# Patient Record
Sex: MALE | HISPANIC OR LATINO | Employment: FULL TIME | URBAN - METROPOLITAN AREA
[De-identification: names, ages, dates, MRNs, and addresses within clinical notes are randomized per-mention and may not be internally consistent; named-entity substitution may affect disease eponyms.]

---

## 2023-03-04 ENCOUNTER — APPOINTMENT (OUTPATIENT)
Dept: RADIOLOGY | Facility: MEDICAL CENTER | Age: 58
End: 2023-03-04
Attending: EMERGENCY MEDICINE

## 2023-03-04 ENCOUNTER — HOSPITAL ENCOUNTER (EMERGENCY)
Facility: MEDICAL CENTER | Age: 58
End: 2023-03-04
Attending: EMERGENCY MEDICINE
Payer: OTHER MISCELLANEOUS

## 2023-03-04 VITALS
DIASTOLIC BLOOD PRESSURE: 89 MMHG | RESPIRATION RATE: 18 BRPM | WEIGHT: 124.12 LBS | OXYGEN SATURATION: 96 % | HEIGHT: 65 IN | TEMPERATURE: 97.5 F | BODY MASS INDEX: 20.68 KG/M2 | SYSTOLIC BLOOD PRESSURE: 121 MMHG | HEART RATE: 79 BPM

## 2023-03-04 DIAGNOSIS — I50.9 ACUTE ON CHRONIC CONGESTIVE HEART FAILURE, UNSPECIFIED HEART FAILURE TYPE (HCC): ICD-10-CM

## 2023-03-04 DIAGNOSIS — N17.9 AKI (ACUTE KIDNEY INJURY) (HCC): ICD-10-CM

## 2023-03-04 LAB
ALBUMIN SERPL BCP-MCNC: 4.1 G/DL (ref 3.2–4.9)
ALBUMIN/GLOB SERPL: 1.2 G/DL
ALP SERPL-CCNC: 115 U/L (ref 30–99)
ALT SERPL-CCNC: 96 U/L (ref 2–50)
ANION GAP SERPL CALC-SCNC: 14 MMOL/L (ref 7–16)
AST SERPL-CCNC: 74 U/L (ref 12–45)
BASOPHILS # BLD AUTO: 0.9 % (ref 0–1.8)
BASOPHILS # BLD: 0.06 K/UL (ref 0–0.12)
BILIRUB SERPL-MCNC: 1.3 MG/DL (ref 0.1–1.5)
BUN SERPL-MCNC: 23 MG/DL (ref 8–22)
CALCIUM ALBUM COR SERPL-MCNC: 9 MG/DL (ref 8.5–10.5)
CALCIUM SERPL-MCNC: 9.1 MG/DL (ref 8.5–10.5)
CHLORIDE SERPL-SCNC: 105 MMOL/L (ref 96–112)
CO2 SERPL-SCNC: 19 MMOL/L (ref 20–33)
CREAT SERPL-MCNC: 1.42 MG/DL (ref 0.5–1.4)
EKG IMPRESSION: NORMAL
EOSINOPHIL # BLD AUTO: 0.16 K/UL (ref 0–0.51)
EOSINOPHIL NFR BLD: 2.4 % (ref 0–6.9)
ERYTHROCYTE [DISTWIDTH] IN BLOOD BY AUTOMATED COUNT: 58.8 FL (ref 35.9–50)
FLUAV RNA SPEC QL NAA+PROBE: NEGATIVE
FLUBV RNA SPEC QL NAA+PROBE: NEGATIVE
GFR SERPLBLD CREATININE-BSD FMLA CKD-EPI: 57 ML/MIN/1.73 M 2
GLOBULIN SER CALC-MCNC: 3.3 G/DL (ref 1.9–3.5)
GLUCOSE SERPL-MCNC: 130 MG/DL (ref 65–99)
HCT VFR BLD AUTO: 49.1 % (ref 42–52)
HGB BLD-MCNC: 15.8 G/DL (ref 14–18)
IMM GRANULOCYTES # BLD AUTO: 0.02 K/UL (ref 0–0.11)
IMM GRANULOCYTES NFR BLD AUTO: 0.3 % (ref 0–0.9)
LYMPHOCYTES # BLD AUTO: 1.31 K/UL (ref 1–4.8)
LYMPHOCYTES NFR BLD: 19.6 % (ref 22–41)
MCH RBC QN AUTO: 29.8 PG (ref 27–33)
MCHC RBC AUTO-ENTMCNC: 32.2 G/DL (ref 33.7–35.3)
MCV RBC AUTO: 92.6 FL (ref 81.4–97.8)
MONOCYTES # BLD AUTO: 0.5 K/UL (ref 0–0.85)
MONOCYTES NFR BLD AUTO: 7.5 % (ref 0–13.4)
NEUTROPHILS # BLD AUTO: 4.62 K/UL (ref 1.82–7.42)
NEUTROPHILS NFR BLD: 69.3 % (ref 44–72)
NRBC # BLD AUTO: 0 K/UL
NRBC BLD-RTO: 0 /100 WBC
NT-PROBNP SERPL IA-MCNC: 6542 PG/ML (ref 0–125)
PLATELET # BLD AUTO: 337 K/UL (ref 164–446)
PMV BLD AUTO: 10 FL (ref 9–12.9)
POTASSIUM SERPL-SCNC: 3.9 MMOL/L (ref 3.6–5.5)
PROT SERPL-MCNC: 7.4 G/DL (ref 6–8.2)
RBC # BLD AUTO: 5.3 M/UL (ref 4.7–6.1)
RSV RNA SPEC QL NAA+PROBE: NEGATIVE
SARS-COV-2 RNA RESP QL NAA+PROBE: NOTDETECTED
SODIUM SERPL-SCNC: 138 MMOL/L (ref 135–145)
SPECIMEN SOURCE: NORMAL
TROPONIN T SERPL-MCNC: 34 NG/L (ref 6–19)
TROPONIN T SERPL-MCNC: 40 NG/L (ref 6–19)
WBC # BLD AUTO: 6.7 K/UL (ref 4.8–10.8)

## 2023-03-04 PROCEDURE — 85025 COMPLETE CBC W/AUTO DIFF WBC: CPT

## 2023-03-04 PROCEDURE — 83880 ASSAY OF NATRIURETIC PEPTIDE: CPT

## 2023-03-04 PROCEDURE — 71045 X-RAY EXAM CHEST 1 VIEW: CPT

## 2023-03-04 PROCEDURE — C9803 HOPD COVID-19 SPEC COLLECT: HCPCS | Performed by: EMERGENCY MEDICINE

## 2023-03-04 PROCEDURE — 80053 COMPREHEN METABOLIC PANEL: CPT

## 2023-03-04 PROCEDURE — 84484 ASSAY OF TROPONIN QUANT: CPT

## 2023-03-04 PROCEDURE — 36415 COLL VENOUS BLD VENIPUNCTURE: CPT

## 2023-03-04 PROCEDURE — 93005 ELECTROCARDIOGRAM TRACING: CPT | Performed by: EMERGENCY MEDICINE

## 2023-03-04 PROCEDURE — 99284 EMERGENCY DEPT VISIT MOD MDM: CPT

## 2023-03-04 PROCEDURE — 0241U HCHG SARS-COV-2 COVID-19 NFCT DS RESP RNA 4 TRGT MIC: CPT

## 2023-03-04 PROCEDURE — 93005 ELECTROCARDIOGRAM TRACING: CPT

## 2023-03-04 RX ORDER — POTASSIUM CHLORIDE 20 MEQ/1
40 TABLET, EXTENDED RELEASE ORAL DAILY
Qty: 60 TABLET | Refills: 0 | Status: ON HOLD | OUTPATIENT
Start: 2023-03-04 | End: 2024-02-11

## 2023-03-04 RX ORDER — FUROSEMIDE 40 MG/1
40 TABLET ORAL DAILY
Qty: 30 TABLET | Refills: 0 | Status: SHIPPED | OUTPATIENT
Start: 2023-03-04 | End: 2024-02-06

## 2023-03-04 ASSESSMENT — LIFESTYLE VARIABLES
HAVE PEOPLE ANNOYED YOU BY CRITICIZING YOUR DRINKING: NO
TOTAL SCORE: 0
EVER HAD A DRINK FIRST THING IN THE MORNING TO STEADY YOUR NERVES TO GET RID OF A HANGOVER: NO
HAVE YOU EVER FELT YOU SHOULD CUT DOWN ON YOUR DRINKING: NO
TOTAL SCORE: 0
EVER FELT BAD OR GUILTY ABOUT YOUR DRINKING: NO
DO YOU DRINK ALCOHOL: NO
TOTAL SCORE: 0
CONSUMPTION TOTAL: NEGATIVE
AVERAGE NUMBER OF DAYS PER WEEK YOU HAVE A DRINK CONTAINING ALCOHOL: 0
ON A TYPICAL DAY WHEN YOU DRINK ALCOHOL HOW MANY DRINKS DO YOU HAVE: 0
HOW MANY TIMES IN THE PAST YEAR HAVE YOU HAD 5 OR MORE DRINKS IN A DAY: 0

## 2023-03-04 NOTE — ED TRIAGE NOTES
"Chief Complaint   Patient presents with    Shortness of Breath     Worsening for 1 week, patient was diagnosed with CHF about 1 month ago and was started on lasix. He has been c/o BLE swelling too.        Patient to triage ambulatory with a steady gait, AAOx4, Appropriate precautions in place.     Explained wait time and triage process. Placed back in lobby. Told to notify ED tech or RN of any changes, verbalized understanding.    BP (!) 133/97   Pulse 100   Temp 36.2 °C (97.1 °F) (Temporal)   Resp 16   Ht 1.651 m (5' 5\")   Wt 56.3 kg (124 lb 1.9 oz)   SpO2 96%   BMI 20.65 kg/m²     "

## 2023-03-04 NOTE — ED NOTES
Pt states he has been a daily drinker x '40 years, I would drink 6-12 beers/day but I quit drinking 6 weeks ago', pt denies any current ETOH, denies having gone through withdrawal 6 weeks ago

## 2023-03-04 NOTE — DISCHARGE INSTRUCTIONS
You were seen in the Emergency Department for shortness of breath after not taking your heart failure medications.    EKG, labs were completed without significant acute abnormalities other than signs of ongoing heart failure.  Kidney function was mildly elevated however not worrisome.    Please use 1,000mg of tylenol every 6 hours as needed for pain.    Please start taking your diuretic medication furosemide again along with potassium daily.    Please call for immediate follow-up with cardiology within the next month for recheck of heart and kidney function.    Return to the Emergency Department with chest pain, trouble breathing, lightheadedness or fainting, or other concerns.

## 2023-03-04 NOTE — ED PROVIDER NOTES
ED Provider Note    CHIEF COMPLAINT  Chief Complaint   Patient presents with    Shortness of Breath     Worsening for 1 week, patient was diagnosed with CHF about 1 month ago and was started on lasix. He has been c/o BLE swelling too.        EXTERNAL RECORDS REVIEWED  Patient appears to have admission at [facility] in December 22 for pneumonia and pleural effusion.  No other recent records or available echo    HPI/ROS  LIMITATION TO HISTORY   Select: : None  OUTSIDE HISTORIAN(S):  None    Santino Zabala is a 57 y.o. male with history of congestive heart failure who presents due to shortness of breath.  Patient states that shortness of breath has been worsening over the last week.  He has a history of heart failure that was diagnosed in December at [facility].  He was discharged home on diuretic medications however states he has been taking these infrequently, trying to save them so he will run out.  He has been out of the medication for a few days.  He reports concern for swelling in his lower extremity and abdomen as well.  He denies any real significant chest pain however has had an ongoing cough.  No other fever or infectious symptoms.  Patient states he did see a cardiologist after his admission and had an ultrasound of his heart.  He does have a history of alcohol abuse however has not drank in the last 6 weeks.    PAST MEDICAL HISTORY   has a past medical history of Congestive heart failure (HCC).    SURGICAL HISTORY  patient denies any surgical history    FAMILY HISTORY  History reviewed. No pertinent family history.    SOCIAL HISTORY  Social History     Tobacco Use    Smoking status: Never    Smokeless tobacco: Never   Vaping Use    Vaping Use: Never used   Substance and Sexual Activity    Alcohol use: Not Currently    Drug use: Never    Sexual activity: Not on file       CURRENT MEDICATIONS  Home Medications       Reviewed by Rehana Gupta R.N. (Registered  "Nurse) on 23 at 0742  Med List Status: Partial     Medication Last Dose Status        Patient Raoul Taking any Medications                           ALLERGIES  Not on File    PHYSICAL EXAM  VITAL SIGNS: /89   Pulse 79   Temp 36.4 °C (97.5 °F) (Temporal)   Resp 18   Ht 1.651 m (5' 5\")   Wt 56.3 kg (124 lb 1.9 oz)   SpO2 96%   BMI 20.65 kg/m²    Constitutional: Nontoxic appearing. Alert in no apparent distress.  HENT: Normocephalic, Atraumatic. Bilateral external ears normal. Nose normal.  Moist mucous membranes.  Oropharynx clear.  Eyes: Pupils are equal and reactive. Conjunctiva normal.   Neck: Supple, full range of motion  Heart: Regular rate and rhythm.  No murmurs.    Lungs: No respiratory distress, normal work of breathing. Lungs clear to auscultation bilaterally.  Abdomen Soft, no distention.  No tenderness to palpation.  Musculoskeletal: Atraumatic. No obvious deformities noted.  No lower extremity edema.  Skin: Warm, Dry.  No erythema, No rash.   Neurologic: Alert and oriented x3. Moving all extremities spontaneously without focal deficits.  Psychiatric: Affect normal, Mood normal, Appears appropriate and not intoxicated.      DIAGNOSTIC STUDIES / PROCEDURES  EKG  I have independently interpreted this EKG  Results for orders placed or performed during the hospital encounter of 23   EKG (NOW)   Result Value Ref Range    Report       [facility]    Test Date:  2023  Pt Name:    TREVON HAUSER                Department: ER  MRN:        [id number]                      Room:  Gender:     Male                         Technician: [id number]  :        1965                   Requested By:ER TRIAGE PROTOCOL  Order #:    [id number]                    Reading MD: Yu Cao MD    Measurements  Intervals                                Axis  Rate:       87                           P:          62  FL:         210                          QRS:        " 90  QRSD:       110                          T:          -17  QT:         399  QTc:        480    Interpretive Statements  Sinus rhythm  Prolonged OH interval  Left ventricular hypertrophy  Borderline prolonged QT interval  ST depressions in lateral leads  No STEMI  No previous ECG available for comparison  Electronically Signed On 3-4-2023 8:28:01 PST by Yu Cao MD           LABS  Labs Reviewed   CBC WITH DIFFERENTIAL - Abnormal; Notable for the following components:       Result Value    MCHC 32.2 (*)     RDW 58.8 (*)     Lymphocytes 19.60 (*)     All other components within normal limits    Narrative:     Biotin intake of greater than 5 mg per day may interfere with  troponin levels, causing false low values.   COMP METABOLIC PANEL - Abnormal; Notable for the following components:    Co2 19 (*)     Glucose 130 (*)     Bun 23 (*)     Creatinine 1.42 (*)     AST(SGOT) 74 (*)     ALT(SGPT) 96 (*)     Alkaline Phosphatase 115 (*)     All other components within normal limits    Narrative:     Biotin intake of greater than 5 mg per day may interfere with  troponin levels, causing false low values.   TROPONIN - Abnormal; Notable for the following components:    Troponin T 40 (*)     All other components within normal limits    Narrative:     Biotin intake of greater than 5 mg per day may interfere with  troponin levels, causing false low values.   TROPONIN - Abnormal; Notable for the following components:    Troponin T 34 (*)     All other components within normal limits    Narrative:     Biotin intake of greater than 5 mg per day may interfere with  troponin levels, causing false low values.   ESTIMATED GFR - Abnormal; Notable for the following components:    GFR (CKD-EPI) 57 (*)     All other components within normal limits    Narrative:     Biotin intake of greater than 5 mg per day may interfere with  troponin levels, causing false low values.   PROBRAIN NATRIURETIC PEPTIDE, NT - Abnormal; Notable for the  following components:    NT-proBNP 6542 (*)     All other components within normal limits    Narrative:     Biotin intake of greater than 5 mg per day may interfere with  troponin levels, causing false low values.   COV-2, FLU A/B, AND RSV BY PCR ([employer])   CORRECTED CALCIUM    Narrative:     Biotin intake of greater than 5 mg per day may interfere with  troponin levels, causing false low values.         RADIOLOGY  I have independently interpreted the diagnostic imaging associated with this visit and am waiting the final reading from the radiologist.   My preliminary interpretation is as follows: no infiltrate  Radiologist interpretation:   DX-CHEST-PORTABLE (1 VIEW)   Final Result         1. No pulmonary infiltrates or consolidations are noted.      2. Cardiomegaly.            COURSE & MEDICAL DECISION MAKING    ED Observation Status? Yes; I am placing the patient in to an observation status due to a diagnostic uncertainty as well as therapeutic intensity. Patient placed in observation status at 8:27 AM, 3/4/2023.     Observation plan is as follows: ekg, labs, chest xray    Upon Reevaluation, the patient's condition has: Improved; and will be discharged.    Patient discharged from ED Observation status at 12:00PM (Time) 3/4/23 (Date).     INITIAL ASSESSMENT, COURSE AND PLAN  Care Narrative: Patient with what appears to be congestive heart failure following prior admission a few months ago for pneumonia who is now presenting with increasing shortness of breath and leg swelling in the setting of running out of his furosemide.  He is afebrile with normal vital signs on arrival.  He has no hypoxia or respiratory distress.  His EKG has some nonspecific changes, unclear if they are chronic or not.  He is not having any active symptoms concerning for ACS.  Initial troponin was mildly elevated however repeat improving.  Clinically doubt pulmonary embolism or aortic dissection.  BNP is elevated consistent with congestive  heart failure.  He has cardiomegaly on chest x-ray however no evidence of pulmonary edema or pneumonia.  COVID testing negative.  Labs are otherwise reassuring other than mildly elevated creatinine, unclear of baseline.  No associated electrolyte abnormality, anemia.  Patient can likely be discharged home with refills of his medication and outpatient follow-up with his cardiologist.    12:00 PM - Upon reassessment, patient is resting comfortably with normal vital signs.  No new complaints at this time.  Discussed results with patient and/or family as well as importance of primary care/cardiology follow up.  Patient understands plan of care and strict return precautions for new or changing symptoms.           ADDITIONAL PROBLEM LIST  Problem #1: Acute on chronic congestive heart failure -due to medication noncompliance, no evidence of respiratory distress or hypoxia, will discharge home with refills of furosemide and potassium with instructions on close outpatient follow-up with cardiology.    Problem #2: Acute kidney injury -mild, unclear of baseline, follow-up with cardiology for repeat labs in 1 to 2 weeks      DISPOSITION AND DISCUSSIONS  Escalation of care considered, and ultimately not performed:acute inpatient care management, however at this time, the patient is most appropriate for outpatient management    Barriers to care at this time, including but not limited to: Patient does not have established PCP and Patient lacks financial resources.       DISPOSITION:  Patient will be discharged home in stable condition.    FOLLOW UP:  [facility]-CARDIOLOGY  [address] [id number] [phone]  Schedule an appointment as soon as possible for a visit   call your cardiologist    [facility], Emergency Dept  [address] [facility] [id number] [phone]    If symptoms worsen      OUTPATIENT MEDICATIONS:  Discharge Medication List as of  3/4/2023 11:58 AM        START taking these medications    Details   furosemide (LASIX) 40 MG Tab Take 1 Tablet by mouth every day., Disp-30 Tablet, R-0, Normal      potassium chloride SA (KDUR) 20 MEQ Tab CR Take 2 Tablets by mouth every day., Disp-60 Tablet, R-0, Normal               FINAL DIAGNOSIS  1. Acute on chronic congestive heart failure, unspecified heart failure type (HCC)    2. SUZY (acute kidney injury) ([facility])           Electronically signed by: Yu Cao M.D., 3/4/2023 8:25 AM

## 2023-04-09 ENCOUNTER — APPOINTMENT (OUTPATIENT)
Dept: RADIOLOGY | Facility: MEDICAL CENTER | Age: 58
End: 2023-04-09
Attending: EMERGENCY MEDICINE

## 2023-04-09 ENCOUNTER — HOSPITAL ENCOUNTER (EMERGENCY)
Facility: MEDICAL CENTER | Age: 58
End: 2023-04-09
Attending: EMERGENCY MEDICINE
Payer: OTHER MISCELLANEOUS

## 2023-04-09 VITALS
HEART RATE: 80 BPM | SYSTOLIC BLOOD PRESSURE: 125 MMHG | DIASTOLIC BLOOD PRESSURE: 92 MMHG | RESPIRATION RATE: 16 BRPM | TEMPERATURE: 98 F | BODY MASS INDEX: 20.02 KG/M2 | WEIGHT: 120.15 LBS | OXYGEN SATURATION: 98 % | HEIGHT: 65 IN

## 2023-04-09 DIAGNOSIS — M25.532 LEFT WRIST PAIN: ICD-10-CM

## 2023-04-09 DIAGNOSIS — M10.9 ACUTE GOUT OF LEFT WRIST, UNSPECIFIED CAUSE: ICD-10-CM

## 2023-04-09 DIAGNOSIS — E79.0 HYPERURICEMIA: ICD-10-CM

## 2023-04-09 LAB
ALBUMIN SERPL BCP-MCNC: 4 G/DL (ref 3.2–4.9)
ALBUMIN/GLOB SERPL: 1.1 G/DL
ALP SERPL-CCNC: 87 U/L (ref 30–99)
ALT SERPL-CCNC: 19 U/L (ref 2–50)
ANION GAP SERPL CALC-SCNC: 14 MMOL/L (ref 7–16)
AST SERPL-CCNC: 17 U/L (ref 12–45)
BASOPHILS # BLD AUTO: 0.2 % (ref 0–1.8)
BASOPHILS # BLD: 0.02 K/UL (ref 0–0.12)
BILIRUB SERPL-MCNC: 2.5 MG/DL (ref 0.1–1.5)
BUN SERPL-MCNC: 14 MG/DL (ref 8–22)
CALCIUM ALBUM COR SERPL-MCNC: 9.1 MG/DL (ref 8.5–10.5)
CALCIUM SERPL-MCNC: 9.1 MG/DL (ref 8.5–10.5)
CHLORIDE SERPL-SCNC: 101 MMOL/L (ref 96–112)
CO2 SERPL-SCNC: 23 MMOL/L (ref 20–33)
CREAT SERPL-MCNC: 0.9 MG/DL (ref 0.5–1.4)
EOSINOPHIL # BLD AUTO: 0.01 K/UL (ref 0–0.51)
EOSINOPHIL NFR BLD: 0.1 % (ref 0–6.9)
ERYTHROCYTE [DISTWIDTH] IN BLOOD BY AUTOMATED COUNT: 57.3 FL (ref 35.9–50)
GFR SERPLBLD CREATININE-BSD FMLA CKD-EPI: 99 ML/MIN/1.73 M 2
GLOBULIN SER CALC-MCNC: 3.7 G/DL (ref 1.9–3.5)
GLUCOSE SERPL-MCNC: 135 MG/DL (ref 65–99)
HCT VFR BLD AUTO: 46 % (ref 42–52)
HGB BLD-MCNC: 14.8 G/DL (ref 14–18)
IMM GRANULOCYTES # BLD AUTO: 0.04 K/UL (ref 0–0.11)
IMM GRANULOCYTES NFR BLD AUTO: 0.4 % (ref 0–0.9)
LYMPHOCYTES # BLD AUTO: 0.8 K/UL (ref 1–4.8)
LYMPHOCYTES NFR BLD: 8.1 % (ref 22–41)
MCH RBC QN AUTO: 30.1 PG (ref 27–33)
MCHC RBC AUTO-ENTMCNC: 32.2 G/DL (ref 33.7–35.3)
MCV RBC AUTO: 93.7 FL (ref 81.4–97.8)
MONOCYTES # BLD AUTO: 0.95 K/UL (ref 0–0.85)
MONOCYTES NFR BLD AUTO: 9.6 % (ref 0–13.4)
NEUTROPHILS # BLD AUTO: 8.08 K/UL (ref 1.82–7.42)
NEUTROPHILS NFR BLD: 81.6 % (ref 44–72)
NRBC # BLD AUTO: 0 K/UL
NRBC BLD-RTO: 0 /100 WBC
PLATELET # BLD AUTO: 266 K/UL (ref 164–446)
PMV BLD AUTO: 10.2 FL (ref 9–12.9)
POTASSIUM SERPL-SCNC: 3.7 MMOL/L (ref 3.6–5.5)
PROT SERPL-MCNC: 7.7 G/DL (ref 6–8.2)
RBC # BLD AUTO: 4.91 M/UL (ref 4.7–6.1)
SODIUM SERPL-SCNC: 138 MMOL/L (ref 135–145)
URATE SERPL-MCNC: 10.7 MG/DL (ref 2.5–8.3)
WBC # BLD AUTO: 9.9 K/UL (ref 4.8–10.8)

## 2023-04-09 PROCEDURE — 96375 TX/PRO/DX INJ NEW DRUG ADDON: CPT

## 2023-04-09 PROCEDURE — 85025 COMPLETE CBC W/AUTO DIFF WBC: CPT

## 2023-04-09 PROCEDURE — 84550 ASSAY OF BLOOD/URIC ACID: CPT

## 2023-04-09 PROCEDURE — 96374 THER/PROPH/DIAG INJ IV PUSH: CPT

## 2023-04-09 PROCEDURE — 700111 HCHG RX REV CODE 636 W/ 250 OVERRIDE (IP): Performed by: EMERGENCY MEDICINE

## 2023-04-09 PROCEDURE — 36415 COLL VENOUS BLD VENIPUNCTURE: CPT

## 2023-04-09 PROCEDURE — 80053 COMPREHEN METABOLIC PANEL: CPT

## 2023-04-09 PROCEDURE — 99284 EMERGENCY DEPT VISIT MOD MDM: CPT

## 2023-04-09 PROCEDURE — 73110 X-RAY EXAM OF WRIST: CPT | Mod: LT

## 2023-04-09 RX ORDER — KETOROLAC TROMETHAMINE 30 MG/ML
30 INJECTION, SOLUTION INTRAMUSCULAR; INTRAVENOUS ONCE
Status: COMPLETED | OUTPATIENT
Start: 2023-04-09 | End: 2023-04-09

## 2023-04-09 RX ORDER — MORPHINE SULFATE 4 MG/ML
4 INJECTION INTRAVENOUS ONCE
Status: COMPLETED | OUTPATIENT
Start: 2023-04-09 | End: 2023-04-09

## 2023-04-09 RX ORDER — ONDANSETRON 2 MG/ML
4 INJECTION INTRAMUSCULAR; INTRAVENOUS ONCE
Status: COMPLETED | OUTPATIENT
Start: 2023-04-09 | End: 2023-04-09

## 2023-04-09 RX ORDER — PREDNISONE 20 MG/1
50 TABLET ORAL DAILY
Qty: 13 TABLET | Refills: 0 | Status: SHIPPED | OUTPATIENT
Start: 2023-04-09 | End: 2023-04-14

## 2023-04-09 RX ORDER — HYDROCODONE BITARTRATE AND ACETAMINOPHEN 5; 325 MG/1; MG/1
1-2 TABLET ORAL EVERY 6 HOURS PRN
Qty: 16 TABLET | Refills: 0 | Status: SHIPPED | OUTPATIENT
Start: 2023-04-09 | End: 2023-04-14

## 2023-04-09 RX ADMIN — KETOROLAC TROMETHAMINE 30 MG: 30 INJECTION, SOLUTION INTRAMUSCULAR at 12:25

## 2023-04-09 RX ADMIN — ONDANSETRON 4 MG: 2 INJECTION INTRAMUSCULAR; INTRAVENOUS at 12:25

## 2023-04-09 RX ADMIN — MORPHINE SULFATE 4 MG: 4 INJECTION, SOLUTION INTRAMUSCULAR; INTRAVENOUS at 12:25

## 2023-04-09 ASSESSMENT — PAIN DESCRIPTION - PAIN TYPE: TYPE: ACUTE PAIN

## 2023-04-09 ASSESSMENT — FIBROSIS 4 INDEX: FIB4 SCORE: 1.3

## 2023-04-09 NOTE — ED TRIAGE NOTES
Chief Complaint   Patient presents with    Arm Pain     C/o pain in left arm since Friday describes as sharp then worse today and also became numb. Rates pain as 8/10     Also noticed redness in left arm since Friday and warm to touch. Educated on triage process. Instructed to notify staff for any worsening symptoms. Denies any recent travel. Denies exposure to known covid positive patients. Denies any respiratory symptoms.

## 2023-04-09 NOTE — Clinical Note
Santino Zabala was seen and treated in our emergency department on 4/9/2023.  He may return to work on 04/16/2023.       If you have any questions or concerns, please don't hesitate to call.      Abdias Serrano M.D.

## 2023-04-09 NOTE — DISCHARGE INSTRUCTIONS
Follow-up with orthopedics regarding left wrist pain.  Please return for fever, uncontrollable pain or any concerns.

## 2023-04-09 NOTE — ED PROVIDER NOTES
ED Provider Note    CHIEF COMPLAINT  Chief Complaint   Patient presents with    Arm Pain     C/o pain in left arm since Friday describes as sharp then worse today and also became numb. Rates pain as 8/10       EXTERNAL RECORDS REVIEWED  External ED Note from December 2022, emergency department note for evaluation of pleural effusions and shortness of breath    HPI/ROS  LIMITATION TO HISTORY   Select: : None  OUTSIDE HISTORIAN(S):  Parent patient's mother at the bedside for discussion of symptoms    Santino Zabala is a 57 y.o. male who presents with complaint of left wrist and shoulder pain onset at work.  He states it was not related to an accident.  He has had the same job over the past 7 years, states it is very physical and he uses his arms for work but there was no direct injury below her fall.  Onset of pain was while at work, he has developed some swelling.  Pain is described as severe to the dorsum of the left wrist.  Gout runs in his family although states no history personally.  Patient has history of CHF, takes Lasix and potassium.  He states he is compliant with his medications.  No shortness of breath or cough today.  No chest pain.  Pain is described as severe with any movement of the left wrist or touch to the left wrist.    PAST MEDICAL HISTORY   has a past medical history of Congestive heart failure (HCC).    SURGICAL HISTORY  patient denies any surgical history    FAMILY HISTORY  No family history on file.    SOCIAL HISTORY  Social History     Tobacco Use    Smoking status: Never    Smokeless tobacco: Never   Vaping Use    Vaping Use: Never used   Substance and Sexual Activity    Alcohol use: Not Currently    Drug use: Never    Sexual activity: Not on file       CURRENT MEDICATIONS  Home Medications       Reviewed by Bridget Patel R.N. (Registered Nurse) on 04/09/23 at 1057  Med List Status: Partial     Medication Last Dose Status   furosemide (LASIX) 40 MG Tab  Active   potassium chloride SA  "(KDUR) 20 MEQ Tab CR  Active                    ALLERGIES  No Known Allergies    PHYSICAL EXAM  VITAL SIGNS: /80   Pulse (!) 105   Temp 36.8 °C (98.3 °F) (Temporal)   Resp 12   Ht 1.651 m (5' 5\")   Wt 54.5 kg (120 lb 2.4 oz)   SpO2 98%   BMI 19.99 kg/m²    Skin: Slight swelling with faint erythematous change to the dorsum of the left wrist.  No bruising, no laceration or abrasion.  No palpable cords.  No proximal edema of the arm or forearm  Vascular: Normal pulse left wrist, normal capillary refill left hand  Neurologic: Sensation intact left hand.  Strength is limited by pain with movement of the fingers  Musculoskeletal: Tenderness left wrist, no deformity.  Left elbow and left shoulder are nontender.  Chest wall nontender, spine nontender    DIAGNOSTIC STUDIES / PROCEDURES      LABS  Results for orders placed or performed during the hospital encounter of 04/09/23   CBC WITH DIFFERENTIAL   Result Value Ref Range    WBC 9.9 4.8 - 10.8 K/uL    RBC 4.91 4.70 - 6.10 M/uL    Hemoglobin 14.8 14.0 - 18.0 g/dL    Hematocrit 46.0 42.0 - 52.0 %    MCV 93.7 81.4 - 97.8 fL    MCH 30.1 27.0 - 33.0 pg    MCHC 32.2 (L) 33.7 - 35.3 g/dL    RDW 57.3 (H) 35.9 - 50.0 fL    Platelet Count 266 164 - 446 K/uL    MPV 10.2 9.0 - 12.9 fL    Neutrophils-Polys 81.60 (H) 44.00 - 72.00 %    Lymphocytes 8.10 (L) 22.00 - 41.00 %    Monocytes 9.60 0.00 - 13.40 %    Eosinophils 0.10 0.00 - 6.90 %    Basophils 0.20 0.00 - 1.80 %    Immature Granulocytes 0.40 0.00 - 0.90 %    Nucleated RBC 0.00 /100 WBC    Neutrophils (Absolute) 8.08 (H) 1.82 - 7.42 K/uL    Lymphs (Absolute) 0.80 (L) 1.00 - 4.80 K/uL    Monos (Absolute) 0.95 (H) 0.00 - 0.85 K/uL    Eos (Absolute) 0.01 0.00 - 0.51 K/uL    Baso (Absolute) 0.02 0.00 - 0.12 K/uL    Immature Granulocytes (abs) 0.04 0.00 - 0.11 K/uL    NRBC (Absolute) 0.00 K/uL   COMP METABOLIC PANEL   Result Value Ref Range    Sodium 138 135 - 145 mmol/L    Potassium 3.7 3.6 - 5.5 mmol/L    Chloride 101 96 " - 112 mmol/L    Co2 23 20 - 33 mmol/L    Anion Gap 14.0 7.0 - 16.0    Glucose 135 (H) 65 - 99 mg/dL    Bun 14 8 - 22 mg/dL    Creatinine 0.90 0.50 - 1.40 mg/dL    Calcium 9.1 8.5 - 10.5 mg/dL    AST(SGOT) 17 12 - 45 U/L    ALT(SGPT) 19 2 - 50 U/L    Alkaline Phosphatase 87 30 - 99 U/L    Total Bilirubin 2.5 (H) 0.1 - 1.5 mg/dL    Albumin 4.0 3.2 - 4.9 g/dL    Total Protein 7.7 6.0 - 8.2 g/dL    Globulin 3.7 (H) 1.9 - 3.5 g/dL    A-G Ratio 1.1 g/dL   URIC ACID   Result Value Ref Range    Uric Acid 10.7 (H) 2.5 - 8.3 mg/dL   CORRECTED CALCIUM   Result Value Ref Range    Correct Calcium 9.1 8.5 - 10.5 mg/dL   ESTIMATED GFR   Result Value Ref Range    GFR (CKD-EPI) 99 >60 mL/min/1.73 m 2         RADIOLOGY  I have independently interpreted the diagnostic imaging associated with this visit and am waiting the final reading from the radiologist.   My preliminary interpretation is as follows: No acute fracture or dislocation on x-ray of the left wrist  Radiologist interpretation:   DX-WRIST-COMPLETE 3+ LEFT   Final Result      Unremarkable wrist/hand series.            COURSE & MEDICAL DECISION MAKING    ED Observation Status? Yes; I am placing the patient in to an observation status due to a diagnostic uncertainty as well as therapeutic intensity. Patient placed in observation status at 11:53 AM, 4/9/2023.     Observation plan is as follows: Serial physical examination, laboratory eval meant and radiographic imaging for left wrist pain which is atraumatic    Upon Reevaluation, the patient's condition has: Improved; and will be discharged.    Patient discharged from ED Observation status at 2 PM (Time) April 9, 2023 (Date).     INITIAL ASSESSMENT, COURSE AND PLAN  Care Narrative: Patient presents with atraumatic pain of his left wrist.  Is possible this is pain related to overuse although without change in his work or any other new activities, this seems less likely.  Patient was found to have elevated uric acid level,  states family history of gout.  Given the nature of his pain, the severity, and location, suspect t gout as the etiology to his discomfort.  Patient will be treated with prednisone for anti-inflammatory purposes, he requested pain medication, is prescribed Norco.  I have informed the patient he may require alternative treatments to gout in the future.  He has been referred to orthopedics regarding his left wrist pain.  A splint to the left wrist was considered however patient would like to avoid stiffening of the wrist and requested no splint be placed.  Patient is afebrile, normal white blood cell count, this does not appear to be a septic arthritis.  However, the possibility this was discussed with the patient and need for him to return for reevaluation should his symptoms not improved.        ADDITIONAL PROBLEM LIST  History of congestive heart failure: Clear lung sounds today with normal pulse oximetry and no respiratory distress, no further work-up was ordered on this.    DISPOSITION AND DISCUSSIONS    Escalation of care considered, and ultimately not performed:acute inpatient care management, however at this time, the patient is most appropriate for outpatient management    Barriers to care at this time, including but not limited to: Patient does not have established PCP.     Decision tools and prescription drugs considered including, but not limited to: Antibiotics not indicated, suspect crystal induced arthritis .    FINAL DIAGNOSIS  1. Left wrist pain    2. Acute gout of left wrist, unspecified cause    3. Hyperuricemia           Electronically signed by: Abdias Serrano M.D., 4/9/2023 11:53 AM

## 2024-01-24 ENCOUNTER — HOSPITAL ENCOUNTER (EMERGENCY)
Facility: MEDICAL CENTER | Age: 59
End: 2024-01-24
Attending: EMERGENCY MEDICINE
Payer: OTHER MISCELLANEOUS

## 2024-01-24 ENCOUNTER — APPOINTMENT (OUTPATIENT)
Dept: RADIOLOGY | Facility: MEDICAL CENTER | Age: 59
End: 2024-01-24
Attending: EMERGENCY MEDICINE

## 2024-01-24 VITALS
HEART RATE: 82 BPM | DIASTOLIC BLOOD PRESSURE: 91 MMHG | RESPIRATION RATE: 16 BRPM | HEIGHT: 65 IN | TEMPERATURE: 97 F | WEIGHT: 130 LBS | OXYGEN SATURATION: 100 % | BODY MASS INDEX: 21.66 KG/M2 | SYSTOLIC BLOOD PRESSURE: 117 MMHG

## 2024-01-24 DIAGNOSIS — R79.89 ELEVATED LFTS: ICD-10-CM

## 2024-01-24 DIAGNOSIS — M79.89 LEG SWELLING: ICD-10-CM

## 2024-01-24 DIAGNOSIS — R10.13 EPIGASTRIC PAIN: ICD-10-CM

## 2024-01-24 LAB
ALBUMIN SERPL BCP-MCNC: 4 G/DL (ref 3.2–4.9)
ALBUMIN/GLOB SERPL: 1.1 G/DL
ALP SERPL-CCNC: 129 U/L (ref 30–99)
ALT SERPL-CCNC: 170 U/L (ref 2–50)
ANION GAP SERPL CALC-SCNC: 16 MMOL/L (ref 7–16)
ANION GAP SERPL CALC-SCNC: 20 MMOL/L (ref 7–16)
AST SERPL-CCNC: 156 U/L (ref 12–45)
BASOPHILS # BLD AUTO: 0.7 % (ref 0–1.8)
BASOPHILS # BLD: 0.04 K/UL (ref 0–0.12)
BILIRUB SERPL-MCNC: 1.8 MG/DL (ref 0.1–1.5)
BUN SERPL-MCNC: 28 MG/DL (ref 8–22)
BUN SERPL-MCNC: 30 MG/DL (ref 8–22)
CALCIUM ALBUM COR SERPL-MCNC: 8.9 MG/DL (ref 8.5–10.5)
CALCIUM SERPL-MCNC: 8.6 MG/DL (ref 8.5–10.5)
CALCIUM SERPL-MCNC: 8.9 MG/DL (ref 8.5–10.5)
CHLORIDE SERPL-SCNC: 100 MMOL/L (ref 96–112)
CHLORIDE SERPL-SCNC: 102 MMOL/L (ref 96–112)
CO2 SERPL-SCNC: 16 MMOL/L (ref 20–33)
CO2 SERPL-SCNC: 18 MMOL/L (ref 20–33)
CREAT SERPL-MCNC: 1.13 MG/DL (ref 0.5–1.4)
CREAT SERPL-MCNC: 1.38 MG/DL (ref 0.5–1.4)
EKG IMPRESSION: NORMAL
EOSINOPHIL # BLD AUTO: 0.02 K/UL (ref 0–0.51)
EOSINOPHIL NFR BLD: 0.4 % (ref 0–6.9)
ERYTHROCYTE [DISTWIDTH] IN BLOOD BY AUTOMATED COUNT: 56.3 FL (ref 35.9–50)
ETHANOL BLD-MCNC: <10.1 MG/DL
GFR SERPLBLD CREATININE-BSD FMLA CKD-EPI: 59 ML/MIN/1.73 M 2
GFR SERPLBLD CREATININE-BSD FMLA CKD-EPI: 75 ML/MIN/1.73 M 2
GLOBULIN SER CALC-MCNC: 3.5 G/DL (ref 1.9–3.5)
GLUCOSE SERPL-MCNC: 107 MG/DL (ref 65–99)
GLUCOSE SERPL-MCNC: 120 MG/DL (ref 65–99)
HCT VFR BLD AUTO: 50.5 % (ref 42–52)
HGB BLD-MCNC: 16.9 G/DL (ref 14–18)
IMM GRANULOCYTES # BLD AUTO: 0.01 K/UL (ref 0–0.11)
IMM GRANULOCYTES NFR BLD AUTO: 0.2 % (ref 0–0.9)
LACTATE SERPL-SCNC: 3.1 MMOL/L (ref 0.5–2)
LIPASE SERPL-CCNC: 23 U/L (ref 11–82)
LYMPHOCYTES # BLD AUTO: 1.03 K/UL (ref 1–4.8)
LYMPHOCYTES NFR BLD: 18.4 % (ref 22–41)
MCH RBC QN AUTO: 32.2 PG (ref 27–33)
MCHC RBC AUTO-ENTMCNC: 33.5 G/DL (ref 32.3–36.5)
MCV RBC AUTO: 96.2 FL (ref 81.4–97.8)
MONOCYTES # BLD AUTO: 0.34 K/UL (ref 0–0.85)
MONOCYTES NFR BLD AUTO: 6.1 % (ref 0–13.4)
NEUTROPHILS # BLD AUTO: 4.17 K/UL (ref 1.82–7.42)
NEUTROPHILS NFR BLD: 74.2 % (ref 44–72)
NRBC # BLD AUTO: 0 K/UL
NRBC BLD-RTO: 0 /100 WBC (ref 0–0.2)
NT-PROBNP SERPL IA-MCNC: ABNORMAL PG/ML (ref 0–125)
PLATELET # BLD AUTO: 232 K/UL (ref 164–446)
PMV BLD AUTO: 11.1 FL (ref 9–12.9)
POTASSIUM SERPL-SCNC: 4.5 MMOL/L (ref 3.6–5.5)
POTASSIUM SERPL-SCNC: 5 MMOL/L (ref 3.6–5.5)
PROT SERPL-MCNC: 7.5 G/DL (ref 6–8.2)
RBC # BLD AUTO: 5.25 M/UL (ref 4.7–6.1)
SODIUM SERPL-SCNC: 136 MMOL/L (ref 135–145)
SODIUM SERPL-SCNC: 136 MMOL/L (ref 135–145)
TROPONIN T SERPL-MCNC: 42 NG/L (ref 6–19)
TROPONIN T SERPL-MCNC: 42 NG/L (ref 6–19)
WBC # BLD AUTO: 5.6 K/UL (ref 4.8–10.8)

## 2024-01-24 PROCEDURE — 85025 COMPLETE CBC W/AUTO DIFF WBC: CPT

## 2024-01-24 PROCEDURE — 84484 ASSAY OF TROPONIN QUANT: CPT

## 2024-01-24 PROCEDURE — 700102 HCHG RX REV CODE 250 W/ 637 OVERRIDE(OP): Performed by: EMERGENCY MEDICINE

## 2024-01-24 PROCEDURE — 80048 BASIC METABOLIC PNL TOTAL CA: CPT

## 2024-01-24 PROCEDURE — 80053 COMPREHEN METABOLIC PANEL: CPT

## 2024-01-24 PROCEDURE — A9270 NON-COVERED ITEM OR SERVICE: HCPCS | Performed by: EMERGENCY MEDICINE

## 2024-01-24 PROCEDURE — 93005 ELECTROCARDIOGRAM TRACING: CPT

## 2024-01-24 PROCEDURE — 700105 HCHG RX REV CODE 258: Performed by: EMERGENCY MEDICINE

## 2024-01-24 PROCEDURE — 36415 COLL VENOUS BLD VENIPUNCTURE: CPT

## 2024-01-24 PROCEDURE — 83605 ASSAY OF LACTIC ACID: CPT

## 2024-01-24 PROCEDURE — 96374 THER/PROPH/DIAG INJ IV PUSH: CPT

## 2024-01-24 PROCEDURE — 71045 X-RAY EXAM CHEST 1 VIEW: CPT

## 2024-01-24 PROCEDURE — 83690 ASSAY OF LIPASE: CPT

## 2024-01-24 PROCEDURE — 93005 ELECTROCARDIOGRAM TRACING: CPT | Performed by: EMERGENCY MEDICINE

## 2024-01-24 PROCEDURE — 99285 EMERGENCY DEPT VISIT HI MDM: CPT

## 2024-01-24 PROCEDURE — 700111 HCHG RX REV CODE 636 W/ 250 OVERRIDE (IP): Performed by: EMERGENCY MEDICINE

## 2024-01-24 PROCEDURE — 82077 ASSAY SPEC XCP UR&BREATH IA: CPT

## 2024-01-24 PROCEDURE — 83880 ASSAY OF NATRIURETIC PEPTIDE: CPT

## 2024-01-24 RX ORDER — SODIUM CHLORIDE, SODIUM LACTATE, POTASSIUM CHLORIDE, CALCIUM CHLORIDE 600; 310; 30; 20 MG/100ML; MG/100ML; MG/100ML; MG/100ML
250 INJECTION, SOLUTION INTRAVENOUS ONCE
Status: COMPLETED | OUTPATIENT
Start: 2024-01-24 | End: 2024-01-24

## 2024-01-24 RX ORDER — SUCRALFATE ORAL 1 G/10ML
1 SUSPENSION ORAL 4 TIMES DAILY
Qty: 414 ML | Refills: 0 | Status: ON HOLD | OUTPATIENT
Start: 2024-01-24 | End: 2024-02-11

## 2024-01-24 RX ORDER — FUROSEMIDE 10 MG/ML
40 INJECTION INTRAMUSCULAR; INTRAVENOUS ONCE
Status: COMPLETED | OUTPATIENT
Start: 2024-01-24 | End: 2024-01-24

## 2024-01-24 RX ORDER — SODIUM CHLORIDE, SODIUM LACTATE, POTASSIUM CHLORIDE, CALCIUM CHLORIDE 600; 310; 30; 20 MG/100ML; MG/100ML; MG/100ML; MG/100ML
1000 INJECTION, SOLUTION INTRAVENOUS ONCE
Status: DISCONTINUED | OUTPATIENT
Start: 2024-01-24 | End: 2024-01-24

## 2024-01-24 RX ORDER — FAMOTIDINE 20 MG/1
20 TABLET, FILM COATED ORAL 2 TIMES DAILY
Qty: 60 TABLET | Refills: 0 | Status: SHIPPED | OUTPATIENT
Start: 2024-01-24 | End: 2024-02-06

## 2024-01-24 RX ORDER — ALLOPURINOL 100 MG/1
100 TABLET ORAL 2 TIMES DAILY
COMMUNITY

## 2024-01-24 RX ADMIN — SODIUM CHLORIDE, POTASSIUM CHLORIDE, SODIUM LACTATE AND CALCIUM CHLORIDE 250 ML: 600; 310; 30; 20 INJECTION, SOLUTION INTRAVENOUS at 17:41

## 2024-01-24 RX ADMIN — LIDOCAINE HYDROCHLORIDE 30 ML: 20 SOLUTION ORAL; TOPICAL at 17:48

## 2024-01-24 RX ADMIN — FUROSEMIDE 40 MG: 10 INJECTION, SOLUTION INTRAMUSCULAR; INTRAVENOUS at 19:37

## 2024-01-24 ASSESSMENT — FIBROSIS 4 INDEX: FIB4 SCORE: 0.87

## 2024-01-24 NOTE — ED TRIAGE NOTES
".  Chief Complaint   Patient presents with    Chest Pain     Hx chf. Reports seeing Dr Sequeira cardiology at [facility].    Leg Swelling     Pt to triage with brother. Reports chronic cp not improving. Pt is awaiting echo to be done through [facility]. Episodes of pain worse yesterday. Pt anxious to sleep reports \"afraid of dying in my sleep\"  "

## 2024-01-25 NOTE — ED NOTES
"Pt discharged home, pt's brother is picking him up. IV discontinued and gauze placed, pt in possession of belongings. Pt provided discharge education and information pertaining to medications and follow up appointments. Pt received copy of discharge instructions and verbalized understanding. Discussed signs and symptoms to return to the ER, patient verbalized understanding. Pt is A/O x 4, ambulatory with a steady gait.      BP (!) 117/91   Pulse 82   Temp 36.1 °C (97 °F) (Temporal)   Resp 16   Ht 1.651 m (5' 5\")   Wt 59 kg (130 lb)   SpO2 100%   BMI 21.63 kg/m²    "

## 2024-01-25 NOTE — ED PROVIDER NOTES
ED Provider Note    CHIEF COMPLAINT  Chief Complaint   Patient presents with    Chest Pain     Hx chf. Reports seeing Dr Sequeira cardiology at [facility].    Leg Swelling       EXTERNAL RECORDS REVIEWED  Outpatient Notes patient has a history of congestive heart failure as well as alcohol dependence.    HPI/ROS  LIMITATION TO HISTORY   Select: : None  OUTSIDE HISTORIAN(S):  eric    Santino Zabala is a 58 y.o. male who presents to the emergency department with chief complaint of epigastric pain and worsening leg swelling.  He is not feeling any real more short of breath he just having this burning epigastric discomfort the counter radiates to his chest wants to make sure that it is okay.  He is been compliant with his potassium Lasix as well as medication for gout.  He states that he does drink but has been sober for a while now at least has been trying to be.  A friend at the bedside stated is not quite sure but he has not been drinking this week.  Denies any bloody stool or emesis denies any actual chest pain or shortness of breath with exertion.    PAST MEDICAL HISTORY   has a past medical history of Congestive heart failure (HCC).    SURGICAL HISTORY  patient denies any surgical history    FAMILY HISTORY  No family history on file.    SOCIAL HISTORY  Social History     Tobacco Use    Smoking status: Never    Smokeless tobacco: Never   Vaping Use    Vaping Use: Never used   Substance and Sexual Activity    Alcohol use: Not Currently    Drug use: Never    Sexual activity: Not on file       CURRENT MEDICATIONS  Home Medications       Reviewed by Radha Mcmanus R.N. (Registered Nurse) on 01/24/24 at 1447  Med List Status: Partial     Medication Last Dose Status   allopurinol (ZYLOPRIM) 100 MG Tab 1/24/2024 Active   furosemide (LASIX) 40 MG Tab 1/24/2024 Active   potassium chloride SA (KDUR) 20 MEQ Tab CR  Active                    ALLERGIES  No Known Allergies    PHYSICAL EXAM  VITAL SIGNS: /57   Pulse 89    "Temp 35.8 °C (96.5 °F) (Temporal)   Resp 15   Ht 1.651 m (5' 5\")   Wt 59 kg (130 lb)   SpO2 97%   BMI 21.63 kg/m²    Pulse OX: Pulse Oxygen level is within normal limits  Constitutional: Alert in no apparent distress.  HENT: Normocephalic, Atraumatic, MMM  Eyes: PERound. Conjunctiva normal, non-icteric.   Heart: Regular rate and rhythm, intact distal pulses   Lungs: Symmetrical movement, no resp distress no rales or rhonchi  Abdomen: Mild epigastric discomfort no right upper quadrant tenderness, non-distended, normal bowel sounds  EXT/Back 2+ pitting edema bilateral lower extremity ghost about the knees to mid thighs DP pulses 2+  Skin: Warm, Dry, No erythema, No rash.   Neurologic: Alert and oriented, Grossly non-focal.       DIAGNOSTIC STUDIES / PROCEDURES  EKG  I have independently interpreted this EKG  Results for orders placed or performed during the hospital encounter of 24   EKG (NOW)   Result Value Ref Range    Report       [facility] Emergency Dept.    Test Date:  2024  Pt Name:    TREVON HAUSER                Department: ER  MRN:        [id number]                      Room:  Gender:     Male                         Technician: [id number]  :        1965                   Requested By:ER TRIAGE PROTOCOL  Order #:    [id number]                    Reading MD:    Measurements  Intervals                                Axis  Rate:       89                           P:          56  FL:         227                          QRS:        106  QRSD:       117                          T:          -30  QT:         386  QTc:        470    Interpretive Statements  Sinus rhythm  Prolonged FL interval  Left atrial enlargement  Nonspecific intraventricular conduction delay  Anterior infarct, old  Minimal ST elevation, lateral leads  Compared to ECG 2023 07:38:03  Atrial abnormality now present  Intraventricular conduction delay now present  Myocardial infarct finding now " present  Left ventri cular hypertrophy no longer present  ST (T wave) deviation still present           LABS  Labs Reviewed   CBC WITH DIFFERENTIAL - Abnormal; Notable for the following components:       Result Value    RDW 56.3 (*)     Neutrophils-Polys 74.20 (*)     Lymphocytes 18.40 (*)     All other components within normal limits    Narrative:     Biotin intake of greater than 5 mg per day may interfere with  troponin levels, causing false low values.   COMP METABOLIC PANEL - Abnormal; Notable for the following components:    Co2 16 (*)     Anion Gap 20.0 (*)     Glucose 120 (*)     Bun 30 (*)     AST(SGOT) 156 (*)     ALT(SGPT) 170 (*)     Alkaline Phosphatase 129 (*)     Total Bilirubin 1.8 (*)     All other components within normal limits    Narrative:     Biotin intake of greater than 5 mg per day may interfere with  troponin levels, causing false low values.   TROPONIN - Abnormal; Notable for the following components:    Troponin T 42 (*)     All other components within normal limits    Narrative:     Biotin intake of greater than 5 mg per day may interfere with  troponin levels, causing false low values.   ESTIMATED GFR - Abnormal; Notable for the following components:    GFR (CKD-EPI) 59 (*)     All other components within normal limits    Narrative:     Biotin intake of greater than 5 mg per day may interfere with  troponin levels, causing false low values.   LACTIC ACID - Abnormal; Notable for the following components:    Lactic Acid 3.1 (*)     All other components within normal limits   TROPONIN - Abnormal; Notable for the following components:    Troponin T 42 (*)     All other components within normal limits   PROBRAIN NATRIURETIC PEPTIDE, NT - Abnormal; Notable for the following components:    NT-proBNP 82101 (*)     All other components within normal limits   BASIC METABOLIC PANEL - Abnormal; Notable for the following components:    Co2 18 (*)     Glucose 107 (*)     Bun 28 (*)     All other  components within normal limits   DIAGNOSTIC ALCOHOL   LIPASE   ESTIMATED GFR         RADIOLOGY  I have independently interpreted the diagnostic imaging associated with this visit and am waiting the final reading from the radiologist.   My preliminary interpretation is as follows:     Chest x-ray without lobar pneumonia nor effusion nor interstitial edema    Radiologist interpretation:     DX-CHEST-PORTABLE (1 VIEW)   Final Result      1.  Stable multichamber cardiac enlargement.   2.  No lobar pneumonia or overt pulmonary edema.            COURSE & MEDICAL DECISION MAKING    ED Observation Status? No; Patient does not meet criteria for ED Observation.     INITIAL ASSESSMENT, COURSE AND PLAN  Care Narrative:       Patient is a 58-year-old male with a history of CHF as well as alcohol abuse and dependence is presenting with epigastric discomfort that kind of radiates to the chest was burning in nature some leg swelling but has been compliant with his medications.  Will evaluate for atypical angina and fluid overload although is not in short of breath at all renal dysfunction electrolyte abnormalities he will be treated with a GI cocktail for concern for alcoholic gastritis pancreatitis will be evaluated as well.      Preliminary laboratory and Alysis was done prior to my evaluation he does have an anion gap acidosis with little bit of elevated BUN consistent with some mild dehydration LFTs are also elevated which have been so in the past with a more so than normal.  There is no evidence of pancreatitis will evaluate with a GI cocktail small IV fluid bolus and then repeat assessment.    DISPOSITION AND DISCUSSIONS  7:35 PM  I reassessed patient the bedside repeat analysis there is no longer an anion gap he does still have a mild acidosis he is feeling improved.  BUN is also improved.  His proBNP was elevated as expected his troponin is at baseline x 2 he is feeling a lot better after the GI cocktail states that  significantly improved his pain.  He did have a mild lactic acidosis but there is no signs of infectious process.  No pneumonia no fluid overload he ambulatory trial without hypoxia.  He states he is feeling better.  He was given a dose of IV Lasix here in the ED.  As well as instructions to take double the dose for the next 3 mornings with his continued potassium.  He will also be prescribed Pepcid and Carafate and referred to gastroenterology.  With return precautions for any new worsening issues.    HYDRATION: Based on the patient's presentation of Dehydration the patient was given IV fluids. IV Hydration was used because oral hydration was not adequate alone. Upon recheck following hydration, the patient was improved.    I am concerned that administering 30 ml/kg of crystalloid may be harmful to this patient despite the Tachycardia, with heart rate greater than 90 BPM. This patient has concern for causing harm given CHF. We will administer a 250 ml bolus and reassess.             I have discussed management of the patient with the following physicians and FRITZ's:  none    Discussion of management with other QHP or appropriate source(s): None     Escalation of care considered, and ultimately not performed:acute inpatient care management, however at this time, the patient is most appropriate for outpatient management    Barriers to care at this time, including but not limited to:  ba .     Decision tools and prescription drugs considered including, but not limited to: Medication modification increase lasix for the next 3 days , pepcid, carafate .    The patient will return for new or worsening symptoms and is stable at the time of discharge.    The patient is referred to a primary physician for blood pressure management, diabetic screening, and for all other preventative health concerns.    DISPOSITION:  Patient will be discharged home in stable condition.    FOLLOW UP:  [facility], Emergency  Dept  [address] [place] [id number]  [phone]    If symptoms worsen      OUTPATIENT MEDICATIONS:  Discharge Medication List as of 1/24/2024  8:06 PM        START taking these medications    Details   famotidine (PEPCID) 20 MG Tab Take 1 Tablet by mouth 2 times a day., Disp-60 Tablet, R-0, Normal      sucralfate (CARAFATE) 1 GM/10ML Suspension Take 10 mL by mouth 4 times a day., Disp-414 mL, R-0, Normal               FINAL DIAGNOSIS  1. Leg swelling    2. Epigastric pain    3. Elevated LFTs           Electronically signed by: Savannah Lee M.D., 1/24/2024 5:26 PM

## 2024-01-25 NOTE — DISCHARGE INSTRUCTIONS
Take 80mg of the lasix on the morning and 40mg at night for the next three days     Take the medication for your stomach twice a day for two weeks and then once daily  Take the liquid medicine for your stomach with every meal

## 2024-01-25 NOTE — ED NOTES
Bedside report received from Madeleine RN, assumed care of patient.  POC discussed with patient. Call light within reach, all needs addressed at this time.   Fall risk interventions in place: Not Applicable (all applicable per [place] Fall risk assessment)   Continuous monitoring: Cardiac Leads, Pulse Ox, or Blood Pressure  IVF/IV medications: Not Applicable   Oxygen: Room Air  Bedside sitter: Not Applicable   Isolation: Not Applicable    ED tech completing ambulation test with pulse ox.

## 2024-02-06 ENCOUNTER — APPOINTMENT (OUTPATIENT)
Dept: RADIOLOGY | Facility: MEDICAL CENTER | Age: 59
DRG: 286 | End: 2024-02-06
Attending: STUDENT IN AN ORGANIZED HEALTH CARE EDUCATION/TRAINING PROGRAM
Payer: OTHER MISCELLANEOUS

## 2024-02-06 ENCOUNTER — HOSPITAL ENCOUNTER (INPATIENT)
Facility: MEDICAL CENTER | Age: 59
LOS: 5 days | DRG: 286 | End: 2024-02-11
Attending: STUDENT IN AN ORGANIZED HEALTH CARE EDUCATION/TRAINING PROGRAM | Admitting: STUDENT IN AN ORGANIZED HEALTH CARE EDUCATION/TRAINING PROGRAM
Payer: OTHER MISCELLANEOUS

## 2024-02-06 DIAGNOSIS — R79.89 ELEVATED TROPONIN: ICD-10-CM

## 2024-02-06 DIAGNOSIS — I50.41 ACUTE COMBINED SYSTOLIC AND DIASTOLIC HEART FAILURE (HCC): ICD-10-CM

## 2024-02-06 DIAGNOSIS — I50.9 ACUTE ON CHRONIC CONGESTIVE HEART FAILURE, UNSPECIFIED HEART FAILURE TYPE (HCC): ICD-10-CM

## 2024-02-06 DIAGNOSIS — I42.8 NONISCHEMIC CARDIOMYOPATHY (HCC): ICD-10-CM

## 2024-02-06 DIAGNOSIS — R60.1 ANASARCA: ICD-10-CM

## 2024-02-06 PROBLEM — R74.01 TRANSAMINASEMIA: Status: ACTIVE | Noted: 2024-02-06

## 2024-02-06 PROBLEM — R74.01 TRANSAMINASEMIA: Status: RESOLVED | Noted: 2024-02-06 | Resolved: 2024-02-06

## 2024-02-06 PROBLEM — I50.43 CHF (CONGESTIVE HEART FAILURE), NYHA CLASS I, ACUTE ON CHRONIC, COMBINED (HCC): Status: ACTIVE | Noted: 2024-02-06

## 2024-02-06 PROBLEM — E80.6 HYPERBILIRUBINEMIA: Status: ACTIVE | Noted: 2024-02-06

## 2024-02-06 LAB
ALBUMIN SERPL BCP-MCNC: 3.8 G/DL (ref 3.2–4.9)
ALBUMIN/GLOB SERPL: 1 G/DL
ALP SERPL-CCNC: 153 U/L (ref 30–99)
ALT SERPL-CCNC: 55 U/L (ref 2–50)
ANION GAP SERPL CALC-SCNC: 14 MMOL/L (ref 7–16)
APPEARANCE UR: CLEAR
AST SERPL-CCNC: 44 U/L (ref 12–45)
BACTERIA #/AREA URNS HPF: NEGATIVE /HPF
BASOPHILS # BLD AUTO: 0.7 % (ref 0–1.8)
BASOPHILS # BLD: 0.05 K/UL (ref 0–0.12)
BILIRUB SERPL-MCNC: 1.8 MG/DL (ref 0.1–1.5)
BILIRUB UR QL STRIP.AUTO: NEGATIVE
BUN SERPL-MCNC: 24 MG/DL (ref 8–22)
CALCIUM ALBUM COR SERPL-MCNC: 9.6 MG/DL (ref 8.5–10.5)
CALCIUM SERPL-MCNC: 9.4 MG/DL (ref 8.5–10.5)
CHLORIDE SERPL-SCNC: 102 MMOL/L (ref 96–112)
CO2 SERPL-SCNC: 21 MMOL/L (ref 20–33)
COLOR UR: YELLOW
CREAT SERPL-MCNC: 0.95 MG/DL (ref 0.5–1.4)
EKG IMPRESSION: NORMAL
EOSINOPHIL # BLD AUTO: 0.04 K/UL (ref 0–0.51)
EOSINOPHIL NFR BLD: 0.6 % (ref 0–6.9)
EPI CELLS #/AREA URNS HPF: NEGATIVE /HPF
ERYTHROCYTE [DISTWIDTH] IN BLOOD BY AUTOMATED COUNT: 59.2 FL (ref 35.9–50)
EST. AVERAGE GLUCOSE BLD GHB EST-MCNC: 157 MG/DL
GFR SERPLBLD CREATININE-BSD FMLA CKD-EPI: 92 ML/MIN/1.73 M 2
GLOBULIN SER CALC-MCNC: 3.7 G/DL (ref 1.9–3.5)
GLUCOSE SERPL-MCNC: 107 MG/DL (ref 65–99)
GLUCOSE UR STRIP.AUTO-MCNC: NEGATIVE MG/DL
HBA1C MFR BLD: 7.1 % (ref 4–5.6)
HCT VFR BLD AUTO: 46.4 % (ref 42–52)
HGB BLD-MCNC: 15.5 G/DL (ref 14–18)
HYALINE CASTS #/AREA URNS LPF: ABNORMAL /LPF
IMM GRANULOCYTES # BLD AUTO: 0.01 K/UL (ref 0–0.11)
IMM GRANULOCYTES NFR BLD AUTO: 0.1 % (ref 0–0.9)
KETONES UR STRIP.AUTO-MCNC: NEGATIVE MG/DL
LEUKOCYTE ESTERASE UR QL STRIP.AUTO: NEGATIVE
LYMPHOCYTES # BLD AUTO: 1.14 K/UL (ref 1–4.8)
LYMPHOCYTES NFR BLD: 16.1 % (ref 22–41)
MAGNESIUM SERPL-MCNC: 2.3 MG/DL (ref 1.5–2.5)
MCH RBC QN AUTO: 32.2 PG (ref 27–33)
MCHC RBC AUTO-ENTMCNC: 33.4 G/DL (ref 32.3–36.5)
MCV RBC AUTO: 96.5 FL (ref 81.4–97.8)
MICRO URNS: ABNORMAL
MONOCYTES # BLD AUTO: 0.6 K/UL (ref 0–0.85)
MONOCYTES NFR BLD AUTO: 8.5 % (ref 0–13.4)
NEUTROPHILS # BLD AUTO: 5.25 K/UL (ref 1.82–7.42)
NEUTROPHILS NFR BLD: 74 % (ref 44–72)
NITRITE UR QL STRIP.AUTO: NEGATIVE
NRBC # BLD AUTO: 0 K/UL
NRBC BLD-RTO: 0 /100 WBC (ref 0–0.2)
NT-PROBNP SERPL IA-MCNC: ABNORMAL PG/ML (ref 0–125)
PH UR STRIP.AUTO: 5 [PH] (ref 5–8)
PLATELET # BLD AUTO: 231 K/UL (ref 164–446)
PMV BLD AUTO: 10.7 FL (ref 9–12.9)
POTASSIUM SERPL-SCNC: 3.9 MMOL/L (ref 3.6–5.5)
PROT SERPL-MCNC: 7.5 G/DL (ref 6–8.2)
PROT UR QL STRIP: 30 MG/DL
RBC # BLD AUTO: 4.81 M/UL (ref 4.7–6.1)
RBC # URNS HPF: ABNORMAL /HPF
RBC UR QL AUTO: NEGATIVE
SODIUM SERPL-SCNC: 137 MMOL/L (ref 135–145)
SP GR UR STRIP.AUTO: 1.01
TROPONIN T SERPL-MCNC: 38 NG/L (ref 6–19)
UROBILINOGEN UR STRIP.AUTO-MCNC: 0.2 MG/DL
WBC # BLD AUTO: 7.1 K/UL (ref 4.8–10.8)
WBC #/AREA URNS HPF: ABNORMAL /HPF

## 2024-02-06 PROCEDURE — 96375 TX/PRO/DX INJ NEW DRUG ADDON: CPT

## 2024-02-06 PROCEDURE — 36415 COLL VENOUS BLD VENIPUNCTURE: CPT

## 2024-02-06 PROCEDURE — 83036 HEMOGLOBIN GLYCOSYLATED A1C: CPT

## 2024-02-06 PROCEDURE — 71045 X-RAY EXAM CHEST 1 VIEW: CPT

## 2024-02-06 PROCEDURE — 99223 1ST HOSP IP/OBS HIGH 75: CPT | Performed by: STUDENT IN AN ORGANIZED HEALTH CARE EDUCATION/TRAINING PROGRAM

## 2024-02-06 PROCEDURE — 96374 THER/PROPH/DIAG INJ IV PUSH: CPT

## 2024-02-06 PROCEDURE — 99285 EMERGENCY DEPT VISIT HI MDM: CPT

## 2024-02-06 PROCEDURE — 700102 HCHG RX REV CODE 250 W/ 637 OVERRIDE(OP): Performed by: STUDENT IN AN ORGANIZED HEALTH CARE EDUCATION/TRAINING PROGRAM

## 2024-02-06 PROCEDURE — 93005 ELECTROCARDIOGRAM TRACING: CPT | Performed by: STUDENT IN AN ORGANIZED HEALTH CARE EDUCATION/TRAINING PROGRAM

## 2024-02-06 PROCEDURE — 700111 HCHG RX REV CODE 636 W/ 250 OVERRIDE (IP): Performed by: STUDENT IN AN ORGANIZED HEALTH CARE EDUCATION/TRAINING PROGRAM

## 2024-02-06 PROCEDURE — 85025 COMPLETE CBC W/AUTO DIFF WBC: CPT

## 2024-02-06 PROCEDURE — 84484 ASSAY OF TROPONIN QUANT: CPT

## 2024-02-06 PROCEDURE — 80053 COMPREHEN METABOLIC PANEL: CPT

## 2024-02-06 PROCEDURE — 83735 ASSAY OF MAGNESIUM: CPT

## 2024-02-06 PROCEDURE — 96376 TX/PRO/DX INJ SAME DRUG ADON: CPT

## 2024-02-06 PROCEDURE — 83880 ASSAY OF NATRIURETIC PEPTIDE: CPT

## 2024-02-06 PROCEDURE — 770020 HCHG ROOM/CARE - TELE (206)

## 2024-02-06 PROCEDURE — 80074 ACUTE HEPATITIS PANEL: CPT

## 2024-02-06 PROCEDURE — 81001 URINALYSIS AUTO W/SCOPE: CPT

## 2024-02-06 PROCEDURE — A9270 NON-COVERED ITEM OR SERVICE: HCPCS | Performed by: STUDENT IN AN ORGANIZED HEALTH CARE EDUCATION/TRAINING PROGRAM

## 2024-02-06 RX ORDER — FAMOTIDINE 20 MG/1
20 TABLET, FILM COATED ORAL 2 TIMES DAILY
Status: DISCONTINUED | OUTPATIENT
Start: 2024-02-06 | End: 2024-02-11 | Stop reason: HOSPADM

## 2024-02-06 RX ORDER — FUROSEMIDE 40 MG/1
40 TABLET ORAL 2 TIMES DAILY
Status: ON HOLD | COMMUNITY
End: 2024-02-11

## 2024-02-06 RX ORDER — FUROSEMIDE 10 MG/ML
40 INJECTION INTRAMUSCULAR; INTRAVENOUS ONCE
Status: COMPLETED | OUTPATIENT
Start: 2024-02-06 | End: 2024-02-06

## 2024-02-06 RX ORDER — MORPHINE SULFATE 4 MG/ML
4 INJECTION INTRAVENOUS ONCE
Status: COMPLETED | OUTPATIENT
Start: 2024-02-06 | End: 2024-02-06

## 2024-02-06 RX ORDER — ALLOPURINOL 100 MG/1
100 TABLET ORAL 2 TIMES DAILY
Status: DISCONTINUED | OUTPATIENT
Start: 2024-02-06 | End: 2024-02-11 | Stop reason: HOSPADM

## 2024-02-06 RX ORDER — FUROSEMIDE 10 MG/ML
40 INJECTION INTRAMUSCULAR; INTRAVENOUS
Status: DISCONTINUED | OUTPATIENT
Start: 2024-02-06 | End: 2024-02-10

## 2024-02-06 RX ORDER — ENOXAPARIN SODIUM 100 MG/ML
40 INJECTION SUBCUTANEOUS DAILY
Status: DISCONTINUED | OUTPATIENT
Start: 2024-02-07 | End: 2024-02-11 | Stop reason: HOSPADM

## 2024-02-06 RX ADMIN — ALLOPURINOL 100 MG: 100 TABLET ORAL at 21:42

## 2024-02-06 RX ADMIN — FUROSEMIDE 40 MG: 10 INJECTION, SOLUTION INTRAMUSCULAR; INTRAVENOUS at 19:52

## 2024-02-06 RX ADMIN — MORPHINE SULFATE 4 MG: 4 INJECTION, SOLUTION INTRAMUSCULAR; INTRAVENOUS at 18:16

## 2024-02-06 RX ADMIN — FAMOTIDINE 20 MG: 20 TABLET ORAL at 21:42

## 2024-02-06 ASSESSMENT — LIFESTYLE VARIABLES: SUBSTANCE_ABUSE: 0

## 2024-02-06 ASSESSMENT — ENCOUNTER SYMPTOMS
DIARRHEA: 0
CHILLS: 0
ABDOMINAL PAIN: 0
COUGH: 0
BACK PAIN: 0
SHORTNESS OF BREATH: 1
DIZZINESS: 0
NAUSEA: 0
VOMITING: 0
DEPRESSION: 0
FEVER: 0
SPEECH CHANGE: 0
NECK PAIN: 0
STRIDOR: 0
SPUTUM PRODUCTION: 0
SINUS PAIN: 0
ORTHOPNEA: 1
HEMOPTYSIS: 0
PALPITATIONS: 0
CLAUDICATION: 0
HALLUCINATIONS: 0
TREMORS: 0
NERVOUS/ANXIOUS: 0
SENSORY CHANGE: 0
PND: 1
HEADACHES: 0
TINGLING: 0

## 2024-02-06 ASSESSMENT — HEART SCORE
HISTORY: SLIGHTLY SUSPICIOUS
HEART SCORE: 5
TROPONIN: 1-3 TIMES NORMAL LIMIT
AGE: 45-64
RISK FACTORS: >2 RISK FACTORS OR HX OF ATHEROSCLEROTIC DISEASE
ECG: NON-SPECIFIC REPOLARIZATION DISTURBANCE

## 2024-02-06 ASSESSMENT — FIBROSIS 4 INDEX: FIB4 SCORE: 3.04

## 2024-02-06 NOTE — ED TRIAGE NOTES
"   Chief Complaint   Patient presents with    Groin Pain     Groin pain x2 days. States his scrotum is very swollen, \"my balls are as big as softballs\". Reports 7/10 burning pain.     Painful Urination     X1 week. +urgency, +incontinence, +odor        Reports urinary and stool incontinence x1 week.     Patient ambulatory to triage for above complaint. Patient A&Ox4, GCS 15, patient speaking in full sentences. Equal and unlabored respirations. Patient educated on triage process and encouraged to notify staff if condition worsens. Appropriate protocols ordered. Patient returned to the lobby in stable condition.     "

## 2024-02-07 ENCOUNTER — APPOINTMENT (OUTPATIENT)
Dept: CARDIOLOGY | Facility: MEDICAL CENTER | Age: 59
DRG: 286 | End: 2024-02-07
Attending: STUDENT IN AN ORGANIZED HEALTH CARE EDUCATION/TRAINING PROGRAM
Payer: OTHER MISCELLANEOUS

## 2024-02-07 ENCOUNTER — APPOINTMENT (OUTPATIENT)
Dept: RADIOLOGY | Facility: MEDICAL CENTER | Age: 59
DRG: 286 | End: 2024-02-07
Attending: STUDENT IN AN ORGANIZED HEALTH CARE EDUCATION/TRAINING PROGRAM
Payer: OTHER MISCELLANEOUS

## 2024-02-07 PROBLEM — I50.41 ACUTE COMBINED SYSTOLIC AND DIASTOLIC HEART FAILURE (HCC): Status: ACTIVE | Noted: 2024-02-06

## 2024-02-07 LAB
ALBUMIN SERPL BCP-MCNC: 3.6 G/DL (ref 3.2–4.9)
ALBUMIN/GLOB SERPL: 1.1 G/DL
ALP SERPL-CCNC: 133 U/L (ref 30–99)
ALT SERPL-CCNC: 47 U/L (ref 2–50)
ANION GAP SERPL CALC-SCNC: 13 MMOL/L (ref 7–16)
AST SERPL-CCNC: 37 U/L (ref 12–45)
BILIRUB SERPL-MCNC: 1.8 MG/DL (ref 0.1–1.5)
BUN SERPL-MCNC: 25 MG/DL (ref 8–22)
CALCIUM ALBUM COR SERPL-MCNC: 9.7 MG/DL (ref 8.5–10.5)
CALCIUM SERPL-MCNC: 9.4 MG/DL (ref 8.5–10.5)
CHLORIDE SERPL-SCNC: 102 MMOL/L (ref 96–112)
CHOLEST SERPL-MCNC: 134 MG/DL (ref 100–199)
CO2 SERPL-SCNC: 21 MMOL/L (ref 20–33)
CREAT SERPL-MCNC: 0.91 MG/DL (ref 0.5–1.4)
ERYTHROCYTE [DISTWIDTH] IN BLOOD BY AUTOMATED COUNT: 56.1 FL (ref 35.9–50)
GFR SERPLBLD CREATININE-BSD FMLA CKD-EPI: 97 ML/MIN/1.73 M 2
GLOBULIN SER CALC-MCNC: 3.3 G/DL (ref 1.9–3.5)
GLUCOSE SERPL-MCNC: 119 MG/DL (ref 65–99)
HCT VFR BLD AUTO: 42.1 % (ref 42–52)
HDLC SERPL-MCNC: 39 MG/DL
HGB BLD-MCNC: 14.6 G/DL (ref 14–18)
LDLC SERPL CALC-MCNC: 73 MG/DL
LV EJECT FRACT MOD 2C 99903: 50.92
LV EJECT FRACT MOD 4C 99902: 52.19
LV EJECT FRACT MOD BP 99901: 48.15
MCH RBC QN AUTO: 32.6 PG (ref 27–33)
MCHC RBC AUTO-ENTMCNC: 34.7 G/DL (ref 32.3–36.5)
MCV RBC AUTO: 94 FL (ref 81.4–97.8)
PLATELET # BLD AUTO: 230 K/UL (ref 164–446)
PMV BLD AUTO: 10.6 FL (ref 9–12.9)
POTASSIUM SERPL-SCNC: 3.8 MMOL/L (ref 3.6–5.5)
PROT SERPL-MCNC: 6.9 G/DL (ref 6–8.2)
RBC # BLD AUTO: 4.48 M/UL (ref 4.7–6.1)
SODIUM SERPL-SCNC: 136 MMOL/L (ref 135–145)
TRIGL SERPL-MCNC: 108 MG/DL (ref 0–149)
WBC # BLD AUTO: 6.8 K/UL (ref 4.8–10.8)

## 2024-02-07 PROCEDURE — 99233 SBSQ HOSP IP/OBS HIGH 50: CPT | Performed by: STUDENT IN AN ORGANIZED HEALTH CARE EDUCATION/TRAINING PROGRAM

## 2024-02-07 PROCEDURE — 80053 COMPREHEN METABOLIC PANEL: CPT

## 2024-02-07 PROCEDURE — 96376 TX/PRO/DX INJ SAME DRUG ADON: CPT

## 2024-02-07 PROCEDURE — A9270 NON-COVERED ITEM OR SERVICE: HCPCS | Performed by: STUDENT IN AN ORGANIZED HEALTH CARE EDUCATION/TRAINING PROGRAM

## 2024-02-07 PROCEDURE — 700117 HCHG RX CONTRAST REV CODE 255: Performed by: STUDENT IN AN ORGANIZED HEALTH CARE EDUCATION/TRAINING PROGRAM

## 2024-02-07 PROCEDURE — 74170 CT ABD WO CNTRST FLWD CNTRST: CPT

## 2024-02-07 PROCEDURE — 700111 HCHG RX REV CODE 636 W/ 250 OVERRIDE (IP): Performed by: STUDENT IN AN ORGANIZED HEALTH CARE EDUCATION/TRAINING PROGRAM

## 2024-02-07 PROCEDURE — 80061 LIPID PANEL: CPT

## 2024-02-07 PROCEDURE — 85027 COMPLETE CBC AUTOMATED: CPT

## 2024-02-07 PROCEDURE — 36415 COLL VENOUS BLD VENIPUNCTURE: CPT

## 2024-02-07 PROCEDURE — 93306 TTE W/DOPPLER COMPLETE: CPT

## 2024-02-07 PROCEDURE — 93306 TTE W/DOPPLER COMPLETE: CPT | Mod: 26 | Performed by: INTERNAL MEDICINE

## 2024-02-07 PROCEDURE — 700102 HCHG RX REV CODE 250 W/ 637 OVERRIDE(OP): Performed by: STUDENT IN AN ORGANIZED HEALTH CARE EDUCATION/TRAINING PROGRAM

## 2024-02-07 PROCEDURE — 770020 HCHG ROOM/CARE - TELE (206)

## 2024-02-07 PROCEDURE — 76705 ECHO EXAM OF ABDOMEN: CPT

## 2024-02-07 RX ADMIN — FUROSEMIDE 40 MG: 10 INJECTION, SOLUTION INTRAMUSCULAR; INTRAVENOUS at 05:10

## 2024-02-07 RX ADMIN — ALLOPURINOL 100 MG: 100 TABLET ORAL at 20:28

## 2024-02-07 RX ADMIN — FAMOTIDINE 20 MG: 20 TABLET ORAL at 20:28

## 2024-02-07 RX ADMIN — IOHEXOL 100 ML: 350 INJECTION, SOLUTION INTRAVENOUS at 16:00

## 2024-02-07 RX ADMIN — FUROSEMIDE 40 MG: 10 INJECTION, SOLUTION INTRAMUSCULAR; INTRAVENOUS at 15:36

## 2024-02-07 RX ADMIN — ENOXAPARIN SODIUM 40 MG: 100 INJECTION SUBCUTANEOUS at 17:01

## 2024-02-07 RX ADMIN — ALLOPURINOL 100 MG: 100 TABLET ORAL at 08:24

## 2024-02-07 RX ADMIN — FAMOTIDINE 20 MG: 20 TABLET ORAL at 08:24

## 2024-02-07 ASSESSMENT — LIFESTYLE VARIABLES
TOTAL SCORE: 0
ALCOHOL_USE: NO
HOW MANY TIMES IN THE PAST YEAR HAVE YOU HAD 5 OR MORE DRINKS IN A DAY: 0
HAVE PEOPLE ANNOYED YOU BY CRITICIZING YOUR DRINKING: NO
ON A TYPICAL DAY WHEN YOU DRINK ALCOHOL HOW MANY DRINKS DO YOU HAVE: 0
HAVE YOU EVER FELT YOU SHOULD CUT DOWN ON YOUR DRINKING: NO
TOTAL SCORE: 0
HAVE PEOPLE ANNOYED YOU BY CRITICIZING YOUR DRINKING: NO
TOTAL SCORE: 0
AVERAGE NUMBER OF DAYS PER WEEK YOU HAVE A DRINK CONTAINING ALCOHOL: 0
CONSUMPTION TOTAL: INCOMPLETE
TOTAL SCORE: 0
EVER FELT BAD OR GUILTY ABOUT YOUR DRINKING: NO
EVER HAD A DRINK FIRST THING IN THE MORNING TO STEADY YOUR NERVES TO GET RID OF A HANGOVER: NO
CONSUMPTION TOTAL: NEGATIVE
HAVE YOU EVER FELT YOU SHOULD CUT DOWN ON YOUR DRINKING: NO
EVER HAD A DRINK FIRST THING IN THE MORNING TO STEADY YOUR NERVES TO GET RID OF A HANGOVER: NO
ALCOHOL_USE: NO
EVER FELT BAD OR GUILTY ABOUT YOUR DRINKING: NO

## 2024-02-07 ASSESSMENT — PATIENT HEALTH QUESTIONNAIRE - PHQ9
SUM OF ALL RESPONSES TO PHQ9 QUESTIONS 1 AND 2: 0
1. LITTLE INTEREST OR PLEASURE IN DOING THINGS: NOT AT ALL
2. FEELING DOWN, DEPRESSED, IRRITABLE, OR HOPELESS: NOT AT ALL

## 2024-02-07 ASSESSMENT — COGNITIVE AND FUNCTIONAL STATUS - GENERAL
SUGGESTED CMS G CODE MODIFIER DAILY ACTIVITY: CJ
MOBILITY SCORE: 21
SUGGESTED CMS G CODE MODIFIER MOBILITY: CJ
CLIMB 3 TO 5 STEPS WITH RAILING: A LITTLE
STANDING UP FROM CHAIR USING ARMS: A LITTLE
DAILY ACTIVITIY SCORE: 21
TOILETING: A LITTLE
DRESSING REGULAR LOWER BODY CLOTHING: A LITTLE
HELP NEEDED FOR BATHING: A LITTLE
WALKING IN HOSPITAL ROOM: A LITTLE

## 2024-02-07 ASSESSMENT — FIBROSIS 4 INDEX: FIB4 SCORE: 1.38

## 2024-02-07 ASSESSMENT — PAIN DESCRIPTION - PAIN TYPE: TYPE: ACUTE PAIN

## 2024-02-07 ASSESSMENT — ENCOUNTER SYMPTOMS
ABDOMINAL PAIN: 0
SHORTNESS OF BREATH: 1

## 2024-02-07 NOTE — ASSESSMENT & PLAN NOTE
Gallbladder wall thickening without visualized shadowing stones, nonspecific in the setting of hepatocellular disease. Consider acalculous cholecystitis as clinically appropriate. Could be further evaluated with HIDA scan as clinically appropriate     CT abdomen result reviewed noted with heterogeneous liver likely from heart failure, likely cirrhosis, moderate ascites with no pancreatic mass, a small bilateral pleural effusion,

## 2024-02-07 NOTE — ED NOTES
Med Rec complete per patient and RX bottles at bedside   Allergies reviewed  Antibiotics in the past 30 days:no  Anticoagulant in past 14 days:no  Pharmacy patient utilizes:[facility]

## 2024-02-07 NOTE — ED NOTES
Pt asleep in gurney, call light in reach, gurney in lowest position. Attached to monitor. NAD at this time.

## 2024-02-07 NOTE — ED NOTES
BEDSIDE REPORT FROM JULIANA HUANG    PT IS SLEEPING. BREATHING IS EVEN AND UNLABORED, SKIN IS WARM AND DRY, VSS, NAD, MONITORING ONGOING. PT ON O2 2L NC LINE TRACED TO THE WALL.

## 2024-02-07 NOTE — PROGRESS NOTES
Hospital Medicine Daily Progress Note    Date of Service  2/7/2024    Chief Complaint  Santino Zabala is a 58 y.o. male admitted 2/6/2024 with acute CHF    Hospital Course    58 male with past medical history of congestive heart failure following up with Dr. Jimenes at [facility] presented with worsening lower extremity swelling.  Labs noted to have elevated troponin and BNP.  Chest ray noted with vascular congestion.  Echocardiogram noted with ejection fraction 20 to 25%, mildly dilated right ventricle reduced right ventricular systolic function.  Cardiology has been consulted for evaluation      Interval Problem Update    2/7/2024  Patient seen and examined in ED  Currently seems asymptomatic  On exam noted to have severe lower extremity edema, minimal rales appreciated on auscultation.  Labs reviewed CBC, BMP unremarkable, A1c 7.1, bilirubin 1.8 elevated troponin, BNP, EKG with sinus tachycardia, nonspecific ST changes.  Right upper quadrant ultrasound result reviewed noted with possible nodule around pancreatic head  Echocardiogram with EF 20 to 25%, dilated right ventricle, reduced right ventricular function     Cardiology to evaluate for further recommendation      Telemetry monitoring for acute CHF  Lasix 40mg BID, monitor potassium and magnesium level   Supplement O2 as needed  Fluid restriction   Daily strict input and output  Daily weight  Monitor Intake/Output, Daily Weights  Get CT pancreas for evaluation of pancreatic nodule  Repeat BMP in a.m. to monitor electrolytes and toxicity while on high-dose of diuretics  Repeat CBC in a.m. to monitor white count and hemoglobin  Need close monitoring of blood counts, electrolytes and renal function due to potential of organ dysfunction due to acute CHF.      Patient has a high medical complexity, complex decision making and is at high risk of complication, morbidity and mortality       I have discussed this patient's plan of care and discharge plan at  IDT rounds today with Case Management, Nursing, Nursing leadership, and other members of the IDT team.    Consultants/Specialty  cardiology    Code Status  Full Code    Disposition  The patient is not medically cleared for discharge to home or a post-acute facility.  Anticipate discharge to: home with close outpatient follow-up    I have placed the appropriate orders for post-discharge needs.    Review of Systems  Review of Systems   Respiratory:  Positive for shortness of breath.    Cardiovascular:  Positive for leg swelling.   Gastrointestinal:  Negative for abdominal pain.        Physical Exam  Temp:  [36.4 °C (97.6 °F)-37 °C (98.6 °F)] 37 °C (98.6 °F)  Pulse:  [] 101  Resp:  [12-24] 16  BP: ()/(77-95) 132/87  SpO2:  [90 %-100 %] 92 %    Physical Exam  Cardiovascular:      Rate and Rhythm: Normal rate and regular rhythm.      Pulses: Normal pulses.      Heart sounds: Normal heart sounds.   Pulmonary:      Effort: Pulmonary effort is normal. No respiratory distress.      Breath sounds: Rales (Minimal Rales noted) present.   Musculoskeletal:      Right lower leg: Edema present.      Left lower leg: Edema present.   Neurological:      General: No focal deficit present.      Mental Status: He is alert.   Psychiatric:         Mood and Affect: Mood normal.         Fluids    Intake/Output Summary (Last 24 hours) at 2/7/2024 1338  Last data filed at 2/7/2024 1119  Gross per 24 hour   Intake --   Output 2200 ml   Net -2200 ml       Laboratory  Recent Labs     02/06/24 1817 02/07/24  0356   WBC 7.1 6.8   RBC 4.81 4.48*   HEMOGLOBIN 15.5 14.6   HEMATOCRIT 46.4 42.1   MCV 96.5 94.0   MCH 32.2 32.6   MCHC 33.4 34.7   RDW 59.2* 56.1*   PLATELETCT 231 230   MPV 10.7 10.6     Recent Labs     02/06/24 1817 02/07/24  0356   SODIUM 137 136   POTASSIUM 3.9 3.8   CHLORIDE 102 102   CO2 21 21   GLUCOSE 107* 119*   BUN 24* 25*   CREATININE 0.95 0.91   CALCIUM 9.4 9.4             Recent Labs     02/07/24  0356    TRIGLYCERIDE 108   HDL 39*   LDL 73       Imaging  EC-ECHOCARDIOGRAM COMPLETE W/O CONT   Final Result      US-RUQ   Preliminary Result         1.  Gallbladder wall thickening without visualized shadowing stones, nonspecific in the setting of hepatocellular disease. Consider acalculous cholecystitis as clinically appropriate. Could be further evaluated with HIDA scan as clinically appropriate.   2.  Echogenic nodule adjacent to the pancreatic head, could represent prominent lymph node, otherwise indeterminate. Follow-up with CT of the abdomen with contrast for further characterization.   3.  Borderline hepatomegaly   4.  Irregular echogenic liver, appearance favoring changes of cirrhosis.   5.  Scattered mild ascites.      DX-CHEST-PORTABLE (1 VIEW)   Final Result      Congestive heart failure.      CT-PANCREAS AND ABDOMEN WITH & W/O    (Results Pending)        Assessment/Plan  * Acute combined systolic and diastolic heart failure (HCC)- (present on admission)  Assessment & Plan  Echo noted with left ventricular ejection fraction 20 to 25%, right ventricular function, global hypokinesis, grade 2 diastolic 40 function        Telemetry monitoring for acute CHF  Lasix 40mg BID, monitor potassium and magnesium level   Supplement O2 as needed  Fluid restriction   Daily strict input and output  Daily weight  Monitor Intake/Output, Daily Weights  Get CT pancreas for evaluation of pancreatic nodule  Repeat BMP in a.m. to monitor electrolytes and toxicity while on high-dose of diuretics  Repeat CBC in a.m. to monitor white count and hemoglobin  Need close monitoring of blood counts, electrolytes and renal function due to potential of organ dysfunction due to acute CHF.      Transaminitis  Assessment & Plan  Gallbladder wall thickening without visualized shadowing stones, nonspecific in the setting of hepatocellular disease. Consider acalculous cholecystitis as clinically appropriate. Could be further evaluated with HIDA  scan as clinically appropriate     He denies abdominal pain.   No tenderness of right upper quadrant  Getting CT abdomen for further evaluation of pancreatic head nodule    Hyperbilirubinemia  Assessment & Plan  Follow-up right upper quadrant ultrasound    Anasarca  Assessment & Plan  Secondary to congestive heart failure.  Follow-up echocardiogram  Lasix 40 mg IV twice daily  Patient will need daily monitoring with BMPs  Daily weights  Strict ins and outs         VTE prophylaxis: lovenox    I have performed a physical exam and reviewed and updated ROS and Plan today (2/7/2024). In review of yesterday's note (2/6/2024), there are no changes except as documented above.

## 2024-02-07 NOTE — ED PROVIDER NOTES
"CHIEF COMPLAINT  Chief Complaint   Patient presents with    Groin Pain     Groin pain x2 days. States his scrotum is very swollen, \"my balls are as big as softballs\". Reports 7/10 burning pain.     Painful Urination     X1 week. +urgency, +incontinence, +odor       LIMITATION TO HISTORY   Select: none    HPI    Santino Zabala is a 58 y.o. male who presents to the Emergency Department presents for evaluation of lower extremity swelling, as well as scrotal swelling.  Patient does have a history of CHF states he has had been compliant with his Lasix.  Over the past few weeks has had increasing lower extremity swelling and noted that his scrotum and penis began to swell.  States his penis has become so swollen it is painful to urinate.  Denies any concerns for sexually transmitted infections.  No chest pain does admit to exertional dyspnea and mild orthopnea    OUTSIDE HISTORIAN(S):  Select: None available    EXTERNAL RECORDS REVIEWED  Select: Other reviewed as several BMPs, BMP ranges from about 6000-11 100, does have a history of CHF, baseline troponin appears to be in the 40s      PAST MEDICAL HISTORY  Past Medical History:   Diagnosis Date    Congestive heart failure (HCC)      .    SURGICAL HISTORY  History reviewed. No pertinent surgical history.      FAMILY HISTORY  History reviewed. No pertinent family history.       SOCIAL HISTORY  Social History     Socioeconomic History    Marital status: Single     Spouse name: Not on file    Number of children: Not on file    Years of education: Not on file    Highest education level: Not on file   Occupational History    Not on file   Tobacco Use    Smoking status: Never    Smokeless tobacco: Never   Vaping Use    Vaping Use: Never used   Substance and Sexual Activity    Alcohol use: Not Currently    Drug use: Never    Sexual activity: Not on file   Other Topics Concern    Not on file   Social History Narrative    Not on file     Social Determinants of Health " "    Financial Resource Strain: Not on file   Food Insecurity: Not on file   Transportation Needs: Not on file   Physical Activity: Not on file   Stress: Not on file   Social Connections: Not on file   Intimate Partner Violence: Not on file   Housing Stability: Not on file         CURRENT MEDICATIONS  No current facility-administered medications on file prior to encounter.     Current Outpatient Medications on File Prior to Encounter   Medication Sig Dispense Refill    allopurinol (ZYLOPRIM) 100 MG Tab Take 100 mg by mouth 2 times a day.      famotidine (PEPCID) 20 MG Tab Take 1 Tablet by mouth 2 times a day. 60 Tablet 0    sucralfate (CARAFATE) 1 GM/10ML Suspension Take 10 mL by mouth 4 times a day. 414 mL 0    furosemide (LASIX) 40 MG Tab Take 1 Tablet by mouth every day. 30 Tablet 0    potassium chloride SA (KDUR) 20 MEQ Tab CR Take 2 Tablets by mouth every day. 60 Tablet 0           ALLERGIES  No Known Allergies    PHYSICAL EXAM  VITAL SIGNS:/79   Pulse 98   Temp 36.6 °C (97.9 °F) (Temporal)   Resp 13   Ht 1.651 m (5' 5\")   Wt 68.4 kg (150 lb 12.7 oz)   SpO2 97%   BMI 25.09 kg/m²       VITALS - vital signs documented prior to this note have been reviewed and noted,  GENERAL - awake, alert, oriented, GCS 15, no apparent distress, non-toxic  appearing  HEENT - normocephalic, atraumatic, pupils equal, sclera anicteric, mucus  membranes moist  NECK - supple, no meningismus, full active range of motion, trachea midline  CARDIOVASCULAR - regular rate/rhythm, no murmurs/gallops/rubs  PULMONARY - no respiratory distress, speaking in full sentences, clear to  auscultation bilaterally, no wheezing/ronchi/rales, no accessory muscle use  GASTROINTESTINAL - soft, non-tender, non-distended, no rebound, guarding,  or peritonitis anasarca  GENITOURINARY -edema of the scrotum and penis  NEUROLOGIC - Awake alert, normal mental status, speech fluid, cognition  normal, moves all extremities  MUSCULOSKELETAL - no obvious " asymmetry or deformities present  EXTREMITIES -weeping edema in bilateral lower extremities  DERMATOLOGIC - warm, dry, no rashes, no jaundice  PSYCHIATRIC - normal affect, normal insight, normal concentration    DIAGNOSTIC STUDIES / PROCEDURES  EKG  I have independently interpreted this EKG  Interpreted below by myself as EKG shows no acute ischemic changes there is LVH with strain pattern and a first-degree AV block, there is a left axis deviation interpretation is for scree AV block, with LVH, no acute ischemic changes     Report   Date Value Ref Range Status   2024       [facility] Emergency Dept.    Test Date:  2024  Pt Name:    TREVON HAUSER                Department: ER  MRN:        [id number]                      Room:       [facility]  Gender:     Male                         Technician: [id number]  :        1965                   Requested By:ELODIA RAI  Order #:    [id number]                    Reading MD: Elodia Rai    Measurements  Intervals                                Axis  Rate:       103                          P:          122  CT:         204                          QRS:        117  QRSD:       117                          T:          -65  QT:         337  QTc:        441    Interpretive Statements  Sinus tachycardia  Borderline prolonged CT interval  Left posterior fascicular block  Consider left ventricular hypertrophy  Anterior Q waves, possibly due to LVH  Nonspecific T abnormalities, lateral leads  Compared to ECG 2024 14:18:13  Left posterior fascicular block now present  Left ventricular hypertrophy now present  T-wave abnormality now present  Sinus rhythm no longer present  ST (T wave) deviation no longer present  LEFT VENTRICULAR HYPERTROPHY with strain pattern  Electronically Signed On 2024 19:30:59 PST by Elodia aRi                LABS  Labs Reviewed   CBC WITH DIFFERENTIAL - Abnormal; Notable for the following components:        Result Value    RDW 59.2 (*)     Neutrophils-Polys 74.00 (*)     Lymphocytes 16.10 (*)     All other components within normal limits    Narrative:     Biotin intake of greater than 5 mg per day may interfere with                  troponin levels, causing false low values.   COMP METABOLIC PANEL - Abnormal; Notable for the following components:    Glucose 107 (*)     Bun 24 (*)     ALT(SGPT) 55 (*)     Alkaline Phosphatase 153 (*)     Total Bilirubin 1.8 (*)     Globulin 3.7 (*)     All other components within normal limits    Narrative:     Biotin intake of greater than 5 mg per day may interfere with                  troponin levels, causing false low values.   TROPONIN - Abnormal; Notable for the following components:    Troponin T 38 (*)     All other components within normal limits    Narrative:     Biotin intake of greater than 5 mg per day may interfere with                  troponin levels, causing false low values.   PROBRAIN NATRIURETIC PEPTIDE, NT - Abnormal; Notable for the following components:    NT-proBNP 15301 (*)     All other components within normal limits    Narrative:     Biotin intake of greater than 5 mg per day may interfere with                  troponin levels, causing false low values.   ESTIMATED GFR    Narrative:     Biotin intake of greater than 5 mg per day may interfere with                  troponin levels, causing false low values.   URINALYSIS       BNP is increasing in conjunction with the patient's pulmonary edema and weeping lower extremity edema concerning for a CHF exacerbation as tried outpatient therapy and failed  RADIOLOGY  I have independently interpreted the diagnostic imaging associated with this visit and am waiting the final reading from the radiologist.   My preliminary interpretation is as follows: Mild pulmonary edema noted cardiomegaly      Radiologist interpretation:   DX-CHEST-PORTABLE (1 VIEW)   Final Result      Congestive heart failure.           COURSE  & MEDICAL DECISION MAKING    ED COURSE:    ED Observation Status?no    INTERVENTIONS BY ME:  Medications   furosemide (Lasix) injection 40 mg (has no administration in time range)   morphine 4 MG/ML injection 4 mg (4 mg Intravenous Given 2/6/24 1816)         Critical care    Critical Care Procedure Note    Total critical care time: Approximately 36 minutes    Upon my assess due to a high probability of clinically significant, life threatening deterioration, secondary to CHF exacerbation requiring IV diuresis the patient required my direct attention and intervention. This critical care time included obtaining a history; examining the patient; pulse oximetry; ordering and review of studies; arranging urgent treatment with development of a management plan; evaluation of patient's response to treatment; frequent reassessment; and, discussions with other providers.    was exclusive of separately billable procedures and treating other patients and teaching time.    INITIAL ASSESSMENT, COURSE AND PLAN  Care Narrative: Patient presented for evaluation of increasing lower extremity edema, penis and scrotal edema, does have a history of CHF has been compliant with his medications, denies any abnormal diet changes.  However over the past several weeks has had increasing lower extremity swelling that is gone up to his penis scrotum and abdomen.  He was seen in the emergency department a few weeks ago, and was discharged with instructions to double his Lasix dose which she did do without significant improvement.  Labs were repeated today did show an uptrending BNP and chest x-ray now does demonstrate pulmonary edema.  In addition he has diffuse anasarca.  Given that he has already tried outpatient therapy has worsening edema uptrending BNP with now noted a pulmonary edema I do believe he warrants admission for IV diuresis.  He was given a dose of Lasix and will be admitted to the medicine service for further care and  evaluation             ADDITIONAL PROBLEM LIST  History of CHF  DISPOSITION AND DISCUSSIONS  I have discussed management of the patient with the following physicians and FRITZ's:  hospitalist    Barriers to care at this time, including but not limited to: Patient does not have established PCP.     Decision tools and prescription drugs considered including, but not limited to: HEART Score 5 .    FINAL DIAGNOSIS  1. Acute on chronic congestive heart failure, unspecified heart failure type (HCC)    2. Anasarca    3. Elevated troponin             Electronically signed by: Figueroa Rai DO ,7:41 PM 02/06/24

## 2024-02-07 NOTE — ED NOTES
Rounded on pt. Pt remains asleep in [facility].  VSS on RA, call light in reach.

## 2024-02-07 NOTE — ED NOTES
Pt sleeping in gurney, awakens easily. Pt medicated per MAR, education provided.  Pt verbalized understanding. VSS on RA.

## 2024-02-07 NOTE — ED NOTES
Pt awake and alert in [facility], asked to have urinal emptied. 500ml of urine out.  Pt denies other needs at this time.

## 2024-02-07 NOTE — ED NOTES
Bedside report received from off going RN: [place], assumed care of patient.  POC discussed with patient. Call light within reach, all needs addressed at this time.       Fall risk interventions in place: Move the patient closer to the nurse's station, Patient's personal possessions are with in their safe reach, Place fall risk sign on patient's door, and Keep floor surfaces clean and dry (all applicable per [place] Fall risk assessment)   Continuous monitoring: Cardiac Leads, Pulse Ox, or Blood Pressure  IVF/IV medications: Not Applicable   Oxygen: Room Air  Bedside sitter: Not Applicable   Isolation: Not Applicable

## 2024-02-07 NOTE — ASSESSMENT & PLAN NOTE
Secondary to congestive heart failure/possible cirrhosis  No enough fluid to tap  on ultrasound  On IV Lasix

## 2024-02-07 NOTE — ED NOTES
PT IS RESTING IN BED. BREATHING IS EVEN AND UNLABORED, SKIN IS WARM AND DRY, VSS, NAD, MONITORING ONGOING.

## 2024-02-07 NOTE — ED NOTES
Rounded on pt. Pt sleeping in [facility], attached to monitor, VSS on RA. NAD at this time.

## 2024-02-07 NOTE — ED NOTES
PT was [id number]. Apologized for wait times and thanked them for their patience. PT states that all symptoms are the same since he arrived here. Advised him to continue to inform us of any updates.

## 2024-02-07 NOTE — H&P
"Hospital Medicine History & Physical Note    Date of Service  2/6/2024    Primary Care Physician  Pcp Pt States None        Code Status  Full Code    Chief Complaint  Chief Complaint   Patient presents with    Groin Pain     Groin pain x2 days. States his scrotum is very swollen, \"my balls are as big as softballs\". Reports 7/10 burning pain.     Painful Urination     X1 week. +urgency, +incontinence, +odor       History of Presenting Illness  Santino Zabala is a 58 y.o. male with past medical history of congestive heart failure followed by outside cardiologist who presented 2/6/2024 with lower extremity swelling.  Patient states that he was diagnosed with congestive heart failure approximately a year ago.  He follows an outside cardiologist, he does state that he has a history of MIs in the past.  He states that over the last few weeks, he has noticed worsening lower extremity swelling that is gone all the way up to his thighs and abdominal regions.  He endorses orthopnea and PND.  He is also having some shortness of breath.  He did contact his cardiologist, who recommended that the patient double his dose of diuretics.  Patient states that there is no improvement.  In the emergency department, vital signs stable.  Did have an elevated BNP.  Chest x-ray did reveal some pulmonary congestion as well.  Patient will be admitted for management of CHF exacerbation.    I discussed the plan of care with patient.    Review of Systems  Review of Systems   Constitutional:  Negative for chills and fever.   HENT:  Negative for congestion, ear discharge, ear pain, nosebleeds and sinus pain.    Respiratory:  Positive for shortness of breath. Negative for cough, hemoptysis, sputum production and stridor.    Cardiovascular:  Positive for orthopnea, leg swelling and PND. Negative for chest pain, palpitations and claudication.   Gastrointestinal:  Negative for abdominal pain, diarrhea, nausea and vomiting.   Genitourinary:  " Negative for frequency, hematuria and urgency.   Musculoskeletal:  Negative for back pain and neck pain.   Neurological:  Negative for dizziness, tingling, tremors, sensory change, speech change and headaches.   Psychiatric/Behavioral:  Negative for depression, hallucinations, substance abuse and suicidal ideas. The patient is not nervous/anxious.        Past Medical History   has a past medical history of Congestive heart failure (HCC).    Surgical History   has no past surgical history on file.     Family History  family history is not on file.   Family history reviewed with patient. There is no family history that is pertinent to the chief complaint.     Social History   reports that he has never smoked. He has never used smokeless tobacco. He reports that he does not currently use alcohol. He reports that he does not use drugs.    Allergies  No Known Allergies    Medications  Prior to Admission Medications   Prescriptions Last Dose Informant Patient Reported? Taking?   allopurinol (ZYLOPRIM) 100 MG Tab   Yes No   Sig: Take 100 mg by mouth 2 times a day.   famotidine (PEPCID) 20 MG Tab   No No   Sig: Take 1 Tablet by mouth 2 times a day.   furosemide (LASIX) 40 MG Tab   No No   Sig: Take 1 Tablet by mouth every day.   potassium chloride SA (KDUR) 20 MEQ Tab CR   No No   Sig: Take 2 Tablets by mouth every day.   sucralfate (CARAFATE) 1 GM/10ML Suspension   No No   Sig: Take 10 mL by mouth 4 times a day.      Facility-Administered Medications: None       Physical Exam  Temp:  [36.4 °C (97.6 °F)-36.6 °C (97.9 °F)] 36.6 °C (97.9 °F)  Pulse:  [] 98  Resp:  [13-18] 13  BP: (118-130)/(79-86) 130/79  SpO2:  [97 %-100 %] 97 %  Blood Pressure: 130/79   Temperature: 36.6 °C (97.9 °F)   Pulse: 98   Respiration: 13   Pulse Oximetry: 97 %       Physical Exam  Constitutional:       General: He is not in acute distress.     Appearance: Normal appearance. He is normal weight. He is not ill-appearing, toxic-appearing or  diaphoretic.   HENT:      Head: Normocephalic and atraumatic.      Mouth/Throat:      Mouth: Mucous membranes are moist.   Eyes:      Pupils: Pupils are equal, round, and reactive to light.   Cardiovascular:      Rate and Rhythm: Normal rate and regular rhythm.      Pulses: Normal pulses.      Heart sounds: Normal heart sounds. No murmur heard.     No friction rub. No gallop.   Pulmonary:      Effort: Pulmonary effort is normal. No respiratory distress.      Breath sounds: Normal breath sounds. No stridor. No wheezing, rhonchi or rales.   Chest:      Chest wall: No tenderness.   Abdominal:      General: There is distension.      Palpations: There is no mass.      Tenderness: There is no abdominal tenderness. There is no right CVA tenderness, left CVA tenderness, guarding or rebound.      Hernia: No hernia is present.   Genitourinary:     Comments: Scrotal and penil swelling  Musculoskeletal:         General: No swelling, tenderness, deformity or signs of injury.      Right lower leg: Edema present.      Left lower leg: Edema present.   Skin:     General: Skin is warm and dry.      Capillary Refill: Capillary refill takes less than 2 seconds.      Coloration: Skin is not jaundiced or pale.      Findings: No bruising, erythema, lesion or rash.   Neurological:      General: No focal deficit present.      Mental Status: He is alert and oriented to person, place, and time. Mental status is at baseline.      Cranial Nerves: No cranial nerve deficit.      Sensory: No sensory deficit.      Motor: No weakness.      Coordination: Coordination normal.      Gait: Gait normal.      Deep Tendon Reflexes: Reflexes normal.   Psychiatric:         Mood and Affect: Mood normal.         Laboratory:  Recent Labs     02/06/24  1817   WBC 7.1   RBC 4.81   HEMOGLOBIN 15.5   HEMATOCRIT 46.4   MCV 96.5   MCH 32.2   MCHC 33.4   RDW 59.2*   PLATELETCT 231   MPV 10.7     Recent Labs     02/06/24  1817   SODIUM 137   POTASSIUM 3.9   CHLORIDE 102    CO2 21   GLUCOSE 107*   BUN 24*   CREATININE 0.95   CALCIUM 9.4     Recent Labs     02/06/24  1817   ALTSGPT 55*   ASTSGOT 44   ALKPHOSPHAT 153*   TBILIRUBIN 1.8*   GLUCOSE 107*         Recent Labs     02/06/24  1817   NTPROBNP 42341*         Recent Labs     02/06/24 1817   TROPONINT 38*       Imaging:  DX-CHEST-PORTABLE (1 VIEW)   Final Result      Congestive heart failure.      EC-ECHOCARDIOGRAM COMPLETE W/O CONT    (Results Pending)   US-RUQ    (Results Pending)       X-Ray:  I have personally reviewed the images and compared with prior images.  EKG:  I have personally reviewed the images and compared with prior images.    Assessment/Plan:  Justification for Admission Status  I anticipate this patient will require at least two midnights for appropriate medical management, necessitating inpatient admission because CHF, anasarca    Patient will need a Telemetry bed on MEDICAL service .  The need is secondary to CHF and anasarca.    * CHF (congestive heart failure), NYHA class I, acute on chronic, combined (HCC)- (present on admission)  Assessment & Plan  Patient states that he was diagnosed with heart failure approximately a year ago.  States that he follows an outside cardiologist.  He has noticed worsening swelling over the last several weeks, and contacted outside cardiologist recommended increasing his diuretic dose  Unable to obtain records and see what his ejection fraction has.  Patient does not appear to be on guideline based therapy for congestive heart failure  Follow up echocardiogram  Day team to initiate appropriate medications based on echocardiogram results    Transaminitis  Assessment & Plan  Follow-up right upper quadrant ultrasound    Hyperbilirubinemia  Assessment & Plan  Follow-up right upper quadrant ultrasound    Anasarca  Assessment & Plan  Secondary to congestive heart failure.  Follow-up echocardiogram  Lasix 40 mg IV twice daily  Patient will need daily monitoring with BMPs  Daily  weights  Strict ins and outs        VTE prophylaxis: enoxaparin ppx

## 2024-02-07 NOTE — PROGRESS NOTES
4 Eyes Skin Assessment Completed by JULIANA Piña and JULIANA Muhammad.    Head WDL  Ears WDL  Nose WDL  Mouth WDL  Neck WDL  Breast/Chest WDL  Shoulder Blades WDL  Spine WDL  (R) Arm/Elbow/Hand WDL  (L) Arm/Elbow/Hand WDL  Abdomen WDL  Groin Swelling r/t CHF exacerbation  Scrotum/Coccyx/Buttocks WDL  (R) Leg Swelling  (L) Leg Swelling  (R) Heel/Foot/Toe Boggy  (L) Heel/Foot/Toe Boggy          Devices In Places Tele Box and Pulse Ox      Interventions In Place N/A    Possible Skin Injury No    Pictures Uploaded Into Epic N/A  Wound Consult Placed N/A  RN Wound Prevention Protocol Ordered No

## 2024-02-07 NOTE — ASSESSMENT & PLAN NOTE
Echo noted with left ventricular ejection fraction 20 to 25%, right ventricular function, global hypokinesis, grade 2 diastolic 40 function        Telemetry monitoring for acute CHF  Lasix 40mg BID, monitor potassium and magnesium level   Supplement O2 as needed  Fluid restriction   Daily strict input and output  Daily weight  Monitor Intake/Output, Daily Weights  Get CT pancreas for evaluation of pancreatic nodule  Repeat BMP in a.m. to monitor electrolytes and toxicity while on high-dose of diuretics  Repeat CBC in a.m. to monitor white count and hemoglobin  Need close monitoring of blood counts, electrolytes and renal function due to potential of organ dysfunction due to acute CHF.    2/8/2024  Continue diuresis  Cardiology planning for cardiac cath eventually    2/9/2024  Scheduled for left heart cath    2/10/2024  He underwent cardiac cath which revealed nonischemic cardiomyopathy.

## 2024-02-07 NOTE — ED NOTES
Labs drawn and sent. Pt reports pain returning to genital region, requesting pain medication.  MD notified.

## 2024-02-08 ENCOUNTER — APPOINTMENT (OUTPATIENT)
Dept: RADIOLOGY | Facility: MEDICAL CENTER | Age: 59
DRG: 286 | End: 2024-02-08
Attending: STUDENT IN AN ORGANIZED HEALTH CARE EDUCATION/TRAINING PROGRAM
Payer: OTHER MISCELLANEOUS

## 2024-02-08 PROBLEM — K70.31 ALCOHOLIC CIRRHOSIS OF LIVER WITH ASCITES (HCC): Status: ACTIVE | Noted: 2024-02-08

## 2024-02-08 LAB
ALBUMIN SERPL BCP-MCNC: 3.4 G/DL (ref 3.2–4.9)
ALBUMIN/GLOB SERPL: 1.1 G/DL
ALP SERPL-CCNC: 141 U/L (ref 30–99)
ALT SERPL-CCNC: 39 U/L (ref 2–50)
ANION GAP SERPL CALC-SCNC: 13 MMOL/L (ref 7–16)
AST SERPL-CCNC: 31 U/L (ref 12–45)
BILIRUB SERPL-MCNC: 1.2 MG/DL (ref 0.1–1.5)
BUN SERPL-MCNC: 30 MG/DL (ref 8–22)
CALCIUM ALBUM COR SERPL-MCNC: 9.4 MG/DL (ref 8.5–10.5)
CALCIUM SERPL-MCNC: 8.9 MG/DL (ref 8.5–10.5)
CHLORIDE SERPL-SCNC: 101 MMOL/L (ref 96–112)
CO2 SERPL-SCNC: 23 MMOL/L (ref 20–33)
CREAT SERPL-MCNC: 0.91 MG/DL (ref 0.5–1.4)
GFR SERPLBLD CREATININE-BSD FMLA CKD-EPI: 97 ML/MIN/1.73 M 2
GLOBULIN SER CALC-MCNC: 3.1 G/DL (ref 1.9–3.5)
GLUCOSE SERPL-MCNC: 122 MG/DL (ref 65–99)
INR PPP: 1.19 (ref 0.87–1.13)
POTASSIUM SERPL-SCNC: 3.5 MMOL/L (ref 3.6–5.5)
PROT SERPL-MCNC: 6.5 G/DL (ref 6–8.2)
PROTHROMBIN TIME: 15.2 SEC (ref 12–14.6)
SODIUM SERPL-SCNC: 137 MMOL/L (ref 135–145)

## 2024-02-08 PROCEDURE — 76705 ECHO EXAM OF ABDOMEN: CPT

## 2024-02-08 PROCEDURE — 770020 HCHG ROOM/CARE - TELE (206)

## 2024-02-08 PROCEDURE — 700102 HCHG RX REV CODE 250 W/ 637 OVERRIDE(OP): Performed by: NURSE PRACTITIONER

## 2024-02-08 PROCEDURE — 99223 1ST HOSP IP/OBS HIGH 75: CPT | Performed by: INTERNAL MEDICINE

## 2024-02-08 PROCEDURE — 36415 COLL VENOUS BLD VENIPUNCTURE: CPT

## 2024-02-08 PROCEDURE — 85610 PROTHROMBIN TIME: CPT

## 2024-02-08 PROCEDURE — 700102 HCHG RX REV CODE 250 W/ 637 OVERRIDE(OP): Performed by: STUDENT IN AN ORGANIZED HEALTH CARE EDUCATION/TRAINING PROGRAM

## 2024-02-08 PROCEDURE — A9270 NON-COVERED ITEM OR SERVICE: HCPCS | Mod: JZ | Performed by: STUDENT IN AN ORGANIZED HEALTH CARE EDUCATION/TRAINING PROGRAM

## 2024-02-08 PROCEDURE — A9270 NON-COVERED ITEM OR SERVICE: HCPCS | Performed by: STUDENT IN AN ORGANIZED HEALTH CARE EDUCATION/TRAINING PROGRAM

## 2024-02-08 PROCEDURE — 700102 HCHG RX REV CODE 250 W/ 637 OVERRIDE(OP): Mod: JZ | Performed by: STUDENT IN AN ORGANIZED HEALTH CARE EDUCATION/TRAINING PROGRAM

## 2024-02-08 PROCEDURE — A9270 NON-COVERED ITEM OR SERVICE: HCPCS | Performed by: NURSE PRACTITIONER

## 2024-02-08 PROCEDURE — 80053 COMPREHEN METABOLIC PANEL: CPT

## 2024-02-08 PROCEDURE — 99233 SBSQ HOSP IP/OBS HIGH 50: CPT | Performed by: STUDENT IN AN ORGANIZED HEALTH CARE EDUCATION/TRAINING PROGRAM

## 2024-02-08 PROCEDURE — 700111 HCHG RX REV CODE 636 W/ 250 OVERRIDE (IP): Mod: JZ | Performed by: STUDENT IN AN ORGANIZED HEALTH CARE EDUCATION/TRAINING PROGRAM

## 2024-02-08 RX ORDER — SPIRONOLACTONE 25 MG/1
25 TABLET ORAL
Status: DISCONTINUED | OUTPATIENT
Start: 2024-02-08 | End: 2024-02-11 | Stop reason: HOSPADM

## 2024-02-08 RX ORDER — DAPAGLIFLOZIN 10 MG/1
10 TABLET, FILM COATED ORAL DAILY
Status: DISCONTINUED | OUTPATIENT
Start: 2024-02-08 | End: 2024-02-11 | Stop reason: HOSPADM

## 2024-02-08 RX ORDER — POTASSIUM CHLORIDE 20 MEQ/1
40 TABLET, EXTENDED RELEASE ORAL EVERY 4 HOURS
Status: COMPLETED | OUTPATIENT
Start: 2024-02-08 | End: 2024-02-08

## 2024-02-08 RX ADMIN — ENOXAPARIN SODIUM 40 MG: 100 INJECTION SUBCUTANEOUS at 17:15

## 2024-02-08 RX ADMIN — POTASSIUM CHLORIDE 40 MEQ: 1500 TABLET, EXTENDED RELEASE ORAL at 09:12

## 2024-02-08 RX ADMIN — FAMOTIDINE 20 MG: 20 TABLET ORAL at 21:11

## 2024-02-08 RX ADMIN — SACUBITRIL AND VALSARTAN 1 TABLET: 24; 26 TABLET, FILM COATED ORAL at 09:12

## 2024-02-08 RX ADMIN — SACUBITRIL AND VALSARTAN 1 TABLET: 24; 26 TABLET, FILM COATED ORAL at 17:15

## 2024-02-08 RX ADMIN — DAPAGLIFLOZIN 10 MG: 10 TABLET, FILM COATED ORAL at 09:12

## 2024-02-08 RX ADMIN — ALLOPURINOL 100 MG: 100 TABLET ORAL at 09:12

## 2024-02-08 RX ADMIN — SPIRONOLACTONE 25 MG: 25 TABLET ORAL at 09:12

## 2024-02-08 RX ADMIN — FUROSEMIDE 40 MG: 10 INJECTION, SOLUTION INTRAMUSCULAR; INTRAVENOUS at 04:58

## 2024-02-08 RX ADMIN — POTASSIUM CHLORIDE 40 MEQ: 1500 TABLET, EXTENDED RELEASE ORAL at 15:39

## 2024-02-08 RX ADMIN — FUROSEMIDE 40 MG: 10 INJECTION, SOLUTION INTRAMUSCULAR; INTRAVENOUS at 17:15

## 2024-02-08 RX ADMIN — FAMOTIDINE 20 MG: 20 TABLET ORAL at 09:12

## 2024-02-08 RX ADMIN — ALLOPURINOL 100 MG: 100 TABLET ORAL at 21:11

## 2024-02-08 ASSESSMENT — PATIENT HEALTH QUESTIONNAIRE - PHQ9
2. FEELING DOWN, DEPRESSED, IRRITABLE, OR HOPELESS: NOT AT ALL
1. LITTLE INTEREST OR PLEASURE IN DOING THINGS: NOT AT ALL
SUM OF ALL RESPONSES TO PHQ9 QUESTIONS 1 AND 2: 0

## 2024-02-08 ASSESSMENT — ENCOUNTER SYMPTOMS
SHORTNESS OF BREATH: 1
ABDOMINAL PAIN: 0

## 2024-02-08 ASSESSMENT — FIBROSIS 4 INDEX
FIB4 SCORE: 1.27
FIB4 SCORE: 1.27

## 2024-02-08 ASSESSMENT — PAIN DESCRIPTION - PAIN TYPE: TYPE: ACUTE PAIN

## 2024-02-08 NOTE — CARE PLAN
The patient is Watcher - Medium risk of patient condition declining or worsening    Shift Goals  Clinical Goals: monitor I/O, diuresis, daily weight  Patient Goals: rest, diuresis    Progress made toward(s) clinical / shift goals:      Problem: Care Map:  Day 1 Optimal Outcome for the Heart Failure Patient  Goal: Day 1:  Optimal Care of the heart failure patient  Outcome: Progressing   Discussed fluid restriction, I/O, daily weight and diet management with pt. Pt agreeable. States he is ready to make the changes at home to follow a heart healthy lifestyle.    Problem: Knowledge Deficit - Standard  Goal: Patient and family/care givers will demonstrate understanding of plan of care, disease process/condition, diagnostic tests and medications  Outcome: Progressing       Patient is not progressing towards the following goals:

## 2024-02-08 NOTE — PROGRESS NOTES
Bedside report received from off going RN/tech: Eleanor, assumed care of patient.     Fall Risk Score: LOW RISK  Fall risk interventions in place: Place yellow fall risk ID band on patient, Provide patient/family education based on risk assessment, Educate patient/family to call staff for assistance when getting out of bed, Place fall precaution signage outside patient door, Place patient in room close to nursing station, Utilize bed/chair fall alarm, and Notify charge of high risk for huddle  Bed type: Regular ([place] Score less than 17 interventions in place)  Patient on cardiac monitor: Yes  IVF/IV medications: Not Applicable   Oxygen: Room Air  Bedside sitter: Not Applicable   Isolation: Not applicable

## 2024-02-08 NOTE — CARE PLAN
Problem: Knowledge Deficit - Standard  Goal: Patient and family/care givers will demonstrate understanding of plan of care, disease process/condition, diagnostic tests and medications  Description: Target End Date:  1-3 days or as soon as patient condition allows    Document in Patient Education    1.  Patient and family/caregiver oriented to unit, equipment, visitation policy and means for communicating concern  2.  Complete/review Learning Assessment  3.  Assess knowledge level of disease process/condition, treatment plan, diagnostic tests and medications  4.  Explain disease process/condition, treatment plan, diagnostic tests and medications  Outcome: Progressing   The patient is Watcher - Medium risk of patient condition declining or worsening    Shift Goals  Clinical Goals: diuresis  Patient Goals: diuresis    Progress made toward(s) clinical / shift goals:  PT updated on POC    Patient is not progressing towards the following goals:

## 2024-02-08 NOTE — PROGRESS NOTES
Hospital Medicine Daily Progress Note    Date of Service  2/8/2024    Chief Complaint  Santino Zabala is a 58 y.o. male admitted 2/6/2024 with acute CHF    Hospital Course    58 male with past medical history of congestive heart failure following up with Dr. Jimenes at [facility] presented with worsening lower extremity swelling.  Labs noted to have elevated troponin and BNP.  Chest ray noted with vascular congestion.  Echocardiogram noted with ejection fraction 20 to 25%, mildly dilated right ventricle reduced right ventricular systolic function.  Cardiology Dr. Martinez evaluated and plan is for  cardiac cath.      Interval Problem Update    2/8/2024  Vitals remained stable  On room air saturating 90%  Patient report he had a history of heavy drinking in the past.  Last drink was a month ago    Labs reviewed noted with sodium 137, potassium 3.5, elevated alkaline phosphatase  CT abdomen result reviewed noted with heterogeneous liver likely from heart failure, likely cirrhosis, moderate ascites with no pancreatic mass, a small bilateral pleural effusion,    Case discussed with cardiology Dr. Martinez .  Plan is to continue with diuresis and cardiac cath      Telemetry monitoring for acute CHF  Lasix 40mg BID, monitor potassium and magnesium level   Supplement O2 as needed  Fluid restriction   Daily strict input and output  Daily weight  Monitor Intake/Output, Daily Weights  Order ultrasound-guided paracentesis    Repeat BMP in a.m. to monitor electrolytes and toxicity while on high-dose of diuretics  Repeat CBC in a.m. to monitor white count and hemoglobin  Need close monitoring of blood counts, electrolytes and renal function due to potential of organ dysfunction due to acute CHF.      Patient has a high medical complexity, complex decision making and is at high risk of complication, morbidity and mortality       I have discussed this patient's plan of care and discharge plan at IDT rounds today with Case  Management, Nursing, Nursing leadership, and other members of the IDT team.    Consultants/Specialty  cardiology    Code Status  Full Code    Disposition  The patient is not medically cleared for discharge to home or a post-acute facility.  Anticipate discharge to: home with close outpatient follow-up    I have placed the appropriate orders for post-discharge needs.    Review of Systems  Review of Systems   Respiratory:  Positive for shortness of breath.    Cardiovascular:  Positive for leg swelling.   Gastrointestinal:  Negative for abdominal pain.        Physical Exam  Temp:  [36.6 °C (97.9 °F)-37.2 °C (99 °F)] 37.2 °C (99 °F)  Pulse:  [] 94  Resp:  [17-18] 17  BP: ()/(72-85) 100/72  SpO2:  [95 %-100 %] 99 %    Physical Exam  Cardiovascular:      Rate and Rhythm: Normal rate and regular rhythm.      Pulses: Normal pulses.      Heart sounds: Normal heart sounds.   Pulmonary:      Effort: Pulmonary effort is normal. No respiratory distress.      Breath sounds: Rales (Minimal Rales noted) present.   Musculoskeletal:      Right lower leg: Edema present.      Left lower leg: Edema present.   Neurological:      General: No focal deficit present.      Mental Status: He is alert.   Psychiatric:         Mood and Affect: Mood normal.         Fluids    Intake/Output Summary (Last 24 hours) at 2/8/2024 1509  Last data filed at 2/8/2024 1040  Gross per 24 hour   Intake 1720 ml   Output 2865 ml   Net -1145 ml         Laboratory  Recent Labs     02/06/24 1817 02/07/24  0356   WBC 7.1 6.8   RBC 4.81 4.48*   HEMOGLOBIN 15.5 14.6   HEMATOCRIT 46.4 42.1   MCV 96.5 94.0   MCH 32.2 32.6   MCHC 33.4 34.7   RDW 59.2* 56.1*   PLATELETCT 231 230   MPV 10.7 10.6       Recent Labs     02/06/24 1817 02/07/24  0356 02/08/24  0032   SODIUM 137 136 137   POTASSIUM 3.9 3.8 3.5*   CHLORIDE 102 102 101   CO2 21 21 23   GLUCOSE 107* 119* 122*   BUN 24* 25* 30*   CREATININE 0.95 0.91 0.91   CALCIUM 9.4 9.4 8.9               Recent Labs      02/07/24  0356   TRIGLYCERIDE 108   HDL 39*   LDL 73         Imaging  US-ABDOMEN LTD (SOFT TISSUE)   Final Result      Small volume ascites is seen in all 4 quadrants. Not enough fluid for therapeutic thoracentesis.      CT-PANCREAS AND ABDOMEN WITH & W/O   Final Result      1.  Heterogeneous liver, possibly due to heart failure.  Cirrhosis is also a consideration.   2.  Moderate ascites.   3.  No pancreatic mass demonstrated.   4.  Enlarged freedom hepatis lymph node, likely accounting for ultrasound findings, measuring 16 mm short axis, of uncertain etiology.   5.  Small bilateral pleural effusions with associated atelectasis.            EC-ECHOCARDIOGRAM COMPLETE W/O CONT   Final Result      US-RUQ   Final Result         1.  Gallbladder wall thickening without visualized shadowing stones, nonspecific in the setting of hepatocellular disease. Consider acalculous cholecystitis as clinically appropriate. Could be further evaluated with HIDA scan as clinically appropriate.   2.  Echogenic nodule adjacent to the pancreatic head, could represent prominent lymph node, otherwise indeterminate. Follow-up with CT of the abdomen with contrast for further characterization.   3.  Borderline hepatomegaly   4.  Irregular echogenic liver, appearance favoring changes of cirrhosis.   5.  Scattered mild ascites.      Findings and/or recommendations of the examination where conveyed digitally using the Voalte system to, Omari Cuadra, and message was electronically verified as Read on 2/7/2024 11:17 AM.      DX-CHEST-PORTABLE (1 VIEW)   Final Result      Congestive heart failure.           Assessment/Plan  * Acute combined systolic and diastolic heart failure (HCC)- (present on admission)  Assessment & Plan  Echo noted with left ventricular ejection fraction 20 to 25%, right ventricular function, global hypokinesis, grade 2 diastolic 40 function        Telemetry monitoring for acute CHF  Lasix 40mg BID, monitor potassium and  magnesium level   Supplement O2 as needed  Fluid restriction   Daily strict input and output  Daily weight  Monitor Intake/Output, Daily Weights  Get CT pancreas for evaluation of pancreatic nodule  Repeat BMP in a.m. to monitor electrolytes and toxicity while on high-dose of diuretics  Repeat CBC in a.m. to monitor white count and hemoglobin  Need close monitoring of blood counts, electrolytes and renal function due to potential of organ dysfunction due to acute CHF.    2/8/2024  Continue diuresis  Cardiology planning for cardiac cath eventually      Alcoholic cirrhosis of liver with ascites (HCC)  Assessment & Plan  Patient had extensive history of alcohol use   reports he had quit alcohol 8 months ago  Ultrasound-guided paracentesis ordered  Monitor CMP daily, INR    Transaminitis  Assessment & Plan  Gallbladder wall thickening without visualized shadowing stones, nonspecific in the setting of hepatocellular disease. Consider acalculous cholecystitis as clinically appropriate. Could be further evaluated with HIDA scan as clinically appropriate     CT abdomen result reviewed noted with heterogeneous liver likely from heart failure, likely cirrhosis, moderate ascites with no pancreatic mass, a small bilateral pleural effusion,    Hyperbilirubinemia  Assessment & Plan  Follow-up right upper quadrant ultrasound    Anasarca  Assessment & Plan  Secondary to congestive heart failure/possible cirrhosis  Follow-up echocardiogram  Lasix 40 mg IV twice daily  Patient will need daily monitoring with BMPs  Daily weights  Strict ins and outs  2/8/2024  Paracentesis ordered         VTE prophylaxis: lovenox    I have performed a physical exam and reviewed and updated ROS and Plan today (2/8/2024). In review of yesterday's note (2/7/2024), there are no changes except as documented above.

## 2024-02-08 NOTE — DIETARY
"Nutrition services: Day 2 of admit.  Santino Zabala is a 58 y.o. male with admitting DX of acute combined systolic and diastolic heart failure.   Consult received for unplanned weight loss of 14-23 lbs in 6 months with poor po (MST 3).     RD able to visit pt at bedside. Pt with a lot of questions regarding heart healthy/kidney healthy diets. RD able to provide heart healthy nutrition therapy handout. Encouraged fresh fruit and vegetables. Pt and RD discussed processed foods and how they can be harmful for the heart when eaten in excess. RD reviewed pt chart, no kidney injury identified. Kidney education not indicated at this time.     Pt reports weight had dropped in the past few months but when he was weighed at the hospital his weight had returned to normal. Per chart review weight is trending up. Pt stated UBW of 135 lbs. Of note pt with a lot of fluid retention at this time. Wt loss potentially being masked by fluid gain. Pt eating % of meals so far. Per NFPE pt is well nourished.     Assessment:  Height: 165.1 cm (5' 5\")  Weight: 63.8 kg (140 lb 10.5 oz)  Body mass index is 23.41 kg/m²., BMI classification: Normal.   Diet/Intake: cardiac with 1500 ml fluid restriction    Wt Readings from Last 5 Encounters:   02/08/24 63.8 kg (140 lb 10.5 oz)   01/24/24 59 kg (130 lb)   04/09/23 54.5 kg (120 lb 2.4 oz)   03/04/23 56.3 kg (124 lb 1.9 oz)     Evaluation:   Hx of congestive heart failure presented with worsening lower extremity swelling   Labs: K+ 3.5, Glu 122, Bun 30  Meds reviewed.   Skin: 3+ pitting edema in the RLE and LLE, 4+ scrotal edema, 3+ perineal and abdominal edema.   +BM    Malnutrition Risk: No criteria identified at this time.     Recommendations/Plan:  Encourage intake of >50%.   Document intake of all PO as % taken in ADL's to provide interdisciplinary communication across all shifts.   Monitor weight.  Nutrition rep will continue to see patient for ongoing meal and snack preferences. "     RD to monitor per department policy.

## 2024-02-08 NOTE — CONSULTS
"CARDIAC CONSULTATION    Requesting Provider: Jacob Wilson M.D.  Reason for consultation: HF    Impressions:  #. Acute HFrEF  #. Severe MR- tethered post leaflet, Inf WMA?  #. Hx of heavy alcohol, now sober x1 yr  #. Ascites      Recommendations:  Diuresis  Cath when able   Dapa, entresto, aldactone    Will follow    History: Santino Zabala is a 58 y.o. male who I have been consulted for heart failure. Has a history for the past year perhaps- never seen at our center. Comes in with edema, ascites. Responding to diuretics. Echo with EF 25, minor WMA, and MR.     ROS:   10 point review systems is otherwise negative except as per the HPI    PE:  /82   Pulse (!) 102   Temp 36.7 °C (98.1 °F) (Temporal)   Resp 17   Ht 1.651 m (5' 5\")   Wt 63.8 kg (140 lb 10.5 oz)   SpO2 99%   BMI 23.41 kg/m²   Gen: NAD  Resp: bibasilar rales  CVS: Regular, systolic murmur + JVD  Abd: Soft, NT/ND  EXT: 2+ Edema    For this encounter I directly reviewed and interpreted ECG tracings and Echo images. Sinus, sev MR, sev LV dysfunction    Past Medical History:   Diagnosis Date    Congestive heart failure (HCC)      History reviewed. No pertinent surgical history.  No Known Allergies    Current Facility-Administered Medications:     potassium chloride SA (Kdur) tablet 40 mEq, 40 mEq, Oral, Q4HRS, Jacob Wilson M.D., 40 mEq at 02/08/24 0912    spironolactone (Aldactone) tablet 25 mg, 25 mg, Oral, Q DAY, Eugenia Fesharamber, A.P.N., 25 mg at 02/08/24 0912    sacubitril-valsartan (Entresto) 24-26 MG 1 Tablet, 1 Tablet, Oral, BID, Eugenia Fesagata, A.P.N., 1 Tablet at 02/08/24 0912    dapagliflozin propanediol (Farxiga) tablet 10 mg, 10 mg, Oral, DAILY, Eugenia Fesagata, A.P.N., 10 mg at 02/08/24 0912    [START ON 2/9/2024] lidocaine (Xylocaine) 1 % injection 0.5 mL, 0.5 mL, Intradermal, Once PRN, SERAFIN Stewart.    enoxaparin (Lovenox) inj 40 mg, 40 mg, Subcutaneous, DAILY AT 1800, Omari Cuadra M.D., 40 mg at 02/07/24 1701    " furosemide (Lasix) injection 40 mg, 40 mg, Intravenous, BID DIURETIC, Omari Cuadra M.D., 40 mg at 02/08/24 0458    allopurinol (Zyloprim) tablet 100 mg, 100 mg, Oral, BID, Omari Cuadra M.D., 100 mg at 02/08/24 0912    famotidine (Pepcid) tablet 20 mg, 20 mg, Oral, BID, Omari Cuadra M.D., 20 mg at 02/08/24 0912  Medications Prior to Admission   Medication Sig Dispense Refill Last Dose    furosemide (LASIX) 40 MG Tab Take 40 mg by mouth 2 times a day.   2/6/2024 at AM    allopurinol (ZYLOPRIM) 100 MG Tab Take 100 mg by mouth 2 times a day.   2/6/2024 at AM    sucralfate (CARAFATE) 1 GM/10ML Suspension Take 10 mL by mouth 4 times a day. 414 mL 0 2/6/2024 at AM    potassium chloride SA (KDUR) 20 MEQ Tab CR Take 2 Tablets by mouth every day. 60 Tablet 0 2/6/2024 at AM      Social History     Socioeconomic History    Marital status: Single     Spouse name: Not on file    Number of children: Not on file    Years of education: Not on file    Highest education level: Not on file   Occupational History    Not on file   Tobacco Use    Smoking status: Never    Smokeless tobacco: Never   Vaping Use    Vaping Use: Never used   Substance and Sexual Activity    Alcohol use: Not Currently    Drug use: Never    Sexual activity: Not on file   Other Topics Concern    Not on file   Social History Narrative    Not on file     Social Determinants of Health     Financial Resource Strain: Not on file   Food Insecurity: Not on file   Transportation Needs: Not on file   Physical Activity: Not on file   Stress: Not on file   Social Connections: Not on file   Intimate Partner Violence: Not on file   Housing Stability: Not on file     History reviewed. No pertinent family history.       Studies  Lab Results   Component Value Date/Time    CHOLSTRLTOT 134 02/07/2024 03:56 AM    LDL 73 02/07/2024 03:56 AM    HDL 39 (A) 02/07/2024 03:56 AM    TRIGLYCERIDE 108 02/07/2024 03:56 AM       Lab Results   Component Value Date/Time    SODIUM 137  "02/08/2024 12:32 AM    POTASSIUM 3.5 (L) 02/08/2024 12:32 AM    CHLORIDE 101 02/08/2024 12:32 AM    CO2 23 02/08/2024 12:32 AM    GLUCOSE 122 (H) 02/08/2024 12:32 AM    BUN 30 (H) 02/08/2024 12:32 AM    CREATININE 0.91 02/08/2024 12:32 AM     Lab Results   Component Value Date/Time    ALKPHOSPHAT 141 (H) 02/08/2024 12:32 AM    ASTSGOT 31 02/08/2024 12:32 AM    ALTSGPT 39 02/08/2024 12:32 AM    TBILIRUBIN 1.2 02/08/2024 12:32 AM      No results found for: \"BNPBTYPENAT\"     Consultation for life threatening condition, excacerbation      "

## 2024-02-08 NOTE — PROGRESS NOTES
Monitor Summary  Rhythm: SR  Rate: 91-97  Ectopy: R coup, R Trigem, O PVC  0.19 / 0.08 / 0.35

## 2024-02-08 NOTE — CARE PLAN
Problem: Care Map:  Day 2 Optimal Outcome for the Heart Failure Patient  Goal: Day 2:  Optimal Care of the heart failure patient  Outcome: Progressing  Intervention: Start Heart Failure education booklet, provide writing utensils and notepad for questions  Note: Pt provided with booklet and has been provided with extensive education regarding heart failure. Pt asked questions appropriately.   Intervention: For patient's with heart failure exacerbation, identify precipitant (diet, med compliance, etc.) and direct education towards lifestyle changes to prevent exacerbations.  Note: Pt able to identity precipitants and modifications to current lifestyle to avoid future hospitalizations.  Intervention: Ensure daily BMP is ordered by provider  Note: Daily BMP ordered by MD  Intervention: Daily weight documented.  Use stand up scale if patient able. Compare to previous weight  Note: Daily weight documented in EMR  Intervention: Assess and document 2 hour post diuretic output  Note: Strict I/O implemented for patient.   Intervention: Document intake and output per shift.  Communicate with care team if volume status is improving, stalled or worseing.  Note: Urinal at bedside for strict I/O  Intervention: Document ambulation tolerance (functional assessment) in ADL flowsheet daily  Note: Documented in EMR  Intervention: Assess edema every shift (peripheral, sacral, periorbital, perineal/scrotal, and abdominal)  Note: Documented in EMR  Intervention: If patient is HFrEF, review for guideline medications (evidence based beta blocker (Toprol XL, carvedilol, bisprolol), ACEI/ARB/ARNI, Aldosterone receptor antagonist).  If not ordered request provider contraindication documentation.  Note: Appropriate meds prescribed by MD     Problem: Knowledge Deficit - Standard  Goal: Patient and family/care givers will demonstrate understanding of plan of care, disease process/condition, diagnostic tests and medications  Outcome:  Progressing     Problem: Pain - Standard  Goal: Alleviation of pain or a reduction in pain to the patient’s comfort goal  Outcome: Progressing   The patient is Watcher - Medium risk of patient condition declining or worsening    Shift Goals  Clinical Goals: diuresis, I/O, increase mobilization  Patient Goals: rest, education  Family Goals: not paresent    Progress made toward(s) clinical / shift goals:  Increase in diuresis    Patient is not progressing towards the following goals:

## 2024-02-08 NOTE — ASSESSMENT & PLAN NOTE
Patient had extensive history of alcohol use   reports he had quit alcohol 8 months ago  No enough fluid to tap on ultrasound

## 2024-02-09 ENCOUNTER — APPOINTMENT (OUTPATIENT)
Dept: CARDIOLOGY | Facility: MEDICAL CENTER | Age: 59
DRG: 286 | End: 2024-02-09
Attending: NURSE PRACTITIONER
Payer: OTHER MISCELLANEOUS

## 2024-02-09 LAB
ALBUMIN SERPL BCP-MCNC: 3.4 G/DL (ref 3.2–4.9)
ALBUMIN/GLOB SERPL: 1.2 G/DL
ALP SERPL-CCNC: 143 U/L (ref 30–99)
ALT SERPL-CCNC: 43 U/L (ref 2–50)
AMPHET UR QL SCN: NEGATIVE
ANION GAP SERPL CALC-SCNC: 11 MMOL/L (ref 7–16)
AST SERPL-CCNC: 34 U/L (ref 12–45)
BARBITURATES UR QL SCN: NEGATIVE
BENZODIAZ UR QL SCN: NEGATIVE
BILIRUB SERPL-MCNC: 1.1 MG/DL (ref 0.1–1.5)
BUN SERPL-MCNC: 27 MG/DL (ref 8–22)
BZE UR QL SCN: NEGATIVE
CALCIUM ALBUM COR SERPL-MCNC: 8.8 MG/DL (ref 8.5–10.5)
CALCIUM SERPL-MCNC: 8.3 MG/DL (ref 8.5–10.5)
CANNABINOIDS UR QL SCN: NEGATIVE
CHLORIDE SERPL-SCNC: 102 MMOL/L (ref 96–112)
CO2 SERPL-SCNC: 24 MMOL/L (ref 20–33)
CREAT SERPL-MCNC: 1.13 MG/DL (ref 0.5–1.4)
FENTANYL UR QL: NEGATIVE
GFR SERPLBLD CREATININE-BSD FMLA CKD-EPI: 75 ML/MIN/1.73 M 2
GLOBULIN SER CALC-MCNC: 2.8 G/DL (ref 1.9–3.5)
GLUCOSE SERPL-MCNC: 117 MG/DL (ref 65–99)
HAV IGM SERPL QL IA: NORMAL
HBV CORE IGM SERPL QL IA: NEGATIVE
HBV SURFACE AG SER QL: NORMAL
HCV AB SER QL: NORMAL
METHADONE UR QL SCN: NEGATIVE
OPIATES UR QL SCN: NEGATIVE
OXYCODONE UR QL SCN: NEGATIVE
PCP UR QL SCN: NEGATIVE
POTASSIUM SERPL-SCNC: 4 MMOL/L (ref 3.6–5.5)
PROPOXYPH UR QL SCN: NEGATIVE
PROT SERPL-MCNC: 6.2 G/DL (ref 6–8.2)
SODIUM SERPL-SCNC: 137 MMOL/L (ref 135–145)

## 2024-02-09 PROCEDURE — 700111 HCHG RX REV CODE 636 W/ 250 OVERRIDE (IP): Performed by: STUDENT IN AN ORGANIZED HEALTH CARE EDUCATION/TRAINING PROGRAM

## 2024-02-09 PROCEDURE — 90471 IMMUNIZATION ADMIN: CPT

## 2024-02-09 PROCEDURE — 3E02340 INTRODUCTION OF INFLUENZA VACCINE INTO MUSCLE, PERCUTANEOUS APPROACH: ICD-10-PCS | Performed by: STUDENT IN AN ORGANIZED HEALTH CARE EDUCATION/TRAINING PROGRAM

## 2024-02-09 PROCEDURE — 36415 COLL VENOUS BLD VENIPUNCTURE: CPT

## 2024-02-09 PROCEDURE — 80307 DRUG TEST PRSMV CHEM ANLYZR: CPT

## 2024-02-09 PROCEDURE — 99233 SBSQ HOSP IP/OBS HIGH 50: CPT | Performed by: STUDENT IN AN ORGANIZED HEALTH CARE EDUCATION/TRAINING PROGRAM

## 2024-02-09 PROCEDURE — 99232 SBSQ HOSP IP/OBS MODERATE 35: CPT | Mod: FS | Performed by: INTERNAL MEDICINE

## 2024-02-09 PROCEDURE — 3E0234Z INTRODUCTION OF SERUM, TOXOID AND VACCINE INTO MUSCLE, PERCUTANEOUS APPROACH: ICD-10-PCS | Performed by: STUDENT IN AN ORGANIZED HEALTH CARE EDUCATION/TRAINING PROGRAM

## 2024-02-09 PROCEDURE — 770020 HCHG ROOM/CARE - TELE (206)

## 2024-02-09 PROCEDURE — 700102 HCHG RX REV CODE 250 W/ 637 OVERRIDE(OP): Performed by: NURSE PRACTITIONER

## 2024-02-09 PROCEDURE — 80053 COMPREHEN METABOLIC PANEL: CPT

## 2024-02-09 PROCEDURE — A9270 NON-COVERED ITEM OR SERVICE: HCPCS | Performed by: NURSE PRACTITIONER

## 2024-02-09 PROCEDURE — A9270 NON-COVERED ITEM OR SERVICE: HCPCS | Performed by: STUDENT IN AN ORGANIZED HEALTH CARE EDUCATION/TRAINING PROGRAM

## 2024-02-09 PROCEDURE — 90677 PCV20 VACCINE IM: CPT | Performed by: STUDENT IN AN ORGANIZED HEALTH CARE EDUCATION/TRAINING PROGRAM

## 2024-02-09 PROCEDURE — 90686 IIV4 VACC NO PRSV 0.5 ML IM: CPT | Performed by: STUDENT IN AN ORGANIZED HEALTH CARE EDUCATION/TRAINING PROGRAM

## 2024-02-09 PROCEDURE — 700102 HCHG RX REV CODE 250 W/ 637 OVERRIDE(OP): Performed by: STUDENT IN AN ORGANIZED HEALTH CARE EDUCATION/TRAINING PROGRAM

## 2024-02-09 RX ORDER — ASPIRIN 81 MG/1
81 TABLET, CHEWABLE ORAL DAILY
Status: DISCONTINUED | OUTPATIENT
Start: 2024-02-09 | End: 2024-02-11 | Stop reason: HOSPADM

## 2024-02-09 RX ADMIN — FAMOTIDINE 20 MG: 20 TABLET ORAL at 06:58

## 2024-02-09 RX ADMIN — ENOXAPARIN SODIUM 40 MG: 100 INJECTION SUBCUTANEOUS at 16:37

## 2024-02-09 RX ADMIN — ASPIRIN 81 MG 81 MG: 81 TABLET ORAL at 11:49

## 2024-02-09 RX ADMIN — SPIRONOLACTONE 25 MG: 25 TABLET ORAL at 06:57

## 2024-02-09 RX ADMIN — FUROSEMIDE 40 MG: 10 INJECTION, SOLUTION INTRAMUSCULAR; INTRAVENOUS at 16:37

## 2024-02-09 RX ADMIN — ALLOPURINOL 100 MG: 100 TABLET ORAL at 06:58

## 2024-02-09 RX ADMIN — ALLOPURINOL 100 MG: 100 TABLET ORAL at 20:00

## 2024-02-09 RX ADMIN — PNEUMOCOCCAL 20-VALENT CONJUGATE VACCINE 0.5 ML
2.2; 2.2; 2.2; 2.2; 2.2; 2.2; 2.2; 2.2; 2.2; 2.2; 2.2; 2.2; 2.2; 2.2; 2.2; 2.2; 4.4; 2.2; 2.2; 2.2 INJECTION, SUSPENSION INTRAMUSCULAR at 16:39

## 2024-02-09 RX ADMIN — SACUBITRIL AND VALSARTAN 1 TABLET: 24; 26 TABLET, FILM COATED ORAL at 06:57

## 2024-02-09 RX ADMIN — FUROSEMIDE 40 MG: 10 INJECTION, SOLUTION INTRAMUSCULAR; INTRAVENOUS at 06:58

## 2024-02-09 RX ADMIN — FAMOTIDINE 20 MG: 20 TABLET ORAL at 20:00

## 2024-02-09 RX ADMIN — INFLUENZA A VIRUS A/VICTORIA/4897/2022 IVR-238 (H1N1) ANTIGEN (FORMALDEHYDE INACTIVATED), INFLUENZA A VIRUS A/DARWIN/9/2021 SAN-010 (H3N2) ANTIGEN (FORMALDEHYDE INACTIVATED), INFLUENZA B VIRUS B/PHUKET/3073/2013 ANTIGEN (FORMALDEHYDE INACTIVATED), AND INFLUENZA B VIRUS B/MICHIGAN/01/2021 ANTIGEN (FORMALDEHYDE INACTIVATED) 0.5 ML: 15; 15; 15; 15 INJECTION, SUSPENSION INTRAMUSCULAR at 16:40

## 2024-02-09 RX ADMIN — DAPAGLIFLOZIN 10 MG: 10 TABLET, FILM COATED ORAL at 06:58

## 2024-02-09 RX ADMIN — SACUBITRIL AND VALSARTAN 1 TABLET: 24; 26 TABLET, FILM COATED ORAL at 16:42

## 2024-02-09 ASSESSMENT — ENCOUNTER SYMPTOMS
SHORTNESS OF BREATH: 1
STRIDOR: 0
SHORTNESS OF BREATH: 0
CHOKING: 0
ABDOMINAL DISTENTION: 1
CHEST TIGHTNESS: 0
ABDOMINAL PAIN: 0
APNEA: 0
WHEEZING: 0
COUGH: 0

## 2024-02-09 ASSESSMENT — PAIN DESCRIPTION - PAIN TYPE
TYPE: ACUTE PAIN
TYPE: ACUTE PAIN

## 2024-02-09 ASSESSMENT — FIBROSIS 4 INDEX: FIB4 SCORE: 1.33

## 2024-02-09 NOTE — PROGRESS NOTES
Cardiology Follow Up Progress Note    Date of Service  2/9/2024    Attending Physician  Jacob Wilson M.D.    Chief Complaint   Acute decompensated systolic heart failure LVEF 25%    HPI  Santino Zabala is a 58 y.o. male admitted 2/6/2024 with severe cardiomyopathy and acute decompensated systolic heart failure LVEF 25%.      PMH: Prior history of heavy alcohol use, reports prior history of cardiac disease and heart failure    Interim Events    No overnight cardiac events  Telemetry-SR  SBP ~100  Effective diuresis  I/O =- 3400 x 24  Plan for [facility] this afternoon.  Review of Systems  Review of Systems   Respiratory:  Negative for apnea, cough, choking, chest tightness, shortness of breath, wheezing and stridor.    Cardiovascular:  Positive for leg swelling. Negative for chest pain.   Gastrointestinal:  Positive for abdominal distention.       Vital signs in last 24 hours  Temp:  [36.8 °C (98.2 °F)-37.2 °C (99 °F)] 36.9 °C (98.5 °F)  Pulse:  [85-99] 93  Resp:  [17] 17  BP: ()/(62-73) 100/67  SpO2:  [88 %-99 %] 92 %    Physical Exam  Physical Exam  Constitutional:       Appearance: He is normal weight.   Cardiovascular:      Rate and Rhythm: Normal rate and regular rhythm.      Pulses: Normal pulses.   Pulmonary:      Effort: Pulmonary effort is normal.   Skin:     General: Skin is warm.   Neurological:      Mental Status: Mental status is at baseline.   Psychiatric:         Mood and Affect: Mood normal.         Lab Review  Lab Results   Component Value Date/Time    WBC 6.8 02/07/2024 03:56 AM    RBC 4.48 (L) 02/07/2024 03:56 AM    HEMOGLOBIN 14.6 02/07/2024 03:56 AM    HEMATOCRIT 42.1 02/07/2024 03:56 AM    MCV 94.0 02/07/2024 03:56 AM    MCH 32.6 02/07/2024 03:56 AM    MCHC 34.7 02/07/2024 03:56 AM    MPV 10.6 02/07/2024 03:56 AM      Lab Results   Component Value Date/Time    SODIUM 137 02/09/2024 01:03 AM    POTASSIUM 4.0 02/09/2024 01:03 AM    CHLORIDE 102 02/09/2024 01:03 AM    CO2 24 02/09/2024 01:03 AM     GLUCOSE 117 (H) 02/09/2024 01:03 AM    BUN 27 (H) 02/09/2024 01:03 AM    CREATININE 1.13 02/09/2024 01:03 AM      Lab Results   Component Value Date/Time    ASTSGOT 34 02/09/2024 01:03 AM    ALTSGPT 43 02/09/2024 01:03 AM     Lab Results   Component Value Date/Time    CHOLSTRLTOT 134 02/07/2024 03:56 AM    LDL 73 02/07/2024 03:56 AM    HDL 39 (A) 02/07/2024 03:56 AM    TRIGLYCERIDE 108 02/07/2024 03:56 AM    TROPONINT 38 (H) 02/06/2024 06:17 PM       Recent Labs     02/06/24  1817   NTPROBNP [id number]*         Assessment/Plan    # Severe cardiomyopathy LVEF 25%  # Acute decompensated systolic heart failure.  # Severe MR  # Ascites  # History of heavy alcohol use  # A1c 7.1    -Continue diuresis furosemide 40 IV twice daily.  -Effective diuresis overnight, -3400.  -GDMT for severe cardiomyopathy  -Currently on Entresto 24/26 twice daily, Farxiga 10 mg daily, spironolactone 25 mg.  -Initiate BB when near euvolemic  -Plan for [facility] this afternoon.    Cardiology will follow along    Thank you for allowing me to participate in the care of this patient.  I will continue to follow this patient    Please contact me with any questions.    SERAFIN Stewart.   Cardiologist, [facility]  [phone]

## 2024-02-09 NOTE — PROGRESS NOTES
Hospital Medicine Daily Progress Note    Date of Service  2/9/2024    Chief Complaint  Santino Zabala is a 58 y.o. male admitted 2/6/2024 with acute CHF    Hospital Course    58 male with past medical history of congestive heart failure following up with Dr. Jimenes at [facility] presented with worsening lower extremity swelling.  Labs noted to have elevated troponin and BNP.  Chest ray noted with vascular congestion.  Echocardiogram noted with ejection fraction 20 to 25%, mildly dilated right ventricle reduced right ventricular systolic function.  Cardiology Dr. Martinez evaluated and plan is for  cardiac cath.      Interval Problem Update      2/9/2024  Blood pressure borderline low    Labs reviewed from this a.m., normal sodium 137, potassium 4, renal function stable slightly worsened since yesterday BUN/creatinine 27/1.13.    Total intake output - 8 L since admission    Ultrasound abdomen result reviewed noted with small ascites in all 4 quadrant, no enough fluid to tap.      Case discussed with cardiology Dr. Martinez        Continue telemetry monitoring for acute CHF  Lasix 40mg BID, monitor potassium and magnesium level   Supplement O2 as needed  Fluid restriction   Daily strict input and output  Daily weight  Monitor Intake/Output, Daily Weights    Repeat BMP in a.m. to monitor electrolytes and toxicity while on high-dose of diuretics  Repeat CBC in a.m. to monitor white count and hemoglobin  Need close monitoring of blood counts, electrolytes and renal function due to potential of organ dysfunction due to acute CHF.      Patient is scheduled for left heart cath today    Patient has a high medical complexity, complex decision making and is at high risk of complication, morbidity and mortality       I have discussed this patient's plan of care and discharge plan at IDT rounds today with Case Management, Nursing, Nursing leadership, and other members of the IDT  team.    Consultants/Specialty  cardiology    Code Status  Full Code    Disposition  The patient is not medically cleared for discharge to home or a post-acute facility.  Anticipate discharge to: home with close outpatient follow-up    I have placed the appropriate orders for post-discharge needs.    Review of Systems  Review of Systems   Respiratory:  Positive for shortness of breath.    Cardiovascular:  Positive for leg swelling.   Gastrointestinal:  Negative for abdominal pain.        Physical Exam  Temp:  [36.8 °C (98.2 °F)-37 °C (98.6 °F)] 36.8 °C (98.3 °F)  Pulse:  [85-99] 94  Resp:  [17] 17  BP: ()/(62-73) 100/64  SpO2:  [88 %-99 %] 92 %    Physical Exam  Cardiovascular:      Rate and Rhythm: Normal rate and regular rhythm.      Pulses: Normal pulses.      Heart sounds: Normal heart sounds.   Pulmonary:      Effort: Pulmonary effort is normal. No respiratory distress.      Breath sounds: No rales (Minimal Rales noted).   Musculoskeletal:      Right lower leg: Edema present.      Left lower leg: Edema present.      Comments: Edema improving   Neurological:      General: No focal deficit present.      Mental Status: He is alert.   Psychiatric:         Mood and Affect: Mood normal.         Fluids    Intake/Output Summary (Last 24 hours) at 2/9/2024 1515  Last data filed at 2/9/2024 1300  Gross per 24 hour   Intake 250 ml   Output 4975 ml   Net -4725 ml         Laboratory  Recent Labs     02/06/24  1817 02/07/24  0356   WBC 7.1 6.8   RBC 4.81 4.48*   HEMOGLOBIN 15.5 14.6   HEMATOCRIT 46.4 42.1   MCV 96.5 94.0   MCH 32.2 32.6   MCHC 33.4 34.7   RDW 59.2* 56.1*   PLATELETCT 231 230   MPV 10.7 10.6       Recent Labs     02/07/24  0356 02/08/24  0032 02/09/24  0103   SODIUM 136 137 137   POTASSIUM 3.8 3.5* 4.0   CHLORIDE 102 101 102   CO2 21 23 24   GLUCOSE 119* 122* 117*   BUN 25* 30* 27*   CREATININE 0.91 0.91 1.13   CALCIUM 9.4 8.9 8.3*       Recent Labs     02/08/24  1542   INR 1.19*         Recent Labs      02/07/24  0356   TRIGLYCERIDE 108   HDL 39*   LDL 73         Imaging  US-ABDOMEN LTD (SOFT TISSUE)   Final Result      Small volume ascites is seen in all 4 quadrants. Not enough fluid for therapeutic thoracentesis.      CT-PANCREAS AND ABDOMEN WITH & W/O   Final Result      1.  Heterogeneous liver, possibly due to heart failure.  Cirrhosis is also a consideration.   2.  Moderate ascites.   3.  No pancreatic mass demonstrated.   4.  Enlarged freedom hepatis lymph node, likely accounting for ultrasound findings, measuring 16 mm short axis, of uncertain etiology.   5.  Small bilateral pleural effusions with associated atelectasis.            EC-ECHOCARDIOGRAM COMPLETE W/O CONT   Final Result      US-RUQ   Final Result         1.  Gallbladder wall thickening without visualized shadowing stones, nonspecific in the setting of hepatocellular disease. Consider acalculous cholecystitis as clinically appropriate. Could be further evaluated with HIDA scan as clinically appropriate.   2.  Echogenic nodule adjacent to the pancreatic head, could represent prominent lymph node, otherwise indeterminate. Follow-up with CT of the abdomen with contrast for further characterization.   3.  Borderline hepatomegaly   4.  Irregular echogenic liver, appearance favoring changes of cirrhosis.   5.  Scattered mild ascites.      Findings and/or recommendations of the examination where conveyed digitally using the [employer] system to, Omari Cuadra, and message was electronically verified as Read on 2/7/2024 11:17 AM.      DX-CHEST-PORTABLE (1 VIEW)   Final Result      Congestive heart failure.      CL-LEFT HEART CATHETERIZATION WITH POSSIBLE INTERVENTION    (Results Pending)        Assessment/Plan  * Acute combined systolic and diastolic heart failure (HCC)- (present on admission)  Assessment & Plan  Echo noted with left ventricular ejection fraction 20 to 25%, right ventricular function, global hypokinesis, grade 2 diastolic 40  function        Telemetry monitoring for acute CHF  Lasix 40mg BID, monitor potassium and magnesium level   Supplement O2 as needed  Fluid restriction   Daily strict input and output  Daily weight  Monitor Intake/Output, Daily Weights  Get CT pancreas for evaluation of pancreatic nodule  Repeat BMP in a.m. to monitor electrolytes and toxicity while on high-dose of diuretics  Repeat CBC in a.m. to monitor white count and hemoglobin  Need close monitoring of blood counts, electrolytes and renal function due to potential of organ dysfunction due to acute CHF.    2/8/2024  Continue diuresis  Cardiology planning for cardiac cath eventually    2/9/2024  Scheduled for left heart cath      Alcoholic cirrhosis of liver with ascites (HCC)  Assessment & Plan  Patient had extensive history of alcohol use   reports he had quit alcohol 8 months ago  No enough fluid to tap on ultrasound    Transaminitis  Assessment & Plan  Gallbladder wall thickening without visualized shadowing stones, nonspecific in the setting of hepatocellular disease. Consider acalculous cholecystitis as clinically appropriate. Could be further evaluated with HIDA scan as clinically appropriate     CT abdomen result reviewed noted with heterogeneous liver likely from heart failure, likely cirrhosis, moderate ascites with no pancreatic mass, a small bilateral pleural effusion,    Hyperbilirubinemia  Assessment & Plan  Follow-up right upper quadrant ultrasound    Anasarca  Assessment & Plan  Secondary to congestive heart failure/possible cirrhosis  Follow-up echocardiogram  Lasix 40 mg IV twice daily  Patient will need daily monitoring with BMPs  Daily weights  Strict ins and outs  No enough fluid to tap ultrasound         VTE prophylaxis: lovenox    I have performed a physical exam and reviewed and updated ROS and Plan today (2/9/2024). In review of yesterday's note (2/8/2024), there are no changes except as documented above.

## 2024-02-09 NOTE — CARE PLAN
Problem: Care Map:  Day 2 Optimal Outcome for the Heart Failure Patient  Goal: Day 2:  Optimal Care of the heart failure patient  Outcome: Progressing  Note: Daily weight charted in EMR. Pt using urinal for strict I/O. Pt NPO for cath procedure. Education provided.      Problem: Knowledge Deficit - Standard  Goal: Patient and family/care givers will demonstrate understanding of plan of care, disease process/condition, diagnostic tests and medications  Outcome: Progressing   The patient is Watcher - Medium risk of patient condition declining or worsening    Shift Goals  Clinical Goals: npo for cath procedure  Patient Goals: eat and drink asap  Family Goals: not present    Progress made toward(s) clinical / shift goals:      Patient is not progressing towards the following goals:

## 2024-02-10 ENCOUNTER — APPOINTMENT (OUTPATIENT)
Dept: CARDIOLOGY | Facility: MEDICAL CENTER | Age: 59
DRG: 286 | End: 2024-02-10
Attending: NURSE PRACTITIONER
Payer: OTHER MISCELLANEOUS

## 2024-02-10 PROBLEM — I42.8 NONISCHEMIC CARDIOMYOPATHY (HCC): Status: ACTIVE | Noted: 2024-02-10

## 2024-02-10 LAB
ANION GAP SERPL CALC-SCNC: 12 MMOL/L (ref 7–16)
BASOPHILS # BLD AUTO: 0.9 % (ref 0–1.8)
BASOPHILS # BLD: 0.05 K/UL (ref 0–0.12)
BUN SERPL-MCNC: 22 MG/DL (ref 8–22)
CALCIUM SERPL-MCNC: 8.4 MG/DL (ref 8.5–10.5)
CHLORIDE SERPL-SCNC: 101 MMOL/L (ref 96–112)
CO2 SERPL-SCNC: 25 MMOL/L (ref 20–33)
CREAT SERPL-MCNC: 1.13 MG/DL (ref 0.5–1.4)
EOSINOPHIL # BLD AUTO: 0.12 K/UL (ref 0–0.51)
EOSINOPHIL NFR BLD: 2.2 % (ref 0–6.9)
ERYTHROCYTE [DISTWIDTH] IN BLOOD BY AUTOMATED COUNT: 56.5 FL (ref 35.9–50)
GFR SERPLBLD CREATININE-BSD FMLA CKD-EPI: 75 ML/MIN/1.73 M 2
GLUCOSE SERPL-MCNC: 104 MG/DL (ref 65–99)
HCT VFR BLD AUTO: 44.6 % (ref 42–52)
HGB BLD-MCNC: 15.3 G/DL (ref 14–18)
IMM GRANULOCYTES # BLD AUTO: 0.02 K/UL (ref 0–0.11)
IMM GRANULOCYTES NFR BLD AUTO: 0.4 % (ref 0–0.9)
LYMPHOCYTES # BLD AUTO: 1.09 K/UL (ref 1–4.8)
LYMPHOCYTES NFR BLD: 20 % (ref 22–41)
MAGNESIUM SERPL-MCNC: 2.4 MG/DL (ref 1.5–2.5)
MCH RBC QN AUTO: 32 PG (ref 27–33)
MCHC RBC AUTO-ENTMCNC: 34.3 G/DL (ref 32.3–36.5)
MCV RBC AUTO: 93.3 FL (ref 81.4–97.8)
MONOCYTES # BLD AUTO: 0.52 K/UL (ref 0–0.85)
MONOCYTES NFR BLD AUTO: 9.5 % (ref 0–13.4)
NEUTROPHILS # BLD AUTO: 3.66 K/UL (ref 1.82–7.42)
NEUTROPHILS NFR BLD: 67 % (ref 44–72)
NRBC # BLD AUTO: 0 K/UL
NRBC BLD-RTO: 0 /100 WBC (ref 0–0.2)
PHOSPHATE SERPL-MCNC: 3.7 MG/DL (ref 2.5–4.5)
PLATELET # BLD AUTO: 281 K/UL (ref 164–446)
PMV BLD AUTO: 10.3 FL (ref 9–12.9)
POTASSIUM SERPL-SCNC: 4 MMOL/L (ref 3.6–5.5)
RBC # BLD AUTO: 4.78 M/UL (ref 4.7–6.1)
SODIUM SERPL-SCNC: 138 MMOL/L (ref 135–145)
WBC # BLD AUTO: 5.5 K/UL (ref 4.8–10.8)

## 2024-02-10 PROCEDURE — 80048 BASIC METABOLIC PNL TOTAL CA: CPT

## 2024-02-10 PROCEDURE — 99232 SBSQ HOSP IP/OBS MODERATE 35: CPT | Mod: 25 | Performed by: NURSE PRACTITIONER

## 2024-02-10 PROCEDURE — B2111ZZ FLUOROSCOPY OF MULTIPLE CORONARY ARTERIES USING LOW OSMOLAR CONTRAST: ICD-10-PCS | Performed by: INTERNAL MEDICINE

## 2024-02-10 PROCEDURE — 770020 HCHG ROOM/CARE - TELE (206)

## 2024-02-10 PROCEDURE — 99233 SBSQ HOSP IP/OBS HIGH 50: CPT | Performed by: STUDENT IN AN ORGANIZED HEALTH CARE EDUCATION/TRAINING PROGRAM

## 2024-02-10 PROCEDURE — 93458 L HRT ARTERY/VENTRICLE ANGIO: CPT | Mod: 26 | Performed by: INTERNAL MEDICINE

## 2024-02-10 PROCEDURE — 700117 HCHG RX CONTRAST REV CODE 255: Performed by: INTERNAL MEDICINE

## 2024-02-10 PROCEDURE — 36415 COLL VENOUS BLD VENIPUNCTURE: CPT

## 2024-02-10 PROCEDURE — A9270 NON-COVERED ITEM OR SERVICE: HCPCS | Performed by: NURSE PRACTITIONER

## 2024-02-10 PROCEDURE — 84100 ASSAY OF PHOSPHORUS: CPT

## 2024-02-10 PROCEDURE — 700111 HCHG RX REV CODE 636 W/ 250 OVERRIDE (IP)

## 2024-02-10 PROCEDURE — 700101 HCHG RX REV CODE 250

## 2024-02-10 PROCEDURE — 85025 COMPLETE CBC W/AUTO DIFF WBC: CPT

## 2024-02-10 PROCEDURE — 99152 MOD SED SAME PHYS/QHP 5/>YRS: CPT | Performed by: INTERNAL MEDICINE

## 2024-02-10 PROCEDURE — 700111 HCHG RX REV CODE 636 W/ 250 OVERRIDE (IP): Mod: JZ | Performed by: STUDENT IN AN ORGANIZED HEALTH CARE EDUCATION/TRAINING PROGRAM

## 2024-02-10 PROCEDURE — 83735 ASSAY OF MAGNESIUM: CPT

## 2024-02-10 PROCEDURE — 99152 MOD SED SAME PHYS/QHP 5/>YRS: CPT

## 2024-02-10 PROCEDURE — 700102 HCHG RX REV CODE 250 W/ 637 OVERRIDE(OP): Performed by: NURSE PRACTITIONER

## 2024-02-10 PROCEDURE — 700102 HCHG RX REV CODE 250 W/ 637 OVERRIDE(OP): Performed by: STUDENT IN AN ORGANIZED HEALTH CARE EDUCATION/TRAINING PROGRAM

## 2024-02-10 PROCEDURE — 4A023N7 MEASUREMENT OF CARDIAC SAMPLING AND PRESSURE, LEFT HEART, PERCUTANEOUS APPROACH: ICD-10-PCS | Performed by: INTERNAL MEDICINE

## 2024-02-10 PROCEDURE — A9270 NON-COVERED ITEM OR SERVICE: HCPCS | Performed by: STUDENT IN AN ORGANIZED HEALTH CARE EDUCATION/TRAINING PROGRAM

## 2024-02-10 RX ORDER — HEPARIN SODIUM 1000 [USP'U]/ML
INJECTION, SOLUTION INTRAVENOUS; SUBCUTANEOUS
Status: COMPLETED
Start: 2024-02-10 | End: 2024-02-10

## 2024-02-10 RX ORDER — HEPARIN SODIUM 200 [USP'U]/100ML
INJECTION, SOLUTION INTRAVENOUS
Status: COMPLETED
Start: 2024-02-10 | End: 2024-02-10

## 2024-02-10 RX ORDER — VERAPAMIL HYDROCHLORIDE 2.5 MG/ML
INJECTION, SOLUTION INTRAVENOUS
Status: COMPLETED
Start: 2024-02-10 | End: 2024-02-10

## 2024-02-10 RX ORDER — LIDOCAINE HYDROCHLORIDE 20 MG/ML
INJECTION, SOLUTION INFILTRATION; PERINEURAL
Status: COMPLETED
Start: 2024-02-10 | End: 2024-02-10

## 2024-02-10 RX ORDER — PHENYLEPHRINE HCL IN 0.9% NACL 1 MG/10 ML
SYRINGE (ML) INTRAVENOUS
Status: COMPLETED
Start: 2024-02-10 | End: 2024-02-10

## 2024-02-10 RX ORDER — FUROSEMIDE 10 MG/ML
40 INJECTION INTRAMUSCULAR; INTRAVENOUS
Status: DISCONTINUED | OUTPATIENT
Start: 2024-02-11 | End: 2024-02-11 | Stop reason: HOSPADM

## 2024-02-10 RX ORDER — MIDAZOLAM HYDROCHLORIDE 1 MG/ML
INJECTION INTRAMUSCULAR; INTRAVENOUS
Status: COMPLETED
Start: 2024-02-10 | End: 2024-02-10

## 2024-02-10 RX ADMIN — DAPAGLIFLOZIN 10 MG: 10 TABLET, FILM COATED ORAL at 05:34

## 2024-02-10 RX ADMIN — NITROGLYCERIN 10 ML: 20 INJECTION INTRAVENOUS at 11:10

## 2024-02-10 RX ADMIN — Medication 200 MCG: at 11:26

## 2024-02-10 RX ADMIN — HEPARIN SODIUM: 1000 INJECTION, SOLUTION INTRAVENOUS; SUBCUTANEOUS at 11:10

## 2024-02-10 RX ADMIN — SPIRONOLACTONE 25 MG: 25 TABLET ORAL at 05:33

## 2024-02-10 RX ADMIN — FENTANYL CITRATE 50 MCG: 50 INJECTION, SOLUTION INTRAMUSCULAR; INTRAVENOUS at 11:26

## 2024-02-10 RX ADMIN — HEPARIN SODIUM 2000 UNITS: 200 INJECTION, SOLUTION INTRAVENOUS at 11:09

## 2024-02-10 RX ADMIN — VERAPAMIL HYDROCHLORIDE 5 MG: 2.5 INJECTION, SOLUTION INTRAVENOUS at 11:10

## 2024-02-10 RX ADMIN — ENOXAPARIN SODIUM 40 MG: 100 INJECTION SUBCUTANEOUS at 17:43

## 2024-02-10 RX ADMIN — MIDAZOLAM HYDROCHLORIDE 1.5 MG: 1 INJECTION, SOLUTION INTRAMUSCULAR; INTRAVENOUS at 11:25

## 2024-02-10 RX ADMIN — FUROSEMIDE 40 MG: 10 INJECTION, SOLUTION INTRAMUSCULAR; INTRAVENOUS at 05:33

## 2024-02-10 RX ADMIN — ASPIRIN 81 MG 81 MG: 81 TABLET ORAL at 05:33

## 2024-02-10 RX ADMIN — FAMOTIDINE 20 MG: 20 TABLET ORAL at 20:26

## 2024-02-10 RX ADMIN — FAMOTIDINE 20 MG: 20 TABLET ORAL at 09:23

## 2024-02-10 RX ADMIN — ALLOPURINOL 100 MG: 100 TABLET ORAL at 09:23

## 2024-02-10 RX ADMIN — IOHEXOL 29 ML: 350 INJECTION, SOLUTION INTRAVENOUS at 11:25

## 2024-02-10 RX ADMIN — LIDOCAINE HYDROCHLORIDE: 20 INJECTION, SOLUTION INFILTRATION; PERINEURAL at 11:10

## 2024-02-10 RX ADMIN — ALLOPURINOL 100 MG: 100 TABLET ORAL at 20:26

## 2024-02-10 ASSESSMENT — ENCOUNTER SYMPTOMS
CHOKING: 0
CHEST TIGHTNESS: 0
WHEEZING: 0
ABDOMINAL DISTENTION: 1
COUGH: 0
STRIDOR: 0
APNEA: 0
SHORTNESS OF BREATH: 0
SHORTNESS OF BREATH: 1
ABDOMINAL PAIN: 0

## 2024-02-10 ASSESSMENT — PAIN DESCRIPTION - PAIN TYPE: TYPE: ACUTE PAIN

## 2024-02-10 ASSESSMENT — FIBROSIS 4 INDEX: FIB4 SCORE: 1.09

## 2024-02-10 NOTE — PROGRESS NOTES
Hospital Medicine Daily Progress Note    Date of Service  2/10/2024    Chief Complaint  Santino Zabala is a 58 y.o. male admitted 2/6/2024 with acute CHF    Hospital Course    58 male with past medical history of congestive heart failure following up with Dr. Jimenes at [facility] presented with worsening lower extremity swelling.  Labs noted to have elevated troponin and BNP.  Chest ray noted with vascular congestion.  Echocardiogram noted with ejection fraction 20 to 25%, mildly dilated right ventricle reduced right ventricular systolic function.  He underwent cardiac cath on 2/10 which revealed nonischemic cardiomyopathy.    Interval Problem Update      2/10/2024  Blood pressure borderline low  Labs reviewed noted with normal white count, sodium 138, normal phosphorus, magnesium level    Negative fluid balance around -12 L since admission    Cardiac cath result reviewed revealed nonischemic cardiomyopathy    Case discussed with interventional cardiology Dr Ortiz     Continue telemetry monitoring for acute CHF  Decrease Lasix to 40 mg IV daily(blood pressure borderline low)   Supplement O2 as needed  Fluid restriction   Daily strict input and output  Daily weight  Monitor Intake/Output, Daily Weights    Repeat BMP in a.m. to monitor electrolytes and toxicity while on high-dose of diuretics  Repeat CBC in a.m. to monitor white count and hemoglobin  Need close monitoring of blood counts, electrolytes and renal function due to potential of organ dysfunction due to acute CHF.    Patient has a high medical complexity, complex decision making and is at high risk of complication, morbidity and mortality       I have discussed this patient's plan of care and discharge plan at IDT rounds today with Case Management, Nursing, Nursing leadership, and other members of the IDT team.    Consultants/Specialty  cardiology    Code Status  Full Code    Disposition  The patient is not medically cleared for discharge to  home or a post-acute facility.  Anticipate discharge to: home with close outpatient follow-up    I have placed the appropriate orders for post-discharge needs.    Review of Systems  Review of Systems   Respiratory:  Positive for shortness of breath.    Cardiovascular:  Positive for leg swelling.   Gastrointestinal:  Negative for abdominal pain.        Physical Exam  Temp:  [36.5 °C (97.7 °F)-37 °C (98.6 °F)] 36.9 °C (98.4 °F)  Pulse:  [9-94] 77  Resp:  [16-18] 17  BP: ()/(61-79) 95/63  SpO2:  [91 %-99 %] 95 %    Physical Exam  Cardiovascular:      Rate and Rhythm: Normal rate and regular rhythm.      Pulses: Normal pulses.      Heart sounds: Normal heart sounds.   Pulmonary:      Effort: Pulmonary effort is normal. No respiratory distress.      Breath sounds: No rales (Minimal Rales noted).   Musculoskeletal:      Right lower leg: Edema present.      Left lower leg: Edema present.      Comments: Edema improving   Neurological:      General: No focal deficit present.      Mental Status: He is alert.   Psychiatric:         Mood and Affect: Mood normal.         Fluids    Intake/Output Summary (Last 24 hours) at 2/10/2024 1526  Last data filed at 2/10/2024 1000  Gross per 24 hour   Intake 800 ml   Output 4655 ml   Net -3855 ml         Laboratory  Recent Labs     02/10/24  0055   WBC 5.5   RBC 4.78   HEMOGLOBIN 15.3   HEMATOCRIT 44.6   MCV 93.3   MCH 32.0   MCHC 34.3   RDW 56.5*   PLATELETCT 281   MPV 10.3       Recent Labs     02/08/24  0032 02/09/24  0103 02/10/24  0055   SODIUM 137 137 138   POTASSIUM 3.5* 4.0 4.0   CHLORIDE 101 102 101   CO2 23 24 25   GLUCOSE 122* 117* 104*   BUN 30* 27* 22   CREATININE 0.91 1.13 1.13   CALCIUM 8.9 8.3* 8.4*       Recent Labs     02/08/24  1542   INR 1.19*                   Imaging  US-ABDOMEN LTD (SOFT TISSUE)   Final Result      Small volume ascites is seen in all 4 quadrants. Not enough fluid for therapeutic thoracentesis.      CT-PANCREAS AND ABDOMEN WITH & W/O   Final  Result      1.  Heterogeneous liver, possibly due to heart failure.  Cirrhosis is also a consideration.   2.  Moderate ascites.   3.  No pancreatic mass demonstrated.   4.  Enlarged freedom hepatis lymph node, likely accounting for ultrasound findings, measuring 16 mm short axis, of uncertain etiology.   5.  Small bilateral pleural effusions with associated atelectasis.            EC-ECHOCARDIOGRAM COMPLETE W/O CONT   Final Result      US-RUQ   Final Result         1.  Gallbladder wall thickening without visualized shadowing stones, nonspecific in the setting of hepatocellular disease. Consider acalculous cholecystitis as clinically appropriate. Could be further evaluated with HIDA scan as clinically appropriate.   2.  Echogenic nodule adjacent to the pancreatic head, could represent prominent lymph node, otherwise indeterminate. Follow-up with CT of the abdomen with contrast for further characterization.   3.  Borderline hepatomegaly   4.  Irregular echogenic liver, appearance favoring changes of cirrhosis.   5.  Scattered mild ascites.      Findings and/or recommendations of the examination where conveyed digitally using the [employer] system toOmari, and message was electronically verified as Read on 2/7/2024 11:17 AM.      DX-CHEST-PORTABLE (1 VIEW)   Final Result      Congestive heart failure.      CL-LEFT HEART CATHETERIZATION WITH POSSIBLE INTERVENTION    (Results Pending)   CL-LEFT HEART CATHETERIZATION WITH POSSIBLE INTERVENTION    (Results Pending)        Assessment/Plan  * Acute combined systolic and diastolic heart failure (HCC)- (present on admission)  Assessment & Plan  Echo noted with left ventricular ejection fraction 20 to 25%, right ventricular function, global hypokinesis, grade 2 diastolic 40 function        Telemetry monitoring for acute CHF  Lasix 40mg BID, monitor potassium and magnesium level   Supplement O2 as needed  Fluid restriction   Daily strict input and output  Daily  weight  Monitor Intake/Output, Daily Weights  Get CT pancreas for evaluation of pancreatic nodule  Repeat BMP in a.m. to monitor electrolytes and toxicity while on high-dose of diuretics  Repeat CBC in a.m. to monitor white count and hemoglobin  Need close monitoring of blood counts, electrolytes and renal function due to potential of organ dysfunction due to acute CHF.    2/8/2024  Continue diuresis  Cardiology planning for cardiac cath eventually    2/9/2024  Scheduled for left heart cath    2/10/2024  He underwent cardiac cath which revealed nonischemic cardiomyopathy.        Alcoholic cirrhosis of liver with ascites (HCC)  Assessment & Plan  Patient had extensive history of alcohol use   reports he had quit alcohol 8 months ago  No enough fluid to tap on ultrasound    Transaminitis  Assessment & Plan  Gallbladder wall thickening without visualized shadowing stones, nonspecific in the setting of hepatocellular disease. Consider acalculous cholecystitis as clinically appropriate. Could be further evaluated with HIDA scan as clinically appropriate     CT abdomen result reviewed noted with heterogeneous liver likely from heart failure, likely cirrhosis, moderate ascites with no pancreatic mass, a small bilateral pleural effusion,    Hyperbilirubinemia  Assessment & Plan  Follow-up right upper quadrant ultrasound    Anasarca  Assessment & Plan  Secondary to congestive heart failure/possible cirrhosis  No enough fluid to tap  on ultrasound  On IV Lasix         VTE prophylaxis: lovenox    I have performed a physical exam and reviewed and updated ROS and Plan today (2/10/2024). In review of yesterday's note (2/9/2024), there are no changes except as documented above.

## 2024-02-10 NOTE — PROGRESS NOTES
Assumed care of patient, bedside report received from JULIANA Cuevas. Updated on POC, call light within reach and fall precautions in place. Bed locked and in lowest position. Patient instructed to call for assistance before getting out of bed. All questions answered, no other needs at this time.

## 2024-02-10 NOTE — PROCEDURES
Cardiac Catheterization Laboratory Procedure Note    DATE: 2/10/2024    : Carl Ortiz MD, MPH    PROCEDURES PERFORMED:  Left heart catheterization  Coronary angiography  Ultrasound-guided right radial arterial access  Moderate conscious sedation    INDICATIONS:  Cardiomyopathy, acute heart failure    CONSENT:  The complete alternatives, risks, and benefits of the procedure were explained to the patient. Informed consent was obtained prior to the procedure.    MEDICATIONS:  Lidocaine  Fentanyl  Midazolam  Nitroglycerin  Verapamil  Heparin    MODERATE CONSCIOUS SEDATION:  I personally supervised the administration of moderate conscious sedation by the nursing staff for 15 minutes.  Start time: 11:11 AM  End time: 11:26 AM    CONTRAST: Omnipaque 29 cc    RADIATION DOSE (Air Kerma): 116 mGy    FLUOROSCOPY TIME: 3.9 minutes    ACCESS:  6-Maltese Glidesheath in the right radial artery    ESTIMATED BLOOD LOSS: <10 cc    COMPLICATIONS: None    PROCEDURE IN DETAIL:  The patient was brought to the cardiac catheterization laboratory in the fasting state. The skin over the right wrist was prepped and draped in the usual sterile fashion. Lidocaine infiltration was used to anesthetize the tissue over the right radial artery. Using the micropuncture technique, and ultrasound guidance, a 6-Maltese Glidesheath was inserted in the right radial artery. A 4Fr JL3.5 diagnostic catheter was then advanced over a standard J-wire into the aortic root and then used to engage the left coronary artery and cineangiograms were obtained in multiple projections for complete evaluation of the left coronary system. This catheter was then exchanged over J-wire to a 4Fr JR4 diagnostic catheter which was advanced into the LV and LVEDP recorded.  Then used to engage the ostium of the right coronary artery and cineangiograms were obtained in multiple projections for complete evaluation of the right coronary system. Following completion of  coronary angiography, all wires, catheters, and sheaths were removed.  A TR band was placed over the right wrist using the patent hemostasis technique.     HEMODYNAMICS:  Aortic pressure: 96 /78 mmHg  LVEDP: 30 mmHg  No significant aortic gradient on pullback    CORONARY ANGIOGRAPHY:  The left main coronary artery : Normal-appearing, trifurcates to LAD, ramus intermedius and left circumflex  Ramus intermedius coronary artery: Large-caliber normal-appearing  The left anterior descending coronary artery : Minimal CAD, large in caliber transapical vessel.  Gives rise to 3 large caliber diagonal branches  The left circumflex coronary artery : Large-caliber vessel that gives rise to diminutive OM1, small OM 2 and a large bifurcating OM 3  The right coronary artery  : Minimal CAD, large caliber dominant vessel.    IMPRESSION:  1.  Minimal epicardial CAD  2.  Nonischemic cardiomyopathy, most likely alcohol related  3.  Significantly elevated resting LVEDP 30mmHg  4.  Narrow pulse pressure    RECOMMENDATIONS:  Ongoing optimal medical therapy for HFrEF and nonischemic cardiomyopathy, as tolerated by BP and HR parameters. Goal SBP > 90 mmHg  Consider another 24-48 hrs of IV diuresis before transitioning to p.o. diuresis as long as tolerated form hemodynamic and renal standpoint  TR band protocol    Cardiology will sign off at this time.  Close outpatient cardiology follow-up - HF clinic - is warranted , will facilitate.    NOTIFICATION:  The patient opted no need to update any family/friends at this time.    Internal medicine hospitalist- Dr. Wilson - was updated.    Carl Ortiz MD, MPH [facility]  Interventional Cardiologist  [facility]   of Clinical Internal Medicine - [facility] Greg GARCIA

## 2024-02-10 NOTE — PROGRESS NOTES
Bedside report received from Dianelys ANDREWS. Fall prec in place. Patient awake and alert, npo for cath. No further needs at this time.

## 2024-02-10 NOTE — PROGRESS NOTES
Monitor Summary:   Rhythm: SR  Rate: 75-89  Measurement: .20/.07/.37  Ectopy: 6b VT, r pvc

## 2024-02-10 NOTE — PROGRESS NOTES
Cardiology Follow Up Progress Note    Date of Service  2/10/2024    Attending Physician  Jacob Wilson M.D.    Chief Complaint   Acute decompensated systolic heart failure LVEF 25%    HPI  Santino Zabala is a 58 y.o. male admitted 2/6/2024 with severe cardiomyopathy & acute decompensated systolic heart failure LVEF 25%.      PMH: Prior history of heavy alcohol use, reports prior history of  heart failure    Interim Events    No overnight cardiac events  Telemetry-SR  SBP ~100  Effective diuresis  I/O =- 3700 x 24  Plan for LHC this morning.  SCr stable  Review of Systems  Review of Systems   Respiratory:  Negative for apnea, cough, choking, chest tightness, shortness of breath, wheezing and stridor.    Cardiovascular:  Positive for leg swelling. Negative for chest pain.   Gastrointestinal:  Positive for abdominal distention.       Vital signs in last 24 hours  Temp:  [36.8 °C (98.2 °F)-37 °C (98.6 °F)] 37 °C (98.6 °F)  Pulse:  [9-94] 74  Resp:  [17-18] 18  BP: ()/(64-79) 100/71  SpO2:  [92 %-99 %] 99 %    Physical Exam  Physical Exam  Constitutional:       Appearance: He is normal weight.   Cardiovascular:      Rate and Rhythm: Normal rate and regular rhythm.      Pulses: Normal pulses.   Pulmonary:      Effort: Pulmonary effort is normal.   Skin:     General: Skin is warm.   Neurological:      Mental Status: Mental status is at baseline.   Psychiatric:         Mood and Affect: Mood normal.         Lab Review  Lab Results   Component Value Date/Time    WBC 5.5 02/10/2024 12:55 AM    RBC 4.78 02/10/2024 12:55 AM    HEMOGLOBIN 15.3 02/10/2024 12:55 AM    HEMATOCRIT 44.6 02/10/2024 12:55 AM    MCV 93.3 02/10/2024 12:55 AM    MCH 32.0 02/10/2024 12:55 AM    MCHC 34.3 02/10/2024 12:55 AM    MPV 10.3 02/10/2024 12:55 AM      Lab Results   Component Value Date/Time    SODIUM 138 02/10/2024 12:55 AM    POTASSIUM 4.0 02/10/2024 12:55 AM    CHLORIDE 101 02/10/2024 12:55 AM    CO2 25 02/10/2024 12:55 AM    GLUCOSE 104  "(H) 02/10/2024 12:55 AM    BUN 22 02/10/2024 12:55 AM    CREATININE 1.13 02/10/2024 12:55 AM      Lab Results   Component Value Date/Time    ASTSGOT 34 02/09/2024 01:03 AM    ALTSGPT 43 02/09/2024 01:03 AM     Lab Results   Component Value Date/Time    CHOLSTRLTOT 134 02/07/2024 03:56 AM    LDL 73 02/07/2024 03:56 AM    HDL 39 (A) 02/07/2024 03:56 AM    TRIGLYCERIDE 108 02/07/2024 03:56 AM    TROPONINT 38 (H) 02/06/2024 06:17 PM       No results for input(s): \"NTPROBNP\" in the last 72 hours.        Assessment/Plan    # new findings of severe cardiomyopathy LVEF 25%.  # Acute decompensated systolic heart failure.  # Severe MR  # Ascites  # History of heavy alcohol use  # A1c 7.1    -Effective diuresis overnight, -3700.  -Continue furosemide 40 IV twice daily.  -GDMT for severe cardiomyopathy  -Currently on Entresto 24/26 BID, Farxiga 10 mg daily, spironolactone 25 mg.  -Initiate BB when near euvolemic  -Plan for [facility] this morning.    Cardiology will follow along    Thank you for allowing me to participate in the care of this patient.  I will continue to follow this patient    Please contact me with any questions.    SERAFIN Stewart.   Cardiologist, [facility]  [phone]        "

## 2024-02-10 NOTE — PROGRESS NOTES
Patient settled back from cath lab. TR band in place with 12ml. Site c/d/I. Palpable R radial pulse. Patient VSS - placed onto room air. Bed alarm in place.

## 2024-02-10 NOTE — PROGRESS NOTES
Patient refused to allow RN to assess scrotal swelling at start of shift. Patient allowing RN to re wrap legs and assess swelling. Patient reports wound on penis related to swelling. photo obtained for chart, wound consult ordered. Wound cleaned. Patient provided underwear to support penis.

## 2024-02-11 ENCOUNTER — PHARMACY VISIT (OUTPATIENT)
Dept: PHARMACY | Facility: MEDICAL CENTER | Age: 59
End: 2024-02-11
Payer: COMMERCIAL

## 2024-02-11 ENCOUNTER — APPOINTMENT (OUTPATIENT)
Dept: CARDIOLOGY | Facility: MEDICAL CENTER | Age: 59
DRG: 286 | End: 2024-02-11
Attending: NURSE PRACTITIONER
Payer: OTHER MISCELLANEOUS

## 2024-02-11 VITALS
BODY MASS INDEX: 21.52 KG/M2 | DIASTOLIC BLOOD PRESSURE: 74 MMHG | OXYGEN SATURATION: 95 % | WEIGHT: 129.19 LBS | HEIGHT: 65 IN | SYSTOLIC BLOOD PRESSURE: 103 MMHG | TEMPERATURE: 97.2 F | HEART RATE: 89 BPM | RESPIRATION RATE: 18 BRPM

## 2024-02-11 LAB
ANION GAP SERPL CALC-SCNC: 11 MMOL/L (ref 7–16)
BUN SERPL-MCNC: 22 MG/DL (ref 8–22)
CALCIUM SERPL-MCNC: 8.4 MG/DL (ref 8.5–10.5)
CHLORIDE SERPL-SCNC: 102 MMOL/L (ref 96–112)
CO2 SERPL-SCNC: 25 MMOL/L (ref 20–33)
CREAT SERPL-MCNC: 1.16 MG/DL (ref 0.5–1.4)
GFR SERPLBLD CREATININE-BSD FMLA CKD-EPI: 72 ML/MIN/1.73 M 2
GLUCOSE SERPL-MCNC: 112 MG/DL (ref 65–99)
MAGNESIUM SERPL-MCNC: 2.6 MG/DL (ref 1.5–2.5)
PHOSPHATE SERPL-MCNC: 3.7 MG/DL (ref 2.5–4.5)
POTASSIUM SERPL-SCNC: 4.1 MMOL/L (ref 3.6–5.5)
SODIUM SERPL-SCNC: 138 MMOL/L (ref 135–145)

## 2024-02-11 PROCEDURE — 80048 BASIC METABOLIC PNL TOTAL CA: CPT

## 2024-02-11 PROCEDURE — A9270 NON-COVERED ITEM OR SERVICE: HCPCS | Performed by: NURSE PRACTITIONER

## 2024-02-11 PROCEDURE — RXMED WILLOW AMBULATORY MEDICATION CHARGE: Performed by: STUDENT IN AN ORGANIZED HEALTH CARE EDUCATION/TRAINING PROGRAM

## 2024-02-11 PROCEDURE — 99239 HOSP IP/OBS DSCHRG MGMT >30: CPT | Performed by: STUDENT IN AN ORGANIZED HEALTH CARE EDUCATION/TRAINING PROGRAM

## 2024-02-11 PROCEDURE — 700102 HCHG RX REV CODE 250 W/ 637 OVERRIDE(OP): Performed by: STUDENT IN AN ORGANIZED HEALTH CARE EDUCATION/TRAINING PROGRAM

## 2024-02-11 PROCEDURE — RXMED WILLOW AMBULATORY MEDICATION CHARGE: Performed by: NURSE PRACTITIONER

## 2024-02-11 PROCEDURE — 84100 ASSAY OF PHOSPHORUS: CPT

## 2024-02-11 PROCEDURE — 83735 ASSAY OF MAGNESIUM: CPT

## 2024-02-11 PROCEDURE — 36415 COLL VENOUS BLD VENIPUNCTURE: CPT

## 2024-02-11 PROCEDURE — A9270 NON-COVERED ITEM OR SERVICE: HCPCS | Performed by: STUDENT IN AN ORGANIZED HEALTH CARE EDUCATION/TRAINING PROGRAM

## 2024-02-11 PROCEDURE — 700102 HCHG RX REV CODE 250 W/ 637 OVERRIDE(OP): Performed by: NURSE PRACTITIONER

## 2024-02-11 PROCEDURE — 700111 HCHG RX REV CODE 636 W/ 250 OVERRIDE (IP): Mod: JZ | Performed by: STUDENT IN AN ORGANIZED HEALTH CARE EDUCATION/TRAINING PROGRAM

## 2024-02-11 RX ORDER — SPIRONOLACTONE 25 MG/1
25 TABLET ORAL DAILY
Qty: 30 TABLET | Refills: 3 | Status: SHIPPED | OUTPATIENT
Start: 2024-02-12

## 2024-02-11 RX ORDER — ASPIRIN 81 MG/1
81 TABLET, CHEWABLE ORAL DAILY
Qty: 30 TABLET | Refills: 0 | Status: SHIPPED | OUTPATIENT
Start: 2024-02-12 | End: 2024-03-13

## 2024-02-11 RX ORDER — ATORVASTATIN CALCIUM 40 MG/1
40 TABLET, FILM COATED ORAL DAILY
Qty: 30 TABLET | Refills: 0 | Status: SHIPPED | OUTPATIENT
Start: 2024-02-11 | End: 2024-03-12

## 2024-02-11 RX ORDER — DAPAGLIFLOZIN 10 MG/1
10 TABLET, FILM COATED ORAL DAILY
Qty: 30 TABLET | Refills: 0 | Status: SHIPPED | OUTPATIENT
Start: 2024-02-12 | End: 2024-03-13

## 2024-02-11 RX ORDER — POTASSIUM CHLORIDE 750 MG/1
10 TABLET, EXTENDED RELEASE ORAL DAILY
Qty: 15 EACH | Refills: 0 | Status: SHIPPED | OUTPATIENT
Start: 2024-02-11 | End: 2024-02-26

## 2024-02-11 RX ORDER — FUROSEMIDE 40 MG/1
40 TABLET ORAL DAILY
Qty: 15 TABLET | Refills: 0 | Status: SHIPPED | OUTPATIENT
Start: 2024-02-11 | End: 2024-02-26

## 2024-02-11 RX ORDER — METOPROLOL SUCCINATE 25 MG/1
25 TABLET, EXTENDED RELEASE ORAL DAILY
Qty: 30 TABLET | Refills: 11 | Status: SHIPPED | OUTPATIENT
Start: 2024-02-11

## 2024-02-11 RX ORDER — POTASSIUM CHLORIDE 20 MEQ/1
20 TABLET, EXTENDED RELEASE ORAL DAILY
Qty: 30 TABLET | Refills: 0 | Status: CANCELLED | OUTPATIENT
Start: 2024-02-11 | End: 2024-03-12

## 2024-02-11 RX ADMIN — ASPIRIN 81 MG 81 MG: 81 TABLET ORAL at 06:35

## 2024-02-11 RX ADMIN — SACUBITRIL AND VALSARTAN 1 TABLET: 24; 26 TABLET, FILM COATED ORAL at 06:35

## 2024-02-11 RX ADMIN — DAPAGLIFLOZIN 10 MG: 10 TABLET, FILM COATED ORAL at 06:35

## 2024-02-11 RX ADMIN — SPIRONOLACTONE 25 MG: 25 TABLET ORAL at 06:35

## 2024-02-11 RX ADMIN — ALLOPURINOL 100 MG: 100 TABLET ORAL at 08:08

## 2024-02-11 RX ADMIN — FUROSEMIDE 40 MG: 10 INJECTION, SOLUTION INTRAVENOUS at 06:35

## 2024-02-11 RX ADMIN — FAMOTIDINE 20 MG: 20 TABLET ORAL at 08:08

## 2024-02-11 ASSESSMENT — FIBROSIS 4 INDEX: FIB4 SCORE: 1.09

## 2024-02-11 ASSESSMENT — PAIN DESCRIPTION - PAIN TYPE: TYPE: ACUTE PAIN

## 2024-02-11 NOTE — CARE PLAN
The patient is Stable - Low risk of patient condition declining or worsening    Shift Goals  Clinical Goals: patient will tolerate a stable bp throug hand post cath  Patient Goals: eat and drink asap  Family Goals: not present    Progress made toward(s) clinical / shift goals:  Tolerated cath well - no issues with bleeding. BP soft but stable. No pain. Great diuresis.    Patient is not progressing towards the following goals:

## 2024-02-11 NOTE — PROGRESS NOTES
Patient being discharged. Pt educated on discharge instructions and new prescriptions, verbalized understanding. Follow up appointment made with cardiology. PIV removed, monitor checked in. Patient going home via family

## 2024-02-11 NOTE — DISCHARGE SUMMARY
"Discharge Summary    CHIEF COMPLAINT ON ADMISSION  Chief Complaint   Patient presents with    Groin Pain     Groin pain x2 days. States his scrotum is very swollen, \"my balls are as big as softballs\". Reports 7/10 burning pain.     Painful Urination     X1 week. +urgency, +incontinence, +odor       Reason for Admission  Groin Pain     Admission Date  2/6/2024    CODE STATUS  Full Code    HPI & HOSPITAL COURSE    58 male with past medical history of congestive heart failure following up with Dr. Jimenes at [facility] presented with worsening lower extremity swelling.  Labs noted to have elevated troponin and BNP.  Chest ray noted with vascular congestion.  Echocardiogram noted with ejection fraction 20 to 25%, mildly dilated right ventricle reduced right ventricular systolic function.  He underwent cardiac cath on 2/10 which revealed nonischemic cardiomyopathy.     Patient seen and examined at bedside on the day of discharge.  Remained asymptomatic.  Euvolemic on exam.  Labs unremarkable  Patient to be discharged with outpatient follow-up with cardiology.  Cardiology cleared patient for discharge.      At the time of discharge patient remain hemodynamically stable ,asymptomatic ,labs remain unremarkable .  Patient will be discharged with close follow up with PCP, cardiology.Discharge plan was discussed with patient in details .  Patient agreed with discharge plan  and  all questions answered.    Therefore, he is discharged in good and stable condition to home with close outpatient follow-up.    The patient met 2-midnight criteria for an inpatient stay at the time of discharge.    Discharge Date  2/11/2024          DISCHARGE DIAGNOSES  Principal Problem:    Acute combined systolic and diastolic heart failure (HCC) (POA: Yes)  Active Problems:    Anasarca (POA: Unknown)    Hyperbilirubinemia (POA: Unknown)    Transaminitis (POA: Unknown)    Alcoholic cirrhosis of liver with ascites (HCC) (POA: Unknown)    " Nonischemic cardiomyopathy (HCC) (POA: Unknown)  Resolved Problems:    * No resolved hospital problems. *      FOLLOW UP    Carl Ortiz M.D.  [address] [facility] [address]  [facility] [address]  [phone]    Follow up in 1 week(s)        MEDICATIONS ON DISCHARGE     Medication List        START taking these medications        Instructions   aspirin 81 MG Chew chewable tablet  Start taking on: February 12, 2024  Commonly known as: Asa   Chew 1 Tablet every day for 30 days.  Dose: 81 mg     atorvastatin 20 MG Tabs  Commonly known as: Lipitor   Take 2 Tablets by mouth every day for 30 days.  Dose: 40 mg     dapagliflozin propanediol 10 MG Tabs  Start taking on: February 12, 2024  Commonly known as: Farxiga   Take 1 Tablet by mouth every day for 30 days.  Dose: 10 mg     sacubitril-valsartan 24-26 MG Tabs  Commonly known as: Entresto   Take 1 Tablet by mouth 2 times a day for 30 days.  Dose: 1 Tablet     spironolactone 25 MG Tabs  Start taking on: February 12, 2024  Commonly known as: Aldactone   Take 1 Tablet by mouth every day.  Dose: 25 mg            CHANGE how you take these medications        Instructions   furosemide 40 MG Tabs  What changed: when to take this  Commonly known as: Lasix   Take 1 Tablet by mouth every day for 15 days.  Dose: 40 mg     potassium chloride SA 10 MEQ Tbcr  What changed:   medication strength  how much to take  Commonly known as: K-Dur   Take 1 Tablet by mouth every day for 15 days.  Dose: 10 mEq            CONTINUE taking these medications        Instructions   allopurinol 100 MG Tabs  Commonly known as: Zyloprim   Take 100 mg by mouth 2 times a day.  Dose: 100 mg            STOP taking these medications      sucralfate 1 GM/10ML Susp  Commonly known as: Carafate              Allergies  No Known Allergies    DIET  Orders Placed This Encounter   Procedures    Diet Order Diet: Cardiac     Standing Status:   Standing     Number of Occurrences:   1     Order Specific Question:   Diet:      Answer:   Cardiac [6]       ACTIVITY  As tolerated.  Weight bearing as tolerated    CONSULTATIONS  Cardiology     PROCEDURES  US-ABDOMEN LTD (SOFT TISSUE)   Final Result      Small volume ascites is seen in all 4 quadrants. Not enough fluid for therapeutic thoracentesis.      CT-PANCREAS AND ABDOMEN WITH & W/O   Final Result      1.  Heterogeneous liver, possibly due to heart failure.  Cirrhosis is also a consideration.   2.  Moderate ascites.   3.  No pancreatic mass demonstrated.   4.  Enlarged freedom hepatis lymph node, likely accounting for ultrasound findings, measuring 16 mm short axis, of uncertain etiology.   5.  Small bilateral pleural effusions with associated atelectasis.            EC-ECHOCARDIOGRAM COMPLETE W/O CONT   Final Result      US-RUQ   Final Result         1.  Gallbladder wall thickening without visualized shadowing stones, nonspecific in the setting of hepatocellular disease. Consider acalculous cholecystitis as clinically appropriate. Could be further evaluated with HIDA scan as clinically appropriate.   2.  Echogenic nodule adjacent to the pancreatic head, could represent prominent lymph node, otherwise indeterminate. Follow-up with CT of the abdomen with contrast for further characterization.   3.  Borderline hepatomegaly   4.  Irregular echogenic liver, appearance favoring changes of cirrhosis.   5.  Scattered mild ascites.      Findings and/or recommendations of the examination where conveyed digitally using the Voalte system to, Omari Cuadra, and message was electronically verified as Read on 2/7/2024 11:17 AM.      DX-CHEST-PORTABLE (1 VIEW)   Final Result      Congestive heart failure.      CL-LEFT HEART CATHETERIZATION WITH POSSIBLE INTERVENTION    (Results Pending)          LABORATORY  Lab Results   Component Value Date    SODIUM 138 02/11/2024    POTASSIUM 4.1 02/11/2024    CHLORIDE 102 02/11/2024    CO2 25 02/11/2024    GLUCOSE 112 (H) 02/11/2024    BUN 22 02/11/2024    CREATININE  1.16 02/11/2024        Lab Results   Component Value Date    WBC 5.5 02/10/2024    HEMOGLOBIN 15.3 02/10/2024    HEMATOCRIT 44.6 02/10/2024    PLATELETCT 281 02/10/2024        Total time of the discharge process exceeds 37  minutes.

## 2024-02-11 NOTE — PROGRESS NOTES
Bedside report received from off going RN/tech: Dillon, assumed care of patient.     Fall Risk Score: NO RISK  Fall risk interventions in place: Place yellow fall risk ID band on patient and Provide patient/family education based on risk assessment  Bed type: Regular (Mich Score less than 17 interventions in place)  Patient on cardiac monitor: Yes  IVF/IV medications: Not Applicable   Oxygen: Room Air  Bedside sitter: Not Applicable   Isolation: Not applicable

## 2024-02-11 NOTE — PROGRESS NOTES
Report received by day shift RN. PT awake alert and oriented x 4 with no c/o pain. Discussed POC. All needs met at this time.

## 2024-02-11 NOTE — DISCHARGE INSTRUCTIONS
HF Patient Discharge Instructions  Monitor your weight daily, and maintain a weight chart, to track your weight changes.   Activity as tolerated, unless your Doctor has ordered otherwise. Other activity order: as tolerated.  Follow a low fat, low cholesterol, low salt diet unless instructed otherwise by your Doctor. Read the labels on the back of food products and track your intake of fat, cholesterol and salt.   Fluid Restriction No. If a Fluid Restriction has been ordered by your Doctor, measure fluids with a measuring cup to ensure that you are not exceeding the restriction.   No smoking.  Oxygen No. If your Doctor has ordered that you wear Oxygen at home, it is important to wear it as ordered.  Did you receive an explanation from staff on the importance of taking each of your medications and why it is necessary to keep taking them unless your doctor says to stop? Yes  Were all of your questions answered about how to manage your heart failure and what to do if you have increased signs and symptoms after you go home? Yes  Do you feel like your heart failure care team involved you in the care treatment plan and allowed you to make decisions regarding your care while in the hospital and addressed any discharge needs you might have? Yes    See the educational handout provided at discharge for more information on monitoring your daily weight, activity and diet. This also explains more about Heart Failure, symptoms of a flare-up and some of the tests that you have undergone.     Warning Signs of a Flare-Up include:  Swelling in the ankles or lower legs.  Shortness of breath, while at rest, or while doing normal activities.   Shortness of breath at night when in bed, or coughing in bed.   Requiring more pillows to sleep at night, or needing to sit up at night to sleep.  Feeling weak, dizzy or fatigued.     When to call your Doctor:  Call your Primary Care Physician or Cardiologist if:   You experience any pain  radiating to your jaw or neck.  You have any difficulty breathing.  You experience weight gain of 3 lbs in a day or 5 lbs in a week.   You feel any palpitations or irregular heartbeats.  You become dizzy or lose consciousness.   If you have had an angiogram or had a pacemaker or AICD placed, and experience:  Bleeding, drainage or swelling at the surgical / puncture site.  Fever greater than 100.0 F  Shock from internal defibrillator.  Cool and / or numb extremities.     Please access the [facility] My HF Guide/Heart Failure Interactive Workbook:   [email or web address]

## 2024-02-12 ENCOUNTER — TELEPHONE (OUTPATIENT)
Dept: CARDIOLOGY | Facility: MEDICAL CENTER | Age: 59
End: 2024-02-12
Payer: OTHER MISCELLANEOUS

## 2024-02-12 NOTE — TELEPHONE ENCOUNTER
LVM for pt to call back and schedule Hospital Follow Up w/ HF team in about 10 days per AL (around 02/20/24). /cg 02/12/24

## 2024-07-31 ENCOUNTER — HOSPITAL ENCOUNTER (EMERGENCY)
Facility: MEDICAL CENTER | Age: 59
End: 2024-07-31
Attending: EMERGENCY MEDICINE

## 2024-07-31 ENCOUNTER — APPOINTMENT (OUTPATIENT)
Dept: RADIOLOGY | Facility: MEDICAL CENTER | Age: 59
End: 2024-07-31
Attending: EMERGENCY MEDICINE

## 2024-07-31 VITALS
DIASTOLIC BLOOD PRESSURE: 73 MMHG | BODY MASS INDEX: 20.68 KG/M2 | WEIGHT: 124.12 LBS | TEMPERATURE: 97.7 F | SYSTOLIC BLOOD PRESSURE: 104 MMHG | RESPIRATION RATE: 15 BRPM | HEART RATE: 72 BPM | OXYGEN SATURATION: 94 % | HEIGHT: 65 IN

## 2024-07-31 DIAGNOSIS — R06.02 SHORTNESS OF BREATH: ICD-10-CM

## 2024-07-31 DIAGNOSIS — R74.01 TRANSAMINITIS: ICD-10-CM

## 2024-07-31 DIAGNOSIS — I50.9 ACUTE CONGESTIVE HEART FAILURE, UNSPECIFIED HEART FAILURE TYPE (HCC): Primary | ICD-10-CM

## 2024-07-31 DIAGNOSIS — R60.0 BILATERAL LOWER EXTREMITY EDEMA: ICD-10-CM

## 2024-07-31 DIAGNOSIS — K27.9 PEPTIC ULCER: ICD-10-CM

## 2024-07-31 DIAGNOSIS — F10.11 HISTORY OF ALCOHOL ABUSE: ICD-10-CM

## 2024-07-31 DIAGNOSIS — R79.89 ELEVATED TROPONIN: ICD-10-CM

## 2024-07-31 DIAGNOSIS — R10.13 EPIGASTRIC ABDOMINAL PAIN: ICD-10-CM

## 2024-07-31 LAB
ALBUMIN SERPL BCP-MCNC: 3.6 G/DL (ref 3.2–4.9)
ALBUMIN/GLOB SERPL: 1.1 G/DL
ALP SERPL-CCNC: 96 U/L (ref 30–99)
ALT SERPL-CCNC: 61 U/L (ref 2–50)
ANION GAP SERPL CALC-SCNC: 13 MMOL/L (ref 7–16)
AST SERPL-CCNC: 72 U/L (ref 12–45)
BASOPHILS # BLD AUTO: 0.8 % (ref 0–1.8)
BASOPHILS # BLD: 0.06 K/UL (ref 0–0.12)
BILIRUB SERPL-MCNC: 0.7 MG/DL (ref 0.1–1.5)
BUN SERPL-MCNC: 33 MG/DL (ref 8–22)
CALCIUM ALBUM COR SERPL-MCNC: 9.2 MG/DL (ref 8.5–10.5)
CALCIUM SERPL-MCNC: 8.9 MG/DL (ref 8.5–10.5)
CHLORIDE SERPL-SCNC: 104 MMOL/L (ref 96–112)
CO2 SERPL-SCNC: 21 MMOL/L (ref 20–33)
CREAT SERPL-MCNC: 1.34 MG/DL (ref 0.5–1.4)
EKG IMPRESSION: NORMAL
EOSINOPHIL # BLD AUTO: 0.06 K/UL (ref 0–0.51)
EOSINOPHIL NFR BLD: 0.8 % (ref 0–6.9)
ERYTHROCYTE [DISTWIDTH] IN BLOOD BY AUTOMATED COUNT: 52.6 FL (ref 35.9–50)
GFR SERPLBLD CREATININE-BSD FMLA CKD-EPI: 61 ML/MIN/1.73 M 2
GLOBULIN SER CALC-MCNC: 3.3 G/DL (ref 1.9–3.5)
GLUCOSE SERPL-MCNC: 148 MG/DL (ref 65–99)
HCT VFR BLD AUTO: 51.6 % (ref 42–52)
HGB BLD-MCNC: 16.7 G/DL (ref 14–18)
IMM GRANULOCYTES # BLD AUTO: 0.02 K/UL (ref 0–0.11)
IMM GRANULOCYTES NFR BLD AUTO: 0.3 % (ref 0–0.9)
LIPASE SERPL-CCNC: 16 U/L (ref 11–82)
LYMPHOCYTES # BLD AUTO: 1.19 K/UL (ref 1–4.8)
LYMPHOCYTES NFR BLD: 15.4 % (ref 22–41)
MCH RBC QN AUTO: 33 PG (ref 27–33)
MCHC RBC AUTO-ENTMCNC: 32.4 G/DL (ref 32.3–36.5)
MCV RBC AUTO: 102 FL (ref 81.4–97.8)
MONOCYTES # BLD AUTO: 0.65 K/UL (ref 0–0.85)
MONOCYTES NFR BLD AUTO: 8.4 % (ref 0–13.4)
NEUTROPHILS # BLD AUTO: 5.74 K/UL (ref 1.82–7.42)
NEUTROPHILS NFR BLD: 74.3 % (ref 44–72)
NRBC # BLD AUTO: 0 K/UL
NRBC BLD-RTO: 0 /100 WBC (ref 0–0.2)
NT-PROBNP SERPL IA-MCNC: ABNORMAL PG/ML (ref 0–125)
PLATELET # BLD AUTO: 291 K/UL (ref 164–446)
PMV BLD AUTO: 10.2 FL (ref 9–12.9)
POTASSIUM SERPL-SCNC: 4.8 MMOL/L (ref 3.6–5.5)
PROT SERPL-MCNC: 6.9 G/DL (ref 6–8.2)
RBC # BLD AUTO: 5.06 M/UL (ref 4.7–6.1)
SODIUM SERPL-SCNC: 138 MMOL/L (ref 135–145)
TROPONIN T SERPL-MCNC: 38 NG/L (ref 6–19)
WBC # BLD AUTO: 7.7 K/UL (ref 4.8–10.8)

## 2024-07-31 PROCEDURE — 36415 COLL VENOUS BLD VENIPUNCTURE: CPT

## 2024-07-31 PROCEDURE — 71045 X-RAY EXAM CHEST 1 VIEW: CPT

## 2024-07-31 PROCEDURE — 96374 THER/PROPH/DIAG INJ IV PUSH: CPT

## 2024-07-31 PROCEDURE — 76705 ECHO EXAM OF ABDOMEN: CPT

## 2024-07-31 PROCEDURE — 83880 ASSAY OF NATRIURETIC PEPTIDE: CPT

## 2024-07-31 PROCEDURE — 93005 ELECTROCARDIOGRAM TRACING: CPT | Performed by: EMERGENCY MEDICINE

## 2024-07-31 PROCEDURE — A9270 NON-COVERED ITEM OR SERVICE: HCPCS | Performed by: EMERGENCY MEDICINE

## 2024-07-31 PROCEDURE — 96375 TX/PRO/DX INJ NEW DRUG ADDON: CPT

## 2024-07-31 PROCEDURE — 84484 ASSAY OF TROPONIN QUANT: CPT

## 2024-07-31 PROCEDURE — 99285 EMERGENCY DEPT VISIT HI MDM: CPT

## 2024-07-31 PROCEDURE — 93005 ELECTROCARDIOGRAM TRACING: CPT

## 2024-07-31 PROCEDURE — 700111 HCHG RX REV CODE 636 W/ 250 OVERRIDE (IP): Mod: JZ,UD | Performed by: EMERGENCY MEDICINE

## 2024-07-31 PROCEDURE — 83690 ASSAY OF LIPASE: CPT

## 2024-07-31 PROCEDURE — 80053 COMPREHEN METABOLIC PANEL: CPT

## 2024-07-31 PROCEDURE — 700102 HCHG RX REV CODE 250 W/ 637 OVERRIDE(OP): Performed by: EMERGENCY MEDICINE

## 2024-07-31 PROCEDURE — 85025 COMPLETE CBC W/AUTO DIFF WBC: CPT

## 2024-07-31 RX ORDER — ONDANSETRON 2 MG/ML
4 INJECTION INTRAMUSCULAR; INTRAVENOUS ONCE
Status: COMPLETED | OUTPATIENT
Start: 2024-07-31 | End: 2024-07-31

## 2024-07-31 RX ORDER — MORPHINE SULFATE 4 MG/ML
4 INJECTION INTRAVENOUS ONCE
Status: COMPLETED | OUTPATIENT
Start: 2024-07-31 | End: 2024-07-31

## 2024-07-31 RX ORDER — FUROSEMIDE 10 MG/ML
80 INJECTION INTRAMUSCULAR; INTRAVENOUS ONCE
Status: COMPLETED | OUTPATIENT
Start: 2024-07-31 | End: 2024-07-31

## 2024-07-31 RX ORDER — FAMOTIDINE 20 MG/1
20 TABLET, FILM COATED ORAL 2 TIMES DAILY
Qty: 60 TABLET | Refills: 0 | Status: SHIPPED | OUTPATIENT
Start: 2024-07-31 | End: 2024-12-08

## 2024-07-31 RX ADMIN — LIDOCAINE HYDROCHLORIDE 30 ML: 20 SOLUTION ORAL; TOPICAL at 17:54

## 2024-07-31 RX ADMIN — ONDANSETRON 4 MG: 2 INJECTION INTRAMUSCULAR; INTRAVENOUS at 17:55

## 2024-07-31 RX ADMIN — FUROSEMIDE 80 MG: 10 INJECTION INTRAMUSCULAR; INTRAVENOUS at 17:51

## 2024-07-31 RX ADMIN — MORPHINE SULFATE 4 MG: 4 INJECTION, SOLUTION INTRAMUSCULAR; INTRAVENOUS at 17:55

## 2024-07-31 ASSESSMENT — FIBROSIS 4 INDEX: FIB4 SCORE: 1.09

## 2024-07-31 NOTE — ED TRIAGE NOTES
Chief Complaint   Patient presents with    Shortness of Breath     SOB x1 day. Reports chest tightness when he takes a deep breath. Hx of CHF, reports he is compliant with his medications.       Patient ambulatory to triage for above complaint. Patient A&Ox4, GCS 15, patient speaking in full sentences. Equal and unlabored respirations. Patient educated on triage process and encouraged to notify staff if condition worsens. Appropriate protocols ordered. Patient returned to the lobby in stable condition.

## 2024-08-01 NOTE — ED NOTES
Pt resting comfortably with even chest rise and fall, aware of POC, urinals at bedside, call light available and in reach.

## 2024-08-01 NOTE — ED NOTES
Pt discharged to home. Pt was given follow up instructions and prescriptions for pepcid. All lines removed prior to discharge. Pt verbalized understanding of all instructions for discharge and is ambulatory out of ED with steady gait. AOx4

## 2024-08-01 NOTE — ED PROVIDER NOTES
"ED Provider Note    Scribed for Magnus Pfeiffer by Brad Tillman. 7/31/2024  5:21 PM    Primary care provider: Pcp Pt States None  Means of arrival: Walk-in  History obtained from: Patient  History limited by: None    CHIEF COMPLAINT  Chief Complaint   Patient presents with    Shortness of Breath     SOB x1 day. Reports chest tightness when he takes a deep breath. Hx of CHF, reports he is compliant with his medications.      EXTERNAL RECORDS REVIEWED  Inpatient Notes patient was admitted for 5 days for heart failure in February of this year    HPI/ROS  LIMITATION TO HISTORY   Select: : None    HPI  Santino Zabala is a 58 y.o. male with a history of congestive heart failure who presents to the Emergency Department for evaluation of intermittent upper abdominal pain and shortness of breath onset one day ago. Patient reports that he had an echogram performed yesterday, and he was having shortness of breath while having this performed. He was advised to present to the ED at that encounter, and he reports that he has been experiencing abdominal tightness when taking a deep breath. He describes having a tightness sensation in his upper abdomen, which makes him feel as though he may need to use the restroom during episodes of shortness of breath. He has had three episodes of tightness and shortness of breath today. He describes his pain as \"sharp and squeezing\", and adds that this pain often makes him feel as though he may lose consciousness. His episodes of tightness last for about two minutes at a time. He reports having associated diarrhea, cold sweats, difficulty with urination, decreased PO intake, and occasional vomiting. He denies any dark or bloody stools, hematuria, or any hematemesis. His last bowel movement was this morning, and he states that he has not been passing gas today. Patient has a history of hospitalizations due to leg swelling and cardiac problems, and has been held in the hospital for a " week until an appropriate medication was found. He was also examined in [place] for his cardiac history. Patient denies any history of asthma, kidney stones, or diabetes. He denies any history of abdominal surgeries. Patient has a history of alcohol consumption, stating that he drank an 18 pack per day for many years, but was sober for the past year. He now drinks about 1-2 beers per week. He denies any tobacco or recreational drug use. Patient stopped taking his medications recently, but began to take them again two days ago.     REVIEW OF SYSTEMS  As above, all other systems reviewed and are negative.   See HPI for further details.     PAST MEDICAL HISTORY   has a past medical history of Congestive heart failure (HCC).  SURGICAL HISTORY  patient denies any surgical history  SOCIAL HISTORY  Social History     Tobacco Use    Smoking status: Never    Smokeless tobacco: Never   Vaping Use    Vaping status: Never Used   Substance Use Topics    Alcohol use: Not Currently    Drug use: Never      Social History     Substance and Sexual Activity   Drug Use Never     FAMILY HISTORY  History reviewed. No pertinent family history.  CURRENT MEDICATIONS  Home Medications       Reviewed by Joanne Dixon R.N. (Registered Nurse) on 07/31/24 at 1613  Med List Status: Partial     Medication Last Dose Status   allopurinol (ZYLOPRIM) 100 MG Tab  Active   metoprolol SR (TOPROL XL) 25 MG TABLET SR 24 HR  Active   spironolactone (ALDACTONE) 25 MG Tab  Active                  Audit from Redirected Encounters    **Home medications have not yet been reviewed for this encounter**       ALLERGIES  No Known Allergies    PHYSICAL EXAM    VITAL SIGNS:   Vitals:    07/31/24 1901 07/31/24 2102 07/31/24 2129 07/31/24 2132   BP: 103/75 104/73     Pulse: 69 72 69 72   Resp:  13 15    Temp:       TempSrc:       SpO2: 100% 91% 92% 94%   Weight:       Height:         Vitals: My interpretation: normotensive, not tachycardic, afebrile, not  hypoxic    Reinterpretation of vitals: Unchanged unremarkable    Cardiac Monitor Interpretation: The cardiac monitor revealed normal Sinus Rhythm as interpreted by me. The cardiac monitor was ordered secondary to the patient's history of abdominal/chest pain and to monitor for dysrhythmia and/or tachycardia.    PE:   Gen: sitting comfortably, speaking clearly, appears in no acute distress   ENT: Mucous membranes moist, posterior pharynx clear, uvula midline, nares patent bilaterally   Neck: Supple, FROM  Pulmonary: Lungs are clear to auscultation bilaterally. No tachypnea  CV:  RRR, no murmur appreciated, pulses 2+ in both upper and lower extremities  Abdomen: soft, mild tenderness palpation in the epigastrium/ND; no rebound/guarding  : no CVA or suprapubic tenderness   Neuro: A&Ox4 (person, place, time, situation), speech fluent, gait steady, no focal deficits appreciated  Skin: No rash or lesions.  No pallor or jaundice.  No cyanosis.  Warm and dry.     DIAGNOSTIC STUDIES / PROCEDURES    LABS  Results for orders placed or performed during the hospital encounter of 07/31/24   CBC with Differential   Result Value Ref Range    WBC 7.7 4.8 - 10.8 K/uL    RBC 5.06 4.70 - 6.10 M/uL    Hemoglobin 16.7 14.0 - 18.0 g/dL    Hematocrit 51.6 42.0 - 52.0 %    .0 (H) 81.4 - 97.8 fL    MCH 33.0 27.0 - 33.0 pg    MCHC 32.4 32.3 - 36.5 g/dL    RDW 52.6 (H) 35.9 - 50.0 fL    Platelet Count 291 164 - 446 K/uL    MPV 10.2 9.0 - 12.9 fL    Neutrophils-Polys 74.30 (H) 44.00 - 72.00 %    Lymphocytes 15.40 (L) 22.00 - 41.00 %    Monocytes 8.40 0.00 - 13.40 %    Eosinophils 0.80 0.00 - 6.90 %    Basophils 0.80 0.00 - 1.80 %    Immature Granulocytes 0.30 0.00 - 0.90 %    Nucleated RBC 0.00 0.00 - 0.20 /100 WBC    Neutrophils (Absolute) 5.74 1.82 - 7.42 K/uL    Lymphs (Absolute) 1.19 1.00 - 4.80 K/uL    Monos (Absolute) 0.65 0.00 - 0.85 K/uL    Eos (Absolute) 0.06 0.00 - 0.51 K/uL    Baso (Absolute) 0.06 0.00 - 0.12 K/uL     Immature Granulocytes (abs) 0.02 0.00 - 0.11 K/uL    NRBC (Absolute) 0.00 K/uL   Comp Metabolic Panel   Result Value Ref Range    Sodium 138 135 - 145 mmol/L    Potassium 4.8 3.6 - 5.5 mmol/L    Chloride 104 96 - 112 mmol/L    Co2 21 20 - 33 mmol/L    Anion Gap 13.0 7.0 - 16.0    Glucose 148 (H) 65 - 99 mg/dL    Bun 33 (H) 8 - 22 mg/dL    Creatinine 1.34 0.50 - 1.40 mg/dL    Calcium 8.9 8.5 - 10.5 mg/dL    Correct Calcium 9.2 8.5 - 10.5 mg/dL    AST(SGOT) 72 (H) 12 - 45 U/L    ALT(SGPT) 61 (H) 2 - 50 U/L    Alkaline Phosphatase 96 30 - 99 U/L    Total Bilirubin 0.7 0.1 - 1.5 mg/dL    Albumin 3.6 3.2 - 4.9 g/dL    Total Protein 6.9 6.0 - 8.2 g/dL    Globulin 3.3 1.9 - 3.5 g/dL    A-G Ratio 1.1 g/dL   proBrain Natriuretic Peptide, NT   Result Value Ref Range    NT-proBNP 28077 (H) 0 - 125 pg/mL   TROPONIN   Result Value Ref Range    Troponin T 38 (H) 6 - 19 ng/L   ESTIMATED GFR   Result Value Ref Range    GFR (CKD-EPI) 61 >60 mL/min/1.73 m 2   LIPASE   Result Value Ref Range    Lipase 16 11 - 82 U/L   EKG   Result Value Ref Range    Report       [facility]    Test Date:  2024  Pt Name:    TREVON HAUSER                Department: ER  MRN:        [id number]                      Room:  Gender:     Male                         Technician: [id number]  :        1965                   Requested By:ER TRIAGE PROTOCOL  Order #:    [id number]                    Reading MD: Magnus Pfeiffer    Measurements  Intervals                                Axis  Rate:       83                           P:          56  WV:         228                          QRS:        135  QRSD:       110                          T:          -34  QT:         387  QTc:        455    Interpretive Statements  Sinus rhythm  Prolonged WV interval  Probable left atrial enlargement  Right axis deviation  Consider left ventricular hypertrophy  Abnormal T, consider ischemia, lateral leads  Compared to ECG 2024  18:25:05  Right-axis deviation now present  Possible ischemia now present    Electronically Signed On 07-  17:29:52 PDT by Magnus Pfeiffer        All labs reviewed by me. Labs were compared to prior labs if they were available. Significant for no leukocytosis, no anemia, normal electrolytes, normal glucose, normal renal function, trace transaminitis, normal bilirubin, heart failure at baseline with a BNP of 12,000, troponin of 38, lipase normal.    RADIOLOGY  I have independently interpreted the diagnostic imaging associated with this visit and am waiting the final reading from the radiologist.   My preliminary interpretation is a follows: Chronic cardiomegaly, no focal consolidation.   Radiologist interpretation is as follows:  US-RUQ   Final Result      1.  Thick-walled edematous gallbladder wall without gallstones. No pericholecystic fluid. Nonspecific in the absence of gallstones. If there is concern for acalculous cholecystitis, HIDA scan may be obtained for further evaluation.   2.  Nodular focus anterior to the caudate lobe measuring 2.1 cm, also seen on previous CT study 2/7/2024, question enlarged lymph node.      DX-CHEST-PORTABLE (1 VIEW)   Final Result         1.  Cardiomegaly is unchanged.      2.  Minimal perihilar opacifications which could be due to congestion appear unchanged.      3.  No new infiltrates or consolidations are identified.        COURSE & MEDICAL DECISION MAKING  Nursing notes, VS, PMSFHx, labs, imaging, EKG reviewed in chart.    Heart Score: Moderate      ED Observation Status? No; Patient does not meet criteria for ED Observation.     Ddx: Gastritis, peptic ulcer disease, cholecystitis, pancreatitis, PE, MI, pneumonia    MDM: 5:21 PM Santino Zabala is a 58 y.o. male who presented with evaluation of moderate epigastric pain, although patient initially reported that he was feeling short of breath.  He denies shortness of breath or chest pain to me on my evaluation and  states that he is having epigastric pain.  He states that usually improves with GI cocktail he had this previously.  He has a history of heavy alcohol abuse but has been sober for quite some time he states.  History of alcohol induced gastritis.  Upon arrival here his vital signs are normal.  His exam shows minimal tenderness in the epigastrium without rebound or guarding, the rest of his abdomen is benign.  Because of his chief complaint initially of shortness of breath and some chest discomfort, patient underwent chest pain workup with chest x-ray which on my depend interpretation showed cardiomegaly but no other focal infiltrates, radiologist agrees, and labs. All labs reviewed by me. Labs were compared to prior labs if they were available. Significant for no leukocytosis, no anemia, normal electrolytes, normal glucose, normal renal function, trace transaminitis, normal bilirubin, heart failure at baseline with a BNP of 12,000, troponin of 38, lipase normal.  Labs appear to be all at baseline on chart review.  Patient did undergo a right upper quadrant ultrasound as he had some mild tenderness in the right upper quadrant epigastrium as well as a trace elevation in transaminitis.  However he does have a history of cirrhosis and elevation in liver enzymes could just be secondary to poor liver function general.  Ultrasound does show that patient has a thick-walled edematous gallbladder but no gallstones.  If there is concern for a calculus cholecystitis recommend HIDA scan.  However after treatment with a GI cocktail patient's pain is completely resolved.  I discussed the case with Dr. Ahuja of general surgery who reviewed labs, imaging at this time feels that the patient is feeling improved, and able to tolerate p.o. intake without exacerbation of symptoms, then he can follow-up with her in clinic as an outpatient.  Patient is tolerating p.o. intake, asking for discharge in no acute distress.  Vital signs remained  normal.  Repeat physical exam is benign.  Ultimately patient discharged with strict return precautions after dose of Lasix given here in the ED and he is on Lasix at home.  No signs of acute fluid overload on exam today.  Return precautions were discussed and patient verbalized understanding.    ADDITIONAL PROBLEM LIST AND DISPOSITION    I have discussed management of the patient with the following physicians and FRITZ's: Dr. Ahuja of general surgery    Discussion of management with other QHP or appropriate source(s): None     Escalation of care considered, and ultimately not performed:acute inpatient care management, however at this time, the patient is most appropriate for outpatient management    Barriers to care at this time, including but not limited to: Patient does not have established PCP.     FINAL IMPRESSION  1. Acute congestive heart failure, unspecified heart failure type (HCC) Acute   2. Shortness of breath Acute   3. Transaminitis Acute   4. Elevated troponin Acute   5. History of alcohol abuse Acute   6. Bilateral lower extremity edema Acute   7. Epigastric abdominal pain Acute   8. Peptic ulcer Acute      Brad LOUIS (Scribmartha), am scribing for, and in the presence of, Magnus Pfeiffer.    Electronically signed by: Brad Tillman ([place]), 7/31/2024    IMagnus personally performed the services described in this documentation, as scribed by Brad Tillman in my presence, and it is both accurate and complete.    The note accurately reflects work and decisions made by me.  Magnus Pfeiffer  7/31/2024  10:09 PM

## 2024-08-01 NOTE — ED NOTES
ERP notified pt has not urinated post lasix. Pt reports no urge, no pain to abdomen w/ palpation.

## 2024-08-01 NOTE — ED NOTES
Pt tolerated PO challenge, urinated 230. Pt bladder scanned post void and 68ml in bladder. ERP notified.

## 2024-08-01 NOTE — DISCHARGE INSTRUCTIONS
I think possibly her pain can be caused by an ulcer in her stomach.  I want you to take the Pepcid medication prescribed as directed.  Want to follow-up with her primary care team for further evaluation and treatment.  Give any worsening symptoms or concerns or when to return to ED.  Thank for coming in today.

## 2024-11-16 ENCOUNTER — APPOINTMENT (OUTPATIENT)
Dept: CARDIOLOGY | Facility: MEDICAL CENTER | Age: 59
DRG: 315 | End: 2024-11-16
Attending: SURGERY
Payer: OTHER MISCELLANEOUS

## 2024-11-16 ENCOUNTER — APPOINTMENT (OUTPATIENT)
Dept: RADIOLOGY | Facility: MEDICAL CENTER | Age: 59
DRG: 315 | End: 2024-11-16
Attending: STUDENT IN AN ORGANIZED HEALTH CARE EDUCATION/TRAINING PROGRAM
Payer: OTHER MISCELLANEOUS

## 2024-11-16 ENCOUNTER — APPOINTMENT (OUTPATIENT)
Dept: RADIOLOGY | Facility: MEDICAL CENTER | Age: 59
DRG: 315 | End: 2024-11-16
Payer: OTHER MISCELLANEOUS

## 2024-11-16 ENCOUNTER — HOSPITAL ENCOUNTER (INPATIENT)
Facility: MEDICAL CENTER | Age: 59
LOS: 2 days | DRG: 315 | End: 2024-11-18
Attending: STUDENT IN AN ORGANIZED HEALTH CARE EDUCATION/TRAINING PROGRAM | Admitting: SURGERY
Payer: OTHER MISCELLANEOUS

## 2024-11-16 DIAGNOSIS — I95.9 HYPOTENSION, UNSPECIFIED HYPOTENSION TYPE: ICD-10-CM
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
## 2024-11-16 DIAGNOSIS — I50.20 HFREF (HEART FAILURE WITH REDUCED EJECTION FRACTION) (HCC): ICD-10-CM

## 2024-11-16 PROBLEM — T14.90XA TRAUMA: Status: ACTIVE | Noted: 2024-11-16

## 2024-11-16 PROBLEM — I50.9 CHF (CONGESTIVE HEART FAILURE) (HCC): Status: ACTIVE | Noted: 2024-11-16

## 2024-11-16 LAB
ABO + RH BLD: NORMAL
ABO GROUP BLD: NORMAL
ALBUMIN SERPL BCP-MCNC: 3.3 G/DL (ref 3.2–4.9)
ALBUMIN/GLOB SERPL: 1 G/DL
ALP SERPL-CCNC: 107 U/L (ref 30–99)
ALT SERPL-CCNC: 431 U/L (ref 2–50)
AMPHET UR QL SCN: POSITIVE
ANION GAP SERPL CALC-SCNC: 12 MMOL/L (ref 7–16)
APTT PPP: 28.4 SEC (ref 24.7–36)
AST SERPL-CCNC: 79 U/L (ref 12–45)
BARBITURATES UR QL SCN: NEGATIVE
BARCODED ABORH UBTYP: 5100
BARCODED PRD CODE UBPRD: NORMAL
BARCODED UNIT NUM UBUNT: NORMAL
BENZODIAZ UR QL SCN: NEGATIVE
BILIRUB SERPL-MCNC: 0.5 MG/DL (ref 0.1–1.5)
BLD GP AB SCN SERPL QL: NORMAL
BUN SERPL-MCNC: 52 MG/DL (ref 8–22)
BZE UR QL SCN: NEGATIVE
CALCIUM ALBUM COR SERPL-MCNC: 8.7 MG/DL (ref 8.5–10.5)
CALCIUM SERPL-MCNC: 8.1 MG/DL (ref 8.5–10.5)
CANNABINOIDS UR QL SCN: NEGATIVE
CHLORIDE SERPL-SCNC: 105 MMOL/L (ref 96–112)
CO2 SERPL-SCNC: 21 MMOL/L (ref 20–33)
COMPONENT R 8504R: NORMAL
CREAT SERPL-MCNC: 1.53 MG/DL (ref 0.5–1.4)
ERYTHROCYTE [DISTWIDTH] IN BLOOD BY AUTOMATED COUNT: 60.3 FL (ref 35.9–50)
ETHANOL BLD-MCNC: <10.1 MG/DL
FENTANYL UR QL: NEGATIVE
GFR SERPLBLD CREATININE-BSD FMLA CKD-EPI: 52 ML/MIN/1.73 M 2
GLOBULIN SER CALC-MCNC: 3.2 G/DL (ref 1.9–3.5)
GLUCOSE SERPL-MCNC: 112 MG/DL (ref 65–99)
HCT VFR BLD AUTO: 39 % (ref 42–52)
HGB BLD-MCNC: 12.7 G/DL (ref 14–18)
INR PPP: 1.14 (ref 0.87–1.13)
LV EJECT FRACT  99904: 20
LV EJECT FRACT MOD 2C 99903: 11.43
LV EJECT FRACT MOD 4C 99902: 22.22
LV EJECT FRACT MOD BP 99901: 17.2
MCH RBC QN AUTO: 32.6 PG (ref 27–33)
MCHC RBC AUTO-ENTMCNC: 32.6 G/DL (ref 32.3–36.5)
MCV RBC AUTO: 100 FL (ref 81.4–97.8)
METHADONE UR QL SCN: NEGATIVE
OPIATES UR QL SCN: NEGATIVE
OXYCODONE UR QL SCN: NEGATIVE
PCP UR QL SCN: NEGATIVE
PLATELET # BLD AUTO: 225 K/UL (ref 164–446)
PMV BLD AUTO: 10.3 FL (ref 9–12.9)
POTASSIUM SERPL-SCNC: 4.1 MMOL/L (ref 3.6–5.5)
PRODUCT TYPE UPROD: NORMAL
PROPOXYPH UR QL SCN: NEGATIVE
PROT SERPL-MCNC: 6.5 G/DL (ref 6–8.2)
PROTHROMBIN TIME: 14.7 SEC (ref 12–14.6)
RBC # BLD AUTO: 3.9 M/UL (ref 4.7–6.1)
RH BLD: NORMAL
SODIUM SERPL-SCNC: 138 MMOL/L (ref 135–145)
TROPONIN T SERPL-MCNC: 89 NG/L (ref 6–19)
UNIT STATUS USTAT: NORMAL
WBC # BLD AUTO: 6.1 K/UL (ref 4.8–10.8)

## 2024-11-16 PROCEDURE — 03HY32Z INSERTION OF MONITORING DEVICE INTO UPPER ARTERY, PERCUTANEOUS APPROACH: ICD-10-PCS | Performed by: STUDENT IN AN ORGANIZED HEALTH CARE EDUCATION/TRAINING PROGRAM

## 2024-11-16 PROCEDURE — 93306 TTE W/DOPPLER COMPLETE: CPT

## 2024-11-16 PROCEDURE — 99254 IP/OBS CNSLTJ NEW/EST MOD 60: CPT | Performed by: INTERNAL MEDICINE

## 2024-11-16 PROCEDURE — 4A133J1 MONITORING OF ARTERIAL PULSE, PERIPHERAL, PERCUTANEOUS APPROACH: ICD-10-PCS | Performed by: STUDENT IN AN ORGANIZED HEALTH CARE EDUCATION/TRAINING PROGRAM

## 2024-11-16 PROCEDURE — 86900 BLOOD TYPING SEROLOGIC ABO: CPT

## 2024-11-16 PROCEDURE — 86901 BLOOD TYPING SEROLOGIC RH(D): CPT

## 2024-11-16 PROCEDURE — 36415 COLL VENOUS BLD VENIPUNCTURE: CPT

## 2024-11-16 PROCEDURE — 71260 CT THORAX DX C+: CPT

## 2024-11-16 PROCEDURE — 80053 COMPREHEN METABOLIC PANEL: CPT

## 2024-11-16 PROCEDURE — 93005 ELECTROCARDIOGRAM TRACING: CPT | Performed by: NURSE PRACTITIONER

## 2024-11-16 PROCEDURE — 80307 DRUG TEST PRSMV CHEM ANLYZR: CPT

## 2024-11-16 PROCEDURE — 36430 TRANSFUSION BLD/BLD COMPNT: CPT

## 2024-11-16 PROCEDURE — 85027 COMPLETE CBC AUTOMATED: CPT

## 2024-11-16 PROCEDURE — 700117 HCHG RX CONTRAST REV CODE 255: Performed by: STUDENT IN AN ORGANIZED HEALTH CARE EDUCATION/TRAINING PROGRAM

## 2024-11-16 PROCEDURE — G0390 TRAUMA RESPONS W/HOSP CRITI: HCPCS

## 2024-11-16 PROCEDURE — 700105 HCHG RX REV CODE 258: Mod: UD

## 2024-11-16 PROCEDURE — 4A133B1 MONITORING OF ARTERIAL PRESSURE, PERIPHERAL, PERCUTANEOUS APPROACH: ICD-10-PCS | Performed by: STUDENT IN AN ORGANIZED HEALTH CARE EDUCATION/TRAINING PROGRAM

## 2024-11-16 PROCEDURE — 85730 THROMBOPLASTIN TIME PARTIAL: CPT

## 2024-11-16 PROCEDURE — 84484 ASSAY OF TROPONIN QUANT: CPT

## 2024-11-16 PROCEDURE — 72128 CT CHEST SPINE W/O DYE: CPT

## 2024-11-16 PROCEDURE — 99291 CRITICAL CARE FIRST HOUR: CPT | Mod: AI,25 | Performed by: SURGERY

## 2024-11-16 PROCEDURE — 85610 PROTHROMBIN TIME: CPT

## 2024-11-16 PROCEDURE — 99291 CRITICAL CARE FIRST HOUR: CPT

## 2024-11-16 PROCEDURE — 72170 X-RAY EXAM OF PELVIS: CPT

## 2024-11-16 PROCEDURE — 93306 TTE W/DOPPLER COMPLETE: CPT | Mod: 26 | Performed by: INTERNAL MEDICINE

## 2024-11-16 PROCEDURE — 72125 CT NECK SPINE W/O DYE: CPT

## 2024-11-16 PROCEDURE — 82077 ASSAY SPEC XCP UR&BREATH IA: CPT

## 2024-11-16 PROCEDURE — 72131 CT LUMBAR SPINE W/O DYE: CPT

## 2024-11-16 PROCEDURE — 700117 HCHG RX CONTRAST REV CODE 255: Performed by: SURGERY

## 2024-11-16 PROCEDURE — 70450 CT HEAD/BRAIN W/O DYE: CPT

## 2024-11-16 PROCEDURE — A9270 NON-COVERED ITEM OR SERVICE: HCPCS | Performed by: NURSE PRACTITIONER

## 2024-11-16 PROCEDURE — P9016 RBC LEUKOCYTES REDUCED: HCPCS

## 2024-11-16 PROCEDURE — 86850 RBC ANTIBODY SCREEN: CPT

## 2024-11-16 PROCEDURE — 770022 HCHG ROOM/CARE - ICU (200)

## 2024-11-16 PROCEDURE — 700105 HCHG RX REV CODE 258: Performed by: SURGERY

## 2024-11-16 PROCEDURE — 700102 HCHG RX REV CODE 250 W/ 637 OVERRIDE(OP): Performed by: NURSE PRACTITIONER

## 2024-11-16 PROCEDURE — 71045 X-RAY EXAM CHEST 1 VIEW: CPT

## 2024-11-16 PROCEDURE — 36620 INSERTION CATHETER ARTERY: CPT | Mod: RT | Performed by: STUDENT IN AN ORGANIZED HEALTH CARE EDUCATION/TRAINING PROGRAM

## 2024-11-16 RX ORDER — SODIUM CHLORIDE 9 MG/ML
INJECTION, SOLUTION INTRAVENOUS
Status: COMPLETED | OUTPATIENT
Start: 2024-11-16 | End: 2024-11-16

## 2024-11-16 RX ORDER — AMOXICILLIN 250 MG
1 CAPSULE ORAL
Status: DISCONTINUED | OUTPATIENT
Start: 2024-11-16 | End: 2024-11-18 | Stop reason: HOSPADM

## 2024-11-16 RX ORDER — OXYCODONE HYDROCHLORIDE 5 MG/1
5 TABLET ORAL
Status: DISCONTINUED | OUTPATIENT
Start: 2024-11-16 | End: 2024-11-18 | Stop reason: HOSPADM

## 2024-11-16 RX ORDER — ONDANSETRON 2 MG/ML
4 INJECTION INTRAMUSCULAR; INTRAVENOUS EVERY 4 HOURS PRN
Status: DISCONTINUED | OUTPATIENT
Start: 2024-11-16 | End: 2024-11-18 | Stop reason: HOSPADM

## 2024-11-16 RX ORDER — OXYCODONE HYDROCHLORIDE 5 MG/1
2.5 TABLET ORAL
Status: DISCONTINUED | OUTPATIENT
Start: 2024-11-16 | End: 2024-11-18 | Stop reason: HOSPADM

## 2024-11-16 RX ORDER — SODIUM PHOSPHATE,MONO-DIBASIC 19G-7G/118
1 ENEMA (ML) RECTAL
Status: DISCONTINUED | OUTPATIENT
Start: 2024-11-16 | End: 2024-11-18 | Stop reason: HOSPADM

## 2024-11-16 RX ORDER — ONDANSETRON 4 MG/1
4 TABLET, ORALLY DISINTEGRATING ORAL EVERY 4 HOURS PRN
Status: DISCONTINUED | OUTPATIENT
Start: 2024-11-16 | End: 2024-11-18 | Stop reason: HOSPADM

## 2024-11-16 RX ORDER — SODIUM CHLORIDE, SODIUM LACTATE, POTASSIUM CHLORIDE, AND CALCIUM CHLORIDE .6; .31; .03; .02 G/100ML; G/100ML; G/100ML; G/100ML
1000 INJECTION, SOLUTION INTRAVENOUS ONCE
Status: DISCONTINUED | OUTPATIENT
Start: 2024-11-16 | End: 2024-11-16

## 2024-11-16 RX ORDER — HYDROMORPHONE HYDROCHLORIDE 1 MG/ML
0.25 INJECTION, SOLUTION INTRAMUSCULAR; INTRAVENOUS; SUBCUTANEOUS
Status: DISCONTINUED | OUTPATIENT
Start: 2024-11-16 | End: 2024-11-17

## 2024-11-16 RX ORDER — BISACODYL 10 MG
10 SUPPOSITORY, RECTAL RECTAL
Status: DISCONTINUED | OUTPATIENT
Start: 2024-11-16 | End: 2024-11-18 | Stop reason: HOSPADM

## 2024-11-16 RX ORDER — POLYETHYLENE GLYCOL 3350 17 G/17G
1 POWDER, FOR SOLUTION ORAL 2 TIMES DAILY
Status: DISCONTINUED | OUTPATIENT
Start: 2024-11-16 | End: 2024-11-18 | Stop reason: HOSPADM

## 2024-11-16 RX ORDER — DOCUSATE SODIUM 100 MG/1
100 CAPSULE, LIQUID FILLED ORAL 2 TIMES DAILY
Status: DISCONTINUED | OUTPATIENT
Start: 2024-11-17 | End: 2024-11-18 | Stop reason: HOSPADM

## 2024-11-16 RX ORDER — AMOXICILLIN 250 MG
1 CAPSULE ORAL NIGHTLY
Status: DISCONTINUED | OUTPATIENT
Start: 2024-11-16 | End: 2024-11-18 | Stop reason: HOSPADM

## 2024-11-16 RX ADMIN — IOHEXOL 100 ML: 350 INJECTION, SOLUTION INTRAVENOUS at 19:30

## 2024-11-16 RX ADMIN — SENNOSIDES AND DOCUSATE SODIUM 1 TABLET: 50; 8.6 TABLET ORAL at 21:35

## 2024-11-16 RX ADMIN — HUMAN ALBUMIN MICROSPHERES AND PERFLUTREN 3 ML: 10; .22 INJECTION, SOLUTION INTRAVENOUS at 20:45

## 2024-11-16 RX ADMIN — SODIUM CHLORIDE 500 ML: 9 INJECTION, SOLUTION INTRAVENOUS at 18:21

## 2024-11-16 RX ADMIN — SODIUM CHLORIDE 500 ML: 9 INJECTION, SOLUTION INTRAVENOUS at 18:36

## 2024-11-16 ASSESSMENT — PAIN DESCRIPTION - PAIN TYPE
TYPE: ACUTE PAIN
TYPE: ACUTE PAIN

## 2024-11-16 ASSESSMENT — FIBROSIS 4 INDEX: FIB4 SCORE: 1.01

## 2024-11-17 ENCOUNTER — APPOINTMENT (OUTPATIENT)
Dept: RADIOLOGY | Facility: MEDICAL CENTER | Age: 59
DRG: 315 | End: 2024-11-17
Attending: NURSE PRACTITIONER
Payer: OTHER MISCELLANEOUS

## 2024-11-17 PROBLEM — F15.10 METHAMPHETAMINE ABUSE (HCC): Status: ACTIVE | Noted: 2024-11-17

## 2024-11-17 PROBLEM — Z78.9 ALCOHOL USE: Status: ACTIVE | Noted: 2024-11-17

## 2024-11-17 PROBLEM — Z53.09 CONTRAINDICATION TO DEEP VEIN THROMBOSIS (DVT) PROPHYLAXIS: Status: ACTIVE | Noted: 2024-11-17

## 2024-11-17 PROBLEM — F10.90 ALCOHOL USE: Status: ACTIVE | Noted: 2024-11-17

## 2024-11-17 LAB
ALBUMIN SERPL BCP-MCNC: 3.3 G/DL (ref 3.2–4.9)
ALBUMIN/GLOB SERPL: 1.1 G/DL
ALP SERPL-CCNC: 95 U/L (ref 30–99)
ALT SERPL-CCNC: 362 U/L (ref 2–50)
ANION GAP SERPL CALC-SCNC: 11 MMOL/L (ref 7–16)
AST SERPL-CCNC: 65 U/L (ref 12–45)
BASOPHILS # BLD AUTO: 0.6 % (ref 0–1.8)
BASOPHILS # BLD: 0.04 K/UL (ref 0–0.12)
BILIRUB SERPL-MCNC: 1.1 MG/DL (ref 0.1–1.5)
BUN SERPL-MCNC: 39 MG/DL (ref 8–22)
CALCIUM ALBUM COR SERPL-MCNC: 8.9 MG/DL (ref 8.5–10.5)
CALCIUM SERPL-MCNC: 8.3 MG/DL (ref 8.5–10.5)
CHLORIDE SERPL-SCNC: 109 MMOL/L (ref 96–112)
CO2 SERPL-SCNC: 18 MMOL/L (ref 20–33)
CREAT SERPL-MCNC: 1.19 MG/DL (ref 0.5–1.4)
EKG IMPRESSION: NORMAL
EOSINOPHIL # BLD AUTO: 0.12 K/UL (ref 0–0.51)
EOSINOPHIL NFR BLD: 1.9 % (ref 0–6.9)
ERYTHROCYTE [DISTWIDTH] IN BLOOD BY AUTOMATED COUNT: 62.4 FL (ref 35.9–50)
GFR SERPLBLD CREATININE-BSD FMLA CKD-EPI: 70 ML/MIN/1.73 M 2
GLOBULIN SER CALC-MCNC: 3.1 G/DL (ref 1.9–3.5)
GLUCOSE BLD STRIP.AUTO-MCNC: 153 MG/DL (ref 65–99)
GLUCOSE SERPL-MCNC: 145 MG/DL (ref 65–99)
HCT VFR BLD AUTO: 40.2 % (ref 42–52)
HGB BLD-MCNC: 13.9 G/DL (ref 14–18)
IMM GRANULOCYTES # BLD AUTO: 0.03 K/UL (ref 0–0.11)
IMM GRANULOCYTES NFR BLD AUTO: 0.5 % (ref 0–0.9)
LYMPHOCYTES # BLD AUTO: 1.24 K/UL (ref 1–4.8)
LYMPHOCYTES NFR BLD: 19.7 % (ref 22–41)
MCH RBC QN AUTO: 33.7 PG (ref 27–33)
MCHC RBC AUTO-ENTMCNC: 34.6 G/DL (ref 32.3–36.5)
MCV RBC AUTO: 97.3 FL (ref 81.4–97.8)
MONOCYTES # BLD AUTO: 0.5 K/UL (ref 0–0.85)
MONOCYTES NFR BLD AUTO: 7.9 % (ref 0–13.4)
NEUTROPHILS # BLD AUTO: 4.37 K/UL (ref 1.82–7.42)
NEUTROPHILS NFR BLD: 69.4 % (ref 44–72)
NRBC # BLD AUTO: 0 K/UL
NRBC BLD-RTO: 0 /100 WBC (ref 0–0.2)
PLATELET # BLD AUTO: 221 K/UL (ref 164–446)
PMV BLD AUTO: 10.3 FL (ref 9–12.9)
POTASSIUM SERPL-SCNC: 4.1 MMOL/L (ref 3.6–5.5)
PROT SERPL-MCNC: 6.4 G/DL (ref 6–8.2)
RBC # BLD AUTO: 4.13 M/UL (ref 4.7–6.1)
SODIUM SERPL-SCNC: 138 MMOL/L (ref 135–145)
WBC # BLD AUTO: 6.3 K/UL (ref 4.8–10.8)

## 2024-11-17 PROCEDURE — 80053 COMPREHEN METABOLIC PANEL: CPT

## 2024-11-17 PROCEDURE — 85025 COMPLETE CBC W/AUTO DIFF WBC: CPT

## 2024-11-17 PROCEDURE — A9270 NON-COVERED ITEM OR SERVICE: HCPCS | Performed by: INTERNAL MEDICINE

## 2024-11-17 PROCEDURE — A9270 NON-COVERED ITEM OR SERVICE: HCPCS | Performed by: HOSPITALIST

## 2024-11-17 PROCEDURE — 700102 HCHG RX REV CODE 250 W/ 637 OVERRIDE(OP): Performed by: HOSPITALIST

## 2024-11-17 PROCEDURE — 700102 HCHG RX REV CODE 250 W/ 637 OVERRIDE(OP): Performed by: NURSE PRACTITIONER

## 2024-11-17 PROCEDURE — 770020 HCHG ROOM/CARE - TELE (206)

## 2024-11-17 PROCEDURE — 82962 GLUCOSE BLOOD TEST: CPT

## 2024-11-17 PROCEDURE — 99254 IP/OBS CNSLTJ NEW/EST MOD 60: CPT | Performed by: HOSPITALIST

## 2024-11-17 PROCEDURE — 99233 SBSQ HOSP IP/OBS HIGH 50: CPT | Mod: 25 | Performed by: INTERNAL MEDICINE

## 2024-11-17 PROCEDURE — 93010 ELECTROCARDIOGRAM REPORT: CPT | Performed by: INTERNAL MEDICINE

## 2024-11-17 PROCEDURE — A9270 NON-COVERED ITEM OR SERVICE: HCPCS | Performed by: NURSE PRACTITIONER

## 2024-11-17 PROCEDURE — 93970 EXTREMITY STUDY: CPT

## 2024-11-17 PROCEDURE — 700102 HCHG RX REV CODE 250 W/ 637 OVERRIDE(OP): Performed by: INTERNAL MEDICINE

## 2024-11-17 PROCEDURE — 99233 SBSQ HOSP IP/OBS HIGH 50: CPT | Mod: DUPCHRG | Performed by: STUDENT IN AN ORGANIZED HEALTH CARE EDUCATION/TRAINING PROGRAM

## 2024-11-17 PROCEDURE — 99232 SBSQ HOSP IP/OBS MODERATE 35: CPT

## 2024-11-17 PROCEDURE — 71045 X-RAY EXAM CHEST 1 VIEW: CPT

## 2024-11-17 RX ORDER — METOPROLOL SUCCINATE 25 MG/1
25 TABLET, EXTENDED RELEASE ORAL
Status: DISCONTINUED | OUTPATIENT
Start: 2024-11-17 | End: 2024-11-18 | Stop reason: HOSPADM

## 2024-11-17 RX ORDER — GAUZE BANDAGE 2" X 2"
100 BANDAGE TOPICAL DAILY
Status: DISCONTINUED | OUTPATIENT
Start: 2024-11-17 | End: 2024-11-18 | Stop reason: HOSPADM

## 2024-11-17 RX ORDER — LOSARTAN POTASSIUM 25 MG/1
25 TABLET ORAL EVERY EVENING
Status: DISCONTINUED | OUTPATIENT
Start: 2024-11-17 | End: 2024-11-18 | Stop reason: HOSPADM

## 2024-11-17 RX ORDER — LOSARTAN POTASSIUM 25 MG/1
25 TABLET ORAL DAILY
Qty: 30 TABLET | Refills: 1 | Status: SHIPPED | OUTPATIENT
Start: 2024-11-17 | End: 2024-11-29

## 2024-11-17 RX ORDER — FOLIC ACID 1 MG/1
1 TABLET ORAL DAILY
Status: DISCONTINUED | OUTPATIENT
Start: 2024-11-17 | End: 2024-11-18 | Stop reason: HOSPADM

## 2024-11-17 RX ADMIN — DOCUSATE SODIUM 100 MG: 100 CAPSULE, LIQUID FILLED ORAL at 05:28

## 2024-11-17 RX ADMIN — FOLIC ACID 1 MG: 1 TABLET ORAL at 17:08

## 2024-11-17 RX ADMIN — POLYETHYLENE GLYCOL 3350 1 PACKET: 17 POWDER, FOR SOLUTION ORAL at 05:28

## 2024-11-17 RX ADMIN — LOSARTAN POTASSIUM 25 MG: 50 TABLET, FILM COATED ORAL at 17:08

## 2024-11-17 RX ADMIN — Medication 100 MG: at 17:08

## 2024-11-17 RX ADMIN — METOPROLOL SUCCINATE 25 MG: 50 TABLET, EXTENDED RELEASE ORAL at 13:10

## 2024-11-17 SDOH — ECONOMIC STABILITY: TRANSPORTATION INSECURITY
IN THE PAST 12 MONTHS, HAS THE LACK OF TRANSPORTATION KEPT YOU FROM MEDICAL APPOINTMENTS OR FROM GETTING MEDICATIONS?: NO

## 2024-11-17 SDOH — ECONOMIC STABILITY: TRANSPORTATION INSECURITY
IN THE PAST 12 MONTHS, HAS LACK OF RELIABLE TRANSPORTATION KEPT YOU FROM MEDICAL APPOINTMENTS, MEETINGS, WORK OR FROM GETTING THINGS NEEDED FOR DAILY LIVING?: NO

## 2024-11-17 ASSESSMENT — COPD QUESTIONNAIRES
HAVE YOU SMOKED AT LEAST 100 CIGARETTES IN YOUR ENTIRE LIFE: NO/DON'T KNOW
DO YOU EVER COUGH UP ANY MUCUS OR PHLEGM?: NO/ONLY WITH OCCASIONAL COLDS OR INFECTIONS
DURING THE PAST 4 WEEKS HOW MUCH DID YOU FEEL SHORT OF BREATH: NONE/LITTLE OF THE TIME
COPD SCREENING SCORE: 2

## 2024-11-17 ASSESSMENT — ENCOUNTER SYMPTOMS
MYALGIAS: 0
SENSORY CHANGE: 0
DOUBLE VISION: 0
COUGH: 0
CHILLS: 0
SHORTNESS OF BREATH: 0
ABDOMINAL PAIN: 0
VOMITING: 0
HEADACHES: 0
DIZZINESS: 0
SPEECH CHANGE: 0
NECK PAIN: 0
TINGLING: 0
BACK PAIN: 0
BLURRED VISION: 0
FEVER: 0
WEAKNESS: 0
NERVOUS/ANXIOUS: 0
NAUSEA: 0

## 2024-11-17 ASSESSMENT — SOCIAL DETERMINANTS OF HEALTH (SDOH)
WITHIN THE PAST 12 MONTHS, THE FOOD YOU BOUGHT JUST DIDN'T LAST AND YOU DIDN'T HAVE MONEY TO GET MORE: NEVER TRUE
WITHIN THE LAST YEAR, HAVE TO BEEN RAPED OR FORCED TO HAVE ANY KIND OF SEXUAL ACTIVITY BY YOUR PARTNER OR EX-PARTNER?: NO
WITHIN THE PAST 12 MONTHS, YOU WORRIED THAT YOUR FOOD WOULD RUN OUT BEFORE YOU GOT THE MONEY TO BUY MORE: NEVER TRUE
WITHIN THE LAST YEAR, HAVE YOU BEEN HUMILIATED OR EMOTIONALLY ABUSED IN OTHER WAYS BY YOUR PARTNER OR EX-PARTNER?: NO
WITHIN THE PAST 12 MONTHS, THE FOOD YOU BOUGHT JUST DIDN'T LAST AND YOU DIDN'T HAVE MONEY TO GET MORE: NEVER TRUE
WITHIN THE LAST YEAR, HAVE TO BEEN RAPED OR FORCED TO HAVE ANY KIND OF SEXUAL ACTIVITY BY YOUR PARTNER OR EX-PARTNER?: NO
WITHIN THE LAST YEAR, HAVE YOU BEEN HUMILIATED OR EMOTIONALLY ABUSED IN OTHER WAYS BY YOUR PARTNER OR EX-PARTNER?: NO
IN THE PAST 12 MONTHS, HAS THE ELECTRIC, GAS, OIL, OR WATER COMPANY THREATENED TO SHUT OFF SERVICE IN YOUR HOME?: NO
WITHIN THE LAST YEAR, HAVE YOU BEEN KICKED, HIT, SLAPPED, OR OTHERWISE PHYSICALLY HURT BY YOUR PARTNER OR EX-PARTNER?: NO
WITHIN THE LAST YEAR, HAVE YOU BEEN KICKED, HIT, SLAPPED, OR OTHERWISE PHYSICALLY HURT BY YOUR PARTNER OR EX-PARTNER?: NO
WITHIN THE LAST YEAR, HAVE YOU BEEN AFRAID OF YOUR PARTNER OR EX-PARTNER?: NO
WITHIN THE LAST YEAR, HAVE YOU BEEN AFRAID OF YOUR PARTNER OR EX-PARTNER?: NO
IN THE PAST 12 MONTHS, HAS THE ELECTRIC, GAS, OIL, OR WATER COMPANY THREATENED TO SHUT OFF SERVICE IN YOUR HOME?: PATIENT DECLINED
WITHIN THE PAST 12 MONTHS, YOU WORRIED THAT YOUR FOOD WOULD RUN OUT BEFORE YOU GOT THE MONEY TO BUY MORE: NEVER TRUE

## 2024-11-17 ASSESSMENT — COGNITIVE AND FUNCTIONAL STATUS - GENERAL
DAILY ACTIVITIY SCORE: 24
SUGGESTED CMS G CODE MODIFIER DAILY ACTIVITY: CH
MOBILITY SCORE: 24
SUGGESTED CMS G CODE MODIFIER MOBILITY: CH

## 2024-11-17 ASSESSMENT — LIFESTYLE VARIABLES
TOTAL SCORE: 0
HAVE PEOPLE ANNOYED YOU BY CRITICIZING YOUR DRINKING: NO
EVER HAD A DRINK FIRST THING IN THE MORNING TO STEADY YOUR NERVES TO GET RID OF A HANGOVER: NO
TOTAL SCORE: 0
HOW MANY TIMES IN THE PAST YEAR HAVE YOU HAD 5 OR MORE DRINKS IN A DAY: 0
HAVE YOU EVER FELT YOU SHOULD CUT DOWN ON YOUR DRINKING: NO
CONSUMPTION TOTAL: NEGATIVE
ON A TYPICAL DAY WHEN YOU DRINK ALCOHOL HOW MANY DRINKS DO YOU HAVE: 0
SUBSTANCE_ABUSE: 1
TOTAL SCORE: 0
DOES PATIENT WANT TO STOP DRINKING: NO
AVERAGE NUMBER OF DAYS PER WEEK YOU HAVE A DRINK CONTAINING ALCOHOL: 0
ALCOHOL_USE: NO
EVER FELT BAD OR GUILTY ABOUT YOUR DRINKING: NO

## 2024-11-17 ASSESSMENT — PAIN DESCRIPTION - PAIN TYPE
TYPE: ACUTE PAIN

## 2024-11-17 ASSESSMENT — FIBROSIS 4 INDEX: FIB4 SCORE: 1.01
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
## 2024-11-17 NOTE — ASSESSMENT & PLAN NOTE
Hypotensive on arrival.  CT without acute traumatic injuries.  2L in crystalloids. Transfused one unit of PRBCs in ED.  Cardiac ECHO with EF of 20 %.  EKG with SR, prolonged KY with L hypertrophy.  Troponin 89.
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
## 2024-11-17 NOTE — ASSESSMENT & PLAN NOTE
Education on the harms of illicit drugs discussed with the patient at length.  Given his heart disease I alerted him that further use could potentially kill him or have further disability.  I have recommended to him that he seek help groups for cessation.

## 2024-11-17 NOTE — PROGRESS NOTES
[facility] does not open until 1000. Unable to complete Med-Rec at this time. RN will follow up when they open later today.

## 2024-11-17 NOTE — ASSESSMENT & PLAN NOTE
VTE prophylaxis initially contraindicated secondary to elevated bleeding risk.  11/19 Trauma surveillance venous duplex ultrasonography ordered.

## 2024-11-17 NOTE — DISCHARGE INSTRUCTIONS
- Call or seek medical attention for questions or concerns  - Follow up with the [facility] RETURN: as needed  - Follow up with your Cardiologist to inform them of your hospital admission  - Follow up with primary care provider within one weeks time to inform them of your hospital admission  - Resume regular diet  - May take over the counter acetaminophen or ibuprofen as needed for pain  - Continue daily over the counter stool softener while on narcotics  - No operation of machinery or motorized vehicles while under the influence of narcotics  - No alcohol, marijuana or illicit drug use while under the influence of narcotics  - In the event of a narcotic overdose naloxone (Narcan) is available without a prescription from any [facility] or [facility]  - No swimming, hot tubs, baths or wound submersion until cleared by outpatient provider. May shower  - No contact sports, strenuous activities, or heavy lifting until cleared by outpatient provider  - If respiratory decompensation, persistent or worsening pain, or signs or symptoms of infection occur seek medical attention      Discharge Instructions    Discharged to home by car with relative. Discharged via walking, hospital escort: Refused.  Special equipment needed: Not Applicable    Be sure to schedule a follow-up appointment with your primary care doctor or any specialists as instructed.     Discharge Plan:   Influenza Vaccine Indication: Not indicated: Previously immunized this influenza season and > 8 years of age    I understand that a diet low in cholesterol, fat, and sodium is recommended for good health. Unless I have been given specific instructions below for another diet, I accept this instruction as my diet prescription.     Special Instructions:   HF Patient Discharge Instructions  Monitor your weight daily, and maintain a weight chart, to track your weight changes.   Activity as tolerated, unless your Doctor has ordered  otherwise.   Follow a low fat, low cholesterol, low salt diet unless instructed otherwise by your Doctor. Read the labels on the back of food products and track your intake of fat, cholesterol and salt.   If a Fluid Restriction has been ordered by your Doctor, measure fluids with a measuring cup to ensure that you are not exceeding the restriction.   No smoking.    See the educational handout provided at discharge for more information on monitoring your daily weight, activity and diet. This also explains more about Heart Failure, symptoms of a flare-up and some of the tests that you have undergone.     Warning Signs of a Flare-Up include:  Swelling in the ankles or lower legs.  Shortness of breath, while at rest, or while doing normal activities.   Shortness of breath at night when in bed, or coughing in bed.   Requiring more pillows to sleep at night, or needing to sit up at night to sleep.  Feeling weak, dizzy or fatigued.     When to call your Doctor:  Call your Primary Care Physician or Cardiologist if:   You experience any pain radiating to your jaw or neck.  You have any difficulty breathing.  You experience weight gain of 3 lbs in a day or 5 lbs in a week.   You feel any palpitations or irregular heartbeats.  You become dizzy or lose consciousness.   If you have had an angiogram or had a pacemaker or AICD placed, and experience:  Bleeding, drainage or swelling at the surgical / puncture site.  Fever greater than 100.0 F  Shock from internal defibrillator.  Cool and / or numb extremities.     Please access the AHA My HF Guide/Heart Failure Interactive Workbook:   [email or web address]  HF Patient Discharge Instructions  Monitor your weight daily, and maintain a weight chart, to track your weight changes.   Activity as tolerated, unless your Doctor has ordered otherwise. Other activity order.  Follow a low fat, low cholesterol, low salt diet unless instructed otherwise by your Doctor. Read the  labels on the back of food products and track your intake of fat, cholesterol and salt.   Fluid Restriction No. If a Fluid Restriction has been ordered by your Doctor, measure fluids with a measuring cup to ensure that you are not exceeding the restriction.   No smoking.  Oxygen No. If your Doctor has ordered that you wear Oxygen at home, it is important to wear it as ordered.  Did you receive an explanation from staff on the importance of taking each of your medications and why it is necessary to keep taking them unless your doctor says to stop? Yes  Were all of your questions answered about how to manage your heart failure and what to do if you have increased signs and symptoms after you go home? Yes  Do you feel like your heart failure care team involved you in the care treatment plan and allowed you to make decisions regarding your care while in the hospital and addressed any discharge needs you might have? Yes    See the educational handout provided at discharge for more information on monitoring your daily weight, activity and diet. This also explains more about Heart Failure, symptoms of a flare-up and some of the tests that you have undergone.     Warning Signs of a Flare-Up include:  Swelling in the ankles or lower legs.  Shortness of breath, while at rest, or while doing normal activities.   Shortness of breath at night when in bed, or coughing in bed.   Requiring more pillows to sleep at night, or needing to sit up at night to sleep.  Feeling weak, dizzy or fatigued.     When to call your Doctor:  Call your Primary Care Physician or Cardiologist if:   You experience any pain radiating to your jaw or neck.  You have any difficulty breathing.  You experience weight gain of 3 lbs in a day or 5 lbs in a week.   You feel any palpitations or irregular heartbeats.  You become dizzy or lose consciousness.   If you have had an angiogram or had a pacemaker or AICD placed, and experience:  Bleeding, drainage or  swelling at the surgical / puncture site.  Fever greater than 100.0 F  Shock from internal defibrillator.  Cool and / or numb extremities.     Please access the AHA My HF Guide/Heart Failure Interactive Workbook:   [email or web address]  Shortness of breath, while at rest, or while doing normal activities.   Shortness of breath at night when in bed, or coughing in bed.   Requiring more pillows to sleep at night, or needing to sit up at night to sleep.  Feeling weak, dizzy or fatigued.     When to call your Doctor:  Call your Primary Care Physician or Cardiologist if:   You experience any pain radiating to your jaw or neck.  You have any difficulty breathing.  You experience weight gain of 3 lbs in a day or 5 lbs in a week.   You feel any palpitations or irregular heartbeats.  You become dizzy or lose consciousness.   If you have had an angiogram or had a pacemaker or AICD placed, and experience:  Bleeding, drainage or swelling at the surgical / puncture site.  Fever greater than 100.0 F  Shock from internal defibrillator.  Cool and / or numb extremities.     Please access the AHA My HF Guide/Heart Failure Interactive Workbook:   [email or web address]
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
## 2024-11-17 NOTE — ASSESSMENT & PLAN NOTE
Ejection fraction of 20% with systolic dysfunction likely secondary to methamphetamine and alcohol use  Cardiology consulting  Monitor I's and O's, vitals, and labs  Initiating core measures with losartan 25 mg nightly and metoprolol XL 25 mg daily
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
## 2024-11-17 NOTE — ASSESSMENT & PLAN NOTE
Thiamine and folate supplementation  Monitor for withdrawals  Seizure precautions  Cessation encouraged with tips discussed
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
## 2024-11-17 NOTE — PROGRESS NOTES
MD Myrick is bedside to place an art line.   No Sedation required.   Time out 2108. Everyone agrees.  Procedure start time: 2110  Procedure stop time: 2115  Cuff pressure correlates.
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
## 2024-11-17 NOTE — CARE PLAN
The patient is Stable - Low risk of patient condition declining or worsening    Shift Goals  Clinical Goals: MAP >60  Patient Goals: Go home and find car  Family Goals: Family not present    Progress made toward(s) clinical / shift goals:    Problem: Safety  Goal: Will remain free from injury  Outcome: Progressing     Problem: Pain Management  Goal: Pain level will decrease to patient's comfort goal  Outcome: Progressing     Problem: Psychosocial Needs:  Goal: Level of anxiety will decrease  Outcome: Progressing     Problem: Medication  Goal: Compliance with prescribed medication will improve  Outcome: Progressing     Problem: Knowledge Deficit  Goal: Knowledge of disease process/condition, treatment plan, diagnostic tests, and medications will improve  Outcome: Progressing

## 2024-11-17 NOTE — ASSESSMENT & PLAN NOTE
Per chart review.   ECHO from 2/2024 with EF 20-25%.  Repeat ECHO with EF 20 %.  Chronic condition treated with metoprolol and spironolactone.  Medication reconciliation pending. Attempted to call Pharmacy on [facility]. It does not open until 1000 this morning.  Eddi Kimball M.D, Cardiology  Hold maintenance medications per Cardiology consult.
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
## 2024-11-18 ENCOUNTER — PHARMACY VISIT (OUTPATIENT)
Dept: PHARMACY | Facility: MEDICAL CENTER | Age: 59
End: 2024-11-18
Payer: COMMERCIAL

## 2024-11-18 ENCOUNTER — APPOINTMENT (OUTPATIENT)
Dept: RADIOLOGY | Facility: MEDICAL CENTER | Age: 59
DRG: 315 | End: 2024-11-18
Attending: NURSE PRACTITIONER
Payer: OTHER MISCELLANEOUS

## 2024-11-18 VITALS
WEIGHT: 127.21 LBS | RESPIRATION RATE: 18 BRPM | HEART RATE: 92 BPM | TEMPERATURE: 98.1 F | OXYGEN SATURATION: 94 % | DIASTOLIC BLOOD PRESSURE: 90 MMHG | SYSTOLIC BLOOD PRESSURE: 134 MMHG

## 2024-11-18 PROBLEM — I42.0 DILATED CARDIOMYOPATHY (HCC): Status: ACTIVE | Noted: 2024-11-18

## 2024-11-18 PROBLEM — I34.0 NON-RHEUMATIC MITRAL REGURGITATION: Status: ACTIVE | Noted: 2024-11-18

## 2024-11-18 PROBLEM — I51.7 LEFT VENTRICULAR DILATATION: Status: ACTIVE | Noted: 2024-11-18

## 2024-11-18 LAB
ALBUMIN SERPL BCP-MCNC: 3.3 G/DL (ref 3.2–4.9)
ALBUMIN/GLOB SERPL: 0.9 G/DL
ALP SERPL-CCNC: 116 U/L (ref 30–99)
ALT SERPL-CCNC: 352 U/L (ref 2–50)
ANION GAP SERPL CALC-SCNC: 11 MMOL/L (ref 7–16)
AST SERPL-CCNC: 65 U/L (ref 12–45)
BASOPHILS # BLD AUTO: 0.5 % (ref 0–1.8)
BASOPHILS # BLD: 0.03 K/UL (ref 0–0.12)
BILIRUB SERPL-MCNC: 0.8 MG/DL (ref 0.1–1.5)
BUN SERPL-MCNC: 28 MG/DL (ref 8–22)
CALCIUM ALBUM COR SERPL-MCNC: 9.4 MG/DL (ref 8.5–10.5)
CALCIUM SERPL-MCNC: 8.8 MG/DL (ref 8.5–10.5)
CHLORIDE SERPL-SCNC: 111 MMOL/L (ref 96–112)
CO2 SERPL-SCNC: 18 MMOL/L (ref 20–33)
CREAT SERPL-MCNC: 1.07 MG/DL (ref 0.5–1.4)
EOSINOPHIL # BLD AUTO: 0.2 K/UL (ref 0–0.51)
EOSINOPHIL NFR BLD: 3.3 % (ref 0–6.9)
ERYTHROCYTE [DISTWIDTH] IN BLOOD BY AUTOMATED COUNT: 64.4 FL (ref 35.9–50)
GFR SERPLBLD CREATININE-BSD FMLA CKD-EPI: 79 ML/MIN/1.73 M 2
GLOBULIN SER CALC-MCNC: 3.5 G/DL (ref 1.9–3.5)
GLUCOSE SERPL-MCNC: 127 MG/DL (ref 65–99)
HCT VFR BLD AUTO: 44.2 % (ref 42–52)
HGB BLD-MCNC: 14.8 G/DL (ref 14–18)
IMM GRANULOCYTES # BLD AUTO: 0.01 K/UL (ref 0–0.11)
IMM GRANULOCYTES NFR BLD AUTO: 0.2 % (ref 0–0.9)
LYMPHOCYTES # BLD AUTO: 1.15 K/UL (ref 1–4.8)
LYMPHOCYTES NFR BLD: 18.8 % (ref 22–41)
MCH RBC QN AUTO: 33.3 PG (ref 27–33)
MCHC RBC AUTO-ENTMCNC: 33.5 G/DL (ref 32.3–36.5)
MCV RBC AUTO: 99.3 FL (ref 81.4–97.8)
MONOCYTES # BLD AUTO: 0.53 K/UL (ref 0–0.85)
MONOCYTES NFR BLD AUTO: 8.7 % (ref 0–13.4)
NEUTROPHILS # BLD AUTO: 4.2 K/UL (ref 1.82–7.42)
NEUTROPHILS NFR BLD: 68.5 % (ref 44–72)
NRBC # BLD AUTO: 0 K/UL
NRBC BLD-RTO: 0 /100 WBC (ref 0–0.2)
PLATELET # BLD AUTO: 237 K/UL (ref 164–446)
PMV BLD AUTO: 10.3 FL (ref 9–12.9)
POTASSIUM SERPL-SCNC: 4.4 MMOL/L (ref 3.6–5.5)
PROT SERPL-MCNC: 6.8 G/DL (ref 6–8.2)
RBC # BLD AUTO: 4.45 M/UL (ref 4.7–6.1)
SODIUM SERPL-SCNC: 140 MMOL/L (ref 135–145)
WBC # BLD AUTO: 6.1 K/UL (ref 4.8–10.8)

## 2024-11-18 PROCEDURE — A9270 NON-COVERED ITEM OR SERVICE: HCPCS | Performed by: NURSE PRACTITIONER

## 2024-11-18 PROCEDURE — 700102 HCHG RX REV CODE 250 W/ 637 OVERRIDE(OP): Performed by: HOSPITALIST

## 2024-11-18 PROCEDURE — 80053 COMPREHEN METABOLIC PANEL: CPT

## 2024-11-18 PROCEDURE — A9270 NON-COVERED ITEM OR SERVICE: HCPCS | Performed by: HOSPITALIST

## 2024-11-18 PROCEDURE — 71045 X-RAY EXAM CHEST 1 VIEW: CPT

## 2024-11-18 PROCEDURE — 700111 HCHG RX REV CODE 636 W/ 250 OVERRIDE (IP): Performed by: INTERNAL MEDICINE

## 2024-11-18 PROCEDURE — 700102 HCHG RX REV CODE 250 W/ 637 OVERRIDE(OP): Performed by: NURSE PRACTITIONER

## 2024-11-18 PROCEDURE — 97165 OT EVAL LOW COMPLEX 30 MIN: CPT

## 2024-11-18 PROCEDURE — RXMED WILLOW AMBULATORY MEDICATION CHARGE: Performed by: INTERNAL MEDICINE

## 2024-11-18 PROCEDURE — 85025 COMPLETE CBC W/AUTO DIFF WBC: CPT

## 2024-11-18 PROCEDURE — 700102 HCHG RX REV CODE 250 W/ 637 OVERRIDE(OP): Performed by: INTERNAL MEDICINE

## 2024-11-18 PROCEDURE — A9270 NON-COVERED ITEM OR SERVICE: HCPCS | Performed by: INTERNAL MEDICINE

## 2024-11-18 PROCEDURE — 99239 HOSP IP/OBS DSCHRG MGMT >30: CPT | Performed by: INTERNAL MEDICINE

## 2024-11-18 PROCEDURE — 90677 PCV20 VACCINE IM: CPT | Performed by: INTERNAL MEDICINE

## 2024-11-18 PROCEDURE — 90471 IMMUNIZATION ADMIN: CPT

## 2024-11-18 RX ORDER — DAPAGLIFLOZIN 10 MG/1
10 TABLET, FILM COATED ORAL DAILY
Qty: 30 TABLET | Refills: 0 | Status: ON HOLD | OUTPATIENT
Start: 2024-11-18 | End: 2024-12-11

## 2024-11-18 RX ORDER — METOPROLOL SUCCINATE 25 MG/1
25 TABLET, EXTENDED RELEASE ORAL DAILY
Qty: 30 TABLET | Refills: 0 | Status: ON HOLD | OUTPATIENT
Start: 2024-11-19 | End: 2024-12-01

## 2024-11-18 RX ORDER — DAPAGLIFLOZIN 10 MG/1
10 TABLET, FILM COATED ORAL DAILY
Status: DISCONTINUED | OUTPATIENT
Start: 2024-11-18 | End: 2024-11-18 | Stop reason: HOSPADM

## 2024-11-18 RX ADMIN — PNEUMOCOCCAL 20-VALENT CONJUGATE VACCINE 0.5 ML
2.2; 2.2; 2.2; 2.2; 2.2; 2.2; 2.2; 2.2; 2.2; 2.2; 2.2; 2.2; 2.2; 2.2; 2.2; 2.2; 4.4; 2.2; 2.2; 2.2 INJECTION, SUSPENSION INTRAMUSCULAR at 09:59

## 2024-11-18 RX ADMIN — DAPAGLIFLOZIN 10 MG: 10 TABLET, FILM COATED ORAL at 08:57

## 2024-11-18 RX ADMIN — FOLIC ACID 1 MG: 1 TABLET ORAL at 05:56

## 2024-11-18 RX ADMIN — METOPROLOL SUCCINATE 25 MG: 50 TABLET, EXTENDED RELEASE ORAL at 05:56

## 2024-11-18 RX ADMIN — DOCUSATE SODIUM 100 MG: 100 CAPSULE, LIQUID FILLED ORAL at 05:56

## 2024-11-18 RX ADMIN — Medication 100 MG: at 05:56

## 2024-11-18 ASSESSMENT — COGNITIVE AND FUNCTIONAL STATUS - GENERAL
HELP NEEDED FOR BATHING: A LITTLE
TOILETING: A LITTLE
DRESSING REGULAR UPPER BODY CLOTHING: A LITTLE
PERSONAL GROOMING: A LITTLE
DAILY ACTIVITIY SCORE: 19
SUGGESTED CMS G CODE MODIFIER DAILY ACTIVITY: CK
DRESSING REGULAR LOWER BODY CLOTHING: A LITTLE

## 2024-11-18 ASSESSMENT — ENCOUNTER SYMPTOMS
SHORTNESS OF BREATH: 0
PALPITATIONS: 0
CHEST TIGHTNESS: 0

## 2024-11-18 ASSESSMENT — ACTIVITIES OF DAILY LIVING (ADL): TOILETING: INDEPENDENT

## 2024-11-18 ASSESSMENT — PAIN DESCRIPTION - PAIN TYPE
TYPE: ACUTE PAIN
TYPE: ACUTE PAIN
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
## 2024-11-18 NOTE — PROGRESS NOTES
Bedside report received from off going RN/tech: Unique , assumed care of patient.     Fall Risk Score: LOW RISK  Fall risk interventions in place: Place yellow fall risk ID band on patient, Provide patient/family education based on risk assessment, Educate patient/family to call staff for assistance when getting out of bed, and Place fall precaution signage outside patient door  Bed type: Regular (Mich Score less than 17 interventions in place)  Patient on cardiac monitor: Yes  IVF/IV medications: Not Applicable   Oxygen: Room Air  Bedside sitter: Not Applicable   Isolation: Not applicable

## 2024-11-18 NOTE — PROGRESS NOTES
Monitor Summary  Rhythm: SR w/ 1st degree  Rate: 80-87  Ectopy: (O) PVC, (R) Coup, (R) Bigem, (R) Trig ST depression 0.9  .22 / .09 / .32
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
## 2024-11-18 NOTE — DISCHARGE PLANNING
Case Management Discharge Planning    Admission Date: 11/16/2024  GMLOS: 2.7  ALOS: 2    6-Clicks ADL Score: 24  6-Clicks Mobility Score: 24    Anticipated Discharge Dispo: Discharge Disposition: Discharged to home/self care (01)    DME Needed: No    Action(s) Taken: RNCM provided resources for drug and alcohol abuse to patient at bedside. Patient has transportation home. No other CM needs identified.    Escalations Completed: None    Medically Clear: Yes    Next Steps: F/U with medical team regarding D/C needs/ barriers.     Barriers to Discharge: None    Is the patient up for discharge tomorrow: No

## 2024-11-18 NOTE — CARE PLAN
The patient is Stable - Low risk of patient condition declining or worsening    Shift Goals  Clinical Goals: Monitor VS  Patient Goals: Go home  Family Goals: YOEL    Progress made toward(s) clinical / shift goals:    Problem: Safety  Goal: Will remain free from injury  Outcome: Progressing  Goal: Will remain free from falls  Outcome: Progressing     Problem: Mobility  Goal: Risk for activity intolerance will decrease  Outcome: Progressing       Patient is not progressing towards the following goals:

## 2024-11-18 NOTE — PROGRESS NOTES
Assumed care of patient upon arrival to unit. Patient currently A & O x 4; on RA, without complaints of acute pain. Pt placed on monitor, monitor room notified, SR 86. Patient oriented to unit and to call light system. Call light within reach. Pt educated to fall risk. Fall precautions in place. Pt provided with personal grooming items. Bed locked and in lowest position. All questions answered. No other needs indicated at this time.

## 2024-11-18 NOTE — PROGRESS NOTES
4 Eyes Skin Assessment Completed by Unique, JULIANA and JULIANA Panda.    Head WDL  Ears WDL  Nose WDL  Mouth WDL  Neck WDL  Breast/Chest WDL  Shoulder Blades WDL  Spine WDL  (R) Arm/Elbow/Hand WDL  (L) Arm/Elbow/Hand WDL  Abdomen WDL  Groin WDL  Scrotum/Coccyx/Buttocks WDL  (R) Leg WDL  (L) Leg Bruising  (R) Heel/Foot/Toe Redness and Blanching  (L) Heel/Foot/Toe Redness and Blanching          Devices In Places Tele Box and Pulse Ox      Interventions In Place Pillows, Q2 Turns, Barrier Cream, and Pressure Redistribution Mattress    Possible Skin Injury No    Pictures Uploaded Into Epic N/A  Wound Consult Placed N/A  RN Wound Prevention Protocol Ordered No
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
## 2024-11-20 LAB — COMPONENT CELLULAR 8504CLL: NORMAL

## 2024-11-29 ENCOUNTER — HOSPITAL ENCOUNTER (INPATIENT)
Facility: MEDICAL CENTER | Age: 59
End: 2024-11-29
Attending: STUDENT IN AN ORGANIZED HEALTH CARE EDUCATION/TRAINING PROGRAM
Payer: MEDICAID

## 2024-11-29 ENCOUNTER — APPOINTMENT (OUTPATIENT)
Dept: RADIOLOGY | Facility: MEDICAL CENTER | Age: 59
DRG: 291 | End: 2024-11-29
Attending: STUDENT IN AN ORGANIZED HEALTH CARE EDUCATION/TRAINING PROGRAM
Payer: MEDICAID

## 2024-11-29 DIAGNOSIS — R09.02 HYPOXIA: ICD-10-CM

## 2024-11-29 DIAGNOSIS — R07.9 ACUTE CHEST PAIN: ICD-10-CM

## 2024-11-29 DIAGNOSIS — M1A.9XX0 CHRONIC GOUT WITHOUT TOPHUS, UNSPECIFIED CAUSE, UNSPECIFIED SITE: ICD-10-CM

## 2024-11-29 DIAGNOSIS — I50.41 ACUTE COMBINED SYSTOLIC AND DIASTOLIC HEART FAILURE (HCC): ICD-10-CM

## 2024-11-29 DIAGNOSIS — R60.0 PERIPHERAL EDEMA: ICD-10-CM

## 2024-11-29 DIAGNOSIS — I50.20 HFREF (HEART FAILURE WITH REDUCED EJECTION FRACTION) (HCC): ICD-10-CM

## 2024-11-29 DIAGNOSIS — I42.8 NONISCHEMIC CARDIOMYOPATHY (HCC): ICD-10-CM

## 2024-11-29 LAB
ALBUMIN SERPL BCP-MCNC: 4.2 G/DL (ref 3.2–4.9)
ALBUMIN/GLOB SERPL: 1.1 G/DL
ALP SERPL-CCNC: 109 U/L (ref 30–99)
ALT SERPL-CCNC: 44 U/L (ref 2–50)
ANION GAP SERPL CALC-SCNC: 16 MMOL/L (ref 7–16)
AST SERPL-CCNC: 29 U/L (ref 12–45)
BASOPHILS # BLD AUTO: 0.5 % (ref 0–1.8)
BASOPHILS # BLD: 0.05 K/UL (ref 0–0.12)
BILIRUB SERPL-MCNC: 1.9 MG/DL (ref 0.1–1.5)
BUN SERPL-MCNC: 33 MG/DL (ref 8–22)
CALCIUM ALBUM COR SERPL-MCNC: 8.9 MG/DL (ref 8.5–10.5)
CALCIUM SERPL-MCNC: 9.1 MG/DL (ref 8.5–10.5)
CHLORIDE SERPL-SCNC: 100 MMOL/L (ref 96–112)
CO2 SERPL-SCNC: 21 MMOL/L (ref 20–33)
CREAT SERPL-MCNC: 1.38 MG/DL (ref 0.5–1.4)
EKG IMPRESSION: NORMAL
EKG IMPRESSION: NORMAL
EOSINOPHIL # BLD AUTO: 0 K/UL (ref 0–0.51)
EOSINOPHIL NFR BLD: 0 % (ref 0–6.9)
ERYTHROCYTE [DISTWIDTH] IN BLOOD BY AUTOMATED COUNT: 60 FL (ref 35.9–50)
GFR SERPLBLD CREATININE-BSD FMLA CKD-EPI: 59 ML/MIN/1.73 M 2
GLOBULIN SER CALC-MCNC: 3.8 G/DL (ref 1.9–3.5)
GLUCOSE SERPL-MCNC: 112 MG/DL (ref 65–99)
HCT VFR BLD AUTO: 45.8 % (ref 42–52)
HGB BLD-MCNC: 15.1 G/DL (ref 14–18)
HOLDING TUBE BB 8507: NORMAL
IMM GRANULOCYTES # BLD AUTO: 0.04 K/UL (ref 0–0.11)
IMM GRANULOCYTES NFR BLD AUTO: 0.4 % (ref 0–0.9)
LIPASE SERPL-CCNC: 24 U/L (ref 11–82)
LYMPHOCYTES # BLD AUTO: 1.13 K/UL (ref 1–4.8)
LYMPHOCYTES NFR BLD: 12.3 % (ref 22–41)
MCH RBC QN AUTO: 33.6 PG (ref 27–33)
MCHC RBC AUTO-ENTMCNC: 33 G/DL (ref 32.3–36.5)
MCV RBC AUTO: 101.8 FL (ref 81.4–97.8)
MONOCYTES # BLD AUTO: 0.56 K/UL (ref 0–0.85)
MONOCYTES NFR BLD AUTO: 6.1 % (ref 0–13.4)
NEUTROPHILS # BLD AUTO: 7.41 K/UL (ref 1.82–7.42)
NEUTROPHILS NFR BLD: 80.7 % (ref 44–72)
NRBC # BLD AUTO: 0 K/UL
NRBC BLD-RTO: 0 /100 WBC (ref 0–0.2)
NT-PROBNP SERPL IA-MCNC: ABNORMAL PG/ML (ref 0–125)
PLATELET # BLD AUTO: 300 K/UL (ref 164–446)
PMV BLD AUTO: 10.1 FL (ref 9–12.9)
POTASSIUM SERPL-SCNC: 4.9 MMOL/L (ref 3.6–5.5)
PROT SERPL-MCNC: 8 G/DL (ref 6–8.2)
RBC # BLD AUTO: 4.5 M/UL (ref 4.7–6.1)
SODIUM SERPL-SCNC: 137 MMOL/L (ref 135–145)
TROPONIN T SERPL-MCNC: 26 NG/L (ref 6–19)
TROPONIN T SERPL-MCNC: 31 NG/L (ref 6–19)
WBC # BLD AUTO: 9.2 K/UL (ref 4.8–10.8)

## 2024-11-29 PROCEDURE — 36415 COLL VENOUS BLD VENIPUNCTURE: CPT

## 2024-11-29 PROCEDURE — 93005 ELECTROCARDIOGRAM TRACING: CPT | Performed by: STUDENT IN AN ORGANIZED HEALTH CARE EDUCATION/TRAINING PROGRAM

## 2024-11-29 PROCEDURE — 84484 ASSAY OF TROPONIN QUANT: CPT

## 2024-11-29 PROCEDURE — 71045 X-RAY EXAM CHEST 1 VIEW: CPT

## 2024-11-29 PROCEDURE — 96375 TX/PRO/DX INJ NEW DRUG ADDON: CPT

## 2024-11-29 PROCEDURE — 700111 HCHG RX REV CODE 636 W/ 250 OVERRIDE (IP): Performed by: STUDENT IN AN ORGANIZED HEALTH CARE EDUCATION/TRAINING PROGRAM

## 2024-11-29 PROCEDURE — 96374 THER/PROPH/DIAG INJ IV PUSH: CPT

## 2024-11-29 PROCEDURE — 85025 COMPLETE CBC W/AUTO DIFF WBC: CPT

## 2024-11-29 PROCEDURE — 700102 HCHG RX REV CODE 250 W/ 637 OVERRIDE(OP): Performed by: STUDENT IN AN ORGANIZED HEALTH CARE EDUCATION/TRAINING PROGRAM

## 2024-11-29 PROCEDURE — 83690 ASSAY OF LIPASE: CPT

## 2024-11-29 PROCEDURE — 81001 URINALYSIS AUTO W/SCOPE: CPT

## 2024-11-29 PROCEDURE — 80053 COMPREHEN METABOLIC PANEL: CPT

## 2024-11-29 PROCEDURE — A9270 NON-COVERED ITEM OR SERVICE: HCPCS | Performed by: STUDENT IN AN ORGANIZED HEALTH CARE EDUCATION/TRAINING PROGRAM

## 2024-11-29 PROCEDURE — 93005 ELECTROCARDIOGRAM TRACING: CPT

## 2024-11-29 PROCEDURE — 83880 ASSAY OF NATRIURETIC PEPTIDE: CPT

## 2024-11-29 PROCEDURE — 99285 EMERGENCY DEPT VISIT HI MDM: CPT

## 2024-11-29 PROCEDURE — 76705 ECHO EXAM OF ABDOMEN: CPT

## 2024-11-29 RX ORDER — MORPHINE SULFATE 4 MG/ML
4 INJECTION INTRAVENOUS
Status: COMPLETED | OUTPATIENT
Start: 2024-11-29 | End: 2024-11-29

## 2024-11-29 RX ORDER — FAMOTIDINE 20 MG/1
20 TABLET, FILM COATED ORAL ONCE
Status: COMPLETED | OUTPATIENT
Start: 2024-11-29 | End: 2024-11-29

## 2024-11-29 RX ORDER — FUROSEMIDE 40 MG/1
40 TABLET ORAL DAILY
Status: ON HOLD | COMMUNITY
End: 2024-12-01

## 2024-11-29 RX ORDER — SACUBITRIL AND VALSARTAN 24; 26 MG/1; MG/1
1 TABLET, FILM COATED ORAL 2 TIMES DAILY
Status: ON HOLD | COMMUNITY
Start: 2024-10-10 | End: 2024-12-01

## 2024-11-29 RX ORDER — ASPIRIN 81 MG/1
81 TABLET ORAL DAILY
Status: ON HOLD | COMMUNITY
End: 2024-12-11

## 2024-11-29 RX ORDER — FUROSEMIDE 10 MG/ML
80 INJECTION INTRAMUSCULAR; INTRAVENOUS ONCE
Status: COMPLETED | OUTPATIENT
Start: 2024-11-29 | End: 2024-11-29

## 2024-11-29 RX ADMIN — FUROSEMIDE 80 MG: 10 INJECTION INTRAMUSCULAR; INTRAVENOUS at 21:08

## 2024-11-29 RX ADMIN — SACUBITRIL AND VALSARTAN 1 TABLET: 24; 26 TABLET, FILM COATED ORAL at 21:13

## 2024-11-29 RX ADMIN — FAMOTIDINE 20 MG: 20 TABLET, FILM COATED ORAL at 21:31

## 2024-11-29 RX ADMIN — MORPHINE SULFATE 4 MG: 4 INJECTION, SOLUTION INTRAMUSCULAR; INTRAVENOUS at 21:45

## 2024-11-29 RX ADMIN — LIDOCAINE HYDROCHLORIDE 15 ML: 20 SOLUTION ORAL; TOPICAL at 21:31

## 2024-11-29 ASSESSMENT — PAIN DESCRIPTION - PAIN TYPE
TYPE: ACUTE PAIN

## 2024-11-29 ASSESSMENT — FIBROSIS 4 INDEX: FIB4 SCORE: 0.88

## 2024-11-30 ENCOUNTER — APPOINTMENT (OUTPATIENT)
Dept: RADIOLOGY | Facility: MEDICAL CENTER | Age: 59
DRG: 291 | End: 2024-11-30
Payer: MEDICAID

## 2024-11-30 PROBLEM — N17.9 AKI (ACUTE KIDNEY INJURY) (HCC): Status: ACTIVE | Noted: 2024-11-30

## 2024-11-30 PROBLEM — J96.01 ACUTE HYPOXIC RESPIRATORY FAILURE (HCC): Status: ACTIVE | Noted: 2024-11-30

## 2024-11-30 PROBLEM — I50.31 ACUTE CONGESTIVE HEART FAILURE WITH LEFT VENTRICULAR DIASTOLIC DYSFUNCTION (HCC): Status: ACTIVE | Noted: 2024-11-30

## 2024-11-30 PROBLEM — I50.43 ACUTE ON CHRONIC COMBINED SYSTOLIC (CONGESTIVE) AND DIASTOLIC (CONGESTIVE) HEART FAILURE (HCC): Status: ACTIVE | Noted: 2024-11-30

## 2024-11-30 LAB
AMPHET UR QL SCN: NEGATIVE
APPEARANCE UR: CLEAR
BACTERIA #/AREA URNS HPF: ABNORMAL /HPF
BARBITURATES UR QL SCN: NEGATIVE
BENZODIAZ UR QL SCN: NEGATIVE
BILIRUB UR QL STRIP.AUTO: NEGATIVE
BZE UR QL SCN: NEGATIVE
CANNABINOIDS UR QL SCN: NEGATIVE
CASTS URNS QL MICRO: >20 /LPF (ref 0–2)
COLOR UR: YELLOW
EPITHELIAL CELLS 1715: ABNORMAL /HPF (ref 0–5)
FENTANYL UR QL: NEGATIVE
GLUCOSE UR STRIP.AUTO-MCNC: NEGATIVE MG/DL
HYALINE CAST   1831: PRESENT /LPF
KETONES UR STRIP.AUTO-MCNC: NEGATIVE MG/DL
LEUKOCYTE ESTERASE UR QL STRIP.AUTO: NEGATIVE
METHADONE UR QL SCN: NEGATIVE
MICRO URNS: ABNORMAL
NITRITE UR QL STRIP.AUTO: NEGATIVE
OPIATES UR QL SCN: POSITIVE
OXYCODONE UR QL SCN: NEGATIVE
PCP UR QL SCN: NEGATIVE
PH UR STRIP.AUTO: 5 [PH] (ref 5–8)
PROPOXYPH UR QL SCN: NEGATIVE
PROT UR QL STRIP: 30 MG/DL
RBC # URNS HPF: ABNORMAL /HPF (ref 0–2)
RBC UR QL AUTO: NEGATIVE
SP GR UR STRIP.AUTO: 1.01
TROPONIN T SERPL-MCNC: 27 NG/L (ref 6–19)
UROBILINOGEN UR STRIP.AUTO-MCNC: 1 EU/DL
WBC #/AREA URNS HPF: ABNORMAL /HPF

## 2024-11-30 PROCEDURE — 770020 HCHG ROOM/CARE - TELE (206)

## 2024-11-30 PROCEDURE — 96372 THER/PROPH/DIAG INJ SC/IM: CPT

## 2024-11-30 PROCEDURE — 71275 CT ANGIOGRAPHY CHEST: CPT

## 2024-11-30 PROCEDURE — 96376 TX/PRO/DX INJ SAME DRUG ADON: CPT

## 2024-11-30 PROCEDURE — 700117 HCHG RX CONTRAST REV CODE 255

## 2024-11-30 PROCEDURE — 80307 DRUG TEST PRSMV CHEM ANLYZR: CPT

## 2024-11-30 PROCEDURE — 700102 HCHG RX REV CODE 250 W/ 637 OVERRIDE(OP)

## 2024-11-30 PROCEDURE — 99223 1ST HOSP IP/OBS HIGH 75: CPT

## 2024-11-30 PROCEDURE — 700111 HCHG RX REV CODE 636 W/ 250 OVERRIDE (IP)

## 2024-11-30 PROCEDURE — 36415 COLL VENOUS BLD VENIPUNCTURE: CPT

## 2024-11-30 PROCEDURE — A9270 NON-COVERED ITEM OR SERVICE: HCPCS

## 2024-11-30 PROCEDURE — 84484 ASSAY OF TROPONIN QUANT: CPT

## 2024-11-30 RX ORDER — ACETAMINOPHEN 325 MG/1
650 TABLET ORAL EVERY 6 HOURS PRN
Status: DISCONTINUED | OUTPATIENT
Start: 2024-11-30 | End: 2024-12-01 | Stop reason: HOSPADM

## 2024-11-30 RX ORDER — AMOXICILLIN 250 MG
2 CAPSULE ORAL EVERY EVENING
Status: DISCONTINUED | OUTPATIENT
Start: 2024-11-30 | End: 2024-12-01 | Stop reason: HOSPADM

## 2024-11-30 RX ORDER — FUROSEMIDE 10 MG/ML
40 INJECTION INTRAMUSCULAR; INTRAVENOUS
Status: DISCONTINUED | OUTPATIENT
Start: 2024-11-30 | End: 2024-12-01 | Stop reason: HOSPADM

## 2024-11-30 RX ORDER — DAPAGLIFLOZIN 10 MG/1
10 TABLET, FILM COATED ORAL EVERY EVENING
Status: DISCONTINUED | OUTPATIENT
Start: 2024-11-30 | End: 2024-12-01 | Stop reason: HOSPADM

## 2024-11-30 RX ORDER — METOPROLOL SUCCINATE 25 MG/1
25 TABLET, EXTENDED RELEASE ORAL EVERY EVENING
Status: DISCONTINUED | OUTPATIENT
Start: 2024-11-30 | End: 2024-12-01 | Stop reason: HOSPADM

## 2024-11-30 RX ORDER — ALLOPURINOL 100 MG/1
100 TABLET ORAL 2 TIMES DAILY
Status: DISCONTINUED | OUTPATIENT
Start: 2024-11-30 | End: 2024-12-01 | Stop reason: HOSPADM

## 2024-11-30 RX ORDER — ASPIRIN 81 MG/1
81 TABLET ORAL EVERY EVENING
Status: DISCONTINUED | OUTPATIENT
Start: 2024-11-30 | End: 2024-12-01 | Stop reason: HOSPADM

## 2024-11-30 RX ORDER — ENOXAPARIN SODIUM 100 MG/ML
30 INJECTION SUBCUTANEOUS DAILY
Status: DISCONTINUED | OUTPATIENT
Start: 2024-11-30 | End: 2024-12-01 | Stop reason: HOSPADM

## 2024-11-30 RX ORDER — POLYETHYLENE GLYCOL 3350 17 G/17G
1 POWDER, FOR SOLUTION ORAL
Status: DISCONTINUED | OUTPATIENT
Start: 2024-11-30 | End: 2024-12-01 | Stop reason: HOSPADM

## 2024-11-30 RX ORDER — LANOLIN ALCOHOL/MO/W.PET/CERES
400 CREAM (GRAM) TOPICAL 2 TIMES DAILY
Status: DISCONTINUED | OUTPATIENT
Start: 2024-11-30 | End: 2024-12-01 | Stop reason: HOSPADM

## 2024-11-30 RX ORDER — POTASSIUM CHLORIDE 1500 MG/1
20 TABLET, EXTENDED RELEASE ORAL 2 TIMES DAILY
Status: DISCONTINUED | OUTPATIENT
Start: 2024-11-30 | End: 2024-12-01 | Stop reason: HOSPADM

## 2024-11-30 RX ADMIN — Medication 400 MG: at 16:25

## 2024-11-30 RX ADMIN — FUROSEMIDE 40 MG: 10 INJECTION INTRAMUSCULAR; INTRAVENOUS at 16:25

## 2024-11-30 RX ADMIN — SENNOSIDES AND DOCUSATE SODIUM 2 TABLET: 50; 8.6 TABLET ORAL at 17:08

## 2024-11-30 RX ADMIN — ALLOPURINOL 100 MG: 100 TABLET ORAL at 16:25

## 2024-11-30 RX ADMIN — ENOXAPARIN SODIUM 30 MG: 100 INJECTION SUBCUTANEOUS at 01:00

## 2024-11-30 RX ADMIN — POTASSIUM CHLORIDE 20 MEQ: 1500 TABLET, EXTENDED RELEASE ORAL at 16:25

## 2024-11-30 RX ADMIN — FUROSEMIDE 40 MG: 10 INJECTION INTRAMUSCULAR; INTRAVENOUS at 06:37

## 2024-11-30 RX ADMIN — ENOXAPARIN SODIUM 30 MG: 100 INJECTION SUBCUTANEOUS at 16:25

## 2024-11-30 RX ADMIN — Medication 400 MG: at 06:37

## 2024-11-30 RX ADMIN — FUROSEMIDE 40 MG: 10 INJECTION INTRAMUSCULAR; INTRAVENOUS at 01:00

## 2024-11-30 RX ADMIN — IOHEXOL 70 ML: 350 INJECTION, SOLUTION INTRAVENOUS at 03:45

## 2024-11-30 RX ADMIN — POTASSIUM CHLORIDE 20 MEQ: 1500 TABLET, EXTENDED RELEASE ORAL at 06:37

## 2024-11-30 RX ADMIN — ASPIRIN 81 MG: 81 TABLET, COATED ORAL at 17:08

## 2024-11-30 RX ADMIN — DAPAGLIFLOZIN 10 MG: 10 TABLET, FILM COATED ORAL at 17:08

## 2024-11-30 RX ADMIN — ALLOPURINOL 100 MG: 100 TABLET ORAL at 06:37

## 2024-11-30 ASSESSMENT — ENCOUNTER SYMPTOMS
VOMITING: 0
NAUSEA: 0
DOUBLE VISION: 0
WHEEZING: 0
CHILLS: 0
SORE THROAT: 0
SHORTNESS OF BREATH: 1
ORTHOPNEA: 1
HEADACHES: 0
HEARTBURN: 0
DIZZINESS: 0
ABDOMINAL PAIN: 0
FEVER: 0
DIARRHEA: 0
MYALGIAS: 0
CONSTIPATION: 0
PALPITATIONS: 0
COUGH: 0

## 2024-11-30 ASSESSMENT — LIFESTYLE VARIABLES
ON A TYPICAL DAY WHEN YOU DRINK ALCOHOL HOW MANY DRINKS DO YOU HAVE: 0
CONSUMPTION TOTAL: NEGATIVE
HAVE PEOPLE ANNOYED YOU BY CRITICIZING YOUR DRINKING: NO
DOES PATIENT WANT TO STOP DRINKING: NO
ALCOHOL_USE: NO
TOTAL SCORE: 0
AVERAGE NUMBER OF DAYS PER WEEK YOU HAVE A DRINK CONTAINING ALCOHOL: 0
HAVE YOU EVER FELT YOU SHOULD CUT DOWN ON YOUR DRINKING: NO
EVER HAD A DRINK FIRST THING IN THE MORNING TO STEADY YOUR NERVES TO GET RID OF A HANGOVER: NO
TOTAL SCORE: 0
TOTAL SCORE: 0
EVER FELT BAD OR GUILTY ABOUT YOUR DRINKING: NO
HOW MANY TIMES IN THE PAST YEAR HAVE YOU HAD 5 OR MORE DRINKS IN A DAY: 0

## 2024-11-30 ASSESSMENT — COGNITIVE AND FUNCTIONAL STATUS - GENERAL
MOVING FROM LYING ON BACK TO SITTING ON SIDE OF FLAT BED: A LITTLE
DRESSING REGULAR UPPER BODY CLOTHING: A LITTLE
TOILETING: A LITTLE
MOBILITY SCORE: 17
WALKING IN HOSPITAL ROOM: A LITTLE
DAILY ACTIVITIY SCORE: 22
MOVING TO AND FROM BED TO CHAIR: A LITTLE
STANDING UP FROM CHAIR USING ARMS: A LITTLE
TURNING FROM BACK TO SIDE WHILE IN FLAT BAD: A LITTLE
SUGGESTED CMS G CODE MODIFIER DAILY ACTIVITY: CJ
SUGGESTED CMS G CODE MODIFIER MOBILITY: CK
CLIMB 3 TO 5 STEPS WITH RAILING: A LOT

## 2024-11-30 ASSESSMENT — PATIENT HEALTH QUESTIONNAIRE - PHQ9
SUM OF ALL RESPONSES TO PHQ9 QUESTIONS 1 AND 2: 0
2. FEELING DOWN, DEPRESSED, IRRITABLE, OR HOPELESS: NOT AT ALL
1. LITTLE INTEREST OR PLEASURE IN DOING THINGS: NOT AT ALL

## 2024-11-30 ASSESSMENT — SOCIAL DETERMINANTS OF HEALTH (SDOH)
WITHIN THE LAST YEAR, HAVE YOU BEEN AFRAID OF YOUR PARTNER OR EX-PARTNER?: NO
WITHIN THE LAST YEAR, HAVE YOU BEEN HUMILIATED OR EMOTIONALLY ABUSED IN OTHER WAYS BY YOUR PARTNER OR EX-PARTNER?: NO
WITHIN THE PAST 12 MONTHS, THE FOOD YOU BOUGHT JUST DIDN'T LAST AND YOU DIDN'T HAVE MONEY TO GET MORE: SOMETIMES TRUE
WITHIN THE LAST YEAR, HAVE TO BEEN RAPED OR FORCED TO HAVE ANY KIND OF SEXUAL ACTIVITY BY YOUR PARTNER OR EX-PARTNER?: NO
IN THE PAST 12 MONTHS, HAS THE ELECTRIC, GAS, OIL, OR WATER COMPANY THREATENED TO SHUT OFF SERVICE IN YOUR HOME?: NO
WITHIN THE PAST 12 MONTHS, YOU WORRIED THAT YOUR FOOD WOULD RUN OUT BEFORE YOU GOT THE MONEY TO BUY MORE: SOMETIMES TRUE
WITHIN THE LAST YEAR, HAVE YOU BEEN KICKED, HIT, SLAPPED, OR OTHERWISE PHYSICALLY HURT BY YOUR PARTNER OR EX-PARTNER?: NO

## 2024-11-30 ASSESSMENT — PAIN DESCRIPTION - PAIN TYPE
TYPE: ACUTE PAIN

## 2024-11-30 ASSESSMENT — FIBROSIS 4 INDEX
FIB4 SCORE: 0.87
FIB4 SCORE: 0.87
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
## 2024-11-30 NOTE — ED NOTES
Pt complaint of burning sensation in the epigastric area going up to the chest associated with shortness of breath.  Notified to the provider  Applied oxygen inhalation at 2 LPM via nasal cannula  ERP will place order

## 2024-11-30 NOTE — ED NOTES
Bedside report given to the next shift JULIANA Carroll for continuity of care and management.  Provided opportunity to asks questions.  Pt connected to monitor  All pt belongings at bedside    Contraptions: IV gauge 20 Lt FA  Alert and Oriented: x 4  Ambulatory: wheelchair  Oxygen: 4 LPM  Pending: UA/ Results

## 2024-11-30 NOTE — ASSESSMENT & PLAN NOTE
History of, ordered urine drug screen this patient has not slept for 24 hours  -11/30 UDS positive for opiates, but not meth

## 2024-11-30 NOTE — PROGRESS NOTES
4 Eyes Skin Assessment Completed by JULIANA Montana and KARYNA Carter.    Head WDL  Ears Redness  Nose WDL  Mouth WDL  Neck WDL  Breast/Chest WDL  Shoulder Blades WDL  Spine WDL  (R) Arm/Elbow/Hand WDL  (L) Arm/Elbow/Hand WDL  Abdomen WDL  Groin WDL  Scrotum/Coccyx/Buttocks Redness and Blanching  (R) Leg Swelling and Edema  (L) Leg Swelling and Edema  (R) Heel/Foot/Toe Swelling and Edema  (L) Heel/Foot/Toe Swelling and Edema          Devices In Places ECG, Tele Box, Blood Pressure Cuff, and Pulse Ox      Interventions In Place NC W/Ear Foams and Pillows    Possible Skin Injury No    Pictures Uploaded Into Epic Yes  Wound Consult Placed N/A  RN Wound Prevention Protocol Ordered No

## 2024-11-30 NOTE — CARE PLAN
The patient is Stable - Low risk of patient condition declining or worsening    Shift Goals  Clinical Goals: monitor O2, monitor VS  Patient Goals: rest    Progress made toward(s) clinical / shift goals:    Problem: Fall Risk  Goal: Patient will remain free from falls  Outcome: Progressing     Problem: Hemodynamics  Goal: Patient's hemodynamics, fluid balance and neurologic status will be stable or improve  Outcome: Progressing     Problem: Respiratory  Goal: Patient will achieve/maintain optimum respiratory ventilation and gas exchange  Outcome: Progressing       Patient is not progressing towards the following goals:

## 2024-11-30 NOTE — ED NOTES
Bedside report received from off going RN/tech: Chance, assumed care of patient.  POC discussed with patient. Call light within reach, all needs addressed at this time.       Fall risk interventions in place: Patient's personal possessions are with in their safe reach, Place socks on patient, Place fall risk sign on patient's door, Give patient urinal if applicable, and Keep floor surfaces clean and dry (all applicable per [place] Fall risk assessment)   Continuous monitoring: Cardiac Leads, Pulse Ox, or Blood Pressure  IVF/IV medications: Not Applicable   Oxygen: How many liters 6L  Bedside sitter: Not Applicable   Isolation: Not Applicable

## 2024-11-30 NOTE — ED NOTES
Ambulation trial while monitoring o2 saturations performed. Pt maintained steady gait without assistance.  Pt was maintaining saturations above 91% for beginning of trial when Pt suddenly felt tightness in abdomen that radiated to chest. Pt complained of dizziness and SOB, o2 saturations declined to mid 70s. Pt placed back on 6L of oxygen via NC with saturations slowly improving.   Break RN informed.

## 2024-11-30 NOTE — ED NOTES
Medication history reviewed with patient at bedside.   Med rec is complete  Allergies reviewed.     Patient has not had any outpatient antibiotics in the last 30 days.   Anticoagulants: No    Tino Harry
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
## 2024-11-30 NOTE — CARE PLAN
The patient is Stable - Low risk of patient condition declining or worsening    Shift Goals  Clinical Goals: (P) Diurese, Monitor O2  Patient Goals: (P) Comfort and rest  Family Goals: (P) YOEL    Progress made toward(s) clinical / shift goals:    Problem: Care Map:  Day 1 Optimal Outcome for the Heart Failure Patient  Goal: Day 1:  Optimal Care of the heart failure patient  Outcome: Progressing     Problem: Hemodynamics  Goal: Patient's hemodynamics, fluid balance and neurologic status will be stable or improve  Outcome: Progressing     Problem: Respiratory  Goal: Patient will achieve/maintain optimum respiratory ventilation and gas exchange  Outcome: Progressing       Patient is not progressing towards the following goals:
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
## 2024-11-30 NOTE — ED TRIAGE NOTES
Chief Complaint   Patient presents with    Shortness of Breath    Abdominal Pain     Pt reports SOB, abdominal pain, and BLE swelling x1 week.     Leg Swelling    Wheelchair to triage for above complaint. PT reports SOB, abdominal pain, and BLE swelling for 1 week. State he has a history of heart failure and has been increasingly feeling sick over the last year. Reports intermittent dizziness and left sided abdominal pain. Denies history of abdominal pain, all organs intact.  Noncompliant with medications. Denies etoh/drug use. Denies chest pain. -n/v    Pt placed in lobby and educated on triage process. Pt encouraged to alert staff for any changes.    Protocol ordered.     Patient and staff wearing appropriate PPE.

## 2024-11-30 NOTE — ED NOTES
Taken patient from triage waiting room, via wheelchair, alert/ oriented x 4.Verified patient identification.  Assumed patient care.   Placed on patient room. Changed clothes to hospital gown. Connected to cardiac monitor.   Given the call light and instructed to call for any assistance needed/ or concerns.   Bed on lowest position, side rails up, breaks locked. Awaiting for ERP.

## 2024-11-30 NOTE — ASSESSMENT & PLAN NOTE
Likely secondary to cardiorenal syndrome, creatinine 1.38, baseline is 1  -Diuresis, trend  - Should restart ACE/ARB for heart failure once creatinine back to baseline

## 2024-11-30 NOTE — ASSESSMENT & PLAN NOTE
Etiology: Likely medication noncompliance   Last echo:11/14/2024, EF of 20%  CXR: Submitted social markings with coughing on my read, however fairly unremarkable given degree of oxygen requirement  BNP: 22,805  - Lasix IV 40mg BID Diuretic  - Goal directed medical therapy: Per last cardiology note, beta-blocker, angiotensin receptor blocker, and Farxiga, hold on Entresto and spironolactone due to blood pressure tolerance  - Strict I/Os  - Daily weights  - 1.5L Fluid restriction, 2 g sodium restriction

## 2024-11-30 NOTE — PROGRESS NOTES
Bedside report received from NOC RN. Assumed care of pt. Pt awake, laying in bed. A/Ox4, VSS. No concerns, complaints or distress. Pt educated to call before getting out of bed. POC reviewed and white board updated. Tele box on. S 76 on the monitor. Call light in reach. Bed locked in lowest position with 2 upper bed rails up. Bed alarm on.

## 2024-11-30 NOTE — ASSESSMENT & PLAN NOTE
Patient requiring up to 6 L nasal cannula in emergency department, baseline room air.  Recent hospitalization for acute congestive heart failure about a couple of weeks ago, patient reports not being on his spironolactone and Lasix as he did not have access to these medications since discharge.  No change in diet, however has been going through more stress with resultant sharp chest pain exacerbated with ambulation  - Likely acute congestive heart failure, however ordered CTA for PE evaluation as patient does have dilated IVC and appears more right-sided  - Lasix 40 mg IV twice daily  - Last echocardiogram 11/14/2024 with EF of 20%

## 2024-11-30 NOTE — PROGRESS NOTES
Report received from ED RN. Pt care assumed. Telemetry applied. Vitas taken. A&O x 4. No complaints of pain. Pt now on 3 L NC. Bed in low and locked position. Call light and belongings within reach. All pt concerns addressed. No further assistance needed at this time.
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
## 2024-12-01 ENCOUNTER — PHARMACY VISIT (OUTPATIENT)
Dept: PHARMACY | Facility: MEDICAL CENTER | Age: 59
End: 2024-12-01
Payer: COMMERCIAL

## 2024-12-01 VITALS
WEIGHT: 121.47 LBS | SYSTOLIC BLOOD PRESSURE: 104 MMHG | RESPIRATION RATE: 16 BRPM | HEART RATE: 83 BPM | DIASTOLIC BLOOD PRESSURE: 61 MMHG | HEIGHT: 65 IN | BODY MASS INDEX: 20.24 KG/M2 | TEMPERATURE: 98.2 F | OXYGEN SATURATION: 97 %

## 2024-12-01 VITALS
OXYGEN SATURATION: 98 % | HEART RATE: 82 BPM | BODY MASS INDEX: 20.09 KG/M2 | SYSTOLIC BLOOD PRESSURE: 101 MMHG | HEIGHT: 65 IN | WEIGHT: 120.59 LBS | DIASTOLIC BLOOD PRESSURE: 62 MMHG | TEMPERATURE: 97.9 F | RESPIRATION RATE: 16 BRPM

## 2024-12-01 LAB
ALBUMIN SERPL BCP-MCNC: 3.7 G/DL (ref 3.2–4.9)
ALBUMIN/GLOB SERPL: 1.3 G/DL
ALP SERPL-CCNC: 104 U/L (ref 30–99)
ALT SERPL-CCNC: 41 U/L (ref 2–50)
ANION GAP SERPL CALC-SCNC: 14 MMOL/L (ref 7–16)
AST SERPL-CCNC: 40 U/L (ref 12–45)
BASOPHILS # BLD AUTO: 0.9 % (ref 0–1.8)
BASOPHILS # BLD: 0.07 K/UL (ref 0–0.12)
BILIRUB SERPL-MCNC: 0.6 MG/DL (ref 0.1–1.5)
BUN SERPL-MCNC: 34 MG/DL (ref 8–22)
CALCIUM ALBUM COR SERPL-MCNC: 8.7 MG/DL (ref 8.5–10.5)
CALCIUM SERPL-MCNC: 8.5 MG/DL (ref 8.5–10.5)
CHLORIDE SERPL-SCNC: 100 MMOL/L (ref 96–112)
CO2 SERPL-SCNC: 24 MMOL/L (ref 20–33)
CREAT SERPL-MCNC: 1.28 MG/DL (ref 0.5–1.4)
EOSINOPHIL # BLD AUTO: 0.08 K/UL (ref 0–0.51)
EOSINOPHIL NFR BLD: 1 % (ref 0–6.9)
ERYTHROCYTE [DISTWIDTH] IN BLOOD BY AUTOMATED COUNT: 56.9 FL (ref 35.9–50)
GFR SERPLBLD CREATININE-BSD FMLA CKD-EPI: 64 ML/MIN/1.73 M 2
GLOBULIN SER CALC-MCNC: 2.9 G/DL (ref 1.9–3.5)
GLUCOSE SERPL-MCNC: 121 MG/DL (ref 65–99)
HCT VFR BLD AUTO: 41.9 % (ref 42–52)
HGB BLD-MCNC: 14.1 G/DL (ref 14–18)
IMM GRANULOCYTES # BLD AUTO: 0.03 K/UL (ref 0–0.11)
IMM GRANULOCYTES NFR BLD AUTO: 0.4 % (ref 0–0.9)
LYMPHOCYTES # BLD AUTO: 1.29 K/UL (ref 1–4.8)
LYMPHOCYTES NFR BLD: 16.9 % (ref 22–41)
MAGNESIUM SERPL-MCNC: 2.3 MG/DL (ref 1.5–2.5)
MCH RBC QN AUTO: 33.8 PG (ref 27–33)
MCHC RBC AUTO-ENTMCNC: 33.7 G/DL (ref 32.3–36.5)
MCV RBC AUTO: 100.5 FL (ref 81.4–97.8)
MONOCYTES # BLD AUTO: 0.79 K/UL (ref 0–0.85)
MONOCYTES NFR BLD AUTO: 10.4 % (ref 0–13.4)
NEUTROPHILS # BLD AUTO: 5.36 K/UL (ref 1.82–7.42)
NEUTROPHILS NFR BLD: 70.4 % (ref 44–72)
NRBC # BLD AUTO: 0 K/UL
NRBC BLD-RTO: 0 /100 WBC (ref 0–0.2)
NT-PROBNP SERPL IA-MCNC: 5870 PG/ML (ref 0–125)
PLATELET # BLD AUTO: 266 K/UL (ref 164–446)
PMV BLD AUTO: 10.5 FL (ref 9–12.9)
POTASSIUM SERPL-SCNC: 4.1 MMOL/L (ref 3.6–5.5)
PROT SERPL-MCNC: 6.6 G/DL (ref 6–8.2)
RBC # BLD AUTO: 4.17 M/UL (ref 4.7–6.1)
SODIUM SERPL-SCNC: 138 MMOL/L (ref 135–145)
WBC # BLD AUTO: 7.6 K/UL (ref 4.8–10.8)

## 2024-12-01 PROCEDURE — 83735 ASSAY OF MAGNESIUM: CPT

## 2024-12-01 PROCEDURE — RXMED WILLOW AMBULATORY MEDICATION CHARGE: Performed by: STUDENT IN AN ORGANIZED HEALTH CARE EDUCATION/TRAINING PROGRAM

## 2024-12-01 PROCEDURE — 85025 COMPLETE CBC W/AUTO DIFF WBC: CPT

## 2024-12-01 PROCEDURE — 36415 COLL VENOUS BLD VENIPUNCTURE: CPT

## 2024-12-01 PROCEDURE — 80053 COMPREHEN METABOLIC PANEL: CPT

## 2024-12-01 PROCEDURE — 700111 HCHG RX REV CODE 636 W/ 250 OVERRIDE (IP)

## 2024-12-01 PROCEDURE — A9270 NON-COVERED ITEM OR SERVICE: HCPCS

## 2024-12-01 PROCEDURE — 99239 HOSP IP/OBS DSCHRG MGMT >30: CPT | Performed by: STUDENT IN AN ORGANIZED HEALTH CARE EDUCATION/TRAINING PROGRAM

## 2024-12-01 PROCEDURE — 700102 HCHG RX REV CODE 250 W/ 637 OVERRIDE(OP)

## 2024-12-01 PROCEDURE — 83880 ASSAY OF NATRIURETIC PEPTIDE: CPT

## 2024-12-01 RX ORDER — SPIRONOLACTONE 25 MG/1
12.5 TABLET ORAL DAILY
Qty: 30 TABLET | Refills: 3 | Status: SHIPPED | OUTPATIENT
Start: 2024-12-01

## 2024-12-01 RX ORDER — METOPROLOL SUCCINATE 25 MG/1
12.5 TABLET, EXTENDED RELEASE ORAL DAILY
Qty: 30 TABLET | Refills: 3 | Status: SHIPPED | OUTPATIENT
Start: 2024-12-01

## 2024-12-01 RX ORDER — ALLOPURINOL 100 MG/1
100 TABLET ORAL 2 TIMES DAILY
Qty: 60 TABLET | Refills: 3 | Status: ON HOLD | OUTPATIENT
Start: 2024-12-01 | End: 2024-12-11

## 2024-12-01 RX ORDER — FUROSEMIDE 40 MG/1
20 TABLET ORAL
Qty: 60 TABLET | Refills: 1 | Status: SHIPPED | OUTPATIENT
Start: 2024-12-01

## 2024-12-01 RX ADMIN — ALLOPURINOL 100 MG: 100 TABLET ORAL at 05:36

## 2024-12-01 RX ADMIN — FUROSEMIDE 40 MG: 10 INJECTION INTRAMUSCULAR; INTRAVENOUS at 05:36

## 2024-12-01 RX ADMIN — Medication 400 MG: at 05:36

## 2024-12-01 RX ADMIN — POTASSIUM CHLORIDE 20 MEQ: 1500 TABLET, EXTENDED RELEASE ORAL at 05:36

## 2024-12-01 ASSESSMENT — FIBROSIS 4 INDEX: FIB4 SCORE: 1.41

## 2024-12-01 NOTE — CARE PLAN
The patient is Stable - Low risk of patient condition declining or worsening    Shift Goals  Clinical Goals: IV lasix, monitor VS  Patient Goals: rest, wats to go home  Family Goals: YOEL    Progress made toward(s) clinical / shift goals:    Problem: Fall Risk  Goal: Patient will remain free from falls  Outcome: Progressing     Problem: Hemodynamics  Goal: Patient's hemodynamics, fluid balance and neurologic status will be stable or improve  Outcome: Progressing     Problem: Respiratory  Goal: Patient will achieve/maintain optimum respiratory ventilation and gas exchange  Outcome: Progressing       Patient is not progressing towards the following goals:

## 2024-12-01 NOTE — PROGRESS NOTES
Monitor summary:        Rhythm: SR   Rate: 66-90  Ectopy: (r)PVC, (r)COUP  Measurements: 18./.10/.53        12hr chart check

## 2024-12-01 NOTE — PROGRESS NOTES
Bedside report received from NOC RN. Assumed care of pt. Pt awake, laying in bed. A/Ox4, VSS. No concerns, complaints or distress. Pt educated to call before getting out of bed. POC reviewed and white board updated. Tele box on. SR 90 on the monitor. Call light in reach. Bed locked in lowest position with 2 upper bed rails up. Bed alarm on.

## 2024-12-01 NOTE — PROGRESS NOTES
Discharge instructions given to patient at bedside, verbalizes understanding and states plans for follow-up with PCP. New and home medication review, post-discharge activity level and worsening of symptoms needing follow-up care discussed. Telemetry monitor/IV cathlon removed. All belongings accounted for, all questions answered at this time. Heart failure follow  up order placed. Patient will be escorted to family member's car via wheelchair.
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
## 2024-12-03 NOTE — DOCUMENTATION QUERY
"                                                                         Pending sale to [facility]                                                                       Query Response Note      PATIENT:               TREVON HAUSER  ACCT #:                  [id number]  MRN:                     [id number]  :                      1965  ADMIT DATE:       2024 6:06 PM  DISCH DATE:        2024 11:16 AM  RESPONDING  PROVIDER #:        [id number]           QUERY TEXT:    HFrEF is documented in the medical record without a documented acuity level.  Can the acuity of the \"HFrEF\" be further specified based on the clinical indicators and required treatments??     Note: If you agree with a diagnosis listed above, please remember to include it in your concurrent daily documentation and onto the Discharge Summary.    The patient's Clinical Indicators include:  (H&P) \"The patient has a known history of severe heart failure\"    (Card Consult) \"Nonischemic cardiomyopathy currently mildly hypotensive but not on pressors.\"    ( PN ) \"Patient has history of alcohol induced cardiomyopathy with EF of 25%. The patient reports he ran out of his medications and had stopped it for 1 week.\"    (Hosp Consult) \"Patient currently does not have any lower extremity edema he is breathing quite readily with no shortness of breath - Ejection fraction of 20% with systolic dysfunction likely secondary to methamphetamine and alcohol use\"    Echo : The left ventricle is mildly dilated. Severely reduced left ventricular systolic function. The left ventricular ejection fraction is visually estimated to be 20%. Moderate to severe mitral regurgitation. Trace aortic insufficiency. Estimated right ventricular systolic pressure is 45 mmHg    CXR : Cardiomegaly with mild diffuse interstitial prominence.    Risk Factors: Methamphetamine abuse, Alcohol use, Trauma, Hypotension    Tx: Initiating core measures with losartan 25 mg nightly and " metoprolol XL 25 mg daily, Monitor I's and O's, vitals, and labs, Echo    If you have any questions, please contact:  Arian Rawls RN CDI [facility]  [email or web address]  Arian Rawls [address]  Options provided:   -- Chronic Heart Failure with Reduced Ejection Fraction   -- Exacerbation or decompensation of Heart Failure with Reduced Ejection Fraction   -- Acute on Chronic Heart Failure with Reduced Ejection Fraction   -- Other explanation, (please specify other explanation)      Query created by: Arian Rawls on 11/18/2024 5:50 AM    RESPONSE TEXT:    Chronic Heart Failure with Reduced Ejection Fraction          Electronically signed by:  YONY ANDERSEN MD 12/2/2024 6:33 PM

## 2024-12-07 ENCOUNTER — HOSPITAL ENCOUNTER (EMERGENCY)
Facility: MEDICAL CENTER | Age: 59
End: 2024-12-07
Attending: EMERGENCY MEDICINE
Payer: MEDICAID

## 2024-12-07 ENCOUNTER — APPOINTMENT (OUTPATIENT)
Dept: RADIOLOGY | Facility: MEDICAL CENTER | Age: 59
End: 2024-12-07
Attending: EMERGENCY MEDICINE
Payer: MEDICAID

## 2024-12-07 VITALS
WEIGHT: 121.47 LBS | TEMPERATURE: 97 F | BODY MASS INDEX: 20.21 KG/M2 | OXYGEN SATURATION: 96 % | HEART RATE: 95 BPM | SYSTOLIC BLOOD PRESSURE: 124 MMHG | RESPIRATION RATE: 18 BRPM | DIASTOLIC BLOOD PRESSURE: 89 MMHG

## 2024-12-07 DIAGNOSIS — R10.13 EPIGASTRIC PAIN: ICD-10-CM

## 2024-12-07 LAB
ALBUMIN SERPL BCP-MCNC: 4.1 G/DL (ref 3.2–4.9)
ALBUMIN/GLOB SERPL: 1 G/DL
ALP SERPL-CCNC: 135 U/L (ref 30–99)
ALT SERPL-CCNC: 51 U/L (ref 2–50)
ANION GAP SERPL CALC-SCNC: 14 MMOL/L (ref 7–16)
AST SERPL-CCNC: 50 U/L (ref 12–45)
BASOPHILS # BLD AUTO: 0.5 % (ref 0–1.8)
BASOPHILS # BLD: 0.04 K/UL (ref 0–0.12)
BILIRUB SERPL-MCNC: 0.8 MG/DL (ref 0.1–1.5)
BUN SERPL-MCNC: 34 MG/DL (ref 8–22)
CALCIUM ALBUM COR SERPL-MCNC: 9.5 MG/DL (ref 8.5–10.5)
CALCIUM SERPL-MCNC: 9.6 MG/DL (ref 8.5–10.5)
CHLORIDE SERPL-SCNC: 106 MMOL/L (ref 96–112)
CO2 SERPL-SCNC: 18 MMOL/L (ref 20–33)
CREAT SERPL-MCNC: 1.31 MG/DL (ref 0.5–1.4)
EOSINOPHIL # BLD AUTO: 0.07 K/UL (ref 0–0.51)
EOSINOPHIL NFR BLD: 1 % (ref 0–6.9)
ERYTHROCYTE [DISTWIDTH] IN BLOOD BY AUTOMATED COUNT: 60.5 FL (ref 35.9–50)
GFR SERPLBLD CREATININE-BSD FMLA CKD-EPI: 62 ML/MIN/1.73 M 2
GLOBULIN SER CALC-MCNC: 4 G/DL (ref 1.9–3.5)
GLUCOSE SERPL-MCNC: 131 MG/DL (ref 65–99)
HCT VFR BLD AUTO: 47.6 % (ref 42–52)
HGB BLD-MCNC: 15.3 G/DL (ref 14–18)
IMM GRANULOCYTES # BLD AUTO: 0.04 K/UL (ref 0–0.11)
IMM GRANULOCYTES NFR BLD AUTO: 0.5 % (ref 0–0.9)
LIPASE SERPL-CCNC: 33 U/L (ref 11–82)
LYMPHOCYTES # BLD AUTO: 1.42 K/UL (ref 1–4.8)
LYMPHOCYTES NFR BLD: 19.4 % (ref 22–41)
MCH RBC QN AUTO: 33.1 PG (ref 27–33)
MCHC RBC AUTO-ENTMCNC: 32.1 G/DL (ref 32.3–36.5)
MCV RBC AUTO: 103 FL (ref 81.4–97.8)
MONOCYTES # BLD AUTO: 0.56 K/UL (ref 0–0.85)
MONOCYTES NFR BLD AUTO: 7.6 % (ref 0–13.4)
NEUTROPHILS # BLD AUTO: 5.2 K/UL (ref 1.82–7.42)
NEUTROPHILS NFR BLD: 71 % (ref 44–72)
NRBC # BLD AUTO: 0.02 K/UL
NRBC BLD-RTO: 0.3 /100 WBC (ref 0–0.2)
PLATELET # BLD AUTO: 253 K/UL (ref 164–446)
PMV BLD AUTO: 10.2 FL (ref 9–12.9)
POTASSIUM SERPL-SCNC: 4.7 MMOL/L (ref 3.6–5.5)
PROT SERPL-MCNC: 8.1 G/DL (ref 6–8.2)
RBC # BLD AUTO: 4.62 M/UL (ref 4.7–6.1)
SODIUM SERPL-SCNC: 138 MMOL/L (ref 135–145)
WBC # BLD AUTO: 7.3 K/UL (ref 4.8–10.8)

## 2024-12-07 PROCEDURE — 85025 COMPLETE CBC W/AUTO DIFF WBC: CPT

## 2024-12-07 PROCEDURE — A9270 NON-COVERED ITEM OR SERVICE: HCPCS | Performed by: EMERGENCY MEDICINE

## 2024-12-07 PROCEDURE — 99284 EMERGENCY DEPT VISIT MOD MDM: CPT

## 2024-12-07 PROCEDURE — 36415 COLL VENOUS BLD VENIPUNCTURE: CPT

## 2024-12-07 PROCEDURE — 80053 COMPREHEN METABOLIC PANEL: CPT

## 2024-12-07 PROCEDURE — 76705 ECHO EXAM OF ABDOMEN: CPT

## 2024-12-07 PROCEDURE — 700102 HCHG RX REV CODE 250 W/ 637 OVERRIDE(OP): Performed by: EMERGENCY MEDICINE

## 2024-12-07 PROCEDURE — 83690 ASSAY OF LIPASE: CPT

## 2024-12-07 PROCEDURE — 96372 THER/PROPH/DIAG INJ SC/IM: CPT

## 2024-12-07 PROCEDURE — 700111 HCHG RX REV CODE 636 W/ 250 OVERRIDE (IP): Mod: UD | Performed by: EMERGENCY MEDICINE

## 2024-12-07 RX ORDER — ONDANSETRON 4 MG/1
4 TABLET, ORALLY DISINTEGRATING ORAL ONCE
Status: COMPLETED | OUTPATIENT
Start: 2024-12-07 | End: 2024-12-07

## 2024-12-07 RX ADMIN — MORPHINE SULFATE 6 MG: 10 INJECTION INTRAVENOUS at 18:46

## 2024-12-07 RX ADMIN — ONDANSETRON 4 MG: 4 TABLET, ORALLY DISINTEGRATING ORAL at 18:47

## 2024-12-07 RX ADMIN — LIDOCAINE HYDROCHLORIDE 30 ML: 20 SOLUTION ORAL; TOPICAL at 18:46

## 2024-12-07 ASSESSMENT — FIBROSIS 4 INDEX: FIB4 SCORE: 1.41

## 2024-12-08 ENCOUNTER — APPOINTMENT (OUTPATIENT)
Dept: RADIOLOGY | Facility: MEDICAL CENTER | Age: 59
DRG: 291 | End: 2024-12-08
Attending: EMERGENCY MEDICINE
Payer: MEDICAID

## 2024-12-08 ENCOUNTER — HOSPITAL ENCOUNTER (INPATIENT)
Facility: MEDICAL CENTER | Age: 59
LOS: 4 days | DRG: 291 | End: 2024-12-12
Attending: EMERGENCY MEDICINE | Admitting: INTERNAL MEDICINE
Payer: MEDICAID

## 2024-12-08 DIAGNOSIS — I50.20 HFREF (HEART FAILURE WITH REDUCED EJECTION FRACTION) (HCC): ICD-10-CM

## 2024-12-08 DIAGNOSIS — N17.9 AKI (ACUTE KIDNEY INJURY) (HCC): ICD-10-CM

## 2024-12-08 DIAGNOSIS — E16.2 HYPOGLYCEMIA: ICD-10-CM

## 2024-12-08 DIAGNOSIS — R57.0 CARDIOGENIC SHOCK (HCC): ICD-10-CM

## 2024-12-08 DIAGNOSIS — B17.9 ACUTE HEPATITIS: ICD-10-CM

## 2024-12-08 DIAGNOSIS — E87.20 LACTIC ACIDOSIS: ICD-10-CM

## 2024-12-08 DIAGNOSIS — I50.41 ACUTE COMBINED SYSTOLIC AND DIASTOLIC HEART FAILURE (HCC): ICD-10-CM

## 2024-12-08 DIAGNOSIS — N17.9 ACUTE RENAL FAILURE, UNSPECIFIED ACUTE RENAL FAILURE TYPE (HCC): ICD-10-CM

## 2024-12-08 DIAGNOSIS — I50.23 ACUTE ON CHRONIC SYSTOLIC CONGESTIVE HEART FAILURE (HCC): ICD-10-CM

## 2024-12-08 DIAGNOSIS — E87.5 HYPERKALEMIA: ICD-10-CM

## 2024-12-08 PROBLEM — R10.9 ABDOMINAL PAIN: Status: ACTIVE | Noted: 2024-12-08

## 2024-12-08 PROBLEM — E87.29 METABOLIC ACIDOSIS, INCREASED ANION GAP: Status: ACTIVE | Noted: 2024-12-08

## 2024-12-08 LAB
ALBUMIN SERPL BCP-MCNC: 4.1 G/DL (ref 3.2–4.9)
ALBUMIN/GLOB SERPL: 1.1 G/DL
ALP SERPL-CCNC: 126 U/L (ref 30–99)
ALT SERPL-CCNC: 4720 U/L (ref 2–50)
ANION GAP SERPL CALC-SCNC: 22 MMOL/L (ref 7–16)
APPEARANCE FLD: NORMAL
APPEARANCE UR: ABNORMAL
AST SERPL-CCNC: >7000 U/L (ref 12–45)
BACTERIA #/AREA URNS HPF: ABNORMAL /HPF
BASOPHILS # BLD AUTO: 0.1 % (ref 0–1.8)
BASOPHILS # BLD: 0.02 K/UL (ref 0–0.12)
BILIRUB SERPL-MCNC: 3.1 MG/DL (ref 0.1–1.5)
BILIRUB UR QL STRIP.AUTO: ABNORMAL
BODY FLD TYPE: NORMAL
BUN SERPL-MCNC: 53 MG/DL (ref 8–22)
CALCIUM ALBUM COR SERPL-MCNC: 9.2 MG/DL (ref 8.5–10.5)
CALCIUM SERPL-MCNC: 9.3 MG/DL (ref 8.5–10.5)
CASTS URNS QL MICRO: >20 /LPF (ref 0–2)
CELLS FLD: 1
CHLORIDE SERPL-SCNC: 101 MMOL/L (ref 96–112)
CO2 SERPL-SCNC: 12 MMOL/L (ref 20–33)
COLOR FLD: YELLOW
COLOR UR: ABNORMAL
CREAT SERPL-MCNC: 2.42 MG/DL (ref 0.5–1.4)
EKG IMPRESSION: NORMAL
EOSINOPHIL # BLD AUTO: 0 K/UL (ref 0–0.51)
EOSINOPHIL NFR BLD: 0 % (ref 0–6.9)
EPITHELIAL CELLS 1715: ABNORMAL /HPF (ref 0–5)
ERYTHROCYTE [DISTWIDTH] IN BLOOD BY AUTOMATED COUNT: 63.1 FL (ref 35.9–50)
GFR SERPLBLD CREATININE-BSD FMLA CKD-EPI: 30 ML/MIN/1.73 M 2
GLOBULIN SER CALC-MCNC: 3.9 G/DL (ref 1.9–3.5)
GLUCOSE BLD STRIP.AUTO-MCNC: 152 MG/DL (ref 65–99)
GLUCOSE BLD STRIP.AUTO-MCNC: 53 MG/DL (ref 65–99)
GLUCOSE SERPL-MCNC: 46 MG/DL (ref 65–99)
GLUCOSE UR STRIP.AUTO-MCNC: NEGATIVE MG/DL
HBV CORE AB SERPL QL IA: NONREACTIVE
HCT VFR BLD AUTO: 47.2 % (ref 42–52)
HGB BLD-MCNC: 14.8 G/DL (ref 14–18)
HIV 1+2 AB+HIV1 P24 AG SERPL QL IA: NORMAL
HYALINE CAST   1831: PRESENT /LPF
IMM GRANULOCYTES # BLD AUTO: 0.1 K/UL (ref 0–0.11)
IMM GRANULOCYTES NFR BLD AUTO: 0.7 % (ref 0–0.9)
INR PPP: 2.57 (ref 0.87–1.13)
KETONES UR STRIP.AUTO-MCNC: ABNORMAL MG/DL
LACTATE SERPL-SCNC: 5.9 MMOL/L (ref 0.5–2)
LACTATE SERPL-SCNC: 7.3 MMOL/L (ref 0.5–2)
LACTATE SERPL-SCNC: 7.9 MMOL/L (ref 0.5–2)
LEUKOCYTE ESTERASE UR QL STRIP.AUTO: NEGATIVE
LIPASE SERPL-CCNC: 50 U/L (ref 11–82)
LYMPHOCYTES # BLD AUTO: 1.31 K/UL (ref 1–4.8)
LYMPHOCYTES NFR BLD: 8.9 % (ref 22–41)
LYMPHOCYTES NFR FLD: 24 %
MCH RBC QN AUTO: 33.2 PG (ref 27–33)
MCHC RBC AUTO-ENTMCNC: 31.4 G/DL (ref 32.3–36.5)
MCV RBC AUTO: 105.8 FL (ref 81.4–97.8)
MICRO URNS: ABNORMAL
MONOCYTES # BLD AUTO: 1.36 K/UL (ref 0–0.85)
MONOCYTES NFR BLD AUTO: 9.3 % (ref 0–13.4)
MONOS+MACROS NFR FLD MANUAL: 12 %
NEUTROPHILS # BLD AUTO: 11.91 K/UL (ref 1.82–7.42)
NEUTROPHILS NFR BLD: 81 % (ref 44–72)
NEUTROPHILS NFR FLD: 63 %
NITRITE UR QL STRIP.AUTO: NEGATIVE
NRBC # BLD AUTO: 0.04 K/UL
NRBC BLD-RTO: 0.3 /100 WBC (ref 0–0.2)
NUC CELL # FLD: 209 CELLS/UL
PH UR STRIP.AUTO: 5 [PH] (ref 5–8)
PLATELET # BLD AUTO: 208 K/UL (ref 164–446)
PMV BLD AUTO: 10.7 FL (ref 9–12.9)
POTASSIUM SERPL-SCNC: 7.7 MMOL/L (ref 3.6–5.5)
PROT SERPL-MCNC: 8 G/DL (ref 6–8.2)
PROT UR QL STRIP: 100 MG/DL
PROTHROMBIN TIME: 27.7 SEC (ref 12–14.6)
RBC # BLD AUTO: 4.46 M/UL (ref 4.7–6.1)
RBC # FLD: <2000 CELLS/UL
RBC # URNS HPF: ABNORMAL /HPF (ref 0–2)
RBC UR QL AUTO: ABNORMAL
SODIUM SERPL-SCNC: 135 MMOL/L (ref 135–145)
SP GR UR STRIP.AUTO: 1.03
UROBILINOGEN UR STRIP.AUTO-MCNC: 1 EU/DL
WBC # BLD AUTO: 14.7 K/UL (ref 4.8–10.8)
WBC #/AREA URNS HPF: ABNORMAL /HPF

## 2024-12-08 PROCEDURE — 82150 ASSAY OF AMYLASE: CPT

## 2024-12-08 PROCEDURE — 36415 COLL VENOUS BLD VENIPUNCTURE: CPT

## 2024-12-08 PROCEDURE — 93005 ELECTROCARDIOGRAM TRACING: CPT | Mod: TC | Performed by: EMERGENCY MEDICINE

## 2024-12-08 PROCEDURE — 82140 ASSAY OF AMMONIA: CPT

## 2024-12-08 PROCEDURE — 49082 ABD PARACENTESIS: CPT

## 2024-12-08 PROCEDURE — 87205 SMEAR GRAM STAIN: CPT

## 2024-12-08 PROCEDURE — 84100 ASSAY OF PHOSPHORUS: CPT

## 2024-12-08 PROCEDURE — 80307 DRUG TEST PRSMV CHEM ANLYZR: CPT

## 2024-12-08 PROCEDURE — 87389 HIV-1 AG W/HIV-1&-2 AB AG IA: CPT

## 2024-12-08 PROCEDURE — 82550 ASSAY OF CK (CPK): CPT

## 2024-12-08 PROCEDURE — 99292 CRITICAL CARE ADDL 30 MIN: CPT | Performed by: INTERNAL MEDICINE

## 2024-12-08 PROCEDURE — 700111 HCHG RX REV CODE 636 W/ 250 OVERRIDE (IP): Mod: JZ | Performed by: EMERGENCY MEDICINE

## 2024-12-08 PROCEDURE — 84443 ASSAY THYROID STIM HORMONE: CPT

## 2024-12-08 PROCEDURE — 770022 HCHG ROOM/CARE - ICU (200)

## 2024-12-08 PROCEDURE — 83605 ASSAY OF LACTIC ACID: CPT | Mod: 91

## 2024-12-08 PROCEDURE — 87086 URINE CULTURE/COLONY COUNT: CPT

## 2024-12-08 PROCEDURE — 84484 ASSAY OF TROPONIN QUANT: CPT

## 2024-12-08 PROCEDURE — 84439 ASSAY OF FREE THYROXINE: CPT

## 2024-12-08 PROCEDURE — 80074 ACUTE HEPATITIS PANEL: CPT

## 2024-12-08 PROCEDURE — 83690 ASSAY OF LIPASE: CPT

## 2024-12-08 PROCEDURE — A9270 NON-COVERED ITEM OR SERVICE: HCPCS | Performed by: EMERGENCY MEDICINE

## 2024-12-08 PROCEDURE — 0W9G3ZX DRAINAGE OF PERITONEAL CAVITY, PERCUTANEOUS APPROACH, DIAGNOSTIC: ICD-10-PCS | Performed by: EMERGENCY MEDICINE

## 2024-12-08 PROCEDURE — 85610 PROTHROMBIN TIME: CPT | Mod: 91

## 2024-12-08 PROCEDURE — 85025 COMPLETE CBC W/AUTO DIFF WBC: CPT

## 2024-12-08 PROCEDURE — 99291 CRITICAL CARE FIRST HOUR: CPT

## 2024-12-08 PROCEDURE — 80143 DRUG ASSAY ACETAMINOPHEN: CPT

## 2024-12-08 PROCEDURE — 700101 HCHG RX REV CODE 250: Performed by: EMERGENCY MEDICINE

## 2024-12-08 PROCEDURE — 87040 BLOOD CULTURE FOR BACTERIA: CPT

## 2024-12-08 PROCEDURE — 80048 BASIC METABOLIC PNL TOTAL CA: CPT

## 2024-12-08 PROCEDURE — 700117 HCHG RX CONTRAST REV CODE 255: Mod: UD | Performed by: EMERGENCY MEDICINE

## 2024-12-08 PROCEDURE — 700105 HCHG RX REV CODE 258: Performed by: EMERGENCY MEDICINE

## 2024-12-08 PROCEDURE — 99291 CRITICAL CARE FIRST HOUR: CPT | Performed by: INTERNAL MEDICINE

## 2024-12-08 PROCEDURE — 93005 ELECTROCARDIOGRAM TRACING: CPT | Mod: TC

## 2024-12-08 PROCEDURE — 82042 OTHER SOURCE ALBUMIN QUAN EA: CPT

## 2024-12-08 PROCEDURE — 700102 HCHG RX REV CODE 250 W/ 637 OVERRIDE(OP): Performed by: EMERGENCY MEDICINE

## 2024-12-08 PROCEDURE — 80053 COMPREHEN METABOLIC PANEL: CPT

## 2024-12-08 PROCEDURE — 82945 GLUCOSE OTHER FLUID: CPT

## 2024-12-08 PROCEDURE — 81001 URINALYSIS AUTO W/SCOPE: CPT

## 2024-12-08 PROCEDURE — 96365 THER/PROPH/DIAG IV INF INIT: CPT

## 2024-12-08 PROCEDURE — 83735 ASSAY OF MAGNESIUM: CPT

## 2024-12-08 PROCEDURE — 83880 ASSAY OF NATRIURETIC PEPTIDE: CPT

## 2024-12-08 PROCEDURE — 96375 TX/PRO/DX INJ NEW DRUG ADDON: CPT

## 2024-12-08 PROCEDURE — 74177 CT ABD & PELVIS W/CONTRAST: CPT

## 2024-12-08 PROCEDURE — 89051 BODY FLUID CELL COUNT: CPT

## 2024-12-08 PROCEDURE — 80179 DRUG ASSAY SALICYLATE: CPT

## 2024-12-08 PROCEDURE — 82962 GLUCOSE BLOOD TEST: CPT | Mod: 91

## 2024-12-08 PROCEDURE — 700111 HCHG RX REV CODE 636 W/ 250 OVERRIDE (IP): Mod: JZ | Performed by: INTERNAL MEDICINE

## 2024-12-08 PROCEDURE — 700111 HCHG RX REV CODE 636 W/ 250 OVERRIDE (IP)

## 2024-12-08 PROCEDURE — 82247 BILIRUBIN TOTAL: CPT

## 2024-12-08 PROCEDURE — 86704 HEP B CORE ANTIBODY TOTAL: CPT

## 2024-12-08 PROCEDURE — 87070 CULTURE OTHR SPECIMN AEROBIC: CPT

## 2024-12-08 RX ORDER — POTASSIUM CHLORIDE 1500 MG/1
20 TABLET, EXTENDED RELEASE ORAL DAILY
Status: ON HOLD | COMMUNITY
End: 2024-12-11

## 2024-12-08 RX ORDER — DEXTROSE MONOHYDRATE 25 G/50ML
25 INJECTION, SOLUTION INTRAVENOUS
Status: DISCONTINUED | OUTPATIENT
Start: 2024-12-08 | End: 2024-12-12 | Stop reason: HOSPADM

## 2024-12-08 RX ORDER — DOBUTAMINE HYDROCHLORIDE 100 MG/100ML
0-20 INJECTION INTRAVENOUS CONTINUOUS
Status: DISCONTINUED | OUTPATIENT
Start: 2024-12-08 | End: 2024-12-08

## 2024-12-08 RX ORDER — MORPHINE SULFATE 4 MG/ML
4 INJECTION INTRAVENOUS ONCE
Status: COMPLETED | OUTPATIENT
Start: 2024-12-08 | End: 2024-12-08

## 2024-12-08 RX ORDER — HEPARIN SODIUM 5000 [USP'U]/ML
5000 INJECTION, SOLUTION INTRAVENOUS; SUBCUTANEOUS EVERY 8 HOURS
Status: DISCONTINUED | OUTPATIENT
Start: 2024-12-08 | End: 2024-12-10

## 2024-12-08 RX ORDER — FUROSEMIDE 10 MG/ML
40 INJECTION INTRAMUSCULAR; INTRAVENOUS ONCE
Status: COMPLETED | OUTPATIENT
Start: 2024-12-08 | End: 2024-12-08

## 2024-12-08 RX ORDER — OXYCODONE HYDROCHLORIDE 5 MG/1
5 TABLET ORAL
Status: DISCONTINUED | OUTPATIENT
Start: 2024-12-08 | End: 2024-12-12 | Stop reason: HOSPADM

## 2024-12-08 RX ORDER — SODIUM CHLORIDE 9 MG/ML
1000 INJECTION, SOLUTION INTRAVENOUS ONCE
Status: COMPLETED | OUTPATIENT
Start: 2024-12-08 | End: 2024-12-08

## 2024-12-08 RX ORDER — CEFTRIAXONE 2 G/1
2000 INJECTION, POWDER, FOR SOLUTION INTRAMUSCULAR; INTRAVENOUS ONCE
Status: COMPLETED | OUTPATIENT
Start: 2024-12-08 | End: 2024-12-08

## 2024-12-08 RX ORDER — FUROSEMIDE 10 MG/ML
40 INJECTION INTRAMUSCULAR; INTRAVENOUS
Status: DISCONTINUED | OUTPATIENT
Start: 2024-12-09 | End: 2024-12-08

## 2024-12-08 RX ORDER — METRONIDAZOLE 500 MG/100ML
500 INJECTION, SOLUTION INTRAVENOUS ONCE
Status: COMPLETED | OUTPATIENT
Start: 2024-12-08 | End: 2024-12-08

## 2024-12-08 RX ORDER — DOBUTAMINE HYDROCHLORIDE 100 MG/100ML
2.5 INJECTION INTRAVENOUS CONTINUOUS
Status: DISCONTINUED | OUTPATIENT
Start: 2024-12-08 | End: 2024-12-11

## 2024-12-08 RX ORDER — FUROSEMIDE 10 MG/ML
40 INJECTION INTRAMUSCULAR; INTRAVENOUS
Status: DISCONTINUED | OUTPATIENT
Start: 2024-12-08 | End: 2024-12-08

## 2024-12-08 RX ORDER — DEXTROSE MONOHYDRATE 25 G/50ML
25 INJECTION, SOLUTION INTRAVENOUS ONCE
Status: COMPLETED | OUTPATIENT
Start: 2024-12-08 | End: 2024-12-08

## 2024-12-08 RX ORDER — OXYCODONE HYDROCHLORIDE 5 MG/1
2.5 TABLET ORAL
Status: DISCONTINUED | OUTPATIENT
Start: 2024-12-08 | End: 2024-12-12 | Stop reason: HOSPADM

## 2024-12-08 RX ORDER — HYDROMORPHONE HYDROCHLORIDE 1 MG/ML
0.25 INJECTION, SOLUTION INTRAMUSCULAR; INTRAVENOUS; SUBCUTANEOUS
Status: DISCONTINUED | OUTPATIENT
Start: 2024-12-08 | End: 2024-12-11

## 2024-12-08 RX ORDER — ACETAMINOPHEN 10 MG/ML
1000 INJECTION, SOLUTION INTRAVENOUS ONCE
Status: DISCONTINUED | OUTPATIENT
Start: 2024-12-08 | End: 2024-12-08

## 2024-12-08 RX ADMIN — INSULIN HUMAN 5 UNITS: 1 INJECTION, SOLUTION INTRAVENOUS at 22:02

## 2024-12-08 RX ADMIN — SODIUM CHLORIDE 1000 ML: 9 INJECTION, SOLUTION INTRAVENOUS at 19:54

## 2024-12-08 RX ADMIN — IOHEXOL 100 ML: 350 INJECTION, SOLUTION INTRAVENOUS at 19:15

## 2024-12-08 RX ADMIN — MORPHINE SULFATE 4 MG: 4 INJECTION, SOLUTION INTRAMUSCULAR; INTRAVENOUS at 18:14

## 2024-12-08 RX ADMIN — SODIUM BICARBONATE 50 MEQ: 84 INJECTION INTRAVENOUS at 22:03

## 2024-12-08 RX ADMIN — CEFTRIAXONE SODIUM 2000 MG: 2 INJECTION, POWDER, FOR SOLUTION INTRAMUSCULAR; INTRAVENOUS at 20:25

## 2024-12-08 RX ADMIN — METRONIDAZOLE 500 MG: 500 INJECTION, SOLUTION INTRAVENOUS at 22:10

## 2024-12-08 RX ADMIN — FUROSEMIDE 40 MG: 10 INJECTION, SOLUTION INTRAVENOUS at 23:53

## 2024-12-08 RX ADMIN — FUROSEMIDE 40 MG: 10 INJECTION, SOLUTION INTRAVENOUS at 23:10

## 2024-12-08 RX ADMIN — DOBUTAMINE HYDROCHLORIDE 2.5 MCG/KG/MIN: 100 INJECTION INTRAVENOUS at 23:16

## 2024-12-08 RX ADMIN — SODIUM ZIRCONIUM CYCLOSILICATE 10 G: 10 POWDER, FOR SUSPENSION ORAL at 23:47

## 2024-12-08 RX ADMIN — DEXTROSE MONOHYDRATE 25 G: 25 INJECTION, SOLUTION INTRAVENOUS at 22:04

## 2024-12-08 RX ADMIN — HEPARIN SODIUM 5000 UNITS: 5000 INJECTION, SOLUTION INTRAVENOUS; SUBCUTANEOUS at 23:38

## 2024-12-08 RX ADMIN — SODIUM CHLORIDE 1000 ML: 9 INJECTION, SOLUTION INTRAVENOUS at 22:06

## 2024-12-08 ASSESSMENT — FIBROSIS 4 INDEX
FIB4 SCORE: 32.33
FIB4 SCORE: 1.66

## 2024-12-08 ASSESSMENT — PAIN DESCRIPTION - PAIN TYPE: TYPE: ACUTE PAIN

## 2024-12-08 NOTE — ED PROVIDER NOTES
ED Provider Note    CHIEF COMPLAINT  Chief Complaint   Patient presents with    Abdominal Pain     Squeezing abdominal pain, reports that it starts after taking his allopurinol.         EXTERNAL RECORDS REVIEWED  Inpatient Notes patient was admitted to the hospital 11/29/2024 to 12/1/2024 for acute hypoxic respiratory failure abdominal pain and bilateral lower extremity swelling for a week.  He has a history of heart failure substance abuse with methamphetamines chronic kidney disease he was not compliant with his medications for heart failure and he was restarted on his medications he was restarted on his Lasix prior to discharge and advised to follow-up with the heart failure clinic and cardiology within a week    HPI/ROS  LIMITATION TO HISTORY   Select: : None  OUTSIDE HISTORIAN(S):  none    Santino Zabala is a 59 y.o. male who presents complaining of right upper quadrant abdominal pain that started after taking his allopurinol around 4:00 this afternoon.  He states he taste acid in the back of his throat.  This pain is mostly to his right upper quadrant.  He denies any fevers or chills cough or cold symptoms.  He does have a history of heart failure chronic kidney disease and substance abuse he still has his gallbladder.  He was just recently discharged from the hospital 6 days ago for congestive heart failure and hypoxia.  He states he has been taking his medications as prescribed.  His shortness of breath is improved and he does not require any oxygen.  Nothing seems to make his pain better or worse.    PAST MEDICAL HISTORY   has a past medical history of Congestive heart failure (HCC).    SURGICAL HISTORY  patient denies any surgical history    FAMILY HISTORY  No family history on file.    SOCIAL HISTORY  Social History     Tobacco Use    Smoking status: Never    Smokeless tobacco: Never   Vaping Use    Vaping status: Never Used   Substance and Sexual Activity    Alcohol use: Not Currently    Drug use:  Never    Sexual activity: Not on file       CURRENT MEDICATIONS  Home Medications       Reviewed by Unique Rodriguez R.N. (Registered Nurse) on 12/07/24 at 1705  Med List Status: Partial     Medication Last Dose Status   allopurinol (ZYLOPRIM) 100 MG Tab  Active   aspirin 81 MG EC tablet  Active   dapagliflozin propanediol (FARXIGA) 10 MG Tab  Active   famotidine (PEPCID) 20 MG Tab  Active   furosemide (LASIX) 40 MG Tab  Active   metoprolol SR (TOPROL XL) 25 MG TABLET SR 24 HR  Active   POTASSIUM CHLORIDE KHOA ER PO  Active   spironolactone (ALDACTONE) 25 MG Tab  Active                  Audit from Redirected Encounters    **Home medications have not yet been reviewed for this encounter**         ALLERGIES  No Known Allergies    PHYSICAL EXAM  VITAL SIGNS: BP (!) 149/105   Pulse 94   Temp 35.9 °C (96.7 °F) (Temporal)   Resp 18   Wt 55.1 kg (121 lb 7.6 oz)   SpO2 95%   BMI 20.21 kg/m²      Constitutional: Well developed, Well nourished, No acute distress, Non-toxic appearance.   HEENT: Normocephalic, Atraumatic,  external ears normal, pharynx pink,  Mucous  Membranes moist, No rhinorrhea or mucosal edema  Eyes: PERRL, EOMI, Conjunctiva normal, No discharge.   Neck: Normal range of motion, No tenderness, Supple, No stridor.   Lymphatic: No lymphadenopathy    Cardiovascular: Regular Rate and Rhythm, No murmurs,  rubs, or gallops.   Thorax & Lungs: Lungs clear to auscultation bilaterally, No respiratory distress, No wheezes, rhales or rhonchi, No chest wall tenderness.   Abdomen: Bowel sounds normal, Soft, right upper quadrant tenderness to palpation, non distended,  No pulsatile masses., no rebound guarding or peritoneal signs.   Skin: Warm, Dry, No erythema, No rash,   Back:  No CVA tenderness,  No spinal tenderness, bony crepitance step offs or instability.   Extremities: Equal, intact distal pulses, No cyanosis, clubbing 2+ bilateral lower extremity edema,  No tenderness.   Musculoskeletal: Good range of  motion in all major joints. No tenderness to palpation or major deformities noted.   Neurologic: Alert & oriented No focal deficits noted.  Psychiatric: Affect normal, Judgment normal, Mood normal.      EKG/LABS  Results for orders placed or performed during the hospital encounter of 12/07/24   CBC WITH DIFFERENTIAL    Collection Time: 12/07/24  5:16 PM   Result Value Ref Range    WBC 7.3 4.8 - 10.8 K/uL    RBC 4.62 (L) 4.70 - 6.10 M/uL    Hemoglobin 15.3 14.0 - 18.0 g/dL    Hematocrit 47.6 42.0 - 52.0 %    .0 (H) 81.4 - 97.8 fL    MCH 33.1 (H) 27.0 - 33.0 pg    MCHC 32.1 (L) 32.3 - 36.5 g/dL    RDW 60.5 (H) 35.9 - 50.0 fL    Platelet Count 253 164 - 446 K/uL    MPV 10.2 9.0 - 12.9 fL    Neutrophils-Polys 71.00 44.00 - 72.00 %    Lymphocytes 19.40 (L) 22.00 - 41.00 %    Monocytes 7.60 0.00 - 13.40 %    Eosinophils 1.00 0.00 - 6.90 %    Basophils 0.50 0.00 - 1.80 %    Immature Granulocytes 0.50 0.00 - 0.90 %    Nucleated RBC 0.30 (H) 0.00 - 0.20 /100 WBC    Neutrophils (Absolute) 5.20 1.82 - 7.42 K/uL    Lymphs (Absolute) 1.42 1.00 - 4.80 K/uL    Monos (Absolute) 0.56 0.00 - 0.85 K/uL    Eos (Absolute) 0.07 0.00 - 0.51 K/uL    Baso (Absolute) 0.04 0.00 - 0.12 K/uL    Immature Granulocytes (abs) 0.04 0.00 - 0.11 K/uL    NRBC (Absolute) 0.02 K/uL   COMP METABOLIC PANEL    Collection Time: 12/07/24  5:16 PM   Result Value Ref Range    Sodium 138 135 - 145 mmol/L    Potassium 4.7 3.6 - 5.5 mmol/L    Chloride 106 96 - 112 mmol/L    Co2 18 (L) 20 - 33 mmol/L    Anion Gap 14.0 7.0 - 16.0    Glucose 131 (H) 65 - 99 mg/dL    Bun 34 (H) 8 - 22 mg/dL    Creatinine 1.31 0.50 - 1.40 mg/dL    Calcium 9.6 8.5 - 10.5 mg/dL    Correct Calcium 9.5 8.5 - 10.5 mg/dL    AST(SGOT) 50 (H) 12 - 45 U/L    ALT(SGPT) 51 (H) 2 - 50 U/L    Alkaline Phosphatase 135 (H) 30 - 99 U/L    Total Bilirubin 0.8 0.1 - 1.5 mg/dL    Albumin 4.1 3.2 - 4.9 g/dL    Total Protein 8.1 6.0 - 8.2 g/dL    Globulin 4.0 (H) 1.9 - 3.5 g/dL    A-G Ratio 1.0 g/dL    LIPASE    Collection Time: 12/07/24  5:16 PM   Result Value Ref Range    Lipase 33 11 - 82 U/L   ESTIMATED GFR    Collection Time: 12/07/24  5:16 PM   Result Value Ref Range    GFR (CKD-EPI) 62 >60 mL/min/1.73 m 2       I have independently interpreted this EKG    RADIOLOGY/PROCEDURES   I have independently interpreted the diagnostic imaging associated with this visit and am waiting the final reading from the radiologist.   My preliminary interpretation is as follows: Gallbladder bladder ultrasound ascites and gallbladder wall thickening    Radiologist interpretation:  US-RUQ   Final Result      1.  Dilated IVC and pulsatile portal venous flow, nonspecific but could be seen with right heart dysfunction.   2.  Nonspecific gallbladder wall edema without cholelithiasis. This is likely related to etiology extrinsic to the gallbladder.   3.  Small amount of perihepatic fluid.          COURSE & MEDICAL DECISION MAKING    ASSESSMENT, COURSE AND PLAN  Care Narrative: Santino Zabala is a 59 y.o. male who presents complaining of right upper quadrant abdominal pain that started after taking his allopurinol around 4:00 this afternoon.  He states he taste acid in the back of his throat.  This pain is mostly to his right upper quadrant.  He denies any fevers or chills cough or cold symptoms.  He does have a history of heart failure chronic kidney disease and substance abuse he still has his gallbladder.  He was just recently discharged from the hospital 6 days ago for congestive heart failure and hypoxia.  He states he has been taking his medications as prescribed.  His shortness of breath is improved and he does not require any oxygen.  Nothing seems to make his pain better or worse.  Physical exam he is alert awake in no acute distress he has right upper quadrant chest palpation no rebound masses or peritoneal signs lungs are clear to auscultation bilaterally heart is regular rhythm no murmurs or gallops he has 2+  bilateral lower extremity edema.               ADDITIONAL PROBLEMS MANAGED      DISPOSITION AND DISCUSSIONS    Patient's white count is normal at 7.3 hemoglobin 15.3 MCV is 103 platelet count is 253 his differential is normal comprehensive metabolic panel has a CO2 slightly low at 18 glucose elevated at 131 BUN is 34 creatinine 1.31.  His LFTs are slightly evaded with a AST of 58 ALT of 51 and alk phos of 135.  Lipase is normal at 33.    Patient's gallbladder ultrasound does not show any stones but there is some gallbladder wall thickening and ascitic fluid.    Give the patient a GI cocktail and some morphine for his pain.    Upon recheck he feels improved he has no abdominal tenderness to palpation he is not febrile and states he is hungry.  He has an appointment to establish care with his primary care doctor on Tuesday.  I advised that he keep that follow-up appointment and continue his Lasix as directed.  I will place him on omeprazole for his stomach I advised return for any worsening pain fevers vomiting or worsening symptoms.  Patient we discharged home in stable improved condition.    I have discussed management of the patient with the following physicians and FRITZ's:  none    Discussion of management with other QHP or appropriate source(s): None     Escalation of care considered, and ultimately not performed:acute inpatient care management, however at this time, the patient is most appropriate for outpatient management    Barriers to care at this time, including but not limited to: Patient does not have established PCP.  And he has a history of methamphetamine abuse    Decision tools and prescription drugs considered including, but not limited to: Pain Medications morphine .      The patient will return for new or worsening symptoms and is stable at the time of discharge.    The patient is referred to a primary physician for blood pressure management, diabetic screening, and for all other preventative health  concerns.        DISPOSITION:  Patient will be discharged home in stable condition.    FOLLOW UP:  [facility], Emergency Dept  [address] [facility] [address] [phone]    As needed, If symptoms worsen      OUTPATIENT MEDICATIONS:  New Prescriptions    OMEPRAZOLE (PRILOSEC) 20 MG DELAYED-RELEASE CAPSULE    Take 1 Capsule by mouth every day.       FINAL DIAGNOSIS  1. Epigastric pain         Electronically signed by: Francisca Jackson M.D., 12/7/2024 6:09 PM

## 2024-12-08 NOTE — ED NOTES
Discharged in stable condition, alert and oriented, ambulatory. Prescribed medication and follow up appointment instructed. Health education imparted. Instructed to come back once symptoms worsened. Pt verbalized understanding of the information given.

## 2024-12-08 NOTE — ED TRIAGE NOTES
Chief Complaint   Patient presents with    Abdominal Pain     Squeezing abdominal pain, reports that it starts after taking his allopurinol.       Pt reports Hx same.    BP (!) 149/105   Pulse 94   Temp 35.9 °C (96.7 °F) (Temporal)   Resp 18   Wt 55.1 kg (121 lb 7.6 oz)   SpO2 95%   Pt informed of wait times. Educated on triage process.  Asked to return to triage RN for any new or worsening of symptoms. Thanked for patience.

## 2024-12-09 LAB
ALBUMIN FLD-MCNC: 2.8 G/DL
ALBUMIN SERPL BCP-MCNC: 3.3 G/DL (ref 3.2–4.9)
ALBUMIN SERPL BCP-MCNC: 3.4 G/DL (ref 3.2–4.9)
ALBUMIN SERPL BCP-MCNC: 3.5 G/DL (ref 3.2–4.9)
ALBUMIN/GLOB SERPL: 0.9 G/DL
ALBUMIN/GLOB SERPL: 1 G/DL
ALBUMIN/GLOB SERPL: 1.1 G/DL
ALP SERPL-CCNC: 105 U/L (ref 30–99)
ALP SERPL-CCNC: 109 U/L (ref 30–99)
ALP SERPL-CCNC: 117 U/L (ref 30–99)
ALT SERPL-CCNC: 3884 U/L (ref 2–50)
ALT SERPL-CCNC: 5088 U/L (ref 2–50)
ALT SERPL-CCNC: 5440 U/L (ref 2–50)
AMMONIA PLAS-SCNC: 47 UMOL/L (ref 11–45)
AMPHET UR QL SCN: POSITIVE
AMYLASE FLD-CCNC: 40 U/L
ANION GAP SERPL CALC-SCNC: 10 MMOL/L (ref 7–16)
ANION GAP SERPL CALC-SCNC: 14 MMOL/L (ref 7–16)
ANION GAP SERPL CALC-SCNC: 15 MMOL/L (ref 7–16)
ANION GAP SERPL CALC-SCNC: 19 MMOL/L (ref 7–16)
APAP SERPL-MCNC: <5 UG/ML (ref 10–30)
AST SERPL-CCNC: 3774 U/L (ref 12–45)
AST SERPL-CCNC: >7000 U/L (ref 12–45)
AST SERPL-CCNC: >7000 U/L (ref 12–45)
BARBITURATES UR QL SCN: NEGATIVE
BASOPHILS # BLD AUTO: 0.1 % (ref 0–1.8)
BASOPHILS # BLD: 0.02 K/UL (ref 0–0.12)
BENZODIAZ UR QL SCN: NEGATIVE
BILIRUB CONJ SERPL-MCNC: 0.9 MG/DL (ref 0.1–0.5)
BILIRUB INDIRECT SERPL-MCNC: 0.5 MG/DL (ref 0–1)
BILIRUB SERPL-MCNC: 1.4 MG/DL (ref 0.1–1.5)
BILIRUB SERPL-MCNC: 1.5 MG/DL (ref 0.1–1.5)
BILIRUB SERPL-MCNC: 1.5 MG/DL (ref 0.1–1.5)
BODY FLD TYPE: NORMAL
BUN SERPL-MCNC: 45 MG/DL (ref 8–22)
BUN SERPL-MCNC: 52 MG/DL (ref 8–22)
BUN SERPL-MCNC: 52 MG/DL (ref 8–22)
BUN SERPL-MCNC: 56 MG/DL (ref 8–22)
BZE UR QL SCN: NEGATIVE
CALCIUM ALBUM COR SERPL-MCNC: 8.6 MG/DL (ref 8.5–10.5)
CALCIUM ALBUM COR SERPL-MCNC: 8.9 MG/DL (ref 8.5–10.5)
CALCIUM ALBUM COR SERPL-MCNC: 9 MG/DL (ref 8.5–10.5)
CALCIUM SERPL-MCNC: 7.8 MG/DL (ref 8.5–10.5)
CALCIUM SERPL-MCNC: 8.2 MG/DL (ref 8.5–10.5)
CALCIUM SERPL-MCNC: 8.3 MG/DL (ref 8.5–10.5)
CALCIUM SERPL-MCNC: 8.5 MG/DL (ref 8.5–10.5)
CANNABINOIDS UR QL SCN: NEGATIVE
CHLORIDE SERPL-SCNC: 100 MMOL/L (ref 96–112)
CHLORIDE SERPL-SCNC: 104 MMOL/L (ref 96–112)
CHLORIDE SERPL-SCNC: 105 MMOL/L (ref 96–112)
CHLORIDE SERPL-SCNC: 97 MMOL/L (ref 96–112)
CK SERPL-CCNC: 499 U/L (ref 0–154)
CO2 SERPL-SCNC: 15 MMOL/L (ref 20–33)
CO2 SERPL-SCNC: 16 MMOL/L (ref 20–33)
CO2 SERPL-SCNC: 17 MMOL/L (ref 20–33)
CO2 SERPL-SCNC: 27 MMOL/L (ref 20–33)
CREAT SERPL-MCNC: 1.45 MG/DL (ref 0.5–1.4)
CREAT SERPL-MCNC: 1.92 MG/DL (ref 0.5–1.4)
CREAT SERPL-MCNC: 2.13 MG/DL (ref 0.5–1.4)
CREAT SERPL-MCNC: 2.3 MG/DL (ref 0.5–1.4)
CREAT UR-MCNC: 30.24 MG/DL
EOSINOPHIL # BLD AUTO: 0 K/UL (ref 0–0.51)
EOSINOPHIL NFR BLD: 0 % (ref 0–6.9)
ERYTHROCYTE [DISTWIDTH] IN BLOOD BY AUTOMATED COUNT: 58.4 FL (ref 35.9–50)
FENTANYL UR QL: NEGATIVE
GFR SERPLBLD CREATININE-BSD FMLA CKD-EPI: 32 ML/MIN/1.73 M 2
GFR SERPLBLD CREATININE-BSD FMLA CKD-EPI: 35 ML/MIN/1.73 M 2
GFR SERPLBLD CREATININE-BSD FMLA CKD-EPI: 39 ML/MIN/1.73 M 2
GFR SERPLBLD CREATININE-BSD FMLA CKD-EPI: 55 ML/MIN/1.73 M 2
GLOBULIN SER CALC-MCNC: 3.3 G/DL (ref 1.9–3.5)
GLOBULIN SER CALC-MCNC: 3.4 G/DL (ref 1.9–3.5)
GLOBULIN SER CALC-MCNC: 3.6 G/DL (ref 1.9–3.5)
GLUCOSE BLD STRIP.AUTO-MCNC: 136 MG/DL (ref 65–99)
GLUCOSE BLD STRIP.AUTO-MCNC: 99 MG/DL (ref 65–99)
GLUCOSE FLD-MCNC: 69 MG/DL
GLUCOSE SERPL-MCNC: 102 MG/DL (ref 65–99)
GLUCOSE SERPL-MCNC: 109 MG/DL (ref 65–99)
GLUCOSE SERPL-MCNC: 117 MG/DL (ref 65–99)
GLUCOSE SERPL-MCNC: 125 MG/DL (ref 65–99)
GRAM STN SPEC: NORMAL
HAV IGM SERPL QL IA: NORMAL
HBV CORE IGM SER QL: NONREACTIVE
HBV SURFACE AG SER QL: NORMAL
HCT VFR BLD AUTO: 41.9 % (ref 42–52)
HCV AB SER QL: NORMAL
HGB BLD-MCNC: 13.6 G/DL (ref 14–18)
IMM GRANULOCYTES # BLD AUTO: 0.07 K/UL (ref 0–0.11)
IMM GRANULOCYTES NFR BLD AUTO: 0.5 % (ref 0–0.9)
INR PPP: 2.69 (ref 0.87–1.13)
INR PPP: 2.83 (ref 0.87–1.13)
LACTATE SERPL-SCNC: 2.1 MMOL/L (ref 0.5–2)
LACTATE SERPL-SCNC: 2.2 MMOL/L (ref 0.5–2)
LACTATE SERPL-SCNC: 2.6 MMOL/L (ref 0.5–2)
LACTATE SERPL-SCNC: 2.7 MMOL/L (ref 0.5–2)
LACTATE SERPL-SCNC: 3.4 MMOL/L (ref 0.5–2)
LACTATE SERPL-SCNC: 3.4 MMOL/L (ref 0.5–2)
LACTATE SERPL-SCNC: 3.5 MMOL/L (ref 0.5–2)
LACTATE SERPL-SCNC: 4.4 MMOL/L (ref 0.5–2)
LACTATE SERPL-SCNC: 5.8 MMOL/L (ref 0.5–2)
LYMPHOCYTES # BLD AUTO: 0.9 K/UL (ref 1–4.8)
LYMPHOCYTES NFR BLD: 6.3 % (ref 22–41)
MAGNESIUM SERPL-MCNC: 2.8 MG/DL (ref 1.5–2.5)
MAGNESIUM SERPL-MCNC: 3 MG/DL (ref 1.5–2.5)
MCH RBC QN AUTO: 32.8 PG (ref 27–33)
MCHC RBC AUTO-ENTMCNC: 32.5 G/DL (ref 32.3–36.5)
MCV RBC AUTO: 101 FL (ref 81.4–97.8)
METHADONE UR QL SCN: NEGATIVE
MONOCYTES # BLD AUTO: 1.33 K/UL (ref 0–0.85)
MONOCYTES NFR BLD AUTO: 9.3 % (ref 0–13.4)
NEUTROPHILS # BLD AUTO: 12.01 K/UL (ref 1.82–7.42)
NEUTROPHILS NFR BLD: 83.8 % (ref 44–72)
NRBC # BLD AUTO: 0.07 K/UL
NRBC BLD-RTO: 0.5 /100 WBC (ref 0–0.2)
NT-PROBNP SERPL IA-MCNC: ABNORMAL PG/ML (ref 0–125)
OPIATES UR QL SCN: POSITIVE
OXYCODONE UR QL SCN: NEGATIVE
PCP UR QL SCN: NEGATIVE
PHOSPHATE SERPL-MCNC: 6.4 MG/DL (ref 2.5–4.5)
PLATELET # BLD AUTO: 189 K/UL (ref 164–446)
PMV BLD AUTO: 10.8 FL (ref 9–12.9)
POTASSIUM SERPL-SCNC: 4 MMOL/L (ref 3.6–5.5)
POTASSIUM SERPL-SCNC: 4.7 MMOL/L (ref 3.6–5.5)
POTASSIUM SERPL-SCNC: 5.2 MMOL/L (ref 3.6–5.5)
POTASSIUM SERPL-SCNC: 5.8 MMOL/L (ref 3.6–5.5)
PROPOXYPH UR QL SCN: NEGATIVE
PROT SERPL-MCNC: 6.7 G/DL (ref 6–8.2)
PROT SERPL-MCNC: 6.8 G/DL (ref 6–8.2)
PROT SERPL-MCNC: 7 G/DL (ref 6–8.2)
PROT UR-MCNC: 30 MG/DL (ref 0–15)
PROT/CREAT UR: 992 MG/G (ref 15–68)
PROTHROMBIN TIME: 28.8 SEC (ref 12–14.6)
PROTHROMBIN TIME: 29.9 SEC (ref 12–14.6)
RBC # BLD AUTO: 4.15 M/UL (ref 4.7–6.1)
SALICYLATES SERPL-MCNC: <1 MG/DL (ref 15–25)
SIGNIFICANT IND 70042: NORMAL
SITE SITE: NORMAL
SODIUM SERPL-SCNC: 134 MMOL/L (ref 135–145)
SODIUM SERPL-SCNC: 134 MMOL/L (ref 135–145)
SODIUM SERPL-SCNC: 135 MMOL/L (ref 135–145)
SODIUM SERPL-SCNC: 136 MMOL/L (ref 135–145)
SOURCE SOURCE: NORMAL
T4 FREE SERPL-MCNC: 1.41 NG/DL (ref 0.93–1.7)
TROPONIN T SERPL-MCNC: 48 NG/L (ref 6–19)
TROPONIN T SERPL-MCNC: 54 NG/L (ref 6–19)
TROPONIN T SERPL-MCNC: 62 NG/L (ref 6–19)
TSH SERPL DL<=0.005 MIU/L-ACNC: 27.9 UIU/ML (ref 0.38–5.33)
WBC # BLD AUTO: 14.3 K/UL (ref 4.8–10.8)

## 2024-12-09 PROCEDURE — 82570 ASSAY OF URINE CREATININE: CPT

## 2024-12-09 PROCEDURE — 700111 HCHG RX REV CODE 636 W/ 250 OVERRIDE (IP): Performed by: INTERNAL MEDICINE

## 2024-12-09 PROCEDURE — 700111 HCHG RX REV CODE 636 W/ 250 OVERRIDE (IP): Mod: JZ

## 2024-12-09 PROCEDURE — 82248 BILIRUBIN DIRECT: CPT

## 2024-12-09 PROCEDURE — 83605 ASSAY OF LACTIC ACID: CPT | Mod: 91

## 2024-12-09 PROCEDURE — 83735 ASSAY OF MAGNESIUM: CPT

## 2024-12-09 PROCEDURE — 84484 ASSAY OF TROPONIN QUANT: CPT

## 2024-12-09 PROCEDURE — 700111 HCHG RX REV CODE 636 W/ 250 OVERRIDE (IP)

## 2024-12-09 PROCEDURE — 80053 COMPREHEN METABOLIC PANEL: CPT | Mod: 91

## 2024-12-09 PROCEDURE — 83880 ASSAY OF NATRIURETIC PEPTIDE: CPT

## 2024-12-09 PROCEDURE — 99291 CRITICAL CARE FIRST HOUR: CPT | Performed by: INTERNAL MEDICINE

## 2024-12-09 PROCEDURE — 82962 GLUCOSE BLOOD TEST: CPT

## 2024-12-09 PROCEDURE — 85610 PROTHROMBIN TIME: CPT

## 2024-12-09 PROCEDURE — A9270 NON-COVERED ITEM OR SERVICE: HCPCS | Performed by: EMERGENCY MEDICINE

## 2024-12-09 PROCEDURE — 84156 ASSAY OF PROTEIN URINE: CPT

## 2024-12-09 PROCEDURE — 85025 COMPLETE CBC W/AUTO DIFF WBC: CPT

## 2024-12-09 PROCEDURE — 700102 HCHG RX REV CODE 250 W/ 637 OVERRIDE(OP): Performed by: INTERNAL MEDICINE

## 2024-12-09 PROCEDURE — 700105 HCHG RX REV CODE 258: Performed by: STUDENT IN AN ORGANIZED HEALTH CARE EDUCATION/TRAINING PROGRAM

## 2024-12-09 PROCEDURE — 770022 HCHG ROOM/CARE - ICU (200)

## 2024-12-09 PROCEDURE — 99292 CRITICAL CARE ADDL 30 MIN: CPT | Performed by: INTERNAL MEDICINE

## 2024-12-09 PROCEDURE — 700111 HCHG RX REV CODE 636 W/ 250 OVERRIDE (IP): Mod: JZ | Performed by: STUDENT IN AN ORGANIZED HEALTH CARE EDUCATION/TRAINING PROGRAM

## 2024-12-09 PROCEDURE — 36415 COLL VENOUS BLD VENIPUNCTURE: CPT

## 2024-12-09 PROCEDURE — A9270 NON-COVERED ITEM OR SERVICE: HCPCS | Performed by: INTERNAL MEDICINE

## 2024-12-09 PROCEDURE — 700102 HCHG RX REV CODE 250 W/ 637 OVERRIDE(OP): Performed by: EMERGENCY MEDICINE

## 2024-12-09 PROCEDURE — 93005 ELECTROCARDIOGRAM TRACING: CPT | Mod: TC

## 2024-12-09 RX ORDER — FUROSEMIDE 10 MG/ML
40 INJECTION INTRAMUSCULAR; INTRAVENOUS EVERY 8 HOURS
Status: DISCONTINUED | OUTPATIENT
Start: 2024-12-09 | End: 2024-12-11

## 2024-12-09 RX ORDER — FUROSEMIDE 10 MG/ML
40 INJECTION INTRAMUSCULAR; INTRAVENOUS ONCE
Status: COMPLETED | OUTPATIENT
Start: 2024-12-09 | End: 2024-12-09

## 2024-12-09 RX ORDER — THIAMINE HYDROCHLORIDE 100 MG/ML
200 INJECTION, SOLUTION INTRAMUSCULAR; INTRAVENOUS DAILY
Status: COMPLETED | OUTPATIENT
Start: 2024-12-09 | End: 2024-12-11

## 2024-12-09 RX ORDER — ASPIRIN 81 MG/1
81 TABLET ORAL DAILY
Status: DISCONTINUED | OUTPATIENT
Start: 2024-12-09 | End: 2024-12-12 | Stop reason: HOSPADM

## 2024-12-09 RX ORDER — SINCALIDE 5 UG/5ML
0.02 INJECTION, POWDER, LYOPHILIZED, FOR SOLUTION INTRAVENOUS ONCE
Status: DISCONTINUED | OUTPATIENT
Start: 2024-12-09 | End: 2024-12-09

## 2024-12-09 RX ADMIN — PHYTONADIONE 10 MG: 10 INJECTION, EMULSION INTRAMUSCULAR; INTRAVENOUS; SUBCUTANEOUS at 04:11

## 2024-12-09 RX ADMIN — THIAMINE HYDROCHLORIDE 200 MG: 100 INJECTION, SOLUTION INTRAMUSCULAR; INTRAVENOUS at 07:41

## 2024-12-09 RX ADMIN — HEPARIN SODIUM 5000 UNITS: 5000 INJECTION, SOLUTION INTRAVENOUS; SUBCUTANEOUS at 05:40

## 2024-12-09 RX ADMIN — FUROSEMIDE 40 MG: 10 INJECTION, SOLUTION INTRAVENOUS at 14:21

## 2024-12-09 RX ADMIN — FUROSEMIDE 40 MG: 10 INJECTION, SOLUTION INTRAVENOUS at 07:42

## 2024-12-09 RX ADMIN — FUROSEMIDE 40 MG: 10 INJECTION, SOLUTION INTRAVENOUS at 04:07

## 2024-12-09 RX ADMIN — SODIUM ZIRCONIUM CYCLOSILICATE 10 G: 10 POWDER, FOR SUSPENSION ORAL at 15:27

## 2024-12-09 RX ADMIN — HEPARIN SODIUM 5000 UNITS: 5000 INJECTION, SOLUTION INTRAVENOUS; SUBCUTANEOUS at 23:10

## 2024-12-09 RX ADMIN — ASPIRIN 81 MG: 81 TABLET, COATED ORAL at 07:42

## 2024-12-09 RX ADMIN — FUROSEMIDE 40 MG: 10 INJECTION, SOLUTION INTRAVENOUS at 23:10

## 2024-12-09 RX ADMIN — HEPARIN SODIUM 5000 UNITS: 5000 INJECTION, SOLUTION INTRAVENOUS; SUBCUTANEOUS at 14:21

## 2024-12-09 RX ADMIN — SODIUM ZIRCONIUM CYCLOSILICATE 10 G: 10 POWDER, FOR SUSPENSION ORAL at 09:43

## 2024-12-09 RX ADMIN — DOBUTAMINE HYDROCHLORIDE 5 MCG/KG/MIN: 100 INJECTION INTRAVENOUS at 16:48

## 2024-12-09 ASSESSMENT — PAIN DESCRIPTION - PAIN TYPE
TYPE: ACUTE PAIN

## 2024-12-09 ASSESSMENT — ENCOUNTER SYMPTOMS
SHORTNESS OF BREATH: 0
SPUTUM PRODUCTION: 0
ABDOMINAL PAIN: 0
WHEEZING: 0
POLYDIPSIA: 1
VOMITING: 0
PALPITATIONS: 0
COUGH: 0
COUGH: 1
DEPRESSION: 0
NERVOUS/ANXIOUS: 0
HEADACHES: 0
CONSTIPATION: 0
SPEECH CHANGE: 0
ORTHOPNEA: 1
DIAPHORESIS: 0
NAUSEA: 1
FEVER: 0
DIZZINESS: 0
MYALGIAS: 0
ABDOMINAL PAIN: 1
SENSORY CHANGE: 0
BRUISES/BLEEDS EASILY: 0
MYALGIAS: 1
SORE THROAT: 0
NAUSEA: 0
DIARRHEA: 0
DIZZINESS: 1
BACK PAIN: 0
BLURRED VISION: 0
CHILLS: 0

## 2024-12-09 ASSESSMENT — PATIENT HEALTH QUESTIONNAIRE - PHQ9
1. LITTLE INTEREST OR PLEASURE IN DOING THINGS: NOT AT ALL
SUM OF ALL RESPONSES TO PHQ9 QUESTIONS 1 AND 2: 0
SUM OF ALL RESPONSES TO PHQ9 QUESTIONS 1 AND 2: 0
2. FEELING DOWN, DEPRESSED, IRRITABLE, OR HOPELESS: NOT AT ALL
1. LITTLE INTEREST OR PLEASURE IN DOING THINGS: NOT AT ALL
2. FEELING DOWN, DEPRESSED, IRRITABLE, OR HOPELESS: NOT AT ALL

## 2024-12-09 ASSESSMENT — LIFESTYLE VARIABLES: SUBSTANCE_ABUSE: 1

## 2024-12-09 NOTE — ASSESSMENT & PLAN NOTE
EF 20% (severe MR, RVSP 45mmHg) on echo 11/16/24 - decompensated due to ongoing meth abuse and medication non-compliance  Hold GDMT while in shock, resume when stable  Educated on importance of drug cessation and medication compliance

## 2024-12-09 NOTE — ASSESSMENT & PLAN NOTE
Secondary to hepatic congestion, cardiogenic shock - improving  Avoid hepatotoxins  Monitor synthetic function while maintaining perfusion to the liver

## 2024-12-09 NOTE — ASSESSMENT & PLAN NOTE
Likely secondary to hepatic congestion -improving  Continue to monitor, consider HIDA scan if worsens

## 2024-12-09 NOTE — ASSESSMENT & PLAN NOTE
Non oliguric SUZY. Baseline Scr 1-1.3, but 2.3 at presentation here. Likely secondary to cardiorenal syndrome.   Aggressive diuresis as noted above  Will get FeUrea if doesn't improve   Has zachery for strict I/O

## 2024-12-09 NOTE — ASSESSMENT & PLAN NOTE
Secondary to poor PO intake since discharge, associated to abdominal pain - improving  Hypoglycemia protocol

## 2024-12-09 NOTE — CARE PLAN
The patient is Stable - Low risk of patient condition declining or worsening    Shift Goals  Clinical Goals: Hemodynamic stability, comfort, admit  Patient Goals: Eat, drink  Family Goals: YOEL    Progress made toward(s) clinical / shift goals:    Problem: Pain - Standard  Goal: Alleviation of pain or a reduction in pain to the patient’s comfort goal  Outcome: Progressing     Problem: Knowledge Deficit - Standard  Goal: Patient and family/care givers will demonstrate understanding of plan of care, disease process/condition, diagnostic tests and medications  Outcome: Progressing     Problem: Fall Risk  Goal: Patient will remain free from falls  Outcome: Progressing       Patient is not progressing towards the following goals:

## 2024-12-09 NOTE — ASSESSMENT & PLAN NOTE
Likely due to decompensated heart failure with hepatic and intestinal congestion or possibly bowel ischemia as he did present with cardiogenic shock. Tbili was initially elevated, has normalized with tx of cardiogenic shock.   Surgery consulted in the ED, will consider HIDA in near future if worsens  Paracentesis done in the ED: SAAG is 1.8. Ascitic protein is 2.8 which is consistent with cardiac ascites. Ascitic fluid neutrophil count is <250.   Prn analgesics, monitor off abx

## 2024-12-09 NOTE — ASSESSMENT & PLAN NOTE
Last use 4 days ago reportedly, monitor for withdrawal  Drug cessation education and resources provided

## 2024-12-09 NOTE — PROGRESS NOTES
"[facility] Progress Note      Admit Date: 12/8/2024  ICU Day: 1      Resident(s): Kim Sanchez M.D.  Attending: Dr. Jeremy Gonda, M.D.    Date & Time:   12/9/2024   6:19 AM       Patient ID:    Name:             Santino Zabala   YOB: 1965  Age:                 59 y.o.  male   MRN:               [id number]    HPI:  From Dr. Anderson's note \"Santino Zabala is a 59 y.o. male with history of HFrEF, substance abuse/methamphetamine, CKD, recently discharged after a hospitalization for acute HFrEF. Presented yesterday with acute abdominal pain, good transient response to GI cocktail, but presents again today with persistent abdominal pain. He tells me he has been doing poorly since discharge on 12/1 with poor appetite, poor urine output, increasing diffuse abdominal pain, increased fatigue, and pale stools. Used meth 4-5 days ago, denies alcohol intake, states compliant with meds at discharge.     In ER, patient was found icteric, in severe abdominal pain, labs remarkable for severe transaminitis, hyperbilirubinemia, lactic acidosis of 7.4, hyperkalemia of 7.7 with EKG changes and metabolic acidosis. CT abdomen with mild ascites, no other acute findings. Received IVF and a diagnostic paracentesis. Repeat EKG with improved T wave changes.\"     Hospital Course/Interval overnight events:  Admitted to the ICU, BP soft in 90s/60s, MAP >65. Weaned off supplemental oxygen. SCr has improved. ALT worse, AST still >7000, bili 3.1 >1.4. Lactic acid is down from 7.9 > 3.4.   SAAG is 1.8. Ascitic protein is 2.8 which is consistent with cardiac ascites. Ascitic fluid neutrophil count is <250. Net output +735ml since admission, adequate UOP ~57ml/hour overnight.   Dobutamine increased to 5 as LFTs are still significantly elevated.   Not requiring pressors.     Consultants:  Critical Care     Procedures:  N/A      Review of Systems   Constitutional:  Positive for malaise/fatigue. Negative for chills, diaphoresis " and fever.   HENT:  Negative for congestion and sore throat.    Respiratory:  Negative for cough, sputum production, shortness of breath and wheezing.    Cardiovascular:  Positive for orthopnea. Negative for chest pain and leg swelling.   Gastrointestinal:  Negative for abdominal pain, constipation, diarrhea (Last soft BM, very pale), nausea and vomiting.   Genitourinary:         No UO since yesterday    Musculoskeletal:  Positive for myalgias.   Skin:  Negative for rash.   Neurological:  Positive for dizziness.       PHYSICAL EXAM    Vitals:    12/09/24 0400   BP: 94/67   Pulse: 70   Resp: 15   Temp:    SpO2: 99%       Physical Exam  Vitals and nursing note reviewed.   Constitutional:       General: He is not in acute distress.     Appearance: He is not ill-appearing, toxic-appearing or diaphoretic.   HENT:      Head: Normocephalic and atraumatic.      Mouth/Throat:      Mouth: Mucous membranes are dry.   Eyes:      Extraocular Movements: Extraocular movements intact.      Conjunctiva/sclera: Conjunctivae normal.      Pupils: Pupils are equal, round, and reactive to light.   Neck:      Comments: JVD and HJR   Cardiovascular:      Rate and Rhythm: Regular rhythm.      Pulses: Normal pulses.      Heart sounds: Murmur (Systolic in all points) heard.      No gallop (+ve hepatojugular reflux).      Comments: Warm extremities  Pulmonary:      Effort: Pulmonary effort is normal.      Breath sounds: Rales (mild, bibasilar) present.   Abdominal:      General: Abdomen is flat.      Palpations: Abdomen is soft.      Tenderness: There is no abdominal tenderness (very mild if any).   Musculoskeletal:         General: Swelling present. Normal range of motion.      Cervical back: Normal range of motion.      Comments: Very trace pitting pedal edema, extremities are warm   Skin:     Capillary Refill: Capillary refill takes 2 to 3 seconds.   Neurological:      General: No focal deficit present.      Mental Status: He is alert.             Fluids:       Intake/Output Summary (Last 24 hours) at 2024 0619  Last data filed at 2024 0329  Gross per 24 hour   Intake 1135.86 ml   Output 400 ml   Net 735.86 ml       Weight: 58.8 kg (129 lb 10.1 oz)  Body mass index is 21.57 kg/m².    Recent Labs     24   SODIUM 135 136 135   POTASSIUM 7.7* 5.8* 5.2   CHLORIDE 101 105 104   CO2 12* 16* 17*   BUN 53* 52* 56*   CREATININE 2.42* 2.30* 2.13*   MAGNESIUM  --  3.0* 2.8*   PHOSPHORUS  --  6.4*  --    CALCIUM 9.3 7.8* 8.2*       GI/Nutrition:  Recent Labs     24   ALTSGPT 51* 4720*  --  5440*   ASTSGOT 50* >7000*  --  >7000*   ALKPHOSPHAT 135* 126*  --  105*   TBILIRUBIN 0.8 3.1*  --  1.4   DBILIRUBIN  --   --   --  0.9*   LIPASE 33 50  --   --    GLUCOSE 131* 46* 125* 109*       Heme:  Recent Labs     24   RBC 4.62* 4.46*  --  4.15*   HEMOGLOBIN 15.3 14.8  --  13.6*   HEMATOCRIT 47.6 47.2  --  41.9*   PLATELETCT 253 208  --  189   PROTHROMBTM  --  27.7* 28.8*  --    INR  --  2.57* 2.69*  --        Infectious Disease:  Temp  Av.3 °C (97.4 °F)  Min: 36 °C (96.8 °F)  Max: 36.7 °C (98 °F)  Recent Labs     24   WBC 7.3 14.7*  --  14.3*   NEUTSPOLYS 71.00 81.00*  --  83.80*   LYMPHOCYTES 19.40* 8.90*  --  6.30*   MONOCYTES 7.60 9.30  --  9.30   EOSINOPHILS 1.00 0.00  --  0.00   BASOPHILS 0.50 0.10  --  0.10   ASTSGOT 50*  --  >7000* >7000*   ALTSGPT 51*  --  4720* 5440*   ALKPHOSPHAT 135*  --  126* 105*   TBILIRUBIN 0.8  --  3.1* 1.4       Meds:   sincalide  0.02 mcg/kg      sodium zirconium cyclosilicate (Lokelma) packet  10 g      Followed by    [START ON 2024] sodium zirconium cyclosilicate (Lokelma) packet  10 g      heparin  5,000 Units      dextrose bolus  25 g      DOBUTamine-Dextrose  2.5 mcg/kg/min (Ideal) 2.5 mcg/kg/min  (12/08/24 3706)    Pharmacy Consult Request  1 Each      oxyCODONE immediate-release  2.5 mg      Or    oxyCODONE immediate-release  5 mg      Or    HYDROmorphone  0.25 mg            Assessment and Plan:  * Cardiogenic shock (HCC)  Assessment & Plan  Pt admitted with cardiogenic shock secondary to acute exacerbation of chronic systolic heart failure. Has hx of suspected nonischemic cardiomyopathy related to methamphetamine use. Suspect that his acute exacerbation was triggered by recent meth use prior to admission and medication compliance.   Evidence of acute multiorgan failure - SUZY, lactic acidosis, transaminitis.   Last echo was during recent hospitalization less than 2 weeks ago, with EF 20%, severe MR, global hypokinesis, reduced RV function  Bedside POCUS at admission showed possible BiV failure - severely enlarged RV and LV with global hypokinesis, severely depressed EF (<15%?), CO 2.0 (LVOT VTI), some pericardial effusion, IVC 3 cm with significant hepatofugal blood flow.     - Inotropes:   Dobutamine:   Dose increased to 5, will follow serial lactate, liver, and renal function tests. Trop already downtrending.   Not titrating to CI at this time as he doesn't have a CVC/[place], and remains hemodynamically stable so deferring it for now.    Norepi:   Hasn't needed it yet, but can start for goal MAP >65.   This will also address vasodilatory response from the Dobutamine.   - Decongestion: Still appears mildly fluid overloaded.    Increase dose of IV lasix to 40mg TID   Monitor I/O  - Afterload: Will need to be started once volume status is optimized  - Strict I/O and daily weight monitoring  - Hold home GDMT for now due to cardiogenic shock  - Holding off on repeat echo for now    Hypoglycemia  Assessment & Plan  Unclear etiology, will monitor for now, hypoglycemia protocol.     Hyperkalemia  Assessment & Plan  Resolved with insulin/dextrose, lasix, sodium bicarb.   Continue lokelma for now  Monitor on  tele    Metabolic acidosis, increased anion gap  Assessment & Plan  Secondary to SUZY and Lactic acidosis, improving  Monitor      Abdominal pain  Assessment & Plan  Could be due to decompensated heart failure with hepatic and intestinal congestion or even ischemia. Tbili was initially elevated, has normalized with tx of cardiogenic shock.   Surgery consulted in the ED, will consider HIDA in near future if worsens  Paracentesis done in the ED: SAAG is 1.8. Ascitic protein is 2.8 which is consistent with cardiac ascites. Ascitic fluid neutrophil count is <250.   Prn analgesics, monitor off abx  Begin diet today and monitor symptoms    Acute on chronic systolic congestive heart failure (HCC)  Assessment & Plan  A&P as noted under Cardiogenic shock.     SUZY (acute kidney injury) (HCC)  Assessment & Plan  Non oliguric SUZY. Baseline Scr 1-1.3, but 2.3 at presentation here. Likely secondary to cardiorenal syndrome.   Aggressive diuresis as noted above  Will get FeUrea if doesn't improve   Has [place] for strict I/O    Methamphetamine abuse (HCC)  Assessment & Plan  Last use was about 4 days prior to hospitalization. Counseled regarding meth cessation.     Nonischemic cardiomyopathy (HCC)  Assessment & Plan  Hx of meth use   Minimal CAD on [facility] in 2/2024  - See plan for cardiogenic shock    Transaminitis  Assessment & Plan  Suspect due to hepatic congestion, cardiogenic shock.   - Hepatitis and HIV negative.   - Monitor labs and pain to consider repeat US with doppler (w/u acute thrombosis and hx of gallbladder wall edema)   - Will continue to monitor synthetic functon    Hyperbilirubinemia  Assessment & Plan  Tbili has normalized. Suspect this was due to hepatic congestion from the Cardiogenic shock.   Monitor for now, HIDA if worsens in the future             Quality Measures:  Feeding:  Cardiac diet  Analgesia: Dilaudid prn  Sedation: N/A  Thromboprophylaxis: Heparin  Head of bed: >30 degrees  Ulcer prophylaxis:  N/A  Glycemic control: N/A  Bowel care: Laxatives ordered  Indwelling lines: 2 PIV  Deescalation of antibiotics: Not on abx, not indicated      CODE STATUS:   Full code  DISPO:    Continue monitoring on ICU

## 2024-12-09 NOTE — ASSESSMENT & PLAN NOTE
Decrease dobutamine to 2.5 mcg/kg/min today  Continue aggressive diuresis  Monitor endorgan function including urine output, LFTs, creatinine

## 2024-12-09 NOTE — ED PROVIDER NOTES
"ED Provider Note    CHIEF COMPLAINT  Chief Complaint   Patient presents with    Abdominal Pain     ABD pain x3 days. Pt was seen last night for same thing, but states pain is worse today. Reported urinary retention.        EXTERNAL RECORDS REVIEWED  12/1/2024, discharge summary, hypoxic respiratory failure with CHF, methamphetamine use and SUZY    HPI/ROS  LIMITATION TO HISTORY   Select: Behavior    Santino Zabala is a 59 y.o. male who presents for evaluation of severe abdominal pain.  He reports is been going on for 3 days, evaluated here in emergency department yesterday for the same.  Recent hospitalization with CHF related respiratory failure.  Noted to have an SUZY and methamphetamine use at that time.  Apparently the patient is also having some difficulty urinating at this time.  Reports the pain is diffuse in his abdomen and severe, he is in significant distress and I think this limits his reliability of historian.  No vomiting with diarrhea.    PAST MEDICAL HISTORY   has a past medical history of Congestive heart failure (HCC).    SURGICAL HISTORY  patient denies any surgical history    FAMILY HISTORY  No family history on file.    SOCIAL HISTORY  Social History     Tobacco Use    Smoking status: Never    Smokeless tobacco: Never   Vaping Use    Vaping status: Never Used   Substance and Sexual Activity    Alcohol use: Not Currently    Drug use: Never    Sexual activity: Not on file       CURRENT MEDICATIONS  Home Medications    **Home medications have not yet been reviewed for this encounter**         ALLERGIES  No Known Allergies    PHYSICAL EXAM  VITAL SIGNS: BP 97/52   Pulse (!) 58   Resp 20   Ht 1.651 m (5' 5\")   Wt 54.9 kg (121 lb)   BMI 20.14 kg/m²    Constitutional: Appears to be in significant distress secondary to pain, perhaps some psychomotor agitation   HENT: Normocephalic, no obvious evidence of acute trauma.  Eyes: No scleral icterus. Normal conjunctiva   Thorax & Lungs: Normal " nonlabored respirations. I appreciate no wheezing, rhonchi or rales. There is normal air movement.  Upon cardiac ascultation I appreciate a regular heart rhythm and a normal rate.   Abdomen: T the abdomen seems mildly distended.  Diffuse tenderness with guarding in all quadrants  Skin: The exposed portions of skin reveal no obvious rash or other abnormalities.  Extremities/Musculoskeletal: 2+ lower extremity pitting edema      EKG/LABS  Results for orders placed or performed during the hospital encounter of 12/08/24   CBC WITH DIFFERENTIAL    Collection Time: 12/08/24  5:50 PM   Result Value Ref Range    WBC 14.7 (H) 4.8 - 10.8 K/uL    RBC 4.46 (L) 4.70 - 6.10 M/uL    Hemoglobin 14.8 14.0 - 18.0 g/dL    Hematocrit 47.2 42.0 - 52.0 %    .8 (H) 81.4 - 97.8 fL    MCH 33.2 (H) 27.0 - 33.0 pg    MCHC 31.4 (L) 32.3 - 36.5 g/dL    RDW 63.1 (H) 35.9 - 50.0 fL    Platelet Count 208 164 - 446 K/uL    MPV 10.7 9.0 - 12.9 fL    Neutrophils-Polys 81.00 (H) 44.00 - 72.00 %    Lymphocytes 8.90 (L) 22.00 - 41.00 %    Monocytes 9.30 0.00 - 13.40 %    Eosinophils 0.00 0.00 - 6.90 %    Basophils 0.10 0.00 - 1.80 %    Immature Granulocytes 0.70 0.00 - 0.90 %    Nucleated RBC 0.30 (H) 0.00 - 0.20 /100 WBC    Neutrophils (Absolute) 11.91 (H) 1.82 - 7.42 K/uL    Lymphs (Absolute) 1.31 1.00 - 4.80 K/uL    Monos (Absolute) 1.36 (H) 0.00 - 0.85 K/uL    Eos (Absolute) 0.00 0.00 - 0.51 K/uL    Baso (Absolute) 0.02 0.00 - 0.12 K/uL    Immature Granulocytes (abs) 0.10 0.00 - 0.11 K/uL    NRBC (Absolute) 0.04 K/uL   LACTIC ACID    Collection Time: 12/08/24  5:50 PM   Result Value Ref Range    Lactic Acid 7.9 (HH) 0.5 - 2.0 mmol/L   URINALYSIS    Collection Time: 12/08/24  7:50 PM    Specimen: Urine, Clean Catch   Result Value Ref Range    Color Dark Yellow     Character Cloudy (A)     Specific Gravity 1.028 <1.035    Ph 5.0 5.0 - 8.0    Glucose Negative Negative mg/dL    Ketones Trace (A) Negative mg/dL    Protein 100 (A) Negative mg/dL     Bilirubin Small (A) Negative    Urobilinogen, Urine 1.0 <=1.0 EU/dL    Nitrite Negative Negative    Leukocyte Esterase Negative Negative    Occult Blood Large (A) Negative    Micro Urine Req Microscopic    URINE MICROSCOPIC (W/UA)    Collection Time: 24  7:50 PM   Result Value Ref Range    WBC 0-2 /hpf    RBC 11-20 (A) 0 - 2 /hpf    Bacteria Rare (A) None /hpf    Epithelial Cells 3-5 0 - 5 /hpf    Urine Casts >20 (A) 0 - 2 /lpf    Hyaline Cast Present /lpf   EKG    Collection Time: 24  8:16 PM   Result Value Ref Range    Report       [facility]    Test Date:  2024  Pt Name:    TREVON HAUSER                Department: ER  MRN:        [id number]                      Room:       [facility]  Gender:     Male                         Technician: [id number]  :        1965                   Requested By:HOLLI CARROLL  Order #:    [id number]                    Reading MD:    Measurements  Intervals                                Axis  Rate:       62                           P:          28  DC:         248                          QRS:        140  QRSD:       124                          T:          -15  QT:         499  QTc:        507    Interpretive Statements  Sinus rhythm  Prolonged DC interval  Nonspecific intraventricular conduction delay  Anteroseptal infarct, old  Nonspecific T abnormalities, lateral leads  Compared to ECG 2024 21:52:57  First degree AV block now present  Intraventricular conduction delay now present  Myocardial infarct finding now present  T-wave abnor mality now present  Left ventricular hypertrophy no longer present  ST (T wave) deviation no longer present  Prolonged QT interval no longer present     Comp Metabolic Panel    Collection Time: 24  8:23 PM   Result Value Ref Range    Sodium 135 135 - 145 mmol/L    Potassium 7.7 (HH) 3.6 - 5.5 mmol/L    Chloride 101 96 - 112 mmol/L    Co2 12 (L) 20 - 33 mmol/L    Anion Gap 22.0 (H) 7.0 -  16.0    Glucose 46 (LL) 65 - 99 mg/dL    Bun 53 (H) 8 - 22 mg/dL    Creatinine 2.42 (H) 0.50 - 1.40 mg/dL    Calcium 9.3 8.5 - 10.5 mg/dL    Correct Calcium 9.2 8.5 - 10.5 mg/dL    AST(SGOT) >7000 (HH) 12 - 45 U/L    ALT(SGPT) 4720 (HH) 2 - 50 U/L    Alkaline Phosphatase 126 (H) 30 - 99 U/L    Total Bilirubin 3.1 (H) 0.1 - 1.5 mg/dL    Albumin 4.1 3.2 - 4.9 g/dL    Total Protein 8.0 6.0 - 8.2 g/dL    Globulin 3.9 (H) 1.9 - 3.5 g/dL    A-G Ratio 1.1 g/dL   LIPASE    Collection Time: 12/08/24  8:23 PM   Result Value Ref Range    Lipase 50 11 - 82 U/L   ESTIMATED GFR    Collection Time: 12/08/24  8:23 PM   Result Value Ref Range    GFR (CKD-EPI) 30 (A) >60 mL/min/1.73 m 2      I have independently interpreted this EKG    RADIOLOGY/PROCEDURES   I have independently interpreted the diagnostic imaging associated with this visit and am waiting the final reading from the radiologist.   My preliminary interpretation is as follows: No focal inflammatory process    Radiologist interpretation:  CT-ABDOMEN-PELVIS WITH   Final Result      1.  Moderate cardiomegaly with multichamber enlargement.   2.  Third spacing of fluid with trace pleural effusions, mild ascites and mild body wall edema.   3.  Hepatic steatosis.   4.  Small caliber branch arteries could be related to vasoconstriction.   5.  Atherosclerosis.                Paracentesis Procedure Note    Indication: Ascites    Consent: The patient provided verbal consent for this procedure.    Procedure: The patient was placed in the supine position with the head of the bed slightly elevated and the appropriate landmarks were identified. The skin over the puncture site in the right lower quadrant region was prepped with chlorhexidine.  22-gauge Angiocath was inserted into the abdomen, needle was withdrawn and through the catheter 10 cc of fluid was aspirated. Fluid was returned which was clear yellow ascites.  A total volume of 10 cc was withdrawn which was sent to the lab for  appropriate testing. The catheter was then withdrawn and a sterile dressing was placed over the site.     The patient tolerated the procedure well.    Complications: None               COURSE & MEDICAL DECISION MAKING    ASSESSMENT, COURSE AND PLAN  Care Narrative: 59-year-old male returning with abdominal pain, he is in significant distress at the time presentation was brought right back from the triage area for emergent evaluation.  Hypotensive initially but 110/78 at the time of exam.  In addition to the distress from pain he also seems to have some degree of psychomotor agitation, he does a history of methamphetamine use.  Diffuse abdominal tenderness with some generalized guarding as well.  Not tachycardic, not febrile.  Will obtain blood work including a lactic acid.  Lipase as well.  Will obtain a CT scan with IV contrast.  He will be reevaluated      WBC is nearly 15,000.  Chemistries are in need of recollect.  Sepsis: Infection was suspected 7:20 PM (Time). Sepsis pathway was initiated. Less than 30cc/kg because of concern for volume overload 1000 mL of crystalloid was ordered. Antibiotics were given per protocol.    9:24 PM chemistry has finally resulted, this require to recollect given hemolysis on the original sample.  SUZY with a GFR of 30.  Hyperkalemia with a potassium of 7.7.  Hypoglycemia with a glucose of 46.  Acute hepatitis with AST greater than 7000 and ALT of 4720, alkaline phos is little bit elevated and a bilirubin of 3.1.  The ultrasound yesterday of the right upper quadrant did reveal some gallbladder wall edema thought likely related to an etiology extrinsic to the gallbladder with a small amount of perihepatic fluid.  Now certainly concerned this does represent an acute cholecystitis.  The patient was treated with ceftriaxone, will receive an additional dose of Flagyl.  Given his new renal failure and SUZY with some ascites appreciated on the CT scan a paracentesis was performed and fluid  sent to the lab.  Will give him another liter of IV fluids, his repeat lactic acid has not yet resulted, this is done cautiously with known CHF.  Additionally he will be treated with medication for his hyperkalemia, his EKG reveals no concerning changes related to this.  I will consult the general surgeon on-call given the concern for cholecystitis.  Ultimately the patient would be admitted to the ICU      Discussed the case with Dr. Howell, general surgeon.  Evaluated imaging and blood work.  Suspect etiology of those ultrasound findings are likely a CHF and not acute cholecystitis.          CRITICAL CARE  The very real possibilty of a deterioration of this patient's condition required the highest level of my preparedness for sudden, emergent intervention.  The critical care time associated with the care of the patient was 39 minutes. Review chart for interventions. This time is exclusive of any other billable procedures.           DISPOSITION AND DISCUSSIONS  I have discussed management of the patient with the following physicians and FRITZ's: Dr. Howell, general surgery.  Dr. Denis, Admitting intensivist    FINAL DIAGNOSIS  1. Acute hepatitis    2. Acute renal failure, unspecified acute renal failure type (HCC)    3. Hyperkalemia    4. Lactic acidosis    5. Hypoglycemia    Procedure: Paracentesis     Electronically signed by: Dariusz Gunter M.D., 12/8/2024 5:51 PM

## 2024-12-09 NOTE — CONSULTS
DATE OF CONSULTATION:  12/8/2024     REFERRING PHYSICIAN:   Dariusz Gunter M.D. ,      CONSULTING PHYSICIAN:  Magnus Howell M.D.     REASON FOR CONSULTATION:  I have been asked by Dr. Dariusz Gunter M.D.  to see the patient in surgical consultation for evaluation of abdominal pain.    HISTORY OF PRESENT ILLNESS: Santino Zabala is a very pleasant 59-year-old man evaluated by the emergency department for abdominal pain congestive heart failure multisystem organ failure he is seen in the ICU.  He reports ongoing abdominal pain for the past 3 days previously seen in the emergency department.  Recent hospitalization for congestive heart failure kidney dysfunction.  He reports the pain increased in severity sought care again in the emergency department.  He received evaluation as below.  Ultrasound from yesterday demonstrated gallbladder wall thickening.  General surgery was consulted.  Santino Zabala is receiving care in the medical ICU.  He reports pain significantly improved after paracentesis, morphine.  He states some residual discomfort but much improved.  He reports no nausea no vomiting.  He reports been tolerating diet.  He notes increased leg swelling decreased urine output shortness of breath      PAST MEDICAL HISTORY:  has a past medical history of Congestive heart failure (HCC).    PAST SURGICAL HISTORY:  has no past surgical history on file.    ALLERGIES: No Known Allergies    CURRENT MEDICATIONS:    Home Medications       Reviewed by Tino Maldonado (Pharmacy Tech) on 12/08/24 at 2028  Med List Status: Complete     Medication Last Dose Status   allopurinol (ZYLOPRIM) 100 MG Tab Unknown Active   aspirin 81 MG EC tablet Unknown Active   dapagliflozin propanediol (FARXIGA) 10 MG Tab Unknown Active   furosemide (LASIX) 40 MG Tab Unknown Active   metoprolol SR (TOPROL XL) 25 MG TABLET SR 24 HR Unknown Active   omeprazole (PRILOSEC) 20 MG delayed-release capsule New Rx Active   potassium  chloride SA (KDUR) 20 MEQ Tab CR Unknown Active   spironolactone (ALDACTONE) 25 MG Tab Unknown Active                  Audit from Redirected Encounters    **Home medications have not yet been reviewed for this encounter**         FAMILY HISTORY: family history is not on file.    SOCIAL HISTORY:  reports that he has never smoked. He has never used smokeless tobacco. He reports that he does not currently use alcohol. He reports that he does not use drugs.    REVIEW OF SYSTEMS: Comprehensive review of systems is negative with the exception of the aforementioned HPI, PMH, and PSH bullets in accordance with CMS guidelines.    PHYSICAL EXAMINATION:    Physical Exam  Awake alert  Cooperative  Distended neck veins supplemental oxygen nasal cannula mild shortness of breath  Abdomen distended mildly tender no guarding no rebound  Edema discoloration lower extremities  LABORATORY VALUES:   Recent Labs     12/07/24  1716 12/08/24  1750   WBC 7.3 14.7*   RBC 4.62* 4.46*   HEMOGLOBIN 15.3 14.8   HEMATOCRIT 47.6 47.2   .0* 105.8*   MCH 33.1* 33.2*   MCHC 32.1* 31.4*   RDW 60.5* 63.1*   PLATELETCT 253 208   MPV 10.2 10.7     Recent Labs     12/07/24  1716 12/08/24 2023   SODIUM 138 135   POTASSIUM 4.7 7.7*   CHLORIDE 106 101   CO2 18* 12*   GLUCOSE 131* 46*   BUN 34* 53*   CREATININE 1.31 2.42*   CALCIUM 9.6 9.3     Recent Labs     12/07/24  1716 12/08/24 2023   ASTSGOT 50* >7000*   ALTSGPT 51* 4720*   TBILIRUBIN 0.8 3.1*   ALKPHOSPHAT 135* 126*   GLOBULIN 4.0* 3.9*            IMAGING:   CT-ABDOMEN-PELVIS WITH   Final Result      1.  Moderate cardiomegaly with multichamber enlargement.   2.  Third spacing of fluid with trace pleural effusions, mild ascites and mild body wall edema.   3.  Hepatic steatosis.   4.  Small caliber branch arteries could be related to vasoconstriction.   5.  Atherosclerosis.          ASSESSMENT AND PLAN:     Santino Zabala is a very pleasant 59-year-old man receiving care medical ICU  Acute  heart failure exacerbation by multiorgan failure  Acute transaminitis likely secondary to heart failure hepatic congestion  Lactic acidosis  Gallbladder wall thickening identified ultrasound likely secondary to above  Patient reports symptomatically improved decreased abdominal pain  Ongoing care as directed by the ICU service  Monitor abdominal exam  Follow-up labs  Consider further evaluation HIDA scan depending on course    ACS Blue will follow           ____________________________________     Magnus Howell M.D.    DD: 12/8/2024  11:02 PM    AAST Grading System for EGS Conditions  ACS NSQIP Surgical Risk Calculator

## 2024-12-09 NOTE — ASSESSMENT & PLAN NOTE
Resolved with insulin/dextrose, lasix, sodium bicarb.   Continue [facility] for now  Monitor on tele

## 2024-12-09 NOTE — ASSESSMENT & PLAN NOTE
Admitted with cardiogenic shock due to acute exacerbation of chronic systolic heart failure. 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 Suspect that his acute exacerbation was triggered by recent meth use prior to admission and poor medication compliance.     Evidence of acute multiorgan failure - SUZY, lactic acidosis, transaminitis.   Last echo was during recent hospitalization less than 2 weeks ago, with EF 20%, severe MR, global hypokinesis, reduced RV function. Bedside POCUS at admission showed possible BiV failure - severely enlarged RV and LV with global hypokinesis, severely depressed EF (<15%?), CO 2.0 (LVOT VTI), some pericardial effusion, IVC 3 cm with significant hepatofugal blood flow. Repeat formal echo not done.     - Inotropes:   Dobutamine:   Responded very well to increased rate of 5 -> reduce down to 2.5 on 12/10.   Did not need pressors support.   LFTs, RFTs improved.   - Decongestion: Tolerating IV Lasix 40mg TID very well.     Monitor I/O strictly   - Afterload: Will need to be started once volume status is optimized  - Strict I/O and daily weight monitoring  - Hold home GDMT for now due to cardiogenic shock  - Holding off on repeat echo for now

## 2024-12-09 NOTE — ED NOTES
Med rec updated and complete. Allergies reviewed. Per historically encounter , discharge summary and dispense report. Attempted to interview pt. Pt was unable to clarify last doses taken or any additional information.    Preferred pharmacy  [facility] = [phone]    Discharge prescriptions recently filled at   [facility] [phone]

## 2024-12-09 NOTE — ASSESSMENT & PLAN NOTE
Tbili has normalized. Suspect this was due to hepatic congestion from the Cardiogenic shock.   Monitor for now, HIDA if worsens in the future   Visual Rehabilitation  1. Writing  a. Posada bold gel pen (1.0MM tip)  b. Click marker  c. Bold lined paper (1 inch)  d. Print not cursive  e. Use your flashlight to read your writing  f. Deluxe low vision checks from [employer]  2. Seeing   a. Ask Lisa to turn on Seeing   b. Position phone carefully  3. Resources  a. Vision Loss Resources  i. Classes and support groups  b. [employer] inc  i. Residential immersion   c. State Services for the blind  i. Support groups  d. Technology  i. Low vision store  ii. [employer] technology - optical character recognition   Patti Petersen, OTR/L  [phone]

## 2024-12-09 NOTE — ED TRIAGE NOTES
Chief Complaint   Patient presents with    Abdominal Pain     ABD pain x3 days. Pt was seen last night for same thing, but states pain is worse today. Reported urinary retention.       PIV placed, pt dressed in gown, labs sent, pt connected to monitor.

## 2024-12-09 NOTE — ASSESSMENT & PLAN NOTE
K 7.7, symptomatic with initial peaked T waves, due to SUZY, metabolic acidosis, and home aldactone - resolved  s/p lokelma, insulin, bicarb in ED 12/8

## 2024-12-09 NOTE — HOSPITAL COURSE
"\"59-year-old male with a past medical history significant for chronic systolic heart failure with last EF of 20%, methamphetamine abuse, and stage III chronic kidney disease who was just admitted to this facility about 2 weeks ago for acute heart failure exacerbation due to being noncompliant with his medications.  He returns today due to worsening abdominal discomfort, leg swelling, and shortness of breath.  The patient reports that he has been compliant with his heart failure medications.  He also reports that the last time he used methamphetamines was approximately 4 days ago.  Upon arrival to the emergency department he is found to be mildly hypotensive and received IV fluid resuscitation as well as antibiotics of Flagyl.  His laboratories returned showing a white count of 14.7, potassium of 7.7, serum bicarb of 12, anion gap of 22, glucose of 46, BUN of 53, creatinine of 2.42, AST greater than 7000, ALT of 4720, and a lactic acid of 7.9.  Upon questioning him the patient is mildly confused and has cold extremities.  He will be admitted to the ICU for multiorgan failure due to decompensated heart failure requiring inotropic therapy. \" Dr. Denis 12/8 12/9: dobutamine up to 5  "

## 2024-12-09 NOTE — PROGRESS NOTES
4 Eyes Skin Assessment Completed by JULIANA Bergman and JULIANA Neal.    Head WDL  Ears WDL  Nose WDL  Mouth WDL  Neck WDL  Breast/Chest WDL  Shoulder Blades WDL  Spine WDL  (R) Arm/Elbow/Hand WDL  (L) Arm/Elbow/Hand WDL  Abdomen WDL  Groin WDL  Scrotum/Coccyx/Buttocks WDL  (R) Leg Swelling,hitesh  (L) Leg Swelling, hitesh  (R) Heel/Foot/Toe WDL  (L) Heel/Foot/Toe WDL          Devices In Places Tele Box, Blood Pressure Cuff, and Pulse Ox      Interventions In Place Gray Ear Foams, NC W/Ear Foams, Sacral Mepilex, TAP System, Pillows, and Low Air Loss Mattress    Possible Skin Injury No    Pictures Uploaded Into Epic N/A  Wound Consult Placed N/A  RN Wound Prevention Protocol Ordered No   \

## 2024-12-09 NOTE — ASSESSMENT & PLAN NOTE
Secondary to cardiorenal syndrome - non-oliguric, improving  Avoid nephrotoxins  Monitor creatinine, urine output, electrolytes closely while maintaining perfusion to the kidneys  Continue aggressive diuresis

## 2024-12-09 NOTE — DISCHARGE PLANNING
Care Transition Team Assessment    In case of emergency, contact brotherTyree, [phone]    RN CM met with patient at bedside for assessment. Patient confirmed address on facesheet where he lives with his mother in a H. He is independent with ADL's and is his 91 year old mother's primary caregiver. He drives self and her to apt as needed. He collects $1500/month SSI. He sees YISEL Snyder, for PCP at [facility] and used [facility] on [address]. Chart lists recent meth use in H&P but patient denies any drug history or MH history. Brother will be ride home at discharge.    Information Source  Orientation Level: Oriented X4  Information Given By: Patient  Who is responsible for making decisions for patient? : Patient    Readmission Evaluation  Is this a readmission?: Yes - unplanned readmission    Elopement Risk  Legal Hold: No  Ambulatory or Self Mobile in Wheelchair: Yes  Disoriented: No  Psychiatric Symptoms: None  History of Wandering: No  Elopement this Admit: No  Vocalizing Wanting to Leave: No  Displays Behaviors, Body Language Wanting to Leave: No-Not at Risk for Elopement  Elopement Risk: Not at Risk for Elopement    Interdisciplinary Discharge Planning  Primary Care Physician: [facility]  Lives with - Patient's Self Care Capacity: Parents  Support Systems: Family Member(s)  Housing / Facility: 1 Story House  Able to Return to Previous ADL's: Yes  Mobility Issues: No  Prior Services: None    Discharge Preparedness  What is your plan after discharge?: Home with help  What are your discharge supports?: Sibling  Prior Functional Level: Ambulatory, Drives Self, Independent with Activities of Daily Living    Functional Assesment  Prior Functional Level: Ambulatory, Drives Self, Independent with Activities of Daily Living    Finances  Financial Barriers to Discharge: No  Prescription Coverage: Yes    Advance Directive  Advance Directive?: None    Domestic Abuse  Have you ever been the victim of abuse or violence?:  No    Psychological Assessment  History of Psychiatric Problems: No    Anticipated Discharge Information  Discharge Disposition: Discharged to home/self care (01)

## 2024-12-09 NOTE — PROGRESS NOTES
Pulmonary/Critical Care Medicine   Attending Progress Note    Date of service: 12/8/2024  Time: 2200    Mr. Zabala is a 59-year-old male with a past medical history significant for chronic systolic heart failure with last EF of 20%, methamphetamine abuse, and stage III chronic kidney disease who was just admitted to this facility about 2 weeks ago for acute heart failure exacerbation due to being noncompliant with his medications.  He returns today due to worsening abdominal discomfort, leg swelling, and shortness of breath.  The patient reports that he has been compliant with his heart failure medications.  He also reports that the last time he used methamphetamines was approximately 4 days ago.  Upon arrival to the emergency department he is found to be mildly hypotensive and received IV fluid resuscitation as well as antibiotics of Flagyl.  His laboratories returned showing a white count of 14.7, potassium of 7.7, serum bicarb of 12, anion gap of 22, glucose of 46, BUN of 53, creatinine of 2.42, AST greater than 7000, ALT of 4720, and a lactic acid of 7.9.  Upon questioning him the patient is mildly confused and has cold extremities.  He will be admitted to the ICU for multiorgan failure due to decompensated heart failure requiring inotropic therapy.    PMH: as above  PSH: no surgeries  SH: methamphetamine abuse  FH: unknown    Medications: K-Dur 20 mEq daily, omeprazole 20 mg daily, Lasix 20 mg daily, Aldactone 12.5 mg daily, metoprolol SR 12.5 mg daily, allopurinol 100 mg 2 times daily, aspirin 81 mg daily, Farxiga 10 mg daily    Allergies: none    Physical exam  Gen: pt appears a bit confused and mildly tachypneic, appears chronically ill and older than stated age  HEENT: perrl, eomi, conj: pink, sclera: mild icteric, oral: dry membranes  Neck: supple  CVS: distant RRR, no murmur  Lungs: clear, diminished bases  Abd: soft, distended, mild diffuse tenderness  Ext: cool distal extremities, 1+ dpp=, 2+ edema  noted to tib-fibs, FROM  Neuro: a/ox2, no focal deficits    Labs:  WBCs 14.7, hemoglobin 14.8, hematocrit 47.2, platelets 208    Sodium 135, potassium 7.7, chloride 101, CO2 12, anion gap 22, glucose 46, BUN 53, creatinine 2.42, AST greater than 7000, ALT 4720, alkaline phosphatase 126, total bilirubin 3.1      A/P  1) Acute Systolic Heart Failure Exacerbation with multiorgan failure   - admit to ICU   - will start dobutamine at 2.5 at fixed dose, may need higher dose and PA catheter for titration for CI   - aggressive diuresis with lasix, may need diuril if he becomes resistant to lasix   - will hold home HFrEF meds for now   - ECHO    2) SUZY   - due to CHF exacerbation and ischemic ATN   - aggressive diuresis   - monitor UOP/creat   - avoid nephrotoxins   - may need nephrology consultation if no improvement with ionotropes and diuresis    3) Acute Transaminitis   - suspect due to decompensated heart failure and hepatic congestion   - CT with hepatic steatosis and ascites   - follow AST/ALT and ammonia   - check hepatitis panel    4) Hyperkalemia   - due to SUZY and acidosis   - s/p insulin/D50, bicarb, lokelma   - serial BMP   - lasix    - may need nephrology for acute RRT if no improvement    5) Lactic acidosis   - suspect due to decompensated heart failure with liver failure   - trend      I spent extensive time in reviewing the patient's condition, physical examination, laboratory and imaging data, prior documentation, in discussion with Dr. Anderson, ICU resident, and nursing in formulating an assessment/plan.    Critical Care time: 110 min. No time overlap, procedures not included in time.  [id number], [id number]    Terri Denis MD  Pulmonary and Critical Care Medicine

## 2024-12-09 NOTE — ASSESSMENT & PLAN NOTE
Suspect due to hepatic congestion, cardiogenic shock.   - Hepatitis and HIV negative.   - Monitor labs and pain to consider repeat US with doppler (w/u acute thrombosis and hx of gallbladder wall edema)   - Will continue to monitor synthetic functon

## 2024-12-09 NOTE — ED NOTES
Lab called with hemolyzed sample with critical of potassium 9.2. ERP notified, repeat sample being obtained, EKG completed.

## 2024-12-09 NOTE — PROGRESS NOTES
"Critical Care Progress Note    Date of admission  12/8/2024    Chief Complaint  59 y.o. male admitted 12/8/2024 with aeCHF    Hospital Course  \"59-year-old male with a past medical history significant for chronic systolic heart failure with last EF of 20%, methamphetamine abuse, and stage III chronic kidney disease who was just admitted to this facility about 2 weeks ago for acute heart failure exacerbation due to being noncompliant with his medications.  He returns today due to worsening abdominal discomfort, leg swelling, and shortness of breath.  The patient reports that he has been compliant with his heart failure medications.  He also reports that the last time he used methamphetamines was approximately 4 days ago.  Upon arrival to the emergency department he is found to be mildly hypotensive and received IV fluid resuscitation as well as antibiotics of Flagyl.  His laboratories returned showing a white count of 14.7, potassium of 7.7, serum bicarb of 12, anion gap of 22, glucose of 46, BUN of 53, creatinine of 2.42, AST greater than 7000, ALT of 4720, and a lactic acid of 7.9.  Upon questioning him the patient is mildly confused and has cold extremities.  He will be admitted to the ICU for multiorgan failure due to decompensated heart failure requiring inotropic therapy. \" Dr. Denis 12/8    Interval Problem Update  Reviewed last 24 hour events:   - remains on dobutamine @ 2.5   - LA, troponin trending down   - improving abd pain   - AAOx4, requesting food/water, discussed meth abuse and cause of HF   - paracentesis without signs of SBP, culture NGTD   - AF, elevated WBC unchanged   - Hgb unchanged at 14   - K down to 5.2, bicarb up to 17, improving SUZY (good UOP)   - unchanged LFTs   - UDS (+) amphetamines + opiates   - NPO overnight, last  12/8   - [facility]    Review of Systems  Review of Systems   Constitutional:  Negative for chills, fever and malaise/fatigue.   HENT:  Negative for congestion and sore " throat.    Eyes:  Negative for blurred vision.   Respiratory:  Positive for cough. Negative for sputum production and shortness of breath.    Cardiovascular:  Negative for chest pain, palpitations and leg swelling.   Gastrointestinal:  Positive for abdominal pain. Negative for nausea and vomiting.   Genitourinary:  Negative for dysuria.   Musculoskeletal:  Negative for back pain and myalgias.   Skin:  Negative for rash.   Neurological:  Negative for dizziness, sensory change, speech change and headaches.   Endo/Heme/Allergies:  Positive for polydipsia. Does not bruise/bleed easily.   Psychiatric/Behavioral:  Positive for substance abuse. Negative for depression. The patient is not nervous/anxious.    All other systems reviewed and are negative.       Vital Signs for last 24 hours   Temp:  [36 °C (96.8 °F)-36.7 °C (98 °F)] 36.7 °C (98 °F)  Pulse:  [58-88] 70  Resp:  [8-40] 15  BP: ()/(40-85) 94/67  SpO2:  [89 %-100 %] 99 %    Hemodynamic parameters for last 24 hours       Respiratory Information for the last 24 hours   Room air    Physical Exam   Physical Exam  Vitals and nursing note reviewed.   Constitutional:       General: He is awake. He is not in acute distress.     Appearance: He is well-developed and normal weight. He is ill-appearing.   HENT:      Head: Normocephalic and atraumatic.      Nose: Nose normal.      Mouth/Throat:      Mouth: Mucous membranes are dry.      Pharynx: Oropharynx is clear. No oropharyngeal exudate.   Eyes:      General: Scleral icterus present.      Conjunctiva/sclera: Conjunctivae normal.      Pupils: Pupils are equal, round, and reactive to light.   Neck:      Vascular: No JVD.   Cardiovascular:      Rate and Rhythm: Normal rate and regular rhythm. Occasional Extrasystoles are present.     Chest Wall: PMI is displaced.      Pulses: Normal pulses.      Heart sounds: Murmur heard.   Pulmonary:      Effort: Pulmonary effort is normal. No respiratory distress.      Breath sounds:  No stridor. Examination of the right-lower field reveals rales. Examination of the left-lower field reveals rales. Rales present. No wheezing.      Comments: Protecting airway, breathing comfortably  Abdominal:      General: Bowel sounds are normal. There is distension.      Palpations: Abdomen is soft. There is fluid wave.      Tenderness: There is no abdominal tenderness. There is no right CVA tenderness, left CVA tenderness, guarding or rebound.   Musculoskeletal:         General: No tenderness.      Cervical back: Neck supple. No tenderness.      Right lower leg: No edema.      Left lower leg: No edema.   Skin:     General: Skin is warm and dry.      Capillary Refill: Capillary refill takes 2 to 3 seconds.      Coloration: Skin is jaundiced. Skin is not pale.   Neurological:      General: No focal deficit present.      Mental Status: He is alert and oriented to person, place, and time.      Cranial Nerves: No cranial nerve deficit.      Sensory: No sensory deficit.   Psychiatric:         Mood and Affect: Mood normal.         Behavior: Behavior normal. Behavior is cooperative.         Thought Content: Thought content normal.         Medications  Current Facility-Administered Medications   Medication Dose Route Frequency Provider Last Rate Last Admin    sincalide (Kinevac) injection 1.2 mcg  0.02 mcg/kg Intravenous Once Kimmy Anderson M.D.        sodium zirconium cyclosilicate (Lokelma) packet 10 g  10 g Oral TID Dariusz Gunter M.D.   10 g at 12/08/24 2347    Followed by    [START ON 12/11/2024] sodium zirconium cyclosilicate (Lokelma) packet 10 g  10 g Oral DAILY AT NOON Dariusz Gunter M.D.        heparin injection 5,000 Units  5,000 Units Subcutaneous Q8HRS Kimmy Anderson M.D.   5,000 Units at 12/09/24 0540    dextrose 50% (D50W) injection 25 g  25 g Intravenous Q15 MIN PRN Kimmy Anderson M.D.        DOBUTamine (Dobutrex) 1 mg/1 mL premix infusion  2.5 mcg/kg/min (Ideal) Intravenous Continuous Terri BHAGAT  SUMMER Denis 9.2 mL/hr at 12/08/24 2316 2.5 mcg/kg/min at 12/08/24 2316    Pharmacy Consult Request ...Pain Management Review 1 Each  1 Each Other PHARMACY TO DOSE Kimmy Anderson M.D.        oxyCODONE immediate-release (Roxicodone) tablet 2.5 mg  2.5 mg Oral Q3HRS PRTERESA Anderson M.D.        Or    oxyCODONE immediate-release (Roxicodone) tablet 5 mg  5 mg Oral Q3HRS PRTERESA Anderson M.D.        Or    HYDROmorphone (Dilaudid) injection 0.25 mg  0.25 mg Intravenous Q3HRS PRTERESA Anderson M.D.           Fluids    Intake/Output Summary (Last 24 hours) at 12/9/2024 0638  Last data filed at 12/9/2024 0329  Gross per 24 hour   Intake 1135.86 ml   Output 400 ml   Net 735.86 ml       Laboratory      Recent Labs     12/08/24 2023   CPKTOTAL 499*     Recent Labs     12/08/24 2023 12/08/24 2330 12/09/24  0410   SODIUM 135 136 135   POTASSIUM 7.7* 5.8* 5.2   CHLORIDE 101 105 104   CO2 12* 16* 17*   BUN 53* 52* 56*   CREATININE 2.42* 2.30* 2.13*   MAGNESIUM  --  3.0* 2.8*   PHOSPHORUS  --  6.4*  --    CALCIUM 9.3 7.8* 8.2*     Recent Labs     12/07/24 1716 12/08/24 2023 12/08/24 2330 12/09/24  0410   ALTSGPT 51* 4720*  --  5440*   ASTSGOT 50* >7000*  --  >7000*   ALKPHOSPHAT 135* 126*  --  105*   TBILIRUBIN 0.8 3.1*  --  1.4   DBILIRUBIN  --   --   --  0.9*   LIPASE 33 50  --   --    GLUCOSE 131* 46* 125* 109*     Recent Labs     12/07/24 1716 12/08/24 1750 12/08/24 2023 12/09/24  0410   WBC 7.3 14.7*  --  14.3*   NEUTSPOLYS 71.00 81.00*  --  83.80*   LYMPHOCYTES 19.40* 8.90*  --  6.30*   MONOCYTES 7.60 9.30  --  9.30   EOSINOPHILS 1.00 0.00  --  0.00   BASOPHILS 0.50 0.10  --  0.10   ASTSGOT 50*  --  >7000* >7000*   ALTSGPT 51*  --  4720* 5440*   ALKPHOSPHAT 135*  --  126* 105*   TBILIRUBIN 0.8  --  3.1* 1.4     Recent Labs     12/07/24  1716 12/08/24  1750 12/08/24  2330 12/09/24  0410 12/09/24  0535   RBC 4.62* 4.46*  --  4.15*  --    HEMOGLOBIN 15.3 14.8  --  13.6*  --    HEMATOCRIT 47.6 47.2  --  41.9*   --    PLATELETCT 253 208  --  189  --    PROTHROMBTM  --  27.7* 28.8*  --  29.9*   INR  --  2.57* 2.69*  --  2.83*       Imaging  CT:    Reviewed  Ultrasound:  Reviewed    Assessment/Plan  * Cardiogenic shock (HCC)  Assessment & Plan  Increase dobutamine to 5 mcg/kg/min today  Continue aggressive diuresis  Monitor endorgan function including lactic acid, urine output, LFTs, creatinine, mentation    Hyperkalemia  Assessment & Plan  K 7.7, symptomatic with initial peaked T waves, due to SUZY, metabolic acidosis, and home aldactone  s/p lokelma, insulin, bicarb in ED 12/8  Continue lasix, Lokelma  Trend closely     Abdominal pain  Assessment & Plan  Secondary to decompensate HF leading to hepatic/biliary and intestinal congestion/ischemia - improving  Surgery consulted, consider HIDA in near future if worsens  Begin diet today and monitor symptoms  Follow-up on paracentesis culture, monitor off antibiotics for now  Prn analgesics    Acute on chronic systolic congestive heart failure (HCC)- (present on admission)  Assessment & Plan  EF 20% (severe MR, RVSP 45mmHg) on echo 11/16/24 - decompensated due to ongoing meth abuse and medication non-compliance  Hold GDMT while in shock  Educated on importance of drug cessation and medication compliance    Metabolic acidosis, increased anion gap  Assessment & Plan  Due to SUZY and lactic acidosis -improving  S/p bicarb in ER    SUZY (acute kidney injury) (HCC)- (present on admission)  Assessment & Plan  Secondary to cardiorenal syndrome - non-oliguric  Avoid nephrotoxins  Monitor creatinine, urine output, electrolytes closely while maintaining perfusion to the kidneys  Continue aggressive diuresis    Nonischemic cardiomyopathy (HCC)- (present on admission)  Assessment & Plan  Hx of meth use, EF 20%, Minimal CAD on [facility] in 2/2024    Transaminitis- (present on admission)  Assessment & Plan  Secondary to hepatic congestion, cardiogenic shock   Avoid hepatotoxins  Monitor synthetic  function while maintaining perfusion to the liver    Hyperbilirubinemia- (present on admission)  Assessment & Plan  Likely secondary to hepatic congestion -improving  Continue to monitor, consider HIDA scan if worsens    Hypoglycemia  Assessment & Plan  Secondary to poor PO intake since discharge, associated to abdominal pain  Hypoglycemia protocol - trend  Begin diet today    Non-rheumatic mitral regurgitation- (present on admission)  Assessment & Plan  Strict blood pressure and fluid management    Methamphetamine abuse (HCC)- (present on admission)  Assessment & Plan  Last use 4 days ago reportedly, monitor for withdrawal  Drug cessation education and resources provided         VTE:  Heparin  Ulcer: Not Indicated  Lines: None    I have performed a physical exam and reviewed and updated ROS and Plan today (12/9/2024). In review of yesterday's note (12/8/2024), there are no changes except as documented above.     Patient remains critically ill today requiring active titration of inotropic support in the setting of decompensated heart failure with associated organ failure. High risk of deterioration and worsening vital organ dysfunction and death without the above critical care interventions.    Discussed patient condition and risk of morbidity and/or mortality with RN, RT, Pharmacy, [facility] resident, Charge nurse / hot rounds, and Patient.  Critical care time = 115 minutes in directly providing and coordinating critical care and extensive data review.  No time overlap and excludes procedures.    Please note that this dictation was created using voice recognition software. I have made every reasonable attempt to correct obvious errors, but there may be errors of grammar and possibly content that I did not discover before finalizing the note.

## 2024-12-09 NOTE — H&P
[facility] Critical Care History & Physical Note    Date of Service  12/9/2024    Attending: Terri Denis M.d.  Senior Resident: Dr. Anderson   Contact Number: [phone]    Primary Care Physician  Pcp Pt States None    Code Status  Full Code    Chief Complaint  Chief Complaint   Patient presents with    Abdominal Pain     ABD pain x3 days. Pt was seen last night for same thing, but states pain is worse today. Reported urinary retention.        History of Presenting Illness (HPI):   Santino Zabala is a 59 y.o. male with history of HFrEF, substance abuse/methamphetamine, CKD, recently discharged after a hospitalization for acute HFrEF. Presented yesterday with acute abdominal pain, good transient response to GI cocktail, but presents again today with persistent abdominal pain. He tells me he has been doing poorly since discharge on 12/1 with poor appetite, poor urine output, increasing diffuse abdominal pain, increased fatigue, and pale stools. Used meth 4-5 days ago, denies alcohol intake, states compliant with meds at discharge.    In ER, patient was found icteric, in severe abdominal pain, labs remarkable for severe transaminitis, hyperbilirubinemia, lactic acidosis of 7.4, hyperkalemia of 7.7 with EKG changes and metabolic acidosis. CT abdomen with mild ascites, no other acute findings. Received IVF and a diagnostic paracentesis. Repeat EKG with improved T wave changes.    I discussed the plan of care with patient, bedside RN, and charge RN.    Review of Systems  Review of Systems   Constitutional:  Positive for malaise/fatigue. Negative for chills, diaphoresis and fever.   HENT:  Negative for congestion and sore throat.    Respiratory:  Negative for cough, sputum production, shortness of breath and wheezing.    Cardiovascular:  Positive for orthopnea. Negative for chest pain and leg swelling.   Gastrointestinal:  Positive for abdominal pain and nausea. Negative for constipation, diarrhea (Last soft BM, very  pale) and vomiting.   Genitourinary:         No UO since yesterday    Musculoskeletal:  Positive for myalgias.   Skin:  Negative for rash.   Neurological:  Positive for dizziness.     Past Medical History   has a past medical history of Congestive heart failure (HCC).    Surgical History   has no past surgical history on file.     Family History  family history is not on file.   Family history reviewed with patient.     Social History  Tobacco: Denies  Alcohol: States last use a year ago   Recreational drugs (illegal or prescription): Meth use 4 days ago     Allergies  No Known Allergies    Medications  Prior to Admission Medications   Prescriptions Last Dose Informant Patient Reported? Taking?   allopurinol (ZYLOPRIM) 100 MG Tab Unknown Historical, Patient No No   Sig: Take 1 Tablet by mouth 2 times a day.   aspirin 81 MG EC tablet Unknown Patient, Historical Yes No   Sig: Take 81 mg by mouth every day.   dapagliflozin propanediol (FARXIGA) 10 MG Tab Unknown Patient, Historical No No   Sig: Take 1 Tablet by mouth every day.   furosemide (LASIX) 40 MG Tab Unknown Historical, Patient No No   Sig: Take 0.5 Tablets by mouth 1 time a day as needed (leg swelling, shortness of breath).   metoprolol SR (TOPROL XL) 25 MG TABLET SR 24 HR Unknown Historical, Patient No No   Sig: Take 0.5 Tablets by mouth every day.   omeprazole (PRILOSEC) 20 MG delayed-release capsule New Rx Historical, Patient No No   Sig: Take 1 Capsule by mouth every day.   potassium chloride SA (KDUR) 20 MEQ Tab CR Unknown Historical, Patient Yes No   Sig: Take 20 mEq by mouth every day.   spironolactone (ALDACTONE) 25 MG Tab Unknown Historical, Patient No No   Sig: Take 0.5 Tablets by mouth every day. 1/2 tablet= 12.5mg      Facility-Administered Medications: None       Physical Exam  Temp:  [36 °C (96.8 °F)-36.7 °C (98 °F)] 36.7 °C (98 °F)  Pulse:  [58-87] 81  Resp:  [15-26] 19  BP: ()/(40-85) 129/85  SpO2:  [89 %-100 %] 100 %  Blood Pressure:  129/85   Temperature: 36.7 °C (98 °F)   Pulse: 81   Respiration: 19   Pulse Oximetry: 100 %     Physical Exam  Vitals and nursing note reviewed.   Constitutional:       General: He is in acute distress.      Appearance: He is ill-appearing. He is not toxic-appearing or diaphoretic.   HENT:      Head: Normocephalic and atraumatic.      Mouth/Throat:      Mouth: Mucous membranes are dry.   Eyes:      Extraocular Movements: Extraocular movements intact.      Conjunctiva/sclera: Conjunctivae normal.      Pupils: Pupils are equal, round, and reactive to light.   Neck:      Comments: JVD and HJR   Cardiovascular:      Rate and Rhythm: Regular rhythm.      Pulses: Normal pulses.      Heart sounds: Murmur (Systolic in all points) heard.   Pulmonary:      Comments: Tachypneic, CLTA  Abdominal:      General: Abdomen is flat.      Palpations: Abdomen is soft.      Tenderness: There is abdominal tenderness (diffuse, mostly RUQ, no rebound).   Musculoskeletal:         General: Swelling (trace pitting edema, cold extremities) present. Normal range of motion.      Cervical back: Normal range of motion.   Skin:     Capillary Refill: Capillary refill takes 2 to 3 seconds.   Neurological:      General: No focal deficit present.      Mental Status: He is alert.         Laboratory:  Recent Labs     12/07/24  1716 12/08/24 1750   WBC 7.3 14.7*   RBC 4.62* 4.46*   HEMOGLOBIN 15.3 14.8   HEMATOCRIT 47.6 47.2   .0* 105.8*   MCH 33.1* 33.2*   MCHC 32.1* 31.4*   RDW 60.5* 63.1*   PLATELETCT 253 208   MPV 10.2 10.7     Recent Labs     12/07/24 1716 12/08/24 2023   SODIUM 138 135   POTASSIUM 4.7 7.7*   CHLORIDE 106 101   CO2 18* 12*   GLUCOSE 131* 46*   BUN 34* 53*   CREATININE 1.31 2.42*   CALCIUM 9.6 9.3     Recent Labs     12/07/24  1716 12/08/24 2023   ALTSGPT 51* 4720*   ASTSGOT 50* >7000*   ALKPHOSPHAT 135* 126*   TBILIRUBIN 0.8 3.1*   LIPASE 33 50   GLUCOSE 131* 46*     Recent Labs     12/08/24  1750   INR 2.57*     Recent  Labs     12/08/24 2023   NTPROBNP 44254*         Recent Labs     12/08/24 2023   TROPONINT 62*       Imaging:  CT-ABDOMEN-PELVIS WITH   Final Result      1.  Moderate cardiomegaly with multichamber enlargement.   2.  Third spacing of fluid with trace pleural effusions, mild ascites and mild body wall edema.   3.  Hepatic steatosis.   4.  Small caliber branch arteries could be related to vasoconstriction.   5.  Atherosclerosis.          Assessment/Plan:  I anticipate this patient will require at least two midnights for appropriate medical management, necessitating inpatient admission because cardiogenic shock, hyperkalemia, SUZY    Patient will need a ICU (Adult and Pediatrics) bed on MEDICAL service .  The need is secondary to cardiogenic shock, hyperkalemia, SUZY.    * Cardiogenic shock (HCC)  Assessment & Plan  Acute multiorgan failure - SUZY, lactic acidosis, transaminitis   Recent meth use, last echo in November with EF 20%, severe MR, global hypokinesis, reduced RV function  Suspect BiV failure - bedside POCUS with severely enlarged RV and LV with global hypokinesis, severely depressed EF (suspect worse than prior 20%), CO 2.0 (LVOT VTI), some pericardial effusion, IVC 3 cm with significant hepatofugal blood flow  - Preload: Start aggressive diuresis   - Inotropic: Start dobutamine @2.5 and reassess  - Afterload: MAP 70~, goal MAP >65, no agent for now   - Trend lactic acid, check trop and proBNP  - Stric I/Os, UO, daily weight  - Hold home GDMT (unclear compliance) in the setting of acute decompensation    Hyperkalemia  Assessment & Plan  K 7.7, symptomatic with initial peaked T waves  Likely due to SUZY, metabolic acidosis, and home aldactone   - s/p lokelma, insulin, bicarb  - Started on lasix as above  - Trend closely   - See plan for SUZY     Hypoglycemia  Assessment & Plan  Poor PO intake since discharge, associated to abdominal pain  - Hypoglycemia protocol - trend  - Strict dextrose if insulin needed for  hyperkalemia     Metabolic acidosis, increased anion gap  Assessment & Plan  Due to SUZY and lactic acidosis   S/p bicarb in ER  - See cardiogenic shock and SUZY - trend    Abdominal pain  Assessment & Plan  Acute for 4-5 days   Likely due to acute ischemic hepatitis as above  CT abdomen with mild ascites s/p dx paracentesis in ER - follow fluid studies   - S/p flagyl x1 in ER, hold off on further antibiotics for now, low concern for infection at this time   - Pain regimen (no nsaids no morphine)   - See transaminitis and cardiogenic shock   - Monitor liver enzymes/function and monitor abdominal pain to consider further w/u    SUZY (acute kidney injury) (HCC)- (present on admission)  Assessment & Plan  Suspect cardiorrenal, UA with significant proteinuria and hematuria though  - Check CPK, TSH   - Check UPCR in AM   - See plan for cardiogenic shock   - Monitor UO     Methamphetamine abuse (HCC)- (present on admission)  Assessment & Plan  Last use 4 days ago reportedly  Denies other substance abuse  - Check UDS  - Continue counseling as appropriate    Nonischemic cardiomyopathy (HCC)- (present on admission)  Assessment & Plan  Hx of meth use   Denies alcohol use   Minimal CAD on [facility] in 2/2024  - See plan for cardiogenic shock    Transaminitis- (present on admission)  Assessment & Plan  Suspect due to hepatic congestion, cardiogenic shock   - Check hepatitis panel, HIV  - Monitor labs and pain to consider repeat US with doppler (w/u acute thrombosis and hx of gallbladder wall edema)   - Check INR    Hyperbilirubinemia- (present on admission)  Assessment & Plan  Likely secondary to hepatic congestion  - Trend to consider further w/u  - See cardiogenic shock      VTE prophylaxis: heparin ppx

## 2024-12-09 NOTE — ASSESSMENT & PLAN NOTE
Secondary to decompensated HF leading to hepatic/biliary and intestinal congestion/ischemia - improving  Surgery consulted, consider HIDA in near future if worsens  Follow-up on paracentesis culture, monitor off antibiotics for now  Prn analgesics

## 2024-12-10 PROBLEM — E87.8 ELECTROLYTE ABNORMALITY: Status: ACTIVE | Noted: 2024-12-10

## 2024-12-10 PROBLEM — I27.20 PULMONARY HYPERTENSION (HCC): Status: ACTIVE | Noted: 2024-12-10

## 2024-12-10 LAB
ALBUMIN SERPL BCP-MCNC: 3.5 G/DL (ref 3.2–4.9)
ALBUMIN/GLOB SERPL: 1.1 G/DL
ALP SERPL-CCNC: 120 U/L (ref 30–99)
ALT SERPL-CCNC: 3561 U/L (ref 2–50)
ANION GAP SERPL CALC-SCNC: 10 MMOL/L (ref 7–16)
AST SERPL-CCNC: 2849 U/L (ref 12–45)
BASOPHILS # BLD AUTO: 0.3 % (ref 0–1.8)
BASOPHILS # BLD: 0.03 K/UL (ref 0–0.12)
BILIRUB SERPL-MCNC: 1.6 MG/DL (ref 0.1–1.5)
BUN SERPL-MCNC: 39 MG/DL (ref 8–22)
CALCIUM ALBUM COR SERPL-MCNC: 8.7 MG/DL (ref 8.5–10.5)
CALCIUM SERPL-MCNC: 8.3 MG/DL (ref 8.5–10.5)
CHLORIDE SERPL-SCNC: 97 MMOL/L (ref 96–112)
CO2 SERPL-SCNC: 29 MMOL/L (ref 20–33)
CREAT SERPL-MCNC: 1.39 MG/DL (ref 0.5–1.4)
EKG IMPRESSION: NORMAL
EOSINOPHIL # BLD AUTO: 0.04 K/UL (ref 0–0.51)
EOSINOPHIL NFR BLD: 0.4 % (ref 0–6.9)
ERYTHROCYTE [DISTWIDTH] IN BLOOD BY AUTOMATED COUNT: 54.3 FL (ref 35.9–50)
GFR SERPLBLD CREATININE-BSD FMLA CKD-EPI: 58 ML/MIN/1.73 M 2
GLOBULIN SER CALC-MCNC: 3.1 G/DL (ref 1.9–3.5)
GLUCOSE SERPL-MCNC: 103 MG/DL (ref 65–99)
HCT VFR BLD AUTO: 43.1 % (ref 42–52)
HGB BLD-MCNC: 14.9 G/DL (ref 14–18)
IMM GRANULOCYTES # BLD AUTO: 0.04 K/UL (ref 0–0.11)
IMM GRANULOCYTES NFR BLD AUTO: 0.4 % (ref 0–0.9)
LACTATE SERPL-SCNC: 2.1 MMOL/L (ref 0.5–2)
LYMPHOCYTES # BLD AUTO: 0.8 K/UL (ref 1–4.8)
LYMPHOCYTES NFR BLD: 9 % (ref 22–41)
MAGNESIUM SERPL-MCNC: 2.1 MG/DL (ref 1.5–2.5)
MCH RBC QN AUTO: 33.5 PG (ref 27–33)
MCHC RBC AUTO-ENTMCNC: 34.6 G/DL (ref 32.3–36.5)
MCV RBC AUTO: 96.9 FL (ref 81.4–97.8)
MONOCYTES # BLD AUTO: 0.74 K/UL (ref 0–0.85)
MONOCYTES NFR BLD AUTO: 8.3 % (ref 0–13.4)
NEUTROPHILS # BLD AUTO: 7.25 K/UL (ref 1.82–7.42)
NEUTROPHILS NFR BLD: 81.6 % (ref 44–72)
NRBC # BLD AUTO: 0.06 K/UL
NRBC BLD-RTO: 0.7 /100 WBC (ref 0–0.2)
NT-PROBNP SERPL IA-MCNC: 2696 PG/ML (ref 0–125)
PHOSPHATE SERPL-MCNC: 2 MG/DL (ref 2.5–4.5)
PLATELET # BLD AUTO: 164 K/UL (ref 164–446)
PMV BLD AUTO: 11.1 FL (ref 9–12.9)
POTASSIUM SERPL-SCNC: 3.3 MMOL/L (ref 3.6–5.5)
PROT SERPL-MCNC: 6.6 G/DL (ref 6–8.2)
RBC # BLD AUTO: 4.45 M/UL (ref 4.7–6.1)
SODIUM SERPL-SCNC: 136 MMOL/L (ref 135–145)
WBC # BLD AUTO: 8.9 K/UL (ref 4.8–10.8)

## 2024-12-10 PROCEDURE — 700111 HCHG RX REV CODE 636 W/ 250 OVERRIDE (IP)

## 2024-12-10 PROCEDURE — A9270 NON-COVERED ITEM OR SERVICE: HCPCS | Mod: JZ

## 2024-12-10 PROCEDURE — 99233 SBSQ HOSP IP/OBS HIGH 50: CPT | Performed by: INTERNAL MEDICINE

## 2024-12-10 PROCEDURE — 700102 HCHG RX REV CODE 250 W/ 637 OVERRIDE(OP): Performed by: INTERNAL MEDICINE

## 2024-12-10 PROCEDURE — 700111 HCHG RX REV CODE 636 W/ 250 OVERRIDE (IP): Mod: JZ | Performed by: INTERNAL MEDICINE

## 2024-12-10 PROCEDURE — 83880 ASSAY OF NATRIURETIC PEPTIDE: CPT

## 2024-12-10 PROCEDURE — 85025 COMPLETE CBC W/AUTO DIFF WBC: CPT

## 2024-12-10 PROCEDURE — 80053 COMPREHEN METABOLIC PANEL: CPT

## 2024-12-10 PROCEDURE — 770020 HCHG ROOM/CARE - TELE (206)

## 2024-12-10 PROCEDURE — 700102 HCHG RX REV CODE 250 W/ 637 OVERRIDE(OP): Mod: JZ

## 2024-12-10 PROCEDURE — A9270 NON-COVERED ITEM OR SERVICE: HCPCS

## 2024-12-10 PROCEDURE — 83605 ASSAY OF LACTIC ACID: CPT

## 2024-12-10 PROCEDURE — 99255 IP/OBS CONSLTJ NEW/EST HI 80: CPT | Performed by: HOSPITALIST

## 2024-12-10 PROCEDURE — A9270 NON-COVERED ITEM OR SERVICE: HCPCS | Performed by: INTERNAL MEDICINE

## 2024-12-10 PROCEDURE — 83735 ASSAY OF MAGNESIUM: CPT

## 2024-12-10 PROCEDURE — 84100 ASSAY OF PHOSPHORUS: CPT

## 2024-12-10 PROCEDURE — 700102 HCHG RX REV CODE 250 W/ 637 OVERRIDE(OP)

## 2024-12-10 PROCEDURE — 700111 HCHG RX REV CODE 636 W/ 250 OVERRIDE (IP): Performed by: INTERNAL MEDICINE

## 2024-12-10 RX ORDER — POTASSIUM CHLORIDE 1500 MG/1
40 TABLET, EXTENDED RELEASE ORAL EVERY 6 HOURS
Status: COMPLETED | OUTPATIENT
Start: 2024-12-10 | End: 2024-12-10

## 2024-12-10 RX ORDER — ENOXAPARIN SODIUM 100 MG/ML
40 INJECTION SUBCUTANEOUS DAILY
Status: DISCONTINUED | OUTPATIENT
Start: 2024-12-10 | End: 2024-12-12 | Stop reason: HOSPADM

## 2024-12-10 RX ADMIN — DIBASIC SODIUM PHOSPHATE, MONOBASIC POTASSIUM PHOSPHATE AND MONOBASIC SODIUM PHOSPHATE 250 MG: 852; 155; 130 TABLET ORAL at 12:48

## 2024-12-10 RX ADMIN — FUROSEMIDE 40 MG: 10 INJECTION, SOLUTION INTRAVENOUS at 14:18

## 2024-12-10 RX ADMIN — ASPIRIN 81 MG: 81 TABLET, COATED ORAL at 05:33

## 2024-12-10 RX ADMIN — DIBASIC SODIUM PHOSPHATE, MONOBASIC POTASSIUM PHOSPHATE AND MONOBASIC SODIUM PHOSPHATE 250 MG: 852; 155; 130 TABLET ORAL at 16:50

## 2024-12-10 RX ADMIN — DIBASIC SODIUM PHOSPHATE, MONOBASIC POTASSIUM PHOSPHATE AND MONOBASIC SODIUM PHOSPHATE 250 MG: 852; 155; 130 TABLET ORAL at 06:39

## 2024-12-10 RX ADMIN — FUROSEMIDE 40 MG: 10 INJECTION, SOLUTION INTRAVENOUS at 21:56

## 2024-12-10 RX ADMIN — POTASSIUM CHLORIDE 40 MEQ: 1500 TABLET, EXTENDED RELEASE ORAL at 12:48

## 2024-12-10 RX ADMIN — THIAMINE HYDROCHLORIDE 200 MG: 100 INJECTION, SOLUTION INTRAMUSCULAR; INTRAVENOUS at 05:33

## 2024-12-10 RX ADMIN — FUROSEMIDE 40 MG: 10 INJECTION, SOLUTION INTRAVENOUS at 05:33

## 2024-12-10 RX ADMIN — HEPARIN SODIUM 5000 UNITS: 5000 INJECTION, SOLUTION INTRAVENOUS; SUBCUTANEOUS at 05:33

## 2024-12-10 RX ADMIN — ENOXAPARIN SODIUM 40 MG: 100 INJECTION SUBCUTANEOUS at 16:51

## 2024-12-10 RX ADMIN — POTASSIUM CHLORIDE 40 MEQ: 1500 TABLET, EXTENDED RELEASE ORAL at 05:33

## 2024-12-10 RX ADMIN — DOBUTAMINE HYDROCHLORIDE 5 MCG/KG/MIN: 100 INJECTION INTRAVENOUS at 06:39

## 2024-12-10 ASSESSMENT — ENCOUNTER SYMPTOMS
MYALGIAS: 1
DIAPHORESIS: 0
FOCAL WEAKNESS: 0
DEPRESSION: 0
SHORTNESS OF BREATH: 0
PALPITATIONS: 0
SPUTUM PRODUCTION: 0
DIZZINESS: 1
SORE THROAT: 0
CHILLS: 0
VOMITING: 0
NERVOUS/ANXIOUS: 0
FLANK PAIN: 0
FEVER: 0
CONSTIPATION: 0
COUGH: 0
ABDOMINAL PAIN: 0
NAUSEA: 0
INSOMNIA: 1
DIARRHEA: 0
DIZZINESS: 0
CONSTIPATION: 1
WHEEZING: 0
MYALGIAS: 0
SHORTNESS OF BREATH: 1

## 2024-12-10 ASSESSMENT — PAIN DESCRIPTION - PAIN TYPE
TYPE: ACUTE PAIN

## 2024-12-10 ASSESSMENT — LIFESTYLE VARIABLES: SUBSTANCE_ABUSE: 1

## 2024-12-10 ASSESSMENT — FIBROSIS 4 INDEX: FIB4 SCORE: 17.47

## 2024-12-10 NOTE — ASSESSMENT & PLAN NOTE
Potassium was 7.7 on admission  This has gone down substantially with IV diuresis  Basic metabolic panel ordered for the morning to follow his potassium which is now being added in the setting of Lasix

## 2024-12-10 NOTE — PROGRESS NOTES
"[facility] Progress Note      Admit Date: 12/8/2024    Resident(s): Kim Sanchez M.D.  Attending: Dr. Jeremy Gonda, M.D.    Date & Time:   12/10/2024   2:36 PM       Patient ID:    Name:             Santino Zabala   YOB: 1965  Age:                 59 y.o.  male   MRN:               [id number]    HPI:  From Dr. Anderson's note \"Santino Zabala is a 59 y.o. male with history of HFrEF, substance abuse/methamphetamine, CKD, recently discharged after a hospitalization for acute HFrEF. Presented yesterday with acute abdominal pain, good transient response to GI cocktail, but presents again today with persistent abdominal pain. He tells me he has been doing poorly since discharge on 12/1 with poor appetite, poor urine output, increasing diffuse abdominal pain, increased fatigue, and pale stools. Used meth 4-5 days ago, denies alcohol intake, states compliant with meds at discharge.     In ER, patient was found icteric, in severe abdominal pain, labs remarkable for severe transaminitis, hyperbilirubinemia, lactic acidosis of 7.4, hyperkalemia of 7.7 with EKG changes and metabolic acidosis. CT abdomen with mild ascites, no other acute findings. Received IVF and a diagnostic paracentesis. Repeat EKG with improved T wave changes.\"     Hospital Course:  12/09: Admitted to the ICU, Dobutamine rate was increased to 5 --> LFTs and RFTs started to improve with this. Lactic acidosis resolved. Tbili normalized and no further abdominal pain so HIDA was cancelled. Reviewed results of diagnostic paracentesis, consistent with cardiac ascites. Did not require levophed.     Interval events:  12/10:   LFTs are down by over 50% this AM and responded very well to increased rate of Dobutamine. Hence reduced Dobutamine drip to 2.5. UOP overnight has been nearly 8L.   Patient stable to be transferred to telemetry floor.     Consultants:  Critical Care     Procedures:  N/A      Review of Systems   Constitutional:  " Positive for malaise/fatigue. Negative for chills, diaphoresis and fever.   HENT:  Negative for congestion and sore throat.    Respiratory:  Negative for cough, sputum production, shortness of breath and wheezing.    Cardiovascular:  Negative for chest pain and leg swelling.   Gastrointestinal:  Negative for abdominal pain, constipation, diarrhea (Last soft BM, very pale), nausea and vomiting.   Musculoskeletal:  Positive for myalgias.   Skin:  Negative for rash.   Neurological:  Positive for dizziness.       PHYSICAL EXAM    Vitals:    12/10/24 1248   BP: 104/55   Pulse: 81   Resp: 16   Temp: 37.1 °C (98.7 °F)   SpO2: 94%       Physical Exam  Vitals and nursing note reviewed.   Constitutional:       General: He is not in acute distress.     Appearance: He is not ill-appearing, toxic-appearing or diaphoretic.   HENT:      Head: Normocephalic and atraumatic.      Mouth/Throat:      Mouth: Mucous membranes are dry.   Eyes:      Extraocular Movements: Extraocular movements intact.      Conjunctiva/sclera: Conjunctivae normal.      Pupils: Pupils are equal, round, and reactive to light.   Neck:      Comments: JVD and HJR   Cardiovascular:      Rate and Rhythm: Regular rhythm.      Pulses: Normal pulses.      Heart sounds: Murmur (Systolic best heard in the LUSB) heard.      No gallop (+ve hepatojugular reflux).      Comments: Warm extremities  Pulmonary:      Effort: Pulmonary effort is normal.      Breath sounds: No rales (mild, bibasilar).   Abdominal:      General: Abdomen is flat.      Palpations: Abdomen is soft.      Tenderness: There is no abdominal tenderness (very mild if any).   Musculoskeletal:         General: No swelling. Normal range of motion.      Cervical back: Normal range of motion.      Comments: Pedal edema is nearly resolved.    Skin:     Capillary Refill: Capillary refill takes 2 to 3 seconds.   Neurological:      General: No focal deficit present.      Mental Status: He is alert.             Fluids:  Date 12/10/24 07 - 24 0659   Shift 3074-2194 9866-4555 4889-9974 24 Hour Total   INTAKE   P.O. 600   600     P.O. 600   600   Shift Total 600   600   OUTPUT   Urine 2300   2300     Urine Void (mL) 800   800     Output (mL) (External Urinary Catheter (Condom)) 1500   1500   Shift Total 2300   2300   NET -1700   -1700        Intake/Output Summary (Last 24 hours) at 2024 0619  Last data filed at 2024 0329  Gross per 24 hour   Intake 1135.86 ml   Output 400 ml   Net 735.86 ml       Weight: 56 kg (123 lb 7.3 oz)  Body mass index is 20.54 kg/m².    Recent Labs     12/08/24  2330 12/09/24  0410 12/09/24  1009 12/09/24  1732 12/10/24  0135 12/10/24  0410   SODIUM 136 135 134* 134* 136  --    POTASSIUM 5.8* 5.2 4.7 4.0 3.3*  --    CHLORIDE 105 104 100 97 97  --    CO2 16* 17* 15* 27 29  --    BUN 52* 56* 52* 45* 39*  --    CREATININE 2.30* 2.13* 1.92* 1.45* 1.39  --    MAGNESIUM 3.0* 2.8*  --   --   --  2.1   PHOSPHORUS 6.4*  --   --   --   --  2.0*   CALCIUM 7.8* 8.2* 8.5 8.3* 8.3*  --        GI/Nutrition:  Recent Labs     24  1009 12/09/24  1732 12/10/24  013   ALTSGPT 51* 4720*  --  5440* 5088* 3884* 3561*   ASTSGOT 50* >7000*  --  >7000* >7000* 3774* 2849*   ALKPHOSPHAT 135* 126*  --  105* 109* 117* 120*   TBILIRUBIN 0.8 3.1*  --  1.4 1.5 1.5 1.6*   DBILIRUBIN  --   --   --  0.9*  --   --   --    LIPASE 33 50  --   --   --   --   --    GLUCOSE 131* 46*   < > 109* 117* 102* 103*    < > = values in this interval not displayed.       Heme:  Recent Labs     24  1750 240 24  0535 12/10/24  0410   RBC 4.46*  --  4.15*  --  4.45*   HEMOGLOBIN 14.8  --  13.6*  --  14.9   HEMATOCRIT 47.2  --  41.9*  --  43.1   PLATELETCT 208  --  189  --  164   PROTHROMBTM 27.7* 28.8*  --  29.9*  --    INR 2.57* 2.69*  --  2.83*  --        Infectious Disease:  Monitored Temp 2  Av.9 °C (98.4 °F)  Min: 36.7  °C (98.1 °F)  Max: 37 °C (98.6 °F)  Temp  Av.8 °C (98.3 °F)  Min: 36.4 °C (97.5 °F)  Max: 37.1 °C (98.7 °F)  Recent Labs     24  17524  0410 24  1009 24  1732 12/10/24  0135 12/10/24  0410   WBC 14.7*  --  14.3*  --   --   --  8.9   NEUTSPOLYS 81.00*  --  83.80*  --   --   --  81.60*   LYMPHOCYTES 8.90*  --  6.30*  --   --   --  9.00*   MONOCYTES 9.30  --  9.30  --   --   --  8.30   EOSINOPHILS 0.00  --  0.00  --   --   --  0.40   BASOPHILS 0.10  --  0.10  --   --   --  0.30   ASTSGOT  --    < > >7000* >7000* 3774* 2849*  --    ALTSGPT  --    < > 5440* 5088* 3884* 3561*  --    ALKPHOSPHAT  --    < > 105* 109* 117* 120*  --    TBILIRUBIN  --    < > 1.4 1.5 1.5 1.6*  --     < > = values in this interval not displayed.       Meds:   phosphorus  250 mg      enoxaparin (LOVENOX) injection  40 mg      thiamine  200 mg      aspirin  81 mg      furosemide  40 mg      dextrose bolus  25 g      DOBUTamine-Dextrose  2.5 mcg/kg/min (Ideal) 2.5 mcg/kg/min (12/10/24 0740)    oxyCODONE immediate-release  2.5 mg      Or    oxyCODONE immediate-release  5 mg      Or    HYDROmorphone  0.25 mg            Assessment and Plan:  * Cardiogenic shock (HCC)  Assessment & Plan  Admitted with cardiogenic shock due to acute exacerbation of chronic systolic heart failure. 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 Suspect that his acute exacerbation was triggered by recent meth use prior to admission and poor medication compliance.     Evidence of acute multiorgan failure - SUZY, lactic acidosis, transaminitis.   Last echo was during recent hospitalization less than 2 weeks ago, with EF 20%, severe MR, global hypokinesis, reduced RV function. Bedside POCUS at admission showed possible BiV failure - severely enlarged RV and LV with global hypokinesis, severely depressed EF (<15%?), CO 2.0 (LVOT VTI), some pericardial effusion, IVC 3 cm with significant hepatofugal blood flow.  Repeat formal echo not done.     - Inotropes:   Dobutamine:   Responded very well to increased rate of 5 -> reduce down to 2.5 on 12/10.   Did not need pressors support.   LFTs, RFTs improved.   - Decongestion: Tolerating IV Lasix 40mg TID very well.     Monitor I/O strictly   - Afterload: Will need to be started once volume status is optimized  - Strict I/O and daily weight monitoring  - Hold home GDMT for now due to cardiogenic shock  - Holding off on repeat echo for now    Hypoglycemia  Assessment & Plan  Unclear etiology, will monitor for now, hypoglycemia protocol.     Hyperkalemia  Assessment & Plan  Resolved with insulin/dextrose, lasix, sodium bicarb.   Continue lokelma for now  Monitor on tele    Metabolic acidosis, increased anion gap  Assessment & Plan  Secondary to SUZY and Lactic acidosis, improving  Monitor      Abdominal pain  Assessment & Plan  Likely due to decompensated heart failure with hepatic and intestinal congestion or possibly bowel ischemia as he did present with cardiogenic shock. Tbili was initially elevated, has normalized with tx of cardiogenic shock.   Surgery consulted in the ED, will consider HIDA in near future if worsens  Paracentesis done in the ED: SAAG is 1.8. Ascitic protein is 2.8 which is consistent with cardiac ascites. Ascitic fluid neutrophil count is <250.   Prn analgesics, monitor off abx      Acute on chronic systolic congestive heart failure (HCC)  Assessment & Plan  A&P as noted under Cardiogenic shock.     SUZY (acute kidney injury) (HCC)  Assessment & Plan  Non oliguric SUZY. Baseline Scr 1-1.3, but 2.3 at presentation here. Likely secondary to cardiorenal syndrome.   Aggressive diuresis as noted above  Will get FeUrea if doesn't improve   Has bingham for strict I/O    Methamphetamine abuse (HCC)  Assessment & Plan  Last use was about 4 days prior to hospitalization. Counseled regarding meth cessation.     Nonischemic cardiomyopathy (HCC)  Assessment & Plan  Hx of meth  use   Minimal CAD on [facility] in 2/2024  - See plan for cardiogenic shock    Transaminitis  Assessment & Plan  Suspect due to hepatic congestion, cardiogenic shock.   - Hepatitis and HIV negative.   - Monitor labs and pain to consider repeat US with doppler (w/u acute thrombosis and hx of gallbladder wall edema)   - Will continue to monitor synthetic functon    Hyperbilirubinemia  Assessment & Plan  Tbili has normalized. Suspect this was due to hepatic congestion from the Cardiogenic shock.   Monitor for now, HIDA if worsens in the future             Quality Measures:  Feeding:  Cardiac diet  Analgesia: Dilaudid prn  Sedation: N/A  Thromboprophylaxis: Heparin  Head of bed: >30 degrees  Ulcer prophylaxis: N/A  Glycemic control: N/A  Bowel care: Laxatives ordered  Indwelling lines: 2 PIV  Deescalation of antibiotics: Not on abx, not indicated      CODE STATUS:   Full code  DISPO:    Stable to be transferred to the telemetry floor

## 2024-12-10 NOTE — ASSESSMENT & PLAN NOTE
Most consistent with cardiorenal syndrome  Baseline creatinine appears to be about 1.3 and was 2.4 on admission  Creatinine is now down to 1.39 on high-dose diuretics which may need to be changed to oral in the near future when his risk of volume depletion

## 2024-12-10 NOTE — PROGRESS NOTES
EKG ordered for possible ST chages noted by monitor tech. Shortly after, Pt had 5 beats of tachydysrhythmia. Resident Justin notified at 1800.

## 2024-12-10 NOTE — PROGRESS NOTES
Redness noted going up patient's arm. Marked with sharpie to monitor growth. Resident Justin notified at 1800.

## 2024-12-10 NOTE — PROGRESS NOTES
"Critical Care Progress Note    Date of admission  12/8/2024    Chief Complaint  59 y.o. male admitted 12/8/2024 with aeCHF    Hospital Course  \"59-year-old male with a past medical history significant for chronic systolic heart failure with last EF of 20%, methamphetamine abuse, and stage III chronic kidney disease who was just admitted to this facility about 2 weeks ago for acute heart failure exacerbation due to being noncompliant with his medications.  He returns today due to worsening abdominal discomfort, leg swelling, and shortness of breath.  The patient reports that he has been compliant with his heart failure medications.  He also reports that the last time he used methamphetamines was approximately 4 days ago.  Upon arrival to the emergency department he is found to be mildly hypotensive and received IV fluid resuscitation as well as antibiotics of Flagyl.  His laboratories returned showing a white count of 14.7, potassium of 7.7, serum bicarb of 12, anion gap of 22, glucose of 46, BUN of 53, creatinine of 2.42, AST greater than 7000, ALT of 4720, and a lactic acid of 7.9.  Upon questioning him the patient is mildly confused and has cold extremities.  He will be admitted to the ICU for multiorgan failure due to decompensated heart failure requiring inotropic therapy. \" Dr. Denis 12/8 12/9: dobutamine up to 5    Interval Problem Update  Reviewed last 24 hour events:   - improving SUZY and LFTs, LA, BNP   - robust UOP with lasix   - dobutamine @ 5   - AF, improving WBC   - AAOx4, denies pain   - NSR with ectopy, SBP    - low K/phos, Na up to 136   - paracentesis fluid culture NGTD   - placido cardiac diet with fluid restriction    Review of Systems  Review of Systems   Constitutional:  Negative for chills, fever and malaise/fatigue.   Respiratory:  Negative for cough and shortness of breath.    Cardiovascular:  Negative for chest pain, palpitations and leg swelling.   Gastrointestinal:  Negative for " abdominal pain, nausea and vomiting.   Genitourinary:  Negative for flank pain.   Musculoskeletal:  Negative for myalgias.   Skin:  Negative for rash.   Neurological:  Negative for dizziness and focal weakness.   Psychiatric/Behavioral:  Positive for substance abuse. Negative for depression. The patient has insomnia. The patient is not nervous/anxious.    All other systems reviewed and are negative.       Vital Signs for last 24 hours   Temp:  [36.4 °C (97.5 °F)-36.8 °C (98.2 °F)] 36.4 °C (97.5 °F)  Pulse:  [60-88] 77  Resp:  [8-42] 15  BP: ()/(54-70) 96/58  SpO2:  [90 %-99 %] 90 %    Respiratory Information for the last 24 hours   Remains on room air    Physical Exam   Physical Exam  Vitals and nursing note reviewed.   Constitutional:       General: He is awake. He is not in acute distress.     Appearance: He is well-developed and normal weight.   HENT:      Head: Normocephalic.      Nose: No rhinorrhea.      Mouth/Throat:      Mouth: Mucous membranes are moist.      Pharynx: Oropharynx is clear. No oropharyngeal exudate.   Eyes:      General: No scleral icterus.     Pupils: Pupils are equal, round, and reactive to light.   Neck:      Vascular: No JVD.   Cardiovascular:      Rate and Rhythm: Normal rate and regular rhythm. Occasional Extrasystoles are present.     Chest Wall: PMI is displaced.      Pulses: Normal pulses.      Heart sounds: Murmur heard.   Pulmonary:      Effort: Pulmonary effort is normal. No tachypnea or respiratory distress.      Breath sounds: No wheezing or rales.      Comments: Protecting airway, breathing comfortably  Abdominal:      General: Bowel sounds are normal. There is no distension.      Palpations: Abdomen is soft. There is fluid wave.      Tenderness: There is no abdominal tenderness. There is no guarding or rebound.   Genitourinary:     Comments: Condom cath in place  Musculoskeletal:         General: No tenderness.      Cervical back: No rigidity.      Right lower leg: No  edema.      Left lower leg: No edema.   Skin:     General: Skin is warm and dry.      Capillary Refill: Capillary refill takes less than 2 seconds.      Findings: No erythema.   Neurological:      General: No focal deficit present.      Mental Status: He is alert and oriented to person, place, and time.      Cranial Nerves: No cranial nerve deficit.      Sensory: No sensory deficit.   Psychiatric:         Mood and Affect: Mood normal.         Behavior: Behavior normal. Behavior is cooperative.         Thought Content: Thought content normal.         Medications  Current Facility-Administered Medications   Medication Dose Route Frequency Provider Last Rate Last Admin    potassium chloride SA (Kdur) tablet 40 mEq  40 mEq Oral Q6HRS Kimmy Anderson M.D.   40 mEq at 12/10/24 0533    phosphorus (K-Phos-Neutral) per tablet 250 mg  250 mg Oral Q6HRS Kim Sanchez M.D.   250 mg at 12/10/24 0639    thiamine (B-1) injection 200 mg  200 mg Intravenous DAILY Kim Sanchez M.D.   200 mg at 12/10/24 0533    aspirin EC tablet 81 mg  81 mg Oral DAILY Jeremy M Gonda, M.D.   81 mg at 12/10/24 0533    furosemide (Lasix) injection 40 mg  40 mg Intravenous Q8HRS Jeremy M Gonda, M.D.   40 mg at 12/10/24 0533    heparin injection 5,000 Units  5,000 Units Subcutaneous Q8HRS Kimmy Anderson M.D.   5,000 Units at 12/10/24 0533    dextrose 50% (D50W) injection 25 g  25 g Intravenous Q15 MIN PRN Kimmy Anderson M.D.        DOBUTamine (Dobutrex) 1 mg/1 mL premix infusion  5 mcg/kg/min (Ideal) Intravenous Continuous Jeremy M Gonda, M.D. 18.5 mL/hr at 12/10/24 0639 5 mcg/kg/min at 12/10/24 0639    oxyCODONE immediate-release (Roxicodone) tablet 2.5 mg  2.5 mg Oral Q3HRS PRTERESA Anderson M.D.        Or    oxyCODONE immediate-release (Roxicodone) tablet 5 mg  5 mg Oral Q3HRS PRTERESA Anderson M.D.        Or    HYDROmorphone (Dilaudid) injection 0.25 mg  0.25 mg Intravenous Q3HRS PRN Kimmy Anderson M.D.           Fluids    Intake/Output  Summary (Last 24 hours) at 12/10/2024 0658  Last data filed at 12/10/2024 0600  Gross per 24 hour   Intake 2004 ml   Output 8600 ml   Net -6596 ml       Laboratory      Recent Labs     12/08/24 2023   CPKTOTAL 499*     Recent Labs     12/08/24  2330 12/09/24  0410 12/09/24  1009 12/09/24  1732 12/10/24  0135 12/10/24  0410   SODIUM 136 135 134* 134* 136  --    POTASSIUM 5.8* 5.2 4.7 4.0 3.3*  --    CHLORIDE 105 104 100 97 97  --    CO2 16* 17* 15* 27 29  --    BUN 52* 56* 52* 45* 39*  --    CREATININE 2.30* 2.13* 1.92* 1.45* 1.39  --    MAGNESIUM 3.0* 2.8*  --   --   --  2.1   PHOSPHORUS 6.4*  --   --   --   --  2.0*   CALCIUM 7.8* 8.2* 8.5 8.3* 8.3*  --      Recent Labs     12/07/24  1716 12/08/24  2023 12/08/24  2330 12/09/24  0410 12/09/24  1009 12/09/24  1732 12/10/24  0135   ALTSGPT 51* 4720*  --  5440* 5088* 3884* 3561*   ASTSGOT 50* >7000*  --  >7000* >7000* 3774* 2849*   ALKPHOSPHAT 135* 126*  --  105* 109* 117* 120*   TBILIRUBIN 0.8 3.1*  --  1.4 1.5 1.5 1.6*   DBILIRUBIN  --   --   --  0.9*  --   --   --    LIPASE 33 50  --   --   --   --   --    GLUCOSE 131* 46*   < > 109* 117* 102* 103*    < > = values in this interval not displayed.     Recent Labs     12/08/24  1750 12/08/24  2023 12/09/24  0410 12/09/24  1009 12/09/24  1732 12/10/24  0135 12/10/24  0410   WBC 14.7*  --  14.3*  --   --   --  8.9   NEUTSPOLYS 81.00*  --  83.80*  --   --   --  81.60*   LYMPHOCYTES 8.90*  --  6.30*  --   --   --  9.00*   MONOCYTES 9.30  --  9.30  --   --   --  8.30   EOSINOPHILS 0.00  --  0.00  --   --   --  0.40   BASOPHILS 0.10  --  0.10  --   --   --  0.30   ASTSGOT  --    < > >7000* >7000* 3774* 2849*  --    ALTSGPT  --    < > 5440* 5088* 3884* 3561*  --    ALKPHOSPHAT  --    < > 105* 109* 117* 120*  --    TBILIRUBIN  --    < > 1.4 1.5 1.5 1.6*  --     < > = values in this interval not displayed.     Recent Labs     12/08/24  1750 12/08/24  2330 12/09/24  0410 12/09/24  0535 12/10/24  0410   RBC 4.46*  --  4.15*  --   4.45*   HEMOGLOBIN 14.8  --  13.6*  --  14.9   HEMATOCRIT 47.2  --  41.9*  --  43.1   PLATELETCT 208  --  189  --  164   PROTHROMBTM 27.7* 28.8*  --  29.9*  --    INR 2.57* 2.69*  --  2.83*  --        Imaging  CT:    Reviewed  Ultrasound:  Reviewed    Assessment/Plan  * Cardiogenic shock (HCC)  Assessment & Plan  Decrease dobutamine to 2.5 mcg/kg/min today  Continue aggressive diuresis  Monitor endorgan function including urine output, LFTs, creatinine    Hyperkalemia  Assessment & Plan  K 7.7, symptomatic with initial peaked T waves, due to SUZY, metabolic acidosis, and home aldactone - resolved  s/p lokelma, insulin, bicarb in ED 12/8    Abdominal pain  Assessment & Plan  Secondary to decompensated HF leading to hepatic/biliary and intestinal congestion/ischemia - improving  Surgery consulted, consider HIDA in near future if worsens  Follow-up on paracentesis culture, monitor off antibiotics for now  Prn analgesics    Acute on chronic systolic congestive heart failure (HCC)- (present on admission)  Assessment & Plan  EF 20% (severe MR, RVSP 45mmHg) on echo 11/16/24 - decompensated due to ongoing meth abuse and medication non-compliance  Hold GDMT while in shock, resume when stable  Educated on importance of drug cessation and medication compliance    Metabolic acidosis, increased anion gap  Assessment & Plan  Due to SUZY and lactic acidosis -improved  S/p bicarb in ER    SUZY (acute kidney injury) (HCC)- (present on admission)  Assessment & Plan  Secondary to cardiorenal syndrome - non-oliguric, improving  Avoid nephrotoxins  Monitor creatinine, urine output, electrolytes closely while maintaining perfusion to the kidneys  Continue aggressive diuresis    Nonischemic cardiomyopathy (HCC)- (present on admission)  Assessment & Plan  Hx of meth use, EF 20%, Minimal CAD on [facility] in 2/2024    Transaminitis- (present on admission)  Assessment & Plan  Secondary to hepatic congestion, cardiogenic shock - improving  Avoid  hepatotoxins  Monitor synthetic function while maintaining perfusion to the liver    Hyperbilirubinemia- (present on admission)  Assessment & Plan  Likely secondary to hepatic congestion -improving  Continue to monitor, consider HIDA scan if worsens    Electrolyte abnormality  Assessment & Plan  Replete K and phos and monitor    Hypoglycemia  Assessment & Plan  Secondary to poor PO intake since discharge, associated to abdominal pain - improving  Hypoglycemia protocol    Non-rheumatic mitral regurgitation- (present on admission)  Assessment & Plan  Strict blood pressure and fluid management    Methamphetamine abuse (HCC)- (present on admission)  Assessment & Plan  Last use 4 days ago reportedly, monitor for withdrawal  Drug cessation education and resources provided         VTE:  Lovenox  Ulcer: Not Indicated  Lines: None    I have performed a physical exam and reviewed and updated ROS and Plan today (12/10/2024). In review of yesterday's note (12/9/2024), there are no changes except as documented above.     Discussed patient condition and risk of morbidity and/or mortality with Hospitalist, RN, RT, Pharmacy, [facility] resident, Charge nurse / hot rounds, and Patient.  Critical care services will sign off at this time.  Please call with any questions or concerns.    Please note that this dictation was created using voice recognition software. I have made every reasonable attempt to correct obvious errors, but there may be errors of grammar and possibly content that I did not discover before finalizing the note.

## 2024-12-10 NOTE — ASSESSMENT & PLAN NOTE
Severely elevated liver enzymes with AST of greater than 7000 secondary to hepatic congestion in the setting of cardiogenic shock  CT did not reveal any evidence of obstruction of the biliary tree  Dobutamine and diuresis and his liver enzymes remain elevated though trending down and will check a CMP in the morning

## 2024-12-10 NOTE — CARE PLAN
"The patient is Watcher - Medium risk of patient condition declining or worsening    Shift Goals  Clinical Goals: hemodynamic stability, diuresis, mobility  Patient Goals: rest, food  Family Goals: YOEL      Problem: Pain - Standard  Goal: Alleviation of pain or a reduction in pain to the patient’s comfort goal  Outcome: Progressing  Note: 0/10 pain for shift     Problem: Fall Risk  Goal: Patient will remain free from falls  Outcome: Progressing  Note: Precautions in place     Problem: Hemodynamics  Goal: Patient's hemodynamics, fluid balance and neurologic status will be stable or improve  Outcome: Progressing  Note: Large amounts of urine output with Lasix BID and dobutamine. Strong pulses, patient reports \"feeling much better\"       "

## 2024-12-10 NOTE — CARE PLAN
The patient is Stable - Low risk of patient condition declining or worsening    Shift Goals  Clinical Goals: Maintain hemodynamic stability, comfort  Patient Goals: Eat, drink, sleep  Family Goals: YOEL    Progress made toward(s) clinical / shift goals:    Problem: Pain - Standard  Goal: Alleviation of pain or a reduction in pain to the patient’s comfort goal  Outcome: Progressing     Problem: Knowledge Deficit - Standard  Goal: Patient and family/care givers will demonstrate understanding of plan of care, disease process/condition, diagnostic tests and medications  Outcome: Progressing     Problem: Fall Risk  Goal: Patient will remain free from falls  Outcome: Progressing     Problem: Care Map:  Day 2 Optimal Outcome for the Heart Failure Patient  Goal: Day 2:  Optimal Care of the heart failure patient  Outcome: Progressing     Problem: Hemodynamics  Goal: Patient's hemodynamics, fluid balance and neurologic status will be stable or improve  Outcome: Progressing       Patient is not progressing towards the following goals:

## 2024-12-10 NOTE — ASSESSMENT & PLAN NOTE
Secondary to noncompliance with outpatient goal-directed medical therapy and ongoing methamphetamine abuse  Echocardiogram 11/16/2024 revealed ejection fraction of 20% with RSVP of 45 mmHg  Goal-directed medical therapy as blood pressure and kidney function tolerates
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
## 2024-12-10 NOTE — ASSESSMENT & PLAN NOTE
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

## 2024-12-10 NOTE — PROGRESS NOTES
4 Eyes Skin Assessment Completed by JULIANA Aguilar and JULIANA Muhammad.    Head WDL  Ears WDL  Nose WDL  Mouth WDL  Neck WDL  Breast/Chest WDL  Shoulder Blades WDL  Spine WDL  (R) Arm/Elbow/Hand WDL  (L) Arm/Elbow/Hand WDL  Abdomen WDL  Groin WDL  Scrotum/Coccyx/Buttocks WDL  (R) Leg WDL  (L) Leg WDL  (R) Heel/Foot/Toe WDL  (L) Heel/Foot/Toe WDL          Devices In Places Tele Box      Interventions In Place N/A    Possible Skin Injury No    Pictures Uploaded Into Epic N/A  Wound Consult Placed N/A  RN Wound Prevention Protocol Ordered No

## 2024-12-10 NOTE — ASSESSMENT & PLAN NOTE
Requiring ICU admission dobutamine drip given his hypotension with severe hepatic congestion from his methamphetamine induced cardiomyopathy  Ongoing dobutamine with high-dose IV Lasix and potassium replacement  Follow his urine output and follow creatinine and potassium in the morning which have been ordered  Restart goal-directed medical therapy as tolerated.  He had not been compliant with these medications prior.

## 2024-12-10 NOTE — PROGRESS NOTES
Bedside report received from off going RN/tech: Ephraim, assumed care of patient.     Fall Risk Score: MODERATE RISK  Fall risk interventions in place: Place yellow fall risk ID band on patient, Provide patient/family education based on risk assessment, Educate patient/family to call staff for assistance when getting out of bed, Place fall precaution signage outside patient door, Place patient in room close to nursing station, and Utilize bed/chair fall alarm  Bed type: Low air loss (Mich Score less than 17 interventions in place)  Patient on cardiac monitor: Yes  IVF/IV medications: Infusion per MAR (List Med(s)) see epic  Oxygen: Room Air  Bedside sitter: Not Applicable   Isolation: Not applicable

## 2024-12-10 NOTE — CARE PLAN
The patient is Stable - Low risk of patient condition declining or worsening    Shift Goals  Clinical Goals: hemodynamics, increase mobility, mointor I/o;s  Patient Goals: rest and comfort  Family Goals: YOEL    Progress made toward(s) clinical / shift goals:        Problem: Pain - Standard  Goal: Alleviation of pain or a reduction in pain to the patient’s comfort goal  Outcome: Progressing     Problem: Knowledge Deficit - Standard  Goal: Patient and family/care givers will demonstrate understanding of plan of care, disease process/condition, diagnostic tests and medications  Outcome: Progressing     Problem: Fall Risk  Goal: Patient will remain free from falls  Outcome: Progressing     Problem: Care Map:  Admission Optimal Outcome for the Heart Failure Patient  Goal: Admission:  Optimal Care of the heart failure patient  Outcome: Progressing     Problem: Care Map:  Day 2 Optimal Outcome for the Heart Failure Patient  Goal: Day 2:  Optimal Care of the heart failure patient  Outcome: Progressing  Note: Pt provided heart failure packet and reviewed questions. Pt educated on HF medications and indications for compliance. Pt daily weights charted in flowsheet and strict I/O's. Pt tolerated sitting in the shannan for meals and increase mobility. Periferal pulses assessed and documented in the flowsheets.

## 2024-12-11 PROBLEM — E87.6 HYPOKALEMIA: Status: ACTIVE | Noted: 2024-12-11

## 2024-12-11 PROBLEM — E83.39 HYPOPHOSPHATEMIA: Status: ACTIVE | Noted: 2024-12-11

## 2024-12-11 LAB
ALBUMIN SERPL BCP-MCNC: 3.8 G/DL (ref 3.2–4.9)
ALBUMIN/GLOB SERPL: 1.1 G/DL
ALP SERPL-CCNC: 138 U/L (ref 30–99)
ALT SERPL-CCNC: 2876 U/L (ref 2–50)
ANION GAP SERPL CALC-SCNC: 12 MMOL/L (ref 7–16)
AST SERPL-CCNC: 1497 U/L (ref 12–45)
BACTERIA FLD AEROBE CULT: NORMAL
BACTERIA UR CULT: NORMAL
BILIRUB FLD-MCNC: 1.3 MG/DL
BILIRUB SERPL-MCNC: 2.5 MG/DL (ref 0.1–1.5)
BUN SERPL-MCNC: 25 MG/DL (ref 8–22)
CALCIUM ALBUM COR SERPL-MCNC: 8.6 MG/DL (ref 8.5–10.5)
CALCIUM SERPL-MCNC: 8.4 MG/DL (ref 8.5–10.5)
CHLORIDE SERPL-SCNC: 95 MMOL/L (ref 96–112)
CO2 SERPL-SCNC: 27 MMOL/L (ref 20–33)
CREAT SERPL-MCNC: 1.03 MG/DL (ref 0.5–1.4)
GFR SERPLBLD CREATININE-BSD FMLA CKD-EPI: 83 ML/MIN/1.73 M 2
GLOBULIN SER CALC-MCNC: 3.4 G/DL (ref 1.9–3.5)
GLUCOSE SERPL-MCNC: 95 MG/DL (ref 65–99)
GRAM STN SPEC: NORMAL
PHOSPHATE SERPL-MCNC: 2.4 MG/DL (ref 2.5–4.5)
POTASSIUM SERPL-SCNC: 3.5 MMOL/L (ref 3.6–5.5)
PROT SERPL-MCNC: 7.2 G/DL (ref 6–8.2)
SIGNIFICANT IND 70042: NORMAL
SIGNIFICANT IND 70042: NORMAL
SITE SITE: NORMAL
SITE SITE: NORMAL
SODIUM SERPL-SCNC: 134 MMOL/L (ref 135–145)
SOURCE SOURCE: NORMAL
SOURCE SOURCE: NORMAL
SPECIMEN SOURCE: NORMAL

## 2024-12-11 PROCEDURE — 700111 HCHG RX REV CODE 636 W/ 250 OVERRIDE (IP)

## 2024-12-11 PROCEDURE — RXMED WILLOW AMBULATORY MEDICATION CHARGE: Performed by: GENERAL PRACTICE

## 2024-12-11 PROCEDURE — A9270 NON-COVERED ITEM OR SERVICE: HCPCS | Performed by: INTERNAL MEDICINE

## 2024-12-11 PROCEDURE — 700111 HCHG RX REV CODE 636 W/ 250 OVERRIDE (IP): Performed by: INTERNAL MEDICINE

## 2024-12-11 PROCEDURE — 700111 HCHG RX REV CODE 636 W/ 250 OVERRIDE (IP): Performed by: GENERAL PRACTICE

## 2024-12-11 PROCEDURE — A9270 NON-COVERED ITEM OR SERVICE: HCPCS | Mod: JZ | Performed by: GENERAL PRACTICE

## 2024-12-11 PROCEDURE — 80053 COMPREHEN METABOLIC PANEL: CPT

## 2024-12-11 PROCEDURE — 99233 SBSQ HOSP IP/OBS HIGH 50: CPT | Performed by: GENERAL PRACTICE

## 2024-12-11 PROCEDURE — 770020 HCHG ROOM/CARE - TELE (206)

## 2024-12-11 PROCEDURE — 700102 HCHG RX REV CODE 250 W/ 637 OVERRIDE(OP): Performed by: INTERNAL MEDICINE

## 2024-12-11 PROCEDURE — 84100 ASSAY OF PHOSPHORUS: CPT

## 2024-12-11 PROCEDURE — 700102 HCHG RX REV CODE 250 W/ 637 OVERRIDE(OP): Mod: JZ | Performed by: GENERAL PRACTICE

## 2024-12-11 RX ORDER — FUROSEMIDE 10 MG/ML
40 INJECTION INTRAMUSCULAR; INTRAVENOUS
Status: DISCONTINUED | OUTPATIENT
Start: 2024-12-11 | End: 2024-12-12 | Stop reason: HOSPADM

## 2024-12-11 RX ORDER — DAPAGLIFLOZIN 10 MG/1
10 TABLET, FILM COATED ORAL DAILY
Qty: 60 TABLET | Refills: 0 | Status: SHIPPED | OUTPATIENT
Start: 2024-12-11 | End: 2025-02-09

## 2024-12-11 RX ORDER — POTASSIUM CHLORIDE 1500 MG/1
40 TABLET, EXTENDED RELEASE ORAL ONCE
Status: COMPLETED | OUTPATIENT
Start: 2024-12-11 | End: 2024-12-11

## 2024-12-11 RX ORDER — METOPROLOL SUCCINATE 25 MG/1
12.5 TABLET, EXTENDED RELEASE ORAL
Status: DISCONTINUED | OUTPATIENT
Start: 2024-12-11 | End: 2024-12-12 | Stop reason: HOSPADM

## 2024-12-11 RX ORDER — ASPIRIN 81 MG/1
81 TABLET ORAL DAILY
Qty: 60 TABLET | Refills: 0 | Status: SHIPPED | OUTPATIENT
Start: 2024-12-11 | End: 2025-02-09

## 2024-12-11 RX ORDER — SPIRONOLACTONE 25 MG/1
12.5 TABLET ORAL
Status: DISCONTINUED | OUTPATIENT
Start: 2024-12-12 | End: 2024-12-12 | Stop reason: HOSPADM

## 2024-12-11 RX ORDER — DAPAGLIFLOZIN 10 MG/1
10 TABLET, FILM COATED ORAL DAILY
Status: DISCONTINUED | OUTPATIENT
Start: 2024-12-11 | End: 2024-12-12 | Stop reason: HOSPADM

## 2024-12-11 RX ADMIN — ASPIRIN 81 MG: 81 TABLET, COATED ORAL at 04:11

## 2024-12-11 RX ADMIN — FUROSEMIDE 40 MG: 10 INJECTION, SOLUTION INTRAVENOUS at 04:10

## 2024-12-11 RX ADMIN — DAPAGLIFLOZIN 10 MG: 10 TABLET, FILM COATED ORAL at 15:42

## 2024-12-11 RX ADMIN — DIBASIC SODIUM PHOSPHATE, MONOBASIC POTASSIUM PHOSPHATE AND MONOBASIC SODIUM PHOSPHATE 500 MG: 852; 155; 130 TABLET ORAL at 08:40

## 2024-12-11 RX ADMIN — POTASSIUM CHLORIDE 40 MEQ: 1500 TABLET, EXTENDED RELEASE ORAL at 08:40

## 2024-12-11 RX ADMIN — FUROSEMIDE 40 MG: 10 INJECTION INTRAMUSCULAR; INTRAVENOUS at 15:42

## 2024-12-11 RX ADMIN — THIAMINE HYDROCHLORIDE 200 MG: 100 INJECTION, SOLUTION INTRAMUSCULAR; INTRAVENOUS at 04:10

## 2024-12-11 RX ADMIN — METOPROLOL SUCCINATE 12.5 MG: 25 TABLET, EXTENDED RELEASE ORAL at 15:42

## 2024-12-11 RX ADMIN — ENOXAPARIN SODIUM 40 MG: 100 INJECTION SUBCUTANEOUS at 16:58

## 2024-12-11 ASSESSMENT — COGNITIVE AND FUNCTIONAL STATUS - GENERAL
DAILY ACTIVITIY SCORE: 23
STANDING UP FROM CHAIR USING ARMS: A LITTLE
MOVING TO AND FROM BED TO CHAIR: A LITTLE
SUGGESTED CMS G CODE MODIFIER MOBILITY: CK
MOBILITY SCORE: 18
SUGGESTED CMS G CODE MODIFIER DAILY ACTIVITY: CI
TOILETING: A LITTLE
TURNING FROM BACK TO SIDE WHILE IN FLAT BAD: A LITTLE
CLIMB 3 TO 5 STEPS WITH RAILING: A LITTLE
MOVING FROM LYING ON BACK TO SITTING ON SIDE OF FLAT BED: A LITTLE
WALKING IN HOSPITAL ROOM: A LITTLE

## 2024-12-11 ASSESSMENT — SOCIAL DETERMINANTS OF HEALTH (SDOH)
WITHIN THE LAST YEAR, HAVE TO BEEN RAPED OR FORCED TO HAVE ANY KIND OF SEXUAL ACTIVITY BY YOUR PARTNER OR EX-PARTNER?: NO
WITHIN THE PAST 12 MONTHS, THE FOOD YOU BOUGHT JUST DIDN'T LAST AND YOU DIDN'T HAVE MONEY TO GET MORE: SOMETIMES TRUE
IN THE PAST 12 MONTHS, HAS THE ELECTRIC, GAS, OIL, OR WATER COMPANY THREATENED TO SHUT OFF SERVICE IN YOUR HOME?: NO
WITHIN THE PAST 12 MONTHS, YOU WORRIED THAT YOUR FOOD WOULD RUN OUT BEFORE YOU GOT THE MONEY TO BUY MORE: SOMETIMES TRUE
WITHIN THE LAST YEAR, HAVE YOU BEEN AFRAID OF YOUR PARTNER OR EX-PARTNER?: NO
WITHIN THE LAST YEAR, HAVE YOU BEEN KICKED, HIT, SLAPPED, OR OTHERWISE PHYSICALLY HURT BY YOUR PARTNER OR EX-PARTNER?: NO
WITHIN THE LAST YEAR, HAVE YOU BEEN HUMILIATED OR EMOTIONALLY ABUSED IN OTHER WAYS BY YOUR PARTNER OR EX-PARTNER?: NO

## 2024-12-11 ASSESSMENT — LIFESTYLE VARIABLES
HAVE YOU EVER FELT YOU SHOULD CUT DOWN ON YOUR DRINKING: NO
TOTAL SCORE: 0
ON A TYPICAL DAY WHEN YOU DRINK ALCOHOL HOW MANY DRINKS DO YOU HAVE: 0
EVER HAD A DRINK FIRST THING IN THE MORNING TO STEADY YOUR NERVES TO GET RID OF A HANGOVER: NO
DOES PATIENT WANT TO STOP DRINKING: NO
HOW MANY TIMES IN THE PAST YEAR HAVE YOU HAD 5 OR MORE DRINKS IN A DAY: 0
TOTAL SCORE: 0
CONSUMPTION TOTAL: NEGATIVE
EVER FELT BAD OR GUILTY ABOUT YOUR DRINKING: NO
HAVE PEOPLE ANNOYED YOU BY CRITICIZING YOUR DRINKING: NO
AVERAGE NUMBER OF DAYS PER WEEK YOU HAVE A DRINK CONTAINING ALCOHOL: 0
ALCOHOL_USE: NO
TOTAL SCORE: 0

## 2024-12-11 ASSESSMENT — FIBROSIS 4 INDEX: FIB4 SCORE: 10.21

## 2024-12-11 NOTE — CARE PLAN
The patient is Stable - Low risk of patient condition declining or worsening    Shift Goals  Clinical Goals: Monitor vitals  Patient Goals: Sleep  Family Goals: YOEL    Progress made toward(s) clinical / shift goals:    Problem: Pain - Standard  Goal: Alleviation of pain or a reduction in pain to the patient’s comfort goal  Description: Target End Date:  Prior to discharge or change in level of care    Document on Vitals flowsheet    1.  Document pain using the appropriate pain scale per order or unit policy  2.  Educate and implement non-pharmacologic comfort measures (i.e. relaxation, distraction, massage, cold/heat therapy, etc.)  3.  Pain management medications as ordered  4.  Reassess pain after pain med administration per policy  5.  If opiods administered assess patient's response to pain medication is appropriate per POSS sedation scale  6.  Follow pain management plan developed in collaboration with patient and interdisciplinary team (including palliative care or pain specialists if applicable)  Outcome: Progressing     Problem: Fall Risk  Goal: Patient will remain free from falls  Description: Target End Date:  Prior to discharge or change in level of care    Document interventions on the [facility] Fall Risk Assessment    1.  Assess for fall risk factors  2.  Implement fall precautions  Outcome: Progressing       Patient educated on using 0-10 pain scale to rate any pain as well as pharmacological and non-pharmacological methods for relieving pain. Patient educated on using call light for any and all ambulation needs to prevent falls. Patient verbalizes understanding of all teachings.

## 2024-12-11 NOTE — DISCHARGE PLANNING
ATTN: Case Management  RE: Referral for Home Health    Reason for referral denial: Current meth use, unsafe environment for HH clinicians.              Unfortunately, we are not able to accept this referral for the reason listed above. If further clarity is needed, our Transitional Care Specialists are available to discuss any barriers to service at [phone].      We look forward to collaborating with you in the future,  [facility]

## 2024-12-11 NOTE — DISCHARGE PLANNING
Care Transition Team Discharge Planning    Anticipated Discharge Information  Discharge Disposition: D/T to home under [facility] care in anticipation of covered skilled care (06)    Discharge Plan:  RNCM met with patient at bedside who is agreeable to HH. Choice obtained for [facility]. Faxed to [facility].    2526: Received call from Ghada with [phone] who is requesting a call when we find an accepting HH agency so that she can approve it.

## 2024-12-11 NOTE — PROGRESS NOTES
Report received from night RN at 0700. PT seen at bedside and pt care assumed. Pt is A&Ox4, on 1L NC, denies pain. PIV flushed and intact. PT is SR on telemetry. PT is x1 assist.     Plan of care reviewed with pt, call light, phone, and personal belongings within reach. Bed alarm on, and bed in low locked position. All pt's needs met at this time.    Bedside report received from off going RN/tech: JULIANA Fischer, assumed care of patient.     Fall Risk Score: MODERATE RISK  Fall risk interventions in place: Provide patient/family education based on risk assessment and Educate patient/family to call staff for assistance when getting out of bed  Bed type: Regular (Mich Score less than 17 interventions in place)  Patient on cardiac monitor: Yes  IVF/IV medications: Infusion per MAR (List Med(s)) Dobutamine  Oxygen: Room Air and No oxygen tank in room  Bedside sitter: Not Applicable   Isolation: Not applicable

## 2024-12-11 NOTE — HOSPITAL COURSE
This is a 59-year-old male with past medical history of chronic systolic heart failure LEF 20%, nonischemic cardiomyopathy, continued methamphetamine use, pulmonary hypertension who presented to the ED on 12/8 with abdominal pain.    Patient with prior admissions for acute on chronic heart failure.  Unfortunately was noncompliant with his medications.  He returns here with  cardiogenic shock requiring dobutamine gtt.  Patient started on aggressive IV diuresis.  Patient GDMT was slowly reintroduced, patient has history of intolerance of ACE/ARB due to hypotension therefore Entresto was not started.  Strongly encourage patient to maintain medication compliance, cessation from alcohol and drug use.  And follow-up with cardiology outpatient.    Patient developed SUZY most likely cardiorenal syndrome. LFTs slowly improving after aggressive IV diuresis. Labs also significant for severely elevated LFTs likely secondary to hepatic congestion in the setting of cardiogenic shock.  CT imaging without any evidence of obstruction. LFTs slowly normalizing after aggressive IV diuresis. Diagnostic paracentesis was performed in the ER, no growth today, negative for SBP.  Discussed with patient to have repeat CMP in about a week to monitor renal function and LFTs.

## 2024-12-11 NOTE — DISCHARGE PLANNING
This referral has been escalated to a Clinical Supervisor for review in order to determine Home Health appropriateness.  This issue will be resolved as quickly as possible.  Thank you!

## 2024-12-11 NOTE — CARE PLAN
Problem: Pain - Standard  Goal: Alleviation of pain or a reduction in pain to the patient’s comfort goal  Outcome: Progressing     Problem: Knowledge Deficit - Standard  Goal: Patient and family/care givers will demonstrate understanding of plan of care, disease process/condition, diagnostic tests and medications  Outcome: Progressing     Problem: Fall Risk  Goal: Patient will remain free from falls  Outcome: Progressing   The patient is Stable - Low risk of patient condition declining or worsening    Shift Goals  Clinical Goals: Remain free of falls, no skin breakdown  Patient Goals: Rest  Family Goals: YOEL    Progress made toward(s) clinical / shift goals:      Pt educated on plan of care, pt verbalizes understanding, reinforcement needed. Pt educated on pain/anxiety scales, alleviating factors, pain/anxiety medications, and side effects, and need for pain/anxiety reassessment, pt pain/anxiety controlled at this time, reinforcement needed. Pt educated on the need to reposition frequently and remain dry to avoid skin breakdown, reinforcement needed, no further skin breakdown during shift. Fall risk education provided to pt, pt educated about the need to call for assistance while attempting to ambulate, reinforcement needed, pt remains free of falls this shift

## 2024-12-11 NOTE — PROGRESS NOTES
Critical lab results received:    ALT 2876  AST 1497    NOC hospitalist notified via Volte text and has acknowledged

## 2024-12-11 NOTE — PROGRESS NOTES
Bedside report received from off going RN/tech: JULIANA Aguilar, assumed care of patient.     Fall Risk Score: MODERATE RISK  Fall risk interventions in place: Provide patient/family education based on risk assessment, Educate patient/family to call staff for assistance when getting out of bed, Place fall precaution signage outside patient door, Place patient in room close to nursing station, Utilize bed/chair fall alarm, and Bed alarm connected correctly  Bed type: Regular (Mich Score less than 17 interventions in place)  Patient on cardiac monitor: Yes  IVF/IV medications: Infusion per MAR (List Med(s)) Dobutemine  Oxygen: Room Air  Bedside sitter: Not Applicable   Isolation: Not applicable

## 2024-12-11 NOTE — PROGRESS NOTES
Monitor Summary  Rhythm: Normal Sinus Rhythm  Rate: 67 - 75  Ectopy: PVCs, Bigeminy , Trigeminy, Couplets  .19 / .10 / .43

## 2024-12-11 NOTE — PROGRESS NOTES
Hospital Medicine Daily Progress Note    Date of Service  12/11/2024    Chief Complaint  Santino Zabala is a 59 y.o. male admitted 12/8/2024 with abdominal pain    Hospital Course  This is a 59-year-old male with past medical history of chronic systolic heart failure LEF 20%, nonischemic cardiomyopathy, continued methamphetamine use, pulmonary hypertension who presented to the ED on 12/8 with abdominal pain.    Patient with prior admissions for acute on chronic heart failure.  Unfortunately was noncompliant with his medications.  He returns here with  cardiogenic shock requiring dobutamine gtt.  Patient started on aggressive IV diuresis.    Patient developed SUZY most likely cardiorenal syndrome. LFTs slowly normalizing after aggressive IV diuresis. Labs also significant for severely elevated LFTs likely secondary to hepatic congestion in the setting of cardiogenic shock.  CT imaging without any evidence of obstruction. LFTs slowly normalizing after aggressive IV diuresis. Diagnostic paracentesis was performed in the ER, no growth today, negative for SBP.    Interval Problem Update  Patient with chronic systolic heart failure, nonischemic cardiomyopathy with continued methamphetamine use admitted with cardiogenic shock requiring dobutamine gtt.    Patient is currently off dobutamine gtt.  Will slowly restart GDMT  as tolerated.     Patient with cardiorenal syndrome and hepatic congestion  in the setting of cardiogenic shock.  SUZY resolving, transaminitis resolving as well.    Counseled patient at length in regards to the importance of medication compliance, alcohol and meth vitamin cessation, and outpatient follow-up.    I have discussed this patient's plan of care and discharge plan at IDT rounds today with Case Management, Nursing, Nursing leadership, and other members of the IDT team.    Consultants/Specialty  critical care    Code Status  Full Code    Disposition  Patient is not medically clear to discharge  home.     I have placed the appropriate orders for post-discharge needs.    Review of Systems  Review of Systems   All other systems reviewed and are negative.       Physical Exam  Temp:  [35.8 °C (96.5 °F)-37.1 °C (98.8 °F)] 36.7 °C (98.1 °F)  Pulse:  [77-88] 88  Resp:  [18] 18  BP: ()/(66-71) 112/71  SpO2:  [95 %-97 %] 97 %    Physical Exam  Vitals and nursing note reviewed.   Constitutional:       General: He is not in acute distress.     Appearance: He is ill-appearing.   HENT:      Head: Normocephalic and atraumatic.   Eyes:      Extraocular Movements: Extraocular movements intact.      Conjunctiva/sclera: Conjunctivae normal.      Pupils: Pupils are equal, round, and reactive to light.   Cardiovascular:      Rate and Rhythm: Normal rate and regular rhythm.      Pulses: Normal pulses.      Heart sounds: No murmur heard.     No friction rub. No gallop.   Pulmonary:      Effort: Pulmonary effort is normal. No respiratory distress.      Breath sounds: Normal breath sounds. No wheezing, rhonchi or rales.   Abdominal:      General: Bowel sounds are normal. There is no distension.      Palpations: Abdomen is soft.      Tenderness: There is no abdominal tenderness.   Musculoskeletal:         General: No swelling or tenderness. Normal range of motion.      Cervical back: Normal range of motion and neck supple. No muscular tenderness.      Right lower leg: No edema.      Left lower leg: No edema.   Skin:     General: Skin is warm and dry.      Capillary Refill: Capillary refill takes less than 2 seconds.      Findings: No bruising, erythema or rash.   Neurological:      General: No focal deficit present.      Mental Status: He is alert and oriented to person, place, and time.         Fluids    Intake/Output Summary (Last 24 hours) at 12/11/2024 1436  Last data filed at 12/11/2024 1008  Gross per 24 hour   Intake 1160 ml   Output 4925 ml   Net -3765 ml        Laboratory  Recent Labs     12/08/24  1750  12/09/24  0410 12/10/24  0410   WBC 14.7* 14.3* 8.9   RBC 4.46* 4.15* 4.45*   HEMOGLOBIN 14.8 13.6* 14.9   HEMATOCRIT 47.2 41.9* 43.1   .8* 101.0* 96.9   MCH 33.2* 32.8 33.5*   MCHC 31.4* 32.5 34.6   RDW 63.1* 58.4* 54.3*   PLATELETCT 208 189 164   MPV 10.7 10.8 11.1     Recent Labs     12/09/24  1732 12/10/24  0135 12/11/24  0156   SODIUM 134* 136 134*   POTASSIUM 4.0 3.3* 3.5*   CHLORIDE 97 97 95*   CO2 27 29 27   GLUCOSE 102* 103* 95   BUN 45* 39* 25*   CREATININE 1.45* 1.39 1.03   CALCIUM 8.3* 8.3* 8.4*     Recent Labs     12/08/24  1750 12/08/24  2330 12/09/24  0535   INR 2.57* 2.69* 2.83*               Imaging  CT-ABDOMEN-PELVIS WITH   Final Result      1.  Moderate cardiomegaly with multichamber enlargement.   2.  Third spacing of fluid with trace pleural effusions, mild ascites and mild body wall edema.   3.  Hepatic steatosis.   4.  Small caliber branch arteries could be related to vasoconstriction.   5.  Atherosclerosis.           Assessment/Plan  * Cardiogenic shock (HCC)- (present on admission)  Assessment & Plan  Requiring ICU admission dobutamine drip given his hypotension with severe hepatic congestion from his methamphetamine induced cardiomyopathy  Ongoing dobutamine with high-dose IV Lasix and potassium replacement  Follow his urine output and follow creatinine and potassium in the morning which have been ordered  Restart goal-directed medical therapy as tolerated.  He had not been compliant with these medications prior.    Pulmonary hypertension (HCC)- (present on admission)  Assessment & Plan  RVSP 45 mmHg    Acute on chronic systolic congestive heart failure (HCC)- (present on admission)  Assessment & Plan  Secondary to noncompliance with outpatient goal-directed medical therapy and ongoing methamphetamine abuse  Echocardiogram 11/16/2024 revealed ejection fraction of 20% with RSVP of 45 mmHg  Goal-directed medical therapy as blood pressure and kidney function tolerates    SUZY (acute  kidney injury) (HCC)- (present on admission)  Assessment & Plan  Most consistent with cardiorenal syndrome  Baseline creatinine appears to be about 1.3 and was 2.4 on admission  Creatinine is now down to 1.39 on high-dose diuretics which may need to be changed to oral in the near future when his risk of volume depletion    Nonischemic cardiomyopathy (HCC)- (present on admission)  Assessment & Plan  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% by echocardiogram November 16, 2024  He had been off his medications which will be restarted    Transaminitis- (present on admission)  Assessment & Plan  Severely elevated liver enzymes with AST of greater than 7000 secondary to hepatic congestion in the setting of cardiogenic shock  CT did not reveal any evidence of obstruction of the biliary tree  Dobutamine and diuresis and his liver enzymes remain elevated though trending down and will check a CMP in the morning    Hypophosphatemia  Assessment & Plan  Oral supplementation  Serial BMP, magnesium, phosphorus levels ordered for tomorrow morning to continue to monitor electrolytes     Hypokalemia  Assessment & Plan  Oral supplementation  Serial BMP, magnesium, phosphorus levels ordered for tomorrow morning to continue to monitor electrolytes     Hypoglycemia  Assessment & Plan  Resolved with diet    Hyperkalemia- (present on admission)  Assessment & Plan  Potassium was 7.7 on admission  This has gone down substantially with IV diuresis  Basic metabolic panel ordered for the morning to follow his potassium which is now being added in the setting of Lasix    Methamphetamine abuse (HCC)- (present on admission)  Assessment & Plan  Ongoing active use for which cessation has been encouraged         VTE prophylaxis: Lovenox    I have performed a physical exam and reviewed and updated ROS and Plan today (12/11/2024). In review of yesterday's note (12/10/2024), there are no changes except as documented  above.      Greater than 51 minutes spent prepping to see patient (e.g. reviewing EMR) obtaining and/or reviewing separately obtained history. Performing a medically appropriate examination and evaluation. Independently interpreting results and communicating results to patient/family/caregiver. Counseling and educating the patient/family/caregiver. Ordering medications, tests, and/or procedures. Referring and communicating with other health care professionals.  Documenting clinical information in EPIC. Care coordination.

## 2024-12-11 NOTE — FACE TO FACE
Face to Face Supporting Documentation - Home Health    The encounter with this patient was in whole or in part the primary reason for home health admission.    Date of encounter:   Patient:                    MRN:                       YOB: 2025  Santino Zabala  [id number]  1965     Home health to see patient for:  Skilled Nursing care for assessment, interventions & education, Registered dietitian consult, Medical social work consult, Home health aide, Physical Therapy evaluation and treatment, and Occupational therapy evaluation and treatment    Skilled need for:  Exacerbation of Chronic Disease State CHF    Skilled nursing interventions to include:  Comment: HH/PT/OT    Homebound status evidenced by:  Need the aid of supportive devices such as crutches, canes, wheelchairs or walkers. Leaving home requires a considerable and taxing effort. There is a normal inability to leave the home.    Community Physician to provide follow up care: YISEL Snyder, P.A.-C.     Optional Interventions? No      I certify the face to face encounter for this home health care referral meets the CMS requirements and the encounter/clinical assessment with the patient was, in whole, or in part, for the medical condition(s) listed above, which is the primary reason for home health care. Based on my clinical findings: the service(s) are medically necessary, support the need for home health care, and the homebound criteria are met.  I certify that this patient has had a face to face encounter by myself.  Nivia Ballard D.O. - NPI: [id number]

## 2024-12-12 ENCOUNTER — PHARMACY VISIT (OUTPATIENT)
Dept: PHARMACY | Facility: MEDICAL CENTER | Age: 59
End: 2024-12-12
Payer: COMMERCIAL

## 2024-12-12 ENCOUNTER — PATIENT OUTREACH (OUTPATIENT)
Dept: SCHEDULING | Facility: IMAGING CENTER | Age: 59
End: 2024-12-12
Payer: MEDICAID

## 2024-12-12 VITALS
SYSTOLIC BLOOD PRESSURE: 116 MMHG | TEMPERATURE: 98.3 F | OXYGEN SATURATION: 96 % | RESPIRATION RATE: 18 BRPM | HEIGHT: 65 IN | BODY MASS INDEX: 18.66 KG/M2 | HEART RATE: 85 BPM | WEIGHT: 111.99 LBS | DIASTOLIC BLOOD PRESSURE: 80 MMHG

## 2024-12-12 LAB
ALBUMIN SERPL BCP-MCNC: 3.4 G/DL (ref 3.2–4.9)
ALBUMIN/GLOB SERPL: 0.9 G/DL
ALP SERPL-CCNC: 166 U/L (ref 30–99)
ALT SERPL-CCNC: 2496 U/L (ref 2–50)
ANION GAP SERPL CALC-SCNC: 15 MMOL/L (ref 7–16)
AST SERPL-CCNC: 847 U/L (ref 12–45)
BILIRUB SERPL-MCNC: 2.3 MG/DL (ref 0.1–1.5)
BUN SERPL-MCNC: 24 MG/DL (ref 8–22)
CALCIUM ALBUM COR SERPL-MCNC: 9.1 MG/DL (ref 8.5–10.5)
CALCIUM SERPL-MCNC: 8.6 MG/DL (ref 8.5–10.5)
CHLORIDE SERPL-SCNC: 98 MMOL/L (ref 96–112)
CO2 SERPL-SCNC: 19 MMOL/L (ref 20–33)
CREAT SERPL-MCNC: 1.18 MG/DL (ref 0.5–1.4)
GFR SERPLBLD CREATININE-BSD FMLA CKD-EPI: 71 ML/MIN/1.73 M 2
GLOBULIN SER CALC-MCNC: 3.7 G/DL (ref 1.9–3.5)
GLUCOSE SERPL-MCNC: 101 MG/DL (ref 65–99)
MAGNESIUM SERPL-MCNC: 2.2 MG/DL (ref 1.5–2.5)
PHOSPHATE SERPL-MCNC: 2.9 MG/DL (ref 2.5–4.5)
POTASSIUM SERPL-SCNC: 4.5 MMOL/L (ref 3.6–5.5)
PROT SERPL-MCNC: 7.1 G/DL (ref 6–8.2)
SODIUM SERPL-SCNC: 132 MMOL/L (ref 135–145)

## 2024-12-12 PROCEDURE — 700102 HCHG RX REV CODE 250 W/ 637 OVERRIDE(OP): Performed by: GENERAL PRACTICE

## 2024-12-12 PROCEDURE — A9270 NON-COVERED ITEM OR SERVICE: HCPCS | Performed by: INTERNAL MEDICINE

## 2024-12-12 PROCEDURE — 84100 ASSAY OF PHOSPHORUS: CPT

## 2024-12-12 PROCEDURE — 99239 HOSP IP/OBS DSCHRG MGMT >30: CPT | Performed by: GENERAL PRACTICE

## 2024-12-12 PROCEDURE — 80053 COMPREHEN METABOLIC PANEL: CPT

## 2024-12-12 PROCEDURE — 700111 HCHG RX REV CODE 636 W/ 250 OVERRIDE (IP): Performed by: GENERAL PRACTICE

## 2024-12-12 PROCEDURE — A9270 NON-COVERED ITEM OR SERVICE: HCPCS | Performed by: GENERAL PRACTICE

## 2024-12-12 PROCEDURE — 700102 HCHG RX REV CODE 250 W/ 637 OVERRIDE(OP): Performed by: INTERNAL MEDICINE

## 2024-12-12 PROCEDURE — 83735 ASSAY OF MAGNESIUM: CPT

## 2024-12-12 RX ORDER — LACTULOSE 10 G/15ML
45 SOLUTION ORAL ONCE
Status: COMPLETED | OUTPATIENT
Start: 2024-12-12 | End: 2024-12-12

## 2024-12-12 RX ADMIN — ASPIRIN 81 MG: 81 TABLET, COATED ORAL at 05:16

## 2024-12-12 RX ADMIN — FUROSEMIDE 40 MG: 10 INJECTION INTRAMUSCULAR; INTRAVENOUS at 05:17

## 2024-12-12 RX ADMIN — METOPROLOL SUCCINATE 12.5 MG: 25 TABLET, EXTENDED RELEASE ORAL at 05:17

## 2024-12-12 RX ADMIN — LACTULOSE 45 ML: 10 SOLUTION ORAL at 09:30

## 2024-12-12 RX ADMIN — DAPAGLIFLOZIN 10 MG: 10 TABLET, FILM COATED ORAL at 05:16

## 2024-12-12 RX ADMIN — SPIRONOLACTONE 12.5 MG: 25 TABLET ORAL at 05:17

## 2024-12-12 ASSESSMENT — FIBROSIS 4 INDEX: FIB4 SCORE: 10.21

## 2024-12-12 NOTE — PROGRESS NOTES
Assumed care of patient; received bedside report from Hermelinda RN. Patient voices no needs at this time.

## 2024-12-12 NOTE — DISCHARGE INSTRUCTIONS
HF Patient Discharge Instructions  Monitor your weight daily, and maintain a weight chart, to track your weight changes.   Activity as tolerated, unless your Doctor has ordered otherwise. Other activity order: as tolerated.  Follow a low fat, low cholesterol, low salt diet unless instructed otherwise by your Doctor. Read the labels on the back of food products and track your intake of fat, cholesterol and salt.   Fluid Restriction Yes - 1.5L (1500ml). If a Fluid Restriction has been ordered by your Doctor, measure fluids with a measuring cup to ensure that you are not exceeding the restriction.   No smoking.  Oxygen No. If your Doctor has ordered that you wear Oxygen at home, it is important to wear it as ordered.  Did you receive an explanation from staff on the importance of taking each of your medications and why it is necessary to keep taking them unless your doctor says to stop? Yes  Were all of your questions answered about how to manage your heart failure and what to do if you have increased signs and symptoms after you go home? Yes  Do you feel like your heart failure care team involved you in the care treatment plan and allowed you to make decisions regarding your care while in the hospital and addressed any discharge needs you might have? Yes    See the educational handout provided at discharge for more information on monitoring your daily weight, activity and diet. This also explains more about Heart Failure, symptoms of a flare-up and some of the tests that you have undergone.     Warning Signs of a Flare-Up include:  Swelling in the ankles or lower legs.  Shortness of breath, while at rest, or while doing normal activities.   Shortness of breath at night when in bed, or coughing in bed.   Requiring more pillows to sleep at night, or needing to sit up at night to sleep.  Feeling weak, dizzy or fatigued.     When to call your Doctor:  Call your Primary Care Physician or Cardiologist if:   You experience  any pain radiating to your jaw or neck.  You have any difficulty breathing.  You experience weight gain of 3 lbs in a day or 5 lbs in a week.   You feel any palpitations or irregular heartbeats.  You become dizzy or lose consciousness.   If you have had an angiogram or had a pacemaker or AICD placed, and experience:  Bleeding, drainage or swelling at the surgical / puncture site.  Fever greater than 100.0 F  Shock from internal defibrillator.  Cool and / or numb extremities.     Please access the [facility] My HF Guide/Heart Failure Interactive Workbook:   [email or web address]

## 2024-12-12 NOTE — PROGRESS NOTES
Bedside report received from off going RN/tech: Jeff, assumed care of patient.     Fall Risk Score: MODERATE RISK  Fall risk interventions in place: Provide patient/family education based on risk assessment, Educate patient/family to call staff for assistance when getting out of bed, Place fall precaution signage outside patient door, Utilize bed/chair fall alarm, and Bed alarm connected correctly  Bed type: Regular (Mich Score less than 17 interventions in place)  Patient on cardiac monitor: Yes  IVF/IV medications: Not Applicable   Oxygen: Room Air  Bedside sitter: Not Applicable   Isolation: Not applicable

## 2024-12-12 NOTE — DISCHARGE PLANNING
Care Transition Team Discharge Planning    Anticipated Discharge Information  Discharge Disposition: D/T to home under [facility] care in anticipation of covered skilled care (06)    Discharge Plan:  HH has been declined by all companies at this time, referral sent to [facility].

## 2024-12-12 NOTE — DISCHARGE PLANNING
[phone]  DPA confirmed with Jeanie easton [facility] that agency is not contracted with pt's insurance.

## 2024-12-12 NOTE — DISCHARGE SUMMARY
Discharge Summary    CHIEF COMPLAINT ON ADMISSION  Chief Complaint   Patient presents with    Abdominal Pain     ABD pain x3 days. Pt was seen last night for same thing, but states pain is worse today. Reported urinary retention.        Reason for Admission  ABD Pain     Admission Date  12/8/2024    CODE STATUS  Prior    HPI & HOSPITAL COURSE  This is a 59-year-old male with past medical history of chronic systolic heart failure LEF 20%, nonischemic cardiomyopathy, continued methamphetamine use, pulmonary hypertension who presented to the ED on 12/8 with abdominal pain.    Patient with prior admissions for acute on chronic heart failure.  Unfortunately was noncompliant with his medications.  He returns here with  cardiogenic shock requiring dobutamine gtt.  Patient started on aggressive IV diuresis.  Patient GDMT was slowly reintroduced, patient has history of intolerance of ACE/ARB due to hypotension therefore Entresto was not started.  Strongly encourage patient to maintain medication compliance, cessation from alcohol and drug use.  And follow-up with cardiology outpatient.    Patient developed SUZY most likely cardiorenal syndrome. LFTs slowly improving after aggressive IV diuresis. Labs also significant for severely elevated LFTs likely secondary to hepatic congestion in the setting of cardiogenic shock.  CT imaging without any evidence of obstruction. LFTs slowly normalizing after aggressive IV diuresis. Diagnostic paracentesis was performed in the ER, no growth today, negative for SBP.  Discussed with patient to have repeat CMP in about a week to monitor renal function and LFTs.    Therefore, he is discharged in good and stable condition to home with close outpatient follow-up.    The patient met 2-midnight criteria for an inpatient stay at the time of discharge.    Discharge Date  12/12/2024    FOLLOW UP ITEMS POST DISCHARGE  Primary care physician  Cardiology    DISCHARGE DIAGNOSES  Principal Problem:     Cardiogenic shock (HCC) (POA: Yes)  Active Problems:    Transaminitis (POA: Yes)    Nonischemic cardiomyopathy (HCC) (POA: Yes)    SUZY (acute kidney injury) (HCC) (POA: Yes)    Acute on chronic systolic congestive heart failure (HCC) (POA: Yes)    Pulmonary hypertension (HCC) (POA: Yes)    Hyperbilirubinemia (POA: Yes)    Methamphetamine abuse (HCC) (POA: Yes)    Non-rheumatic mitral regurgitation (POA: Yes)    Abdominal pain (POA: Unknown)    Metabolic acidosis, increased anion gap (POA: Unknown)    Hyperkalemia (POA: Yes)    Hypoglycemia (POA: Unknown)    Electrolyte abnormality (POA: Unknown)    Hypokalemia (POA: Unknown)    Hypophosphatemia (POA: Unknown)  Resolved Problems:    * No resolved hospital problems. *      FOLLOW UP  Charley Molina DNP, APRN, LENY-C  [facility]  [address]  [place] [address]   Ph. [phone]  Go on 12/13/2024  Please go to your hospital / heart failure follow up with Charley Molina DNP, APRN, MASOUD on Friday, December 13th 2024, check in at 1:15pm for a 1:30pm appointment.     Bring your photo ID, insurance cards, and any medications / vitamins you maybe taking. Thank you.    Justice Wing P.A.-C.  [address]  [place] [id number]  [phone]    Follow up        MEDICATIONS ON DISCHARGE     Medication List        CONTINUE taking these medications        Instructions   Aspirin Low Dose 81 MG EC tablet  Generic drug: aspirin   Take 1 Tablet by mouth every day for 60 days.  Dose: 81 mg     Farxiga 10 MG Tabs  Generic drug: dapagliflozin propanediol   Take 1 Tablet by mouth every day for 60 days.  Dose: 10 mg     furosemide 40 MG Tabs  Commonly known as: Lasix   Take 0.5 Tablets by mouth 1 time a day as needed (leg swelling, shortness of breath).  Dose: 20 mg     metoprolol SR 25 MG Tb24  Commonly known as: Toprol XL   Take 0.5 Tablets by mouth every day.  Dose: 12.5 mg     omeprazole 20 MG delayed-release capsule  Commonly known as: PriLOSEC   Take 1  Capsule by mouth every day.  Dose: 20 mg     spironolactone 25 MG Tabs  Commonly known as: Aldactone   Take 0.5 Tablets by mouth every day. 1/2 tablet= 12.5mg  Dose: 12.5 mg            STOP taking these medications      allopurinol 100 MG Tabs  Commonly known as: Zyloprim     potassium chloride SA 20 MEQ Tbcr  Commonly known as: Kdur              Allergies  No Known Allergies    DIET  No orders of the defined types were placed in this encounter.      ACTIVITY  As tolerated.  Weight bearing as tolerated    CONSULTATIONS  Critical care    PROCEDURES  Paracentesis    LABORATORY  Lab Results   Component Value Date    SODIUM 132 (L) 12/12/2024    POTASSIUM 4.5 12/12/2024    CHLORIDE 98 12/12/2024    CO2 19 (L) 12/12/2024    GLUCOSE 101 (H) 12/12/2024    BUN 24 (H) 12/12/2024    CREATININE 1.18 12/12/2024        Lab Results   Component Value Date    WBC 8.9 12/10/2024    HEMOGLOBIN 14.9 12/10/2024    HEMATOCRIT 43.1 12/10/2024    PLATELETCT 164 12/10/2024      CT-ABDOMEN-PELVIS WITH   Final Result      1.  Moderate cardiomegaly with multichamber enlargement.   2.  Third spacing of fluid with trace pleural effusions, mild ascites and mild body wall edema.   3.  Hepatic steatosis.   4.  Small caliber branch arteries could be related to vasoconstriction.   5.  Atherosclerosis.         Total time of the discharge process exceeds 45 minutes.

## 2024-12-12 NOTE — PROGRESS NOTES
Patient to be discharged today - patient aware and agreeable to plan. D/c instructions reviewed with patient - ?'s/concerns answered. 1 piv removed and meds to beds handed to patient.

## 2024-12-12 NOTE — PROGRESS NOTES
Monitor Summary    Rhythm:  SR with 1st degree heart block    Rate:  80-87    Ectopy:  rPVC    .22  /  .10  /   .39

## 2024-12-12 NOTE — CARE PLAN
The patient is Stable - Low risk of patient condition declining or worsening    Progress made toward(s) clinical / shift goals:      Patient Goals: Have bm, continue HF medications, sleep  Family Goals: YOEL    Shift Goals  Clinical Goals: Monitor bm, comfort  Problem: Knowledge Deficit - Standard  Goal: Patient and family/care givers will demonstrate understanding of plan of care, disease process/condition, diagnostic tests and medications  Outcome: Progressing  Note: Pt updated on POC, all current questions answered at this time       Problem: Fall Risk  Goal: Patient will remain free from falls  Outcome: Progressing  Note: Treaded socks and bed/strip alarm on, side rails up x 2. Call light within reach. Pt educated to call for assistance. Reinforce as needed. Continue to monitor.         Problem: Care Map:  Day 1 Optimal Outcome for the Heart Failure Patient  Goal: Day 1:  Optimal Care of the heart failure patient  Outcome: Progressing  Note: HF education booklet provided, daily weights and labs ordered, HF medications started, cardiac diet and fluid restriction in place         Patient is not progressing towards the following goals:

## 2024-12-12 NOTE — PROGRESS NOTES
Rhythm: SR  Rate: 65-98  Ectopy: (R) PVC, (R) Coup, (R) Big, (R) Trig  Measurements: .21/.09/.35

## 2024-12-13 LAB
BACTERIA BLD CULT: NORMAL
BACTERIA BLD CULT: NORMAL
SIGNIFICANT IND 70042: NORMAL
SIGNIFICANT IND 70042: NORMAL
SITE SITE: NORMAL
SITE SITE: NORMAL
SOURCE SOURCE: NORMAL
SOURCE SOURCE: NORMAL

## 2025-01-15 ENCOUNTER — TELEPHONE (OUTPATIENT)
Dept: HEALTH INFORMATION MANAGEMENT | Facility: OTHER | Age: 60
End: 2025-01-15
Payer: MEDICAID

## 2025-05-02 ENCOUNTER — APPOINTMENT (OUTPATIENT)
Dept: CARDIOLOGY | Facility: MEDICAL CENTER | Age: 60
DRG: 641 | End: 2025-05-02
Attending: STUDENT IN AN ORGANIZED HEALTH CARE EDUCATION/TRAINING PROGRAM
Payer: MEDICAID

## 2025-05-02 ENCOUNTER — HOSPITAL ENCOUNTER (INPATIENT)
Facility: MEDICAL CENTER | Age: 60
LOS: 1 days | DRG: 641 | End: 2025-05-03
Attending: STUDENT IN AN ORGANIZED HEALTH CARE EDUCATION/TRAINING PROGRAM | Admitting: HOSPITALIST
Payer: MEDICAID

## 2025-05-02 ENCOUNTER — APPOINTMENT (OUTPATIENT)
Dept: RADIOLOGY | Facility: MEDICAL CENTER | Age: 60
DRG: 641 | End: 2025-05-02
Attending: STUDENT IN AN ORGANIZED HEALTH CARE EDUCATION/TRAINING PROGRAM
Payer: MEDICAID

## 2025-05-02 DIAGNOSIS — R53.1 WEAKNESS: ICD-10-CM

## 2025-05-02 DIAGNOSIS — E87.5 HYPERKALEMIA: ICD-10-CM

## 2025-05-02 DIAGNOSIS — I50.22 CHRONIC SYSTOLIC HEART FAILURE (HCC): ICD-10-CM

## 2025-05-02 DIAGNOSIS — N17.9 AKI (ACUTE KIDNEY INJURY) (HCC): ICD-10-CM

## 2025-05-02 PROBLEM — E87.20 LACTIC ACIDOSIS: Status: ACTIVE | Noted: 2025-05-02

## 2025-05-02 LAB
ALBUMIN SERPL BCP-MCNC: 4.1 G/DL (ref 3.2–4.9)
ALBUMIN/GLOB SERPL: 1 G/DL
ALP SERPL-CCNC: 120 U/L (ref 30–99)
ALT SERPL-CCNC: 30 U/L (ref 2–50)
AMPHET UR QL SCN: POSITIVE
ANION GAP SERPL CALC-SCNC: 13 MMOL/L (ref 7–16)
ANION GAP SERPL CALC-SCNC: 17 MMOL/L (ref 7–16)
ANION GAP SERPL CALC-SCNC: 18 MMOL/L (ref 7–16)
APPEARANCE UR: CLEAR
AST SERPL-CCNC: 70 U/L (ref 12–45)
B PARAP IS1001 DNA NPH QL NAA+NON-PROBE: NOT DETECTED
B PERT.PT PRMT NPH QL NAA+NON-PROBE: NOT DETECTED
BACTERIA #/AREA URNS HPF: ABNORMAL /HPF
BARBITURATES UR QL SCN: NEGATIVE
BASOPHILS # BLD AUTO: 0.7 % (ref 0–1.8)
BASOPHILS # BLD: 0.06 K/UL (ref 0–0.12)
BENZODIAZ UR QL SCN: NEGATIVE
BILIRUB CONJ SERPL-MCNC: 0.8 MG/DL (ref 0.1–0.5)
BILIRUB SERPL-MCNC: 2.5 MG/DL (ref 0.1–1.5)
BILIRUB UR QL STRIP.AUTO: NEGATIVE
BUN SERPL-MCNC: 36 MG/DL (ref 8–22)
BUN SERPL-MCNC: 40 MG/DL (ref 8–22)
BUN SERPL-MCNC: 40 MG/DL (ref 8–22)
BZE UR QL SCN: NEGATIVE
C PNEUM DNA NPH QL NAA+NON-PROBE: NOT DETECTED
CALCIUM ALBUM COR SERPL-MCNC: 9.5 MG/DL (ref 8.5–10.5)
CALCIUM SERPL-MCNC: 10 MG/DL (ref 8.5–10.5)
CALCIUM SERPL-MCNC: 9.6 MG/DL (ref 8.5–10.5)
CALCIUM SERPL-MCNC: 9.6 MG/DL (ref 8.5–10.5)
CANNABINOIDS UR QL SCN: NEGATIVE
CASTS URNS QL MICRO: ABNORMAL /LPF (ref 0–2)
CHLORIDE SERPL-SCNC: 104 MMOL/L (ref 96–112)
CK SERPL-CCNC: 168 U/L (ref 0–154)
CO2 SERPL-SCNC: 16 MMOL/L (ref 20–33)
CO2 SERPL-SCNC: 17 MMOL/L (ref 20–33)
CO2 SERPL-SCNC: 20 MMOL/L (ref 20–33)
COLOR UR: YELLOW
CREAT SERPL-MCNC: 1.32 MG/DL (ref 0.5–1.4)
CREAT SERPL-MCNC: 1.77 MG/DL (ref 0.5–1.4)
CREAT SERPL-MCNC: 1.82 MG/DL (ref 0.5–1.4)
EKG IMPRESSION: NORMAL
EKG IMPRESSION: NORMAL
EOSINOPHIL # BLD AUTO: 0.01 K/UL (ref 0–0.51)
EOSINOPHIL NFR BLD: 0.1 % (ref 0–6.9)
EPITHELIAL CELLS 1715: ABNORMAL /HPF (ref 0–5)
ERYTHROCYTE [DISTWIDTH] IN BLOOD BY AUTOMATED COUNT: 55.6 FL (ref 35.9–50)
FENTANYL UR QL: NEGATIVE
FLUAV RNA NPH QL NAA+NON-PROBE: NOT DETECTED
FLUBV RNA NPH QL NAA+NON-PROBE: NOT DETECTED
GFR SERPLBLD CREATININE-BSD FMLA CKD-EPI: 42 ML/MIN/1.73 M 2
GFR SERPLBLD CREATININE-BSD FMLA CKD-EPI: 43 ML/MIN/1.73 M 2
GFR SERPLBLD CREATININE-BSD FMLA CKD-EPI: 62 ML/MIN/1.73 M 2
GLOBULIN SER CALC-MCNC: 4 G/DL (ref 1.9–3.5)
GLUCOSE BLD STRIP.AUTO-MCNC: 148 MG/DL (ref 65–99)
GLUCOSE BLD STRIP.AUTO-MCNC: 95 MG/DL (ref 65–99)
GLUCOSE BLD STRIP.AUTO-MCNC: 95 MG/DL (ref 65–99)
GLUCOSE SERPL-MCNC: 100 MG/DL (ref 65–99)
GLUCOSE SERPL-MCNC: 107 MG/DL (ref 65–99)
GLUCOSE SERPL-MCNC: 108 MG/DL (ref 65–99)
GLUCOSE UR STRIP.AUTO-MCNC: 100 MG/DL
GRANULAR CASTS  1716G: PRESENT /LPF
HADV DNA NPH QL NAA+NON-PROBE: NOT DETECTED
HCOV 229E RNA NPH QL NAA+NON-PROBE: NOT DETECTED
HCOV HKU1 RNA NPH QL NAA+NON-PROBE: NOT DETECTED
HCOV NL63 RNA NPH QL NAA+NON-PROBE: NOT DETECTED
HCOV OC43 RNA NPH QL NAA+NON-PROBE: NOT DETECTED
HCT VFR BLD AUTO: 57.4 % (ref 42–52)
HGB BLD-MCNC: 18.2 G/DL (ref 14–18)
HMPV RNA NPH QL NAA+NON-PROBE: NOT DETECTED
HPIV1 RNA NPH QL NAA+NON-PROBE: NOT DETECTED
HPIV2 RNA NPH QL NAA+NON-PROBE: NOT DETECTED
HPIV3 RNA NPH QL NAA+NON-PROBE: NOT DETECTED
HPIV4 RNA NPH QL NAA+NON-PROBE: NOT DETECTED
IMM GRANULOCYTES # BLD AUTO: 0.05 K/UL (ref 0–0.11)
IMM GRANULOCYTES NFR BLD AUTO: 0.6 % (ref 0–0.9)
INR PPP: 1.21 (ref 0.87–1.13)
KETONES UR STRIP.AUTO-MCNC: ABNORMAL MG/DL
LACTATE SERPL-SCNC: 2.5 MMOL/L (ref 0.5–2)
LACTATE SERPL-SCNC: 3.6 MMOL/L (ref 0.5–2)
LEUKOCYTE ESTERASE UR QL STRIP.AUTO: NEGATIVE
LIPASE SERPL-CCNC: 28 U/L (ref 11–82)
LV EJECT FRACT MOD 2C 99903: 17.22
LV EJECT FRACT MOD 4C 99902: 16.05
LV EJECT FRACT MOD BP 99901: 17.31
LYMPHOCYTES # BLD AUTO: 0.68 K/UL (ref 1–4.8)
LYMPHOCYTES NFR BLD: 8.1 % (ref 22–41)
M PNEUMO DNA NPH QL NAA+NON-PROBE: NOT DETECTED
MCH RBC QN AUTO: 32.7 PG (ref 27–33)
MCHC RBC AUTO-ENTMCNC: 31.7 G/DL (ref 32.3–36.5)
MCV RBC AUTO: 103.1 FL (ref 81.4–97.8)
METHADONE UR QL SCN: NEGATIVE
MICRO URNS: ABNORMAL
MONOCYTES # BLD AUTO: 0.55 K/UL (ref 0–0.85)
MONOCYTES NFR BLD AUTO: 6.5 % (ref 0–13.4)
NEUTROPHILS # BLD AUTO: 7.08 K/UL (ref 1.82–7.42)
NEUTROPHILS NFR BLD: 84 % (ref 44–72)
NITRITE UR QL STRIP.AUTO: NEGATIVE
NRBC # BLD AUTO: 0 K/UL
NRBC BLD-RTO: 0 /100 WBC (ref 0–0.2)
NT-PROBNP SERPL IA-MCNC: ABNORMAL PG/ML (ref 0–125)
OPIATES UR QL SCN: NEGATIVE
OXYCODONE UR QL SCN: NEGATIVE
PCP UR QL SCN: NEGATIVE
PH UR STRIP.AUTO: 6 [PH] (ref 5–8)
PLATELET # BLD AUTO: 226 K/UL (ref 164–446)
PMV BLD AUTO: 9.9 FL (ref 9–12.9)
POTASSIUM SERPL-SCNC: 4.5 MMOL/L (ref 3.6–5.5)
POTASSIUM SERPL-SCNC: 7.2 MMOL/L (ref 3.6–5.5)
POTASSIUM SERPL-SCNC: 7.2 MMOL/L (ref 3.6–5.5)
PROPOXYPH UR QL SCN: NEGATIVE
PROT SERPL-MCNC: 8.1 G/DL (ref 6–8.2)
PROT UR QL STRIP: 30 MG/DL
PROTHROMBIN TIME: 15.4 SEC (ref 12–14.6)
RBC # BLD AUTO: 5.57 M/UL (ref 4.7–6.1)
RBC # URNS HPF: ABNORMAL /HPF (ref 0–2)
RBC UR QL AUTO: NEGATIVE
RSV RNA NPH QL NAA+NON-PROBE: NOT DETECTED
RV+EV RNA NPH QL NAA+NON-PROBE: NOT DETECTED
SARS-COV-2 RNA NPH QL NAA+NON-PROBE: NOTDETECTED
SODIUM SERPL-SCNC: 137 MMOL/L (ref 135–145)
SODIUM SERPL-SCNC: 137 MMOL/L (ref 135–145)
SODIUM SERPL-SCNC: 139 MMOL/L (ref 135–145)
SP GR UR STRIP.AUTO: 1.02
T4 FREE SERPL-MCNC: 1.68 NG/DL (ref 0.93–1.7)
TROPONIN T SERPL-MCNC: 40 NG/L (ref 6–19)
TROPONIN T SERPL-MCNC: 48 NG/L (ref 6–19)
TSH SERPL DL<=0.005 MIU/L-ACNC: 7.9 UIU/ML (ref 0.38–5.33)
UROBILINOGEN UR STRIP.AUTO-MCNC: 1 EU/DL
WBC # BLD AUTO: 8.4 K/UL (ref 4.8–10.8)
WBC #/AREA URNS HPF: ABNORMAL /HPF

## 2025-05-02 PROCEDURE — 700102 HCHG RX REV CODE 250 W/ 637 OVERRIDE(OP): Performed by: HOSPITALIST

## 2025-05-02 PROCEDURE — 700102 HCHG RX REV CODE 250 W/ 637 OVERRIDE(OP): Performed by: INTERNAL MEDICINE

## 2025-05-02 PROCEDURE — 81001 URINALYSIS AUTO W/SCOPE: CPT

## 2025-05-02 PROCEDURE — 93306 TTE W/DOPPLER COMPLETE: CPT

## 2025-05-02 PROCEDURE — 700111 HCHG RX REV CODE 636 W/ 250 OVERRIDE (IP): Performed by: INTERNAL MEDICINE

## 2025-05-02 PROCEDURE — 84443 ASSAY THYROID STIM HORMONE: CPT

## 2025-05-02 PROCEDURE — 99291 CRITICAL CARE FIRST HOUR: CPT

## 2025-05-02 PROCEDURE — 83690 ASSAY OF LIPASE: CPT

## 2025-05-02 PROCEDURE — 93306 TTE W/DOPPLER COMPLETE: CPT | Mod: 26 | Performed by: INTERNAL MEDICINE

## 2025-05-02 PROCEDURE — 83880 ASSAY OF NATRIURETIC PEPTIDE: CPT

## 2025-05-02 PROCEDURE — 36415 COLL VENOUS BLD VENIPUNCTURE: CPT

## 2025-05-02 PROCEDURE — 99222 1ST HOSP IP/OBS MODERATE 55: CPT | Performed by: INTERNAL MEDICINE

## 2025-05-02 PROCEDURE — 96365 THER/PROPH/DIAG IV INF INIT: CPT

## 2025-05-02 PROCEDURE — A9270 NON-COVERED ITEM OR SERVICE: HCPCS | Performed by: HOSPITALIST

## 2025-05-02 PROCEDURE — 82248 BILIRUBIN DIRECT: CPT

## 2025-05-02 PROCEDURE — 93005 ELECTROCARDIOGRAM TRACING: CPT | Mod: TC | Performed by: HOSPITALIST

## 2025-05-02 PROCEDURE — 700105 HCHG RX REV CODE 258: Performed by: INTERNAL MEDICINE

## 2025-05-02 PROCEDURE — 83605 ASSAY OF LACTIC ACID: CPT

## 2025-05-02 PROCEDURE — 82962 GLUCOSE BLOOD TEST: CPT

## 2025-05-02 PROCEDURE — 93005 ELECTROCARDIOGRAM TRACING: CPT | Mod: TC | Performed by: STUDENT IN AN ORGANIZED HEALTH CARE EDUCATION/TRAINING PROGRAM

## 2025-05-02 PROCEDURE — 82550 ASSAY OF CK (CPK): CPT

## 2025-05-02 PROCEDURE — 71045 X-RAY EXAM CHEST 1 VIEW: CPT

## 2025-05-02 PROCEDURE — 84484 ASSAY OF TROPONIN QUANT: CPT

## 2025-05-02 PROCEDURE — 700111 HCHG RX REV CODE 636 W/ 250 OVERRIDE (IP): Mod: JZ,UD | Performed by: STUDENT IN AN ORGANIZED HEALTH CARE EDUCATION/TRAINING PROGRAM

## 2025-05-02 PROCEDURE — 84439 ASSAY OF FREE THYROXINE: CPT

## 2025-05-02 PROCEDURE — 80048 BASIC METABOLIC PNL TOTAL CA: CPT

## 2025-05-02 PROCEDURE — 0202U NFCT DS 22 TRGT SARS-COV-2: CPT

## 2025-05-02 PROCEDURE — 85025 COMPLETE CBC W/AUTO DIFF WBC: CPT

## 2025-05-02 PROCEDURE — 700105 HCHG RX REV CODE 258: Performed by: STUDENT IN AN ORGANIZED HEALTH CARE EDUCATION/TRAINING PROGRAM

## 2025-05-02 PROCEDURE — 93005 ELECTROCARDIOGRAM TRACING: CPT | Mod: TC

## 2025-05-02 PROCEDURE — 87040 BLOOD CULTURE FOR BACTERIA: CPT | Mod: 91

## 2025-05-02 PROCEDURE — 80053 COMPREHEN METABOLIC PANEL: CPT

## 2025-05-02 PROCEDURE — 700117 HCHG RX CONTRAST REV CODE 255: Performed by: STUDENT IN AN ORGANIZED HEALTH CARE EDUCATION/TRAINING PROGRAM

## 2025-05-02 PROCEDURE — 770020 HCHG ROOM/CARE - TELE (206)

## 2025-05-02 PROCEDURE — 99223 1ST HOSP IP/OBS HIGH 75: CPT | Performed by: HOSPITALIST

## 2025-05-02 PROCEDURE — 700101 HCHG RX REV CODE 250: Mod: UD | Performed by: STUDENT IN AN ORGANIZED HEALTH CARE EDUCATION/TRAINING PROGRAM

## 2025-05-02 PROCEDURE — 99291 CRITICAL CARE FIRST HOUR: CPT | Performed by: STUDENT IN AN ORGANIZED HEALTH CARE EDUCATION/TRAINING PROGRAM

## 2025-05-02 PROCEDURE — 80307 DRUG TEST PRSMV CHEM ANLYZR: CPT

## 2025-05-02 PROCEDURE — 85610 PROTHROMBIN TIME: CPT

## 2025-05-02 PROCEDURE — 96375 TX/PRO/DX INJ NEW DRUG ADDON: CPT

## 2025-05-02 RX ORDER — DEXTROSE MONOHYDRATE 25 G/50ML
25 INJECTION, SOLUTION INTRAVENOUS ONCE
Status: COMPLETED | OUTPATIENT
Start: 2025-05-02 | End: 2025-05-02

## 2025-05-02 RX ORDER — FUROSEMIDE 10 MG/ML
40 INJECTION INTRAMUSCULAR; INTRAVENOUS ONCE
Status: DISCONTINUED | OUTPATIENT
Start: 2025-05-02 | End: 2025-05-02

## 2025-05-02 RX ORDER — ALBUTEROL SULFATE 90 UG/1
2 INHALANT RESPIRATORY (INHALATION) EVERY 6 HOURS PRN
Qty: 8.5 G | Refills: 0 | Status: SHIPPED | OUTPATIENT
Start: 2025-05-02 | End: 2025-05-02

## 2025-05-02 RX ORDER — HYDROMORPHONE HYDROCHLORIDE 1 MG/ML
0.25 INJECTION, SOLUTION INTRAMUSCULAR; INTRAVENOUS; SUBCUTANEOUS
Status: DISCONTINUED | OUTPATIENT
Start: 2025-05-02 | End: 2025-05-03 | Stop reason: HOSPADM

## 2025-05-02 RX ORDER — CALCIUM GLUCONATE 20 MG/ML
2 INJECTION, SOLUTION INTRAVENOUS ONCE
Status: COMPLETED | OUTPATIENT
Start: 2025-05-02 | End: 2025-05-02

## 2025-05-02 RX ORDER — INSULIN LISPRO 100 [IU]/ML
1-6 INJECTION, SOLUTION INTRAVENOUS; SUBCUTANEOUS EVERY 6 HOURS
Status: DISCONTINUED | OUTPATIENT
Start: 2025-05-02 | End: 2025-05-02

## 2025-05-02 RX ORDER — HYDRALAZINE HYDROCHLORIDE 20 MG/ML
10 INJECTION INTRAMUSCULAR; INTRAVENOUS EVERY 4 HOURS PRN
Status: DISCONTINUED | OUTPATIENT
Start: 2025-05-02 | End: 2025-05-03 | Stop reason: HOSPADM

## 2025-05-02 RX ORDER — METOPROLOL SUCCINATE 25 MG/1
12.5 TABLET, EXTENDED RELEASE ORAL
Status: DISCONTINUED | OUTPATIENT
Start: 2025-05-02 | End: 2025-05-02

## 2025-05-02 RX ORDER — DOBUTAMINE HYDROCHLORIDE 100 MG/100ML
5 INJECTION INTRAVENOUS CONTINUOUS
Status: DISCONTINUED | OUTPATIENT
Start: 2025-05-02 | End: 2025-05-02

## 2025-05-02 RX ORDER — DEXTROSE MONOHYDRATE 25 G/50ML
25 INJECTION, SOLUTION INTRAVENOUS
Status: DISCONTINUED | OUTPATIENT
Start: 2025-05-02 | End: 2025-05-02

## 2025-05-02 RX ORDER — INDOMETHACIN 25 MG/1
50 CAPSULE ORAL ONCE
Status: COMPLETED | OUTPATIENT
Start: 2025-05-02 | End: 2025-05-02

## 2025-05-02 RX ORDER — SODIUM CHLORIDE, SODIUM LACTATE, POTASSIUM CHLORIDE, AND CALCIUM CHLORIDE .6; .31; .03; .02 G/100ML; G/100ML; G/100ML; G/100ML
500 INJECTION, SOLUTION INTRAVENOUS ONCE
Status: COMPLETED | OUTPATIENT
Start: 2025-05-02 | End: 2025-05-03

## 2025-05-02 RX ORDER — OXYCODONE HYDROCHLORIDE 5 MG/1
2.5-5 TABLET ORAL EVERY 4 HOURS PRN
Refills: 0 | Status: DISCONTINUED | OUTPATIENT
Start: 2025-05-02 | End: 2025-05-03 | Stop reason: HOSPADM

## 2025-05-02 RX ORDER — HEPARIN SODIUM 5000 [USP'U]/ML
5000 INJECTION, SOLUTION INTRAVENOUS; SUBCUTANEOUS EVERY 8 HOURS
Status: DISCONTINUED | OUTPATIENT
Start: 2025-05-02 | End: 2025-05-02

## 2025-05-02 RX ORDER — ACETAMINOPHEN 325 MG/1
650 TABLET ORAL EVERY 6 HOURS PRN
Status: DISCONTINUED | OUTPATIENT
Start: 2025-05-02 | End: 2025-05-03 | Stop reason: HOSPADM

## 2025-05-02 RX ORDER — AMOXICILLIN 250 MG
2 CAPSULE ORAL EVERY EVENING
Status: DISCONTINUED | OUTPATIENT
Start: 2025-05-02 | End: 2025-05-02

## 2025-05-02 RX ORDER — POLYETHYLENE GLYCOL 3350 17 G/17G
1 POWDER, FOR SOLUTION ORAL
Status: DISCONTINUED | OUTPATIENT
Start: 2025-05-02 | End: 2025-05-02

## 2025-05-02 RX ADMIN — INSULIN HUMAN 5 UNITS: 100 INJECTION, SOLUTION PARENTERAL at 05:02

## 2025-05-02 RX ADMIN — HEPARIN SODIUM 5000 UNITS: 5000 INJECTION, SOLUTION INTRAVENOUS; SUBCUTANEOUS at 09:38

## 2025-05-02 RX ADMIN — CALCIUM GLUCONATE 2 G: 20 INJECTION, SOLUTION INTRAVENOUS at 05:07

## 2025-05-02 RX ADMIN — SODIUM CHLORIDE, POTASSIUM CHLORIDE, SODIUM LACTATE AND CALCIUM CHLORIDE 500 ML: 600; 310; 30; 20 INJECTION, SOLUTION INTRAVENOUS at 07:40

## 2025-05-02 RX ADMIN — SODIUM CHLORIDE, POTASSIUM CHLORIDE, SODIUM LACTATE AND CALCIUM CHLORIDE 500 ML: 600; 310; 30; 20 INJECTION, SOLUTION INTRAVENOUS at 09:00

## 2025-05-02 RX ADMIN — DEXTROSE MONOHYDRATE 25 G: 25 INJECTION, SOLUTION INTRAVENOUS at 05:03

## 2025-05-02 RX ADMIN — SODIUM BICARBONATE 50 MEQ: 84 INJECTION INTRAVENOUS at 05:05

## 2025-05-02 RX ADMIN — RIVAROXABAN 10 MG: 10 TABLET, FILM COATED ORAL at 17:24

## 2025-05-02 RX ADMIN — HUMAN ALBUMIN MICROSPHERES AND PERFLUTREN 3 ML: 10; .22 INJECTION, SOLUTION INTRAVENOUS at 10:00

## 2025-05-02 ASSESSMENT — SOCIAL DETERMINANTS OF HEALTH (SDOH)
WITHIN THE PAST 12 MONTHS, THE FOOD YOU BOUGHT JUST DIDN'T LAST AND YOU DIDN'T HAVE MONEY TO GET MORE: OFTEN TRUE
WITHIN THE LAST YEAR, HAVE YOU BEEN HUMILIATED OR EMOTIONALLY ABUSED IN OTHER WAYS BY YOUR PARTNER OR EX-PARTNER?: NO
IN THE PAST 12 MONTHS, HAS THE ELECTRIC, GAS, OIL, OR WATER COMPANY THREATENED TO SHUT OFF SERVICE IN YOUR HOME?: YES
WITHIN THE PAST 12 MONTHS, YOU WORRIED THAT YOUR FOOD WOULD RUN OUT BEFORE YOU GOT THE MONEY TO BUY MORE: SOMETIMES TRUE
WITHIN THE LAST YEAR, HAVE YOU BEEN KICKED, HIT, SLAPPED, OR OTHERWISE PHYSICALLY HURT BY YOUR PARTNER OR EX-PARTNER?: NO
WITHIN THE LAST YEAR, HAVE YOU BEEN AFRAID OF YOUR PARTNER OR EX-PARTNER?: NO
WITHIN THE LAST YEAR, HAVE TO BEEN RAPED OR FORCED TO HAVE ANY KIND OF SEXUAL ACTIVITY BY YOUR PARTNER OR EX-PARTNER?: NO

## 2025-05-02 ASSESSMENT — ENCOUNTER SYMPTOMS
FEVER: 0
SEIZURES: 0
LOSS OF CONSCIOUSNESS: 0
CHILLS: 0
SHORTNESS OF BREATH: 0
CLAUDICATION: 0
ORTHOPNEA: 0
DIARRHEA: 1
VOMITING: 0
NAUSEA: 0
ABDOMINAL PAIN: 1
SPUTUM PRODUCTION: 0
TREMORS: 0
FLANK PAIN: 1
COUGH: 1

## 2025-05-02 ASSESSMENT — PAIN DESCRIPTION - PAIN TYPE
TYPE: ACUTE PAIN

## 2025-05-02 ASSESSMENT — LIFESTYLE VARIABLES
HAVE PEOPLE ANNOYED YOU BY CRITICIZING YOUR DRINKING: NO
TOTAL SCORE: 0
HAVE YOU EVER FELT YOU SHOULD CUT DOWN ON YOUR DRINKING: NO
ALCOHOL_USE: NO
AVERAGE NUMBER OF DAYS PER WEEK YOU HAVE A DRINK CONTAINING ALCOHOL: 0
DOES PATIENT WANT TO STOP DRINKING: NO
HOW MANY TIMES IN THE PAST YEAR HAVE YOU HAD 5 OR MORE DRINKS IN A DAY: 0
TOTAL SCORE: 0
EVER HAD A DRINK FIRST THING IN THE MORNING TO STEADY YOUR NERVES TO GET RID OF A HANGOVER: NO
CONSUMPTION TOTAL: NEGATIVE
ON A TYPICAL DAY WHEN YOU DRINK ALCOHOL HOW MANY DRINKS DO YOU HAVE: 0
EVER FELT BAD OR GUILTY ABOUT YOUR DRINKING: NO
TOTAL SCORE: 0

## 2025-05-02 ASSESSMENT — COGNITIVE AND FUNCTIONAL STATUS - GENERAL
WALKING IN HOSPITAL ROOM: A LITTLE
SUGGESTED CMS G CODE MODIFIER MOBILITY: CI
SUGGESTED CMS G CODE MODIFIER DAILY ACTIVITY: CH
DAILY ACTIVITIY SCORE: 24
MOBILITY SCORE: 23

## 2025-05-02 ASSESSMENT — FIBROSIS 4 INDEX
FIB4 SCORE: 6.2
FIB4 SCORE: 3.39

## 2025-05-02 NOTE — ASSESSMENT & PLAN NOTE
NICMP in the setting of amphetamine use   Unsure if in acute exacerbation clinically patient asymptomatic , however labs like BNP /lactic acid argue otherwise   Bedside POCUS  showed collapsed IVC   S/p cardiac cath 2/24 with  non obstructive CAD , minimal CAD in LAD/RCA   Last TTE - EF 20%, moderate- severe MR , dilated LV, RVSP -45   Chronic CHF regimen includes - metoprolol/aldactone/lasix, intolerant to entresto/ACE/ARB    - hold aldactone  - hold dobutamine for now   -hold BB  -will hold lasix for now and  will dose lasix as tolerated for hyperkalemia /CHF if needed  - repeat TTE

## 2025-05-02 NOTE — H&P
Hospital Medicine History & Physical Note    Date of Service  5/2/2025    Primary Care Physician  YISEL Snyder, P.A.-C.    Consultants  critical care    Specialist Names: I discussed with Dr. Lozoya.    Code Status  Full Code    Chief Complaint  Chief Complaint   Patient presents with    Shortness of Breath     Shortness of breath since earlier today    Denied any chest pain  Past medical history of chronic systolic heart failure LEF 20%, nonischemic cardiomyopathy, continued methamphetamine use, pulmonary hypertension     Weakness     Generalized body weakness since earlier today    Abdominal Pain     Lower abdominal pain since earlier today associated with diarrhea    Diarrhea       History of Presenting Illness  Santino Zabala is a 59 y.o. male who presented 5/2/2025 with shortness of breath.  Mr. Zabala has a past medical history of methamphetamine induced cardiomyopathy by last echocardiogram 11/16. He was most recently admitted in December with K 7.7 lactic acidosis and severe transaminitis. He was to be in cardiogenic shock and was treated with diuresis and a dobutamine drip.   Today he presented to the ER after a day of shortness of breath. He had an episode of diarrhea yesterday and poor oral intake yesterday due to nausea. This morning in the ER his potassium was 7.2 with Cr 1.7. critical care and nephrology consulted. He states he has been compliant with lasix, aldactone, and toprol. His tox screen is + for meth though he denies use for 2-3 months.   EF today reveals an unchanged EF of 20%.     I discussed the plan of care with bedside RN.    Review of Systems  Review of Systems   Respiratory:  Negative for shortness of breath.    Cardiovascular:  Negative for chest pain.       Past Medical History   has a past medical history of Congestive heart failure (HCC).    Surgical History   has no past surgical history on file.     Family History  family history is not on file.   Family history  reviewed with patient. There is no family history that is pertinent to the chief complaint.     Social History   reports that he has never smoked. He has never used smokeless tobacco. He reports that he does not currently use alcohol. He reports that he does not use drugs.    Allergies  No Known Allergies    Medications  Prior to Admission Medications   Prescriptions Last Dose Informant Patient Reported? Taking?   furosemide (LASIX) 40 MG Tab  Historical, Patient No No   Sig: Take 0.5 Tablets by mouth 1 time a day as needed (leg swelling, shortness of breath).   metoprolol SR (TOPROL XL) 25 MG TABLET SR 24 HR  Historical, Patient No No   Sig: Take 0.5 Tablets by mouth every day.   omeprazole (PRILOSEC) 20 MG delayed-release capsule  Historical, Patient No No   Sig: Take 1 Capsule by mouth every day.   spironolactone (ALDACTONE) 25 MG Tab  Historical, Patient No No   Sig: Take 0.5 Tablets by mouth every day. 1/2 tablet= 12.5mg      Facility-Administered Medications: None       Physical Exam  Temp:  [36.1 °C (96.9 °F)] 36.1 °C (96.9 °F)  Pulse:  [80-89] 85  Resp:  [16-18] 17  BP: (104-134)/(56-81) 113/70  SpO2:  [90 %-98 %] 98 %  Blood Pressure: 113/70   Temperature: 36.1 °C (96.9 °F)   Pulse: 85   Respiration: 17   Pulse Oximetry: 98 %       Physical Exam  Vitals and nursing note reviewed.   Constitutional:       General: He is not in acute distress.  Cardiovascular:      Rate and Rhythm: Normal rate and regular rhythm.   Pulmonary:      Effort: Pulmonary effort is normal.      Breath sounds: Normal breath sounds.   Abdominal:      General: There is no distension.      Tenderness: There is no abdominal tenderness.   Musculoskeletal:      Right lower leg: No edema.      Left lower leg: No edema.   Neurological:      Mental Status: He is alert and oriented to person, place, and time.   Psychiatric:      Comments: Quite animated         Laboratory:  Recent Labs     05/02/25  0158   WBC 8.4   RBC 5.57   HEMOGLOBIN 18.2*    HEMATOCRIT 57.4*   .1*   MCH 32.7   MCHC 31.7*   RDW 55.6*   PLATELETCT 226   MPV 9.9     Recent Labs     05/02/25 0321 05/02/25  0500 05/02/25  0847   SODIUM 139 137 137   POTASSIUM 7.2* 7.2* 4.5   CHLORIDE 104 104 104   CO2 17* 16* 20   GLUCOSE 108* 107* 100*   BUN 40* 40* 36*   CREATININE 1.82* 1.77* 1.32   CALCIUM 9.6 9.6 10.0     Recent Labs     05/02/25  0158 05/02/25 0321 05/02/25  0500 05/02/25  0847   ALTSGPT  --  30  --   --    ASTSGOT  --  70*  --   --    ALKPHOSPHAT  --  120*  --   --    TBILIRUBIN  --  2.5*  --   --    DBILIRUBIN  --   --  0.8*  --    LIPASE 28  --   --   --    GLUCOSE  --  108* 107* 100*     Recent Labs     05/02/25  0158   INR 1.21*     Recent Labs     05/02/25  0158   NTPROBNP 22363*         Recent Labs     05/02/25 0321 05/02/25  0500   TROPONINT 48* 40*       Imaging:  EC-ECHOCARDIOGRAM COMPLETE W/ CONT         DX-CHEST-PORTABLE (1 VIEW)   Final Result      1.  No acute cardiopulmonary disease evident.   2.  Enlargement of the cardiomediastinal silhouette.      US-RENAL    (Results Pending)           Assessment/Plan:  Justification for Admission Status  I anticipate this patient will require at least two midnights for appropriate medical management, necessitating inpatient admission because severe hyperkalemia 7.2    Patient will need a ICU (Adult and Pediatrics) bed on MEDICAL service .  The need is secondary to hyperkalemia.    * Hyperkalemia- (present on admission)  Assessment & Plan  Severe at 7.2  Likely due to dehydration  IV fluids  Hold aldactone  Serial BMPs   Tele monitorin    SUZY (acute kidney injury) (HCC)- (present on admission)  Assessment & Plan  Cr 1.7  Hold diuretics  IV fluids  Follow urine output  Serial BMPs    Chronic systolic heart failure (HCC)- (present on admission)  Assessment & Plan  EF unchanged at 20%  Without exacerbation  Hold his outpatient meds    Lactic acidosis- (present on admission)  Assessment & Plan  Due to dehydration  No evidence  of infection    Methamphetamine abuse (HCC)- (present on admission)  Assessment & Plan  Tox screen +   He denies use          VTE prophylaxis: Xarelto 10 mg daily as prophylaxis

## 2025-05-02 NOTE — ED NOTES
Eddi from Lab called with critical result of potassium 7.2 at 0552. Critical lab result read back to Eddi.   Dr. Lu notified of critical lab result at 0553.  Critical lab result read back by Dr. Lu.

## 2025-05-02 NOTE — CONSULTS
Critical Care Consultation    Date of consult: 5/2/2025    Referring Physician  Ayesha Lozoya M.D.    Reason for Consultation  SUZY , hyperkalemia     History of Presenting Illness  59 y.o. male  with a medical history significant for NICMP with amphetamine use, on GDMT who presented on 5/2/2025 with worsening generalized weakness, malaise, decreased urine output with sensation of incomplete emptying of bladder  , abdominal pain for two days, diarrhea for one day.  He has been feeling unwell ( his friend had URTI) with upper respiratory tract symptoms, runny nose, cough, nasal congestion for past 3 days. He has been taking lasix, aldactone and potassium supplements. He has no SOB  (has been working out for few days ), edema , orthopnea, chest pain, fever. Yesterday he had one episode of diarrhea, with decreased PO intake, pain with urination but improved. He denies using any drugs , has been eating healthy lots of fruits including bananas.     Code Status  Full Code    Review of Systems  Review of Systems   Constitutional:  Negative for chills and fever.   Respiratory:  Positive for cough. Negative for sputum production and shortness of breath.    Cardiovascular:  Negative for chest pain, orthopnea and claudication.   Gastrointestinal:  Positive for abdominal pain and diarrhea. Negative for nausea and vomiting.   Genitourinary:  Positive for dysuria and flank pain.   Neurological:  Negative for tremors, seizures and loss of consciousness.   All other systems reviewed and are negative.      Past Medical History   has a past medical history of Congestive heart failure (HCC).    Surgical History   has no past surgical history on file.    Family History  family history is not on file.    Social History   reports that he has never smoked. He has never used smokeless tobacco. He reports that he does not currently use alcohol. He reports that he does not use drugs.    Medications  Home Medications    **Home  medications have not yet been reviewed for this encounter**       Audit from Redirected Encounters    **Home medications have not yet been reviewed for this encounter**       Current Facility-Administered Medications   Medication Dose Route Frequency Provider Last Rate Last Admin    acetaminophen (Tylenol) tablet 650 mg  650 mg Oral Q6HRS PRN Rio Paniagua M.D.        heparin injection 5,000 Units  5,000 Units Subcutaneous Q8HRS Rio Paniagua M.D.        hydrALAZINE (Apresoline) injection 10 mg  10 mg Intravenous Q4HRS PRN Rio Paniagua M.D.        insulin lispro (HumaLOG,AdmeLOG) subcutaneous injection  1-6 Units Subcutaneous Q6HRS Rio Paniagua M.D.        And    dextrose 50 % (D50W) injection 25 g  25 g Intravenous Q15 MIN PRN Rio Paniagua M.D.        oxyCODONE immediate-release (Roxicodone) tablet 2.5-5 mg  2.5-5 mg Oral Q4HRS PRN Rio Paniagua M.D.        HYDROmorphone (Dilaudid) injection 0.25 mg  0.25 mg Intravenous Q3HRS PRN Rio Paniagua M.D.           Allergies  No Known Allergies    Vital Signs last 24 hours  Temp:  [36.1 °C (96.9 °F)] 36.1 °C (96.9 °F)  Pulse:  [80-89] 85  Resp:  [16-18] 17  BP: (104-134)/(56-81) 113/70  SpO2:  [90 %-98 %] 98 %    Physical Exam  Physical Exam  Vitals and nursing note reviewed.   Constitutional:       General: He is not in acute distress.     Appearance: He is not toxic-appearing.   HENT:      Head: Normocephalic and atraumatic.   Eyes:      General: No scleral icterus.     Conjunctiva/sclera: Conjunctivae normal.   Cardiovascular:      Rate and Rhythm: Normal rate and regular rhythm.      Heart sounds: No murmur heard.  Pulmonary:      Effort: Pulmonary effort is normal. No respiratory distress.      Breath sounds: No wheezing or rales.   Abdominal:      General: There is no distension.      Palpations: Abdomen is soft. There is no mass.      Tenderness: There is no abdominal tenderness.   Musculoskeletal:      Right lower leg: No edema.       Left lower leg: Edema present.   Skin:     General: Skin is warm and dry.      Coloration: Skin is not jaundiced.   Neurological:      General: No focal deficit present.      Mental Status: He is alert and oriented to person, place, and time.         Fluids    Intake/Output Summary (Last 24 hours) at 2025 0856  Last data filed at 2025 0620  Gross per 24 hour   Intake --   Output 500 ml   Net -500 ml       Laboratory  Recent Results (from the past 48 hours)   EKG    Collection Time: 25  1:21 AM   Result Value Ref Range    Report       [facility]    Test Date:  2025  Pt Name:    TREVON HAUSER                Department: ER  MRN:        [id number]                      Room:  Gender:     Male                         Technician: [id number]  :        1965                   Requested By:ER TRIAGE PROTOCOL  Order #:    [id number]                    Reading MD:    Measurements  Intervals                                Axis  Rate:       82                           P:          74  NE:         229                          QRS:        135  QRSD:       120                          T:          -32  QT:         436  QTc:        510    Interpretive Statements  Sinus rhythm  Prolonged NE interval  Biatrial enlargement  Left posterior fascicular block  Anterior infarct, old  Nonspecific T abnormalities, inferior leads  Prolonged QT interval  Compared to ECG 2024 18:24:54  Atrial abnormality now present  Left posterior fascicular block now present  Myocardial infarct finding now present   T-wave abnormality now present  Prolonged QT interval now present  Ventricular premature complex(es) no longer present  Left ventricular hypertrophy no longer present  Right-axis deviation no longer present     CBC with Differential    Collection Time: 25  1:58 AM   Result Value Ref Range    WBC 8.4 4.8 - 10.8 K/uL    RBC 5.57 4.70 - 6.10 M/uL    Hemoglobin 18.2 (H) 14.0 - 18.0 g/dL     Hematocrit 57.4 (H) 42.0 - 52.0 %    .1 (H) 81.4 - 97.8 fL    MCH 32.7 27.0 - 33.0 pg    MCHC 31.7 (L) 32.3 - 36.5 g/dL    RDW 55.6 (H) 35.9 - 50.0 fL    Platelet Count 226 164 - 446 K/uL    MPV 9.9 9.0 - 12.9 fL    Neutrophils-Polys 84.00 (H) 44.00 - 72.00 %    Lymphocytes 8.10 (L) 22.00 - 41.00 %    Monocytes 6.50 0.00 - 13.40 %    Eosinophils 0.10 0.00 - 6.90 %    Basophils 0.70 0.00 - 1.80 %    Immature Granulocytes 0.60 0.00 - 0.90 %    Nucleated RBC 0.00 0.00 - 0.20 /100 WBC    Neutrophils (Absolute) 7.08 1.82 - 7.42 K/uL    Lymphs (Absolute) 0.68 (L) 1.00 - 4.80 K/uL    Monos (Absolute) 0.55 0.00 - 0.85 K/uL    Eos (Absolute) 0.01 0.00 - 0.51 K/uL    Baso (Absolute) 0.06 0.00 - 0.12 K/uL    Immature Granulocytes (abs) 0.05 0.00 - 0.11 K/uL    NRBC (Absolute) 0.00 K/uL   proBrain Natriuretic Peptide, NT (BNP)    Collection Time: 25  1:58 AM   Result Value Ref Range    NT-proBNP 20377 (H) 0 - 125 pg/mL   LIPASE    Collection Time: 25  1:58 AM   Result Value Ref Range    Lipase 28 11 - 82 U/L   Prothrombin Time    Collection Time: 25  1:58 AM   Result Value Ref Range    PT 15.4 (H) 12.0 - 14.6 sec    INR 1.21 (H) 0.87 - 1.13   EKG    Collection Time: 25  2:05 AM   Result Value Ref Range    Report       [facility] Emergency Dept.    Test Date:  2025  Pt Name:    TREVON HAUSER                Department: ER  MRN:        [id number]                      Room:       [facility]  Gender:     Male                         Technician: [id number]  :        1965                   Requested By:SHAJI AYALA  Order #:    [id number]                    Reading MD: Shaji Ayala    Measurements  Intervals                                Axis  Rate:       82                           P:          75  AK:         226                          QRS:        147  QRSD:       130                          T:          -26  QT:         446  QTc:        521    Interpretive  Statements  Sinus rhythm at a rate of 82, first-degree AV block, left bundle branch block  with secondary repolarization abnormality, LVH, evidence of old anterior  ischemia.  Diffuse ST segment depressions.  This EKG is not significantly  different than prior today however compared to EKG in December demonstrates  some  diffusely peaked T waves.  Electronically Signed On 05- 02:05:40 PDT by Filiberto Ayala     Comp Metabolic Panel    Collection Time: 05/02/25  3:21 AM   Result Value Ref Range    Sodium 139 135 - 145 mmol/L    Potassium 7.2 (HH) 3.6 - 5.5 mmol/L    Chloride 104 96 - 112 mmol/L    Co2 17 (L) 20 - 33 mmol/L    Anion Gap 18.0 (H) 7.0 - 16.0    Glucose 108 (H) 65 - 99 mg/dL    Bun 40 (H) 8 - 22 mg/dL    Creatinine 1.82 (H) 0.50 - 1.40 mg/dL    Calcium 9.6 8.5 - 10.5 mg/dL    Correct Calcium 9.5 8.5 - 10.5 mg/dL    AST(SGOT) 70 (H) 12 - 45 U/L    ALT(SGPT) 30 2 - 50 U/L    Alkaline Phosphatase 120 (H) 30 - 99 U/L    Total Bilirubin 2.5 (H) 0.1 - 1.5 mg/dL    Albumin 4.1 3.2 - 4.9 g/dL    Total Protein 8.1 6.0 - 8.2 g/dL    Globulin 4.0 (H) 1.9 - 3.5 g/dL    A-G Ratio 1.0 g/dL   TROPONIN    Collection Time: 05/02/25  3:21 AM   Result Value Ref Range    Troponin T 48 (H) 6 - 19 ng/L   ESTIMATED GFR    Collection Time: 05/02/25  3:21 AM   Result Value Ref Range    GFR (CKD-EPI) 42 (A) >60 mL/min/1.73 m 2   BASIC METABOLIC PANEL    Collection Time: 05/02/25  5:00 AM   Result Value Ref Range    Sodium 137 135 - 145 mmol/L    Potassium 7.2 (HH) 3.6 - 5.5 mmol/L    Chloride 104 96 - 112 mmol/L    Co2 16 (L) 20 - 33 mmol/L    Glucose 107 (H) 65 - 99 mg/dL    Bun 40 (H) 8 - 22 mg/dL    Creatinine 1.77 (H) 0.50 - 1.40 mg/dL    Calcium 9.6 8.5 - 10.5 mg/dL    Anion Gap 17.0 (H) 7.0 - 16.0   TROPONIN    Collection Time: 05/02/25  5:00 AM   Result Value Ref Range    Troponin T 40 (H) 6 - 19 ng/L   POCT glucose device results    Collection Time: 05/02/25  5:00 AM   Result Value Ref Range    POC Glucose, Blood  95 65 - 99 mg/dL   ESTIMATED GFR    Collection Time: 05/02/25  5:00 AM   Result Value Ref Range    GFR (CKD-EPI) 43 (A) >60 mL/min/1.73 m 2   CREATINE KINASE    Collection Time: 05/02/25  5:00 AM   Result Value Ref Range    CPK Total 168 (H) 0 - 154 U/L   TSH WITH REFLEX TO FT4    Collection Time: 05/02/25  5:00 AM   Result Value Ref Range    TSH 7.900 (H) 0.380 - 5.330 uIU/mL   FREE THYROXINE    Collection Time: 05/02/25  5:00 AM   Result Value Ref Range    Free T-4 1.68 0.93 - 1.70 ng/dL   LACTIC ACID    Collection Time: 05/02/25  5:10 AM   Result Value Ref Range    Lactic Acid 3.6 (H) 0.5 - 2.0 mmol/L   POCT glucose device results    Collection Time: 05/02/25  6:09 AM   Result Value Ref Range    POC Glucose, Blood 148 (H) 65 - 99 mg/dL       Imaging  DX-CHEST-PORTABLE (1 VIEW)   Final Result      1.  No acute cardiopulmonary disease evident.   2.  Enlargement of the cardiomediastinal silhouette.      US-RENAL    (Results Pending)   EC-ECHOCARDIOGRAM COMPLETE W/ CONT    (Results Pending)       Assessment/Plan  * Hyperkalemia- (present on admission)  Assessment & Plan  In the setting of SUZY , PO intake of Potassium with spironolactone  Given  hyperK treatment in ER    - monitor BMP Q4H  - IVF resus       Lactic acidosis  Assessment & Plan  Due to hypoperfusion     SUZY (acute kidney injury) (HCC)- (present on admission)  Assessment & Plan  Unsure if  pre-renal in the setting of dehydration  vs cardiorenal   Has markers of CHF - elevated BNP/lactate   However hemo concentrated with elevated Hb, clinically dry  , which may support dehydration  Bedside POCUS  showed collapsed IVC   Ordered dobutamine in ER  - will give IVF bolus x 1 , based on repeat BMP we will evaluate need for doubtamine  - will dose lasix as needed in the future   - repeat BMP Q4H  - hold aldactone  - get UA with culture, urine electrolytes/osmo  - US kidneys   - nephro consulted by ERP     Methamphetamine abuse (HCC)- (present on  admission)  Assessment & Plan  - UDS    Chronic systolic heart failure (HCC)- (present on admission)  Assessment & Plan  NICMP in the setting of amphetamine use   Unsure if in acute exacerbation clinically patient asymptomatic , however labs like BNP /lactic acid argue otherwise   Bedside POCUS  showed collapsed IVC   S/p cardiac cath 2/24 with  non obstructive CAD , minimal CAD in LAD/RCA   Last TTE - EF 20%, moderate- severe MR , dilated LV, RVSP -45   Chronic CHF regimen includes - metoprolol/aldactone/lasix, intolerant to entresto/ACE/ARB    - hold aldactone  - hold dobutamine for now   -hold BB  -will hold lasix for now and  will dose lasix as tolerated for hyperkalemia /CHF if needed  - repeat TTE         Discussed patient condition and risk of morbidity and/or mortality with Patient.    The patient remains critically ill.  Critical care time = 65 minutes in directly providing and coordinating critical care and extensive data review.  No time overlap and excludes procedures.

## 2025-05-02 NOTE — ASSESSMENT & PLAN NOTE
Unsure if  pre-renal in the setting of dehydration  vs cardiorenal   Has markers of CHF - elevated BNP/lactate   However hemo concentrated with elevated Hb, clinically dry  , which may support dehydration  Bedside POCUS  showed collapsed IVC   Ordered dobutamine in ER  - will give IVF bolus x 1 , based on repeat BMP we will evaluate need for doubtamine  - will dose lasix as needed in the future   - repeat BMP Q4H  - hold aldactone  - get UA with culture, urine electrolytes/osmo  - US kidneys   - nephro consulted by ERP

## 2025-05-02 NOTE — ASSESSMENT & PLAN NOTE
In the setting of SUZY , PO intake of Potassium with spironolactone  Given  hyperK treatment in ER    - monitor BMP Q4H  - IVF resus

## 2025-05-02 NOTE — ED NOTES
Eddi from Lab called with critical result of potassium 7.2 at 0418. Critical lab result read back to Eddi.   Dr. Ayala notified of critical lab result at 0420.  Critical lab result read back by Dr. Ayala.

## 2025-05-02 NOTE — DISCHARGE INSTRUCTIONS
Discharge Instructions    Discharged to home by car with friend. Discharged via walking, hospital escort: Yes.  Special equipment needed: Not Applicable    Be sure to schedule a follow-up appointment with your primary care doctor or any specialists as instructed.     Discharge Plan:   Diet Plan: Discussed  Activity Level: Discussed  Confirmed Follow up Appointment: Patient to Call and Schedule Appointment  Confirmed Symptoms Management: Discussed  Medication Reconciliation Updated: Yes    I understand that a diet low in cholesterol, fat, and sodium is recommended for good health. Unless I have been given specific instructions below for another diet, I accept this instruction as my diet prescription.   Other diet:     Special Instructions: None    -Is this patient being discharged with medication to prevent blood clots?  No    Is patient discharged on Warfarin / Coumadin?   No

## 2025-05-02 NOTE — CARE PLAN
The patient is Stable - Low risk of patient condition declining or worsening    Shift Goals  Clinical Goals: Lower potassium  Patient Goals: Eat  Family Goals: YOEL    Progress made toward(s) clinical / shift goals:    Problem: Knowledge Deficit - Standard  Goal: Patient and family/care givers will demonstrate understanding of plan of care, disease process/condition, diagnostic tests and medications  Outcome: Progressing       Patient is not progressing towards the following goals:

## 2025-05-02 NOTE — PROGRESS NOTES
Report received at bedside in ER from ER RN.    Pt is a late middle aged male, in no obvious distress lying in bed, A&OX4 GCS15 without current medical complaints. Pt states that he has been experiencing N/V/D, malaise, generalized weakness, abd pain x2 days. Pt denies current CP, SOB, LOC, or any other complaints not listed. Bilateral IV access established in ER. Pt transferred onto CIC monitoring equipment and moved to T610 by [facility]. On arrival to unit, pt ambulated from [facility] to ICU bed without problems. Pt oriented to room, bed locked in lowest position, call light with pt. Pt advised to use call light before attempting to leave his bed.

## 2025-05-02 NOTE — PROGRESS NOTES
4 Eyes Skin Assessment Completed by JULIANA Chou and JULIANA Stone.    Head WDL  Ears WDL  Nose WDL  Mouth WDL  Neck WDL  Breast/Chest WDL  Shoulder Blades WDL  Spine WDL  (R) Arm/Elbow/Hand WDL  (L) Arm/Elbow/Hand WDL  Abdomen WDL  Groin WDL  Scrotum/Coccyx/Buttocks WDL  (R) Leg discolored lower legs  (L) Leg Discolored lower legs   (R) Heel/Foot/Toe WDL  (L) Heel/Foot/Toe WDL          Devices In Places ECG, Blood Pressure Cuff, and Pulse Ox      Interventions In Place Low Air Loss Mattress    Possible Skin Injury No    Pictures Uploaded Into Epic N/A  Wound Consult Placed N/A  RN Wound Prevention Protocol Ordered No

## 2025-05-02 NOTE — ED NOTES
Patient resting comfortably, resp even and unlabored, NADN, VSS. Awaiting blood test results. Pt denies any needs at this time. Urinal within reach. Bed in lowest position and locked, call light within reach

## 2025-05-02 NOTE — ED PROVIDER NOTES
ED Provider Note    CHIEF COMPLAINT  Chief Complaint   Patient presents with    Shortness of Breath     Shortness of breath since earlier today    Denied any chest pain  Past medical history of chronic systolic heart failure LEF 20%, nonischemic cardiomyopathy, continued methamphetamine use, pulmonary hypertension     Weakness     Generalized body weakness since earlier today    Abdominal Pain     Lower abdominal pain since earlier today associated with diarrhea    Diarrhea       EXTERNAL RECORDS REVIEWED  Inpatient Notes patient was discharged from the hospital 12/12/2024 after admission 4 days prior.  Presented with abdominal pain for 3 days as well as urinary retention.  History of chronic systolic heart failure with an EF of 20%, nonischemic cardiomyopathy, methamphetamine abuse continued, pulmonary hypertension.  He presented in cardiogenic shock requiring a dobutamine drip and IV diuresis.  He developed an SUZY likely reflective of cardiorenal syndrome.  He had elevation in LFTs that began improving with aggressive IV diuresis likely secondary to hepatic congestion.  Diagnostic paracentesis performed in the ER with no growth suggestive of SBP.    HPI/ROS  LIMITATION TO HISTORY   Select: : None      Santino Zabala is a 59 y.o. male who presents to the emergency department for evaluation of now resolved shortness of breath and abdominal pain, generalized weakness and diarrhea.  The patient reports that for the last 3 days he has had a cough, runny nose and nasal congestion, general malaise and fatigue.  He states that this morning he began to develop diarrhea and has had a few episodes throughout the day.  He also experienced some lower abdominal pain associated with that diarrhea though he states the pain is now better.  He reports that he has had to pull back on his Lasix recently because it is working too much and he is peeing too much however he states that this morning he started to develop some pain  "with urination and a sensation that his bladder is not emptying completely.  He states he was feeling short of breath earlier in the day as well although notes he is not feeling short of breath now.  He denies any chest pain or pressure.  He has been taking all of his medications as prescribed and states he has not used any drugs since he left the hospital 5 months ago.    PAST MEDICAL HISTORY   has a past medical history of Congestive heart failure (HCC).    SURGICAL HISTORY  patient denies any surgical history    FAMILY HISTORY  History reviewed. No pertinent family history.    SOCIAL HISTORY  Social History     Tobacco Use    Smoking status: Never    Smokeless tobacco: Never   Vaping Use    Vaping status: Never Used   Substance and Sexual Activity    Alcohol use: Not Currently    Drug use: Never    Sexual activity: Not on file       CURRENT MEDICATIONS  Home Medications    **Home medications have not yet been reviewed for this encounter**       Audit from Redirected Encounters    **Home medications have not yet been reviewed for this encounter**         ALLERGIES  No Known Allergies    PHYSICAL EXAM  VITAL SIGNS: /79   Pulse 89   Temp 36.1 °C (96.9 °F) (Temporal)   Resp 16   Ht 1.651 m (5' 5\")   Wt 56.7 kg (125 lb)   SpO2 97%   BMI 20.80 kg/m²    Constitutional: No acute distress, overall well-appearing  HEENT: Atraumatic, normocephalic, pupils are equal round reactive to light, nose normal, mouth shows moist mucous membranes  Cardiovascular: Regular rate and rhythm, no murmur, rub or gallop, 2+ pulses peripherally x4  Thorax & Lungs: No respiratory distress, no wheezes, rales or rhonchi, no chest wall tenderness.  GI: Soft, non-distended, non-tender, no rebound  Skin: Warm, dry, no acute rash or lesion  Musculoskeletal: Moving all extremities, no acute deformity, no edema, no tenderness  Neurologic: A&Ox3, at baseline mentation, ambulatory with a steady gait  Psychiatric: Appropriate affect for " situation at this time      EKG/LABS  Labs Reviewed   CBC WITH DIFFERENTIAL - Abnormal; Notable for the following components:       Result Value    Hemoglobin 18.2 (*)     Hematocrit 57.4 (*)     .1 (*)     MCHC 31.7 (*)     RDW 55.6 (*)     Neutrophils-Polys 84.00 (*)     Lymphocytes 8.10 (*)     Lymphs (Absolute) 0.68 (*)     All other components within normal limits   PROBRAIN NATRIURETIC PEPTIDE, NT - Abnormal; Notable for the following components:    NT-proBNP 77436 (*)     All other components within normal limits   COMP METABOLIC PANEL - Abnormal; Notable for the following components:    Potassium 7.2 (*)     Co2 17 (*)     Anion Gap 18.0 (*)     Glucose 108 (*)     Bun 40 (*)     Creatinine 1.82 (*)     AST(SGOT) 70 (*)     Alkaline Phosphatase 120 (*)     Total Bilirubin 2.5 (*)     Globulin 4.0 (*)     All other components within normal limits    Narrative:     SPECIMEN IS A RECOLLECT   TROPONIN - Abnormal; Notable for the following components:    Troponin T 48 (*)     All other components within normal limits    Narrative:     SPECIMEN IS A RECOLLECT   ESTIMATED GFR - Abnormal; Notable for the following components:    GFR (CKD-EPI) 42 (*)     All other components within normal limits    Narrative:     SPECIMEN IS A RECOLLECT   LIPASE   URINALYSIS,CULTURE IF INDICATED   BASIC METABOLIC PANEL   LACTIC ACID   TROPONIN   POCT GLUCOSE   POCT GLUCOSE   POCT GLUCOSE   POCT GLUCOSE     Results for orders placed or performed during the hospital encounter of 25   EKG   Result Value Ref Range    Report       [facility] Emergency Dept.    Test Date:  2025  Pt Name:    TREVON HAUSER                Department: ER  MRN:        [id number]                      Room:  Gender:     Male                         Technician: [id number]  :        1965                   Requested By:ER TRIAGE PROTOCOL  Order #:    [id number]                    Reading MD:    Measurements  Intervals                                 Axis  Rate:       82                           P:          74  CT:         229                          QRS:        135  QRSD:       120                          T:          -32  QT:         436  QTc:        510    Interpretive Statements  Sinus rhythm  Prolonged CT interval  Biatrial enlargement  Left posterior fascicular block  Anterior infarct, old  Nonspecific T abnormalities, inferior leads  Prolonged QT interval  Compared to ECG 2024 18:24:54  Atrial abnormality now present  Left posterior fascicular block now present  Myocardial infarct finding now present   T-wave abnormality now present  Prolonged QT interval now present  Ventricular premature complex(es) no longer present  Left ventricular hypertrophy no longer present  Right-axis deviation no longer present     EKG   Result Value Ref Range    Report       [facility] Emergency Dept.    Test Date:  2025  Pt Name:    TREVON HAUSER                Department: ER  MRN:        [id number]                      Room:       [facility]  Gender:     Male                         Technician: [id number]  :        1965                   Requested By:SHAJI AYALA  Order #:    [id number]                    Reading MD: Shaji Ayala    Measurements  Intervals                                Axis  Rate:       82                           P:          75  CT:         226                          QRS:        147  QRSD:       130                          T:          -26  QT:         446  QTc:        521    Interpretive Statements  Sinus rhythm at a rate of 82, first-degree AV block, left bundle branch block  with secondary repolarization abnormality, LVH, evidence of old anterior  ischemia.  Diffuse ST segment depressions.  This EKG is not significantly  different than prior today however compared to EKG in December demonstrates  some  diffusely peaked T waves.  Electronically Signed On 2025 02:05:40 PDT by Shaji  Jamie         I have independently interpreted this EKG    RADIOLOGY/PROCEDURES   I have independently interpreted the diagnostic imaging associated with this visit and am waiting the final reading from the radiologist.   My preliminary interpretation is as follows: No pulmonary edema    Radiologist interpretation:  DX-CHEST-PORTABLE (1 VIEW)   Final Result      1.  No acute cardiopulmonary disease evident.   2.  Enlargement of the cardiomediastinal silhouette.          COURSE & MEDICAL DECISION MAKING    ASSESSMENT, COURSE AND PLAN  Care Narrative:     Patient presents to the emergency department for evaluation of generalized weakness in the setting of symptoms suggestive of a viral URI over the last few days, now resolved shortness of breath and abdominal pain, diarrhea.  Vital signs are stable and clinically patient actually appears overall very well but dehydrated.  He has a quite fragile cardiovascular status however given his severe heart failure with reduced ejection fraction therefore I elected to pursue laboratory evaluation prior to fluid resuscitation.  EKG was obtained with evidence of new peaked T waves and otherwise again demonstrating findings of left ventricular hypertrophy and left bundle branch block with some diffuse ST segment depressions not significantly changed from prior EKG however given his new peaked T waves, evidence of dehydration, diarrhea I am certainly concerned for an SUZY with resultant hyperkalemia.  Unfortunately his first set of labs hemolyzed with resulted in delay but repeat CMP was remarkable for hyperkalemia with a potassium of 7.2 albeit again with some hemolysis.  He has an SUZY with a prerenal component and his hemoglobin and hematocrit are elevated suggestive of hemoconcentration.  Given his peaked T waves I did empirically treat him for hyperkalemia with cardioprotective calcium, and shifting insulin and dextrose, sodium bicarbonate.  Case was discussed with Dr. Najjar,  nephrologist who does not recommend any diuretic therapy or fluids at this time and will assess the patient to determine further workup.  Agrees with plan for ICU admission.  Case discussed with Dr. Paniagua, intensivist who requests repeat BMP and lactate to further delineate history of potassium level and any evidence of cardiogenic shock with resultant SUZY of which patient certainly has a history and may require dobutamine therapy.  These were ordered and are pending currently.  Case discussed with oncoming ERP Dr. Lu who will follow-up these results and aid in ultimate disposition for the patient either to the IMC or ICU.    CRITICAL CARE  The very real possibilty of a deterioration of this patient's condition required the highest level of my preparedness for sudden, emergent intervention.  I provided critical care services, which included medication orders, frequent reevaluations of the patient's condition and response to treatment, ordering and reviewing test results, and discussing the case with various consultants.  The critical care time associated with the care of the patient was 30 minutes. Review chart for interventions. This time is exclusive of any other billable procedures.       ADDITIONAL PROBLEMS MANAGED  SUZY  Hyperkalemia    DISPOSITION AND DISCUSSIONS  I have discussed management of the patient with the following physicians and FRITZ's: Intensivist, nephrologist, oncoming ERP as above    FINAL DIAGNOSIS  1. SUZY (acute kidney injury) (HCC)    2. Hyperkalemia    3. Weakness    4. Chronic systolic heart failure (HCC)         Electronically signed by: Filiberto Ayala M.D., 5/2/2025 1:51 AM       Patient reassessed.  No distress, no clinical signs of dehydration, non-focal exam. Most highly suspected is evolving viral infection. Low-risk for serious bacterial infection. Tolerated well po.  Anticipatory guidance was given regarding diagnosis(es), expected course, reasons to return for emergent re-evaluation, and home care. Caregiver questions were answered.  The patient was discharged in stable condition. Follow up with PMD in 1-2 days

## 2025-05-02 NOTE — ED NOTES
Received call from sample handling informing green top for CMP and trop hemolyzed and will need to be recollected. New sample sent to lab

## 2025-05-03 ENCOUNTER — PHARMACY VISIT (OUTPATIENT)
Dept: PHARMACY | Facility: MEDICAL CENTER | Age: 60
End: 2025-05-03
Payer: COMMERCIAL

## 2025-05-03 ENCOUNTER — APPOINTMENT (OUTPATIENT)
Dept: RADIOLOGY | Facility: MEDICAL CENTER | Age: 60
DRG: 641 | End: 2025-05-03
Attending: STUDENT IN AN ORGANIZED HEALTH CARE EDUCATION/TRAINING PROGRAM
Payer: MEDICAID

## 2025-05-03 VITALS
WEIGHT: 126.1 LBS | RESPIRATION RATE: 31 BRPM | BODY MASS INDEX: 21.01 KG/M2 | SYSTOLIC BLOOD PRESSURE: 118 MMHG | HEIGHT: 65 IN | DIASTOLIC BLOOD PRESSURE: 79 MMHG | HEART RATE: 101 BPM | TEMPERATURE: 97.7 F | OXYGEN SATURATION: 97 %

## 2025-05-03 LAB
ANION GAP SERPL CALC-SCNC: 11 MMOL/L (ref 7–16)
BUN SERPL-MCNC: 37 MG/DL (ref 8–22)
CALCIUM SERPL-MCNC: 8.7 MG/DL (ref 8.5–10.5)
CHLORIDE SERPL-SCNC: 104 MMOL/L (ref 96–112)
CO2 SERPL-SCNC: 19 MMOL/L (ref 20–33)
CREAT SERPL-MCNC: 1.27 MG/DL (ref 0.5–1.4)
EKG IMPRESSION: NORMAL
GFR SERPLBLD CREATININE-BSD FMLA CKD-EPI: 64 ML/MIN/1.73 M 2
GLUCOSE SERPL-MCNC: 111 MG/DL (ref 65–99)
MAGNESIUM SERPL-MCNC: 2.1 MG/DL (ref 1.5–2.5)
POTASSIUM SERPL-SCNC: 4.5 MMOL/L (ref 3.6–5.5)
SODIUM SERPL-SCNC: 134 MMOL/L (ref 135–145)

## 2025-05-03 PROCEDURE — 700102 HCHG RX REV CODE 250 W/ 637 OVERRIDE(OP)

## 2025-05-03 PROCEDURE — 99239 HOSP IP/OBS DSCHRG MGMT >30: CPT | Performed by: HOSPITALIST

## 2025-05-03 PROCEDURE — 80048 BASIC METABOLIC PNL TOTAL CA: CPT

## 2025-05-03 PROCEDURE — 83735 ASSAY OF MAGNESIUM: CPT

## 2025-05-03 PROCEDURE — A9270 NON-COVERED ITEM OR SERVICE: HCPCS

## 2025-05-03 PROCEDURE — RXMED WILLOW AMBULATORY MEDICATION CHARGE: Performed by: EMERGENCY MEDICINE

## 2025-05-03 PROCEDURE — 76775 US EXAM ABDO BACK WALL LIM: CPT

## 2025-05-03 RX ADMIN — LIDOCAINE HYDROCHLORIDE 15 ML: 20 SOLUTION ORAL; TOPICAL at 06:01

## 2025-05-03 ASSESSMENT — PAIN DESCRIPTION - PAIN TYPE
TYPE: ACUTE PAIN

## 2025-05-03 ASSESSMENT — FIBROSIS 4 INDEX: FIB4 SCORE: 3.39

## 2025-05-03 NOTE — CARE PLAN
The patient is Watcher - Medium risk of patient condition declining or worsening    Shift Goals  Clinical Goals: monitor labs  Patient Goals: Eat, sleep, go home.  Family Goals: YOEL    Progress made toward(s) clinical / shift goals:    Problem: Knowledge Deficit - Standard  Goal: Patient and family/care givers will demonstrate understanding of plan of care, disease process/condition, diagnostic tests and medications  Outcome: Progressing     Problem: Safety  Goal: Will remain free from injury  Outcome: Progressing     Problem: Pain Management  Goal: Pain level will decrease to patient's comfort goal  Outcome: Progressing     Problem: Infection  Goal: Will remain free from infection  Outcome: Progressing     Problem: Psychosocial Needs:  Goal: Level of anxiety will decrease  Outcome: Progressing

## 2025-05-03 NOTE — PROGRESS NOTES
Monitor summary  SR/ST 's with a first degree heart block, and frequent PAC's PVC's    0.21/0.07/0.46

## 2025-05-03 NOTE — PROGRESS NOTES
Patient discharged home with follow up instructions. Discharge information and education provided by this RN, all questions answered. PIV removed. Meds to beds not ordered. All belongings returned. Patient escorted out.

## 2025-05-03 NOTE — PROGRESS NOTES
Patient screamed of abdominal pain, diaphoretic, HR dropped to 60's from 90's then tachy up to the low 100's. STAT EKG ordered. Notified Dr. Gardner of patients symptoms. Received orders as shown on MAR.

## 2025-05-03 NOTE — CONSULTS
DATE OF SERVICE:  05/02/2025     REQUESTING PHYSICIAN:  Dr. Ayala.     REASON FOR CONSULTATION:  Acute kidney injury and hyperkalemia.     The patient is seen and examined, medical records reviewed.     HISTORY OF PRESENT ILLNESS:  The patient is a 59-year-old gentleman with a   past medical history significant for cardiomyopathy, history of   methamphetamine use, presented to the hospital last night with generalized   weakness and decreased urine output.  The patient was found to be in acute   kidney injury and hyperkalemia.  His initial potassium was 7.2 and his   creatinine is up to 1.8.  His baseline creatinine is 1.18 from 12/2024.  The   patient has been admitted to the Intensive Care Unit and we were called to   manage his hyperkalemia and acute kidney injury.     The patient was on potassium supplement with his diuretics.  The patient has   no recent use of NSAIDs or IV contrast exposure.     PAST MEDICAL HISTORY:  Significant for cardiomyopathy.     ALLERGIES:  No known drug allergies.     SOCIAL HISTORY:  The patient has no smoking history.     FAMILY HISTORY:  No known renal disease.     MEDICATIONS:  Reviewed, which include furosemide, spironolactone, and   potassium supplement.     REVIEW OF SYSTEMS:  The patient has no chest pain or shortness of breath.     PHYSICAL EXAMINATION:  GENERAL:  The patient appears ill, but no apparent distress.  VITAL SIGNS:  Blood pressure of 107/81, heart rate was 86, respiratory rate   was 13.  HEENT:  Normocephalic and atraumatic.  Sclerae are anicteric.  Pupils are   reactive.  Nose normal.  Mucous membranes moist.  NECK:  No lymphadenopathy, No JVD, no thyroid mass.  CHEST:  Normal.  LUNGS:  Clear to auscultation.  HEART:  S1 and S2.  ABDOMEN:  Soft, nontender.  No hepatosplenomegaly.  There is no inguinal   lymphadenopathy.  EXTREMITIES:  There is trace lower extremity edema.  SKIN:  No skin rash.  NEUROLOGIC: The patient is alert and oriented x3.  MOOD:  Normal.      LABORATORY DATA:  His recent labs from this morning were reviewed.     DIAGNOSTIC DATA:  The patient had an EKG, which I reviewed the image myself   showed sinus rhythm with prolonged RI interval.     ASSESSMENT:  1.  Acute kidney injury.  The etiology is not very clear, most likely   cardiorenal syndrome.  2.  Hyperkalemia secondary to potassium supplement in the setting of   spironolactone use.  3.  Metabolic acidosis.  4.  Thrombocytosis.     PLAN:  1.  There is no acute need for renal replacement therapy.  2.  The patient already treated medically in the ER with potassium lowering   cocktails.  3.  Renal diet  4.  Renal dose medication.  5.  Gentle hydration.  6.  Serial potassium level check.  7.  Low potassium diet.  8.  Discontinue spironolactone and potassium supplement.  9.  Daily labs.  10.  Prognosis is guarded     Plan discussed in detail with Dr. Ayala.        ______________________________  FADI NAJJAR, MD FN/NURY    DD:  05/02/2025 14:48  DT:  05/02/2025 18:04    Job#:  [id number]

## 2025-05-03 NOTE — DISCHARGE SUMMARY
Discharge Summary    CHIEF COMPLAINT ON ADMISSION  Chief Complaint   Patient presents with    Shortness of Breath     Shortness of breath since earlier today    Denied any chest pain  Past medical history of chronic systolic heart failure LEF 20%, nonischemic cardiomyopathy, continued methamphetamine use, pulmonary hypertension     Weakness     Generalized body weakness since earlier today    Abdominal Pain     Lower abdominal pain since earlier today associated with diarrhea    Diarrhea       Reason for Admission  Weakness     Admission Date  5/2/2025    CODE STATUS  Full Code    HPI & HOSPITAL COURSE  This is a 59 y.o. male here with shortness of breath.   Mr. Zabala has a past medical history of methamphetamine induced cardiomyopathy by last echocardiogram 11/16. He was most recently admitted in December with K 7.7 lactic acidosis and severe transaminitis. He was to be in cardiogenic shock and was treated with diuresis and a dobutamine drip.   Today he presented to the ER after a day of shortness of breath. He had an episode of diarrhea yesterday and poor oral intake yesterday due to nausea. This morning in the ER his potassium was 7.2 with Cr 1.7. critical care and nephrology consulted. He states he has been compliant with lasix, aldactone, and toprol. His tox screen is + for meth though he denies use for 2-3 months.   EF today reveals an unchanged EF of 20%. Renal ultrasound was normal.     5/3: Mr. Zabala was seen in the ICU. He is feeling much better. He is on room air.  His potassium is 4.5. He is tolerating a diet. His blood pressure is appropriate. He had been experiencing nausea with poor oral intake and diarrhea previously and had still been taking his diuretics. He can restart them now that he is back to normal volume and hold them if he becomes dehydrated. Meth cessation encouraged.       Therefore, he is discharged in good and stable condition to home with close outpatient follow-up.    The  patient recovered much more quickly than anticipated on admission.    Discharge Date  5/3    FOLLOW UP ITEMS POST DISCHARGE  PCP Justice MORILLO    DISCHARGE DIAGNOSES  Principal Problem:    Hyperkalemia (POA: Yes)  Active Problems:    SUZY (acute kidney injury) (HCC) (POA: Yes)    Chronic systolic heart failure (HCC) (POA: Yes)    Methamphetamine abuse (HCC) (POA: Yes)    Lactic acidosis (POA: Yes)  Resolved Problems:    * No resolved hospital problems. *      FOLLOW UP  No future appointments.  Justice Wing P.A.-C.  [address] [id number] [phone]    Schedule an appointment as soon as possible for a visit       [facility], Emergency Dept  [address] [facility] [address] [phone]    As needed, If symptoms worsen      MEDICATIONS ON DISCHARGE     Medication List        CONTINUE taking these medications        Instructions   furosemide 40 MG Tabs  Commonly known as: Lasix   Take 0.5 Tablets by mouth 1 time a day as needed (leg swelling, shortness of breath).  Dose: 20 mg     metoprolol SR 25 MG Tb24  Commonly known as: Toprol XL   Take 0.5 Tablets by mouth every day.  Dose: 12.5 mg     omeprazole 20 MG delayed-release capsule  Commonly known as: PriLOSEC   Take 1 Capsule by mouth every day.  Dose: 20 mg     spironolactone 25 MG Tabs  Commonly known as: Aldactone   Take 0.5 Tablets by mouth every day. 1/2 tablet= 12.5mg  Dose: 12.5 mg              Allergies  No Known Allergies    DIET  Orders Placed This Encounter   Procedures    Diet Order Diet: Cardiac     Standing Status:   Standing     Number of Occurrences:   1     Diet::   Cardiac [6]       ACTIVITY  As tolerated.      CONSULTATIONS  Critical care  nephrology    PROCEDURES  Echo:    CONCLUSIONS  Compared to the images of the prior study 2/7/2024, mitral   regurgitation has worsened, previously mild.  Severely reduced left ventricular systolic function.  The left ventricular ejection fraction is visually  estimated to be less   than 20%.  Grade II diastolic dysfunction.  Reduced right ventricular systolic function.  Moderate mitral regurgitation.  Mild aortic insufficiency.    LABORATORY  Lab Results   Component Value Date    SODIUM 134 (L) 05/03/2025    POTASSIUM 4.5 05/03/2025    CHLORIDE 104 05/03/2025    CO2 19 (L) 05/03/2025    GLUCOSE 111 (H) 05/03/2025    BUN 37 (H) 05/03/2025    CREATININE 1.27 05/03/2025        Lab Results   Component Value Date    WBC 8.4 05/02/2025    HEMOGLOBIN 18.2 (H) 05/02/2025    HEMATOCRIT 57.4 (H) 05/02/2025    PLATELETCT 226 05/02/2025      US-RENAL   Final Result      Normal exam.      EC-ECHOCARDIOGRAM COMPLETE W/ CONT   Final Result      DX-CHEST-PORTABLE (1 VIEW)   Final Result      1.  No acute cardiopulmonary disease evident.   2.  Enlargement of the cardiomediastinal silhouette.            Total time of the discharge process exceeds 34 minutes.

## 2025-05-09 ENCOUNTER — HOSPITAL ENCOUNTER (INPATIENT)
Facility: MEDICAL CENTER | Age: 60
LOS: 2 days | DRG: 682 | End: 2025-05-11
Attending: EMERGENCY MEDICINE
Payer: MEDICAID

## 2025-05-09 ENCOUNTER — APPOINTMENT (OUTPATIENT)
Dept: RADIOLOGY | Facility: MEDICAL CENTER | Age: 60
DRG: 682 | End: 2025-05-09
Attending: EMERGENCY MEDICINE
Payer: MEDICAID

## 2025-05-09 DIAGNOSIS — N18.9 ACUTE KIDNEY INJURY SUPERIMPOSED ON CHRONIC KIDNEY DISEASE (HCC): ICD-10-CM

## 2025-05-09 DIAGNOSIS — N17.9 ACUTE KIDNEY INJURY SUPERIMPOSED ON CHRONIC KIDNEY DISEASE (HCC): ICD-10-CM

## 2025-05-09 DIAGNOSIS — E79.0 HYPERURICEMIA: ICD-10-CM

## 2025-05-09 DIAGNOSIS — E87.5 HYPERKALEMIA: ICD-10-CM

## 2025-05-09 PROBLEM — E88.3 TUMOR LYSIS SYNDROME: Status: ACTIVE | Noted: 2025-05-09

## 2025-05-09 LAB
ALBUMIN SERPL BCP-MCNC: 4.3 G/DL (ref 3.2–4.9)
ALBUMIN/GLOB SERPL: 1 G/DL
ALP SERPL-CCNC: 112 U/L (ref 30–99)
ALT SERPL-CCNC: 90 U/L (ref 2–50)
AMPHET UR QL SCN: NEGATIVE
ANION GAP SERPL CALC-SCNC: 17 MMOL/L (ref 7–16)
APPEARANCE UR: CLEAR
AST SERPL-CCNC: 63 U/L (ref 12–45)
BARBITURATES UR QL SCN: NEGATIVE
BASOPHILS # BLD AUTO: 0.7 % (ref 0–1.8)
BASOPHILS # BLD: 0.05 K/UL (ref 0–0.12)
BENZODIAZ UR QL SCN: NEGATIVE
BILIRUB SERPL-MCNC: 2.5 MG/DL (ref 0.1–1.5)
BILIRUB UR QL STRIP.AUTO: NEGATIVE
BUN SERPL-MCNC: 45 MG/DL (ref 8–22)
BUN SERPL-MCNC: 45 MG/DL (ref 8–22)
BUN SERPL-MCNC: 47 MG/DL (ref 8–22)
BZE UR QL SCN: NEGATIVE
CALCIUM ALBUM COR SERPL-MCNC: 9.2 MG/DL (ref 8.5–10.5)
CALCIUM SERPL-MCNC: 8.9 MG/DL (ref 8.5–10.5)
CALCIUM SERPL-MCNC: 9.1 MG/DL (ref 8.5–10.5)
CALCIUM SERPL-MCNC: 9.4 MG/DL (ref 8.5–10.5)
CANNABINOIDS UR QL SCN: NEGATIVE
CHLORIDE SERPL-SCNC: 100 MMOL/L (ref 96–112)
CHLORIDE SERPL-SCNC: 99 MMOL/L (ref 96–112)
CHLORIDE SERPL-SCNC: 99 MMOL/L (ref 96–112)
CO2 SERPL-SCNC: 16 MMOL/L (ref 20–33)
CO2 SERPL-SCNC: 17 MMOL/L (ref 20–33)
CO2 SERPL-SCNC: 19 MMOL/L (ref 20–33)
COLOR UR: YELLOW
CREAT SERPL-MCNC: 1.57 MG/DL (ref 0.5–1.4)
CREAT SERPL-MCNC: 1.69 MG/DL (ref 0.5–1.4)
CREAT SERPL-MCNC: 1.74 MG/DL (ref 0.5–1.4)
EKG IMPRESSION: NORMAL
EOSINOPHIL # BLD AUTO: 0.03 K/UL (ref 0–0.51)
EOSINOPHIL NFR BLD: 0.4 % (ref 0–6.9)
ERYTHROCYTE [DISTWIDTH] IN BLOOD BY AUTOMATED COUNT: 54 FL (ref 35.9–50)
FENTANYL UR QL: NEGATIVE
GFR SERPLBLD CREATININE-BSD FMLA CKD-EPI: 44 ML/MIN/1.73 M 2
GFR SERPLBLD CREATININE-BSD FMLA CKD-EPI: 46 ML/MIN/1.73 M 2
GFR SERPLBLD CREATININE-BSD FMLA CKD-EPI: 50 ML/MIN/1.73 M 2
GLOBULIN SER CALC-MCNC: 4.3 G/DL (ref 1.9–3.5)
GLUCOSE SERPL-MCNC: 75 MG/DL (ref 65–99)
GLUCOSE SERPL-MCNC: 76 MG/DL (ref 65–99)
GLUCOSE SERPL-MCNC: 93 MG/DL (ref 65–99)
GLUCOSE UR STRIP.AUTO-MCNC: NEGATIVE MG/DL
HCT VFR BLD AUTO: 50 % (ref 42–52)
HGB BLD-MCNC: 16.4 G/DL (ref 14–18)
IMM GRANULOCYTES # BLD AUTO: 0.02 K/UL (ref 0–0.11)
IMM GRANULOCYTES NFR BLD AUTO: 0.3 % (ref 0–0.9)
KETONES UR STRIP.AUTO-MCNC: NEGATIVE MG/DL
LDH SERPL L TO P-CCNC: 490 U/L (ref 107–266)
LEUKOCYTE ESTERASE UR QL STRIP.AUTO: NEGATIVE
LIPASE SERPL-CCNC: 19 U/L (ref 11–82)
LYMPHOCYTES # BLD AUTO: 1 K/UL (ref 1–4.8)
LYMPHOCYTES NFR BLD: 13.8 % (ref 22–41)
MAGNESIUM SERPL-MCNC: 2.6 MG/DL (ref 1.5–2.5)
MAGNESIUM SERPL-MCNC: 2.9 MG/DL (ref 1.5–2.5)
MCH RBC QN AUTO: 33.5 PG (ref 27–33)
MCHC RBC AUTO-ENTMCNC: 32.8 G/DL (ref 32.3–36.5)
MCV RBC AUTO: 102.2 FL (ref 81.4–97.8)
METHADONE UR QL SCN: NEGATIVE
MICRO URNS: NORMAL
MONOCYTES # BLD AUTO: 0.55 K/UL (ref 0–0.85)
MONOCYTES NFR BLD AUTO: 7.6 % (ref 0–13.4)
NEUTROPHILS # BLD AUTO: 5.6 K/UL (ref 1.82–7.42)
NEUTROPHILS NFR BLD: 77.2 % (ref 44–72)
NITRITE UR QL STRIP.AUTO: NEGATIVE
NRBC # BLD AUTO: 0.02 K/UL
NRBC BLD-RTO: 0.3 /100 WBC (ref 0–0.2)
NT-PROBNP SERPL IA-MCNC: ABNORMAL PG/ML (ref 0–125)
OPIATES UR QL SCN: POSITIVE
OXYCODONE UR QL SCN: NEGATIVE
PCP UR QL SCN: NEGATIVE
PH UR STRIP.AUTO: 5.5 [PH] (ref 5–8)
PHOSPHATE SERPL-MCNC: 6.3 MG/DL (ref 2.5–4.5)
PHOSPHATE SERPL-MCNC: 6.5 MG/DL (ref 2.5–4.5)
PLATELET # BLD AUTO: 331 K/UL (ref 164–446)
PMV BLD AUTO: 13.2 FL (ref 9–12.9)
POTASSIUM SERPL-SCNC: 4.4 MMOL/L (ref 3.6–5.5)
POTASSIUM SERPL-SCNC: 4.4 MMOL/L (ref 3.6–5.5)
POTASSIUM SERPL-SCNC: 5 MMOL/L (ref 3.6–5.5)
POTASSIUM SERPL-SCNC: 6.9 MMOL/L (ref 3.6–5.5)
POTASSIUM SERPL-SCNC: 7.3 MMOL/L (ref 3.6–5.5)
PROPOXYPH UR QL SCN: NEGATIVE
PROT SERPL-MCNC: 8.6 G/DL (ref 6–8.2)
PROT UR QL STRIP: NEGATIVE MG/DL
RBC # BLD AUTO: 4.89 M/UL (ref 4.7–6.1)
RBC UR QL AUTO: NEGATIVE
SODIUM SERPL-SCNC: 132 MMOL/L (ref 135–145)
SODIUM SERPL-SCNC: 133 MMOL/L (ref 135–145)
SODIUM SERPL-SCNC: 136 MMOL/L (ref 135–145)
SP GR UR STRIP.AUTO: 1.02
TROPONIN T SERPL-MCNC: 50 NG/L (ref 6–19)
TROPONIN T SERPL-MCNC: 58 NG/L (ref 6–19)
URATE SERPL-MCNC: 14.9 MG/DL (ref 2.5–8.3)
UROBILINOGEN UR STRIP.AUTO-MCNC: 1 EU/DL
WBC # BLD AUTO: 7.3 K/UL (ref 4.8–10.8)

## 2025-05-09 PROCEDURE — 700101 HCHG RX REV CODE 250: Mod: UD | Performed by: EMERGENCY MEDICINE

## 2025-05-09 PROCEDURE — A9270 NON-COVERED ITEM OR SERVICE: HCPCS

## 2025-05-09 PROCEDURE — 770020 HCHG ROOM/CARE - TELE (206)

## 2025-05-09 PROCEDURE — 700102 HCHG RX REV CODE 250 W/ 637 OVERRIDE(OP)

## 2025-05-09 PROCEDURE — 99223 1ST HOSP IP/OBS HIGH 75: CPT

## 2025-05-09 PROCEDURE — 700102 HCHG RX REV CODE 250 W/ 637 OVERRIDE(OP): Mod: UD | Performed by: EMERGENCY MEDICINE

## 2025-05-09 PROCEDURE — 36415 COLL VENOUS BLD VENIPUNCTURE: CPT

## 2025-05-09 PROCEDURE — 80053 COMPREHEN METABOLIC PANEL: CPT

## 2025-05-09 PROCEDURE — 96366 THER/PROPH/DIAG IV INF ADDON: CPT

## 2025-05-09 PROCEDURE — 83880 ASSAY OF NATRIURETIC PEPTIDE: CPT

## 2025-05-09 PROCEDURE — 84100 ASSAY OF PHOSPHORUS: CPT | Mod: 91

## 2025-05-09 PROCEDURE — 700117 HCHG RX CONTRAST REV CODE 255: Performed by: EMERGENCY MEDICINE

## 2025-05-09 PROCEDURE — 93010 ELECTROCARDIOGRAM REPORT: CPT | Performed by: INTERNAL MEDICINE

## 2025-05-09 PROCEDURE — 80307 DRUG TEST PRSMV CHEM ANLYZR: CPT

## 2025-05-09 PROCEDURE — 74177 CT ABD & PELVIS W/CONTRAST: CPT

## 2025-05-09 PROCEDURE — 84484 ASSAY OF TROPONIN QUANT: CPT

## 2025-05-09 PROCEDURE — 96375 TX/PRO/DX INJ NEW DRUG ADDON: CPT

## 2025-05-09 PROCEDURE — 80048 BASIC METABOLIC PNL TOTAL CA: CPT | Mod: 91

## 2025-05-09 PROCEDURE — 81003 URINALYSIS AUTO W/O SCOPE: CPT

## 2025-05-09 PROCEDURE — 83690 ASSAY OF LIPASE: CPT

## 2025-05-09 PROCEDURE — 93005 ELECTROCARDIOGRAM TRACING: CPT | Mod: TC | Performed by: EMERGENCY MEDICINE

## 2025-05-09 PROCEDURE — 96365 THER/PROPH/DIAG IV INF INIT: CPT

## 2025-05-09 PROCEDURE — 85025 COMPLETE CBC W/AUTO DIFF WBC: CPT

## 2025-05-09 PROCEDURE — 51798 US URINE CAPACITY MEASURE: CPT

## 2025-05-09 PROCEDURE — 71045 X-RAY EXAM CHEST 1 VIEW: CPT

## 2025-05-09 PROCEDURE — 96372 THER/PROPH/DIAG INJ SC/IM: CPT

## 2025-05-09 PROCEDURE — 84132 ASSAY OF SERUM POTASSIUM: CPT

## 2025-05-09 PROCEDURE — 700105 HCHG RX REV CODE 258: Performed by: INTERNAL MEDICINE

## 2025-05-09 PROCEDURE — A9270 NON-COVERED ITEM OR SERVICE: HCPCS | Mod: UD | Performed by: EMERGENCY MEDICINE

## 2025-05-09 PROCEDURE — 83735 ASSAY OF MAGNESIUM: CPT

## 2025-05-09 PROCEDURE — 99222 1ST HOSP IP/OBS MODERATE 55: CPT | Performed by: INTERNAL MEDICINE

## 2025-05-09 PROCEDURE — 96376 TX/PRO/DX INJ SAME DRUG ADON: CPT

## 2025-05-09 PROCEDURE — 84550 ASSAY OF BLOOD/URIC ACID: CPT

## 2025-05-09 PROCEDURE — 700111 HCHG RX REV CODE 636 W/ 250 OVERRIDE (IP)

## 2025-05-09 PROCEDURE — 700111 HCHG RX REV CODE 636 W/ 250 OVERRIDE (IP): Mod: UD | Performed by: EMERGENCY MEDICINE

## 2025-05-09 PROCEDURE — 99285 EMERGENCY DEPT VISIT HI MDM: CPT

## 2025-05-09 PROCEDURE — 700111 HCHG RX REV CODE 636 W/ 250 OVERRIDE (IP): Performed by: INTERNAL MEDICINE

## 2025-05-09 PROCEDURE — 700111 HCHG RX REV CODE 636 W/ 250 OVERRIDE (IP): Mod: JZ

## 2025-05-09 PROCEDURE — 83615 LACTATE (LD) (LDH) ENZYME: CPT

## 2025-05-09 RX ORDER — DEXTROSE MONOHYDRATE 25 G/50ML
50 INJECTION, SOLUTION INTRAVENOUS ONCE
Status: COMPLETED | OUTPATIENT
Start: 2025-05-09 | End: 2025-05-09

## 2025-05-09 RX ORDER — FUROSEMIDE 10 MG/ML
40 INJECTION INTRAMUSCULAR; INTRAVENOUS 2 TIMES DAILY
Status: DISCONTINUED | OUTPATIENT
Start: 2025-05-09 | End: 2025-05-09

## 2025-05-09 RX ORDER — ASPIRIN 81 MG/1
81 TABLET ORAL
Status: DISCONTINUED | OUTPATIENT
Start: 2025-05-09 | End: 2025-05-11 | Stop reason: HOSPADM

## 2025-05-09 RX ORDER — OXYCODONE HYDROCHLORIDE 5 MG/1
5 TABLET ORAL
Refills: 0 | Status: DISCONTINUED | OUTPATIENT
Start: 2025-05-09 | End: 2025-05-11 | Stop reason: HOSPADM

## 2025-05-09 RX ORDER — METOPROLOL SUCCINATE 50 MG/1
50 TABLET, EXTENDED RELEASE ORAL DAILY
Status: DISCONTINUED | OUTPATIENT
Start: 2025-05-09 | End: 2025-05-11 | Stop reason: HOSPADM

## 2025-05-09 RX ORDER — ASPIRIN 81 MG/1
81 TABLET ORAL
Status: ON HOLD | COMMUNITY

## 2025-05-09 RX ORDER — SODIUM CHLORIDE 9 MG/ML
INJECTION, SOLUTION INTRAVENOUS CONTINUOUS
Status: DISCONTINUED | OUTPATIENT
Start: 2025-05-09 | End: 2025-05-09

## 2025-05-09 RX ORDER — HYDRALAZINE HYDROCHLORIDE 20 MG/ML
20 INJECTION INTRAMUSCULAR; INTRAVENOUS EVERY 6 HOURS PRN
Status: DISCONTINUED | OUTPATIENT
Start: 2025-05-09 | End: 2025-05-11 | Stop reason: HOSPADM

## 2025-05-09 RX ORDER — SODIUM BICARBONATE IN D5W 150/1000ML
PLASTIC BAG, INJECTION (ML) INTRAVENOUS CONTINUOUS
Status: DISCONTINUED | OUTPATIENT
Start: 2025-05-09 | End: 2025-05-10

## 2025-05-09 RX ORDER — FUROSEMIDE 10 MG/ML
40 INJECTION INTRAMUSCULAR; INTRAVENOUS ONCE
Status: COMPLETED | OUTPATIENT
Start: 2025-05-09 | End: 2025-05-09

## 2025-05-09 RX ORDER — FUROSEMIDE 10 MG/ML
80 INJECTION INTRAMUSCULAR; INTRAVENOUS
Status: DISCONTINUED | OUTPATIENT
Start: 2025-05-09 | End: 2025-05-11 | Stop reason: HOSPADM

## 2025-05-09 RX ORDER — OXYCODONE HYDROCHLORIDE 10 MG/1
10 TABLET ORAL
Refills: 0 | Status: DISCONTINUED | OUTPATIENT
Start: 2025-05-09 | End: 2025-05-11 | Stop reason: HOSPADM

## 2025-05-09 RX ORDER — AMOXICILLIN 250 MG
2 CAPSULE ORAL EVERY EVENING
Status: DISCONTINUED | OUTPATIENT
Start: 2025-05-09 | End: 2025-05-11 | Stop reason: HOSPADM

## 2025-05-09 RX ORDER — ALLOPURINOL 100 MG/1
75 TABLET ORAL DAILY
Status: DISCONTINUED | OUTPATIENT
Start: 2025-05-09 | End: 2025-05-09

## 2025-05-09 RX ORDER — TAMSULOSIN HYDROCHLORIDE 0.4 MG/1
0.4 CAPSULE ORAL
Status: DISCONTINUED | OUTPATIENT
Start: 2025-05-09 | End: 2025-05-11 | Stop reason: HOSPADM

## 2025-05-09 RX ORDER — INDOMETHACIN 25 MG/1
50 CAPSULE ORAL ONCE
Status: COMPLETED | OUTPATIENT
Start: 2025-05-09 | End: 2025-05-09

## 2025-05-09 RX ORDER — ACETAMINOPHEN 325 MG/1
650 TABLET ORAL EVERY 6 HOURS PRN
Status: DISCONTINUED | OUTPATIENT
Start: 2025-05-09 | End: 2025-05-11 | Stop reason: HOSPADM

## 2025-05-09 RX ORDER — MORPHINE SULFATE 4 MG/ML
4 INJECTION INTRAVENOUS ONCE
Status: COMPLETED | OUTPATIENT
Start: 2025-05-09 | End: 2025-05-09

## 2025-05-09 RX ORDER — ALLOPURINOL 300 MG/1
300 TABLET ORAL 2 TIMES DAILY
Status: DISCONTINUED | OUTPATIENT
Start: 2025-05-09 | End: 2025-05-10

## 2025-05-09 RX ORDER — FUROSEMIDE 40 MG/1
40 TABLET ORAL DAILY
Status: ON HOLD | COMMUNITY

## 2025-05-09 RX ORDER — POLYETHYLENE GLYCOL 3350 17 G/17G
1 POWDER, FOR SOLUTION ORAL
Status: DISCONTINUED | OUTPATIENT
Start: 2025-05-09 | End: 2025-05-11 | Stop reason: HOSPADM

## 2025-05-09 RX ORDER — MORPHINE SULFATE 4 MG/ML
4 INJECTION INTRAVENOUS
Status: DISCONTINUED | OUTPATIENT
Start: 2025-05-09 | End: 2025-05-11 | Stop reason: HOSPADM

## 2025-05-09 RX ORDER — OMEPRAZOLE 20 MG/1
20 CAPSULE, DELAYED RELEASE ORAL
Status: DISCONTINUED | OUTPATIENT
Start: 2025-05-09 | End: 2025-05-11 | Stop reason: HOSPADM

## 2025-05-09 RX ORDER — ALUMINA, MAGNESIA, AND SIMETHICONE 2400; 2400; 240 MG/30ML; MG/30ML; MG/30ML
10 SUSPENSION ORAL ONCE
Status: COMPLETED | OUTPATIENT
Start: 2025-05-09 | End: 2025-05-09

## 2025-05-09 RX ORDER — METOPROLOL SUCCINATE 50 MG/1
50 TABLET, EXTENDED RELEASE ORAL DAILY
Status: ON HOLD | COMMUNITY
Start: 2025-03-08

## 2025-05-09 RX ORDER — ALLOPURINOL 100 MG/1
200 TABLET ORAL ONCE
Status: COMPLETED | OUTPATIENT
Start: 2025-05-09 | End: 2025-05-09

## 2025-05-09 RX ORDER — HEPARIN SODIUM 5000 [USP'U]/ML
5000 INJECTION, SOLUTION INTRAVENOUS; SUBCUTANEOUS EVERY 8 HOURS
Status: DISCONTINUED | OUTPATIENT
Start: 2025-05-09 | End: 2025-05-11 | Stop reason: HOSPADM

## 2025-05-09 RX ORDER — LOPERAMIDE HYDROCHLORIDE 2 MG/1
2 CAPSULE ORAL 4 TIMES DAILY PRN
Status: ON HOLD | COMMUNITY

## 2025-05-09 RX ADMIN — HEPARIN SODIUM 5000 UNITS: 5000 INJECTION, SOLUTION INTRAVENOUS; SUBCUTANEOUS at 22:40

## 2025-05-09 RX ADMIN — IOHEXOL 80 ML: 350 INJECTION, SOLUTION INTRAVENOUS at 02:50

## 2025-05-09 RX ADMIN — HEPARIN SODIUM 5000 UNITS: 5000 INJECTION, SOLUTION INTRAVENOUS; SUBCUTANEOUS at 07:07

## 2025-05-09 RX ADMIN — TAMSULOSIN HYDROCHLORIDE 0.4 MG: 0.4 CAPSULE ORAL at 12:57

## 2025-05-09 RX ADMIN — DEXTROSE MONOHYDRATE 50 ML: 25 INJECTION, SOLUTION INTRAVENOUS at 03:51

## 2025-05-09 RX ADMIN — FUROSEMIDE 40 MG: 10 INJECTION, SOLUTION INTRAVENOUS at 07:08

## 2025-05-09 RX ADMIN — ALLOPURINOL 300 MG: 300 TABLET ORAL at 17:12

## 2025-05-09 RX ADMIN — METOPROLOL SUCCINATE 50 MG: 50 TABLET, FILM COATED, EXTENDED RELEASE ORAL at 07:08

## 2025-05-09 RX ADMIN — SENNOSIDES AND DOCUSATE SODIUM 2 TABLET: 50; 8.6 TABLET ORAL at 17:12

## 2025-05-09 RX ADMIN — MORPHINE SULFATE 4 MG: 4 INJECTION, SOLUTION INTRAMUSCULAR; INTRAVENOUS at 17:12

## 2025-05-09 RX ADMIN — INSULIN HUMAN 5 UNITS: 100 INJECTION, SOLUTION PARENTERAL at 03:54

## 2025-05-09 RX ADMIN — OXYCODONE HYDROCHLORIDE 10 MG: 10 TABLET ORAL at 15:12

## 2025-05-09 RX ADMIN — HEPARIN SODIUM 5000 UNITS: 5000 INJECTION, SOLUTION INTRAVENOUS; SUBCUTANEOUS at 13:05

## 2025-05-09 RX ADMIN — ALUMINUM HYDROXIDE, MAGNESIUM HYDROXIDE, AND DIMETHICONE 10 ML: 400; 400; 40 SUSPENSION ORAL at 02:35

## 2025-05-09 RX ADMIN — ALLOPURINOL 75 MG: 100 TABLET ORAL at 07:08

## 2025-05-09 RX ADMIN — FUROSEMIDE 40 MG: 10 INJECTION, SOLUTION INTRAVENOUS at 03:49

## 2025-05-09 RX ADMIN — MORPHINE SULFATE 4 MG: 4 INJECTION INTRAVENOUS at 02:35

## 2025-05-09 RX ADMIN — SODIUM BICARBONATE: 84 INJECTION, SOLUTION INTRAVENOUS at 22:46

## 2025-05-09 RX ADMIN — ALLOPURINOL 200 MG: 100 TABLET ORAL at 09:32

## 2025-05-09 RX ADMIN — SODIUM BICARBONATE: 84 INJECTION, SOLUTION INTRAVENOUS at 09:29

## 2025-05-09 RX ADMIN — SODIUM BICARBONATE 50 MEQ: 84 INJECTION INTRAVENOUS at 05:00

## 2025-05-09 RX ADMIN — FUROSEMIDE 80 MG: 10 INJECTION, SOLUTION INTRAVENOUS at 15:13

## 2025-05-09 ASSESSMENT — PAIN DESCRIPTION - PAIN TYPE
TYPE: ACUTE PAIN

## 2025-05-09 ASSESSMENT — ENCOUNTER SYMPTOMS
DOUBLE VISION: 0
NAUSEA: 1
PALPITATIONS: 0
DIZZINESS: 0
SORE THROAT: 0
SHORTNESS OF BREATH: 1
MYALGIAS: 0
DIARRHEA: 0
ABDOMINAL PAIN: 1
VOMITING: 1
HEARTBURN: 0
FEVER: 0
WHEEZING: 0
NAUSEA: 0
ABDOMINAL PAIN: 0
WEAKNESS: 1
COUGH: 0
HEADACHES: 0
CHILLS: 0
CONSTIPATION: 0
VOMITING: 0

## 2025-05-09 ASSESSMENT — COGNITIVE AND FUNCTIONAL STATUS - GENERAL
MOBILITY SCORE: 24
SUGGESTED CMS G CODE MODIFIER DAILY ACTIVITY: CH
DAILY ACTIVITIY SCORE: 24
SUGGESTED CMS G CODE MODIFIER MOBILITY: CH

## 2025-05-09 ASSESSMENT — LIFESTYLE VARIABLES
HAVE YOU EVER FELT YOU SHOULD CUT DOWN ON YOUR DRINKING: NO
TOTAL SCORE: 0
HAVE PEOPLE ANNOYED YOU BY CRITICIZING YOUR DRINKING: NO
AVERAGE NUMBER OF DAYS PER WEEK YOU HAVE A DRINK CONTAINING ALCOHOL: 0
CONSUMPTION TOTAL: NEGATIVE
HOW MANY TIMES IN THE PAST YEAR HAVE YOU HAD 5 OR MORE DRINKS IN A DAY: 0
EVER HAD A DRINK FIRST THING IN THE MORNING TO STEADY YOUR NERVES TO GET RID OF A HANGOVER: NO
EVER FELT BAD OR GUILTY ABOUT YOUR DRINKING: NO
ALCOHOL_USE: NO
TOTAL SCORE: 0
TOTAL SCORE: 0
ON A TYPICAL DAY WHEN YOU DRINK ALCOHOL HOW MANY DRINKS DO YOU HAVE: 0
DOES PATIENT WANT TO STOP DRINKING: NO

## 2025-05-09 ASSESSMENT — SOCIAL DETERMINANTS OF HEALTH (SDOH)
WITHIN THE LAST YEAR, HAVE TO BEEN RAPED OR FORCED TO HAVE ANY KIND OF SEXUAL ACTIVITY BY YOUR PARTNER OR EX-PARTNER?: NO
WITHIN THE PAST 12 MONTHS, THE FOOD YOU BOUGHT JUST DIDN'T LAST AND YOU DIDN'T HAVE MONEY TO GET MORE: OFTEN TRUE
WITHIN THE LAST YEAR, HAVE YOU BEEN HUMILIATED OR EMOTIONALLY ABUSED IN OTHER WAYS BY YOUR PARTNER OR EX-PARTNER?: NO
IN THE PAST 12 MONTHS, HAS THE ELECTRIC, GAS, OIL, OR WATER COMPANY THREATENED TO SHUT OFF SERVICE IN YOUR HOME?: YES
WITHIN THE LAST YEAR, HAVE YOU BEEN AFRAID OF YOUR PARTNER OR EX-PARTNER?: NO
WITHIN THE PAST 12 MONTHS, YOU WORRIED THAT YOUR FOOD WOULD RUN OUT BEFORE YOU GOT THE MONEY TO BUY MORE: SOMETIMES TRUE
WITHIN THE LAST YEAR, HAVE YOU BEEN KICKED, HIT, SLAPPED, OR OTHERWISE PHYSICALLY HURT BY YOUR PARTNER OR EX-PARTNER?: NO

## 2025-05-09 ASSESSMENT — PATIENT HEALTH QUESTIONNAIRE - PHQ9
1. LITTLE INTEREST OR PLEASURE IN DOING THINGS: NOT AT ALL
SUM OF ALL RESPONSES TO PHQ9 QUESTIONS 1 AND 2: 0
2. FEELING DOWN, DEPRESSED, IRRITABLE, OR HOPELESS: NOT AT ALL

## 2025-05-09 ASSESSMENT — FIBROSIS 4 INDEX
FIB4 SCORE: 1.2
FIB4 SCORE: 1.2

## 2025-05-09 NOTE — ED NOTES
Bedside report received from off going RN/tech: Altagracia ANDREWS, assumed care of patient.  POC discussed with patient. Call light within reach, all needs addressed at this time.       Fall risk interventions in place: Move the patient closer to the nurse's station, Patient's personal possessions are with in their safe reach, Place socks on patient, Place fall risk sign on patient's door, Give patient urinal if applicable, Keep floor surfaces clean and dry, and Accompanied to restroom (all applicable per [facility] Fall risk assessment)   Continuous monitoring: Cardiac Leads, Pulse Ox, or Blood Pressure  IVF/IV medications: Not Applicable   Oxygen: How many liters 3L, Traced the line to wall oxygen, and No oxygen tank in room  Bedside sitter: Not Applicable   Isolation: Not Applicable

## 2025-05-09 NOTE — PROGRESS NOTES
I will be off duty and signed out of voalte at 3pm.  If you have any needs for this patient after 3pm, please reach out to the on call Aysha WHITE.  Thank you!

## 2025-05-09 NOTE — ED NOTES
Lab called with critical result of K 6.9 at 0434. Critical lab result read back to Lab.   Dr. Serrato notified of critical lab result at 0436.  Critical lab result read back by Dr. Serrato.

## 2025-05-09 NOTE — ED NOTES
Pt taken to restroom via WC, pt able to move independently    Bladder scan completed per Dr. Hyatt 127 mL

## 2025-05-09 NOTE — ED NOTES
Pt transferred in to hospital bed without difficulties  Patient updated on plan of care, all questions answered at this time.     Andrew locked and in the lowest position. Pt connected to continuous monitor with call light and personal belongings within reach.

## 2025-05-09 NOTE — PROGRESS NOTES
4 Eyes Skin Assessment Completed by JULIANA crump and JULIANA solorzano.    Head WDL  Ears WDL  Nose WDL  Mouth WDL  Neck WDL  Breast/Chest WDL  Shoulder Blades WDL  Spine WDL  (R) Arm/Elbow/Hand Bruising  (L) Arm/Elbow/Hand Bruising  Abdomen WDL  Groin WDL  Scrotum/Coccyx/Buttocks Discoloration  (R) Leg Redness, Blanching, Bruising, Swelling, and Edema  (L) Leg Redness, Blanching, Bruising, Swelling, and Edema  (R) Heel/Foot/Toe Redness, Blanching, and Swelling  (L) Heel/Foot/Toe Redness, Blanching, and Swelling          Devices In Places Tele Box, Blood Pressure Cuff, and Pulse Ox      Interventions In Place Heels Loaded W/Pillows    Possible Skin Injury No    Pictures Uploaded Into Epic N/A  Wound Consult Placed N/A  RN Wound Prevention Protocol Ordered No

## 2025-05-09 NOTE — CONSULTS
"Nephrology Consultation    Date of Service  5/9/2025    Referring Physician  OREN Pollock.*    Consulting Physician  Raul Hyatt M.D.    Reason for Consultation  SUZY, hyperkalemia    History of Presenting Illness  59 y.o. male with a history of chronic systolic heart failure, methamphetamine use, hypertension who presented 5/9/2025 with feelings of \" heart going too slow.\"    The patient tells me he came into the hospital because his heart was too slow.  He says he felt really bad, so he came to the hospital.  He denies any known history of kidney disease.  He denies taking NSAIDs.  He does complain of lower urinary tract symptoms, urinary frequency, nocturia, and said he has needed a urinary catheter before.  He thinks he takes metoprolol, Lasix, aspirin, statin.  Patient has previously been prescribed Entresto, spironolactone, and potassium supplements, but he does not remember if he was taking these prior to admission.  He complains that his legs are swollen, and he feels a little short of breath.  He denies recent methamphetamine use.  He denies NSAID use.    Review of Systems  Review of Systems   Constitutional:  Negative for fever.   Respiratory:  Positive for shortness of breath.    Cardiovascular:  Positive for leg swelling. Negative for chest pain.   Gastrointestinal:  Negative for abdominal pain.   All other systems reviewed and are negative.      Past Medical History  Past Medical History:   Diagnosis Date    Congestive heart failure (HCC)        Surgical History  Past Surgical History:   Procedure Laterality Date    NO PERTINENT PAST SURGICAL HISTORY         Family History  History reviewed. No pertinent family history.    Social History  Social History     Socioeconomic History    Marital status: Single     Spouse name: Not on file    Number of children: Not on file    Years of education: Not on file    Highest education level: Not on file   Occupational History    Not on file   Tobacco Use "    Smoking status: Never    Smokeless tobacco: Never   Vaping Use    Vaping status: Never Used   Substance and Sexual Activity    Alcohol use: Not Currently    Drug use: Never    Sexual activity: Not on file   Other Topics Concern    Not on file   Social History Narrative    ** Merged History Encounter **          Social Drivers of Health     Financial Resource Strain: Not on file   Food Insecurity: Food Insecurity Present (5/2/2025)    Hunger Vital Sign     Worried About Running Out of Food in the Last Year: Sometimes true     Ran Out of Food in the Last Year: Often true   Transportation Needs: No Transportation Needs (5/2/2025)    PRAPARE - Transportation     Lack of Transportation (Medical): No     Lack of Transportation (Non-Medical): No   Physical Activity: Not on file   Stress: Not on file   Social Connections: Not on file   Intimate Partner Violence: Not At Risk (5/2/2025)    Humiliation, Afraid, Rape, and Kick questionnaire     Fear of Current or Ex-Partner: No     Emotionally Abused: No     Physically Abused: No     Sexually Abused: No   Housing Stability: High Risk (5/2/2025)    Housing Stability Vital Sign     Unable to Pay for Housing in the Last Year: Yes     Number of Times Moved in the Last Year: 0     Homeless in the Last Year: No       Medications  Current Facility-Administered Medications   Medication Dose Route Frequency Provider Last Rate Last Admin    acetaminophen (Tylenol) tablet 650 mg  650 mg Oral Q6HRS PRN ARISTIDES UngerO.        senna-docusate (Pericolace Or Senokot S) 8.6-50 MG per tablet 2 Tablet  2 Tablet Oral Q EVENING ESEQUIEL Unger.ODonald        And    polyethylene glycol/lytes (Miralax) Packet 1 Packet  1 Packet Oral QDAY PRN ESEQUIEL Unger.O.        heparin injection 5,000 Units  5,000 Units Subcutaneous Q8HRS ESEQUIEL Unger.O.   5,000 Units at 05/09/25 1305    aspirin EC tablet 81 mg  81 mg Oral QDAY PRN ESEQUIEL Unger.O.        metoprolol SR (Toprol XL)  tablet 50 mg  50 mg Oral DAILY Champ Summers D.O.   50 mg at 05/09/25 0708    omeprazole (PriLOSEC) capsule 20 mg  20 mg Oral QDAY PRN Champ Summers D.O.        sodium bicarbonate 150 mEq in D5W infusion (premix)   Intravenous Continuous Raul Hyatt M.D. 150 mL/hr at 05/09/25 0929 New Bag at 05/09/25 0929    hydrALAZINE (Apresoline) injection 20 mg  20 mg Intravenous Q6HRS PRN Anika Casas, A.P.R.N.        allopurinol (Zyloprim) tablet 300 mg  300 mg Oral BID Anika Casas, A.P.R.N.        furosemide (Lasix) injection 80 mg  80 mg Intravenous BID DIURETIC Raul Hyatt M.D.        tamsulosin (Flomax) capsule 0.4 mg  0.4 mg Oral AFTER BREAKFAST Anika Casas, A.P.R.N.   0.4 mg at 05/09/25 1257    oxyCODONE immediate-release (Roxicodone) tablet 5 mg  5 mg Oral Q3HRS PRN Anika Casas, A.P.R.N.        Or    oxyCODONE immediate release (Roxicodone) tablet 10 mg  10 mg Oral Q3HRS PRN Anika Casas, A.P.R.N.        Or    morphine 4 MG/ML injection 4 mg  4 mg Intravenous Q3HRS PRN Anika Casas, A.P.R.N.         Current Outpatient Medications   Medication Sig Dispense Refill    metoprolol SR (TOPROL XL) 50 MG TABLET SR 24 HR Take 50 mg by mouth every day.      furosemide (LASIX) 40 MG Tab Take 40 mg by mouth every day.      loperamide (IMODIUM) 2 MG Cap Take 2 mg by mouth 4 times a day as needed for Diarrhea.      aspirin 81 MG EC tablet Take 81 mg by mouth 1 time a day as needed (PAIN).      spironolactone (ALDACTONE) 25 MG Tab Take 0.5 Tablets by mouth every day. 1/2 tablet= 12.5mg 30 Tablet 3    omeprazole (PRILOSEC) 20 MG delayed-release capsule Take 1 Capsule by mouth every day. (Patient taking differently: Take 20 mg by mouth 1 time a day as needed.) 30 Capsule 0       Allergies  No Known Allergies    Physical Exam  Temp:  [35.8 °C (96.5 °F)] 35.8 °C (96.5 °F)  Pulse:  [] 81  Resp:  [12-20] 20  BP: (102-136)/(69-95) 113/72  SpO2:  [82 %-100 %] 94 %    Physical  Exam  Constitutional:       General: He is not in acute distress.     Appearance: He is well-developed. He is ill-appearing. He is not diaphoretic.   HENT:      Head: Normocephalic and atraumatic.      Mouth/Throat:      Mouth: Mucous membranes are moist.      Pharynx: Oropharynx is clear. No oropharyngeal exudate.   Eyes:      General: No scleral icterus.     Extraocular Movements: Extraocular movements intact.   Neck:      Trachea: No tracheal deviation.   Cardiovascular:      Rate and Rhythm: Normal rate.      Heart sounds: Normal heart sounds. No murmur heard.  Pulmonary:      Effort: Pulmonary effort is normal.      Breath sounds: Normal breath sounds. No stridor. No rales.   Abdominal:      General: There is no distension.      Palpations: Abdomen is soft.      Tenderness: There is no abdominal tenderness.   Musculoskeletal:         General: Normal range of motion.      Right lower leg: Edema present.      Left lower leg: Edema present.   Skin:     General: Skin is warm and dry.   Neurological:      General: No focal deficit present.      Mental Status: He is alert and oriented to person, place, and time.   Psychiatric:         Mood and Affect: Mood normal.         Behavior: Behavior normal.         Fluids  Date 05/09/25 0700 - 05/10/25 0659   Shift 3868-9200 2246-4997 8895-3304 24 Hour Total   INTAKE   Shift Total       OUTPUT   Urine 2050 2050   Shift Total 2050 2050   Weight (kg) 57.2 57.2 57.2 57.2       Laboratory  Labs reviewed, pertinent labs below.  Recent Labs     05/09/25  0232   WBC 7.3   RBC 4.89   HEMOGLOBIN 16.4   HEMATOCRIT 50.0   .2*   MCH 33.5*   MCHC 32.8   RDW 54.0*   PLATELETCT 331   MPV 13.2*     Recent Labs     05/09/25  0232 05/09/25  0347 05/09/25  0523 05/09/25  0925   SODIUM 133* 132* 136  --    POTASSIUM 7.3* 6.9* 4.4 5.0   CHLORIDE 99 99 100  --    CO2 17* 16* 19*  --    GLUCOSE 93 75 76  --    BUN 47* 45* 45*  --    CREATININE 1.74* 1.69* 1.57*  --    CALCIUM 9.4 9.1 8.9   --                 URINALYSIS:  Lab Results   Component Value Date/Time    COLORURINE Yellow 05/09/2025 0713    CLARITY Clear 05/09/2025 0713    SPECGRAVITY 1.016 05/09/2025 0713    PHURINE 5.5 05/09/2025 0713    KETONES Negative 05/09/2025 0713    PROTEINURIN Negative 05/09/2025 0713    BILIRUBINUR Negative 05/09/2025 0713    UROBILU 1.0 05/09/2025 0713    NITRITE Negative 05/09/2025 0713    LEUKESTERAS Negative 05/09/2025 0713    OCCULTBLOOD Negative 05/09/2025 0713     UPC  Lab Results   Component Value Date/Time    TOTPROTUR 30.0 (H) 12/09/2024 0305      Lab Results   Component Value Date/Time    CREATININEU 30.24 12/09/2024 0305       Imaging interpreted by radiologist. Imaging reports reviewed with pertinent findings below  CT-ABDOMEN-PELVIS WITH   Final Result         1.  Hepatomegaly.   2.  Marked cardiomegaly.   3.  Atherosclerosis and atherosclerotic coronary artery disease.            DX-CHEST-PORTABLE (1 VIEW)   Final Result         1.  No acute cardiopulmonary disease.            Assessment/Plan  59 y.o. male with a history of chronic systolic heart failure, methamphetamine use, hypertension who presented 5/9/2025 with acute on chronic systolic heart failure, SUZY, hyperkalemia.    1.  SUZY on CKD 2.  SUZY likely due to cardiorenal syndrome.  Recommend aggressive diuresis as below.  There is no acute need for dialysis.  Check renal function panel daily.  Avoid NSAIDs and other nephrotoxins.  Concern for bladder outlet obstruction as well.  Recommend checking bladder scan at least once a shift, low threshold for Aguilar catheter placement if patient has urinary retention.    2.  Hyperkalemia, most likely due to medication side effect rather than SUZY, as his SUZY is not severe.  I question if patient had been taking spironolactone, Entresto, and potassium supplements prior to admission.  Recommend discontinue all potassium supplements, spironolactone, ARB for now.  Recommend high-dose Lasix 80 mg IV twice  daily as the patient is volume overloaded.  I also recommend bicarbonate infusion as below.    3.  Metabolic acidosis, due to SUZY.  Recommend sodium bicarbonate 150 mEq and 1 L of D5W at a rate of 150 mL/h.  Check renal function panel at least once daily.    4.  Acute on chronic systolic heart failure.  Patient does appear volume overloaded.  I am hopeful this improves with Lasix, low threshold to decrease IV fluids, or discontinue IV fluids if patient becomes volume overloaded.  Both the Lasix and bicarbonate fluids are being used to help treat life-threatening hyperkalemia.    5.  Hyperuricemia, likely due to SUZY.  Patient has no known history of cancer, so very low suspicion for tumor lysis syndrome.  Check uric acid level daily.  Continue IV fluids to help flush out uric acid via the kidneys.  Continue renal dosed allopurinol.    6.  Hyperphosphatemia, actually quite mild.  Again, low suspicion for tumor lysis syndrome, this is most likely due to SUZY.  No acute need for phosphorus binders.  Check renal function panel daily, which includes a phosphorus level.    Discussed with nurse practitioner Anika Hyatt MD  Nephrology  [facility]

## 2025-05-09 NOTE — ED TRIAGE NOTES
Chief Complaint   Patient presents with    Peripheral Edema     Patient reports peripheral edema worsening in last few days, reports numbness and tingling in feet.     Abdominal Pain     Reports generalized abdominal pain x 1 week, seen and discharged a week ago for this.       Patient to triage w/c for above complaint. Patient reports taking his water pill yesterday. Has hx of cardiac arrest. AAxO 4, tachycardic. EKG performed prior to triage assessment, patient roomed to red 12 per Erp order. Chast pain protocol ordered with d-dimer per ERP.

## 2025-05-09 NOTE — ED PROVIDER NOTES
ED Provider Note    CHIEF COMPLAINT  Chief Complaint   Patient presents with    Peripheral Edema     Patient reports peripheral edema worsening in last few days, reports numbness and tingling in feet.     Abdominal Pain     Reports generalized abdominal pain x 1 week, seen and discharged a week ago for this.        EXTERNAL RECORDS REVIEWED  Echocardiogram done 5/2/2025 shows LVEF less than 20%, grade 2 diastolic dysfunction, moderate MR, mild AI.    Recently discharged 5/3/2025 after he was admitted for hyperkalemia and SUZY.    HPI/ROS  LIMITATION TO HISTORY   Select: : None  OUTSIDE HISTORIAN(S):  None      Santino Zabala is a 59 y.o. male who presents to the ER for swelling in the lower extremities as well as upper abdominal pain.  He states since being discharged from the hospital, symptoms never fully resolved.  He has been compliant with all of his medications.  He denies any chest pain or shortness of breath.  Still urinating    PAST MEDICAL HISTORY   has a past medical history of Congestive heart failure (HCC).    SURGICAL HISTORY   has a past surgical history that includes no pertinent past surgical history.    FAMILY HISTORY  History reviewed. No pertinent family history.    SOCIAL HISTORY  Social History     Tobacco Use    Smoking status: Never    Smokeless tobacco: Never   Vaping Use    Vaping status: Never Used   Substance and Sexual Activity    Alcohol use: Not Currently    Drug use: Never    Sexual activity: Not on file       CURRENT MEDICATIONS  Home Medications       Reviewed by Tino Rhodes (Pharmacy Tech) on 05/09/25 at 0534  Med List Status: Complete     Medication Last Dose Status   aspirin 81 MG EC tablet 5/5/2025 Active   furosemide (LASIX) 40 MG Tab 5/8/2025 Active   loperamide (IMODIUM) 2 MG Cap 5/8/2025 Active   metoprolol SR (TOPROL XL) 50 MG TABLET SR 24 HR 5/8/2025 Active   omeprazole (PRILOSEC) 20 MG delayed-release capsule 5/6/2025 Active   spironolactone (ALDACTONE) 25 MG  "Tab 5/8/2025 Active                  Audit from Redirected Encounters    **Home medications have not yet been reviewed for this encounter**         ALLERGIES  No Known Allergies    PHYSICAL EXAM  VITAL SIGNS: /76   Pulse 86   Temp 35.8 °C (96.5 °F) (Temporal)   Resp 13   Ht 1.651 m (5' 5\")   Wt 57.2 kg (126 lb)   SpO2 94%   BMI 20.97 kg/m²    General: Sitting in stretcher, appears uncomfortable  HEENT: NCAT, moist mucous membranes  CV: Regular rate rhythm, no murmurs  Pulmonary: CTAB normal work of breathing  Abdomen: Soft nondistended, tender to palpation in the upper abdomen  MSK: 2+ pitting edema bilateral lower extremities to the knees    EKG/LABS  CBC shows normal cell counts, initial metabolic panel shows hyperkalemia of 7.3, metabolic acidosis bicarb 17, creatinine 1.7.  Mild transaminitis, mild hyponatremia 133.  Repeat metabolic panel shows potassium improved to 6.9 and subsequently 4.4 after medical management.  Magnesium 2.6, phosphorus 6.3.  Troponin 58, BNP 27,000.  LDH elevated 490, uric acid 14.9.    CBC sinus tachycardia rate 108, right axis deviation, borderline IVCD  ms, QTc 502 ms, T wave inversions in lead III, ST depression with T wave inversion in V5 and V6, similar to previous EKGs.  No STEMI   I have independently interpreted this EKG    RADIOLOGY/PROCEDURES   I have independently interpreted the diagnostic imaging associated with this visit and am waiting the final reading from the radiologist.   My preliminary interpretation is as follows: CT of the abdomen and pelvis shows hepatomegaly and cardiomegaly, CAD, but no acute findings  Chest: X-ray shows clear lungs    Radiologist interpretation:  CT-ABDOMEN-PELVIS WITH   Final Result         1.  Hepatomegaly.   2.  Marked cardiomegaly.   3.  Atherosclerosis and atherosclerotic coronary artery disease.            DX-CHEST-PORTABLE (1 VIEW)   Final Result         1.  No acute cardiopulmonary disease.          COURSE & MEDICAL " DECISION MAKING    ASSESSMENT, COURSE AND PLAN  Care Narrative: Differential includes PUD, pancreatitis, cholecystitis, hepatitis, bowel obstruction, bowel perforation, hyperkalemia, SUZY, other electrolyte derangement, tumor lysis syndrome, CHF exacerbation    On arrival the patient is uncomfortable appearing, slightly tachycardic.  He is breathing comfortably on room air has clear lungs.  Does have significant pitting edema in the periphery.  Upper abdominal tenderness.  Differential above considered.  He is initially given Maalox and morphine with significant improvement in his symptoms.  An EKG shows chronic changes no new ischemic changes.  Chest x-ray shows clear lung fields.  Labs concerning for severe hyperkalemia potassium 7.3.  His EKG does not show any sign of hyperkalemia changes new from previous EKGs I did not feel calcium was necessary.  I did order for 5 units of insulin, D50 and 40 mg of Lasix, just prior to all of these being drawn we did repeat another BMP and potassium was confirmed elevated at 6.9.  He has a slight metabolic acidosis, kidney function is abnormal creatinine around 1.7.  Slight transaminitis.  He is also hypomagnesemic, hypophosphatemic.  Cardiac enzymes are elevated but no more than priors I do not feel he is having an acute cardiac problem.  CBC shows normal cell counts.  Differential is normal, no abnormal cells.  CT of the abdomen and pelvis does not show any acute pathology.  At this time I feel patient needs inpatient admission for further workup and management of his hyperkalemia.  I spoke with the hospitalist Dr. Summers and we decided to add on repeat BMP and also tumor lysis syndrome labs.  Prior to this he did get additional sodium bicarb.  His potassium did normalize to 4.4 however he does have significant elevation in his uric acid and LDH, 14.9 and 490.  Patient will be admitted to a telemetry floor under the hospital service for further management of possible tumor  lysis syndrome, hyperkalemia, etc.  At this time we will hold off on fluid management as he does have an EF of 20% and is already shown signs of fluid overload.    DISPOSITION AND DISCUSSIONS  I have discussed management of the patient with the following physicians and FRITZ's: Dr. Summers    Discussion of management with other [facility] or appropriate source(s): None       Decision tools and prescription drugs considered including, but not limited to: n/a.    FINAL DIAGNOSIS  1. Hyperkalemia         Electronically signed by: Jeff Serrato M.D., 5/9/2025 2:00 AM

## 2025-05-09 NOTE — ED NOTES
Pt medicated per MAR  Patient updated on plan of care, all questions answered at this time.     Andrew locked and in the lowest position. Pt connected to continuous monitor with call light and personal belongings within reach.      detailed exam

## 2025-05-09 NOTE — CARE PLAN
The patient is Watcher - Medium risk of patient condition declining or worsening     Clinical goals: diurese, monitor hr and rhythm, pain control, mobility, safety, bladder scan qshift    Progress made toward(s) clinical / shift goals:        Problem: Pain - Standard  Goal: Alleviation of pain or a reduction in pain to the patient’s comfort goal  Description: Target End Date:  Prior to discharge or change in level of careDocument on Vitals flowsheet1.  Document pain using the appropriate pain scale per order or unit policy2.  Educate and implement non-pharmacologic comfort measures (i.e. relaxation, distraction, massage, cold/heat therapy, etc.)3.  Pain management medications as ordered4.  Reassess pain after pain med administration per policy5.  If opiods administered assess patient's response to pain medication is appropriate per POSS sedation scale6.  Follow pain management plan developed in collaboration with patient and interdisciplinary team (including palliative care or pain specialists if applicable)  Outcome: Progressing     Problem: Knowledge Deficit - Standard  Goal: Patient and family/care givers will demonstrate understanding of plan of care, disease process/condition, diagnostic tests and medications  Description: Target End Date:  1-3 days or as soon as patient condition allowsDocument in Patient Education1.  Patient and family/caregiver oriented to unit, equipment, visitation policy and means for communicating concern2.  Complete/review Learning Assessment3.  Assess knowledge level of disease process/condition, treatment plan, diagnostic tests and medications4.  Explain disease process/condition, treatment plan, diagnostic tests and medications  Outcome: Progressing

## 2025-05-09 NOTE — ED NOTES
Break RN: US PIV placed, labs drawn and sent. Pt medicated per MAR for abdominal pain. Chest x-ray at bedside.

## 2025-05-09 NOTE — H&P
Hospital Medicine History & Physical Note    Date of Service  5/9/2025    Primary Care Physician  YISEL Snyder, P.A.-C.    Code Status  Full Code    Chief Complaint  Chief Complaint   Patient presents with    Peripheral Edema     Patient reports peripheral edema worsening in last few days, reports numbness and tingling in feet.     Abdominal Pain     Reports generalized abdominal pain x 1 week, seen and discharged a week ago for this.        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  However, on arrival patient was noted to have a potassium of 7.3.  Patient denies any other potassium intake, dietary changes, and has avoided drug use due to feeling sick.  Repeat potassium in the emergency department was 6.9 and therefore provided insulin and dextrose, bicarb, and Lasix 40 mg with a repeat potassium of 4.4.  Additionally, patient noted to have a phosphorus of 6.5, magnesium of 2.9, LDH of 490, uric acid of 14.9 all concerning for tumor lysis syndrome from unknown etiology.  Patient will be admitted for diuresis, monitoring of potassium, as well as nephrology consult in a.m. for potential etiology workup.    I discussed the plan of care with patient.    Review of Systems  Review of Systems   Constitutional:  Negative for chills and fever.   HENT:  Negative for congestion and sore throat.    Eyes:  Negative for double vision.   Respiratory:  Positive for shortness of breath. Negative for cough and wheezing.    Cardiovascular:  Positive for leg swelling. Negative for chest pain and  palpitations.   Gastrointestinal:  Positive for abdominal pain, nausea and vomiting. Negative for constipation and diarrhea.   Genitourinary:  Negative for dysuria.   Musculoskeletal:  Negative for myalgias.   Neurological:  Negative for dizziness and headaches.       Past Medical History   has a past medical history of Congestive heart failure (HCC).    Surgical History   has a past surgical history that includes no pertinent past surgical history.     Family History  family history is not on file.   Family history reviewed with patient. There is no family history that is pertinent to the chief complaint.     Social History   reports that he has never smoked. He has never used smokeless tobacco. He reports that he does not currently use alcohol. He reports that he does not use drugs.    Allergies  No Known Allergies    Medications  Prior to Admission Medications   Prescriptions Last Dose Informant Patient Reported? Taking?   aspirin 81 MG EC tablet 5/5/2025 Patient Yes Yes   Sig: Take 81 mg by mouth 1 time a day as needed (PAIN).   furosemide (LASIX) 40 MG Tab 5/8/2025 at  3:00 PM Patient Yes Yes   Sig: Take 40 mg by mouth every day.   loperamide (IMODIUM) 2 MG Cap 5/8/2025 at  3:00 PM Patient Yes Yes   Sig: Take 2 mg by mouth 4 times a day as needed for Diarrhea.   metoprolol SR (TOPROL XL) 50 MG TABLET SR 24 HR 5/8/2025 at  3:00 PM Patient Yes Yes   Sig: Take 50 mg by mouth every day.   omeprazole (PRILOSEC) 20 MG delayed-release capsule 5/6/2025 Patient No No   Sig: Take 1 Capsule by mouth every day.   Patient taking differently: Take 20 mg by mouth 1 time a day as needed.   spironolactone (ALDACTONE) 25 MG Tab 5/8/2025 at  3:00 PM Patient No Yes   Sig: Take 0.5 Tablets by mouth every day. 1/2 tablet= 12.5mg      Facility-Administered Medications: None       Physical Exam  Temp:  [35.8 °C (96.5 °F)] 35.8 °C (96.5 °F)  Pulse:  [] 89  Resp:  [13-18] 13  BP: (110-135)/(69-92) 118/83  SpO2:  [90 %-98 %] 91  %  Blood Pressure: 118/83   Temperature: 35.8 °C (96.5 °F)   Pulse: 89   Respiration: 13   Pulse Oximetry: 91 %       Physical Exam  Vitals and nursing note reviewed.   Constitutional:       General: He is in acute distress.   HENT:      Head: Normocephalic and atraumatic.      Mouth/Throat:      Mouth: Mucous membranes are moist.   Eyes:      General: No scleral icterus.     Extraocular Movements: Extraocular movements intact.   Cardiovascular:      Rate and Rhythm: Normal rate and regular rhythm.      Heart sounds: No murmur heard.     No gallop.   Pulmonary:      Effort: Pulmonary effort is normal. No respiratory distress.      Breath sounds: Rales present. No wheezing or rhonchi.   Abdominal:      General: Abdomen is flat. Bowel sounds are normal. There is no distension.      Palpations: Abdomen is soft.      Tenderness: There is no abdominal tenderness.   Musculoskeletal:         General: Swelling present. No tenderness.      Cervical back: Neck supple.   Neurological:      Mental Status: He is alert. Mental status is at baseline.   Psychiatric:         Mood and Affect: Mood normal.         Laboratory:  Recent Labs     05/09/25 0232   WBC 7.3   RBC 4.89   HEMOGLOBIN 16.4   HEMATOCRIT 50.0   .2*   MCH 33.5*   MCHC 32.8   RDW 54.0*   PLATELETCT 331   MPV 13.2*     Recent Labs     05/09/25 0232 05/09/25 0347 05/09/25  0523   SODIUM 133* 132* 136   POTASSIUM 7.3* 6.9* 4.4   CHLORIDE 99 99 100   CO2 17* 16* 19*   GLUCOSE 93 75 76   BUN 47* 45* 45*   CREATININE 1.74* 1.69* 1.57*   CALCIUM 9.4 9.1 8.9     Recent Labs     05/09/25 0232 05/09/25 0347 05/09/25  0523   ALTSGPT 90*  --   --    ASTSGOT 63*  --   --    ALKPHOSPHAT 112*  --   --    TBILIRUBIN 2.5*  --   --    LIPASE 19  --   --    GLUCOSE 93 75 76         Recent Labs     05/09/25 0232   NTPROBNP 15536*         Recent Labs     05/09/25 0232 05/09/25 0347   TROPONINT 50* 58*       Imaging:  CT-ABDOMEN-PELVIS WITH   Final Result         1.   Hepatomegaly.   2.  Marked cardiomegaly.   3.  Atherosclerosis and atherosclerotic coronary artery disease.            DX-CHEST-PORTABLE (1 VIEW)   Final Result         1.  No acute cardiopulmonary disease.          EKG:  I have personally reviewed the images and compared with prior images.    Assessment/Plan:  Justification for Admission Status  I anticipate this patient will require at least two midnights for appropriate medical management, necessitating inpatient admission because significant electrolyte abnormalities concerning for tumor lysis syndrome    Patient will need a Telemetry bed on MEDICAL service .  The need is secondary to hyperkalemia.    * Tumor lysis syndrome- (present on admission)  Assessment & Plan  Initially presented with potassium was 7.3, phosphorus of 6.5, magnesium 2.9, , and uric acid 14.9 all concerning for tumor lysis syndrome from unknown etiology  - Lasix 40 mg IV twice daily  - Started allopurinol for uric acid  - Trend potassium every 8 hours  - Telemetry monitoring  - Unclear etiology, potential hematologic malignancy?  Nephrology consult in a.m.    Hyperuricemia  Assessment & Plan  Uric acid of 14.9, concerning for tumor lysis syndrome  - Started allopurinol, nephro consult in AM    Hyperkalemia- (present on admission)  Assessment & Plan  Unclear etiology, however initially presented with a potassium of 7.3, received calcium gluconate, insulin dextrose, bicarb in the emergency department that then downtrended to 4.4.  - Serum potassium every 8 hours  - Lasix 40 mg IV twice daily    CHF (congestive heart failure) (HCC)- (present on admission)  Assessment & Plan  History of chronic heart failure with reduced ejection fraction likely secondary to methamphetamine use.  Last echocardiogram 5/2/25 with severely reduced ejection fraction to less than 20% with grade 2 diastolic dysfunction  - Continue home metoprolol  - Holding home spironolactone and Lasix for now        VTE  prophylaxis: heparin ppx

## 2025-05-09 NOTE — ASSESSMENT & PLAN NOTE
Recurrent. Had multiple admissions and found to have hyperkalemia. Due to SUZY. ?spirolactone use  Received insulin dextrose, bicarb in the emergency department  Dc all potassium supplements, spironolactone, ARB  Iv lasix  Continue monitoring

## 2025-05-09 NOTE — ASSESSMENT & PLAN NOTE
History of chronic heart failure with reduced ejection fraction likely secondary to methamphetamine use.  Last echocardiogram 5/2/25 with severely reduced ejection fraction to less than 20% with grade 2 diastolic dysfunction  - Continue home metoprolol  - Holding home spironolactone and Lasix for now

## 2025-05-09 NOTE — ED NOTES
Pt medicated per MAR  Patient updated on plan of care, all questions answered at this time.     Andrew locked and in the lowest position. Pt connected to continuous monitor with call light and personal belongings within reach.

## 2025-05-09 NOTE — PROGRESS NOTES
Hospital Medicine Daily Progress Note    Date of Service  5/9/2025    Chief Complaint  Santino Zabala is a 59 y.o. male admitted 5/9/2025 with weakness and shortness of breath.     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  However, on arrival patient was noted to have a potassium of 7.3.  Patient denies any other potassium intake, dietary changes, and has avoided drug use due to feeling sick.  Repeat potassium in the emergency department was 6.9 and therefore provided insulin and dextrose, bicarb, and Lasix 40 mg with a repeat potassium of 4.4.  Additionally, patient noted to have a phosphorus of 6.5, magnesium of 2.9, LDH of 490, uric acid of 14.9 all concerning for tumor lysis syndrome from unknown etiology.  Patient will be admitted for diuresis, monitoring of potassium, as well as nephrology consult in a.m. for potential etiology workup.     Interval Problem Update  5/9 Afebrile, vitals are stable and on 3 L. Potassium has trended down.  Checking uric acid, phosphorous and LDH daily.  Nephrology consulted, appreciate recommendations.     I have discussed this patient's plan of care and discharge plan at IDT rounds today with Case Management, Nursing, Nursing leadership, and other members of the IDT team.    Consultants/Specialty  nephrology    Code Status  Full Code    Disposition  The patient is not medically cleared for discharge to home or a post-acute facility.  Anticipate discharge to: home with close outpatient follow-up    I have placed the appropriate orders for post-discharge  needs.    Review of Systems  Review of Systems   Constitutional:  Negative for chills, fever and malaise/fatigue.   Respiratory:  Positive for shortness of breath. Negative for cough.    Cardiovascular:  Negative for chest pain, palpitations and leg swelling.   Gastrointestinal:  Negative for abdominal pain, diarrhea, heartburn, nausea and vomiting.   Genitourinary:  Negative for dysuria, frequency and urgency.   Neurological:  Positive for weakness. Negative for dizziness and headaches.   All other systems reviewed and are negative.       Physical Exam  Temp:  [35.8 °C (96.5 °F)] 35.8 °C (96.5 °F)  Pulse:  [] 95  Resp:  [12-18] 16  BP: (110-136)/(69-95) 133/92  SpO2:  [82 %-98 %] 98 %    Physical Exam  Vitals and nursing note reviewed.   Constitutional:       Appearance: Normal appearance. He is not ill-appearing.   HENT:      Head: Normocephalic and atraumatic.      Jaw: There is normal jaw occlusion.      Right Ear: Hearing normal.      Left Ear: Hearing normal.      Nose: Nose normal.      Mouth/Throat:      Lips: Pink.      Mouth: Mucous membranes are moist.   Eyes:      Extraocular Movements: Extraocular movements intact.      Conjunctiva/sclera: Conjunctivae normal.      Pupils: Pupils are equal, round, and reactive to light.   Neck:      Vascular: No carotid bruit.   Cardiovascular:      Rate and Rhythm: Normal rate and regular rhythm.      Pulses: Normal pulses.      Heart sounds: Normal heart sounds, S1 normal and S2 normal.   Pulmonary:      Effort: Pulmonary effort is normal.      Breath sounds: Normal breath sounds and air entry. No stridor.   Musculoskeletal:      Cervical back: Normal range of motion and neck supple.      Right lower le+ Edema present.      Left lower le+ Edema present.   Skin:     General: Skin is warm and dry.      Capillary Refill: Capillary refill takes less than 2 seconds.   Neurological:      General: No focal deficit present.      Mental Status: He is alert and  oriented to person, place, and time. Mental status is at baseline.      Sensory: Sensation is intact.      Motor: Motor function is intact.   Psychiatric:         Attention and Perception: Attention and perception normal.         Mood and Affect: Mood and affect normal.         Speech: Speech normal.         Behavior: Behavior normal. Behavior is cooperative.         Fluids    Intake/Output Summary (Last 24 hours) at 5/9/2025 0845  Last data filed at 5/9/2025 0837  Gross per 24 hour   Intake --   Output 1000 ml   Net -1000 ml        Laboratory  Recent Labs     05/09/25  0232   WBC 7.3   RBC 4.89   HEMOGLOBIN 16.4   HEMATOCRIT 50.0   .2*   MCH 33.5*   MCHC 32.8   RDW 54.0*   PLATELETCT 331   MPV 13.2*     Recent Labs     05/09/25  0232 05/09/25  0347 05/09/25  0523   SODIUM 133* 132* 136   POTASSIUM 7.3* 6.9* 4.4   CHLORIDE 99 99 100   CO2 17* 16* 19*   GLUCOSE 93 75 76   BUN 47* 45* 45*   CREATININE 1.74* 1.69* 1.57*   CALCIUM 9.4 9.1 8.9                   Imaging  CT-ABDOMEN-PELVIS WITH   Final Result         1.  Hepatomegaly.   2.  Marked cardiomegaly.   3.  Atherosclerosis and atherosclerotic coronary artery disease.            DX-CHEST-PORTABLE (1 VIEW)   Final Result         1.  No acute cardiopulmonary disease.           Assessment/Plan  * Tumor lysis syndrome- (present on admission)  Assessment & Plan  Initially presented with potassium was 7.3, phosphorus of 6.5, magnesium 2.9, , and uric acid 14.9 all concerning for tumor lysis syndrome from unknown etiology  - Lasix 40 mg IV twice daily  - Started allopurinol for uric acid  - Trend potassium every 8 hours  - Telemetry monitoring  - Unclear etiology, potential hematologic malignancy?  Nephrology consult in a.m.    Hyperuricemia  Assessment & Plan  Uric acid of 14.9, concerning for tumor lysis syndrome  - Started allopurinol, nephro consult in AM    Hyperkalemia- (present on admission)  Assessment & Plan  Unclear etiology, however initially  presented with a potassium of 7.3, received insulin dextrose, bicarb in the emergency department that then downtrended to 4.4.  - Serum potassium every 8 hours  - Lasix 40 mg IV twice daily    CHF (congestive heart failure) (HCC)- (present on admission)  Assessment & Plan  History of chronic heart failure with reduced ejection fraction likely secondary to methamphetamine use.  Last echocardiogram 5/2/25 with severely reduced ejection fraction to less than 20% with grade 2 diastolic dysfunction  - Continue home metoprolol  - Holding home spironolactone and Lasix for now         VTE prophylaxis:   SCDs/TEDs   heparin ppx      I have performed a physical exam and reviewed and updated ROS and Plan today (5/9/2025). In review of yesterday's note (5/8/2025), there are no changes except as documented above.

## 2025-05-09 NOTE — ASSESSMENT & PLAN NOTE
Initially presented with potassium was 7.3, phosphorus of 6.5, magnesium 2.9, , and uric acid 14.9 all concerning for tumor lysis syndrome from unknown etiology  - Lasix 40 mg IV twice daily  - Started allopurinol for uric acid  - Trend potassium every 8 hours  - Telemetry monitoring  - Unclear etiology, potential hematologic malignancy?  Nephrology consulted, appreciate recommendations

## 2025-05-09 NOTE — ED NOTES
Pt reports hx sleep apnea, pt placed on 3 liters NC while sleeping to maintain spo2 greater than 90%

## 2025-05-09 NOTE — HOSPITAL COURSE
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  However, on arrival patient was noted to have a potassium of 7.3.  Patient denies any other potassium intake, dietary changes, and has avoided drug use due to feeling sick.  Repeat potassium in the emergency department was 6.9 and therefore provided insulin and dextrose, bicarb, and Lasix 40 mg with a repeat potassium of 4.4.  Additionally, patient noted to have a phosphorus of 6.5, magnesium of 2.9, LDH of 490, uric acid of 14.9 all concerning for tumor lysis syndrome from unknown etiology.  Patient will be admitted for diuresis, monitoring of potassium, as well as nephrology consult in a.m. for potential etiology workup.

## 2025-05-09 NOTE — ED NOTES
Medication history reviewed with patient.  Med rec is complete.  Allergies reviewed.     Patient denies any outpatient antibiotics or anticoagulants in the last 30 days.     Dispense history available in EPIC? Yes      Tricia Ortiz PhT

## 2025-05-09 NOTE — ED NOTES
Assist RN: Lab called with critical result of Potassium 7.3 at 0318. Critical lab result read back to Lab.   Dr. Serrato notified of critical lab result at 0318.  Critical lab result read back by Dr. Serrato.

## 2025-05-10 PROBLEM — N18.9 ACUTE KIDNEY INJURY SUPERIMPOSED ON CHRONIC KIDNEY DISEASE (HCC): Status: ACTIVE | Noted: 2024-11-30

## 2025-05-10 LAB
ALBUMIN SERPL BCP-MCNC: 3.3 G/DL (ref 3.2–4.9)
ALBUMIN/GLOB SERPL: 1 G/DL
ALP SERPL-CCNC: 83 U/L (ref 30–99)
ALT SERPL-CCNC: 74 U/L (ref 2–50)
ANION GAP SERPL CALC-SCNC: 13 MMOL/L (ref 7–16)
AST SERPL-CCNC: 60 U/L (ref 12–45)
BASOPHILS # BLD AUTO: 0.8 % (ref 0–1.8)
BASOPHILS # BLD: 0.05 K/UL (ref 0–0.12)
BILIRUB SERPL-MCNC: 1.6 MG/DL (ref 0.1–1.5)
BUN SERPL-MCNC: 47 MG/DL (ref 8–22)
CALCIUM ALBUM COR SERPL-MCNC: 8.9 MG/DL (ref 8.5–10.5)
CALCIUM SERPL-MCNC: 8.3 MG/DL (ref 8.5–10.5)
CHLORIDE SERPL-SCNC: 92 MMOL/L (ref 96–112)
CO2 SERPL-SCNC: 29 MMOL/L (ref 20–33)
CREAT SERPL-MCNC: 1.51 MG/DL (ref 0.5–1.4)
EOSINOPHIL # BLD AUTO: 0.08 K/UL (ref 0–0.51)
EOSINOPHIL NFR BLD: 1.3 % (ref 0–6.9)
ERYTHROCYTE [DISTWIDTH] IN BLOOD BY AUTOMATED COUNT: 51 FL (ref 35.9–50)
GFR SERPLBLD CREATININE-BSD FMLA CKD-EPI: 52 ML/MIN/1.73 M 2
GLOBULIN SER CALC-MCNC: 3.2 G/DL (ref 1.9–3.5)
GLUCOSE SERPL-MCNC: 219 MG/DL (ref 65–99)
HCT VFR BLD AUTO: 42.6 % (ref 42–52)
HGB BLD-MCNC: 14.1 G/DL (ref 14–18)
IMM GRANULOCYTES # BLD AUTO: 0.03 K/UL (ref 0–0.11)
IMM GRANULOCYTES NFR BLD AUTO: 0.5 % (ref 0–0.9)
LDH SERPL L TO P-CCNC: 422 U/L (ref 107–266)
LYMPHOCYTES # BLD AUTO: 0.89 K/UL (ref 1–4.8)
LYMPHOCYTES NFR BLD: 14.3 % (ref 22–41)
MAGNESIUM SERPL-MCNC: 2.1 MG/DL (ref 1.5–2.5)
MCH RBC QN AUTO: 32.9 PG (ref 27–33)
MCHC RBC AUTO-ENTMCNC: 33.1 G/DL (ref 32.3–36.5)
MCV RBC AUTO: 99.3 FL (ref 81.4–97.8)
MONOCYTES # BLD AUTO: 0.74 K/UL (ref 0–0.85)
MONOCYTES NFR BLD AUTO: 11.9 % (ref 0–13.4)
NEUTROPHILS # BLD AUTO: 4.43 K/UL (ref 1.82–7.42)
NEUTROPHILS NFR BLD: 71.2 % (ref 44–72)
NRBC # BLD AUTO: 0 K/UL
NRBC BLD-RTO: 0 /100 WBC (ref 0–0.2)
PHOSPHATE SERPL-MCNC: 4 MG/DL (ref 2.5–4.5)
PLATELET # BLD AUTO: 213 K/UL (ref 164–446)
PMV BLD AUTO: 10.5 FL (ref 9–12.9)
POTASSIUM SERPL-SCNC: 3.4 MMOL/L (ref 3.6–5.5)
POTASSIUM SERPL-SCNC: 3.8 MMOL/L (ref 3.6–5.5)
PROT SERPL-MCNC: 6.5 G/DL (ref 6–8.2)
RBC # BLD AUTO: 4.29 M/UL (ref 4.7–6.1)
SODIUM SERPL-SCNC: 134 MMOL/L (ref 135–145)
URATE SERPL-MCNC: 13.3 MG/DL (ref 2.5–8.3)
WBC # BLD AUTO: 6.2 K/UL (ref 4.8–10.8)

## 2025-05-10 PROCEDURE — 99233 SBSQ HOSP IP/OBS HIGH 50: CPT | Performed by: STUDENT IN AN ORGANIZED HEALTH CARE EDUCATION/TRAINING PROGRAM

## 2025-05-10 PROCEDURE — 84100 ASSAY OF PHOSPHORUS: CPT

## 2025-05-10 PROCEDURE — 84550 ASSAY OF BLOOD/URIC ACID: CPT

## 2025-05-10 PROCEDURE — 84132 ASSAY OF SERUM POTASSIUM: CPT

## 2025-05-10 PROCEDURE — 36415 COLL VENOUS BLD VENIPUNCTURE: CPT

## 2025-05-10 PROCEDURE — A9270 NON-COVERED ITEM OR SERVICE: HCPCS

## 2025-05-10 PROCEDURE — 700111 HCHG RX REV CODE 636 W/ 250 OVERRIDE (IP)

## 2025-05-10 PROCEDURE — 99232 SBSQ HOSP IP/OBS MODERATE 35: CPT | Performed by: INTERNAL MEDICINE

## 2025-05-10 PROCEDURE — 770020 HCHG ROOM/CARE - TELE (206)

## 2025-05-10 PROCEDURE — 51798 US URINE CAPACITY MEASURE: CPT

## 2025-05-10 PROCEDURE — A9270 NON-COVERED ITEM OR SERVICE: HCPCS | Performed by: STUDENT IN AN ORGANIZED HEALTH CARE EDUCATION/TRAINING PROGRAM

## 2025-05-10 PROCEDURE — 700102 HCHG RX REV CODE 250 W/ 637 OVERRIDE(OP)

## 2025-05-10 PROCEDURE — 83615 LACTATE (LD) (LDH) ENZYME: CPT

## 2025-05-10 PROCEDURE — 80053 COMPREHEN METABOLIC PANEL: CPT

## 2025-05-10 PROCEDURE — 700105 HCHG RX REV CODE 258: Performed by: INTERNAL MEDICINE

## 2025-05-10 PROCEDURE — 85025 COMPLETE CBC W/AUTO DIFF WBC: CPT

## 2025-05-10 PROCEDURE — 83735 ASSAY OF MAGNESIUM: CPT

## 2025-05-10 PROCEDURE — 700102 HCHG RX REV CODE 250 W/ 637 OVERRIDE(OP): Performed by: STUDENT IN AN ORGANIZED HEALTH CARE EDUCATION/TRAINING PROGRAM

## 2025-05-10 PROCEDURE — 700111 HCHG RX REV CODE 636 W/ 250 OVERRIDE (IP): Performed by: INTERNAL MEDICINE

## 2025-05-10 RX ORDER — ALLOPURINOL 300 MG/1
300 TABLET ORAL 2 TIMES DAILY
Status: DISCONTINUED | OUTPATIENT
Start: 2025-05-10 | End: 2025-05-11 | Stop reason: HOSPADM

## 2025-05-10 RX ADMIN — FUROSEMIDE 80 MG: 10 INJECTION, SOLUTION INTRAVENOUS at 15:59

## 2025-05-10 RX ADMIN — FUROSEMIDE 80 MG: 10 INJECTION, SOLUTION INTRAVENOUS at 04:48

## 2025-05-10 RX ADMIN — HEPARIN SODIUM 5000 UNITS: 5000 INJECTION, SOLUTION INTRAVENOUS; SUBCUTANEOUS at 04:48

## 2025-05-10 RX ADMIN — OXYCODONE HYDROCHLORIDE 10 MG: 10 TABLET ORAL at 07:40

## 2025-05-10 RX ADMIN — ALLOPURINOL 300 MG: 300 TABLET ORAL at 17:25

## 2025-05-10 RX ADMIN — ALLOPURINOL 300 MG: 300 TABLET ORAL at 04:49

## 2025-05-10 RX ADMIN — SENNOSIDES AND DOCUSATE SODIUM 2 TABLET: 50; 8.6 TABLET ORAL at 16:00

## 2025-05-10 RX ADMIN — METOPROLOL SUCCINATE 50 MG: 50 TABLET, FILM COATED, EXTENDED RELEASE ORAL at 04:50

## 2025-05-10 RX ADMIN — SODIUM BICARBONATE: 84 INJECTION, SOLUTION INTRAVENOUS at 07:12

## 2025-05-10 RX ADMIN — TAMSULOSIN HYDROCHLORIDE 0.4 MG: 0.4 CAPSULE ORAL at 07:37

## 2025-05-10 RX ADMIN — HEPARIN SODIUM 5000 UNITS: 5000 INJECTION, SOLUTION INTRAVENOUS; SUBCUTANEOUS at 23:44

## 2025-05-10 RX ADMIN — HEPARIN SODIUM 5000 UNITS: 5000 INJECTION, SOLUTION INTRAVENOUS; SUBCUTANEOUS at 13:12

## 2025-05-10 ASSESSMENT — ENCOUNTER SYMPTOMS
CHILLS: 0
FEVER: 0
HEADACHES: 0
VOMITING: 0
COUGH: 0
ABDOMINAL PAIN: 0
HEARTBURN: 0
PALPITATIONS: 0
DIZZINESS: 0
NAUSEA: 0
WEAKNESS: 1
DIARRHEA: 0
SHORTNESS OF BREATH: 0
SHORTNESS OF BREATH: 1

## 2025-05-10 ASSESSMENT — PAIN DESCRIPTION - PAIN TYPE
TYPE: ACUTE PAIN

## 2025-05-10 ASSESSMENT — FIBROSIS 4 INDEX: FIB4 SCORE: 1.96

## 2025-05-10 NOTE — PROGRESS NOTES
Bedside report received from off going RN/tech: Jeanine assumed care of patient.     Fall Risk Score: NO RISK  Fall risk interventions in place: Place yellow fall risk ID band on patient, Provide patient/family education based on risk assessment, Educate patient/family to call staff for assistance when getting out of bed, Place fall precaution signage outside patient door, and Utilize bed/chair fall alarm  Bed type: Regular (Mich Score less than 17 interventions in place)  Patient on cardiac monitor: Yes  IVF/IV medications: Infusion per MAR (List Med(s)) sodium bicarbonate 150 mL/r[hr  Oxygen: How many liters 2L  Bedside sitter: Not Applicable   Isolation: Not applicable

## 2025-05-10 NOTE — ASSESSMENT & PLAN NOTE
BLE swelling and sob. Hx of meth induced cardiomyopathy  BNP elevated  Echo EF 20% just one week ago  Continue iv lasix  Daily weight, I&O  Continue Toprol, hold spirolactone due to recurrent hyperkalemia

## 2025-05-10 NOTE — PROGRESS NOTES
Nephrology Daily Progress Note    Date of Service  5/10/2025    Chief Complaint  59 y.o. male with a history of chronic systolic heart failure, methamphetamine use, hypertension who presented 5/9/2025 with acute on chronic systolic heart failure, SUZY, hyperkalemia.    Interval Problem Update  5/10 -urine output 4.2 L in the last 24 hours!  Patient denies chest pain, shortness of breath, headache, nausea, vomiting.    Review of Systems  Review of Systems   Constitutional:  Negative for fever.   Respiratory:  Negative for shortness of breath.    Cardiovascular:  Negative for chest pain.   Gastrointestinal:  Negative for abdominal pain.   All other systems reviewed and are negative.       Physical Exam  Temp:  [36.1 °C (97 °F)-36.6 °C (97.9 °F)] 36.5 °C (97.7 °F)  Pulse:  [] 76  Resp:  [12-22] 16  BP: (102-126)/(69-86) 121/81  SpO2:  [92 %-100 %] 97 %    Physical Exam  Constitutional:       General: He is not in acute distress.     Appearance: He is well-developed. He is not diaphoretic.   HENT:      Head: Normocephalic and atraumatic.      Mouth/Throat:      Mouth: Mucous membranes are moist.      Pharynx: Oropharynx is clear. No oropharyngeal exudate.   Eyes:      General: No scleral icterus.     Extraocular Movements: Extraocular movements intact.   Neck:      Trachea: No tracheal deviation.   Cardiovascular:      Rate and Rhythm: Normal rate.      Heart sounds: Murmur heard.   Pulmonary:      Effort: Pulmonary effort is normal.      Breath sounds: Normal breath sounds. No stridor. No rales.   Abdominal:      General: There is no distension.      Palpations: Abdomen is soft.      Tenderness: There is no abdominal tenderness.   Musculoskeletal:         General: Normal range of motion.      Right lower leg: Edema (2+) present.      Left lower leg: Edema (2+) present.   Skin:     General: Skin is warm and dry.   Neurological:      General: No focal deficit present.      Mental Status: He is alert and oriented to  person, place, and time.   Psychiatric:         Mood and Affect: Mood normal.         Behavior: Behavior normal.         Fluids    Intake/Output Summary (Last 24 hours) at 5/10/2025 1357  Last data filed at 5/10/2025 1200  Gross per 24 hour   Intake 1620 ml   Output 3850 ml   Net -2230 ml       Laboratory  Labs reviewed, pertinent labs below.  Recent Labs     05/09/25  0232 05/10/25  0050   WBC 7.3 6.2   RBC 4.89 4.29*   HEMOGLOBIN 16.4 14.1   HEMATOCRIT 50.0 42.6   .2* 99.3*   MCH 33.5* 32.9   MCHC 32.8 33.1   RDW 54.0* 51.0*   PLATELETCT 331 213   MPV 13.2* 10.5     Recent Labs     05/09/25  0347 05/09/25  0523 05/09/25  0925 05/09/25  1651 05/10/25  0050 05/10/25  0921   SODIUM 132* 136  --   --  134*  --    POTASSIUM 6.9* 4.4   < > 4.4 3.8 3.4*   CHLORIDE 99 100  --   --  92*  --    CO2 16* 19*  --   --  29  --    GLUCOSE 75 76  --   --  219*  --    BUN 45* 45*  --   --  47*  --    CREATININE 1.69* 1.57*  --   --  1.51*  --    CALCIUM 9.1 8.9  --   --  8.3*  --     < > = values in this interval not displayed.               URINALYSIS:  Lab Results   Component Value Date/Time    COLORURINE Yellow 05/09/2025 0713    CLARITY Clear 05/09/2025 0713    SPECGRAVITY 1.016 05/09/2025 0713    PHURINE 5.5 05/09/2025 0713    KETONES Negative 05/09/2025 0713    PROTEINURIN Negative 05/09/2025 0713    BILIRUBINUR Negative 05/09/2025 0713    UROBILU 1.0 05/09/2025 0713    NITRITE Negative 05/09/2025 0713    LEUKESTERAS Negative 05/09/2025 0713    OCCULTBLOOD Negative 05/09/2025 0713     UPC  Lab Results   Component Value Date/Time    TOTPROTUR 30.0 (H) 12/09/2024 0305      Lab Results   Component Value Date/Time    CREATININEU 30.24 12/09/2024 0305         Imaging interpreted by radiologist. Imaging reports reviewed with pertinent findings below  CT-ABDOMEN-PELVIS WITH   Final Result         1.  Hepatomegaly.   2.  Marked cardiomegaly.   3.  Atherosclerosis and atherosclerotic coronary artery disease.             DX-CHEST-PORTABLE (1 VIEW)   Final Result         1.  No acute cardiopulmonary disease.            Current Facility-Administered Medications   Medication Dose Route Frequency Provider Last Rate Last Admin    allopurinol (Zyloprim) tablet 300 mg  300 mg Oral BID Kristel Motley M.D.        acetaminophen (Tylenol) tablet 650 mg  650 mg Oral Q6HRS PRN ARISTIDES UngerODonald        senna-docusate (Pericolace Or Senokot S) 8.6-50 MG per tablet 2 Tablet  2 Tablet Oral Q EVENING ESEQUIEL Unger.O.   2 Tablet at 05/09/25 1712    And    polyethylene glycol/lytes (Miralax) Packet 1 Packet  1 Packet Oral QDAY PRN ARISTIDES UngerO.        heparin injection 5,000 Units  5,000 Units Subcutaneous Q8HRS ESEQUIEL Unger.O.   5,000 Units at 05/10/25 1312    aspirin EC tablet 81 mg  81 mg Oral QDAY PRN ESEQUIEL Unger.O.        metoprolol SR (Toprol XL) tablet 50 mg  50 mg Oral DAILY ESEQUIEL Unger.O.   50 mg at 05/10/25 0450    omeprazole (PriLOSEC) capsule 20 mg  20 mg Oral QDAY PRN ESEQUIEL Unger.O.        hydrALAZINE (Apresoline) injection 20 mg  20 mg Intravenous Q6HRS PRN Anika Casas, A.P.R.N.        furosemide (Lasix) injection 80 mg  80 mg Intravenous BID DIURETIC Raul Hyatt M.D.   80 mg at 05/10/25 0448    tamsulosin (Flomax) capsule 0.4 mg  0.4 mg Oral AFTER BREAKFAST Anika Casas, A.P.R.N.   0.4 mg at 05/10/25 0737    oxyCODONE immediate-release (Roxicodone) tablet 5 mg  5 mg Oral Q3HRS PRN Anika Casas, A.P.R.N.        Or    oxyCODONE immediate release (Roxicodone) tablet 10 mg  10 mg Oral Q3HRS PRN Anika Casas, A.P.R.N.   10 mg at 05/10/25 0740    Or    morphine 4 MG/ML injection 4 mg  4 mg Intravenous Q3HRS PRN KOSTA PollockRDonaldN.   4 mg at 05/09/25 1712         Assessment/Plan  59 y.o. male with a history of chronic systolic heart failure, methamphetamine use, hypertension who presented 5/9/2025 with acute on chronic systolic heart failure, SUZY,  hyperkalemia.     1.  SUZY on CKD 2-3.  SUZY likely due to cardiorenal syndrome.  Nonoliguric.  Creatinine and GFR improving.  No acute need for dialysis.  Continue IV diuretics until the patient is more euvolemic, regardless of creatinine and GFR.  I have low concern for tumor lysis syndrome.  Avoid NSAIDs and other nephrotoxins.  Check renal function panel at least once daily.     2.  Hyperkalemia, resolved, now with hypokalemia after aggressive diuresis.  Recommend cautious repletion of potassium in the setting of SUZY.  Check renal function panel at least once daily.    3.  Metabolic acidosis, due to SUZY.  Resolved.  Discontinue sodium bicarbonate infusion.  Check renal function panel at least once daily.     4.  Acute on chronic systolic heart failure.  Patient still appears volume overloaded.  Recommend continue IV Lasix twice daily until the patient is euvolemic, then transition to Lasix 80 mg at least once daily, if not needed twice daily to maintain euvolemia.  Recommend low-sodium diet.  Ideally, patient should also be on other guideline directed medical therapy, SGLT2 inhibitor, ACE inhibitor or ARB.  I would recommend avoiding mineralocorticoid antagonists given hyperkalemia on admission, but it would be reasonable to trial mineralocorticoid antagonist and monitor for hyperkalemia recurrence.     5.  Hyperuricemia, likely due to SUZY.  Patient has no known history of cancer, so very low suspicion for tumor lysis syndrome.  Check uric acid level daily.  Discontinue IV fluids as the patient is volume overloaded with acute on chronic systolic heart failure.  Maintain allopurinol.  Goal uric acid level less than 6.      6.  Hyperphosphatemia, resolved.  I have low suspicion for tumor lysis syndrome.  No need for phosphorus binders.  Check renal function panel daily.    As the patient is nonoliguric and kidney function is improving, nephrology will sign off.  Discussed with Dr. Kristel Motley.    Raul Hyatt,  MD  Nephrology  [facility]

## 2025-05-10 NOTE — ASSESSMENT & PLAN NOTE
SUZY on CKD, cardiorenal?  US renal normal 5/3  Iv lasix  Hold ARB, spirolactone  Avoid nephrotoxins  Cont monitoring

## 2025-05-10 NOTE — PROGRESS NOTES
"Hospital Medicine Daily Progress Note    Date of Service  5/10/2025    Chief Complaint  Santino Zabala is a 59 y.o. male admitted 5/9/2025 with weakness and shortness of breath.     Hospital Course  Patient is a 59-year-old male with past medical history of HErEF 20% of EF secondary to methamphetamine use and recent admission due to generalized weakness and shortness of breath found to have significant hyperkalemia to 7.7 and lactic acidosis concerning for cardiogenic shock and placed on dobutamine drip with diuresis 5/3, who presents due to increasing edema in his lower extremities and feel \"heart beating slow\".     Noted K 7.3, Cr 1.74, phos 6.5, mag 2.9, uric acid 14.9. nephrology consulted, likely due to SUYZ received emergent treatments of hyperkalemia. Nephrology consulted. Iv Lasix. Dc all potassium supplements, spironolactone, ARB. Iv bicarb    Acute on chronic systolic HF: EF 20% on echo 5/2. BNP 20951. Continue iv lasix, Toprol. Hold spironolactone    Interval Problem Update  -Notes were extensively reviewed from primary team, radiology, ED, surgery, specialists, etc.   -Reviewed labs to date, microbiology for current admit and prior  -Imaging was independently reviewed and interpreted    Seen patient at bedside  Reports sob and BLE swelling  Continue iv lasix  Dc all potassium supplements, spironolactone, ARB.   Discussed with nephrology, continue iv diuresis  Tele monitoring    I have discussed this patient's plan of care and discharge plan at IDT rounds today with Case Management, Nursing, Nursing leadership, and other members of the IDT team.    Consultants/Specialty  nephrology    Code Status  Full Code    Disposition  The patient is not medically cleared for discharge to home or a post-acute facility.      I have placed the appropriate orders for post-discharge needs.    Review of Systems  Review of Systems   Constitutional:  Negative for chills, fever and malaise/fatigue.   Respiratory:  Positive " for shortness of breath. Negative for cough.    Cardiovascular:  Negative for chest pain, palpitations and leg swelling.   Gastrointestinal:  Negative for abdominal pain, diarrhea, heartburn, nausea and vomiting.   Genitourinary:  Negative for dysuria, frequency and urgency.   Neurological:  Positive for weakness. Negative for dizziness and headaches.   All other systems reviewed and are negative.       Physical Exam  Temp:  [36.1 °C (97 °F)-36.6 °C (97.9 °F)] 36.5 °C (97.7 °F)  Pulse:  [] 76  Resp:  [12-22] 16  BP: (102-126)/(69-86) 121/81  SpO2:  [92 %-100 %] 97 %    Physical Exam  Vitals and nursing note reviewed.   Constitutional:       Appearance: Normal appearance. He is not ill-appearing.   HENT:      Head: Normocephalic and atraumatic.      Jaw: There is normal jaw occlusion.      Right Ear: Hearing normal.      Left Ear: Hearing normal.      Nose: Nose normal.      Mouth/Throat:      Lips: Pink.      Mouth: Mucous membranes are moist.   Eyes:      Extraocular Movements: Extraocular movements intact.      Conjunctiva/sclera: Conjunctivae normal.      Pupils: Pupils are equal, round, and reactive to light.   Neck:      Vascular: No carotid bruit.   Cardiovascular:      Rate and Rhythm: Normal rate and regular rhythm.      Pulses: Normal pulses.      Heart sounds: Normal heart sounds, S1 normal and S2 normal.   Pulmonary:      Effort: Pulmonary effort is normal.      Breath sounds: Normal breath sounds and air entry. No stridor.   Musculoskeletal:      Cervical back: Normal range of motion and neck supple.      Right lower le+ Edema present.      Left lower le+ Edema present.   Skin:     General: Skin is warm and dry.      Capillary Refill: Capillary refill takes less than 2 seconds.   Neurological:      General: No focal deficit present.      Mental Status: He is alert and oriented to person, place, and time. Mental status is at baseline.      Sensory: Sensation is intact.      Motor: Motor  function is intact.   Psychiatric:         Attention and Perception: Attention and perception normal.         Mood and Affect: Mood and affect normal.         Speech: Speech normal.         Behavior: Behavior normal. Behavior is cooperative.         Fluids    Intake/Output Summary (Last 24 hours) at 5/10/2025 1312  Last data filed at 5/10/2025 1200  Gross per 24 hour   Intake 1620 ml   Output 3850 ml   Net -2230 ml        Laboratory  Recent Labs     05/09/25  0232 05/10/25  0050   WBC 7.3 6.2   RBC 4.89 4.29*   HEMOGLOBIN 16.4 14.1   HEMATOCRIT 50.0 42.6   .2* 99.3*   MCH 33.5* 32.9   MCHC 32.8 33.1   RDW 54.0* 51.0*   PLATELETCT 331 213   MPV 13.2* 10.5     Recent Labs     05/09/25  0347 05/09/25  0523 05/09/25  0925 05/09/25  1651 05/10/25  0050 05/10/25  0921   SODIUM 132* 136  --   --  134*  --    POTASSIUM 6.9* 4.4   < > 4.4 3.8 3.4*   CHLORIDE 99 100  --   --  92*  --    CO2 16* 19*  --   --  29  --    GLUCOSE 75 76  --   --  219*  --    BUN 45* 45*  --   --  47*  --    CREATININE 1.69* 1.57*  --   --  1.51*  --    CALCIUM 9.1 8.9  --   --  8.3*  --     < > = values in this interval not displayed.                   Imaging  CT-ABDOMEN-PELVIS WITH   Final Result         1.  Hepatomegaly.   2.  Marked cardiomegaly.   3.  Atherosclerosis and atherosclerotic coronary artery disease.            DX-CHEST-PORTABLE (1 VIEW)   Final Result         1.  No acute cardiopulmonary disease.           Assessment/Plan  * Acute kidney injury superimposed on chronic kidney disease (HCC)- (present on admission)  Assessment & Plan  SUZY on CKD, cardiorenal?  US renal normal 5/3  Iv lasix  Hold ARB, spirolactone  Avoid nephrotoxins  Cont monitoring     Hyperkalemia- (present on admission)  Assessment & Plan  Recurrent. Had multiple admissions and found to have hyperkalemia. Due to SUZY. ?spirolactone use  Received insulin dextrose, bicarb in the emergency department  Dc all potassium supplements, spironolactone, ARB  Iv  lasix  Continue monitoring     Hyperuricemia  Assessment & Plan  Likely due to SUZY  Iv lasix   Cont monitoring    Acute on chronic combined systolic (congestive) and diastolic (congestive) heart failure (HCC)- (present on admission)  Assessment & Plan  BLE swelling and sob. Hx of meth induced cardiomyopathy  BNP elevated  Echo EF 20% just one week ago  Continue iv lasix  Daily weight, I&O  Continue Toprol, hold spirolactone due to recurrent hyperkalemia      Methamphetamine abuse (HCC)- (present on admission)  Assessment & Plan  Hx of. Patient reports his last use was 3 months ago, however meth was positive on 5/2 UDS, but negative on this admit    Hyperbilirubinemia- (present on admission)  Assessment & Plan  Likely due to congestive hepatopathy  Iv lasix  Continue monitoring          VTE prophylaxis: heparin     I have performed a physical exam and reviewed and updated ROS and Plan today (5/10/2025). In review of yesterday's note (5/9/2025), there are no changes except as documented above.    Patient is has a high medical complexity, complex decision making and is at high risk for complication, morbidity, and mortality.  I spent 51 minutes, reviewing the chart, obtaining and/or reviewing separately obtained history. Performing a medically appropriate examination and evaluation.  Counseling and educating the patient. Ordering and reviewing medications, tests, or procedures.  Discussing the case with nephrology.  Documenting clinical information in EPIC. Independently interpreting results and communicating results to patient. Discussing future disposition of care with patient, RN and case management.  This does not include time spent on separately billable procedures/tests.

## 2025-05-10 NOTE — PROGRESS NOTES
4bts vtach and another 4bts vtach. MD notified and is aware. No further orders at this time.

## 2025-05-10 NOTE — ASSESSMENT & PLAN NOTE
Hx of. Patient reports his last use was 3 months ago, however meth was positive on 5/2 UDS, but negative on this admit

## 2025-05-10 NOTE — CARE PLAN
The patient is Watcher - Medium risk of patient condition declining or worsening    Shift Goals  Clinical Goals: monitor elelctrolytes/renal/liver labs  Patient Goals: rest  Family Goals: louis    Progress made toward(s) clinical / shift goals:  Pt provided with nonpharmacologic pain control as requested, pt calls appropriately, expresses understanding of bed alarm indications      Problem: Pain - Standard  Goal: Alleviation of pain or a reduction in pain to the patient’s comfort goal  Outcome: Progressing     Problem: Knowledge Deficit - Standard  Goal: Patient and family/care givers will demonstrate understanding of plan of care, disease process/condition, diagnostic tests and medications  Outcome: Progressing     Problem: Fall Risk  Goal: Patient will remain free from falls  Outcome: Progressing

## 2025-05-10 NOTE — CARE PLAN
The patient is Watcher - Medium risk of patient condition declining or worsening    Shift Goals  Clinical Goals: monitor hr and rhythm, safety, mobility, IVFs, monitor labs and vital signs, pain control  Patient Goals: pain control and rest  Family Goals: YOEL    Progress made toward(s) clinical / shift goals:        Problem: Pain - Standard  Goal: Alleviation of pain or a reduction in pain to the patient’s comfort goal  Description: Target End Date:  Prior to discharge or change in level of careDocument on Vitals flowsheet1.  Document pain using the appropriate pain scale per order or unit policy2.  Educate and implement non-pharmacologic comfort measures (i.e. relaxation, distraction, massage, cold/heat therapy, etc.)3.  Pain management medications as ordered4.  Reassess pain after pain med administration per policy5.  If opiods administered assess patient's response to pain medication is appropriate per POSS sedation scale6.  Follow pain management plan developed in collaboration with patient and interdisciplinary team (including palliative care or pain specialists if applicable)  Outcome: Progressing     Problem: Knowledge Deficit - Standard  Goal: Patient and family/care givers will demonstrate understanding of plan of care, disease process/condition, diagnostic tests and medications  Description: Target End Date:  1-3 days or as soon as patient condition allowsDocument in Patient Education1.  Patient and family/caregiver oriented to unit, equipment, visitation policy and means for communicating concern2.  Complete/review Learning Assessment3.  Assess knowledge level of disease process/condition, treatment plan, diagnostic tests and medications4.  Explain disease process/condition, treatment plan, diagnostic tests and medications  Outcome: Progressing     Problem: Fall Risk  Goal: Patient will remain free from falls  Description: Target End Date:  Prior to discharge or change in level of careDocument  interventions on the [facility] Fall Risk Assessment1.  Assess for fall risk factors2.  Implement fall precautions  Outcome: Progressing

## 2025-05-11 ENCOUNTER — PHARMACY VISIT (OUTPATIENT)
Dept: PHARMACY | Facility: MEDICAL CENTER | Age: 60
End: 2025-05-11
Payer: COMMERCIAL

## 2025-05-11 VITALS
HEART RATE: 79 BPM | BODY MASS INDEX: 19.8 KG/M2 | OXYGEN SATURATION: 95 % | SYSTOLIC BLOOD PRESSURE: 109 MMHG | DIASTOLIC BLOOD PRESSURE: 75 MMHG | RESPIRATION RATE: 18 BRPM | TEMPERATURE: 98.1 F | WEIGHT: 118.83 LBS | HEIGHT: 65 IN

## 2025-05-11 LAB
ANION GAP SERPL CALC-SCNC: 11 MMOL/L (ref 7–16)
BUN SERPL-MCNC: 27 MG/DL (ref 8–22)
CALCIUM SERPL-MCNC: 8.6 MG/DL (ref 8.5–10.5)
CHLORIDE SERPL-SCNC: 92 MMOL/L (ref 96–112)
CO2 SERPL-SCNC: 32 MMOL/L (ref 20–33)
CREAT SERPL-MCNC: 1.24 MG/DL (ref 0.5–1.4)
ERYTHROCYTE [DISTWIDTH] IN BLOOD BY AUTOMATED COUNT: 51.1 FL (ref 35.9–50)
GFR SERPLBLD CREATININE-BSD FMLA CKD-EPI: 66 ML/MIN/1.73 M 2
GLUCOSE SERPL-MCNC: 160 MG/DL (ref 65–99)
HCT VFR BLD AUTO: 43.9 % (ref 42–52)
HGB BLD-MCNC: 14.4 G/DL (ref 14–18)
LDH SERPL L TO P-CCNC: 390 U/L (ref 107–266)
MCH RBC QN AUTO: 32.5 PG (ref 27–33)
MCHC RBC AUTO-ENTMCNC: 32.8 G/DL (ref 32.3–36.5)
MCV RBC AUTO: 99.1 FL (ref 81.4–97.8)
PHOSPHATE SERPL-MCNC: 3.1 MG/DL (ref 2.5–4.5)
PLATELET # BLD AUTO: 214 K/UL (ref 164–446)
PMV BLD AUTO: 10.3 FL (ref 9–12.9)
POTASSIUM SERPL-SCNC: 3.7 MMOL/L (ref 3.6–5.5)
RBC # BLD AUTO: 4.43 M/UL (ref 4.7–6.1)
SODIUM SERPL-SCNC: 135 MMOL/L (ref 135–145)
URATE SERPL-MCNC: 9.5 MG/DL (ref 2.5–8.3)
WBC # BLD AUTO: 4.9 K/UL (ref 4.8–10.8)

## 2025-05-11 PROCEDURE — 700102 HCHG RX REV CODE 250 W/ 637 OVERRIDE(OP)

## 2025-05-11 PROCEDURE — 99239 HOSP IP/OBS DSCHRG MGMT >30: CPT | Performed by: STUDENT IN AN ORGANIZED HEALTH CARE EDUCATION/TRAINING PROGRAM

## 2025-05-11 PROCEDURE — A9270 NON-COVERED ITEM OR SERVICE: HCPCS

## 2025-05-11 PROCEDURE — 85027 COMPLETE CBC AUTOMATED: CPT

## 2025-05-11 PROCEDURE — RXMED WILLOW AMBULATORY MEDICATION CHARGE: Performed by: STUDENT IN AN ORGANIZED HEALTH CARE EDUCATION/TRAINING PROGRAM

## 2025-05-11 PROCEDURE — A9270 NON-COVERED ITEM OR SERVICE: HCPCS | Performed by: STUDENT IN AN ORGANIZED HEALTH CARE EDUCATION/TRAINING PROGRAM

## 2025-05-11 PROCEDURE — 84550 ASSAY OF BLOOD/URIC ACID: CPT

## 2025-05-11 PROCEDURE — 36415 COLL VENOUS BLD VENIPUNCTURE: CPT

## 2025-05-11 PROCEDURE — 700111 HCHG RX REV CODE 636 W/ 250 OVERRIDE (IP): Mod: JZ | Performed by: INTERNAL MEDICINE

## 2025-05-11 PROCEDURE — 80048 BASIC METABOLIC PNL TOTAL CA: CPT

## 2025-05-11 PROCEDURE — 84100 ASSAY OF PHOSPHORUS: CPT

## 2025-05-11 PROCEDURE — 700111 HCHG RX REV CODE 636 W/ 250 OVERRIDE (IP)

## 2025-05-11 PROCEDURE — 700102 HCHG RX REV CODE 250 W/ 637 OVERRIDE(OP): Performed by: STUDENT IN AN ORGANIZED HEALTH CARE EDUCATION/TRAINING PROGRAM

## 2025-05-11 PROCEDURE — 83615 LACTATE (LD) (LDH) ENZYME: CPT

## 2025-05-11 RX ORDER — ALLOPURINOL 300 MG/1
300 TABLET ORAL DAILY
Qty: 30 TABLET | Refills: 0 | Status: ON HOLD | OUTPATIENT
Start: 2025-05-11

## 2025-05-11 RX ORDER — ALLOPURINOL 100 MG/1
100 TABLET ORAL DAILY
Qty: 30 TABLET | Refills: 0 | Status: SHIPPED | OUTPATIENT
Start: 2025-05-11 | End: 2025-05-11

## 2025-05-11 RX ORDER — TAMSULOSIN HYDROCHLORIDE 0.4 MG/1
0.4 CAPSULE ORAL
Qty: 30 CAPSULE | Refills: 0 | Status: ON HOLD | OUTPATIENT
Start: 2025-05-12

## 2025-05-11 RX ADMIN — METOPROLOL SUCCINATE 50 MG: 50 TABLET, FILM COATED, EXTENDED RELEASE ORAL at 05:48

## 2025-05-11 RX ADMIN — FUROSEMIDE 80 MG: 10 INJECTION, SOLUTION INTRAVENOUS at 05:48

## 2025-05-11 RX ADMIN — ALLOPURINOL 300 MG: 300 TABLET ORAL at 05:48

## 2025-05-11 RX ADMIN — HEPARIN SODIUM 5000 UNITS: 5000 INJECTION, SOLUTION INTRAVENOUS; SUBCUTANEOUS at 05:46

## 2025-05-11 RX ADMIN — TAMSULOSIN HYDROCHLORIDE 0.4 MG: 0.4 CAPSULE ORAL at 09:03

## 2025-05-11 ASSESSMENT — FIBROSIS 4 INDEX: FIB4 SCORE: 1.96

## 2025-05-11 NOTE — PROGRESS NOTES
Monitor Summary  Rhythm: sr  Rate: 60-84  Ectopy: o pvc, 4bts vtach x2  .12 / .11 / .45

## 2025-05-11 NOTE — DISCHARGE SUMMARY
"Discharge Summary    CHIEF COMPLAINT ON ADMISSION  Chief Complaint   Patient presents with    Peripheral Edema     Patient reports peripheral edema worsening in last few days, reports numbness and tingling in feet.     Abdominal Pain     Reports generalized abdominal pain x 1 week, seen and discharged a week ago for this.        Reason for Admission  Abdominal Pain     Admission Date  5/9/2025    CODE STATUS  Full Code    HPI & HOSPITAL COURSE  This is a 59-year-old male with past medical history of HErEF 20% of EF secondary to methamphetamine use and recent admission due to generalized weakness and shortness of breath found to have significant hyperkalemia to 7.7 and lactic acidosis concerning for cardiogenic shock and placed on dobutamine drip with diuresis 5/3, who presents due to increasing edema in his lower extremities and feel \"heart beating slow\".      Noted K 7.3, Cr 1.74, phos 6.5, mag 2.9, uric acid 14.9 on admission. Nephrology was consulted, likely due to SUZY. He received emergent treatments of hyperkalemia. All potassium supplements, spironolactone, ARB were discontinued. On iv lasix. He is also started on allopurinol for hyperuricemia.  Patient's SUZY, hyperkalemia, hyperphosphatemia and hyperuricemia with improving after IV Lasix. BLE swelling and sob improving significantly.     I have advised patient to discontinue all potassium supplements, spirolactone, Entresto and losartan.  He is continued with Lasix and Toprol. Complete recreational drug cessation advised. Outpatient cardiology and PCP follow up. He voiced understanding.     Therefore, he is discharged in good and stable condition to home with close outpatient follow-up.    The patient met 2-midnight criteria for an inpatient stay at the time of discharge.    Discharge Date  5/11/25    FOLLOW UP ITEMS POST DISCHARGE  PCP  cardiology    DISCHARGE DIAGNOSES  Principal Problem:    Acute kidney injury superimposed on chronic kidney disease (HCC) " (POA: Yes)  Active Problems:    Hyperkalemia (POA: Yes)    Hyperbilirubinemia (POA: Yes)    Methamphetamine abuse (HCC) (POA: Yes)    Acute on chronic combined systolic (congestive) and diastolic (congestive) heart failure (HCC) (POA: Yes)    Hyperuricemia (POA: Unknown)  Resolved Problems:    * No resolved hospital problems. *      FOLLOW UP  No future appointments.  Justice Wing P.A.-C.  [address] [place] [id number] [phone]    Follow up in 1 week(s)        MEDICATIONS ON DISCHARGE     Medication List        START taking these medications        Instructions   tamsulosin 0.4 MG capsule  Start taking on: May 12, 2025  Commonly known as: Flomax   Take 1 Capsule by mouth 1/2 hour after breakfast.  Dose: 0.4 mg            CONTINUE taking these medications        Instructions   aspirin 81 MG EC tablet   Take 81 mg by mouth 1 time a day as needed (PAIN).  Dose: 81 mg     furosemide 40 MG Tabs  Commonly known as: Lasix   Take 40 mg by mouth every day.  Dose: 40 mg     loperamide 2 MG Caps  Commonly known as: Imodium   Take 2 mg by mouth 4 times a day as needed for Diarrhea.  Dose: 2 mg     metoprolol SR 50 MG Tb24  Commonly known as: Toprol XL   Take 50 mg by mouth every day.  Dose: 50 mg     omeprazole 20 MG delayed-release capsule  Commonly known as: PriLOSEC   Take 1 Capsule by mouth every day.  Dose: 20 mg            STOP taking these medications      spironolactone 25 MG Tabs  Commonly known as: Aldactone              Allergies  No Known Allergies    DIET  Orders Placed This Encounter   Procedures    Diet Order Diet: Cardiac; Second Modifier: (optional): Renal     Standing Status:   Standing     Number of Occurrences:   1     Diet::   Cardiac [6]     Second Modifier: (optional):   Renal [8]       ACTIVITY  As tolerated.  Weight bearing as tolerated    CONSULTATIONS  nephrology    PROCEDURES  na    LABORATORY  Lab Results   Component Value Date    SODIUM 135 05/11/2025    POTASSIUM 3.7 05/11/2025     CHLORIDE 92 (L) 05/11/2025    CO2 32 05/11/2025    GLUCOSE 160 (H) 05/11/2025    BUN 27 (H) 05/11/2025    CREATININE 1.24 05/11/2025        Lab Results   Component Value Date    WBC 4.9 05/11/2025    HEMOGLOBIN 14.4 05/11/2025    HEMATOCRIT 43.9 05/11/2025    PLATELETCT 214 05/11/2025      CT-ABDOMEN-PELVIS WITH   Final Result         1.  Hepatomegaly.   2.  Marked cardiomegaly.   3.  Atherosclerosis and atherosclerotic coronary artery disease.            DX-CHEST-PORTABLE (1 VIEW)   Final Result         1.  No acute cardiopulmonary disease.          Total time of the discharge process 35 minutes.

## 2025-05-11 NOTE — PROGRESS NOTES
Per monitors, pt had 6 beats vtach and returned to SR. Current HR in the 70s, pt was asleep during event and reported lightheadedness on waking. VSS, APRN aware.

## 2025-05-11 NOTE — CARE PLAN
The patient is Watcher - Medium risk of patient condition declining or worsening    Shift Goals  Clinical Goals: monitor HR, VSS, diurese, monitor electrolytes  Patient Goals: rest  Family Goals: louis    Progress made toward(s) clinical / shift goals:  Pt calls appropriately when he needs bathroom, compliant with bed alarm, reports no pain      Problem: Pain - Standard  Goal: Alleviation of pain or a reduction in pain to the patient’s comfort goal  Outcome: Progressing     Problem: Knowledge Deficit - Standard  Goal: Patient and family/care givers will demonstrate understanding of plan of care, disease process/condition, diagnostic tests and medications  Outcome: Progressing     Problem: Fall Risk  Goal: Patient will remain free from falls  Outcome: Progressing

## 2025-05-11 NOTE — DISCHARGE INSTRUCTIONS
Discharge Instructions per Kristel Motley M.D.    Please follow-up with PCP as outpatient.  No alcohol, no recreational drug  No potassium tablets. Discontinue spirolactone and Entresto and Losartan.   Continue metoprolol and furosemide  Check your weight daily and follow up with your cardiologist as outpatient  Avoid any NSAIDs such as ibuprofen, Aleve, Motrin or Naproxen. Take tylenol over the counter if have mild headache or mild pain.     Return to ER in the event of new or worsening symptoms. Please note importance of compliance and the patient has agreed to proceed with all medical recommendations and follow up plan indicated above. All medications come with benefits and risks. Risks may include permanent injury or death and these risks can be minimized with close reassessment and monitoring. Please make it to your scheduled follow ups with PCP and cardiology      HF Patient Discharge Instructions  Monitor your weight daily, and maintain a weight chart, to track your weight changes.   Activity as tolerated, unless your Doctor has ordered otherwise. Other activity order: as tolerated.  Follow a low fat, low cholesterol, low salt diet unless instructed otherwise by your Doctor. Read the labels on the back of food products and track your intake of fat, cholesterol and salt.   Fluid Restriction No. If a Fluid Restriction has been ordered by your Doctor, measure fluids with a measuring cup to ensure that you are not exceeding the restriction.   No smoking.  Oxygen No. If your Doctor has ordered that you wear Oxygen at home, it is important to wear it as ordered.  Did you receive an explanation from staff on the importance of taking each of your medications and why it is necessary to keep taking them unless your doctor says to stop? Yes  Were all of your questions answered about how to manage your heart failure and what to do if you have increased signs and symptoms after you go home? Yes  Do you feel like your heart  failure care team involved you in the care treatment plan and allowed you to make decisions regarding your care while in the hospital and addressed any discharge needs you might have? Yes    See the educational handout provided at discharge for more information on monitoring your daily weight, activity and diet. This also explains more about Heart Failure, symptoms of a flare-up and some of the tests that you have undergone.     Warning Signs of a Flare-Up include:  Swelling in the ankles or lower legs.  Shortness of breath, while at rest, or while doing normal activities.   Shortness of breath at night when in bed, or coughing in bed.   Requiring more pillows to sleep at night, or needing to sit up at night to sleep.  Feeling weak, dizzy or fatigued.     When to call your Doctor:  Call your Primary Care Physician or Cardiologist if:   You experience any pain radiating to your jaw or neck.  You have any difficulty breathing.  You experience weight gain of 3 lbs in a day or 5 lbs in a week.   You feel any palpitations or irregular heartbeats.  You become dizzy or lose consciousness.   If you have had an angiogram or had a pacemaker or AICD placed, and experience:  Bleeding, drainage or swelling at the surgical / puncture site.  Fever greater than 100.0 F  Shock from internal defibrillator.  Cool and / or numb extremities.     Please access the [facility] My HF Guide/Heart Failure Interactive Workbook:   [email or web address]

## 2025-05-22 ENCOUNTER — APPOINTMENT (OUTPATIENT)
Dept: RADIOLOGY | Facility: MEDICAL CENTER | Age: 60
End: 2025-05-22
Attending: EMERGENCY MEDICINE
Payer: MEDICAID

## 2025-05-22 ENCOUNTER — APPOINTMENT (OUTPATIENT)
Dept: RADIOLOGY | Facility: MEDICAL CENTER | Age: 60
DRG: 291 | End: 2025-05-22
Payer: MEDICAID

## 2025-05-22 ENCOUNTER — HOSPITAL ENCOUNTER (INPATIENT)
Facility: MEDICAL CENTER | Age: 60
End: 2025-05-22
Attending: EMERGENCY MEDICINE | Admitting: HOSPITALIST
Payer: MEDICAID

## 2025-05-22 DIAGNOSIS — I50.9 ACUTE ON CHRONIC CONGESTIVE HEART FAILURE, UNSPECIFIED HEART FAILURE TYPE (HCC): Primary | ICD-10-CM

## 2025-05-22 DIAGNOSIS — E87.70 HYPERVOLEMIA, UNSPECIFIED HYPERVOLEMIA TYPE: ICD-10-CM

## 2025-05-22 DIAGNOSIS — R06.02 SHORTNESS OF BREATH: ICD-10-CM

## 2025-05-22 DIAGNOSIS — E87.1 HYPONATREMIA: ICD-10-CM

## 2025-05-22 DIAGNOSIS — R60.0 BILATERAL LOWER EXTREMITY EDEMA: ICD-10-CM

## 2025-05-22 DIAGNOSIS — R07.9 CHEST PAIN, UNSPECIFIED TYPE: ICD-10-CM

## 2025-05-22 LAB
ALBUMIN SERPL BCP-MCNC: 3.9 G/DL (ref 3.2–4.9)
ALBUMIN/GLOB SERPL: 0.9 G/DL
ALP SERPL-CCNC: 118 U/L (ref 30–99)
ALT SERPL-CCNC: 35 U/L (ref 2–50)
ANION GAP SERPL CALC-SCNC: 21 MMOL/L (ref 7–16)
AST SERPL-CCNC: 62 U/L (ref 12–45)
BASOPHILS # BLD AUTO: 0.7 % (ref 0–1.8)
BASOPHILS # BLD: 0.05 K/UL (ref 0–0.12)
BILIRUB SERPL-MCNC: 2.4 MG/DL (ref 0.1–1.5)
BUN SERPL-MCNC: 31 MG/DL (ref 8–22)
CALCIUM ALBUM COR SERPL-MCNC: 9.5 MG/DL (ref 8.5–10.5)
CALCIUM SERPL-MCNC: 9.4 MG/DL (ref 8.5–10.5)
CHLORIDE SERPL-SCNC: 102 MMOL/L (ref 96–112)
CO2 SERPL-SCNC: 10 MMOL/L (ref 20–33)
CREAT SERPL-MCNC: 1.63 MG/DL (ref 0.5–1.4)
EKG IMPRESSION: NORMAL
EOSINOPHIL # BLD AUTO: 0.05 K/UL (ref 0–0.51)
EOSINOPHIL NFR BLD: 0.7 % (ref 0–6.9)
ERYTHROCYTE [DISTWIDTH] IN BLOOD BY AUTOMATED COUNT: 58.6 FL (ref 35.9–50)
FLUAV RNA SPEC QL NAA+PROBE: NEGATIVE
FLUBV RNA SPEC QL NAA+PROBE: NEGATIVE
GFR SERPLBLD CREATININE-BSD FMLA CKD-EPI: 48 ML/MIN/1.73 M 2
GLOBULIN SER CALC-MCNC: 4.2 G/DL (ref 1.9–3.5)
GLUCOSE SERPL-MCNC: 103 MG/DL (ref 65–99)
HCT VFR BLD AUTO: 51.9 % (ref 42–52)
HGB BLD-MCNC: 15.7 G/DL (ref 14–18)
IMM GRANULOCYTES # BLD AUTO: 0.02 K/UL (ref 0–0.11)
IMM GRANULOCYTES NFR BLD AUTO: 0.3 % (ref 0–0.9)
LYMPHOCYTES # BLD AUTO: 1.35 K/UL (ref 1–4.8)
LYMPHOCYTES NFR BLD: 18 % (ref 22–41)
MCH RBC QN AUTO: 32.4 PG (ref 27–33)
MCHC RBC AUTO-ENTMCNC: 30.3 G/DL (ref 32.3–36.5)
MCV RBC AUTO: 107.2 FL (ref 81.4–97.8)
MONOCYTES # BLD AUTO: 0.63 K/UL (ref 0–0.85)
MONOCYTES NFR BLD AUTO: 8.4 % (ref 0–13.4)
NEUTROPHILS # BLD AUTO: 5.41 K/UL (ref 1.82–7.42)
NEUTROPHILS NFR BLD: 71.9 % (ref 44–72)
NRBC # BLD AUTO: 0 K/UL
NRBC BLD-RTO: 0 /100 WBC (ref 0–0.2)
NT-PROBNP SERPL IA-MCNC: ABNORMAL PG/ML (ref 0–125)
PLATELET # BLD AUTO: 295 K/UL (ref 164–446)
PMV BLD AUTO: 10.2 FL (ref 9–12.9)
POTASSIUM SERPL-SCNC: 5.1 MMOL/L (ref 3.6–5.5)
PROT SERPL-MCNC: 8.1 G/DL (ref 6–8.2)
RBC # BLD AUTO: 4.84 M/UL (ref 4.7–6.1)
RSV RNA SPEC QL NAA+PROBE: NEGATIVE
SARS-COV-2 RNA RESP QL NAA+PROBE: NOTDETECTED
SODIUM SERPL-SCNC: 133 MMOL/L (ref 135–145)
TROPONIN T SERPL-MCNC: 54 NG/L (ref 6–19)
TROPONIN T SERPL-MCNC: 58 NG/L (ref 6–19)
WBC # BLD AUTO: 7.5 K/UL (ref 4.8–10.8)

## 2025-05-22 PROCEDURE — 93005 ELECTROCARDIOGRAM TRACING: CPT | Mod: TC

## 2025-05-22 PROCEDURE — 93005 ELECTROCARDIOGRAM TRACING: CPT | Mod: TC | Performed by: EMERGENCY MEDICINE

## 2025-05-22 PROCEDURE — 80053 COMPREHEN METABOLIC PANEL: CPT

## 2025-05-22 PROCEDURE — 84484 ASSAY OF TROPONIN QUANT: CPT | Mod: 91

## 2025-05-22 PROCEDURE — 83880 ASSAY OF NATRIURETIC PEPTIDE: CPT

## 2025-05-22 PROCEDURE — 71045 X-RAY EXAM CHEST 1 VIEW: CPT

## 2025-05-22 PROCEDURE — 36415 COLL VENOUS BLD VENIPUNCTURE: CPT

## 2025-05-22 PROCEDURE — 99285 EMERGENCY DEPT VISIT HI MDM: CPT

## 2025-05-22 PROCEDURE — 0241U HCHG SARS-COV-2 COVID-19 NFCT DS RESP RNA 4 TRGT ED POC: CPT

## 2025-05-22 PROCEDURE — 85025 COMPLETE CBC W/AUTO DIFF WBC: CPT

## 2025-05-22 PROCEDURE — 700111 HCHG RX REV CODE 636 W/ 250 OVERRIDE (IP): Mod: JZ,UD | Performed by: EMERGENCY MEDICINE

## 2025-05-22 PROCEDURE — 96374 THER/PROPH/DIAG INJ IV PUSH: CPT

## 2025-05-22 RX ORDER — LIDOCAINE HYDROCHLORIDE AND EPINEPHRINE 10; 10 MG/ML; UG/ML
10 INJECTION, SOLUTION INFILTRATION; PERINEURAL ONCE
Status: DISCONTINUED | OUTPATIENT
Start: 2025-05-22 | End: 2025-05-22

## 2025-05-22 RX ORDER — FUROSEMIDE 10 MG/ML
60 INJECTION INTRAMUSCULAR; INTRAVENOUS ONCE
Status: COMPLETED | OUTPATIENT
Start: 2025-05-22 | End: 2025-05-22

## 2025-05-22 RX ADMIN — FUROSEMIDE 60 MG: 10 INJECTION, SOLUTION INTRAVENOUS at 21:51

## 2025-05-22 ASSESSMENT — FIBROSIS 4 INDEX: FIB4 SCORE: 1.96

## 2025-05-23 PROBLEM — N18.30 ACUTE RENAL FAILURE SUPERIMPOSED ON STAGE 3 CHRONIC KIDNEY DISEASE (HCC): Status: ACTIVE | Noted: 2024-11-30

## 2025-05-23 PROBLEM — I50.9 DECOMPENSATED HEART FAILURE (HCC): Status: ACTIVE | Noted: 2025-05-23

## 2025-05-23 PROBLEM — I50.9 DECOMPENSATED HEART FAILURE (HCC): Status: RESOLVED | Noted: 2025-05-23 | Resolved: 2025-05-23

## 2025-05-23 LAB
AMMONIA PLAS-SCNC: 29 UMOL/L (ref 11–45)
AMPHET UR QL SCN: NEGATIVE
BARBITURATES UR QL SCN: NEGATIVE
BENZODIAZ UR QL SCN: NEGATIVE
BZE UR QL SCN: NEGATIVE
CANNABINOIDS UR QL SCN: NEGATIVE
FENTANYL UR QL: NEGATIVE
INR PPP: 1.51 (ref 0.87–1.13)
METHADONE UR QL SCN: NEGATIVE
OPIATES UR QL SCN: NEGATIVE
OXYCODONE UR QL SCN: NEGATIVE
PCP UR QL SCN: NEGATIVE
PROPOXYPH UR QL SCN: NEGATIVE
PROTHROMBIN TIME: 18.2 SEC (ref 12–14.6)

## 2025-05-23 PROCEDURE — 80307 DRUG TEST PRSMV CHEM ANLYZR: CPT

## 2025-05-23 PROCEDURE — A9270 NON-COVERED ITEM OR SERVICE: HCPCS | Performed by: STUDENT IN AN ORGANIZED HEALTH CARE EDUCATION/TRAINING PROGRAM

## 2025-05-23 PROCEDURE — 80053 COMPREHEN METABOLIC PANEL: CPT

## 2025-05-23 PROCEDURE — 770020 HCHG ROOM/CARE - TELE (206)

## 2025-05-23 PROCEDURE — 99497 ADVNCD CARE PLAN 30 MIN: CPT | Performed by: HOSPITALIST

## 2025-05-23 PROCEDURE — 700102 HCHG RX REV CODE 250 W/ 637 OVERRIDE(OP): Performed by: HOSPITALIST

## 2025-05-23 PROCEDURE — 36415 COLL VENOUS BLD VENIPUNCTURE: CPT

## 2025-05-23 PROCEDURE — 82140 ASSAY OF AMMONIA: CPT

## 2025-05-23 PROCEDURE — 700111 HCHG RX REV CODE 636 W/ 250 OVERRIDE (IP): Mod: JZ | Performed by: HOSPITALIST

## 2025-05-23 PROCEDURE — 700102 HCHG RX REV CODE 250 W/ 637 OVERRIDE(OP): Performed by: STUDENT IN AN ORGANIZED HEALTH CARE EDUCATION/TRAINING PROGRAM

## 2025-05-23 PROCEDURE — 99223 1ST HOSP IP/OBS HIGH 75: CPT | Performed by: HOSPITALIST

## 2025-05-23 PROCEDURE — 85610 PROTHROMBIN TIME: CPT

## 2025-05-23 PROCEDURE — A9270 NON-COVERED ITEM OR SERVICE: HCPCS | Performed by: HOSPITALIST

## 2025-05-23 PROCEDURE — 85025 COMPLETE CBC W/AUTO DIFF WBC: CPT

## 2025-05-23 PROCEDURE — 700111 HCHG RX REV CODE 636 W/ 250 OVERRIDE (IP): Performed by: STUDENT IN AN ORGANIZED HEALTH CARE EDUCATION/TRAINING PROGRAM

## 2025-05-23 RX ORDER — TAMSULOSIN HYDROCHLORIDE 0.4 MG/1
0.4 CAPSULE ORAL
Status: DISCONTINUED | OUTPATIENT
Start: 2025-05-23 | End: 2025-06-02 | Stop reason: HOSPADM

## 2025-05-23 RX ORDER — METOPROLOL SUCCINATE 50 MG/1
50 TABLET, EXTENDED RELEASE ORAL DAILY
Status: DISCONTINUED | OUTPATIENT
Start: 2025-05-23 | End: 2025-06-02 | Stop reason: HOSPADM

## 2025-05-23 RX ORDER — SODIUM BICARBONATE 650 MG/1
1300 TABLET ORAL 3 TIMES DAILY
Status: DISCONTINUED | OUTPATIENT
Start: 2025-05-23 | End: 2025-05-24

## 2025-05-23 RX ORDER — LOSARTAN POTASSIUM 25 MG/1
25 TABLET ORAL
Status: DISCONTINUED | OUTPATIENT
Start: 2025-05-23 | End: 2025-05-23

## 2025-05-23 RX ORDER — DAPAGLIFLOZIN 10 MG/1
10 TABLET, FILM COATED ORAL DAILY
Status: DISCONTINUED | OUTPATIENT
Start: 2025-05-23 | End: 2025-06-02 | Stop reason: HOSPADM

## 2025-05-23 RX ORDER — FUROSEMIDE 10 MG/ML
40 INJECTION INTRAMUSCULAR; INTRAVENOUS 2 TIMES DAILY
Status: DISCONTINUED | OUTPATIENT
Start: 2025-05-23 | End: 2025-05-25

## 2025-05-23 RX ORDER — HEPARIN SODIUM 5000 [USP'U]/ML
5000 INJECTION, SOLUTION INTRAVENOUS; SUBCUTANEOUS EVERY 8 HOURS
Status: DISCONTINUED | OUTPATIENT
Start: 2025-05-23 | End: 2025-05-24

## 2025-05-23 RX ORDER — SPIRONOLACTONE 25 MG/1
25 TABLET ORAL
Status: DISCONTINUED | OUTPATIENT
Start: 2025-05-23 | End: 2025-05-24

## 2025-05-23 RX ORDER — LOSARTAN POTASSIUM 25 MG/1
25 TABLET ORAL
Status: DISCONTINUED | OUTPATIENT
Start: 2025-05-24 | End: 2025-05-23

## 2025-05-23 RX ADMIN — HEPARIN SODIUM 5000 UNITS: 5000 INJECTION, SOLUTION INTRAVENOUS; SUBCUTANEOUS at 22:24

## 2025-05-23 RX ADMIN — FUROSEMIDE 40 MG: 10 INJECTION, SOLUTION INTRAVENOUS at 17:26

## 2025-05-23 RX ADMIN — TAMSULOSIN HYDROCHLORIDE 0.4 MG: 0.4 CAPSULE ORAL at 09:31

## 2025-05-23 RX ADMIN — DAPAGLIFLOZIN 10 MG: 10 TABLET, FILM COATED ORAL at 09:31

## 2025-05-23 RX ADMIN — SPIRONOLACTONE 25 MG: 25 TABLET ORAL at 12:55

## 2025-05-23 RX ADMIN — SODIUM BICARBONATE 1300 MG: 650 TABLET ORAL at 12:55

## 2025-05-23 RX ADMIN — FUROSEMIDE 40 MG: 10 INJECTION, SOLUTION INTRAVENOUS at 05:13

## 2025-05-23 RX ADMIN — SODIUM BICARBONATE 1300 MG: 650 TABLET ORAL at 22:21

## 2025-05-23 RX ADMIN — HEPARIN SODIUM 5000 UNITS: 5000 INJECTION, SOLUTION INTRAVENOUS; SUBCUTANEOUS at 14:09

## 2025-05-23 ASSESSMENT — COGNITIVE AND FUNCTIONAL STATUS - GENERAL
MOBILITY SCORE: 21
SUGGESTED CMS G CODE MODIFIER MOBILITY: CJ
DAILY ACTIVITIY SCORE: 23
DRESSING REGULAR LOWER BODY CLOTHING: A LITTLE
MOVING TO AND FROM BED TO CHAIR: A LITTLE
WALKING IN HOSPITAL ROOM: A LITTLE
SUGGESTED CMS G CODE MODIFIER DAILY ACTIVITY: CI
CLIMB 3 TO 5 STEPS WITH RAILING: A LITTLE

## 2025-05-23 ASSESSMENT — LIFESTYLE VARIABLES
SUBSTANCE_ABUSE: 0
TOTAL SCORE: 0
HOW MANY TIMES IN THE PAST YEAR HAVE YOU HAD 5 OR MORE DRINKS IN A DAY: 0
AVERAGE NUMBER OF DAYS PER WEEK YOU HAVE A DRINK CONTAINING ALCOHOL: 0
CONSUMPTION TOTAL: NEGATIVE
ON A TYPICAL DAY WHEN YOU DRINK ALCOHOL HOW MANY DRINKS DO YOU HAVE: 0
TOTAL SCORE: 0
EVER FELT BAD OR GUILTY ABOUT YOUR DRINKING: NO
TOTAL SCORE: 0
ALCOHOL_USE: NO
HAVE PEOPLE ANNOYED YOU BY CRITICIZING YOUR DRINKING: NO
HAVE YOU EVER FELT YOU SHOULD CUT DOWN ON YOUR DRINKING: NO
EVER HAD A DRINK FIRST THING IN THE MORNING TO STEADY YOUR NERVES TO GET RID OF A HANGOVER: NO

## 2025-05-23 ASSESSMENT — PAIN DESCRIPTION - PAIN TYPE
TYPE: ACUTE PAIN

## 2025-05-23 ASSESSMENT — ENCOUNTER SYMPTOMS
DEPRESSION: 0
EYE PAIN: 0
DIARRHEA: 0
BRUISES/BLEEDS EASILY: 0
ORTHOPNEA: 1
STRIDOR: 0
CHILLS: 0
FLANK PAIN: 0
BACK PAIN: 0
PHOTOPHOBIA: 0
BLOOD IN STOOL: 0
WHEEZING: 0
FALLS: 0
CLAUDICATION: 0
HEADACHES: 0
FEVER: 0
CONSTIPATION: 0
BLURRED VISION: 0
NECK PAIN: 0
ABDOMINAL PAIN: 0
HALLUCINATIONS: 0
MYALGIAS: 0
DIAPHORESIS: 0
TREMORS: 0
WEAKNESS: 0
NAUSEA: 0
HEMOPTYSIS: 0
DIZZINESS: 1
SHORTNESS OF BREATH: 1
COUGH: 0
DOUBLE VISION: 0
SORE THROAT: 0
PND: 1
POLYDIPSIA: 0
HEARTBURN: 0
VOMITING: 0
SPUTUM PRODUCTION: 0
SINUS PAIN: 0
PALPITATIONS: 1
TINGLING: 0

## 2025-05-23 ASSESSMENT — SOCIAL DETERMINANTS OF HEALTH (SDOH)

## 2025-05-23 ASSESSMENT — FIBROSIS 4 INDEX
FIB4 SCORE: 2.13

## 2025-05-23 NOTE — CARE PLAN
The patient is Stable - Low risk of patient condition declining or worsening    Shift Goals  Clinical Goals: diurese, monitor labs, vss  Patient Goals: rest  Family Goals: YOEL.  No family present    Progress made toward(s) clinical / shift goals:    Problem: Knowledge Deficit - Standard  Goal: Patient and family/care givers will demonstrate understanding of plan of care, disease process/condition, diagnostic tests and medications  Description: Target End Date:  1-3 days or as soon as patient condition allowsDocument in Patient Education1.  Patient and family/caregiver oriented to unit, equipment, visitation policy and means for communicating concern2.  Complete/review Learning Assessment3.  Assess knowledge level of disease process/condition, treatment plan, diagnostic tests and medications4.  Explain disease process/condition, treatment plan, diagnostic tests and medications  Outcome: Progressing     Problem: Fall Risk  Goal: Patient will remain free from falls  Description: Target End Date:  Prior to discharge or change in level of careDocument interventions on the [facility] Fall Risk Assessment1.  Assess for fall risk factors2.  Implement fall precautions  Outcome: Progressing     Problem: Care Map:  Day 1 Optimal Outcome for the Heart Failure Patient  Goal: Day 1:  Optimal Care of the heart failure patient  Description: Target End Date:  end of day 1  Outcome: Progressing       Patient is not progressing towards the following goals:

## 2025-05-23 NOTE — ED PROVIDER NOTES
ED Provider Note    CHIEF COMPLAINT  Chief Complaint   Patient presents with    Chest Pain     Chest pain with shortness of breath since 5 pm    Past medical history of chronic systolic heart failure LEF 20%, nonischemic cardiomyopathy, continued methamphetamine use, pulmonary hypertension     Shortness of Breath    Leg Swelling     From few days, progressively worsening       EXTERNAL RECORDS REVIEWED  Other ED records reviewed: Patient was hospitalized  May 2025 with an acute heart failure exacerbation.    HPI/ROS  LIMITATION TO HISTORY   Select: : None  OUTSIDE HISTORIAN(S):  None    Santino Zabala is a 59 y.o. male with history of HFrEF with an EF of 20% secondary to methamphetamine use who presents to the emergency department for evaluation of chest pain, shortness of breath, and progressing bilateral lower extremity swelling for the past couple days.  Patient takes Lasix, and he feels like this is not working anymore.  He is barely producing any urine.  No recent fever, chills, sore throat, cough.  No abdominal pain, nausea, vomiting, diarrhea, constipation.  No urinary symptoms.    PAST MEDICAL HISTORY   has a past medical history of Congestive heart failure (HCC).    SURGICAL HISTORY   has a past surgical history that includes no pertinent past surgical history.    FAMILY HISTORY  History reviewed. No pertinent family history.    SOCIAL HISTORY  Social History     Tobacco Use    Smoking status: Never    Smokeless tobacco: Never   Vaping Use    Vaping status: Never Used   Substance and Sexual Activity    Alcohol use: Not Currently    Drug use: Never    Sexual activity: Not on file       CURRENT MEDICATIONS  Home Medications       Reviewed by Luz Maria Isidro R.N. (Registered Nurse) on 05/23/25 at 0230  Med List Status: Complete     Medication Last Dose Status   allopurinol (ZYLOPRIM) 300 MG Tab 5/21/2025 Active   aspirin 81 MG EC tablet 5/22/2025 Active   furosemide (LASIX) 40 MG Tab 5/22/2025 Active  "  loperamide (IMODIUM) 2 MG Cap 5/22/2025 Active   metoprolol SR (TOPROL XL) 50 MG TABLET SR 24 HR 5/22/2025 Active   omeprazole (PRILOSEC) 20 MG delayed-release capsule 5/22/2025 Active   tamsulosin (FLOMAX) 0.4 MG capsule 5/22/2025 Active                  Audit from Redirected Encounters    **Home medications have not yet been reviewed for this encounter**         ALLERGIES  Allergies[1]    PHYSICAL EXAM  VITAL SIGNS: /71   Pulse 80   Temp 36.5 °C (97.7 °F) (Temporal)   Resp 17   Ht 1.651 m (5' 5\")   Wt 56.4 kg (124 lb 5.4 oz)   SpO2 98%   BMI 20.69 kg/m²    General: Nontoxic.   Eyes: EOM grossly intact BL.  HENT: MMM.   Neck: Normal ROM.   Lungs: Mild tachypnea.  Decreased lung sounds throughout.  Cardiac: Tachycardic, regular rhythm.  Bilateral lower extremity pitting and edema.  Skin: No rashes, lesions, bruising, or petechiae. Well-perfused.   Neuro: Grossly nonfocal neurologic exam. Face symmetric. Normal mentation.     EKG/LABS  EKG INTERPRETATION:   Time: 2007   Rate: 106  Rhythm: Sinus tachycardia  Axis and Intervals: Normal intervals  No ST segment or T wave abnormalities  No evidence of Brugada, WPW, or prolonged QT  I have independently interpreted this EKG    RADIOLOGY/PROCEDURES   I have independently interpreted the diagnostic imaging associated with this visit and am waiting the final reading from the radiologist.   My preliminary interpretation is as follows:   Chest x-ray: Cardiomegaly.    Radiologist interpretation:  DX-CHEST-PORTABLE (1 VIEW)   Final Result         1.  No acute cardiopulmonary disease.   2.  Cardiomegaly      US-RUQ    (Results Pending)       COURSE & MEDICAL DECISION MAKING    ASSESSMENT, COURSE AND PLAN  Care Narrative:   Santino Zabala is a 59 y.o. male with history of HFrEF with an EF of 20% secondary to methamphetamine use who presents to the emergency department for evaluation of chest pain, shortness of breath, and progressing bilateral lower extremity " swelling for the past couple days.  He has been taking his Lasix as prescribed, but feels like it is not working anymore because his urine output has tapered off.    2120: On my initial assessment, ABCs are intact.  He is tachycardic and slightly tachypneic, but hemodynamically stable.  He appears volume overloaded.  He has bilateral pitting edema up to the knees.  He feels very short of breath.  He has decreased air entry throughout, but crackles at the bases.  No hypoxia.  Abdomen is nontender.  I suspect he is having an acute on chronic heart failure exacerbation.  EKG showed no new ischemic changes.  IV access established.  CBC unremarkable.  CMP showed an SUZY with creatinine of 1.63, nonactionable electrolytes.  BNP 22K and troponin 52, which is his normal baseline.  Chest x-ray showed cardiomegaly, no pleural effusion, pneumothorax, or consolidation.  He received IV Lasix.    Given he is so symptomatic, I think he requires inpatient admission for medication management and IV diuresis.  I spoke with the admitting inpatient hospitalist Dr. Fisher who agreed to admission.  Patient was updated.  He was admitted in stable condition.    ADDITIONAL PROBLEMS MANAGED  N/A    DISPOSITION AND DISCUSSIONS  I have discussed management of the patient with the following physicians and FRITZ's:  Dr. Fisher     Discussion of management with other [facility] or appropriate source(s): None     Escalation of care considered, and ultimately not performed: N/A    Barriers to care at this time, including but not limited to: None.     Decision tools and prescription drugs considered including, but not limited to: N/A.    FINAL DIAGNOSIS  1. Acute on chronic congestive heart failure, unspecified heart failure type (HCC) Acute   2. Bilateral lower extremity edema Acute   3. Chest pain, unspecified type Acute   4. Shortness of breath Acute   5. Hypervolemia, unspecified hypervolemia type Acute        Electronically signed by: Clara Cartagena,  VIVIANA, 5/22/2025 9:21 PM           [1] No Known Allergies

## 2025-05-23 NOTE — H&P
Hospital Medicine History & Physical Note    Date of Service  5/23/2025    Primary Care Physician  YISEL Snyder, P.A.-C.    Consultants      Specialist Names:     Code Status  Full Code    Chief Complaint  Chief Complaint   Patient presents with    Chest Pain     Chest pain with shortness of breath since 5 pm    Past medical history of chronic systolic heart failure LEF 20%, nonischemic cardiomyopathy, continued methamphetamine use, pulmonary hypertension     Shortness of Breath    Leg Swelling     From few days, progressively worsening       History of Presenting Illness  Santino Zabala is a 59 y.o. male who presented 5/22/2025 with past medical history of heart failure with EF of 20%, alcoholic cirrhosis, meth abuse who presents to the hospital for lower extremity swelling and shortness of breath for the past 2 days.  It is associated with orthopnea, chest pain, palpitations.  Patient states that he had multiple episodes of diarrhea this morning.  The patient does complain about periumbilical abdominal pain.  He is trying to take his oral Lasix but is unable to urinate.  He denies any nausea, vomiting, fevers.  The patient was admitted in the hospital on 5/9 where he was asked to discontinue his Aldactone, Entresto and losartan.  During that admission he was given aggressive IV Lasix and placed on dobutamine infusion.  The patient continues to have methamphetamine use.  Patient states that he is compliant with his home medications.  He denies a high salt intake.    Chest x-ray interpreted by me found increased to vascular congestion and cardiomegaly  EKG interpreted by me found sinus tachycardia.  Biatrial enlargement    I discussed the plan of care with patient.    Review of Systems  Review of Systems   Constitutional:  Negative for chills, diaphoresis, fever and malaise/fatigue.   HENT:  Negative for congestion, ear discharge, ear pain, hearing loss, nosebleeds, sinus pain, sore throat and tinnitus.     Eyes:  Negative for blurred vision, double vision, photophobia and pain.   Respiratory:  Positive for shortness of breath. Negative for cough, hemoptysis, sputum production, wheezing and stridor.    Cardiovascular:  Positive for chest pain, palpitations, orthopnea, leg swelling and PND. Negative for claudication.   Gastrointestinal:  Negative for abdominal pain, blood in stool, constipation, diarrhea, heartburn, melena, nausea and vomiting.   Genitourinary:  Negative for dysuria, flank pain, frequency, hematuria and urgency.   Musculoskeletal:  Negative for back pain, falls, joint pain, myalgias and neck pain.   Skin:  Negative for itching and rash.   Neurological:  Positive for dizziness. Negative for tingling, tremors, weakness and headaches.   Endo/Heme/Allergies:  Negative for environmental allergies and polydipsia. Does not bruise/bleed easily.   Psychiatric/Behavioral:  Negative for depression, hallucinations, substance abuse and suicidal ideas.        Past Medical History   has a past medical history of Congestive heart failure (HCC).    Surgical History   has a past surgical history that includes no pertinent past surgical history.     Family History  Family history reviewed with patient. There is no family history that is pertinent to the chief complaint.     Social History   reports that he has never smoked. He has never used smokeless tobacco. He reports that he does not currently use alcohol. He reports that he does not use drugs.    Allergies  Allergies[1]    Medications  Prior to Admission Medications   Prescriptions Last Dose Informant Patient Reported? Taking?   allopurinol (ZYLOPRIM) 300 MG Tab   No No   Sig: Take 1 Tablet by mouth every day.   aspirin 81 MG EC tablet  Patient Yes No   Sig: Take 81 mg by mouth 1 time a day as needed (PAIN).   furosemide (LASIX) 40 MG Tab  Patient Yes No   Sig: Take 40 mg by mouth every day.   loperamide (IMODIUM) 2 MG Cap  Patient Yes No   Sig: Take 2 mg by mouth  4 times a day as needed for Diarrhea.   metoprolol SR (TOPROL XL) 50 MG TABLET SR 24 HR  Patient Yes No   Sig: Take 50 mg by mouth every day.   omeprazole (PRILOSEC) 20 MG delayed-release capsule  Patient No No   Sig: Take 1 Capsule by mouth every day.   Patient taking differently: Take 20 mg by mouth 1 time a day as needed.   tamsulosin (FLOMAX) 0.4 MG capsule   No No   Sig: Take 1 Capsule by mouth 1/2 hour after breakfast.      Facility-Administered Medications: None       Physical Exam  Temp:  [35.9 °C (96.6 °F)] 35.9 °C (96.6 °F)  Pulse:  [] 84  Resp:  [20-27] 20  BP: (107-136)/(80-92) 114/80  SpO2:  [99 %-100 %] 99 %  Blood Pressure: 107/81   Temperature: 35.9 °C (96.6 °F)   Pulse: 87   Respiration: (!) 27   Pulse Oximetry: 99 %       Physical Exam  Vitals and nursing note reviewed.   Constitutional:       General: He is not in acute distress.     Appearance: Normal appearance. He is not ill-appearing, toxic-appearing or diaphoretic.   HENT:      Head: Normocephalic and atraumatic.      Nose: No congestion or rhinorrhea.      Mouth/Throat:      Pharynx: No posterior oropharyngeal erythema.   Eyes:      General: No scleral icterus.        Right eye: No discharge.   Neck:      Vascular: Hepatojugular reflux and JVD present.   Cardiovascular:      Rate and Rhythm: Normal rate and regular rhythm.      Pulses: Normal pulses.      Heart sounds: Murmur heard.      No friction rub. No gallop.   Pulmonary:      Effort: No respiratory distress.      Breath sounds: No stridor. Rales present. No wheezing or rhonchi.   Abdominal:      General: There is distension.      Tenderness: There is no abdominal tenderness.   Musculoskeletal:         General: No swelling, tenderness, deformity or signs of injury.      Cervical back: Normal range of motion.      Right lower leg: Edema present.      Left lower leg: Edema present.   Skin:     Capillary Refill: Capillary refill takes more than 3 seconds.      Coloration: Skin is  not jaundiced or pale.      Findings: No bruising, erythema, lesion or rash.   Neurological:      General: No focal deficit present.      Mental Status: He is alert and oriented to person, place, and time.         Laboratory:  Recent Labs     05/22/25 2054   WBC 7.5   RBC 4.84   HEMOGLOBIN 15.7   HEMATOCRIT 51.9   .2*   MCH 32.4   MCHC 30.3*   RDW 58.6*   PLATELETCT 295   MPV 10.2     Recent Labs     05/22/25 2054   SODIUM 133*   POTASSIUM 5.1   CHLORIDE 102   CO2 10*   GLUCOSE 103*   BUN 31*   CREATININE 1.63*   CALCIUM 9.4     Recent Labs     05/22/25 2054   ALTSGPT 35   ASTSGOT 62*   ALKPHOSPHAT 118*   TBILIRUBIN 2.4*   GLUCOSE 103*         Recent Labs     05/22/25 2054   NTPROBNP 68568*         Recent Labs     05/22/25 2054 05/22/25  2257   TROPONINT 58* 54*       Imaging:  DX-CHEST-PORTABLE (1 VIEW)   Final Result         1.  No acute cardiopulmonary disease.   2.  Cardiomegaly          X-Ray:  I have personally reviewed the images and compared with prior images.  EKG:  I have personally reviewed the images and compared with prior images.    Assessment/Plan:  Justification for Admission Status  I anticipate this patient will require at least two midnights for appropriate medical management, necessitating inpatient admission because decompensated heart failure    Patient will need a Telemetry bed on MEDICAL service .  The need is secondary to decompensated heart failure.    * Acute on chronic systolic heart failure (HCC)- (present on admission)  Assessment & Plan  EF 20%  Continue metoprolol  IV Lasix 40 twice daily  Strict ins and outs and daily weights  Monitor on telemetry  The patient will require increased dose of oral Lasix since he is not taking Aldactone and Entresto    Pulmonary hypertension (HCC)- (present on admission)  Assessment & Plan  Monitor volume status    Metabolic acidosis, increased anion gap- (present on admission)  Assessment & Plan  Continue to trend    Acute renal failure  superimposed on stage 3 chronic kidney disease (HCC)- (present on admission)  Assessment & Plan  Start IV Lasix  Monitor BMP and assess response  Avoid IV contrast/nephrotoxins/NSAIDs  Dose adjust meds for decreased GFR      Methamphetamine abuse (HCC)- (present on admission)  Assessment & Plan  Longstanding history of abuse  Recommend cessation    Alcoholic cirrhosis of liver with ascites (HCC)- (present on admission)  Assessment & Plan  Decompensated  Monitor volume status  Ammonia and INR pending        VTE prophylaxis: SCDs/TEDs      I discussed advance care planning for at least 20 minutes with the patient, including diagnosis, prognosis, plan of care, risks and benefits of any therapies that could be offered, as well as alternatives including palliation and hospice, as appropriate. The patient has opted Full code. Time spent is exclusive of evaluation and management or other separately billable procedures.          [1] No Known Allergies

## 2025-05-23 NOTE — PROGRESS NOTES
4 Eyes Skin Assessment Completed by JULIANA Loera and KARYNA Ashley.    Skin assessment is primarily focused on high risk bony prominences. Pay special attention to skin beneath and around medical devices, high risk bony prominences, skin to skin areas and areas where the patient lacks sensation to feel pain and areas where the patient previously had breakdown.     Head (Occipital):  WDL   Ears (Under Medical Devices): WDL   Nose (Under Medical Devices): Red   Mouth:  WDL   Neck: WDL   Breast/Chest:  WDL   Shoulder Blades:  WDL   Spine:   WDL   (R) Arm/Elbow/Hand: Red and Blanching Elbow   (L) Arm/Elbow/Hand: Red and Blanching Elbow   Abdomen: WDL   Pannus/Groin:  WDL   Sacrum/Coccyx:   Blanching, Discolored   (R) Ischial Tuberosity (Sit Bones):  WDL   (L) Ischial Tuberosity (Sit Bones):  WDL   (R) Leg:  Edema, Discolored   (L) Leg:  Edema, Discolored     (R) Heel:  Edema   (R) Foot/Toe: Edema   (L) Heel: Edema   (L) Foot/Toe:  Edema       DEVICES IN USE:   Respiratory Devices:  NA, patient on room air  Feeding Devices:  N/A   Lines & BP Monitoring Devices:  Peripheral IV and Pulse ox    Orthopedic Devices:  N/A  Miscellaneous Devices:  N/A    PROTOCOL INTERVENTIONS:   Standard/Trauma Bed:  Already in place    WOUND PHOTOS:   N/A no wounds identified    WOUND CONSULT:   N/A, no advanced wound care needs identified

## 2025-05-23 NOTE — ASSESSMENT & PLAN NOTE
Symptoms to guide titration of loop diuretic dosing: Hold for SUZY  Monitor urine output  Daily weight  Initiate and optimize GDMT  ARNI: Hold for SUZY  Evidence-based beta-blocker: Metoprolol  Mineralocorticoid receptor antagonist: Hold for SUZY  SGLT2 inhibitor: Hold for SUZY  Monitor blood pressure and renal function and electrolytes while undergoing aggressive diuresis and optimizing GDMT   Echocardiogram ordered and pending

## 2025-05-23 NOTE — PROGRESS NOTES
59-year-old male with acute hypoxemic respiratory failure secondary to acute decompensated heart failure with reduced ejection fraction    History of alcohol use disorder and methamphetamine use disorder    Patient seen and evaluated at bedside  Pertinent labs and imaging reviewed  CO2 10  Bicarb tablets ordered  Anion gap 21  Glucose 103  BUN 31  Creatinine 1.63  GFR 48  Total bili 2.4   Right upper quadrant ultrasound ordered  ammonia 29  Troponin 58, 54 (baseline 40-62)  BNP 22,353  Respiratory viral panel negative  Chest x-ray with cardiomegaly, otherwise negative  EKG revealed sinus tachycardia rate 106. QTc 480, possible left posterior fascicular block  Afebrile, hemodynamically stable, no oxygen requirements  Voiding, stooling, tolerating oral intake well  Previous bowel movement PTA  Ongoing aggressive diuresis with twice daily IV Lasix  Close monitoring renal function and electrolytes undergoing diuresis  Hold metoprolol while diuresing  Spironolactone ordered  Farxiga ordered  Continue losartan  Echocardiogram completed November 2024 revealed LVEF 20%  Echocardiogram completed May 2025 revealed LVEF of less than 20% with grade 2 diastolic dysfunction    Mindy Pendleton M.D.

## 2025-05-23 NOTE — ASSESSMENT & PLAN NOTE
Start IV Lasix  Monitor BMP and assess response  Avoid IV contrast/nephrotoxins/NSAIDs  Dose adjust meds for decreased GFR

## 2025-05-24 ENCOUNTER — APPOINTMENT (OUTPATIENT)
Dept: RADIOLOGY | Facility: MEDICAL CENTER | Age: 60
End: 2025-05-24
Attending: STUDENT IN AN ORGANIZED HEALTH CARE EDUCATION/TRAINING PROGRAM
Payer: MEDICAID

## 2025-05-24 LAB
ALBUMIN SERPL BCP-MCNC: 3.3 G/DL (ref 3.2–4.9)
ALBUMIN/GLOB SERPL: 1 G/DL
ALP SERPL-CCNC: 121 U/L (ref 30–99)
ALT SERPL-CCNC: 31 U/L (ref 2–50)
ANION GAP SERPL CALC-SCNC: 15 MMOL/L (ref 7–16)
AST SERPL-CCNC: 53 U/L (ref 12–45)
BASOPHILS # BLD AUTO: 0.6 % (ref 0–1.8)
BASOPHILS # BLD: 0.04 K/UL (ref 0–0.12)
BILIRUB SERPL-MCNC: 1.2 MG/DL (ref 0.1–1.5)
BUN SERPL-MCNC: 30 MG/DL (ref 8–22)
CALCIUM ALBUM COR SERPL-MCNC: 9.1 MG/DL (ref 8.5–10.5)
CALCIUM SERPL-MCNC: 8.5 MG/DL (ref 8.5–10.5)
CHLORIDE SERPL-SCNC: 99 MMOL/L (ref 96–112)
CO2 SERPL-SCNC: 22 MMOL/L (ref 20–33)
CREAT SERPL-MCNC: 1.38 MG/DL (ref 0.5–1.4)
EOSINOPHIL # BLD AUTO: 0.12 K/UL (ref 0–0.51)
EOSINOPHIL NFR BLD: 1.7 % (ref 0–6.9)
ERYTHROCYTE [DISTWIDTH] IN BLOOD BY AUTOMATED COUNT: 52.9 FL (ref 35.9–50)
GFR SERPLBLD CREATININE-BSD FMLA CKD-EPI: 58 ML/MIN/1.73 M 2
GLOBULIN SER CALC-MCNC: 3.3 G/DL (ref 1.9–3.5)
GLUCOSE SERPL-MCNC: 143 MG/DL (ref 65–99)
HCT VFR BLD AUTO: 43.9 % (ref 42–52)
HGB BLD-MCNC: 15 G/DL (ref 14–18)
IMM GRANULOCYTES # BLD AUTO: 0.03 K/UL (ref 0–0.11)
IMM GRANULOCYTES NFR BLD AUTO: 0.4 % (ref 0–0.9)
LYMPHOCYTES # BLD AUTO: 0.76 K/UL (ref 1–4.8)
LYMPHOCYTES NFR BLD: 10.7 % (ref 22–41)
MCH RBC QN AUTO: 33.4 PG (ref 27–33)
MCHC RBC AUTO-ENTMCNC: 34.2 G/DL (ref 32.3–36.5)
MCV RBC AUTO: 97.8 FL (ref 81.4–97.8)
MONOCYTES # BLD AUTO: 0.64 K/UL (ref 0–0.85)
MONOCYTES NFR BLD AUTO: 9 % (ref 0–13.4)
NEUTROPHILS # BLD AUTO: 5.5 K/UL (ref 1.82–7.42)
NEUTROPHILS NFR BLD: 77.6 % (ref 44–72)
NRBC # BLD AUTO: 0 K/UL
NRBC BLD-RTO: 0 /100 WBC (ref 0–0.2)
PLATELET # BLD AUTO: 265 K/UL (ref 164–446)
PMV BLD AUTO: 10.2 FL (ref 9–12.9)
POTASSIUM SERPL-SCNC: 3.5 MMOL/L (ref 3.6–5.5)
PROT SERPL-MCNC: 6.6 G/DL (ref 6–8.2)
RBC # BLD AUTO: 4.49 M/UL (ref 4.7–6.1)
SODIUM SERPL-SCNC: 136 MMOL/L (ref 135–145)
WBC # BLD AUTO: 7.1 K/UL (ref 4.8–10.8)

## 2025-05-24 PROCEDURE — A9270 NON-COVERED ITEM OR SERVICE: HCPCS | Performed by: STUDENT IN AN ORGANIZED HEALTH CARE EDUCATION/TRAINING PROGRAM

## 2025-05-24 PROCEDURE — 99232 SBSQ HOSP IP/OBS MODERATE 35: CPT | Performed by: STUDENT IN AN ORGANIZED HEALTH CARE EDUCATION/TRAINING PROGRAM

## 2025-05-24 PROCEDURE — 700102 HCHG RX REV CODE 250 W/ 637 OVERRIDE(OP): Performed by: STUDENT IN AN ORGANIZED HEALTH CARE EDUCATION/TRAINING PROGRAM

## 2025-05-24 PROCEDURE — A9270 NON-COVERED ITEM OR SERVICE: HCPCS | Performed by: HOSPITALIST

## 2025-05-24 PROCEDURE — 700102 HCHG RX REV CODE 250 W/ 637 OVERRIDE(OP): Performed by: HOSPITALIST

## 2025-05-24 PROCEDURE — 76705 ECHO EXAM OF ABDOMEN: CPT

## 2025-05-24 PROCEDURE — 700111 HCHG RX REV CODE 636 W/ 250 OVERRIDE (IP): Mod: JZ | Performed by: STUDENT IN AN ORGANIZED HEALTH CARE EDUCATION/TRAINING PROGRAM

## 2025-05-24 PROCEDURE — 770020 HCHG ROOM/CARE - TELE (206)

## 2025-05-24 PROCEDURE — 700111 HCHG RX REV CODE 636 W/ 250 OVERRIDE (IP): Mod: JZ | Performed by: HOSPITALIST

## 2025-05-24 RX ORDER — SPIRONOLACTONE 25 MG/1
50 TABLET ORAL
Status: DISCONTINUED | OUTPATIENT
Start: 2025-05-25 | End: 2025-05-25

## 2025-05-24 RX ORDER — SPIRONOLACTONE 25 MG/1
25 TABLET ORAL ONCE
Status: COMPLETED | OUTPATIENT
Start: 2025-05-24 | End: 2025-05-24

## 2025-05-24 RX ORDER — ENOXAPARIN SODIUM 100 MG/ML
40 INJECTION SUBCUTANEOUS DAILY
Status: DISCONTINUED | OUTPATIENT
Start: 2025-05-24 | End: 2025-05-25

## 2025-05-24 RX ORDER — ALLOPURINOL 300 MG/1
300 TABLET ORAL DAILY
Status: DISCONTINUED | OUTPATIENT
Start: 2025-05-24 | End: 2025-06-02 | Stop reason: HOSPADM

## 2025-05-24 RX ORDER — POTASSIUM CHLORIDE 1500 MG/1
40 TABLET, EXTENDED RELEASE ORAL ONCE
Status: COMPLETED | OUTPATIENT
Start: 2025-05-24 | End: 2025-05-24

## 2025-05-24 RX ADMIN — DAPAGLIFLOZIN 10 MG: 10 TABLET, FILM COATED ORAL at 04:14

## 2025-05-24 RX ADMIN — HEPARIN SODIUM 5000 UNITS: 5000 INJECTION, SOLUTION INTRAVENOUS; SUBCUTANEOUS at 04:14

## 2025-05-24 RX ADMIN — FUROSEMIDE 40 MG: 10 INJECTION, SOLUTION INTRAVENOUS at 04:14

## 2025-05-24 RX ADMIN — POTASSIUM CHLORIDE 40 MEQ: 1500 TABLET, EXTENDED RELEASE ORAL at 09:02

## 2025-05-24 RX ADMIN — SPIRONOLACTONE 25 MG: 25 TABLET ORAL at 09:02

## 2025-05-24 RX ADMIN — SPIRONOLACTONE 25 MG: 25 TABLET ORAL at 04:13

## 2025-05-24 RX ADMIN — ENOXAPARIN SODIUM 40 MG: 100 INJECTION SUBCUTANEOUS at 17:06

## 2025-05-24 RX ADMIN — TAMSULOSIN HYDROCHLORIDE 0.4 MG: 0.4 CAPSULE ORAL at 09:02

## 2025-05-24 RX ADMIN — FUROSEMIDE 40 MG: 10 INJECTION, SOLUTION INTRAVENOUS at 17:07

## 2025-05-24 RX ADMIN — ALLOPURINOL 300 MG: 300 TABLET ORAL at 09:02

## 2025-05-24 ASSESSMENT — PAIN DESCRIPTION - PAIN TYPE: TYPE: ACUTE PAIN

## 2025-05-24 ASSESSMENT — FIBROSIS 4 INDEX: FIB4 SCORE: 2.16

## 2025-05-24 ASSESSMENT — ENCOUNTER SYMPTOMS: SHORTNESS OF BREATH: 0

## 2025-05-24 NOTE — CARE PLAN
The patient is Watcher - Medium risk of patient condition declining or worsening    Shift Goals  Clinical Goals: diurese, monitor labs, vss, strick I/O  Patient Goals: rest  Family Goals: N/A  Problem: Knowledge Deficit - Standard  Goal: Patient and family/care givers will demonstrate understanding of plan of care, disease process/condition, diagnostic tests and medications  Outcome: Progressing     Problem: Fall Risk  Goal: Patient will remain free from falls  5/24/2025 0256 by Sarbjit Wang R.N.  Outcome: Progressing  5/24/2025 0223 by Sarbjit Wang R.N.  Outcome: Progressing     Problem: Care Map:  Admission Optimal Outcome for the Heart Failure Patient  Goal: Admission:  Optimal Care of the heart failure patient  5/24/2025 0256 by Sarbjit aWng, R.N.  Outcome: Progressing  5/24/2025 0223 by Sarbjit Wang, R.N.  Outcome: Progressing

## 2025-05-24 NOTE — PROGRESS NOTES
Bedside report received Golden Loera.     A&O x 4, Able to make needs known.  Pain Assessment: 0/10 to denies any pain . Continuous tele and [place] monitoring in place. Educate and reinforce the used of call light. Head to toe assessment don e patient has Numbness and tingling with +2 edema on his both leg.    Bed in low position and locked, pt resting comfortably now, call light with in reach, all needs met at this time. Interventions will be executed per plan of care.

## 2025-05-24 NOTE — PROGRESS NOTES
Monitor summary: SR/ST ,   MI .21, QRS .10, QT .34, F PvC, Trig per strip from monitor room.    NOC

## 2025-05-24 NOTE — PROGRESS NOTES
Hospital Medicine Daily Progress Note    Date of Service  5/24/2025    Chief Complaint  Santino Zabala is a 59 y.o. male admitted 5/22/2025 with acute hypoxic respiratory failure secondary to acute decompensated heart failure    Hospital Course  The patient is 59-year-old male with a past medical history significant for HFrEF (EF 20%), alcoholic cirrhosis, and methamphetamine use disorder.    The patient was hospitalized earlier this same month for acute decompensated heart failure for which she was discontinued from Aldactone, Entresto, and losartan for concern of hyperkalemia.    The patient was initially evaluated emergency department for chest pain, shortness of breath, bilateral lower extremity swelling for approximately 2 days.  The patient reported compliance with the Lasix however he is making minimal urine.  In the emergency department, he appeared to be volume overloaded on the ERP clinical examination.  There is no ischemic changes on EKG.  Patient is found to have an SUZY with creatinine of 1.63.  BNP 22,000 with a troponin 52 which appear to be at his baseline.  He was provided IV Lasix.  Patient was admitted for acute complex heart failure.    Interval Problem Update  Patient seen and evaluated at bedside  Pertinent labs and imaging reviewed  Potassium 3.5  Glucose 143  Creatinine 1.63, 1.38, concern for cardiorenal syndrome  AST 62, 53 (previous AST >7000 in 12/24)  ALT 35, 31 (previous ALT >5000 in 12/24)  Hepatitis panel negative 12/24  H/o hepatocongestion during previous hospitalizations  UDS negative  Troponin 58, 54  BNP 88952  INR 1.51  PT 18.2  RVP negative  CXR for no acute cardiopulmonary disease, cardiomegaly  RUQ pending  Ammonia 29  Total bili 1.2  Afebrile, hemodynamically stable, no oxygen requirements  Voiding, stooling, tolerating oral intake well  Previous bowel movement PTA  On scheduled senna and miralax    I have discussed this patient's plan of care and discharge plan at IDT  rounds today with Case Management, Nursing, Nursing leadership, and other members of the IDT team.    Consultants/Specialty  None    Code Status  Full Code    Disposition  The patient is not medically cleared for discharge to home or a post-acute facility.      I have placed the appropriate orders for post-discharge needs.    Review of Systems  Review of Systems   Respiratory:  Negative for shortness of breath.    Cardiovascular:  Positive for leg swelling.        Physical Exam  Temp:  [36.5 °C (97.7 °F)-36.6 °C (97.9 °F)] 36.6 °C (97.9 °F)  Pulse:  [] 102  Resp:  [16-18] 18  BP: (100-121)/(69-79) 107/74  SpO2:  [91 %-98 %] 91 %    Physical Exam  Constitutional:       General: He is not in acute distress.     Appearance: Normal appearance. He is normal weight.   HENT:      Head: Normocephalic and atraumatic.      Nose: Nose normal.   Eyes:      Extraocular Movements: Extraocular movements intact.   Pulmonary:      Effort: Pulmonary effort is normal. No respiratory distress.      Breath sounds: Examination of the right-lower field reveals decreased breath sounds. Examination of the left-lower field reveals decreased breath sounds. Decreased breath sounds present.   Musculoskeletal:      Cervical back: Normal range of motion.      Right lower le+ Pitting Edema present.      Left lower le+ Pitting Edema present.   Skin:     General: Skin is warm and dry.   Neurological:      General: No focal deficit present.      Mental Status: He is alert and oriented to person, place, and time.   Psychiatric:         Mood and Affect: Mood normal.         Behavior: Behavior normal.         Thought Content: Thought content normal.         Fluids    Intake/Output Summary (Last 24 hours) at 2025 1652  Last data filed at 2025 0834  Gross per 24 hour   Intake 540 ml   Output 6200 ml   Net -5660 ml        Laboratory  Recent Labs     25  2359   WBC 7.5 7.1   RBC 4.84 4.49*   HEMOGLOBIN 15.7 15.0    HEMATOCRIT 51.9 43.9   .2* 97.8   MCH 32.4 33.4*   MCHC 30.3* 34.2   RDW 58.6* 52.9*   PLATELETCT 295 265   MPV 10.2 10.2     Recent Labs     05/22/25 2054 05/23/25  2359   SODIUM 133* 136   POTASSIUM 5.1 3.5*   CHLORIDE 102 99   CO2 10* 22   GLUCOSE 103* 143*   BUN 31* 30*   CREATININE 1.63* 1.38   CALCIUM 9.4 8.5     Recent Labs     05/23/25  0436   INR 1.51*               Imaging  US-RUQ   Final Result         1.  Mild gallbladder wall thickening without visualized shadowing stones, consider acalculous cholecystitis. Could be further evaluated with HIDA scan as clinically appropriate.   2.  Hepatomegaly      DX-CHEST-PORTABLE (1 VIEW)   Final Result         1.  No acute cardiopulmonary disease.   2.  Cardiomegaly           Assessment/Plan  * Acute on chronic systolic heart failure (HCC)- (present on admission)  Assessment & Plan  Continue intravenous loop diuretic for decongestion: Twice daily IV Lasix 40 mg  Symptoms to guide titration of loop diuretic dosing  Monitor urine output  Daily weight  Initiate and optimize GDMT  ARNI: Will add as renal function and electrolytes allow  Evidence-based beta-blocker: Metoprolol  Mineralocorticoid receptor antagonist: Spironolactone  SGLT2 inhibitor: Will add as renal function electrolytes allow  Monitor blood pressure and renal function and electrolytes while undergoing aggressive diuresis and optimizing GDMT     Pulmonary hypertension (HCC)- (present on admission)  Assessment & Plan  Monitor volume status    Metabolic acidosis, increased anion gap- (present on admission)  Assessment & Plan  Continue to trend    Acute renal failure superimposed on stage 3 chronic kidney disease (HCC)- (present on admission)  Assessment & Plan  Start IV Lasix  Monitor BMP and assess response  Avoid IV contrast/nephrotoxins/NSAIDs  Dose adjust meds for decreased GFR      Methamphetamine abuse (HCC)- (present on admission)  Assessment & Plan  Longstanding history of  abuse  Recommend cessation    Alcoholic cirrhosis of liver with ascites (HCC)- (present on admission)  Assessment & Plan  Decompensated  Monitor volume status  Ammonia WNL  INR 1.51         VTE prophylaxis:    enoxaparin ppx      I have performed a physical exam and reviewed and updated ROS and Plan today (5/24/2025). In review of yesterday's note (5/23/2025), there are no changes except as documented above.

## 2025-05-24 NOTE — PROGRESS NOTES
Monitor Summary:   SR 73-97  (R) PVC, (R) Coup, 7bt vtach, hr down to 40   0.25/0.09/0.53

## 2025-05-24 NOTE — CARE PLAN
The patient is Stable - Low risk of patient condition declining or worsening    Shift Goals  Clinical Goals: monitor labs, diurese  Patient Goals: rest  Family Goals: N/A    Progress made toward(s) clinical / shift goals:    Problem: Knowledge Deficit - Standard  Goal: Patient and family/care givers will demonstrate understanding of plan of care, disease process/condition, diagnostic tests and medications  Description: Target End Date:  1-3 days or as soon as patient condition allowsDocument in Patient Education1.  Patient and family/caregiver oriented to unit, equipment, visitation policy and means for communicating concern2.  Complete/review Learning Assessment3.  Assess knowledge level of disease process/condition, treatment plan, diagnostic tests and medications4.  Explain disease process/condition, treatment plan, diagnostic tests and medications  Outcome: Progressing     Problem: Fall Risk  Goal: Patient will remain free from falls  Description: Target End Date:  Prior to discharge or change in level of careDocument interventions on the [facility] Fall Risk Assessment1.  Assess for fall risk factors2.  Implement fall precautions  Outcome: Progressing     Problem: Care Map:  Day 2 Optimal Outcome for the Heart Failure Patient  Goal: Day 2:  Optimal Care of the heart failure patient  Description: Target End Date:  end of day 2  Outcome: Progressing       Patient is not progressing towards the following goals:

## 2025-05-25 ENCOUNTER — APPOINTMENT (OUTPATIENT)
Dept: RADIOLOGY | Facility: MEDICAL CENTER | Age: 60
End: 2025-05-25
Attending: STUDENT IN AN ORGANIZED HEALTH CARE EDUCATION/TRAINING PROGRAM
Payer: MEDICAID

## 2025-05-25 ENCOUNTER — APPOINTMENT (OUTPATIENT)
Dept: CARDIOLOGY | Facility: MEDICAL CENTER | Age: 60
End: 2025-05-25
Attending: STUDENT IN AN ORGANIZED HEALTH CARE EDUCATION/TRAINING PROGRAM
Payer: MEDICAID

## 2025-05-25 PROBLEM — R10.84 GENERALIZED ABDOMINAL PAIN: Status: ACTIVE | Noted: 2025-05-25

## 2025-05-25 PROBLEM — R79.89 ELEVATED TROPONIN: Status: ACTIVE | Noted: 2025-05-25

## 2025-05-25 LAB
ALBUMIN SERPL BCP-MCNC: 3.4 G/DL (ref 3.2–4.9)
ALBUMIN/GLOB SERPL: 0.9 G/DL
ALP SERPL-CCNC: 145 U/L (ref 30–99)
ALT SERPL-CCNC: 28 U/L (ref 2–50)
ANION GAP SERPL CALC-SCNC: 13 MMOL/L (ref 7–16)
AST SERPL-CCNC: 52 U/L (ref 12–45)
BILIRUB SERPL-MCNC: 1.3 MG/DL (ref 0.1–1.5)
BUN SERPL-MCNC: 29 MG/DL (ref 8–22)
CALCIUM ALBUM COR SERPL-MCNC: 9.4 MG/DL (ref 8.5–10.5)
CALCIUM SERPL-MCNC: 8.9 MG/DL (ref 8.5–10.5)
CHLORIDE SERPL-SCNC: 96 MMOL/L (ref 96–112)
CO2 SERPL-SCNC: 22 MMOL/L (ref 20–33)
CREAT SERPL-MCNC: 1.49 MG/DL (ref 0.5–1.4)
ERYTHROCYTE [DISTWIDTH] IN BLOOD BY AUTOMATED COUNT: 52.5 FL (ref 35.9–50)
GFR SERPLBLD CREATININE-BSD FMLA CKD-EPI: 53 ML/MIN/1.73 M 2
GLOBULIN SER CALC-MCNC: 3.9 G/DL (ref 1.9–3.5)
GLUCOSE SERPL-MCNC: 123 MG/DL (ref 65–99)
HCT VFR BLD AUTO: 49.9 % (ref 42–52)
HGB BLD-MCNC: 16.9 G/DL (ref 14–18)
MAGNESIUM SERPL-MCNC: 2.2 MG/DL (ref 1.5–2.5)
MCH RBC QN AUTO: 33.1 PG (ref 27–33)
MCHC RBC AUTO-ENTMCNC: 33.9 G/DL (ref 32.3–36.5)
MCV RBC AUTO: 97.8 FL (ref 81.4–97.8)
PHOSPHATE SERPL-MCNC: 3.3 MG/DL (ref 2.5–4.5)
PLATELET # BLD AUTO: 275 K/UL (ref 164–446)
PMV BLD AUTO: 10 FL (ref 9–12.9)
POTASSIUM SERPL-SCNC: 4.5 MMOL/L (ref 3.6–5.5)
PROT SERPL-MCNC: 7.3 G/DL (ref 6–8.2)
RBC # BLD AUTO: 5.1 M/UL (ref 4.7–6.1)
SODIUM SERPL-SCNC: 131 MMOL/L (ref 135–145)
WBC # BLD AUTO: 7.1 K/UL (ref 4.8–10.8)

## 2025-05-25 PROCEDURE — 700102 HCHG RX REV CODE 250 W/ 637 OVERRIDE(OP): Performed by: HOSPITALIST

## 2025-05-25 PROCEDURE — 83735 ASSAY OF MAGNESIUM: CPT

## 2025-05-25 PROCEDURE — 99233 SBSQ HOSP IP/OBS HIGH 50: CPT | Performed by: STUDENT IN AN ORGANIZED HEALTH CARE EDUCATION/TRAINING PROGRAM

## 2025-05-25 PROCEDURE — 93308 TTE F-UP OR LMTD: CPT

## 2025-05-25 PROCEDURE — 85027 COMPLETE CBC AUTOMATED: CPT

## 2025-05-25 PROCEDURE — A9270 NON-COVERED ITEM OR SERVICE: HCPCS | Performed by: STUDENT IN AN ORGANIZED HEALTH CARE EDUCATION/TRAINING PROGRAM

## 2025-05-25 PROCEDURE — 700102 HCHG RX REV CODE 250 W/ 637 OVERRIDE(OP): Performed by: STUDENT IN AN ORGANIZED HEALTH CARE EDUCATION/TRAINING PROGRAM

## 2025-05-25 PROCEDURE — 770020 HCHG ROOM/CARE - TELE (206)

## 2025-05-25 PROCEDURE — 80053 COMPREHEN METABOLIC PANEL: CPT

## 2025-05-25 PROCEDURE — A9537 TC99M MEBROFENIN: HCPCS

## 2025-05-25 PROCEDURE — 700111 HCHG RX REV CODE 636 W/ 250 OVERRIDE (IP): Mod: JZ | Performed by: HOSPITALIST

## 2025-05-25 PROCEDURE — A9270 NON-COVERED ITEM OR SERVICE: HCPCS | Performed by: HOSPITALIST

## 2025-05-25 PROCEDURE — 36415 COLL VENOUS BLD VENIPUNCTURE: CPT

## 2025-05-25 PROCEDURE — 84100 ASSAY OF PHOSPHORUS: CPT

## 2025-05-25 PROCEDURE — 700117 HCHG RX CONTRAST REV CODE 255: Performed by: STUDENT IN AN ORGANIZED HEALTH CARE EDUCATION/TRAINING PROGRAM

## 2025-05-25 PROCEDURE — 700111 HCHG RX REV CODE 636 W/ 250 OVERRIDE (IP): Mod: JZ | Performed by: STUDENT IN AN ORGANIZED HEALTH CARE EDUCATION/TRAINING PROGRAM

## 2025-05-25 RX ORDER — SPIRONOLACTONE 25 MG/1
25 TABLET ORAL
Status: DISCONTINUED | OUTPATIENT
Start: 2025-05-26 | End: 2025-06-02 | Stop reason: HOSPADM

## 2025-05-25 RX ORDER — AMOXICILLIN 250 MG
2 CAPSULE ORAL EVERY EVENING
Status: DISCONTINUED | OUTPATIENT
Start: 2025-05-25 | End: 2025-06-02 | Stop reason: HOSPADM

## 2025-05-25 RX ORDER — FUROSEMIDE 40 MG/1
40 TABLET ORAL
Status: DISCONTINUED | OUTPATIENT
Start: 2025-05-26 | End: 2025-05-30

## 2025-05-25 RX ORDER — BISACODYL 5 MG
5 TABLET, DELAYED RELEASE (ENTERIC COATED) ORAL
Status: DISCONTINUED | OUTPATIENT
Start: 2025-05-25 | End: 2025-06-02 | Stop reason: HOSPADM

## 2025-05-25 RX ORDER — ONDANSETRON 2 MG/ML
4 INJECTION INTRAMUSCULAR; INTRAVENOUS EVERY 4 HOURS PRN
Status: DISCONTINUED | OUTPATIENT
Start: 2025-05-25 | End: 2025-06-02 | Stop reason: HOSPADM

## 2025-05-25 RX ORDER — POLYETHYLENE GLYCOL 3350 17 G/17G
1 POWDER, FOR SOLUTION ORAL DAILY
Status: DISCONTINUED | OUTPATIENT
Start: 2025-05-25 | End: 2025-06-02 | Stop reason: HOSPADM

## 2025-05-25 RX ORDER — METHOCARBAMOL 500 MG/1
500 TABLET, FILM COATED ORAL 4 TIMES DAILY PRN
Status: DISCONTINUED | OUTPATIENT
Start: 2025-05-25 | End: 2025-06-02 | Stop reason: HOSPADM

## 2025-05-25 RX ORDER — HEPARIN SODIUM 5000 [USP'U]/ML
5000 INJECTION, SOLUTION INTRAVENOUS; SUBCUTANEOUS EVERY 8 HOURS
Status: DISCONTINUED | OUTPATIENT
Start: 2025-05-25 | End: 2025-06-02 | Stop reason: HOSPADM

## 2025-05-25 RX ADMIN — SPIRONOLACTONE 50 MG: 25 TABLET ORAL at 05:25

## 2025-05-25 RX ADMIN — ONDANSETRON 4 MG: 2 INJECTION INTRAMUSCULAR; INTRAVENOUS at 19:06

## 2025-05-25 RX ADMIN — DAPAGLIFLOZIN 10 MG: 10 TABLET, FILM COATED ORAL at 05:25

## 2025-05-25 RX ADMIN — HUMAN ALBUMIN MICROSPHERES AND PERFLUTREN 3 ML: 10; .22 INJECTION, SOLUTION INTRAVENOUS at 18:45

## 2025-05-25 RX ADMIN — FUROSEMIDE 40 MG: 10 INJECTION, SOLUTION INTRAVENOUS at 05:24

## 2025-05-25 RX ADMIN — TAMSULOSIN HYDROCHLORIDE 0.4 MG: 0.4 CAPSULE ORAL at 08:01

## 2025-05-25 RX ADMIN — SENNOSIDES AND DOCUSATE SODIUM 2 TABLET: 50; 8.6 TABLET ORAL at 17:56

## 2025-05-25 RX ADMIN — HEPARIN SODIUM 5000 UNITS: 5000 INJECTION, SOLUTION INTRAVENOUS; SUBCUTANEOUS at 21:06

## 2025-05-25 RX ADMIN — ALLOPURINOL 300 MG: 300 TABLET ORAL at 05:25

## 2025-05-25 RX ADMIN — POLYETHYLENE GLYCOL 3350 1 PACKET: 17 POWDER, FOR SOLUTION ORAL at 16:02

## 2025-05-25 ASSESSMENT — ENCOUNTER SYMPTOMS
ABDOMINAL PAIN: 1
CONSTIPATION: 1
SHORTNESS OF BREATH: 0

## 2025-05-25 ASSESSMENT — FIBROSIS 4 INDEX: FIB4 SCORE: 2.14

## 2025-05-25 NOTE — PROGRESS NOTES
Bedside shift report received from Luz Maria hernandez. Pt resting in bed on 0.5L nasal cannula, call bell within reach, frame alarm set, urinals at bedside. No complaints at this time. Per dayshift nurse, friends are not allowed to visit.

## 2025-05-25 NOTE — PROGRESS NOTES
[facility] Medicine Daily Progress Note    Date of Service  5/25/2025    Chief Complaint  Santino Zabala is a 59 y.o. male admitted 5/22/2025 with acute hypoxic respiratory failure secondary to acute decompensated heart failure    Hospital Course  The patient is 59-year-old male with a past medical history significant for HFrEF (EF 20%), alcoholic cirrhosis, and methamphetamine use disorder.    The patient was hospitalized earlier this same month for acute decompensated heart failure for which she was discontinued from Aldactone, Entresto, and losartan for concern of hyperkalemia.    The patient was initially evaluated emergency department for chest pain, shortness of breath, bilateral lower extremity swelling for approximately 2 days.  Unfortunate reported that he did not think the Lasix was working very well as he became fluid overloaded again.  His BNP was 22,000 in the emergency department..  Fluid overloaded on clinical examination.    The patient was initiated on IV diuresis.  Additionally, he was provided spironolactone, metoprolol, and Farxiga for his heart failure.  His IV diuresis was inevitably titrated down to oral dosing prior to discharge.    The patient was found to have an elevated troponin while hospitalized.  He has had some increased troponin levels in the past, however these values are still higher than those baseline elevations.  As such, the patient underwent a limited echocardiogram to assess for wall motion abnormality.  The patient has no chest pain.    The patient was found to have elevated LFTs with a normal bilirubin on laboratory imaging.  The values during this hospitalization are substantially improved compared to previous hospitalizations.  The patient underwent an right upper quadrant abdominal ultrasound and was found to have mild gallbladder wall thickening without visualized shadowing stones concerning for acalculous cholecystitis and hepatomegaly.  On physical examination, the  patient reported some general abdominal lysed pain and distention which the patient attributed to constipation.  His pain appeared to be moderately worse in his right upper quadrant.  He underwent a HIDA scan which is still pending as of 5/25.  The patient was provided bowel protocol.      Interval Problem Update  Patient seen and evaluated at bedside  Pertinent labs and imaging reviewed  Potassium 3.5, 4.5  Glucose 143, 123  Creatinine 1.63, 1.38, 1.49  AST 62, 53, 52 (previous AST >7000 in 12/24)  ALT 35, 31, 28 (previous ALT >5000 in 12/24)  H/o hepatocongestion during previous hospitalizations  Positive Pires sign 5/25  HIDA scan ordered  UDS negative  Troponin 58, 54  CXR for no acute cardiopulmonary disease, cardiomegaly  RUQ reviewed  Afebrile, hemodynamically stable, no oxygen requirements  Voiding, stooling, tolerating oral intake well  Previous bowel movement PTA  On scheduled senna and miralax  As needed suppository available    I have discussed this patient's plan of care and discharge plan at IDT rounds today with Case Management, Nursing, Nursing leadership, and other members of the IDT team.    Consultants/Specialty  None    Code Status  Full Code    Disposition  The patient is not medically cleared for discharge to home or a post-acute facility.      I have placed the appropriate orders for post-discharge needs.    Review of Systems  Review of Systems   Respiratory:  Negative for shortness of breath.    Cardiovascular:  Positive for leg swelling.   Gastrointestinal:  Positive for abdominal pain and constipation.        Physical Exam  Temp:  [36.3 °C (97.3 °F)-36.8 °C (98.2 °F)] 36.5 °C (97.7 °F)  Pulse:  [102-111] 102  Resp:  [16-18] 16  BP: (107-115)/(76-82) 107/76  SpO2:  [94 %-98 %] 98 %    Physical Exam  Constitutional:       General: He is not in acute distress.     Appearance: Normal appearance. He is normal weight.   HENT:      Head: Normocephalic and atraumatic.      Nose: Nose normal.    Eyes:      Extraocular Movements: Extraocular movements intact.   Pulmonary:      Effort: Pulmonary effort is normal. No respiratory distress.      Breath sounds: Examination of the right-lower field reveals decreased breath sounds. Examination of the left-lower field reveals decreased breath sounds. Decreased breath sounds present.   Abdominal:      General: There is distension.      Palpations: Abdomen is soft.      Tenderness: There is generalized abdominal tenderness. Positive signs include Pires's sign.   Musculoskeletal:      Cervical back: Normal range of motion.      Right lower le+ Pitting Edema present.      Left lower le+ Pitting Edema present.   Skin:     General: Skin is warm and dry.   Neurological:      General: No focal deficit present.      Mental Status: He is alert and oriented to person, place, and time.   Psychiatric:         Mood and Affect: Mood normal.         Behavior: Behavior normal.         Thought Content: Thought content normal.         Fluids    Intake/Output Summary (Last 24 hours) at 2025 1349  Last data filed at 2025 1042  Gross per 24 hour   Intake 1240 ml   Output 6975 ml   Net -5735 ml        Laboratory  Recent Labs     25  0001   WBC 7.5 7.1 7.1   RBC 4.84 4.49* 5.10   HEMOGLOBIN 15.7 15.0 16.9   HEMATOCRIT 51.9 43.9 49.9   .2* 97.8 97.8   MCH 32.4 33.4* 33.1*   MCHC 30.3* 34.2 33.9   RDW 58.6* 52.9* 52.5*   PLATELETCT 295 265 275   MPV 10.2 10.2 10.0     Recent Labs     25  0001   SODIUM 133* 136 131*   POTASSIUM 5.1 3.5* 4.5   CHLORIDE 102 99 96   CO2 10 22   GLUCOSE 103* 143* 123*   BUN 31* 30* 29*   CREATININE 1.63* 1.38 1.49*   CALCIUM 9.4 8.5 8.9     Recent Labs     25  0436   INR 1.51*               Imaging  US-RUQ   Final Result         1.  Mild gallbladder wall thickening without visualized shadowing stones, consider acalculous cholecystitis. Could be further  evaluated with HIDA scan as clinically appropriate.   2.  Hepatomegaly      DX-CHEST-PORTABLE (1 VIEW)   Final Result         1.  No acute cardiopulmonary disease.   2.  Cardiomegaly      EC-ECHOCARDIOGRAM LTD W/O CONT    (Results Pending)   NM-BILIARY (HIDA) SCAN WITH CCK    (Results Pending)        Assessment/Plan  * Acute on chronic systolic heart failure (HCC)- (present on admission)  Assessment & Plan  Continue intravenous loop diuretic for decongestion: Twice daily transition to daily dosing of Lasix  Symptoms to guide titration of loop diuretic dosing  Monitor urine output  Daily weight  Initiate and optimize GDMT  ARNI: Hold for SUZY  Evidence-based beta-blocker: Metoprolol  Mineralocorticoid receptor antagonist: Spironolactone  SGLT2 inhibitor: Farxiga  Monitor blood pressure and renal function and electrolytes while undergoing aggressive diuresis and optimizing GDMT   Echocardiogram ordered and pending    Elevated troponin  Assessment & Plan  Present  No chest pain  Limited echocardiogram ordered to assess wall motion abnormality    Generalized abdominal pain  Assessment & Plan  Patient reported generalized abdominal pain and distention which she attributed to constipation  The patient did have some increased abdominal pain in the right upper quadrant compared to other areas of his abdomen  HIDA scan ordered  Bowel protocol ordered    Pulmonary hypertension (HCC)- (present on admission)  Assessment & Plan  Monitor volume status    Metabolic acidosis, increased anion gap- (present on admission)  Assessment & Plan  Continue to trend    Acute renal failure superimposed on stage 3 chronic kidney disease (HCC)- (present on admission)  Assessment & Plan  Start IV Lasix  Monitor BMP and assess response  Avoid IV contrast/nephrotoxins/NSAIDs  Dose adjust meds for decreased GFR      Methamphetamine abuse (HCC)- (present on admission)  Assessment & Plan  Longstanding history of abuse  Recommend cessation    Alcoholic  cirrhosis of liver with ascites (HCC)- (present on admission)  Assessment & Plan  Decompensated  Monitor volume status  Ammonia WNL  INR 1.51         VTE prophylaxis:    heparin ppx      I have performed a physical exam and reviewed and updated ROS and Plan today (5/25/2025). In review of yesterday's note (5/24/2025), there are no changes except as documented above.    I provided a total of 52 minutes addressing the patient's medical and discharge needs while in the acute care setting. This patient's care required the review of medical records and diagnostic studies, direct face-to-face time with the patient and/or family, documentation, and communication with my interdisciplinary team of RNs, pharmacists, and .

## 2025-05-25 NOTE — CARE PLAN
The patient is Stable - Low risk of patient condition declining or worsening    Shift Goals  Clinical Goals: diurese, monitor electrolytes  Patient Goals: rest  Family Goals: N/A    Progress made toward(s) clinical / shift goals:  Pt tolerating diuresis, weaned to room air.    Patient is not progressing towards the following goals:

## 2025-05-25 NOTE — ASSESSMENT & PLAN NOTE
Patient reported generalized abdominal pain and distention which he attributed to constipation  The patient did have some increased abdominal pain in the right upper quadrant compared to other areas of his abdomen  HIDA scan negative  No improvement in symptoms with PPI/GI cocktail  Bowel movement 5/26  CT abdomen pelvis pending

## 2025-05-25 NOTE — CARE PLAN
The patient is Watcher - Medium risk of patient condition declining or worsening    Shift Goals  Clinical Goals: monitor labs, diurese  Patient Goals: rest  Family Goals: N/A    Progress made toward(s) clinical / shift goals:      Problem: Fall Risk  Goal: Patient will remain free from falls  Outcome: Progressing     Problem: Care Map:  Day 3 Optimal Outcome for the Heart Failure Patient  Goal: Day 3:  Optimal Care of the heart failure patient  Outcome: Progressing

## 2025-05-26 ENCOUNTER — APPOINTMENT (OUTPATIENT)
Dept: RADIOLOGY | Facility: MEDICAL CENTER | Age: 60
DRG: 291 | End: 2025-05-26
Attending: STUDENT IN AN ORGANIZED HEALTH CARE EDUCATION/TRAINING PROGRAM
Payer: MEDICAID

## 2025-05-26 LAB
ALBUMIN SERPL BCP-MCNC: 3.4 G/DL (ref 3.2–4.9)
ALBUMIN/GLOB SERPL: 0.9 G/DL
ALP SERPL-CCNC: 133 U/L (ref 30–99)
ALT SERPL-CCNC: 28 U/L (ref 2–50)
ANION GAP SERPL CALC-SCNC: 11 MMOL/L (ref 7–16)
AST SERPL-CCNC: 40 U/L (ref 12–45)
BILIRUB SERPL-MCNC: 0.9 MG/DL (ref 0.1–1.5)
BUN SERPL-MCNC: 36 MG/DL (ref 8–22)
CALCIUM ALBUM COR SERPL-MCNC: 9.8 MG/DL (ref 8.5–10.5)
CALCIUM SERPL-MCNC: 9.3 MG/DL (ref 8.5–10.5)
CHLORIDE SERPL-SCNC: 96 MMOL/L (ref 96–112)
CO2 SERPL-SCNC: 23 MMOL/L (ref 20–33)
CREAT SERPL-MCNC: 1.94 MG/DL (ref 0.5–1.4)
ERYTHROCYTE [DISTWIDTH] IN BLOOD BY AUTOMATED COUNT: 52.2 FL (ref 35.9–50)
GFR SERPLBLD CREATININE-BSD FMLA CKD-EPI: 39 ML/MIN/1.73 M 2
GLOBULIN SER CALC-MCNC: 3.9 G/DL (ref 1.9–3.5)
GLUCOSE SERPL-MCNC: 118 MG/DL (ref 65–99)
HCT VFR BLD AUTO: 48.5 % (ref 42–52)
HGB BLD-MCNC: 16 G/DL (ref 14–18)
LIPASE SERPL-CCNC: 113 U/L (ref 11–82)
LV EJECT FRACT  99904: 20
LV EJECT FRACT MOD 2C 99903: 15.5
LV EJECT FRACT MOD 4C 99902: 10.62
LV EJECT FRACT MOD BP 99901: 12.86
MCH RBC QN AUTO: 32.6 PG (ref 27–33)
MCHC RBC AUTO-ENTMCNC: 33 G/DL (ref 32.3–36.5)
MCV RBC AUTO: 98.8 FL (ref 81.4–97.8)
PLATELET # BLD AUTO: 304 K/UL (ref 164–446)
PMV BLD AUTO: 10.4 FL (ref 9–12.9)
POTASSIUM SERPL-SCNC: 4.9 MMOL/L (ref 3.6–5.5)
PROT SERPL-MCNC: 7.3 G/DL (ref 6–8.2)
RBC # BLD AUTO: 4.91 M/UL (ref 4.7–6.1)
SODIUM SERPL-SCNC: 130 MMOL/L (ref 135–145)
WBC # BLD AUTO: 6.6 K/UL (ref 4.8–10.8)

## 2025-05-26 PROCEDURE — 83690 ASSAY OF LIPASE: CPT

## 2025-05-26 PROCEDURE — 99233 SBSQ HOSP IP/OBS HIGH 50: CPT | Performed by: STUDENT IN AN ORGANIZED HEALTH CARE EDUCATION/TRAINING PROGRAM

## 2025-05-26 PROCEDURE — 80053 COMPREHEN METABOLIC PANEL: CPT

## 2025-05-26 PROCEDURE — A9270 NON-COVERED ITEM OR SERVICE: HCPCS | Performed by: HOSPITALIST

## 2025-05-26 PROCEDURE — 74177 CT ABD & PELVIS W/CONTRAST: CPT

## 2025-05-26 PROCEDURE — 700117 HCHG RX CONTRAST REV CODE 255: Performed by: STUDENT IN AN ORGANIZED HEALTH CARE EDUCATION/TRAINING PROGRAM

## 2025-05-26 PROCEDURE — 700102 HCHG RX REV CODE 250 W/ 637 OVERRIDE(OP): Performed by: HOSPITALIST

## 2025-05-26 PROCEDURE — 36415 COLL VENOUS BLD VENIPUNCTURE: CPT

## 2025-05-26 PROCEDURE — 93308 TTE F-UP OR LMTD: CPT | Mod: 26 | Performed by: INTERNAL MEDICINE

## 2025-05-26 PROCEDURE — 700105 HCHG RX REV CODE 258: Performed by: STUDENT IN AN ORGANIZED HEALTH CARE EDUCATION/TRAINING PROGRAM

## 2025-05-26 PROCEDURE — A9270 NON-COVERED ITEM OR SERVICE: HCPCS | Performed by: STUDENT IN AN ORGANIZED HEALTH CARE EDUCATION/TRAINING PROGRAM

## 2025-05-26 PROCEDURE — 85027 COMPLETE CBC AUTOMATED: CPT

## 2025-05-26 PROCEDURE — 700111 HCHG RX REV CODE 636 W/ 250 OVERRIDE (IP): Performed by: STUDENT IN AN ORGANIZED HEALTH CARE EDUCATION/TRAINING PROGRAM

## 2025-05-26 PROCEDURE — 770020 HCHG ROOM/CARE - TELE (206)

## 2025-05-26 PROCEDURE — 700102 HCHG RX REV CODE 250 W/ 637 OVERRIDE(OP): Performed by: STUDENT IN AN ORGANIZED HEALTH CARE EDUCATION/TRAINING PROGRAM

## 2025-05-26 RX ORDER — OXYCODONE HYDROCHLORIDE 5 MG/1
5 TABLET ORAL EVERY 4 HOURS PRN
Refills: 0 | Status: DISCONTINUED | OUTPATIENT
Start: 2025-05-26 | End: 2025-06-02 | Stop reason: HOSPADM

## 2025-05-26 RX ORDER — SODIUM CHLORIDE, SODIUM LACTATE, POTASSIUM CHLORIDE, CALCIUM CHLORIDE 600; 310; 30; 20 MG/100ML; MG/100ML; MG/100ML; MG/100ML
INJECTION, SOLUTION INTRAVENOUS CONTINUOUS
Status: ACTIVE | OUTPATIENT
Start: 2025-05-26 | End: 2025-05-26

## 2025-05-26 RX ORDER — OMEPRAZOLE 20 MG/1
20 CAPSULE, DELAYED RELEASE ORAL 2 TIMES DAILY
Status: DISCONTINUED | OUTPATIENT
Start: 2025-05-26 | End: 2025-06-02 | Stop reason: HOSPADM

## 2025-05-26 RX ADMIN — IOHEXOL 97 ML: 350 INJECTION, SOLUTION INTRAVENOUS at 21:30

## 2025-05-26 RX ADMIN — ALLOPURINOL 300 MG: 300 TABLET ORAL at 05:05

## 2025-05-26 RX ADMIN — DAPAGLIFLOZIN 10 MG: 10 TABLET, FILM COATED ORAL at 05:04

## 2025-05-26 RX ADMIN — HEPARIN SODIUM 5000 UNITS: 5000 INJECTION, SOLUTION INTRAVENOUS; SUBCUTANEOUS at 05:04

## 2025-05-26 RX ADMIN — SODIUM CHLORIDE, POTASSIUM CHLORIDE, SODIUM LACTATE AND CALCIUM CHLORIDE: 600; 310; 30; 20 INJECTION, SOLUTION INTRAVENOUS at 07:54

## 2025-05-26 RX ADMIN — HEPARIN SODIUM 5000 UNITS: 5000 INJECTION, SOLUTION INTRAVENOUS; SUBCUTANEOUS at 14:07

## 2025-05-26 RX ADMIN — METOPROLOL SUCCINATE 50 MG: 50 TABLET, EXTENDED RELEASE ORAL at 05:04

## 2025-05-26 RX ADMIN — OXYCODONE 5 MG: 5 TABLET ORAL at 20:15

## 2025-05-26 RX ADMIN — FUROSEMIDE 40 MG: 40 TABLET ORAL at 05:04

## 2025-05-26 RX ADMIN — OXYCODONE 5 MG: 5 TABLET ORAL at 12:18

## 2025-05-26 RX ADMIN — OMEPRAZOLE 20 MG: 20 CAPSULE, DELAYED RELEASE ORAL at 17:17

## 2025-05-26 RX ADMIN — METHOCARBAMOL 500 MG: 500 TABLET ORAL at 09:13

## 2025-05-26 RX ADMIN — LIDOCAINE HYDROCHLORIDE 30 ML: 20 SOLUTION OROPHARYNGEAL at 14:22

## 2025-05-26 RX ADMIN — POLYETHYLENE GLYCOL 3350 1 PACKET: 17 POWDER, FOR SOLUTION ORAL at 05:04

## 2025-05-26 RX ADMIN — SPIRONOLACTONE 25 MG: 25 TABLET ORAL at 05:04

## 2025-05-26 RX ADMIN — TAMSULOSIN HYDROCHLORIDE 0.4 MG: 0.4 CAPSULE ORAL at 07:53

## 2025-05-26 RX ADMIN — HEPARIN SODIUM 5000 UNITS: 5000 INJECTION, SOLUTION INTRAVENOUS; SUBCUTANEOUS at 22:25

## 2025-05-26 ASSESSMENT — ENCOUNTER SYMPTOMS
SHORTNESS OF BREATH: 0
CONSTIPATION: 0
ABDOMINAL PAIN: 1

## 2025-05-26 ASSESSMENT — PAIN DESCRIPTION - PAIN TYPE
TYPE: ACUTE PAIN

## 2025-05-26 ASSESSMENT — FIBROSIS 4 INDEX: FIB4 SCORE: 2.14

## 2025-05-26 NOTE — PROGRESS NOTES
Hospital Medicine Daily Progress Note    Date of Service  5/26/2025    Chief Complaint  Santino Zabala is a 59 y.o. male admitted 5/22/2025 with acute hypoxic respiratory failure secondary to acute decompensated heart failure    Hospital Course  The patient is 59-year-old male with a past medical history significant for HFrEF (EF 20%), alcoholic cirrhosis, and methamphetamine use disorder.    The patient was hospitalized earlier this same month for acute decompensated heart failure for which she was discontinued from Aldactone, Entresto, and losartan for concern of hyperkalemia.    The patient was initially evaluated emergency department for chest pain, shortness of breath, bilateral lower extremity swelling for approximately 2 days.  Unfortunate reported that he did not think the Lasix was working very well as he became fluid overloaded again.  His BNP was 22,000 in the emergency department..  Fluid overloaded on clinical examination. The patient was initiated on IV diuresis.  Additionally, he was provided spironolactone, metoprolol, and Farxiga for his heart failure.  His IV diuresis was inevitably titrated down to oral dosing prior to discharge.  Unfortunately, the patient developed an SUZY which resulted in the holding of his GDMT.  Primary service will resume diuretic as clinically able.    The patient was found to have an elevated troponin while hospitalized.  He has had some increased troponin levels in the past, however these values are still higher than those baseline elevations.  As such, the patient underwent a limited echocardiogram to assess for wall motion abnormality this result is still pending as of 5/26.  The patient has no chest pain.    The patient was found to have elevated LFTs with a normal bilirubin on laboratory imaging.  The values during this hospitalization are substantially improved compared to previous hospitalizations.  The patient reported ongoing abdominal pain which she  attributed to constipation.  However, he did have a positive Pires sign.  The patient underwent an right upper quadrant abdominal ultrasound and was found to have mild gallbladder wall thickening without visualized shadowing stones concerning for acalculous cholecystitis and hepatomegaly. He underwent a HIDA scan which was negative.  He had a large bowel movement on 5/26 making constipation less likely the etiology.  He was provided PPI with a GI cocktail with no improvement in his symptoms.  CT scan done for the same concern on 5/9 that was negative with the exception of hepatomegaly.  Repeat CT scan ordered and pending.    Interval Problem Update  Patient seen and evaluated at bedside  Pertinent labs and imaging reviewed  Potassium 3.5, 4.5 4.9  Glucose 143, 123  Creatinine 1.63, 1.38, 1.49, 1.94  GDMT held  AST 62, 53, 52 (previous AST >7000 in 12/24)  ALT 35, 31, 28 (previous ALT >5000 in 12/24)  H/o hepatocongestion during previous hospitalizations  HIDA scan negative  Lipase ordered and pending  Afebrile, hemodynamically stable, no oxygen requirements  Voiding, stooling, tolerating oral intake well  Previous bowel movement 5/26  Patient with ongoing abdominal pain that did not have any improvement with PPI and GI cocktail plan CT abdomen pelvis with contrast ordered and pending    I have discussed this patient's plan of care and discharge plan at IDT rounds today with Case Management, Nursing, Nursing leadership, and other members of the IDT team.    Consultants/Specialty  None    Code Status  Full Code    Disposition  The patient is not medically cleared for discharge to home or a post-acute facility.      I have placed the appropriate orders for post-discharge needs.    Review of Systems  Review of Systems   Respiratory:  Negative for shortness of breath.    Cardiovascular:  Negative for leg swelling.   Gastrointestinal:  Positive for abdominal pain. Negative for constipation.        Physical Exam  Temp:   [36.1 °C (97 °F)-36.6 °C (97.9 °F)] 36.1 °C (97 °F)  Pulse:  [] 93  Resp:  [16-20] 18  BP: ()/(65-83) 93/65  SpO2:  [94 %-97 %] 97 %    Physical Exam  Constitutional:       General: He is not in acute distress.     Appearance: Normal appearance. He is normal weight.   HENT:      Head: Normocephalic and atraumatic.      Nose: Nose normal.   Eyes:      Extraocular Movements: Extraocular movements intact.   Pulmonary:      Effort: Pulmonary effort is normal. No respiratory distress.      Breath sounds: Examination of the right-lower field reveals decreased breath sounds. Examination of the left-lower field reveals decreased breath sounds. Decreased breath sounds present.   Abdominal:      General: There is distension.      Palpations: Abdomen is soft.      Tenderness: There is generalized abdominal tenderness. Positive signs include Pires's sign.   Musculoskeletal:      Cervical back: Normal range of motion.      Right lower leg: No edema.      Left lower leg: No edema.   Skin:     General: Skin is warm and dry.   Neurological:      General: No focal deficit present.      Mental Status: He is alert and oriented to person, place, and time.   Psychiatric:         Mood and Affect: Mood normal.         Behavior: Behavior normal.         Thought Content: Thought content normal.         Fluids    Intake/Output Summary (Last 24 hours) at 5/26/2025 1637  Last data filed at 5/26/2025 0900  Gross per 24 hour   Intake 440 ml   Output 1450 ml   Net -1010 ml        Laboratory  Recent Labs     05/23/25 2359 05/25/25  0001 05/26/25 0429   WBC 7.1 7.1 6.6   RBC 4.49* 5.10 4.91   HEMOGLOBIN 15.0 16.9 16.0   HEMATOCRIT 43.9 49.9 48.5   MCV 97.8 97.8 98.8*   MCH 33.4* 33.1* 32.6   MCHC 34.2 33.9 33.0   RDW 52.9* 52.5* 52.2*   PLATELETCT 265 275 304   MPV 10.2 10.0 10.4     Recent Labs     05/23/25 2359 05/25/25  0001 05/26/25  0429   SODIUM 136 131* 130*   POTASSIUM 3.5* 4.5 4.9   CHLORIDE 99 96 96   CO2 22 22 23    GLUCOSE 143* 123* 118*   BUN 30* 29* 36*   CREATININE 1.38 1.49* 1.94*   CALCIUM 8.5 8.9 9.3                     Imaging  EC-ECHOCARDIOGRAM LTD W/ CONT         NM-BILIARY (HIDA) SCAN WITH CCK   Final Result         1. Normal hepatobiliary scan. No evidence of acute cholecystitis.      2. Normal gallbladder ejection fraction.         US-RUQ   Final Result         1.  Mild gallbladder wall thickening without visualized shadowing stones, consider acalculous cholecystitis. Could be further evaluated with HIDA scan as clinically appropriate.   2.  Hepatomegaly      DX-CHEST-PORTABLE (1 VIEW)   Final Result         1.  No acute cardiopulmonary disease.   2.  Cardiomegaly      CT-ABDOMEN-PELVIS WITH    (Results Pending)        Assessment/Plan  * Acute on chronic systolic heart failure (HCC)- (present on admission)  Assessment & Plan  Symptoms to guide titration of loop diuretic dosing: Hold for SUZY  Monitor urine output  Daily weight  Initiate and optimize GDMT  ARNI: Hold for SUZY  Evidence-based beta-blocker: Metoprolol  Mineralocorticoid receptor antagonist: Hold for SUZY  SGLT2 inhibitor: Hold for SUZY  Monitor blood pressure and renal function and electrolytes while undergoing aggressive diuresis and optimizing GDMT   Echocardiogram ordered and pending    Elevated troponin  Assessment & Plan  Present  No chest pain  Limited echocardiogram ordered to assess wall motion abnormality    Generalized abdominal pain  Assessment & Plan  Patient reported generalized abdominal pain and distention which he attributed to constipation  The patient did have some increased abdominal pain in the right upper quadrant compared to other areas of his abdomen  HIDA scan negative  No improvement in symptoms with PPI/GI cocktail  Bowel movement 5/26  CT abdomen pelvis pending    Pulmonary hypertension (HCC)- (present on admission)  Assessment & Plan  Monitor volume status    Metabolic acidosis, increased anion gap- (present on  admission)  Assessment & Plan  Continue to trend    Acute renal failure superimposed on stage 3 chronic kidney disease (HCC)- (present on admission)  Assessment & Plan  Start IV Lasix  Monitor BMP and assess response  Avoid IV contrast/nephrotoxins/NSAIDs  Dose adjust meds for decreased GFR      Methamphetamine abuse (HCC)- (present on admission)  Assessment & Plan  Longstanding history of abuse  Recommend cessation    Alcoholic cirrhosis of liver with ascites (HCC)- (present on admission)  Assessment & Plan  Decompensated  Monitor volume status  Ammonia WNL  INR 1.51         VTE prophylaxis:    heparin ppx      I have performed a physical exam and reviewed and updated ROS and Plan today (5/26/2025). In review of yesterday's note (5/25/2025), there are no changes except as documented above.    I provided a total of 55 minutes addressing the patient's medical and discharge needs while in the acute care setting. This patient's care required the review of medical records and diagnostic studies, direct face-to-face time with the patient and/or family, documentation, and communication with my interdisciplinary team of RNs, pharmacists, and .

## 2025-05-26 NOTE — HOSPITAL COURSE
The patient is 59-year-old male with a past medical history significant for HFrEF (EF 20%), alcoholic cirrhosis, and methamphetamine use disorder.     The patient was hospitalized earlier this same month for acute decompensated heart failure for which she was discontinued from Aldactone, Entresto, and losartan for concern of hyperkalemia.     The patient was initially evaluated emergency department for chest pain, shortness of breath, bilateral lower extremity swelling for approximately 2 days.  Unfortunate reported that he did not think the Lasix was working very well as he became fluid overloaded again.  His BNP was 22,000 in the emergency department..  Fluid overloaded on clinical examination. The patient was initiated on IV diuresis.  Additionally, he was provided spironolactone, metoprolol, and Farxiga for his heart failure.  His IV diuresis was inevitably titrated down to oral dosing prior to discharge.  Unfortunately, the patient developed an SUZY which resulted in the holding of his GDMT.  Primary service will resume diuretic as clinically able.     The patient was found to have an elevated troponin while hospitalized.  He has had some increased troponin levels in the past, however these values are still higher than those baseline elevations.  As such, the patient underwent a limited echocardiogram to assess for wall motion abnormality this result is still pending as of 5/26.  The patient has no chest pain.     The patient was found to have elevated LFTs with a normal bilirubin on laboratory imaging.  The values during this hospitalization are substantially improved compared to previous hospitalizations.  The patient reported ongoing abdominal pain which she attributed to constipation.  However, he did have a positive Pires sign.  The patient underwent an right upper quadrant abdominal ultrasound and was found to have mild gallbladder wall thickening without visualized shadowing stones concerning for  acalculous cholecystitis and hepatomegaly. He underwent a HIDA scan which was negative.  He had a large bowel movement on 5/26 making constipation less likely the etiology.  He was provided PPI with a GI cocktail with no improvement in his symptoms.  CT scan done for the same concern on 5/9 that was negative with the exception of hepatomegaly.  Repeat CT scan ordered and pending.

## 2025-05-26 NOTE — CARE PLAN
The patient is Stable - Low risk of patient condition declining or worsening    Shift Goals  Clinical Goals: diurese, monitor labs  Patient Goals: rest  Family Goals: N/A    Progress made toward(s) clinical / shift goals:  Pt taking scheduled medications, labs drawn this afternoon.    Patient is not progressing towards the following goals:      Problem: Knowledge Deficit - Standard  Goal: Patient and family/care givers will demonstrate understanding of plan of care, disease process/condition, diagnostic tests and medications  Outcome: Not Progressing  Pt educated at bedside about s/s of heart failure.    Problem: Fall Risk  Goal: Patient will remain free from falls  Outcome: Not Progressing   Bed in low position, bed alarm on, call light in reach.

## 2025-05-26 NOTE — PROGRESS NOTES
Bedside shift report received from ross hernandez. Pt sitting up in bed complaining of nausea. Bed alarm set, call bell within reach.

## 2025-05-26 NOTE — PROGRESS NOTES
Telemetry Shift Summary    Rhythm SR-ST  HR Range   Ectopy freq PVCs, trigem, bigem, couplets  Measurements 0.22/0.08/0.4    7 beats vtach at 1145. No CP, no palpitations, vitals stable. Dr. Pendleton notified.    Normal Values  Rhythm SR  HR Range    Measurements 0.12-0.20 / 0.06-0.10  / 0.30-0.52

## 2025-05-26 NOTE — CARE PLAN
The patient is Watcher - Medium risk of patient condition declining or worsening    Shift Goals  Clinical Goals: diurese, monitor labs  Patient Goals: rest  Family Goals: N/A    Progress made toward(s) clinical / shift goals:      Problem: Care Map:  Day 3 Optimal Outcome for the Heart Failure Patient  Goal: Day 3:  Optimal Care of the heart failure patient  Outcome: Progressing     Problem: Fall Risk  Goal: Patient will remain free from falls  Outcome: Progressing

## 2025-05-27 LAB
ALBUMIN SERPL BCP-MCNC: 3.8 G/DL (ref 3.2–4.9)
ALBUMIN/GLOB SERPL: 1 G/DL
ALP SERPL-CCNC: 121 U/L (ref 30–99)
ALT SERPL-CCNC: 27 U/L (ref 2–50)
ANION GAP SERPL CALC-SCNC: 14 MMOL/L (ref 7–16)
ANION GAP SERPL CALC-SCNC: 14 MMOL/L (ref 7–16)
AST SERPL-CCNC: 44 U/L (ref 12–45)
BILIRUB SERPL-MCNC: 1.3 MG/DL (ref 0.1–1.5)
BUN SERPL-MCNC: 46 MG/DL (ref 8–22)
BUN SERPL-MCNC: 49 MG/DL (ref 8–22)
CALCIUM ALBUM COR SERPL-MCNC: 9.4 MG/DL (ref 8.5–10.5)
CALCIUM SERPL-MCNC: 9.2 MG/DL (ref 8.5–10.5)
CALCIUM SERPL-MCNC: 9.8 MG/DL (ref 8.5–10.5)
CHLORIDE SERPL-SCNC: 89 MMOL/L (ref 96–112)
CHLORIDE SERPL-SCNC: 93 MMOL/L (ref 96–112)
CO2 SERPL-SCNC: 19 MMOL/L (ref 20–33)
CO2 SERPL-SCNC: 21 MMOL/L (ref 20–33)
CREAT SERPL-MCNC: 1.85 MG/DL (ref 0.5–1.4)
CREAT SERPL-MCNC: 2.04 MG/DL (ref 0.5–1.4)
EKG IMPRESSION: NORMAL
ERYTHROCYTE [DISTWIDTH] IN BLOOD BY AUTOMATED COUNT: 53.9 FL (ref 35.9–50)
GFR SERPLBLD CREATININE-BSD FMLA CKD-EPI: 36 ML/MIN/1.73 M 2
GFR SERPLBLD CREATININE-BSD FMLA CKD-EPI: 41 ML/MIN/1.73 M 2
GLOBULIN SER CALC-MCNC: 3.9 G/DL (ref 1.9–3.5)
GLUCOSE BLD STRIP.AUTO-MCNC: 113 MG/DL (ref 65–99)
GLUCOSE BLD STRIP.AUTO-MCNC: 138 MG/DL (ref 65–99)
GLUCOSE BLD STRIP.AUTO-MCNC: 143 MG/DL (ref 65–99)
GLUCOSE BLD STRIP.AUTO-MCNC: 166 MG/DL (ref 65–99)
GLUCOSE BLD STRIP.AUTO-MCNC: 174 MG/DL (ref 65–99)
GLUCOSE BLD STRIP.AUTO-MCNC: 409 MG/DL (ref 65–99)
GLUCOSE BLD STRIP.AUTO-MCNC: 95 MG/DL (ref 65–99)
GLUCOSE SERPL-MCNC: 149 MG/DL (ref 65–99)
GLUCOSE SERPL-MCNC: 168 MG/DL (ref 65–99)
HCT VFR BLD AUTO: 50.5 % (ref 42–52)
HGB BLD-MCNC: 16.8 G/DL (ref 14–18)
MCH RBC QN AUTO: 33.2 PG (ref 27–33)
MCHC RBC AUTO-ENTMCNC: 33.3 G/DL (ref 32.3–36.5)
MCV RBC AUTO: 99.8 FL (ref 81.4–97.8)
PLATELET # BLD AUTO: 259 K/UL (ref 164–446)
PMV BLD AUTO: 10.6 FL (ref 9–12.9)
POTASSIUM SERPL-SCNC: 5.4 MMOL/L (ref 3.6–5.5)
POTASSIUM SERPL-SCNC: 5.6 MMOL/L (ref 3.6–5.5)
POTASSIUM SERPL-SCNC: 6.9 MMOL/L (ref 3.6–5.5)
PROT SERPL-MCNC: 7.7 G/DL (ref 6–8.2)
RBC # BLD AUTO: 5.06 M/UL (ref 4.7–6.1)
SODIUM SERPL-SCNC: 124 MMOL/L (ref 135–145)
SODIUM SERPL-SCNC: 126 MMOL/L (ref 135–145)
WBC # BLD AUTO: 9.2 K/UL (ref 4.8–10.8)

## 2025-05-27 PROCEDURE — 85027 COMPLETE CBC AUTOMATED: CPT

## 2025-05-27 PROCEDURE — 700102 HCHG RX REV CODE 250 W/ 637 OVERRIDE(OP): Performed by: STUDENT IN AN ORGANIZED HEALTH CARE EDUCATION/TRAINING PROGRAM

## 2025-05-27 PROCEDURE — 36415 COLL VENOUS BLD VENIPUNCTURE: CPT

## 2025-05-27 PROCEDURE — 700111 HCHG RX REV CODE 636 W/ 250 OVERRIDE (IP): Performed by: STUDENT IN AN ORGANIZED HEALTH CARE EDUCATION/TRAINING PROGRAM

## 2025-05-27 PROCEDURE — 700101 HCHG RX REV CODE 250: Performed by: INTERNAL MEDICINE

## 2025-05-27 PROCEDURE — 80053 COMPREHEN METABOLIC PANEL: CPT

## 2025-05-27 PROCEDURE — 770020 HCHG ROOM/CARE - TELE (206)

## 2025-05-27 PROCEDURE — 99233 SBSQ HOSP IP/OBS HIGH 50: CPT | Performed by: INTERNAL MEDICINE

## 2025-05-27 PROCEDURE — 82962 GLUCOSE BLOOD TEST: CPT | Mod: 91

## 2025-05-27 PROCEDURE — 93010 ELECTROCARDIOGRAM REPORT: CPT | Performed by: INTERNAL MEDICINE

## 2025-05-27 PROCEDURE — 700101 HCHG RX REV CODE 250

## 2025-05-27 PROCEDURE — 700111 HCHG RX REV CODE 636 W/ 250 OVERRIDE (IP): Mod: JZ

## 2025-05-27 PROCEDURE — 84132 ASSAY OF SERUM POTASSIUM: CPT

## 2025-05-27 PROCEDURE — A9270 NON-COVERED ITEM OR SERVICE: HCPCS | Performed by: HOSPITALIST

## 2025-05-27 PROCEDURE — 80048 BASIC METABOLIC PNL TOTAL CA: CPT

## 2025-05-27 PROCEDURE — 700102 HCHG RX REV CODE 250 W/ 637 OVERRIDE(OP): Performed by: HOSPITALIST

## 2025-05-27 PROCEDURE — A9270 NON-COVERED ITEM OR SERVICE: HCPCS | Performed by: STUDENT IN AN ORGANIZED HEALTH CARE EDUCATION/TRAINING PROGRAM

## 2025-05-27 PROCEDURE — 700111 HCHG RX REV CODE 636 W/ 250 OVERRIDE (IP): Performed by: INTERNAL MEDICINE

## 2025-05-27 PROCEDURE — 93005 ELECTROCARDIOGRAM TRACING: CPT | Mod: TC

## 2025-05-27 RX ORDER — DEXTROSE MONOHYDRATE 25 G/50ML
25 INJECTION, SOLUTION INTRAVENOUS ONCE
Status: COMPLETED | OUTPATIENT
Start: 2025-05-27 | End: 2025-05-27

## 2025-05-27 RX ORDER — INDOMETHACIN 25 MG/1
50 CAPSULE ORAL ONCE
Status: COMPLETED | OUTPATIENT
Start: 2025-05-27 | End: 2025-05-27

## 2025-05-27 RX ORDER — CALCIUM GLUCONATE 20 MG/ML
2 INJECTION, SOLUTION INTRAVENOUS ONCE
Status: COMPLETED | OUTPATIENT
Start: 2025-05-27 | End: 2025-05-27

## 2025-05-27 RX ADMIN — HEPARIN SODIUM 5000 UNITS: 5000 INJECTION, SOLUTION INTRAVENOUS; SUBCUTANEOUS at 13:28

## 2025-05-27 RX ADMIN — SODIUM BICARBONATE 50 MEQ: 84 INJECTION INTRAVENOUS at 02:20

## 2025-05-27 RX ADMIN — HEPARIN SODIUM 5000 UNITS: 5000 INJECTION, SOLUTION INTRAVENOUS; SUBCUTANEOUS at 05:51

## 2025-05-27 RX ADMIN — DEXTROSE MONOHYDRATE 25 G: 25 INJECTION, SOLUTION INTRAVENOUS at 02:31

## 2025-05-27 RX ADMIN — ALLOPURINOL 300 MG: 300 TABLET ORAL at 05:52

## 2025-05-27 RX ADMIN — TAMSULOSIN HYDROCHLORIDE 0.4 MG: 0.4 CAPSULE ORAL at 08:38

## 2025-05-27 RX ADMIN — DEXTROSE MONOHYDRATE 25 G: 25 INJECTION, SOLUTION INTRAVENOUS at 19:24

## 2025-05-27 RX ADMIN — HEPARIN SODIUM 5000 UNITS: 5000 INJECTION, SOLUTION INTRAVENOUS; SUBCUTANEOUS at 21:19

## 2025-05-27 RX ADMIN — OMEPRAZOLE 20 MG: 20 CAPSULE, DELAYED RELEASE ORAL at 05:51

## 2025-05-27 RX ADMIN — INSULIN HUMAN 5 UNITS: 100 INJECTION, SOLUTION PARENTERAL at 02:31

## 2025-05-27 RX ADMIN — OXYCODONE 5 MG: 5 TABLET ORAL at 01:39

## 2025-05-27 RX ADMIN — CALCIUM GLUCONATE 2 G: 20 INJECTION, SOLUTION INTRAVENOUS at 03:01

## 2025-05-27 RX ADMIN — OXYCODONE 5 MG: 5 TABLET ORAL at 17:22

## 2025-05-27 RX ADMIN — INSULIN HUMAN 5 UNITS: 100 INJECTION, SOLUTION PARENTERAL at 19:23

## 2025-05-27 RX ADMIN — OMEPRAZOLE 20 MG: 20 CAPSULE, DELAYED RELEASE ORAL at 17:22

## 2025-05-27 ASSESSMENT — ENCOUNTER SYMPTOMS
SHORTNESS OF BREATH: 0
ABDOMINAL PAIN: 1
CONSTIPATION: 0

## 2025-05-27 ASSESSMENT — FIBROSIS 4 INDEX: FIB4 SCORE: 1.96

## 2025-05-27 ASSESSMENT — PAIN DESCRIPTION - PAIN TYPE
TYPE: ACUTE PAIN

## 2025-05-27 NOTE — PROGRESS NOTES
Monitor Summary  Rhythm: SR/ST  Rate: 82-90  Ectopy: occasional PVC  Measurements:.24 /.10/.41

## 2025-05-27 NOTE — DISCHARGE PLANNING
CHW attempted to introduced community care management to the patient.   Pt was asleep.   CHW will attempt again.

## 2025-05-27 NOTE — PROGRESS NOTES
[facility] Medicine Daily Progress Note    Date of Service  5/27/2025    Chief Complaint  Santino Zabala is a 59 y.o. male admitted 5/22/2025 with acute hypoxic respiratory failure secondary to acute decompensated heart failure    Hospital Course  The patient is 59-year-old male with a past medical history significant for HFrEF (EF 20%), alcoholic cirrhosis, and methamphetamine use disorder.    The patient was hospitalized earlier this same month for acute decompensated heart failure for which she was discontinued from Aldactone, Entresto, and losartan for concern of hyperkalemia.    The patient was initially evaluated emergency department for chest pain, shortness of breath, bilateral lower extremity swelling for approximately 2 days.  Unfortunate reported that he did not think the Lasix was working very well as he became fluid overloaded again.  His BNP was 22,000 in the emergency department..  Fluid overloaded on clinical examination. The patient was initiated on IV diuresis.  Additionally, he was provided spironolactone, metoprolol, and Farxiga for his heart failure.  His IV diuresis was inevitably titrated down to oral dosing prior to discharge.  Unfortunately, the patient developed an SUZY which resulted in the holding of his GDMT.  Primary service will resume diuretic as clinically able.    The patient was found to have an elevated troponin while hospitalized.  He has had some increased troponin levels in the past, however these values are still higher than those baseline elevations.  As such, the patient underwent a limited echocardiogram to assess for wall motion abnormality this result is still pending as of 5/26.  The patient has no chest pain.    The patient was found to have elevated LFTs with a normal bilirubin on laboratory imaging.  The values during this hospitalization are substantially improved compared to previous hospitalizations.  The patient reported ongoing abdominal pain which she  attributed to constipation.  However, he did have a positive Pires sign.  The patient underwent an right upper quadrant abdominal ultrasound and was found to have mild gallbladder wall thickening without visualized shadowing stones concerning for acalculous cholecystitis and hepatomegaly. He underwent a HIDA scan which was negative.  He had a large bowel movement on 5/26 making constipation less likely the etiology.  He was provided PPI with a GI cocktail with no improvement in his symptoms.  CT scan done for the same concern on 5/9 that was negative with the exception of hepatomegaly.  Repeat CT scan ordered and pending.    Interval Problem Update  Patient seen and evaluated at bedside  Pertinent labs and imaging reviewed  Potassium 3.5, 4.5 4.9  Glucose 143, 123  Creatinine 1.63, 1.38, 1.49, 1.94  GDMT held  AST 62, 53, 52 (previous AST >7000 in 12/24)  ALT 35, 31, 28 (previous ALT >5000 in 12/24)  H/o hepatocongestion during previous hospitalizations  HIDA scan negative  Lipase ordered and pending  Afebrile, hemodynamically stable, no oxygen requirements  Voiding, stooling, tolerating oral intake well  Previous bowel movement 5/26  Patient with ongoing abdominal pain that did not have any improvement with PPI and GI cocktail plan CT abdomen pelvis with contrast ordered and pending    5/27: patient seen and examined, had hypokalemia ealry this AM and received hyperkalemia protocol  Now 5.4 close monitor his potasium and also monitor on telemetry for arhythmia  Close monitor for decompensation   I have discussed this patient's plan of care and discharge plan at IDT rounds today with Case Management, Nursing, Nursing leadership, and other members of the IDT team.    Consultants/Specialty  None    Code Status  Full Code    Disposition  The patient is not medically cleared for discharge to home or a post-acute facility.      I have placed the appropriate orders for post-discharge needs.    Review of Systems  Review  of Systems   Respiratory:  Negative for shortness of breath.    Cardiovascular:  Negative for leg swelling.   Gastrointestinal:  Positive for abdominal pain. Negative for constipation.        Physical Exam  Temp:  [36.1 °C (97 °F)-36.4 °C (97.5 °F)] 36.3 °C (97.3 °F)  Pulse:  [82-93] 93  Resp:  [18-22] 18  BP: ()/(57-79) 125/79  SpO2:  [92 %-100 %] 92 %    Physical Exam  Constitutional:       General: He is not in acute distress.     Appearance: Normal appearance. He is normal weight.   HENT:      Head: Normocephalic and atraumatic.      Nose: Nose normal.   Eyes:      Extraocular Movements: Extraocular movements intact.   Pulmonary:      Effort: Pulmonary effort is normal. No respiratory distress.      Breath sounds: Examination of the right-lower field reveals decreased breath sounds. Examination of the left-lower field reveals decreased breath sounds. Decreased breath sounds present.   Abdominal:      General: There is distension.      Palpations: Abdomen is soft.      Tenderness: There is generalized abdominal tenderness. Positive signs include Pires's sign.   Musculoskeletal:      Cervical back: Normal range of motion.      Right lower leg: No edema.      Left lower leg: No edema.   Skin:     General: Skin is warm and dry.   Neurological:      General: No focal deficit present.      Mental Status: He is alert and oriented to person, place, and time.   Psychiatric:         Mood and Affect: Mood normal.         Behavior: Behavior normal.         Thought Content: Thought content normal.         Fluids    Intake/Output Summary (Last 24 hours) at 5/27/2025 1532  Last data filed at 5/27/2025 1300  Gross per 24 hour   Intake 720 ml   Output 1750 ml   Net -1030 ml        Laboratory  Recent Labs     05/25/25  0001 05/26/25  0429 05/27/25  0026   WBC 7.1 6.6 9.2   RBC 5.10 4.91 5.06   HEMOGLOBIN 16.9 16.0 16.8   HEMATOCRIT 49.9 48.5 50.5   MCV 97.8 98.8* 99.8*   MCH 33.1* 32.6 33.2*   MCHC 33.9 33.0 33.3   RDW  52.5* 52.2* 53.9*   PLATELETCT 275 304 259   MPV 10.0 10.4 10.6     Recent Labs     05/26/25  0429 05/27/25  0125 05/27/25  0435   SODIUM 130* 124* 126*   POTASSIUM 4.9 6.9* 5.4   CHLORIDE 96 89* 93*   CO2 23 21 19*   GLUCOSE 118* 168* 149*   BUN 36* 49* 46*   CREATININE 1.94* 2.04* 1.85*   CALCIUM 9.3 9.2 9.8                     Imaging  CT-ABDOMEN-PELVIS WITH   Final Result         1.  Gallbladder wall thickening with slight adjacent hazy fat stranding, appearance suggesting changes of cholecystitis. Findings corresponding with right upper quadrant sonogram   2.  Hepatomegaly.   3.  Irregular hepatic contour favoring changes of cirrhosis.   4.  Scattered minimal abdominal ascites.   5.  Cardiomegaly.   6.  Atherosclerosis and atherosclerotic coronary artery disease.            EC-ECHOCARDIOGRAM LTD W/ CONT   Final Result      NM-BILIARY (HIDA) SCAN WITH CCK   Final Result         1. Normal hepatobiliary scan. No evidence of acute cholecystitis.      2. Normal gallbladder ejection fraction.         US-RUQ   Final Result         1.  Mild gallbladder wall thickening without visualized shadowing stones, consider acalculous cholecystitis. Could be further evaluated with HIDA scan as clinically appropriate.   2.  Hepatomegaly      DX-CHEST-PORTABLE (1 VIEW)   Final Result         1.  No acute cardiopulmonary disease.   2.  Cardiomegaly           Assessment/Plan  * Acute on chronic systolic heart failure (HCC)- (present on admission)  Assessment & Plan  Symptoms to guide titration of loop diuretic dosing: Hold for SUZY  Monitor urine output  Daily weight  Initiate and optimize GDMT  ARNI: Hold for SUZY  Evidence-based beta-blocker: Metoprolol  Mineralocorticoid receptor antagonist: Hold for SUZY  SGLT2 inhibitor: Hold for SUZY  Monitor blood pressure and renal function and electrolytes while undergoing aggressive diuresis and optimizing GDMT   Echocardiogram ordered and pending    Elevated troponin  Assessment &  Plan  Present  No chest pain  Limited echocardiogram ordered to assess wall motion abnormality    Generalized abdominal pain  Assessment & Plan  Patient reported generalized abdominal pain and distention which he attributed to constipation  The patient did have some increased abdominal pain in the right upper quadrant compared to other areas of his abdomen  HIDA scan negative  No improvement in symptoms with PPI/GI cocktail  Bowel movement 5/26  CT abdomen pelvis pending    Pulmonary hypertension (HCC)- (present on admission)  Assessment & Plan  Monitor volume status    Metabolic acidosis, increased anion gap- (present on admission)  Assessment & Plan  Continue to trend    Acute renal failure superimposed on stage 3 chronic kidney disease (HCC)- (present on admission)  Assessment & Plan  Start IV Lasix  Monitor BMP and assess response  Avoid IV contrast/nephrotoxins/NSAIDs  Dose adjust meds for decreased GFR      Methamphetamine abuse (HCC)- (present on admission)  Assessment & Plan  Longstanding history of abuse  Recommend cessation    Alcoholic cirrhosis of liver with ascites (HCC)- (present on admission)  Assessment & Plan  Decompensated  Monitor volume status  Ammonia WNL  INR 1.51         VTE prophylaxis: heparin ppx    Greater than 51 minutes spent prepping to see patient (e.g. review of tests) obtaining and/or reviewing separately obtained history. Performing a medically appropriate examination and/ evaluation.  Counseling and educating the patient/family/caregiver.  Ordering medications, tests, or procedures.  Referring and communicating with other health care professionals.  Documenting clinical information in EPIC.  Independently interpreting results and communicating results to patient/family/caregiver.  Care coordination.      I have performed a physical exam and reviewed and updated ROS and Plan today (5/27/2025). In review of yesterday's note (5/26/2025), there are no changes except as documented  above.

## 2025-05-27 NOTE — PROGRESS NOTES
Notified by lab of a critical K of 6.9. Notified on call provider of result, provider expressed understanding. STAT EKG and hyperkalemic protocol initiated

## 2025-05-27 NOTE — PROGRESS NOTES
Pt back from CT, tele box on, monitor room notified. Pt cell phone back at bedside, pt sitting in chair all fall precautions in place.

## 2025-05-27 NOTE — CARE PLAN
"The patient is Watcher - Medium risk of patient condition declining or worsening    Shift Goals  Clinical Goals: diuresis, hemodynamic stability  Patient Goals: rest, \"feel better\", pain control  Family Goals: louis    Progress made toward(s) clinical / shift goals:    Problem: Knowledge Deficit - Standard  Goal: Patient and family/care givers will demonstrate understanding of plan of care, disease process/condition, diagnostic tests and medications  Outcome: Progressing     Problem: Fall Risk  Goal: Patient will remain free from falls  Outcome: Progressing       Patient is not progressing towards the following goals:      Problem: Care Map:  Day 3 Optimal Outcome for the Heart Failure Patient  Goal: Day 3:  Optimal Care of the heart failure patient  Outcome: Not Progressing     Problem: Pain - Standard  Goal: Alleviation of pain or a reduction in pain to the patient’s comfort goal  Outcome: Not Progressing     "

## 2025-05-27 NOTE — DISCHARGE PLANNING
Care Transition Team Assessment    Patient, Santino Zabala, is a 59-year-old admitted for chest pain. Patient is alert and oriented x4; information was given by the patient. Please see pt's H&P for prior medical history. Patient reports living with his mother in a H with two steps to enter; [address] [place] [address]. Pt confirmed contacts on facesheet, brother Tyree [phone] (lives locally). Patient does have a PCP, Justice Wing. Patient reports good support from family and friends. Hospitalization admission is an unplanned readmit. Patient does have transportation once medically cleared for discharge, brother can assist with transport. Patient reports some food insecurity. Confirmed medical insurance is [facility]. Preferred pharmacy is [facility] at [address] [place] [address]. Patient is independent. Patient utilizes a cane. Patient denies history and current concerns with substance abuse and mental health.     Identified DC needs at this time:    None    Information Source  Orientation Level: Oriented X4  Information Given By: Patient  Who is responsible for making decisions for patient? : Patient    Readmission Evaluation  Is this a readmission?: Yes - unplanned readmission  Why do you think you were readmitted?: Chest pain    Elopement Risk  Legal Hold: No  Ambulatory or Self Mobile in Wheelchair: Yes  Disoriented: No  Psychiatric Symptoms: None  History of Wandering: No  Elopement this Admit: No  Vocalizing Wanting to Leave: No  Displays Behaviors, Body Language Wanting to Leave: No-Not at Risk for Elopement  Elopement Risk: Not at Risk for Elopement    Interdisciplinary Discharge Planning  Primary Care Physician: [facility]  Lives with - Patient's Self Care Capacity: Parents (pt is caregiver to his mother)  Patient or legal guardian wants to designate a caregiver: No  Support Systems: Family Member(s), Friends / Neighbors  Housing / Facility: 1 Story House    Discharge  Preparedness  What is your plan after discharge?: Home with help  Prior Functional Level: Ambulatory, Independent with Activities of Daily Living, Independent with Medication Management, Uses Cane    Functional Assesment  Prior Functional Level: Ambulatory, Independent with Activities of Daily Living, Independent with Medication Management, Uses Cane    Vision / Hearing Impairment  Vision Impairment : No  Hearing Impairment : No    Domestic Abuse  Have you ever been the victim of abuse or violence?: No  Possible Abuse/Neglect Reported to:: Not Applicable    Psychological Assessment  History of Substance Abuse: None  History of Psychiatric Problems: No    Anticipated Discharge Information  Discharge Disposition: Discharged to home/self care (01)

## 2025-05-27 NOTE — CARE PLAN
"The patient is Stable - Low risk of patient condition declining or worsening    Shift Goals  Clinical Goals: monitor labs  Patient Goals: rest, \"feel better\", pain control  Family Goals: louis    Progress made toward(s) clinical / shift goals:  Pain much improved from yesterday 5/26. SBA to toilet, steady gait.     Patient is not progressing towards the following goals:  "

## 2025-05-27 NOTE — PROGRESS NOTES
Pt off the floor to CT. Tele box off, monitor room notified. Pt adamant on brining personal cellphone down with him, RN offered to put it in safe place, pt declined.

## 2025-05-27 NOTE — PROGRESS NOTES
NOC APRN CROSS COVER NOTE      Notified by nursing of potassium of 6.9 and sodium of 124, these were recollected to ensure accuracy.     Orders placed for STAT EKG and hyperkalemia protocol with insulin and dextrose as well as sodium bicarb.     Recheck BMP at 0400.    Savannah Gardner ACNPC-AG, NOC Hospitalist CINDY

## 2025-05-27 NOTE — PROGRESS NOTES
Telemetry Shift Summary    Rhythm SR-ST  HR Range   Ectopy occasional PVCs  Measurements 0.24/0.10/0.40      Normal Values  Rhythm SR  HR Range    Measurements 0.12-0.20 / 0.06-0.10  / 0.30-0.52    1st degree AV block

## 2025-05-28 LAB
ANION GAP SERPL CALC-SCNC: 12 MMOL/L (ref 7–16)
BUN SERPL-MCNC: 38 MG/DL (ref 8–22)
CALCIUM SERPL-MCNC: 9.1 MG/DL (ref 8.5–10.5)
CHLORIDE SERPL-SCNC: 92 MMOL/L (ref 96–112)
CO2 SERPL-SCNC: 22 MMOL/L (ref 20–33)
CREAT SERPL-MCNC: 1.29 MG/DL (ref 0.5–1.4)
ERYTHROCYTE [DISTWIDTH] IN BLOOD BY AUTOMATED COUNT: 51.7 FL (ref 35.9–50)
GFR SERPLBLD CREATININE-BSD FMLA CKD-EPI: 63 ML/MIN/1.73 M 2
GLUCOSE SERPL-MCNC: 111 MG/DL (ref 65–99)
HCT VFR BLD AUTO: 42.6 % (ref 42–52)
HGB BLD-MCNC: 14.3 G/DL (ref 14–18)
MCH RBC QN AUTO: 32.6 PG (ref 27–33)
MCHC RBC AUTO-ENTMCNC: 33.6 G/DL (ref 32.3–36.5)
MCV RBC AUTO: 97.3 FL (ref 81.4–97.8)
PLATELET # BLD AUTO: 249 K/UL (ref 164–446)
PMV BLD AUTO: 10.5 FL (ref 9–12.9)
POTASSIUM SERPL-SCNC: 5.1 MMOL/L (ref 3.6–5.5)
RBC # BLD AUTO: 4.38 M/UL (ref 4.7–6.1)
SODIUM SERPL-SCNC: 125 MMOL/L (ref 135–145)
SODIUM SERPL-SCNC: 126 MMOL/L (ref 135–145)
WBC # BLD AUTO: 6.7 K/UL (ref 4.8–10.8)

## 2025-05-28 PROCEDURE — A9270 NON-COVERED ITEM OR SERVICE: HCPCS | Performed by: STUDENT IN AN ORGANIZED HEALTH CARE EDUCATION/TRAINING PROGRAM

## 2025-05-28 PROCEDURE — 36415 COLL VENOUS BLD VENIPUNCTURE: CPT

## 2025-05-28 PROCEDURE — 700102 HCHG RX REV CODE 250 W/ 637 OVERRIDE(OP): Performed by: INTERNAL MEDICINE

## 2025-05-28 PROCEDURE — 700111 HCHG RX REV CODE 636 W/ 250 OVERRIDE (IP): Mod: JZ | Performed by: STUDENT IN AN ORGANIZED HEALTH CARE EDUCATION/TRAINING PROGRAM

## 2025-05-28 PROCEDURE — 84295 ASSAY OF SERUM SODIUM: CPT

## 2025-05-28 PROCEDURE — 99233 SBSQ HOSP IP/OBS HIGH 50: CPT | Performed by: INTERNAL MEDICINE

## 2025-05-28 PROCEDURE — 85027 COMPLETE CBC AUTOMATED: CPT

## 2025-05-28 PROCEDURE — A9270 NON-COVERED ITEM OR SERVICE: HCPCS | Performed by: INTERNAL MEDICINE

## 2025-05-28 PROCEDURE — 700102 HCHG RX REV CODE 250 W/ 637 OVERRIDE(OP): Performed by: STUDENT IN AN ORGANIZED HEALTH CARE EDUCATION/TRAINING PROGRAM

## 2025-05-28 PROCEDURE — 80048 BASIC METABOLIC PNL TOTAL CA: CPT

## 2025-05-28 PROCEDURE — A9270 NON-COVERED ITEM OR SERVICE: HCPCS | Performed by: HOSPITALIST

## 2025-05-28 PROCEDURE — 770020 HCHG ROOM/CARE - TELE (206)

## 2025-05-28 PROCEDURE — 700102 HCHG RX REV CODE 250 W/ 637 OVERRIDE(OP): Performed by: HOSPITALIST

## 2025-05-28 RX ORDER — SODIUM CHLORIDE 1 G/1
1 TABLET ORAL
Status: DISCONTINUED | OUTPATIENT
Start: 2025-05-28 | End: 2025-05-29

## 2025-05-28 RX ADMIN — BISMUTH SUBSALICYLATE 524 MG: 525 LIQUID ORAL at 17:39

## 2025-05-28 RX ADMIN — ALLOPURINOL 300 MG: 300 TABLET ORAL at 05:10

## 2025-05-28 RX ADMIN — OXYCODONE 5 MG: 5 TABLET ORAL at 11:23

## 2025-05-28 RX ADMIN — OMEPRAZOLE 20 MG: 20 CAPSULE, DELAYED RELEASE ORAL at 05:10

## 2025-05-28 RX ADMIN — OMEPRAZOLE 20 MG: 20 CAPSULE, DELAYED RELEASE ORAL at 17:39

## 2025-05-28 RX ADMIN — HEPARIN SODIUM 5000 UNITS: 5000 INJECTION, SOLUTION INTRAVENOUS; SUBCUTANEOUS at 05:10

## 2025-05-28 RX ADMIN — METOPROLOL SUCCINATE 50 MG: 50 TABLET, EXTENDED RELEASE ORAL at 05:11

## 2025-05-28 RX ADMIN — ONDANSETRON 4 MG: 2 INJECTION INTRAMUSCULAR; INTRAVENOUS at 11:34

## 2025-05-28 RX ADMIN — METHOCARBAMOL 500 MG: 500 TABLET ORAL at 09:43

## 2025-05-28 RX ADMIN — SODIUM ZIRCONIUM CYCLOSILICATE 10 G: 10 POWDER, FOR SUSPENSION ORAL at 11:24

## 2025-05-28 RX ADMIN — SODIUM CHLORIDE 1 G: 1 TABLET ORAL at 11:24

## 2025-05-28 RX ADMIN — OXYCODONE 5 MG: 5 TABLET ORAL at 06:36

## 2025-05-28 RX ADMIN — TAMSULOSIN HYDROCHLORIDE 0.4 MG: 0.4 CAPSULE ORAL at 08:35

## 2025-05-28 ASSESSMENT — PAIN DESCRIPTION - PAIN TYPE
TYPE: ACUTE PAIN

## 2025-05-28 ASSESSMENT — ENCOUNTER SYMPTOMS
CONSTIPATION: 0
SHORTNESS OF BREATH: 0
ABDOMINAL PAIN: 1

## 2025-05-28 ASSESSMENT — FIBROSIS 4 INDEX: FIB4 SCORE: 2.04

## 2025-05-28 NOTE — PROGRESS NOTES
"I entered the room at 1740, pt's nephew Anirudh and grandmother were at bedside. Anirudh was holding a bottle of beer that was half empty. He was also swaying side to side and slurring his speech. I introduced myself as the bedside RN and stated \"You can't drink beer in the hospital, you have to leave.\"   Anirudh stated \"I don't want you in here, bring some girls in here.\"  I repeated myself that Anirudh had to leave or I would call security.   Anirudh emptied the bottle in the sink and placed it in the trash.   Grandmother asked for updates on patient condition, I stated that the patient would have lab work tonight and tomorrow and possibly discharge in the morning.   Nephmery Oleary said \"Grandma let's go I'll drive you home.\"  Nephew and grandmother walked off unit to [facility] tower elevators.    Security called, descriptions of both nephew and grandmother given to dispatch.     Dr. Lucas notified.  "

## 2025-05-28 NOTE — PROGRESS NOTES
Monitor Summary  Rhythm: ST  Rate: 100-108  Ectopy: (f) PVC  Measurements: .21/.10/.34  ---12 hr Chart Review---

## 2025-05-28 NOTE — PROGRESS NOTES
Received bedside report from off-going RN and assumed patient care. Patient sitting up in bed with no signs of acute distress: A&Ox 4, RA, denies pain, telemetry box in place. Discussed POC: labs, medications, and treatments with patient who demonstrates understanding and all questions answered. Bed locked/lowest position with call bell and possessions within reach. Patient denies additional needs at this time.

## 2025-05-28 NOTE — PROGRESS NOTES
[facility] Medicine Daily Progress Note    Date of Service  5/28/2025    Chief Complaint  Santino Zabala is a 59 y.o. male admitted 5/22/2025 with acute hypoxic respiratory failure secondary to acute decompensated heart failure    Hospital Course  The patient is 59-year-old male with a past medical history significant for HFrEF (EF 20%), alcoholic cirrhosis, and methamphetamine use disorder.    The patient was hospitalized earlier this same month for acute decompensated heart failure for which she was discontinued from Aldactone, Entresto, and losartan for concern of hyperkalemia.    The patient was initially evaluated emergency department for chest pain, shortness of breath, bilateral lower extremity swelling for approximately 2 days.  Unfortunate reported that he did not think the Lasix was working very well as he became fluid overloaded again.  His BNP was 22,000 in the emergency department..  Fluid overloaded on clinical examination. The patient was initiated on IV diuresis.  Additionally, he was provided spironolactone, metoprolol, and Farxiga for his heart failure.  His IV diuresis was inevitably titrated down to oral dosing prior to discharge.  Unfortunately, the patient developed an SUZY which resulted in the holding of his GDMT.  Primary service will resume diuretic as clinically able.    The patient was found to have an elevated troponin while hospitalized.  He has had some increased troponin levels in the past, however these values are still higher than those baseline elevations.  As such, the patient underwent a limited echocardiogram to assess for wall motion abnormality this result is still pending as of 5/26.  The patient has no chest pain.    The patient was found to have elevated LFTs with a normal bilirubin on laboratory imaging.  The values during this hospitalization are substantially improved compared to previous hospitalizations.  The patient reported ongoing abdominal pain which she  attributed to constipation.  However, he did have a positive Pires sign.  The patient underwent an right upper quadrant abdominal ultrasound and was found to have mild gallbladder wall thickening without visualized shadowing stones concerning for acalculous cholecystitis and hepatomegaly. He underwent a HIDA scan which was negative.  He had a large bowel movement on 5/26 making constipation less likely the etiology.  He was provided PPI with a GI cocktail with no improvement in his symptoms.  CT scan done for the same concern on 5/9 that was negative with the exception of hepatomegaly.  Repeat CT scan ordered and pending.    Interval Problem Update  Patient seen and evaluated at bedside  Pertinent labs and imaging reviewed  Potassium 3.5, 4.5 4.9  Glucose 143, 123  Creatinine 1.63, 1.38, 1.49, 1.94  GDMT held  AST 62, 53, 52 (previous AST >7000 in 12/24)  ALT 35, 31, 28 (previous ALT >5000 in 12/24)  H/o hepatocongestion during previous hospitalizations  HIDA scan negative  Lipase ordered and pending  Afebrile, hemodynamically stable, no oxygen requirements  Voiding, stooling, tolerating oral intake well  Previous bowel movement 5/26  Patient with ongoing abdominal pain that did not have any improvement with PPI and GI cocktail plan CT abdomen pelvis with contrast ordered and pending  5/27: patient seen and examined, had hypokalemia ealry this AM and received hyperkalemia protocol  Now 5.4 close monitor his potasium and also monitor on telemetry for arhythmia  Close monitor for decompensation   5/28: Patient seen and examined afebrile, no nausea or vomiting.in regards to his hyperkalemia is improving  In regards to hyponatremia: sodium 126 today  will start on salt tabs   Close monitor his sodium   I have discussed this patient's plan of care and discharge plan at IDT rounds today with Case Management, Nursing, Nursing leadership, and other members of the IDT team.    Consultants/Specialty  None    Code  Status  Full Code    Disposition  The patient is not medically cleared for discharge to home or a post-acute facility.      I have placed the appropriate orders for post-discharge needs.    Review of Systems  Review of Systems   Respiratory:  Negative for shortness of breath.    Cardiovascular:  Negative for leg swelling.   Gastrointestinal:  Positive for abdominal pain. Negative for constipation.        Physical Exam  Temp:  [36.1 °C (97 °F)-36.8 °C (98.2 °F)] 36.2 °C (97.2 °F)  Pulse:  [] 82  Resp:  [18] 18  BP: (105-128)/(67-90) 112/67  SpO2:  [92 %-95 %] 92 %    Physical Exam  Constitutional:       General: He is not in acute distress.     Appearance: Normal appearance. He is normal weight.   HENT:      Head: Normocephalic and atraumatic.      Nose: Nose normal.   Eyes:      Extraocular Movements: Extraocular movements intact.   Pulmonary:      Effort: Pulmonary effort is normal. No respiratory distress.      Breath sounds: Examination of the right-lower field reveals decreased breath sounds. Examination of the left-lower field reveals decreased breath sounds. Decreased breath sounds present.   Abdominal:      General: There is distension.      Palpations: Abdomen is soft.      Tenderness: There is generalized abdominal tenderness. Positive signs include Pires's sign.   Musculoskeletal:      Cervical back: Normal range of motion.      Right lower leg: No edema.      Left lower leg: No edema.   Skin:     General: Skin is warm and dry.   Neurological:      General: No focal deficit present.      Mental Status: He is alert and oriented to person, place, and time.   Psychiatric:         Mood and Affect: Mood normal.         Behavior: Behavior normal.         Thought Content: Thought content normal.         Fluids    Intake/Output Summary (Last 24 hours) at 5/28/2025 1350  Last data filed at 5/28/2025 1000  Gross per 24 hour   Intake 780 ml   Output 800 ml   Net -20 ml        Laboratory  Recent Labs      05/26/25  0429 05/27/25  0026 05/28/25  0442   WBC 6.6 9.2 6.7   RBC 4.91 5.06 4.38*   HEMOGLOBIN 16.0 16.8 14.3   HEMATOCRIT 48.5 50.5 42.6   MCV 98.8* 99.8* 97.3   MCH 32.6 33.2* 32.6   MCHC 33.0 33.3 33.6   RDW 52.2* 53.9* 51.7*   PLATELETCT 304 259 249   MPV 10.4 10.6 10.5     Recent Labs     05/27/25  0125 05/27/25  0435 05/27/25  1800 05/28/25  0442   SODIUM 124* 126*  --  126*   POTASSIUM 6.9* 5.4 5.6* 5.1   CHLORIDE 89* 93*  --  92*   CO2 21 19*  --  22   GLUCOSE 168* 149*  --  111*   BUN 49* 46*  --  38*   CREATININE 2.04* 1.85*  --  1.29   CALCIUM 9.2 9.8  --  9.1                     Imaging  CT-ABDOMEN-PELVIS WITH   Final Result         1.  Gallbladder wall thickening with slight adjacent hazy fat stranding, appearance suggesting changes of cholecystitis. Findings corresponding with right upper quadrant sonogram   2.  Hepatomegaly.   3.  Irregular hepatic contour favoring changes of cirrhosis.   4.  Scattered minimal abdominal ascites.   5.  Cardiomegaly.   6.  Atherosclerosis and atherosclerotic coronary artery disease.            EC-ECHOCARDIOGRAM LTD W/ CONT   Final Result      NM-BILIARY (HIDA) SCAN WITH CCK   Final Result         1. Normal hepatobiliary scan. No evidence of acute cholecystitis.      2. Normal gallbladder ejection fraction.         US-RUQ   Final Result         1.  Mild gallbladder wall thickening without visualized shadowing stones, consider acalculous cholecystitis. Could be further evaluated with HIDA scan as clinically appropriate.   2.  Hepatomegaly      DX-CHEST-PORTABLE (1 VIEW)   Final Result         1.  No acute cardiopulmonary disease.   2.  Cardiomegaly           Assessment/Plan  * Acute on chronic systolic heart failure (HCC)- (present on admission)  Assessment & Plan  Symptoms to guide titration of loop diuretic dosing: Hold for SUZY  Monitor urine output  Daily weight  Initiate and optimize GDMT  ARNI: Hold for SUZY  Evidence-based beta-blocker:  Metoprolol  Mineralocorticoid receptor antagonist: Hold for SUZY  SGLT2 inhibitor: Hold for SUZY  Monitor blood pressure and renal function and electrolytes while undergoing aggressive diuresis and optimizing GDMT   Echocardiogram ordered and pending    Elevated troponin  Assessment & Plan  Present  No chest pain  Limited echocardiogram ordered to assess wall motion abnormality    Generalized abdominal pain  Assessment & Plan  Patient reported generalized abdominal pain and distention which he attributed to constipation  The patient did have some increased abdominal pain in the right upper quadrant compared to other areas of his abdomen  HIDA scan negative  No improvement in symptoms with PPI/GI cocktail  Bowel movement 5/26  CT abdomen pelvis pending    Pulmonary hypertension (HCC)- (present on admission)  Assessment & Plan  Monitor volume status    Metabolic acidosis, increased anion gap- (present on admission)  Assessment & Plan  Continue to trend    Acute renal failure superimposed on stage 3 chronic kidney disease (HCC)- (present on admission)  Assessment & Plan  Start IV Lasix  Monitor BMP and assess response  Avoid IV contrast/nephrotoxins/NSAIDs  Dose adjust meds for decreased GFR      Methamphetamine abuse (HCC)- (present on admission)  Assessment & Plan  Longstanding history of abuse  Recommend cessation    Alcoholic cirrhosis of liver with ascites (HCC)- (present on admission)  Assessment & Plan  Decompensated  Monitor volume status  Ammonia WNL  INR 1.51       Greater than 51 minutes spent prepping to see patient (e.g. review of tests) obtaining and/or reviewing separately obtained history. Performing a medically appropriate examination and/ evaluation.  Counseling and educating the patient/family/caregiver.  Ordering medications, tests, or procedures.  Referring and communicating with other health care professionals.  Documenting clinical information in EPIC.  Independently interpreting results and  communicating results to patient/family/caregiver.  Care coordination.      VTE prophylaxis: heparin ppx    I have performed a physical exam and reviewed and updated ROS and Plan today (5/28/2025). In review of yesterday's note (5/27/2025), there are no changes except as documented above.

## 2025-05-28 NOTE — CARE PLAN
The patient is Stable - Low risk of patient condition declining or worsening    Shift Goals  Clinical Goals: K+, VSS, safety/comfort  Patient Goals: sleep  Family Goals: YOEL - no family present    Progress made toward(s) clinical / shift goals:      Problem: Knowledge Deficit - Standard  Goal: Patient and family/care givers will demonstrate understanding of plan of care, disease process/condition, diagnostic tests and medications  Description: Target End Date:  1-3 days or as soon as patient condition allowsDocument in Patient Education1.  Patient and family/caregiver oriented to unit, equipment, visitation policy and means for communicating concern2.  Complete/review Learning Assessment3.  Assess knowledge level of disease process/condition, treatment plan, diagnostic tests and medications4.  Explain disease process/condition, treatment plan, diagnostic tests and medications  Outcome: Progressing     Problem: Fall Risk  Goal: Patient will remain free from falls  Description: Target End Date:  Prior to discharge or change in level of careDocument interventions on the [place] [place] Fall Risk Assessment1.  Assess for fall risk factors2.  Implement fall precautions  Outcome: Progressing     Problem: Care Map:  Day 5 Optimal Outcome for the Heart Failure Patient  Goal: Day 5:  Optimal Care of the heart failure patient  Description: Target End Date:  end of day 5  Outcome: Progressing       Patient is not progressing towards the following goals:

## 2025-05-28 NOTE — CARE PLAN
The patient is Stable - Low risk of patient condition declining or worsening    Shift Goals  Clinical Goals: monitor labs, VSS  Patient Goals: sleep  Family Goals: YOEL - no family present    Progress made toward(s) clinical / shift goals:    Problem: Knowledge Deficit - Standard  Goal: Patient and family/care givers will demonstrate understanding of plan of care, disease process/condition, diagnostic tests and medications  Outcome: Progressing     Problem: Fall Risk  Goal: Patient will remain free from falls  Outcome: Progressing     Problem: Care Map:  Day Before Discharge Optimal Outcome for the Heart Failure Patient  Goal: Day Before Discharge:  Optimal Care of the heart failure patient  Description: Target End Date:  Prior to discharge or change in level of care  Outcome: Progressing     Problem: Pain - Standard  Goal: Alleviation of pain or a reduction in pain to the patient’s comfort goal  Outcome: Progressing       Patient is not progressing towards the following goals:

## 2025-05-29 LAB
ANION GAP SERPL CALC-SCNC: 13 MMOL/L (ref 7–16)
ANION GAP SERPL CALC-SCNC: 15 MMOL/L (ref 7–16)
ANION GAP SERPL CALC-SCNC: 16 MMOL/L (ref 7–16)
BUN SERPL-MCNC: 47 MG/DL (ref 8–22)
BUN SERPL-MCNC: 48 MG/DL (ref 8–22)
BUN SERPL-MCNC: 52 MG/DL (ref 8–22)
CALCIUM SERPL-MCNC: 9 MG/DL (ref 8.5–10.5)
CALCIUM SERPL-MCNC: 9.4 MG/DL (ref 8.5–10.5)
CALCIUM SERPL-MCNC: 9.7 MG/DL (ref 8.5–10.5)
CHLORIDE SERPL-SCNC: 90 MMOL/L (ref 96–112)
CHLORIDE SERPL-SCNC: 92 MMOL/L (ref 96–112)
CHLORIDE SERPL-SCNC: 95 MMOL/L (ref 96–112)
CO2 SERPL-SCNC: 16 MMOL/L (ref 20–33)
CO2 SERPL-SCNC: 17 MMOL/L (ref 20–33)
CO2 SERPL-SCNC: 19 MMOL/L (ref 20–33)
CREAT SERPL-MCNC: 1.44 MG/DL (ref 0.5–1.4)
CREAT SERPL-MCNC: 1.45 MG/DL (ref 0.5–1.4)
CREAT SERPL-MCNC: 1.47 MG/DL (ref 0.5–1.4)
GFR SERPLBLD CREATININE-BSD FMLA CKD-EPI: 54 ML/MIN/1.73 M 2
GFR SERPLBLD CREATININE-BSD FMLA CKD-EPI: 55 ML/MIN/1.73 M 2
GFR SERPLBLD CREATININE-BSD FMLA CKD-EPI: 55 ML/MIN/1.73 M 2
GLUCOSE BLD STRIP.AUTO-MCNC: 149 MG/DL (ref 65–99)
GLUCOSE BLD STRIP.AUTO-MCNC: 156 MG/DL (ref 65–99)
GLUCOSE BLD STRIP.AUTO-MCNC: 197 MG/DL (ref 65–99)
GLUCOSE SERPL-MCNC: 127 MG/DL (ref 65–99)
GLUCOSE SERPL-MCNC: 129 MG/DL (ref 65–99)
GLUCOSE SERPL-MCNC: 140 MG/DL (ref 65–99)
POTASSIUM SERPL-SCNC: 5.5 MMOL/L (ref 3.6–5.5)
POTASSIUM SERPL-SCNC: 5.6 MMOL/L (ref 3.6–5.5)
POTASSIUM SERPL-SCNC: 6.1 MMOL/L (ref 3.6–5.5)
SODIUM SERPL-SCNC: 122 MMOL/L (ref 135–145)
SODIUM SERPL-SCNC: 124 MMOL/L (ref 135–145)
SODIUM SERPL-SCNC: 127 MMOL/L (ref 135–145)

## 2025-05-29 PROCEDURE — 700101 HCHG RX REV CODE 250: Performed by: INTERNAL MEDICINE

## 2025-05-29 PROCEDURE — 36415 COLL VENOUS BLD VENIPUNCTURE: CPT

## 2025-05-29 PROCEDURE — 700111 HCHG RX REV CODE 636 W/ 250 OVERRIDE (IP): Mod: JZ | Performed by: INTERNAL MEDICINE

## 2025-05-29 PROCEDURE — 82962 GLUCOSE BLOOD TEST: CPT | Mod: 91

## 2025-05-29 PROCEDURE — A9270 NON-COVERED ITEM OR SERVICE: HCPCS | Performed by: INTERNAL MEDICINE

## 2025-05-29 PROCEDURE — 99233 SBSQ HOSP IP/OBS HIGH 50: CPT | Performed by: INTERNAL MEDICINE

## 2025-05-29 PROCEDURE — 700111 HCHG RX REV CODE 636 W/ 250 OVERRIDE (IP): Performed by: STUDENT IN AN ORGANIZED HEALTH CARE EDUCATION/TRAINING PROGRAM

## 2025-05-29 PROCEDURE — A9270 NON-COVERED ITEM OR SERVICE: HCPCS | Performed by: STUDENT IN AN ORGANIZED HEALTH CARE EDUCATION/TRAINING PROGRAM

## 2025-05-29 PROCEDURE — 700102 HCHG RX REV CODE 250 W/ 637 OVERRIDE(OP): Performed by: INTERNAL MEDICINE

## 2025-05-29 PROCEDURE — A9270 NON-COVERED ITEM OR SERVICE: HCPCS | Performed by: HOSPITALIST

## 2025-05-29 PROCEDURE — 80048 BASIC METABOLIC PNL TOTAL CA: CPT | Mod: 91

## 2025-05-29 PROCEDURE — 700105 HCHG RX REV CODE 258: Performed by: INTERNAL MEDICINE

## 2025-05-29 PROCEDURE — 700102 HCHG RX REV CODE 250 W/ 637 OVERRIDE(OP): Performed by: STUDENT IN AN ORGANIZED HEALTH CARE EDUCATION/TRAINING PROGRAM

## 2025-05-29 PROCEDURE — 770020 HCHG ROOM/CARE - TELE (206)

## 2025-05-29 PROCEDURE — 700102 HCHG RX REV CODE 250 W/ 637 OVERRIDE(OP): Performed by: HOSPITALIST

## 2025-05-29 RX ORDER — DEXTROSE MONOHYDRATE 25 G/50ML
25 INJECTION, SOLUTION INTRAVENOUS ONCE
Status: COMPLETED | OUTPATIENT
Start: 2025-05-29 | End: 2025-05-29

## 2025-05-29 RX ORDER — CALCIUM GLUCONATE 20 MG/ML
2 INJECTION, SOLUTION INTRAVENOUS ONCE
Status: COMPLETED | OUTPATIENT
Start: 2025-05-29 | End: 2025-05-29

## 2025-05-29 RX ORDER — SODIUM CHLORIDE 9 MG/ML
INJECTION, SOLUTION INTRAVENOUS CONTINUOUS
Status: DISCONTINUED | OUTPATIENT
Start: 2025-05-29 | End: 2025-05-30

## 2025-05-29 RX ORDER — SODIUM CHLORIDE 1 G/1
2 TABLET ORAL
Status: DISCONTINUED | OUTPATIENT
Start: 2025-05-29 | End: 2025-06-02 | Stop reason: HOSPADM

## 2025-05-29 RX ADMIN — HEPARIN SODIUM 5000 UNITS: 5000 INJECTION, SOLUTION INTRAVENOUS; SUBCUTANEOUS at 06:19

## 2025-05-29 RX ADMIN — SODIUM ZIRCONIUM CYCLOSILICATE 10 G: 10 POWDER, FOR SUSPENSION ORAL at 11:39

## 2025-05-29 RX ADMIN — SODIUM CHLORIDE 980 ML: 9 INJECTION, SOLUTION INTRAVENOUS at 10:00

## 2025-05-29 RX ADMIN — METOPROLOL SUCCINATE 50 MG: 50 TABLET, EXTENDED RELEASE ORAL at 06:19

## 2025-05-29 RX ADMIN — INSULIN HUMAN 5 UNITS: 100 INJECTION, SOLUTION PARENTERAL at 09:50

## 2025-05-29 RX ADMIN — OXYCODONE 5 MG: 5 TABLET ORAL at 19:25

## 2025-05-29 RX ADMIN — BISMUTH SUBSALICYLATE 524 MG: 525 LIQUID ORAL at 06:20

## 2025-05-29 RX ADMIN — TAMSULOSIN HYDROCHLORIDE 0.4 MG: 0.4 CAPSULE ORAL at 09:51

## 2025-05-29 RX ADMIN — DEXTROSE MONOHYDRATE 25 G: 25 INJECTION, SOLUTION INTRAVENOUS at 09:49

## 2025-05-29 RX ADMIN — METHOCARBAMOL 500 MG: 500 TABLET ORAL at 21:16

## 2025-05-29 RX ADMIN — SODIUM CHLORIDE 2 G: 1 TABLET ORAL at 11:35

## 2025-05-29 RX ADMIN — CALCIUM GLUCONATE 2 G: 20 INJECTION, SOLUTION INTRAVENOUS at 10:00

## 2025-05-29 ASSESSMENT — ENCOUNTER SYMPTOMS
SHORTNESS OF BREATH: 0
CONSTIPATION: 0
ABDOMINAL PAIN: 1

## 2025-05-29 ASSESSMENT — PAIN DESCRIPTION - PAIN TYPE: TYPE: ACUTE PAIN

## 2025-05-29 ASSESSMENT — FIBROSIS 4 INDEX: FIB4 SCORE: 2.04

## 2025-05-29 NOTE — PROGRESS NOTES
[facility] Medicine Daily Progress Note    Date of Service  5/29/2025    Chief Complaint  Santino Zabala is a 59 y.o. male admitted 5/22/2025 with acute hypoxic respiratory failure secondary to acute decompensated heart failure    Hospital Course  The patient is 59-year-old male with a past medical history significant for HFrEF (EF 20%), alcoholic cirrhosis, and methamphetamine use disorder.    The patient was hospitalized earlier this same month for acute decompensated heart failure for which she was discontinued from Aldactone, Entresto, and losartan for concern of hyperkalemia.    The patient was initially evaluated emergency department for chest pain, shortness of breath, bilateral lower extremity swelling for approximately 2 days.  Unfortunate reported that he did not think the Lasix was working very well as he became fluid overloaded again.  His BNP was 22,000 in the emergency department..  Fluid overloaded on clinical examination. The patient was initiated on IV diuresis.  Additionally, he was provided spironolactone, metoprolol, and Farxiga for his heart failure.  His IV diuresis was inevitably titrated down to oral dosing prior to discharge.  Unfortunately, the patient developed an SUZY which resulted in the holding of his GDMT.  Primary service will resume diuretic as clinically able.    The patient was found to have an elevated troponin while hospitalized.  He has had some increased troponin levels in the past, however these values are still higher than those baseline elevations.  As such, the patient underwent a limited echocardiogram to assess for wall motion abnormality this result is still pending as of 5/26.  The patient has no chest pain.    The patient was found to have elevated LFTs with a normal bilirubin on laboratory imaging.  The values during this hospitalization are substantially improved compared to previous hospitalizations.  The patient reported ongoing abdominal pain which she  attributed to constipation.  However, he did have a positive Pires sign.  The patient underwent an right upper quadrant abdominal ultrasound and was found to have mild gallbladder wall thickening without visualized shadowing stones concerning for acalculous cholecystitis and hepatomegaly. He underwent a HIDA scan which was negative.  He had a large bowel movement on 5/26 making constipation less likely the etiology.  He was provided PPI with a GI cocktail with no improvement in his symptoms.  CT scan done for the same concern on 5/9 that was negative with the exception of hepatomegaly.  Repeat CT scan ordered and pending.    Interval Problem Update  Patient seen and evaluated at bedside  Pertinent labs and imaging reviewed  Potassium 3.5, 4.5 4.9  Glucose 143, 123  Creatinine 1.63, 1.38, 1.49, 1.94  GDMT held  AST 62, 53, 52 (previous AST >7000 in 12/24)  ALT 35, 31, 28 (previous ALT >5000 in 12/24)  H/o hepatocongestion during previous hospitalizations  HIDA scan negative  Lipase ordered and pending  Afebrile, hemodynamically stable, no oxygen requirements  Voiding, stooling, tolerating oral intake well  Previous bowel movement 5/26  Patient with ongoing abdominal pain that did not have any improvement with PPI and GI cocktail plan CT abdomen pelvis with contrast ordered and pending  5/27: patient seen and examined, had hypokalemia ealry this AM and received hyperkalemia protocol  Now 5.4 close monitor his potasium and also monitor on telemetry for arhythmia  Close monitor for decompensation   5/28: Patient seen and examined afebrile, no nausea or vomiting.in regards to his hyperkalemia is improving  In regards to hyponatremia: sodium 126 today  will start on salt tabs   Close monitor his sodium   5/29: Patient seen and examined afebrile, no nausea or vomiting, his sodium 122 today increasing salt tabs to 2 gram TID also start on nss IV   Also hyperkalemia potassium of 5.6 will give IV insulin and dextrose cont on  lokelma  Close monitor on tele for arhythmia   I have discussed this patient's plan of care and discharge plan at IDT rounds today with Case Management, Nursing, Nursing leadership, and other members of the IDT team.    Consultants/Specialty  None    Code Status  Full Code    Disposition  The patient is not medically cleared for discharge to home or a post-acute facility.      I have placed the appropriate orders for post-discharge needs.    Review of Systems  Review of Systems   Respiratory:  Negative for shortness of breath.    Cardiovascular:  Negative for leg swelling.   Gastrointestinal:  Positive for abdominal pain. Negative for constipation.        Physical Exam  Temp:  [36.4 °C (97.5 °F)-36.5 °C (97.7 °F)] 36.5 °C (97.7 °F)  Pulse:  [88-97] 90  Resp:  [16-18] 16  BP: ()/(69-83) 114/80  SpO2:  [90 %-97 %] 90 %    Physical Exam  Constitutional:       General: He is not in acute distress.     Appearance: Normal appearance. He is normal weight.   HENT:      Head: Normocephalic and atraumatic.      Nose: Nose normal.   Eyes:      Extraocular Movements: Extraocular movements intact.   Pulmonary:      Effort: Pulmonary effort is normal. No respiratory distress.      Breath sounds: Examination of the right-lower field reveals decreased breath sounds. Examination of the left-lower field reveals decreased breath sounds. Decreased breath sounds present.   Abdominal:      General: There is distension.      Palpations: Abdomen is soft.      Tenderness: There is generalized abdominal tenderness. Positive signs include Pires's sign.   Musculoskeletal:      Cervical back: Normal range of motion.      Right lower leg: No edema.      Left lower leg: No edema.   Skin:     General: Skin is warm and dry.   Neurological:      General: No focal deficit present.      Mental Status: He is alert and oriented to person, place, and time.   Psychiatric:         Mood and Affect: Mood normal.         Behavior: Behavior normal.          Thought Content: Thought content normal.         Fluids    Intake/Output Summary (Last 24 hours) at 5/29/2025 1453  Last data filed at 5/29/2025 0542  Gross per 24 hour   Intake --   Output 350 ml   Net -350 ml        Laboratory  Recent Labs     05/27/25  0026 05/28/25  0442   WBC 9.2 6.7   RBC 5.06 4.38*   HEMOGLOBIN 16.8 14.3   HEMATOCRIT 50.5 42.6   MCV 99.8* 97.3   MCH 33.2* 32.6   MCHC 33.3 33.6   RDW 53.9* 51.7*   PLATELETCT 259 249   MPV 10.6 10.5     Recent Labs     05/27/25  0435 05/27/25  1800 05/28/25  0442 05/28/25  1408 05/29/25  0815   SODIUM 126*  --  126* 125* 122*   POTASSIUM 5.4 5.6* 5.1  --  5.6*   CHLORIDE 93*  --  92*  --  90*   CO2 19*  --  22  --  19*   GLUCOSE 149*  --  111*  --  127*   BUN 46*  --  38*  --  47*   CREATININE 1.85*  --  1.29  --  1.47*   CALCIUM 9.8  --  9.1  --  9.0                     Imaging  CT-ABDOMEN-PELVIS WITH   Final Result         1.  Gallbladder wall thickening with slight adjacent hazy fat stranding, appearance suggesting changes of cholecystitis. Findings corresponding with right upper quadrant sonogram   2.  Hepatomegaly.   3.  Irregular hepatic contour favoring changes of cirrhosis.   4.  Scattered minimal abdominal ascites.   5.  Cardiomegaly.   6.  Atherosclerosis and atherosclerotic coronary artery disease.            EC-ECHOCARDIOGRAM LTD W/ CONT   Final Result      NM-BILIARY (HIDA) SCAN WITH CCK   Final Result         1. Normal hepatobiliary scan. No evidence of acute cholecystitis.      2. Normal gallbladder ejection fraction.         US-RUQ   Final Result         1.  Mild gallbladder wall thickening without visualized shadowing stones, consider acalculous cholecystitis. Could be further evaluated with HIDA scan as clinically appropriate.   2.  Hepatomegaly      DX-CHEST-PORTABLE (1 VIEW)   Final Result         1.  No acute cardiopulmonary disease.   2.  Cardiomegaly           Assessment/Plan  * Acute on chronic systolic heart failure (HCC)- (present  on admission)  Assessment & Plan  Symptoms to guide titration of loop diuretic dosing: Hold for SUZY  Monitor urine output  Daily weight  Initiate and optimize GDMT  ARNI: Hold for SUZY  Evidence-based beta-blocker: Metoprolol  Mineralocorticoid receptor antagonist: Hold for SUZY  SGLT2 inhibitor: Hold for SUZY  Monitor blood pressure and renal function and electrolytes while undergoing aggressive diuresis and optimizing GDMT   Echocardiogram ordered and pending    Elevated troponin  Assessment & Plan  Present  No chest pain  Limited echocardiogram ordered to assess wall motion abnormality    Generalized abdominal pain  Assessment & Plan  Patient reported generalized abdominal pain and distention which he attributed to constipation  The patient did have some increased abdominal pain in the right upper quadrant compared to other areas of his abdomen  HIDA scan negative  No improvement in symptoms with PPI/GI cocktail  Bowel movement 5/26  CT abdomen pelvis pending    Pulmonary hypertension (HCC)- (present on admission)  Assessment & Plan  Monitor volume status    Metabolic acidosis, increased anion gap- (present on admission)  Assessment & Plan  Continue to trend    Acute renal failure superimposed on stage 3 chronic kidney disease (HCC)- (present on admission)  Assessment & Plan  Start IV Lasix  Monitor BMP and assess response  Avoid IV contrast/nephrotoxins/NSAIDs  Dose adjust meds for decreased GFR      Methamphetamine abuse (HCC)- (present on admission)  Assessment & Plan  Longstanding history of abuse  Recommend cessation    Alcoholic cirrhosis of liver with ascites (HCC)- (present on admission)  Assessment & Plan  Decompensated  Monitor volume status  Ammonia WNL  INR 1.51      VTE prophylaxis: heparin ppx    Greater than 51 minutes spent prepping to see patient (e.g. review of tests) obtaining and/or reviewing separately obtained history. Performing a medically appropriate examination and/ evaluation.  Counseling  and educating the patient/family/caregiver.  Ordering medications, tests, or procedures.  Referring and communicating with other health care professionals.  Documenting clinical information in EPIC.  Independently interpreting results and communicating results to patient/family/caregiver.  Care coordination.      I have performed a physical exam and reviewed and updated ROS and Plan today (5/29/2025). In review of yesterday's note (5/28/2025), there are no changes except as documented above.

## 2025-05-29 NOTE — PROGRESS NOTES
Bedside shift report received from corinne rn. Pt sleeping in chair at bedside, up self, room air, call bell within reach.

## 2025-05-29 NOTE — CARE PLAN
The patient is Watcher - Medium risk of patient condition declining or worsening    Shift Goals  Clinical Goals: monitor labs, VSS  Patient Goals: sleep  Family Goals: YOEL - no family present    Progress made toward(s) clinical / shift goals:    Problem: Knowledge Deficit - Standard  Goal: Patient and family/care givers will demonstrate understanding of plan of care, disease process/condition, diagnostic tests and medications  Outcome: Progressing     Problem: Fall Risk  Goal: Patient will remain free from falls  Outcome: Progressing     Problem: Care Map:  Day 3 Optimal Outcome for the Heart Failure Patient  Goal: Day 3:  Optimal Care of the heart failure patient  Outcome: Progressing

## 2025-05-30 LAB
ANION GAP SERPL CALC-SCNC: 13 MMOL/L (ref 7–16)
ANION GAP SERPL CALC-SCNC: 15 MMOL/L (ref 7–16)
ANION GAP SERPL CALC-SCNC: 16 MMOL/L (ref 7–16)
ANION GAP SERPL CALC-SCNC: 16 MMOL/L (ref 7–16)
BUN SERPL-MCNC: 50 MG/DL (ref 8–22)
BUN SERPL-MCNC: 52 MG/DL (ref 8–22)
BUN SERPL-MCNC: 54 MG/DL (ref 8–22)
BUN SERPL-MCNC: 55 MG/DL (ref 8–22)
CALCIUM SERPL-MCNC: 8.8 MG/DL (ref 8.5–10.5)
CALCIUM SERPL-MCNC: 9 MG/DL (ref 8.5–10.5)
CALCIUM SERPL-MCNC: 9.2 MG/DL (ref 8.5–10.5)
CALCIUM SERPL-MCNC: 9.2 MG/DL (ref 8.5–10.5)
CHLORIDE SERPL-SCNC: 93 MMOL/L (ref 96–112)
CHLORIDE SERPL-SCNC: 95 MMOL/L (ref 96–112)
CHLORIDE SERPL-SCNC: 95 MMOL/L (ref 96–112)
CHLORIDE SERPL-SCNC: 96 MMOL/L (ref 96–112)
CO2 SERPL-SCNC: 16 MMOL/L (ref 20–33)
CO2 SERPL-SCNC: 16 MMOL/L (ref 20–33)
CO2 SERPL-SCNC: 17 MMOL/L (ref 20–33)
CO2 SERPL-SCNC: 17 MMOL/L (ref 20–33)
CREAT SERPL-MCNC: 1.37 MG/DL (ref 0.5–1.4)
CREAT SERPL-MCNC: 1.56 MG/DL (ref 0.5–1.4)
CREAT SERPL-MCNC: 1.61 MG/DL (ref 0.5–1.4)
CREAT SERPL-MCNC: 1.64 MG/DL (ref 0.5–1.4)
ERYTHROCYTE [DISTWIDTH] IN BLOOD BY AUTOMATED COUNT: 54.4 FL (ref 35.9–50)
GFR SERPLBLD CREATININE-BSD FMLA CKD-EPI: 47 ML/MIN/1.73 M 2
GFR SERPLBLD CREATININE-BSD FMLA CKD-EPI: 48 ML/MIN/1.73 M 2
GFR SERPLBLD CREATININE-BSD FMLA CKD-EPI: 50 ML/MIN/1.73 M 2
GFR SERPLBLD CREATININE-BSD FMLA CKD-EPI: 59 ML/MIN/1.73 M 2
GLUCOSE BLD STRIP.AUTO-MCNC: 115 MG/DL (ref 65–99)
GLUCOSE BLD STRIP.AUTO-MCNC: 135 MG/DL (ref 65–99)
GLUCOSE BLD STRIP.AUTO-MCNC: 139 MG/DL (ref 65–99)
GLUCOSE BLD STRIP.AUTO-MCNC: 143 MG/DL (ref 65–99)
GLUCOSE BLD STRIP.AUTO-MCNC: 172 MG/DL (ref 65–99)
GLUCOSE SERPL-MCNC: 116 MG/DL (ref 65–99)
GLUCOSE SERPL-MCNC: 121 MG/DL (ref 65–99)
GLUCOSE SERPL-MCNC: 142 MG/DL (ref 65–99)
GLUCOSE SERPL-MCNC: 186 MG/DL (ref 65–99)
HCT VFR BLD AUTO: 45.1 % (ref 42–52)
HGB BLD-MCNC: 15.1 G/DL (ref 14–18)
MCH RBC QN AUTO: 33.5 PG (ref 27–33)
MCHC RBC AUTO-ENTMCNC: 33.5 G/DL (ref 32.3–36.5)
MCV RBC AUTO: 100 FL (ref 81.4–97.8)
PLATELET # BLD AUTO: 252 K/UL (ref 164–446)
PMV BLD AUTO: 10.3 FL (ref 9–12.9)
POTASSIUM SERPL-SCNC: 5.4 MMOL/L (ref 3.6–5.5)
POTASSIUM SERPL-SCNC: 5.7 MMOL/L (ref 3.6–5.5)
POTASSIUM SERPL-SCNC: 5.8 MMOL/L (ref 3.6–5.5)
POTASSIUM SERPL-SCNC: 5.9 MMOL/L (ref 3.6–5.5)
RBC # BLD AUTO: 4.51 M/UL (ref 4.7–6.1)
SODIUM SERPL-SCNC: 126 MMOL/L (ref 135–145)
SODIUM SERPL-SCNC: 127 MMOL/L (ref 135–145)
WBC # BLD AUTO: 6.7 K/UL (ref 4.8–10.8)

## 2025-05-30 PROCEDURE — A9270 NON-COVERED ITEM OR SERVICE: HCPCS | Performed by: INTERNAL MEDICINE

## 2025-05-30 PROCEDURE — 700101 HCHG RX REV CODE 250: Performed by: PHYSICIAN ASSISTANT

## 2025-05-30 PROCEDURE — 700102 HCHG RX REV CODE 250 W/ 637 OVERRIDE(OP): Performed by: STUDENT IN AN ORGANIZED HEALTH CARE EDUCATION/TRAINING PROGRAM

## 2025-05-30 PROCEDURE — 700102 HCHG RX REV CODE 250 W/ 637 OVERRIDE(OP): Performed by: HOSPITALIST

## 2025-05-30 PROCEDURE — 700102 HCHG RX REV CODE 250 W/ 637 OVERRIDE(OP): Performed by: INTERNAL MEDICINE

## 2025-05-30 PROCEDURE — A9270 NON-COVERED ITEM OR SERVICE: HCPCS | Performed by: STUDENT IN AN ORGANIZED HEALTH CARE EDUCATION/TRAINING PROGRAM

## 2025-05-30 PROCEDURE — 700111 HCHG RX REV CODE 636 W/ 250 OVERRIDE (IP): Performed by: PHYSICIAN ASSISTANT

## 2025-05-30 PROCEDURE — 99233 SBSQ HOSP IP/OBS HIGH 50: CPT | Performed by: INTERNAL MEDICINE

## 2025-05-30 PROCEDURE — A9270 NON-COVERED ITEM OR SERVICE: HCPCS | Performed by: HOSPITALIST

## 2025-05-30 PROCEDURE — 700111 HCHG RX REV CODE 636 W/ 250 OVERRIDE (IP): Performed by: STUDENT IN AN ORGANIZED HEALTH CARE EDUCATION/TRAINING PROGRAM

## 2025-05-30 PROCEDURE — 36415 COLL VENOUS BLD VENIPUNCTURE: CPT

## 2025-05-30 PROCEDURE — 770020 HCHG ROOM/CARE - TELE (206)

## 2025-05-30 PROCEDURE — 82962 GLUCOSE BLOOD TEST: CPT | Mod: 91

## 2025-05-30 PROCEDURE — 700101 HCHG RX REV CODE 250: Performed by: INTERNAL MEDICINE

## 2025-05-30 PROCEDURE — 80048 BASIC METABOLIC PNL TOTAL CA: CPT | Mod: 91

## 2025-05-30 PROCEDURE — 85027 COMPLETE CBC AUTOMATED: CPT

## 2025-05-30 PROCEDURE — 700111 HCHG RX REV CODE 636 W/ 250 OVERRIDE (IP): Performed by: INTERNAL MEDICINE

## 2025-05-30 RX ORDER — DEXTROSE MONOHYDRATE 25 G/50ML
25 INJECTION, SOLUTION INTRAVENOUS ONCE
Status: COMPLETED | OUTPATIENT
Start: 2025-05-30 | End: 2025-05-30

## 2025-05-30 RX ORDER — FUROSEMIDE 40 MG/1
40 TABLET ORAL
Status: DISCONTINUED | OUTPATIENT
Start: 2025-05-30 | End: 2025-06-01

## 2025-05-30 RX ADMIN — OMEPRAZOLE 20 MG: 20 CAPSULE, DELAYED RELEASE ORAL at 05:10

## 2025-05-30 RX ADMIN — OXYCODONE 5 MG: 5 TABLET ORAL at 05:10

## 2025-05-30 RX ADMIN — SODIUM CHLORIDE 2 G: 1 TABLET ORAL at 17:23

## 2025-05-30 RX ADMIN — SODIUM ZIRCONIUM CYCLOSILICATE 10 G: 10 POWDER, FOR SUSPENSION ORAL at 12:33

## 2025-05-30 RX ADMIN — HEPARIN SODIUM 5000 UNITS: 5000 INJECTION, SOLUTION INTRAVENOUS; SUBCUTANEOUS at 21:48

## 2025-05-30 RX ADMIN — OMEPRAZOLE 20 MG: 20 CAPSULE, DELAYED RELEASE ORAL at 17:24

## 2025-05-30 RX ADMIN — TAMSULOSIN HYDROCHLORIDE 0.4 MG: 0.4 CAPSULE ORAL at 08:55

## 2025-05-30 RX ADMIN — INSULIN HUMAN 5 UNITS: 100 INJECTION, SOLUTION PARENTERAL at 02:21

## 2025-05-30 RX ADMIN — SODIUM CHLORIDE 2 G: 1 TABLET ORAL at 08:55

## 2025-05-30 RX ADMIN — SENNOSIDES AND DOCUSATE SODIUM 2 TABLET: 50; 8.6 TABLET ORAL at 17:23

## 2025-05-30 RX ADMIN — INSULIN HUMAN 5 UNITS: 100 INJECTION, SOLUTION PARENTERAL at 10:00

## 2025-05-30 RX ADMIN — DEXTROSE MONOHYDRATE 25 G: 25 INJECTION, SOLUTION INTRAVENOUS at 02:21

## 2025-05-30 RX ADMIN — METHOCARBAMOL 500 MG: 500 TABLET ORAL at 23:28

## 2025-05-30 RX ADMIN — METOPROLOL SUCCINATE 50 MG: 50 TABLET, EXTENDED RELEASE ORAL at 05:10

## 2025-05-30 RX ADMIN — FUROSEMIDE 40 MG: 40 TABLET ORAL at 12:33

## 2025-05-30 RX ADMIN — OXYCODONE 5 MG: 5 TABLET ORAL at 10:02

## 2025-05-30 RX ADMIN — OXYCODONE 5 MG: 5 TABLET ORAL at 00:01

## 2025-05-30 RX ADMIN — DEXTROSE MONOHYDRATE 25 G: 25 INJECTION, SOLUTION INTRAVENOUS at 10:00

## 2025-05-30 ASSESSMENT — PAIN DESCRIPTION - PAIN TYPE
TYPE: ACUTE PAIN

## 2025-05-30 ASSESSMENT — FIBROSIS 4 INDEX: FIB4 SCORE: 2.02

## 2025-05-30 ASSESSMENT — ENCOUNTER SYMPTOMS
ABDOMINAL PAIN: 1
SHORTNESS OF BREATH: 0
CONSTIPATION: 0

## 2025-05-30 NOTE — CARE PLAN
The patient is Watcher - Medium risk of patient condition declining or worsening    Shift Goals  Clinical Goals: florin management, electrotlyes, fluids  Patient Goals: sleep  Family Goals: YOEL - no family present    Progress made toward(s) clinical / shift goals:    Problem: Knowledge Deficit - Standard  Goal: Patient and family/care givers will demonstrate understanding of plan of care, disease process/condition, diagnostic tests and medications  Outcome: Progressing

## 2025-05-30 NOTE — PROGRESS NOTES
Bedside shift report received from corinne rn. Pt sitting in chair. Complaining and cursing of abdominal cramping. Fluid running at 75ml/h. Room air. Call bell within reach.

## 2025-05-30 NOTE — PROGRESS NOTES
[facility] Medicine Daily Progress Note    Date of Service  5/30/2025    Chief Complaint  Santino Zabala is a 59 y.o. male admitted 5/22/2025 with acute hypoxic respiratory failure secondary to acute decompensated heart failure    Hospital Course  The patient is 59-year-old male with a past medical history significant for HFrEF (EF 20%), alcoholic cirrhosis, and methamphetamine use disorder.    The patient was hospitalized earlier this same month for acute decompensated heart failure for which she was discontinued from Aldactone, Entresto, and losartan for concern of hyperkalemia.    The patient was initially evaluated emergency department for chest pain, shortness of breath, bilateral lower extremity swelling for approximately 2 days.  Unfortunate reported that he did not think the Lasix was working very well as he became fluid overloaded again.  His BNP was 22,000 in the emergency department..  Fluid overloaded on clinical examination. The patient was initiated on IV diuresis.  Additionally, he was provided spironolactone, metoprolol, and Farxiga for his heart failure.  His IV diuresis was inevitably titrated down to oral dosing prior to discharge.  Unfortunately, the patient developed an SUZY which resulted in the holding of his GDMT.  Primary service will resume diuretic as clinically able.    The patient was found to have an elevated troponin while hospitalized.  He has had some increased troponin levels in the past, however these values are still higher than those baseline elevations.  As such, the patient underwent a limited echocardiogram to assess for wall motion abnormality this result is still pending as of 5/26.  The patient has no chest pain.    The patient was found to have elevated LFTs with a normal bilirubin on laboratory imaging.  The values during this hospitalization are substantially improved compared to previous hospitalizations.  The patient reported ongoing abdominal pain which she  attributed to constipation.  However, he did have a positive Pires sign.  The patient underwent an right upper quadrant abdominal ultrasound and was found to have mild gallbladder wall thickening without visualized shadowing stones concerning for acalculous cholecystitis and hepatomegaly. He underwent a HIDA scan which was negative.  He had a large bowel movement on 5/26 making constipation less likely the etiology.  He was provided PPI with a GI cocktail with no improvement in his symptoms.  CT scan done for the same concern on 5/9 that was negative with the exception of hepatomegaly.  Repeat CT scan ordered and pending.    Interval Problem Update  Patient seen and evaluated at bedside  Pertinent labs and imaging reviewed  Potassium 3.5, 4.5 4.9  Glucose 143, 123  Creatinine 1.63, 1.38, 1.49, 1.94  GDMT held  AST 62, 53, 52 (previous AST >7000 in 12/24)  ALT 35, 31, 28 (previous ALT >5000 in 12/24)  H/o hepatocongestion during previous hospitalizations  HIDA scan negative  Lipase ordered and pending  Afebrile, hemodynamically stable, no oxygen requirements  Voiding, stooling, tolerating oral intake well  Previous bowel movement 5/26  Patient with ongoing abdominal pain that did not have any improvement with PPI and GI cocktail plan CT abdomen pelvis with contrast ordered and pending  5/27: patient seen and examined, had hypokalemia ealry this AM and received hyperkalemia protocol  Now 5.4 close monitor his potasium and also monitor on telemetry for arhythmia  Close monitor for decompensation   5/28: Patient seen and examined afebrile, no nausea or vomiting.in regards to his hyperkalemia is improving  In regards to hyponatremia: sodium 126 today  will start on salt tabs   Close monitor his sodium   5/29: Patient seen and examined afebrile, no nausea or vomiting, his sodium 122 today increasing salt tabs to 2 gram TID also start on nss IV   Also hyperkalemia potassium of 5.6 will give IV insulin and dextrose cont on  lokelma  Close monitor on tele for arhythmia   5/30: Patient seen and examined afebrile, no nausea or vomiting hyperkalemia potassium of 5.7 today giving IV insulin and dextrose. Starting lasix  Also for hyponatremia cont salt tabs   Close monitor for decompensation   I have discussed this patient's plan of care and discharge plan at IDT rounds today with Case Management, Nursing, Nursing leadership, and other members of the IDT team.    Consultants/Specialty  None    Code Status  Full Code    Disposition  The patient is not medically cleared for discharge to home or a post-acute facility.      I have placed the appropriate orders for post-discharge needs.    Review of Systems  Review of Systems   Respiratory:  Negative for shortness of breath.    Cardiovascular:  Negative for leg swelling.   Gastrointestinal:  Positive for abdominal pain. Negative for constipation.        Physical Exam  Temp:  [36.1 °C (97 °F)-36.4 °C (97.6 °F)] 36.3 °C (97.3 °F)  Pulse:  [] 90  Resp:  [16-20] 20  BP: ()/(77-87) 106/77  SpO2:  [94 %-96 %] 95 %    Physical Exam  Constitutional:       General: He is not in acute distress.     Appearance: Normal appearance. He is normal weight.   HENT:      Head: Normocephalic and atraumatic.      Nose: Nose normal.   Eyes:      Extraocular Movements: Extraocular movements intact.   Pulmonary:      Effort: Pulmonary effort is normal. No respiratory distress.      Breath sounds: Examination of the right-lower field reveals decreased breath sounds. Examination of the left-lower field reveals decreased breath sounds. Decreased breath sounds present.   Abdominal:      General: There is distension.      Palpations: Abdomen is soft.      Tenderness: There is generalized abdominal tenderness. Positive signs include Pires's sign.   Musculoskeletal:      Cervical back: Normal range of motion.      Right lower leg: No edema.      Left lower leg: No edema.   Skin:     General: Skin is warm and dry.    Neurological:      General: No focal deficit present.      Mental Status: He is alert and oriented to person, place, and time.   Psychiatric:         Mood and Affect: Mood normal.         Behavior: Behavior normal.         Thought Content: Thought content normal.         Fluids    Intake/Output Summary (Last 24 hours) at 5/30/2025 1420  Last data filed at 5/30/2025 0558  Gross per 24 hour   Intake 200 ml   Output 450 ml   Net -250 ml        Laboratory  Recent Labs     05/28/25  0442 05/30/25  0139   WBC 6.7 6.7   RBC 4.38* 4.51*   HEMOGLOBIN 14.3 15.1   HEMATOCRIT 42.6 45.1   MCV 97.3 100.0*   MCH 32.6 33.5*   MCHC 33.6 33.5   RDW 51.7* 54.4*   PLATELETCT 249 252   MPV 10.5 10.3     Recent Labs     05/29/25 2236 05/30/25  0139 05/30/25  0743   SODIUM 127* 126* 126*   POTASSIUM 6.1* 5.9* 5.7*   CHLORIDE 95* 96 93*   CO2 16* 17* 17*   GLUCOSE 140* 186* 142*   BUN 52* 50* 52*   CREATININE 1.45* 1.37 1.64*   CALCIUM 9.4 8.8 9.2                     Imaging  CT-ABDOMEN-PELVIS WITH   Final Result         1.  Gallbladder wall thickening with slight adjacent hazy fat stranding, appearance suggesting changes of cholecystitis. Findings corresponding with right upper quadrant sonogram   2.  Hepatomegaly.   3.  Irregular hepatic contour favoring changes of cirrhosis.   4.  Scattered minimal abdominal ascites.   5.  Cardiomegaly.   6.  Atherosclerosis and atherosclerotic coronary artery disease.            EC-ECHOCARDIOGRAM LTD W/ CONT   Final Result      NM-BILIARY (HIDA) SCAN WITH CCK   Final Result         1. Normal hepatobiliary scan. No evidence of acute cholecystitis.      2. Normal gallbladder ejection fraction.         US-RUQ   Final Result         1.  Mild gallbladder wall thickening without visualized shadowing stones, consider acalculous cholecystitis. Could be further evaluated with HIDA scan as clinically appropriate.   2.  Hepatomegaly      DX-CHEST-PORTABLE (1 VIEW)   Final Result         1.  No acute  cardiopulmonary disease.   2.  Cardiomegaly           Assessment/Plan  * Acute on chronic systolic heart failure (HCC)- (present on admission)  Assessment & Plan  Symptoms to guide titration of loop diuretic dosing: Hold for SUZY  Monitor urine output  Daily weight  Initiate and optimize GDMT  ARNI: Hold for SUYZ  Evidence-based beta-blocker: Metoprolol  Mineralocorticoid receptor antagonist: Hold for SUZY  SGLT2 inhibitor: Hold for SUZY  Monitor blood pressure and renal function and electrolytes while undergoing aggressive diuresis and optimizing GDMT   Echocardiogram ordered and pending    Elevated troponin  Assessment & Plan  Present  No chest pain  Limited echocardiogram ordered to assess wall motion abnormality    Generalized abdominal pain  Assessment & Plan  Patient reported generalized abdominal pain and distention which he attributed to constipation  The patient did have some increased abdominal pain in the right upper quadrant compared to other areas of his abdomen  HIDA scan negative  No improvement in symptoms with PPI/GI cocktail  Bowel movement 5/26  CT abdomen pelvis pending    Pulmonary hypertension (HCC)- (present on admission)  Assessment & Plan  Monitor volume status    Metabolic acidosis, increased anion gap- (present on admission)  Assessment & Plan  Continue to trend    Acute renal failure superimposed on stage 3 chronic kidney disease (HCC)- (present on admission)  Assessment & Plan  Start IV Lasix  Monitor BMP and assess response  Avoid IV contrast/nephrotoxins/NSAIDs  Dose adjust meds for decreased GFR      Methamphetamine abuse (HCC)- (present on admission)  Assessment & Plan  Longstanding history of abuse  Recommend cessation    Alcoholic cirrhosis of liver with ascites (HCC)- (present on admission)  Assessment & Plan  Decompensated  Monitor volume status  Ammonia WNL  INR 1.51        Greater than 51 minutes spent prepping to see patient (e.g. review of tests) obtaining and/or reviewing  separately obtained history. Performing a medically appropriate examination and/ evaluation.  Counseling and educating the patient/family/caregiver.  Ordering medications, tests, or procedures.  Referring and communicating with other health care professionals.  Documenting clinical information in EPIC.  Independently interpreting results and communicating results to patient/family/caregiver.  Care coordination.      VTE prophylaxis: heparin ppx      I have performed a physical exam and reviewed and updated ROS and Plan today (5/30/2025). In review of yesterday's note (5/29/2025), there are no changes except as documented above.

## 2025-05-31 VITALS
BODY MASS INDEX: 20.46 KG/M2 | TEMPERATURE: 97.3 F | RESPIRATION RATE: 16 BRPM | OXYGEN SATURATION: 96 % | HEIGHT: 65 IN | HEART RATE: 86 BPM | SYSTOLIC BLOOD PRESSURE: 106 MMHG | WEIGHT: 122.8 LBS | DIASTOLIC BLOOD PRESSURE: 75 MMHG

## 2025-05-31 LAB
ANION GAP SERPL CALC-SCNC: 13 MMOL/L (ref 7–16)
ANION GAP SERPL CALC-SCNC: 14 MMOL/L (ref 7–16)
ANION GAP SERPL CALC-SCNC: 15 MMOL/L (ref 7–16)
ANION GAP SERPL CALC-SCNC: 15 MMOL/L (ref 7–16)
BUN SERPL-MCNC: 46 MG/DL (ref 8–22)
BUN SERPL-MCNC: 48 MG/DL (ref 8–22)
BUN SERPL-MCNC: 50 MG/DL (ref 8–22)
BUN SERPL-MCNC: 51 MG/DL (ref 8–22)
CALCIUM SERPL-MCNC: 8.6 MG/DL (ref 8.5–10.5)
CALCIUM SERPL-MCNC: 8.9 MG/DL (ref 8.5–10.5)
CALCIUM SERPL-MCNC: 9 MG/DL (ref 8.5–10.5)
CALCIUM SERPL-MCNC: 9.1 MG/DL (ref 8.5–10.5)
CHLORIDE SERPL-SCNC: 92 MMOL/L (ref 96–112)
CHLORIDE SERPL-SCNC: 94 MMOL/L (ref 96–112)
CHLORIDE SERPL-SCNC: 94 MMOL/L (ref 96–112)
CHLORIDE SERPL-SCNC: 95 MMOL/L (ref 96–112)
CO2 SERPL-SCNC: 15 MMOL/L (ref 20–33)
CO2 SERPL-SCNC: 16 MMOL/L (ref 20–33)
CO2 SERPL-SCNC: 16 MMOL/L (ref 20–33)
CO2 SERPL-SCNC: 19 MMOL/L (ref 20–33)
CREAT SERPL-MCNC: 1.26 MG/DL (ref 0.5–1.4)
CREAT SERPL-MCNC: 1.33 MG/DL (ref 0.5–1.4)
CREAT SERPL-MCNC: 1.35 MG/DL (ref 0.5–1.4)
CREAT SERPL-MCNC: 1.44 MG/DL (ref 0.5–1.4)
ERYTHROCYTE [DISTWIDTH] IN BLOOD BY AUTOMATED COUNT: 51.7 FL (ref 35.9–50)
GFR SERPLBLD CREATININE-BSD FMLA CKD-EPI: 55 ML/MIN/1.73 M 2
GFR SERPLBLD CREATININE-BSD FMLA CKD-EPI: 60 ML/MIN/1.73 M 2
GFR SERPLBLD CREATININE-BSD FMLA CKD-EPI: 61 ML/MIN/1.73 M 2
GFR SERPLBLD CREATININE-BSD FMLA CKD-EPI: 65 ML/MIN/1.73 M 2
GLUCOSE SERPL-MCNC: 120 MG/DL (ref 65–99)
GLUCOSE SERPL-MCNC: 127 MG/DL (ref 65–99)
GLUCOSE SERPL-MCNC: 137 MG/DL (ref 65–99)
GLUCOSE SERPL-MCNC: 158 MG/DL (ref 65–99)
HCT VFR BLD AUTO: 44 % (ref 42–52)
HGB BLD-MCNC: 14.8 G/DL (ref 14–18)
MCH RBC QN AUTO: 32.8 PG (ref 27–33)
MCHC RBC AUTO-ENTMCNC: 33.6 G/DL (ref 32.3–36.5)
MCV RBC AUTO: 97.6 FL (ref 81.4–97.8)
PLATELET # BLD AUTO: 239 K/UL (ref 164–446)
PMV BLD AUTO: 10.6 FL (ref 9–12.9)
POTASSIUM SERPL-SCNC: 4.5 MMOL/L (ref 3.6–5.5)
POTASSIUM SERPL-SCNC: 4.8 MMOL/L (ref 3.6–5.5)
POTASSIUM SERPL-SCNC: 4.9 MMOL/L (ref 3.6–5.5)
POTASSIUM SERPL-SCNC: 5.5 MMOL/L (ref 3.6–5.5)
RBC # BLD AUTO: 4.51 M/UL (ref 4.7–6.1)
SODIUM SERPL-SCNC: 123 MMOL/L (ref 135–145)
SODIUM SERPL-SCNC: 124 MMOL/L (ref 135–145)
SODIUM SERPL-SCNC: 125 MMOL/L (ref 135–145)
SODIUM SERPL-SCNC: 126 MMOL/L (ref 135–145)
WBC # BLD AUTO: 7 K/UL (ref 4.8–10.8)

## 2025-05-31 PROCEDURE — 36415 COLL VENOUS BLD VENIPUNCTURE: CPT

## 2025-05-31 PROCEDURE — A9270 NON-COVERED ITEM OR SERVICE: HCPCS | Performed by: STUDENT IN AN ORGANIZED HEALTH CARE EDUCATION/TRAINING PROGRAM

## 2025-05-31 PROCEDURE — A9270 NON-COVERED ITEM OR SERVICE: HCPCS | Performed by: INTERNAL MEDICINE

## 2025-05-31 PROCEDURE — A9270 NON-COVERED ITEM OR SERVICE: HCPCS | Performed by: HOSPITALIST

## 2025-05-31 PROCEDURE — 99233 SBSQ HOSP IP/OBS HIGH 50: CPT | Performed by: INTERNAL MEDICINE

## 2025-05-31 PROCEDURE — 80048 BASIC METABOLIC PNL TOTAL CA: CPT | Mod: 91

## 2025-05-31 PROCEDURE — 700102 HCHG RX REV CODE 250 W/ 637 OVERRIDE(OP): Performed by: STUDENT IN AN ORGANIZED HEALTH CARE EDUCATION/TRAINING PROGRAM

## 2025-05-31 PROCEDURE — 85027 COMPLETE CBC AUTOMATED: CPT

## 2025-05-31 PROCEDURE — 700102 HCHG RX REV CODE 250 W/ 637 OVERRIDE(OP): Performed by: HOSPITALIST

## 2025-05-31 PROCEDURE — 770020 HCHG ROOM/CARE - TELE (206)

## 2025-05-31 PROCEDURE — 700102 HCHG RX REV CODE 250 W/ 637 OVERRIDE(OP): Performed by: INTERNAL MEDICINE

## 2025-05-31 PROCEDURE — 700111 HCHG RX REV CODE 636 W/ 250 OVERRIDE (IP): Performed by: STUDENT IN AN ORGANIZED HEALTH CARE EDUCATION/TRAINING PROGRAM

## 2025-05-31 RX ADMIN — SENNOSIDES AND DOCUSATE SODIUM 2 TABLET: 50; 8.6 TABLET ORAL at 18:22

## 2025-05-31 RX ADMIN — OMEPRAZOLE 20 MG: 20 CAPSULE, DELAYED RELEASE ORAL at 05:10

## 2025-05-31 RX ADMIN — ALLOPURINOL 300 MG: 300 TABLET ORAL at 18:22

## 2025-05-31 RX ADMIN — OXYCODONE 5 MG: 5 TABLET ORAL at 16:27

## 2025-05-31 RX ADMIN — OMEPRAZOLE 20 MG: 20 CAPSULE, DELAYED RELEASE ORAL at 18:22

## 2025-05-31 RX ADMIN — TAMSULOSIN HYDROCHLORIDE 0.4 MG: 0.4 CAPSULE ORAL at 08:43

## 2025-05-31 RX ADMIN — METOPROLOL SUCCINATE 50 MG: 50 TABLET, EXTENDED RELEASE ORAL at 05:10

## 2025-05-31 RX ADMIN — POLYETHYLENE GLYCOL 3350 1 PACKET: 17 POWDER, FOR SOLUTION ORAL at 05:10

## 2025-05-31 RX ADMIN — SODIUM CHLORIDE 2 G: 1 TABLET ORAL at 08:44

## 2025-05-31 RX ADMIN — OXYCODONE 5 MG: 5 TABLET ORAL at 22:02

## 2025-05-31 RX ADMIN — SODIUM CHLORIDE 2 G: 1 TABLET ORAL at 17:48

## 2025-05-31 RX ADMIN — SODIUM CHLORIDE 2 G: 1 TABLET ORAL at 12:38

## 2025-05-31 RX ADMIN — SODIUM ZIRCONIUM CYCLOSILICATE 10 G: 10 POWDER, FOR SUSPENSION ORAL at 12:38

## 2025-05-31 RX ADMIN — FUROSEMIDE 40 MG: 40 TABLET ORAL at 05:11

## 2025-05-31 RX ADMIN — HEPARIN SODIUM 5000 UNITS: 5000 INJECTION, SOLUTION INTRAVENOUS; SUBCUTANEOUS at 22:02

## 2025-05-31 ASSESSMENT — FIBROSIS 4 INDEX: FIB4 SCORE: 2.13

## 2025-05-31 ASSESSMENT — PAIN DESCRIPTION - PAIN TYPE
TYPE: ACUTE PAIN

## 2025-05-31 ASSESSMENT — ENCOUNTER SYMPTOMS
CONSTIPATION: 0
SHORTNESS OF BREATH: 0
ABDOMINAL PAIN: 1

## 2025-05-31 NOTE — CARE PLAN
The patient is Watcher - Medium risk of patient condition declining or worsening    Shift Goals  Clinical Goals: electrolyte correction  Patient Goals: increase sodium, rest  Family Goals: louis    Progress made toward(s) clinical / shift goals:  pt safety maintained. Pt provided with salt tablets, educated on eating salt in diet to correct low sodium. Plan of care reviewed with patient at bedside, all questions addressed. Pt medicated for pain per MAR, given comfort measures of rest and repositioning.       Problem: Knowledge Deficit - Standard  Goal: Patient and family/care givers will demonstrate understanding of plan of care, disease process/condition, diagnostic tests and medications  Outcome: Progressing     Problem: Pain - Standard  Goal: Alleviation of pain or a reduction in pain to the patient’s comfort goal  Outcome: Progressing       Patient is not progressing towards the following goals:

## 2025-05-31 NOTE — PROGRESS NOTES
Bedside report received from Jeannine ANDREWS. Pt assessment complete. Pt AO x 4. Reviewed plan of care with pt. Pt is tele monitored. Chart and labs reviewed. Bed in lowest position, and 2 side rails up. Pt educated on call lights, call light within reach. Hourly rounding in place

## 2025-05-31 NOTE — PROGRESS NOTES
Bedside shift report received from corinne rn. Pt resting in chair at bedside. Pt up self. NS running at 75ml/h. Call bell within reach. No complaints at this time.

## 2025-05-31 NOTE — CARE PLAN
The patient is Watcher - Medium risk of patient condition declining or worsening    Shift Goals  Clinical Goals: florin management, electrotlyes, fluids  Patient Goals: sleep  Family Goals: YOEL - no family present    Progress made toward(s) clinical / shift goals:      Problem: Knowledge Deficit - Standard  Goal: Patient and family/care givers will demonstrate understanding of plan of care, disease process/condition, diagnostic tests and medications  Outcome: Progressing     Problem: Pain - Standard  Goal: Alleviation of pain or a reduction in pain to the patient’s comfort goal  Outcome: Progressing

## 2025-05-31 NOTE — PROGRESS NOTES
[facility] Medicine Daily Progress Note    Date of Service  5/31/2025    Chief Complaint  Santino Zabala is a 59 y.o. male admitted 5/22/2025 with acute hypoxic respiratory failure secondary to acute decompensated heart failure    Hospital Course  The patient is 59-year-old male with a past medical history significant for HFrEF (EF 20%), alcoholic cirrhosis, and methamphetamine use disorder.    The patient was hospitalized earlier this same month for acute decompensated heart failure for which she was discontinued from Aldactone, Entresto, and losartan for concern of hyperkalemia.    The patient was initially evaluated emergency department for chest pain, shortness of breath, bilateral lower extremity swelling for approximately 2 days.  Unfortunate reported that he did not think the Lasix was working very well as he became fluid overloaded again.  His BNP was 22,000 in the emergency department..  Fluid overloaded on clinical examination. The patient was initiated on IV diuresis.  Additionally, he was provided spironolactone, metoprolol, and Farxiga for his heart failure.  His IV diuresis was inevitably titrated down to oral dosing prior to discharge.  Unfortunately, the patient developed an SUZY which resulted in the holding of his GDMT.  Primary service will resume diuretic as clinically able.    The patient was found to have an elevated troponin while hospitalized.  He has had some increased troponin levels in the past, however these values are still higher than those baseline elevations.  As such, the patient underwent a limited echocardiogram to assess for wall motion abnormality this result is still pending as of 5/26.  The patient has no chest pain.    The patient was found to have elevated LFTs with a normal bilirubin on laboratory imaging.  The values during this hospitalization are substantially improved compared to previous hospitalizations.  The patient reported ongoing abdominal pain which she  attributed to constipation.  However, he did have a positive Pires sign.  The patient underwent an right upper quadrant abdominal ultrasound and was found to have mild gallbladder wall thickening without visualized shadowing stones concerning for acalculous cholecystitis and hepatomegaly. He underwent a HIDA scan which was negative.  He had a large bowel movement on 5/26 making constipation less likely the etiology.  He was provided PPI with a GI cocktail with no improvement in his symptoms.  CT scan done for the same concern on 5/9 that was negative with the exception of hepatomegaly.  Repeat CT scan ordered and pending.    Interval Problem Update  Patient seen and evaluated at bedside  Pertinent labs and imaging reviewed  Potassium 3.5, 4.5 4.9  Glucose 143, 123  Creatinine 1.63, 1.38, 1.49, 1.94  GDMT held  AST 62, 53, 52 (previous AST >7000 in 12/24)  ALT 35, 31, 28 (previous ALT >5000 in 12/24)  H/o hepatocongestion during previous hospitalizations  HIDA scan negative  Lipase ordered and pending  Afebrile, hemodynamically stable, no oxygen requirements  Voiding, stooling, tolerating oral intake well  Previous bowel movement 5/26  Patient with ongoing abdominal pain that did not have any improvement with PPI and GI cocktail plan CT abdomen pelvis with contrast ordered and pending  5/27: patient seen and examined, had hypokalemia ealry this AM and received hyperkalemia protocol  Now 5.4 close monitor his potasium and also monitor on telemetry for arhythmia  Close monitor for decompensation   5/28: Patient seen and examined afebrile, no nausea or vomiting.in regards to his hyperkalemia is improving  In regards to hyponatremia: sodium 126 today  will start on salt tabs   Close monitor his sodium   5/29: Patient seen and examined afebrile, no nausea or vomiting, his sodium 122 today increasing salt tabs to 2 gram TID also start on nss IV   Also hyperkalemia potassium of 5.6 will give IV insulin and dextrose cont on  lokelma  Close monitor on tele for arhythmia   5/30: Patient seen and examined afebrile, no nausea or vomiting hyperkalemia potassium of 5.7 today giving IV insulin and dextrose. Starting lasix  Also for hyponatremia cont salt tabs   Close monitor for decompensation   5/31: Patient seen and examined, afebrile resting in bed, hyperkalemia improving but still having hyponatremia  Cont on salt tabs  Cont lasix  Close monitor for decompensation   I have discussed this patient's plan of care and discharge plan at IDT rounds today with Case Management, Nursing, Nursing leadership, and other members of the IDT team.    Consultants/Specialty  None    Code Status  Full Code    Disposition  Medically Cleared  I have placed the appropriate orders for post-discharge needs.    Review of Systems  Review of Systems   Respiratory:  Negative for shortness of breath.    Cardiovascular:  Negative for leg swelling.   Gastrointestinal:  Positive for abdominal pain. Negative for constipation.        Physical Exam  Temp:  [36.2 °C (97.2 °F)-36.5 °C (97.7 °F)] 36.2 °C (97.2 °F)  Pulse:  [80-94] 87  Resp:  [15-20] 18  BP: (103-120)/(75-91) 103/77  SpO2:  [90 %-95 %] 94 %    Physical Exam  Constitutional:       General: He is not in acute distress.     Appearance: Normal appearance. He is normal weight.   HENT:      Head: Normocephalic and atraumatic.      Nose: Nose normal.   Eyes:      Extraocular Movements: Extraocular movements intact.   Pulmonary:      Effort: Pulmonary effort is normal. No respiratory distress.      Breath sounds: Examination of the right-lower field reveals decreased breath sounds. Examination of the left-lower field reveals decreased breath sounds. Decreased breath sounds present.   Abdominal:      General: There is distension.      Palpations: Abdomen is soft.      Tenderness: There is generalized abdominal tenderness. Positive signs include Pires's sign.   Musculoskeletal:      Cervical back: Normal range of  motion.      Right lower leg: No edema.      Left lower leg: No edema.   Skin:     General: Skin is warm and dry.   Neurological:      General: No focal deficit present.      Mental Status: He is alert and oriented to person, place, and time.   Psychiatric:         Mood and Affect: Mood normal.         Behavior: Behavior normal.         Thought Content: Thought content normal.         Fluids    Intake/Output Summary (Last 24 hours) at 5/31/2025 1431  Last data filed at 5/31/2025 0500  Gross per 24 hour   Intake 350 ml   Output 450 ml   Net -100 ml        Laboratory  Recent Labs     05/30/25  0139 05/31/25  0200   WBC 6.7 7.0   RBC 4.51* 4.51*   HEMOGLOBIN 15.1 14.8   HEMATOCRIT 45.1 44.0   .0* 97.6   MCH 33.5* 32.8   MCHC 33.5 33.6   RDW 54.4* 51.7*   PLATELETCT 252 239   MPV 10.3 10.6     Recent Labs     05/31/25  0200 05/31/25  0739 05/31/25  1327   SODIUM 125* 126* 124*   POTASSIUM 4.8 4.5 4.9   CHLORIDE 95* 94* 94*   CO2 15* 19* 16*   GLUCOSE 127* 120* 137*   BUN 50* 46* 48*   CREATININE 1.35 1.33 1.26   CALCIUM 9.0 8.9 8.6                     Imaging  CT-ABDOMEN-PELVIS WITH   Final Result         1.  Gallbladder wall thickening with slight adjacent hazy fat stranding, appearance suggesting changes of cholecystitis. Findings corresponding with right upper quadrant sonogram   2.  Hepatomegaly.   3.  Irregular hepatic contour favoring changes of cirrhosis.   4.  Scattered minimal abdominal ascites.   5.  Cardiomegaly.   6.  Atherosclerosis and atherosclerotic coronary artery disease.            EC-ECHOCARDIOGRAM LTD W/ CONT   Final Result      NM-BILIARY (HIDA) SCAN WITH CCK   Final Result         1. Normal hepatobiliary scan. No evidence of acute cholecystitis.      2. Normal gallbladder ejection fraction.         US-RUQ   Final Result         1.  Mild gallbladder wall thickening without visualized shadowing stones, consider acalculous cholecystitis. Could be further evaluated with HIDA scan as clinically  appropriate.   2.  Hepatomegaly      DX-CHEST-PORTABLE (1 VIEW)   Final Result         1.  No acute cardiopulmonary disease.   2.  Cardiomegaly           Assessment/Plan  * Acute on chronic systolic heart failure (HCC)- (present on admission)  Assessment & Plan  Symptoms to guide titration of loop diuretic dosing: Hold for SUZY  Monitor urine output  Daily weight  Initiate and optimize GDMT  ARNI: Hold for SUZY  Evidence-based beta-blocker: Metoprolol  Mineralocorticoid receptor antagonist: Hold for SUZY  SGLT2 inhibitor: Hold for SUZY  Monitor blood pressure and renal function and electrolytes while undergoing aggressive diuresis and optimizing GDMT   Echocardiogram ordered and pending    Elevated troponin  Assessment & Plan  Present  No chest pain  Limited echocardiogram ordered to assess wall motion abnormality    Generalized abdominal pain  Assessment & Plan  Patient reported generalized abdominal pain and distention which he attributed to constipation  The patient did have some increased abdominal pain in the right upper quadrant compared to other areas of his abdomen  HIDA scan negative  No improvement in symptoms with PPI/GI cocktail  Bowel movement 5/26  CT abdomen pelvis pending    Pulmonary hypertension (HCC)- (present on admission)  Assessment & Plan  Monitor volume status    Metabolic acidosis, increased anion gap- (present on admission)  Assessment & Plan  Continue to trend    Acute renal failure superimposed on stage 3 chronic kidney disease (HCC)- (present on admission)  Assessment & Plan  Start IV Lasix  Monitor BMP and assess response  Avoid IV contrast/nephrotoxins/NSAIDs  Dose adjust meds for decreased GFR      Methamphetamine abuse (HCC)- (present on admission)  Assessment & Plan  Longstanding history of abuse  Recommend cessation    Alcoholic cirrhosis of liver with ascites (HCC)- (present on admission)  Assessment & Plan  Decompensated  Monitor volume status  Ammonia WNL  INR 1.51        Greater  than 51 minutes spent prepping to see patient (e.g. review of tests) obtaining and/or reviewing separately obtained history. Performing a medically appropriate examination and/ evaluation.  Counseling and educating the patient/family/caregiver.  Ordering medications, tests, or procedures.  Referring and communicating with other health care professionals.  Documenting clinical information in EPIC.  Independently interpreting results and communicating results to patient/family/caregiver.  Care coordination.      VTE prophylaxis: heparin ppx      I have performed a physical exam and reviewed and updated ROS and Plan today (5/31/2025). In review of yesterday's note (5/30/2025), there are no changes except as documented above.

## 2025-05-31 NOTE — DOCUMENTATION QUERY
"                                                                         [facility]                                                                       Query Response Note      PATIENT:               TREVON HAUSER  ACCT #:                  [id number]  MRN:                     [id number]  :                      1965  ADMIT DATE:       2025 8:47 PM  DISCH DATE:          RESPONDING  PROVIDER #:        [id number]           QUERY TEXT:    Acute hypoxemic respiratory failure is being documented in the narrative of the Progress Notes. Per ED and H&P patient has shortness of breath however, per H&P and subsequent progress notes patient has \"No respiratory distress\".     Can the status of acute hypoxemic respiratory failure be further clarified based on the clinical indicators and treatment?    The patient's Clinical Indicators include:   Admit  59 y.o. M w/ lower extremity swelling and shortness of breath for the past 2 days.     H&P: Acute on chronic HFrEF, pulm htn, acidosis, SUZY on CKD 3, EtOH cirrhosis w/ ascites, meth abuse.  -- Physical Exam: \" Effort: No respiratory distress.\" & \" Positive for shortness of breath.\"     -   PN's: Physical Exam \" Effort: No respiratory distress.\" & \"Negative for shortness of breath.\".     Oximetry: 97% - 100% on room air. RR: 20 - 27   Oximetry: 92% - 98% on room air -> 98% on 2L of O2 = 110/0.28 = P/F Ratio 393.  RR: 16 - 20   Oximetry: 97% on 0.5L if O2 -> 91% - 94% on room air -> 96% on 0.5L of O2; RR: 16 - 18    Treatment: Oximetry; O2 supplementation as needed; tx underlying conditions  Risk Factors: Acute on chronic HFrEF; meth abuse; alcoholic cirrhosis of liver w/ ascites, CKD    Thank You,  Irish Garcia RN, CCDS  Senior Clinical    [email or web address]  Connect via [email or web address] Messenger  Options provided:   -- Acute hypoxic respiratory failure does not exist and amended documentation provided in the medical record   " -- Acute hypoxic respiratory failure exists, (Please document additional clinical indicators)   -- Other explanation, (please specify other explanation)      Query created by: Irish Garcia on 5/27/2025 5:08 AM    RESPONSE TEXT:    Acute hypoxic respiratory failure does not exist and amended documentation provided in the medical record          Electronically signed by:  ANDI HOUSTON MD 5/31/2025 7:47 AM

## 2025-06-01 LAB
ANION GAP SERPL CALC-SCNC: 13 MMOL/L (ref 7–16)
ANION GAP SERPL CALC-SCNC: 13 MMOL/L (ref 7–16)
ANION GAP SERPL CALC-SCNC: 15 MMOL/L (ref 7–16)
ANION GAP SERPL CALC-SCNC: 16 MMOL/L (ref 7–16)
BUN SERPL-MCNC: 47 MG/DL (ref 8–22)
BUN SERPL-MCNC: 51 MG/DL (ref 8–22)
BUN SERPL-MCNC: 52 MG/DL (ref 8–22)
BUN SERPL-MCNC: 53 MG/DL (ref 8–22)
CALCIUM SERPL-MCNC: 8.5 MG/DL (ref 8.5–10.5)
CALCIUM SERPL-MCNC: 8.9 MG/DL (ref 8.5–10.5)
CALCIUM SERPL-MCNC: 8.9 MG/DL (ref 8.5–10.5)
CALCIUM SERPL-MCNC: 9 MG/DL (ref 8.5–10.5)
CHLORIDE SERPL-SCNC: 92 MMOL/L (ref 96–112)
CHLORIDE SERPL-SCNC: 92 MMOL/L (ref 96–112)
CHLORIDE SERPL-SCNC: 93 MMOL/L (ref 96–112)
CHLORIDE SERPL-SCNC: 97 MMOL/L (ref 96–112)
CO2 SERPL-SCNC: 16 MMOL/L (ref 20–33)
CO2 SERPL-SCNC: 16 MMOL/L (ref 20–33)
CO2 SERPL-SCNC: 20 MMOL/L (ref 20–33)
CO2 SERPL-SCNC: 20 MMOL/L (ref 20–33)
CREAT SERPL-MCNC: 1.46 MG/DL (ref 0.5–1.4)
CREAT SERPL-MCNC: 1.47 MG/DL (ref 0.5–1.4)
GFR SERPLBLD CREATININE-BSD FMLA CKD-EPI: 54 ML/MIN/1.73 M 2
GFR SERPLBLD CREATININE-BSD FMLA CKD-EPI: 55 ML/MIN/1.73 M 2
GLUCOSE SERPL-MCNC: 120 MG/DL (ref 65–99)
GLUCOSE SERPL-MCNC: 145 MG/DL (ref 65–99)
GLUCOSE SERPL-MCNC: 167 MG/DL (ref 65–99)
GLUCOSE SERPL-MCNC: 215 MG/DL (ref 65–99)
POTASSIUM SERPL-SCNC: 4.3 MMOL/L (ref 3.6–5.5)
POTASSIUM SERPL-SCNC: 4.9 MMOL/L (ref 3.6–5.5)
POTASSIUM SERPL-SCNC: 5.2 MMOL/L (ref 3.6–5.5)
POTASSIUM SERPL-SCNC: 5.3 MMOL/L (ref 3.6–5.5)
SODIUM SERPL-SCNC: 124 MMOL/L (ref 135–145)
SODIUM SERPL-SCNC: 125 MMOL/L (ref 135–145)
SODIUM SERPL-SCNC: 126 MMOL/L (ref 135–145)
SODIUM SERPL-SCNC: 128 MMOL/L (ref 135–145)

## 2025-06-01 PROCEDURE — 700102 HCHG RX REV CODE 250 W/ 637 OVERRIDE(OP): Performed by: INTERNAL MEDICINE

## 2025-06-01 PROCEDURE — A9270 NON-COVERED ITEM OR SERVICE: HCPCS | Performed by: INTERNAL MEDICINE

## 2025-06-01 PROCEDURE — 700102 HCHG RX REV CODE 250 W/ 637 OVERRIDE(OP): Performed by: STUDENT IN AN ORGANIZED HEALTH CARE EDUCATION/TRAINING PROGRAM

## 2025-06-01 PROCEDURE — 700111 HCHG RX REV CODE 636 W/ 250 OVERRIDE (IP): Performed by: STUDENT IN AN ORGANIZED HEALTH CARE EDUCATION/TRAINING PROGRAM

## 2025-06-01 PROCEDURE — 700102 HCHG RX REV CODE 250 W/ 637 OVERRIDE(OP): Performed by: HOSPITALIST

## 2025-06-01 PROCEDURE — A9270 NON-COVERED ITEM OR SERVICE: HCPCS | Performed by: HOSPITALIST

## 2025-06-01 PROCEDURE — A9270 NON-COVERED ITEM OR SERVICE: HCPCS | Performed by: STUDENT IN AN ORGANIZED HEALTH CARE EDUCATION/TRAINING PROGRAM

## 2025-06-01 PROCEDURE — 80048 BASIC METABOLIC PNL TOTAL CA: CPT | Mod: 91

## 2025-06-01 PROCEDURE — 99233 SBSQ HOSP IP/OBS HIGH 50: CPT | Performed by: INTERNAL MEDICINE

## 2025-06-01 PROCEDURE — 770020 HCHG ROOM/CARE - TELE (206)

## 2025-06-01 RX ORDER — FUROSEMIDE 10 MG/ML
40 INJECTION INTRAMUSCULAR; INTRAVENOUS DAILY
Status: DISCONTINUED | OUTPATIENT
Start: 2025-06-02 | End: 2025-06-02 | Stop reason: HOSPADM

## 2025-06-01 RX ADMIN — TAMSULOSIN HYDROCHLORIDE 0.4 MG: 0.4 CAPSULE ORAL at 08:39

## 2025-06-01 RX ADMIN — SODIUM ZIRCONIUM CYCLOSILICATE 10 G: 10 POWDER, FOR SUSPENSION ORAL at 18:31

## 2025-06-01 RX ADMIN — SODIUM ZIRCONIUM CYCLOSILICATE 10 G: 10 POWDER, FOR SUSPENSION ORAL at 11:21

## 2025-06-01 RX ADMIN — METOPROLOL SUCCINATE 50 MG: 50 TABLET, EXTENDED RELEASE ORAL at 05:03

## 2025-06-01 RX ADMIN — SODIUM CHLORIDE 2 G: 1 TABLET ORAL at 11:23

## 2025-06-01 RX ADMIN — HEPARIN SODIUM 5000 UNITS: 5000 INJECTION, SOLUTION INTRAVENOUS; SUBCUTANEOUS at 21:29

## 2025-06-01 RX ADMIN — HEPARIN SODIUM 5000 UNITS: 5000 INJECTION, SOLUTION INTRAVENOUS; SUBCUTANEOUS at 05:03

## 2025-06-01 RX ADMIN — METHOCARBAMOL 500 MG: 500 TABLET ORAL at 01:18

## 2025-06-01 RX ADMIN — OMEPRAZOLE 20 MG: 20 CAPSULE, DELAYED RELEASE ORAL at 18:31

## 2025-06-01 RX ADMIN — METHOCARBAMOL 500 MG: 500 TABLET ORAL at 16:51

## 2025-06-01 RX ADMIN — FUROSEMIDE 40 MG: 40 TABLET ORAL at 05:03

## 2025-06-01 RX ADMIN — METHOCARBAMOL 500 MG: 500 TABLET ORAL at 08:39

## 2025-06-01 RX ADMIN — SODIUM ZIRCONIUM CYCLOSILICATE 10 G: 10 POWDER, FOR SUSPENSION ORAL at 21:29

## 2025-06-01 RX ADMIN — SODIUM CHLORIDE 2 G: 1 TABLET ORAL at 18:31

## 2025-06-01 RX ADMIN — HEPARIN SODIUM 5000 UNITS: 5000 INJECTION, SOLUTION INTRAVENOUS; SUBCUTANEOUS at 14:31

## 2025-06-01 RX ADMIN — METHOCARBAMOL 500 MG: 500 TABLET ORAL at 22:52

## 2025-06-01 RX ADMIN — OMEPRAZOLE 20 MG: 20 CAPSULE, DELAYED RELEASE ORAL at 05:03

## 2025-06-01 RX ADMIN — ALLOPURINOL 300 MG: 300 TABLET ORAL at 18:31

## 2025-06-01 RX ADMIN — SODIUM CHLORIDE 2 G: 1 TABLET ORAL at 08:39

## 2025-06-01 ASSESSMENT — FIBROSIS 4 INDEX: FIB4 SCORE: 2.13

## 2025-06-01 ASSESSMENT — PAIN DESCRIPTION - PAIN TYPE
TYPE: ACUTE PAIN
TYPE: ACUTE PAIN

## 2025-06-01 ASSESSMENT — ENCOUNTER SYMPTOMS
CONSTIPATION: 0
ABDOMINAL PAIN: 1
SHORTNESS OF BREATH: 0

## 2025-06-01 NOTE — PROGRESS NOTES
Bedside report received from Justice ANDREWS. Pt assessment complete. Pt AO x 4. Reviewed plan of care with pt. Pt is tele monitored. Chart and labs reviewed. Bed in lowest position, and 2 side rails up. Pt educated on call lights, call light within reach. Hourly rounding in place

## 2025-06-01 NOTE — PROGRESS NOTES
Monitor Summary  Rhythm: SR  Rate: 82-96  Ectopy: (F) PVC, (R) Coup  Measurements: .23/.09/.31  ---12 hr Chart Review---

## 2025-06-01 NOTE — CARE PLAN
The patient is Stable - Low risk of patient condition declining or worsening    Shift Goals  Clinical Goals: safety, monitor labs  Patient Goals: increase sodium, discahrge  Family Goals: louis    Progress made toward(s) clinical / shift goals:  pt safety maintained. Lab results reviewed, discussed with provider. Plan of care reviewed with patient and brother at bedside, all questions addressed. Pt medicated for pain per MAR, pt has most relief of abd pain and cramping with muscle relaxant.       Problem: Knowledge Deficit - Standard  Goal: Patient and family/care givers will demonstrate understanding of plan of care, disease process/condition, diagnostic tests and medications  Outcome: Progressing     Problem: Pain - Standard  Goal: Alleviation of pain or a reduction in pain to the patient’s comfort goal  Outcome: Progressing       Patient is not progressing towards the following goals:

## 2025-06-01 NOTE — CARE PLAN
The patient is Stable - Low risk of patient condition declining or worsening    Shift Goals  Clinical Goals: Monitor abnormal labs, Safety  Patient Goals: Increase sodium, Food  Family Goals: YOEL    Progress made toward(s) clinical / shift goals:      Problem: Knowledge Deficit - Standard  Goal: Patient and family/care givers will demonstrate understanding of plan of care, disease process/condition, diagnostic tests and medications  Description: Patient and family/caregiver oriented to unit, equipment, visitation policy and means for communicating concern. Complete/review Learning Assessment. Assess knowledge level of disease process/condition, treatment plan, diagnostic tests and medications. Explain disease process/condition, treatment plan, diagnostic tests and medications    Outcome: Progressing     Problem: Pain - Standard  Goal: Alleviation of pain or a reduction in pain to the patient’s comfort goal  Description: Document pain using the appropriate pain scale per order or unit policy. Educate and implement non-pharmacologic comfort measures (i.e. relaxation, distraction, massage, cold/heat therapy, etc.). Pain management medications as ordered. Reassess pain after pain med administration per policy. If opiods administered assess patient's response to pain medication is appropriate per POSS sedation scale. Follow pain management plan developed in collaboration with patient and interdisciplinary team (including palliative care or pain specialists if applicable).    Outcome: Progressing       Patient is not progressing towards the following goals:

## 2025-06-01 NOTE — PROGRESS NOTES
[facility] Medicine Daily Progress Note    Date of Service  6/1/2025    Chief Complaint  Santino Zabala is a 59 y.o. male admitted 5/22/2025 with acute hypoxic respiratory failure secondary to acute decompensated heart failure    Hospital Course  The patient is 59-year-old male with a past medical history significant for HFrEF (EF 20%), alcoholic cirrhosis, and methamphetamine use disorder.    The patient was hospitalized earlier this same month for acute decompensated heart failure for which she was discontinued from Aldactone, Entresto, and losartan for concern of hyperkalemia.    The patient was initially evaluated emergency department for chest pain, shortness of breath, bilateral lower extremity swelling for approximately 2 days.  Unfortunate reported that he did not think the Lasix was working very well as he became fluid overloaded again.  His BNP was 22,000 in the emergency department..  Fluid overloaded on clinical examination. The patient was initiated on IV diuresis.  Additionally, he was provided spironolactone, metoprolol, and Farxiga for his heart failure.  His IV diuresis was inevitably titrated down to oral dosing prior to discharge.  Unfortunately, the patient developed an SUZY which resulted in the holding of his GDMT.  Primary service will resume diuretic as clinically able.    The patient was found to have an elevated troponin while hospitalized.  He has had some increased troponin levels in the past, however these values are still higher than those baseline elevations.  As such, the patient underwent a limited echocardiogram to assess for wall motion abnormality this result is still pending as of 5/26.  The patient has no chest pain.    The patient was found to have elevated LFTs with a normal bilirubin on laboratory imaging.  The values during this hospitalization are substantially improved compared to previous hospitalizations.  The patient reported ongoing abdominal pain which she attributed  to constipation.  However, he did have a positive Pires sign.  The patient underwent an right upper quadrant abdominal ultrasound and was found to have mild gallbladder wall thickening without visualized shadowing stones concerning for acalculous cholecystitis and hepatomegaly. He underwent a HIDA scan which was negative.  He had a large bowel movement on 5/26 making constipation less likely the etiology.  He was provided PPI with a GI cocktail with no improvement in his symptoms.  CT scan done for the same concern on 5/9 that was negative with the exception of hepatomegaly.  Repeat CT scan ordered and pending.    Interval Problem Update  Patient seen and evaluated at bedside  Pertinent labs and imaging reviewed  Potassium 3.5, 4.5 4.9  Glucose 143, 123  Creatinine 1.63, 1.38, 1.49, 1.94  GDMT held  AST 62, 53, 52 (previous AST >7000 in 12/24)  ALT 35, 31, 28 (previous ALT >5000 in 12/24)  H/o hepatocongestion during previous hospitalizations  HIDA scan negative  Lipase ordered and pending  Afebrile, hemodynamically stable, no oxygen requirements  Voiding, stooling, tolerating oral intake well  Previous bowel movement 5/26  Patient with ongoing abdominal pain that did not have any improvement with PPI and GI cocktail plan CT abdomen pelvis with contrast ordered and pending  5/27: patient seen and examined, had hypokalemia ealry this AM and received hyperkalemia protocol  Now 5.4 close monitor his potasium and also monitor on telemetry for arhythmia  Close monitor for decompensation   5/28: Patient seen and examined afebrile, no nausea or vomiting.in regards to his hyperkalemia is improving  In regards to hyponatremia: sodium 126 today  will start on salt tabs   Close monitor his sodium   5/29: Patient seen and examined afebrile, no nausea or vomiting, his sodium 122 today increasing salt tabs to 2 gram TID also start on nss IV   Also hyperkalemia potassium of 5.6 will give IV insulin and dextrose cont on  lokelma  Close monitor on tele for arhythmia   5/30: Patient seen and examined afebrile, no nausea or vomiting hyperkalemia potassium of 5.7 today giving IV insulin and dextrose. Starting lasix  Also for hyponatremia cont salt tabs   Close monitor for decompensation   5/31: Patient seen and examined, afebrile resting in bed, hyperkalemia improving but still having hyponatremia  Cont on salt tabs  Cont lasix  Close monitor for decompensation   6/1: Patient seen and examined, afebrile hyponatremia sodium 125 today, will place fluid restriction on patient , cont salt tabs  Hyperkalemia: increased lokelma dose   Close monitor for decompensation   I have discussed this patient's plan of care and discharge plan at IDT rounds today with Case Management, Nursing, Nursing leadership, and other members of the IDT team.    Consultants/Specialty  None    Code Status  Full Code    Disposition  The patient is not medically cleared for discharge to home or a post-acute facility.      I have placed the appropriate orders for post-discharge needs.    Review of Systems  Review of Systems   Respiratory:  Negative for shortness of breath.    Cardiovascular:  Negative for leg swelling.   Gastrointestinal:  Positive for abdominal pain. Negative for constipation.        Physical Exam  Temp:  [36.3 °C (97.3 °F)-36.4 °C (97.5 °F)] 36.4 °C (97.5 °F)  Pulse:  [70-98] 92  Resp:  [16-18] 18  BP: ()/(66-83) 103/71  SpO2:  [94 %-98 %] 94 %    Physical Exam  Constitutional:       General: He is not in acute distress.     Appearance: Normal appearance. He is normal weight.   HENT:      Head: Normocephalic and atraumatic.      Nose: Nose normal.   Eyes:      Extraocular Movements: Extraocular movements intact.   Pulmonary:      Effort: Pulmonary effort is normal. No respiratory distress.      Breath sounds: Examination of the right-lower field reveals decreased breath sounds. Examination of the left-lower field reveals decreased breath sounds.  Decreased breath sounds present.   Abdominal:      General: There is distension.      Palpations: Abdomen is soft.      Tenderness: There is generalized abdominal tenderness. Positive signs include Pires's sign.   Musculoskeletal:      Cervical back: Normal range of motion.      Right lower leg: No edema.      Left lower leg: No edema.   Skin:     General: Skin is warm and dry.   Neurological:      General: No focal deficit present.      Mental Status: He is alert and oriented to person, place, and time.   Psychiatric:         Mood and Affect: Mood normal.         Behavior: Behavior normal.         Thought Content: Thought content normal.         Fluids  No intake or output data in the 24 hours ending 06/01/25 1545       Laboratory  Recent Labs     05/30/25  0139 05/31/25  0200   WBC 6.7 7.0   RBC 4.51* 4.51*   HEMOGLOBIN 15.1 14.8   HEMATOCRIT 45.1 44.0   .0* 97.6   MCH 33.5* 32.8   MCHC 33.5 33.6   RDW 54.4* 51.7*   PLATELETCT 252 239   MPV 10.3 10.6     Recent Labs     06/01/25  0120 06/01/25  0734 06/01/25  1330   SODIUM 124* 125* 126*   POTASSIUM 5.3 5.2 4.9   CHLORIDE 92* 92* 93*   CO2 16* 20 20   GLUCOSE 120* 145* 215*   BUN 53* 52* 51*   CREATININE 1.46* 1.47* 1.47*   CALCIUM 8.9 9.0 8.9                     Imaging  CT-ABDOMEN-PELVIS WITH   Final Result         1.  Gallbladder wall thickening with slight adjacent hazy fat stranding, appearance suggesting changes of cholecystitis. Findings corresponding with right upper quadrant sonogram   2.  Hepatomegaly.   3.  Irregular hepatic contour favoring changes of cirrhosis.   4.  Scattered minimal abdominal ascites.   5.  Cardiomegaly.   6.  Atherosclerosis and atherosclerotic coronary artery disease.            EC-ECHOCARDIOGRAM LTD W/ CONT   Final Result      NM-BILIARY (HIDA) SCAN WITH CCK   Final Result         1. Normal hepatobiliary scan. No evidence of acute cholecystitis.      2. Normal gallbladder ejection fraction.         US-RUQ   Final Result          1.  Mild gallbladder wall thickening without visualized shadowing stones, consider acalculous cholecystitis. Could be further evaluated with HIDA scan as clinically appropriate.   2.  Hepatomegaly      DX-CHEST-PORTABLE (1 VIEW)   Final Result         1.  No acute cardiopulmonary disease.   2.  Cardiomegaly           Assessment/Plan  * Acute on chronic systolic heart failure (HCC)- (present on admission)  Assessment & Plan  Symptoms to guide titration of loop diuretic dosing: Hold for SUZY  Monitor urine output  Daily weight  Initiate and optimize GDMT  ARNI: Hold for SUZY  Evidence-based beta-blocker: Metoprolol  Mineralocorticoid receptor antagonist: Hold for SUZY  SGLT2 inhibitor: Hold for SUZY  Monitor blood pressure and renal function and electrolytes while undergoing aggressive diuresis and optimizing GDMT   Echocardiogram ordered and pending    Elevated troponin  Assessment & Plan  Present  No chest pain  Limited echocardiogram ordered to assess wall motion abnormality    Generalized abdominal pain  Assessment & Plan  Patient reported generalized abdominal pain and distention which he attributed to constipation  The patient did have some increased abdominal pain in the right upper quadrant compared to other areas of his abdomen  HIDA scan negative  No improvement in symptoms with PPI/GI cocktail  Bowel movement 5/26  CT abdomen pelvis pending    Pulmonary hypertension (HCC)- (present on admission)  Assessment & Plan  Monitor volume status    Metabolic acidosis, increased anion gap- (present on admission)  Assessment & Plan  Continue to trend    Acute renal failure superimposed on stage 3 chronic kidney disease (HCC)- (present on admission)  Assessment & Plan  Start IV Lasix  Monitor BMP and assess response  Avoid IV contrast/nephrotoxins/NSAIDs  Dose adjust meds for decreased GFR      Methamphetamine abuse (HCC)- (present on admission)  Assessment & Plan  Longstanding history of abuse  Recommend  cessation    Alcoholic cirrhosis of liver with ascites (HCC)- (present on admission)  Assessment & Plan  Decompensated  Monitor volume status  Ammonia WNL  INR 1.51      VTE prophylaxis: heparin ppx  Greater than 51 minutes spent prepping to see patient (e.g. review of tests) obtaining and/or reviewing separately obtained history. Performing a medically appropriate examination and/ evaluation.  Counseling and educating the patient/family/caregiver.  Ordering medications, tests, or procedures.  Referring and communicating with other health care professionals.  Documenting clinical information in EPIC.  Independently interpreting results and communicating results to patient/family/caregiver.  Care coordination.      I have performed a physical exam and reviewed and updated ROS and Plan today (6/1/2025). In review of yesterday's note (5/31/2025), there are no changes except as documented above.

## 2025-06-02 ENCOUNTER — PHARMACY VISIT (OUTPATIENT)
Dept: PHARMACY | Facility: MEDICAL CENTER | Age: 60
End: 2025-06-02
Payer: COMMERCIAL

## 2025-06-02 VITALS
RESPIRATION RATE: 18 BRPM | SYSTOLIC BLOOD PRESSURE: 119 MMHG | TEMPERATURE: 97.7 F | OXYGEN SATURATION: 95 % | HEIGHT: 65 IN | HEART RATE: 89 BPM | DIASTOLIC BLOOD PRESSURE: 85 MMHG | WEIGHT: 126.1 LBS | BODY MASS INDEX: 21.01 KG/M2

## 2025-06-02 LAB
ANION GAP SERPL CALC-SCNC: 10 MMOL/L (ref 7–16)
ANION GAP SERPL CALC-SCNC: 12 MMOL/L (ref 7–16)
BUN SERPL-MCNC: 38 MG/DL (ref 8–22)
BUN SERPL-MCNC: 44 MG/DL (ref 8–22)
CALCIUM SERPL-MCNC: 8.4 MG/DL (ref 8.5–10.5)
CALCIUM SERPL-MCNC: 8.4 MG/DL (ref 8.5–10.5)
CHLORIDE SERPL-SCNC: 98 MMOL/L (ref 96–112)
CHLORIDE SERPL-SCNC: 99 MMOL/L (ref 96–112)
CO2 SERPL-SCNC: 17 MMOL/L (ref 20–33)
CO2 SERPL-SCNC: 20 MMOL/L (ref 20–33)
CREAT SERPL-MCNC: 1.13 MG/DL (ref 0.5–1.4)
CREAT SERPL-MCNC: 1.15 MG/DL (ref 0.5–1.4)
ERYTHROCYTE [DISTWIDTH] IN BLOOD BY AUTOMATED COUNT: 51.1 FL (ref 35.9–50)
EST. AVERAGE GLUCOSE BLD GHB EST-MCNC: 151 MG/DL
GFR SERPLBLD CREATININE-BSD FMLA CKD-EPI: 73 ML/MIN/1.73 M 2
GFR SERPLBLD CREATININE-BSD FMLA CKD-EPI: 74 ML/MIN/1.73 M 2
GLUCOSE SERPL-MCNC: 101 MG/DL (ref 65–99)
GLUCOSE SERPL-MCNC: 179 MG/DL (ref 65–99)
HBA1C MFR BLD: 6.9 % (ref 4–5.6)
HCT VFR BLD AUTO: 40.9 % (ref 42–52)
HGB BLD-MCNC: 13.8 G/DL (ref 14–18)
MCH RBC QN AUTO: 32.9 PG (ref 27–33)
MCHC RBC AUTO-ENTMCNC: 33.7 G/DL (ref 32.3–36.5)
MCV RBC AUTO: 97.6 FL (ref 81.4–97.8)
PLATELET # BLD AUTO: 255 K/UL (ref 164–446)
PMV BLD AUTO: 11.1 FL (ref 9–12.9)
POTASSIUM SERPL-SCNC: 3.8 MMOL/L (ref 3.6–5.5)
POTASSIUM SERPL-SCNC: 4.2 MMOL/L (ref 3.6–5.5)
RBC # BLD AUTO: 4.19 M/UL (ref 4.7–6.1)
SODIUM SERPL-SCNC: 127 MMOL/L (ref 135–145)
SODIUM SERPL-SCNC: 129 MMOL/L (ref 135–145)
WBC # BLD AUTO: 5.4 K/UL (ref 4.8–10.8)

## 2025-06-02 PROCEDURE — 700102 HCHG RX REV CODE 250 W/ 637 OVERRIDE(OP): Performed by: HOSPITALIST

## 2025-06-02 PROCEDURE — A9270 NON-COVERED ITEM OR SERVICE: HCPCS | Performed by: HOSPITALIST

## 2025-06-02 PROCEDURE — A9270 NON-COVERED ITEM OR SERVICE: HCPCS | Performed by: STUDENT IN AN ORGANIZED HEALTH CARE EDUCATION/TRAINING PROGRAM

## 2025-06-02 PROCEDURE — 700102 HCHG RX REV CODE 250 W/ 637 OVERRIDE(OP): Performed by: INTERNAL MEDICINE

## 2025-06-02 PROCEDURE — 99239 HOSP IP/OBS DSCHRG MGMT >30: CPT | Performed by: INTERNAL MEDICINE

## 2025-06-02 PROCEDURE — 700111 HCHG RX REV CODE 636 W/ 250 OVERRIDE (IP): Performed by: STUDENT IN AN ORGANIZED HEALTH CARE EDUCATION/TRAINING PROGRAM

## 2025-06-02 PROCEDURE — 700102 HCHG RX REV CODE 250 W/ 637 OVERRIDE(OP): Performed by: STUDENT IN AN ORGANIZED HEALTH CARE EDUCATION/TRAINING PROGRAM

## 2025-06-02 PROCEDURE — A9270 NON-COVERED ITEM OR SERVICE: HCPCS | Performed by: INTERNAL MEDICINE

## 2025-06-02 PROCEDURE — 83036 HEMOGLOBIN GLYCOSYLATED A1C: CPT

## 2025-06-02 PROCEDURE — 80048 BASIC METABOLIC PNL TOTAL CA: CPT

## 2025-06-02 PROCEDURE — RXMED WILLOW AMBULATORY MEDICATION CHARGE: Performed by: INTERNAL MEDICINE

## 2025-06-02 PROCEDURE — 85027 COMPLETE CBC AUTOMATED: CPT

## 2025-06-02 RX ORDER — SODIUM CHLORIDE 1 G/1
2 TABLET ORAL
Qty: 9 TABLET | Refills: 0 | Status: ON HOLD | OUTPATIENT
Start: 2025-06-02 | End: 2025-06-06

## 2025-06-02 RX ADMIN — OMEPRAZOLE 20 MG: 20 CAPSULE, DELAYED RELEASE ORAL at 04:13

## 2025-06-02 RX ADMIN — HEPARIN SODIUM 5000 UNITS: 5000 INJECTION, SOLUTION INTRAVENOUS; SUBCUTANEOUS at 04:13

## 2025-06-02 RX ADMIN — SODIUM CHLORIDE 2 G: 1 TABLET ORAL at 07:35

## 2025-06-02 RX ADMIN — TAMSULOSIN HYDROCHLORIDE 0.4 MG: 0.4 CAPSULE ORAL at 07:35

## 2025-06-02 RX ADMIN — SODIUM CHLORIDE 2 G: 1 TABLET ORAL at 11:13

## 2025-06-02 RX ADMIN — OXYCODONE 5 MG: 5 TABLET ORAL at 00:22

## 2025-06-02 ASSESSMENT — PAIN DESCRIPTION - PAIN TYPE: TYPE: ACUTE PAIN

## 2025-06-02 NOTE — DISCHARGE SUMMARY
Discharge Summary    CHIEF COMPLAINT ON ADMISSION  Chief Complaint   Patient presents with    Chest Pain     Chest pain with shortness of breath since 5 pm    Past medical history of chronic systolic heart failure LEF 20%, nonischemic cardiomyopathy, continued methamphetamine use, pulmonary hypertension     Shortness of Breath    Leg Swelling     From few days, progressively worsening       Reason for Admission  Legs swelling     Admission Date  5/22/2025    CODE STATUS  Prior    HPI & HOSPITAL COURSE    The patient is 59-year-old male with a past medical history significant for HFrEF (EF 20%), alcoholic cirrhosis, and methamphetamine use disorder.   The patient was hospitalized earlier this same month for acute decompensated heart failure for which she was discontinued from Aldactone, Entresto, and losartan for concern of hyperkalemia.    The patient was initially evaluated emergency department for chest pain, shortness of breath, bilateral lower extremity swelling for approximately 2 days.  Unfortunate reported that he did not think the Lasix was working very well as he became fluid overloaded again.  His BNP was 22,000 in the emergency department..  Fluid overloaded on clinical examination. The patient was initiated on IV diuresis.  Additionally, he was provided spironolactone, metoprolol, and Farxiga for his heart failure.  His IV diuresis was inevitably titrated down to oral dosing prior to discharge.  Unfortunately, the patient developed an SUZY which resulted in the holding of his GDMT.  Primary service will resume diuretic as clinically able.     The patient was found to have an elevated troponin while hospitalized.  He has had some increased troponin levels in the past, however these values are still higher than those baseline elevations.  As such, the patient underwent a limited echocardiogram to assess for wall motion abnormality this result is still pending as of 5/26.  The patient has no chest pain.  The  patient was found to have elevated LFTs with a normal bilirubin on laboratory imaging.  The values during this hospitalization are substantially improved compared to previous hospitalizations.  The patient reported ongoing abdominal pain which she attributed to constipation.  However, he did have a positive Pires sign.  The patient underwent an right upper quadrant abdominal ultrasound and was found to have mild gallbladder wall thickening without visualized shadowing stones concerning for acalculous cholecystitis and hepatomegaly. He underwent a HIDA scan which was negative.  He had a large bowel movement on 5/26 making constipation less likely the etiology.  He was provided PPI with a GI cocktail with no improvement in his symptoms.  CT scan done for the same concern on 5/9 that was negative with the exception of hepatomegaly.   Patient was also having hyponatremia and hyperkalemia and was started on salt tabs and lokelma now hyperkalemia has resolved and hyponatremia improving  Patient feeling better and will be discharged home today       The patient met 2-midnight criteria for an inpatient stay at the time of discharge.    Discharge Date  6/2/2025    FOLLOW UP ITEMS POST DISCHARGE  PCP    DISCHARGE DIAGNOSES  Principal Problem:    Acute on chronic systolic heart failure (HCC) (POA: Yes)  Active Problems:    Alcoholic cirrhosis of liver with ascites (HCC) (POA: Yes)    Methamphetamine abuse (HCC) (POA: Yes)    Acute renal failure superimposed on stage 3 chronic kidney disease (HCC) (POA: Yes)    Metabolic acidosis, increased anion gap (POA: Yes)    Pulmonary hypertension (HCC) (POA: Yes)    Generalized abdominal pain (POA: Unknown)    Elevated troponin (POA: Unknown)  Resolved Problems:    * No resolved hospital problems. *      FOLLOW UP  Future Appointments   Date Time Provider Department Center   6/10/2025  1:30 PM STEVE Kimble None     [facility]  [address] [facility]  [address]  [phone]  Go on 6/9/2025  Arrive at 8:00am for ongoing heart failure treatment. This is a walk-in clinic. Patients are seen on a first come, first served basis, wait times may vary. This clinic offers a sliding-fee scale.      MEDICATIONS ON DISCHARGE     Medication List        START taking these medications        Instructions   sodium chloride 1 GM Tabs  Commonly known as: Salt   Take 2 Tablets by mouth 3 times a day with meals.  Dose: 2 g            CONTINUE taking these medications        Instructions   allopurinol 300 MG Tabs  Commonly known as: Zyloprim   Take 1 Tablet by mouth every day.  Dose: 300 mg     furosemide 40 MG Tabs  Commonly known as: Lasix   Take 40 mg by mouth every day.  Dose: 40 mg     loperamide 2 MG Caps  Commonly known as: Imodium   Take 2 mg by mouth 4 times a day as needed for Diarrhea.  Dose: 2 mg     metoprolol SR 50 MG Tb24  Commonly known as: Toprol XL   Take 50 mg by mouth every day.  Dose: 50 mg     omeprazole 20 MG delayed-release capsule  Commonly known as: PriLOSEC   Take 1 Capsule by mouth every day.  Dose: 20 mg     tamsulosin 0.4 MG capsule  Commonly known as: Flomax   Take 1 Capsule by mouth 1/2 hour after breakfast.  Dose: 0.4 mg            STOP taking these medications      aspirin 81 MG EC tablet              Allergies  Allergies[1]    DIET  No orders of the defined types were placed in this encounter.      ACTIVITY  As tolerated.  Weight bearing as tolerated    CONSULTATIONS  None    PROCEDURES  None     LABORATORY  Lab Results   Component Value Date    SODIUM 129 (L) 06/02/2025    POTASSIUM 4.2 06/02/2025    CHLORIDE 99 06/02/2025    CO2 20 06/02/2025    GLUCOSE 101 (H) 06/02/2025    BUN 38 (H) 06/02/2025    CREATININE 1.13 06/02/2025        Lab Results   Component Value Date    WBC 5.4 06/02/2025    HEMOGLOBIN 13.8 (L) 06/02/2025    HEMATOCRIT 40.9 (L) 06/02/2025    PLATELETCT 255 06/02/2025        Total time of the discharge process exceeds 35 minutes.        [1] No Known Allergies

## 2025-06-02 NOTE — DISCHARGE INSTRUCTIONS
HF Patient Discharge Instructions  Monitor your weight daily, and maintain a weight chart, to track your weight changes.   Activity as tolerated, unless your Doctor has ordered otherwise. Other activity order: .  Follow a low fat, low cholesterol, low salt diet unless instructed otherwise by your Doctor. Read the labels on the back of food products and track your intake of fat, cholesterol and salt.   Fluid Restriction Yes. If a Fluid Restriction has been ordered by your Doctor, measure fluids with a measuring cup to ensure that you are not exceeding the restriction.   No smoking.  Oxygen No. If your Doctor has ordered that you wear Oxygen at home, it is important to wear it as ordered.  Did you receive an explanation from staff on the importance of taking each of your medications and why it is necessary to keep taking them unless your doctor says to stop? Yes  Were all of your questions answered about how to manage your heart failure and what to do if you have increased signs and symptoms after you go home? Yes  Do you feel like your heart failure care team involved you in the care treatment plan and allowed you to make decisions regarding your care while in the hospital and addressed any discharge needs you might have? Yes    See the educational handout provided at discharge for more information on monitoring your daily weight, activity and diet. This also explains more about Heart Failure, symptoms of a flare-up and some of the tests that you have undergone.     Warning Signs of a Flare-Up include:  Swelling in the ankles or lower legs.  Shortness of breath, while at rest, or while doing normal activities.   Shortness of breath at night when in bed, or coughing in bed.   Requiring more pillows to sleep at night, or needing to sit up at night to sleep.  Feeling weak, dizzy or fatigued.     When to call your Doctor:  Call your Primary Care Physician or Cardiologist if:   You experience any pain radiating to your  jaw or neck.  You have any difficulty breathing.  You experience weight gain of 3 lbs in a day or 5 lbs in a week.   You feel any palpitations or irregular heartbeats.  You become dizzy or lose consciousness.   If you have had an angiogram or had a pacemaker or AICD placed, and experience:  Bleeding, drainage or swelling at the surgical / puncture site.  Fever greater than 100.0 F  Shock from internal defibrillator.  Cool and / or numb extremities.     Please access the [facility] My HF Guide/Heart Failure Interactive Workbook:   [email or web address]

## 2025-06-03 ENCOUNTER — HOSPITAL ENCOUNTER (INPATIENT)
Facility: MEDICAL CENTER | Age: 60
LOS: 4 days | DRG: 292 | End: 2025-06-08
Attending: EMERGENCY MEDICINE | Admitting: STUDENT IN AN ORGANIZED HEALTH CARE EDUCATION/TRAINING PROGRAM
Payer: MEDICAID

## 2025-06-03 ENCOUNTER — APPOINTMENT (OUTPATIENT)
Dept: RADIOLOGY | Facility: MEDICAL CENTER | Age: 60
DRG: 292 | End: 2025-06-03
Attending: EMERGENCY MEDICINE
Payer: MEDICAID

## 2025-06-03 DIAGNOSIS — I50.23 ACUTE ON CHRONIC SYSTOLIC HEART FAILURE (HCC): ICD-10-CM

## 2025-06-03 DIAGNOSIS — E87.6 HYPOKALEMIA: ICD-10-CM

## 2025-06-03 DIAGNOSIS — R79.89 ELEVATED TROPONIN: Primary | ICD-10-CM

## 2025-06-03 DIAGNOSIS — R79.89 ELEVATED BRAIN NATRIURETIC PEPTIDE (BNP) LEVEL: ICD-10-CM

## 2025-06-03 DIAGNOSIS — L03.119 CELLULITIS OF LOWER EXTREMITY, UNSPECIFIED LATERALITY: ICD-10-CM

## 2025-06-03 DIAGNOSIS — R60.0 BILATERAL LOWER EXTREMITY EDEMA: ICD-10-CM

## 2025-06-03 DIAGNOSIS — R06.02 SHORTNESS OF BREATH: ICD-10-CM

## 2025-06-03 DIAGNOSIS — R10.84 GENERALIZED ABDOMINAL PAIN: ICD-10-CM

## 2025-06-03 DIAGNOSIS — R19.7 DIARRHEA, UNSPECIFIED TYPE: ICD-10-CM

## 2025-06-03 LAB
ALBUMIN SERPL BCP-MCNC: 3.7 G/DL (ref 3.2–4.9)
ALBUMIN/GLOB SERPL: 1.1 G/DL
ALP SERPL-CCNC: 102 U/L (ref 30–99)
ALT SERPL-CCNC: 26 U/L (ref 2–50)
ANION GAP SERPL CALC-SCNC: 13 MMOL/L (ref 7–16)
AST SERPL-CCNC: 40 U/L (ref 12–45)
BASOPHILS # BLD AUTO: 0.3 % (ref 0–1.8)
BASOPHILS # BLD: 0.02 K/UL (ref 0–0.12)
BILIRUB SERPL-MCNC: 1.2 MG/DL (ref 0.1–1.5)
BUN SERPL-MCNC: 29 MG/DL (ref 8–22)
CALCIUM ALBUM COR SERPL-MCNC: 8.7 MG/DL (ref 8.5–10.5)
CALCIUM SERPL-MCNC: 8.5 MG/DL (ref 8.5–10.5)
CHLORIDE SERPL-SCNC: 107 MMOL/L (ref 96–112)
CO2 SERPL-SCNC: 16 MMOL/L (ref 20–33)
CREAT SERPL-MCNC: 1.07 MG/DL (ref 0.5–1.4)
EOSINOPHIL # BLD AUTO: 0.09 K/UL (ref 0–0.51)
EOSINOPHIL NFR BLD: 1.5 % (ref 0–6.9)
ERYTHROCYTE [DISTWIDTH] IN BLOOD BY AUTOMATED COUNT: 54.6 FL (ref 35.9–50)
GFR SERPLBLD CREATININE-BSD FMLA CKD-EPI: 79 ML/MIN/1.73 M 2
GLOBULIN SER CALC-MCNC: 3.4 G/DL (ref 1.9–3.5)
GLUCOSE SERPL-MCNC: 107 MG/DL (ref 65–99)
HCT VFR BLD AUTO: 44 % (ref 42–52)
HGB BLD-MCNC: 14.6 G/DL (ref 14–18)
IMM GRANULOCYTES # BLD AUTO: 0.02 K/UL (ref 0–0.11)
IMM GRANULOCYTES NFR BLD AUTO: 0.3 % (ref 0–0.9)
LIPASE SERPL-CCNC: 50 U/L (ref 11–82)
LYMPHOCYTES # BLD AUTO: 0.58 K/UL (ref 1–4.8)
LYMPHOCYTES NFR BLD: 9.9 % (ref 22–41)
MCH RBC QN AUTO: 32.7 PG (ref 27–33)
MCHC RBC AUTO-ENTMCNC: 33.2 G/DL (ref 32.3–36.5)
MCV RBC AUTO: 98.7 FL (ref 81.4–97.8)
MONOCYTES # BLD AUTO: 0.46 K/UL (ref 0–0.85)
MONOCYTES NFR BLD AUTO: 7.8 % (ref 0–13.4)
NEUTROPHILS # BLD AUTO: 4.7 K/UL (ref 1.82–7.42)
NEUTROPHILS NFR BLD: 80.2 % (ref 44–72)
NRBC # BLD AUTO: 0 K/UL
NRBC BLD-RTO: 0 /100 WBC (ref 0–0.2)
NT-PROBNP SERPL IA-MCNC: ABNORMAL PG/ML (ref 0–125)
PLATELET # BLD AUTO: 248 K/UL (ref 164–446)
PMV BLD AUTO: 10.3 FL (ref 9–12.9)
POTASSIUM SERPL-SCNC: 4.8 MMOL/L (ref 3.6–5.5)
PROT SERPL-MCNC: 7.1 G/DL (ref 6–8.2)
RBC # BLD AUTO: 4.46 M/UL (ref 4.7–6.1)
SODIUM SERPL-SCNC: 136 MMOL/L (ref 135–145)
TROPONIN T SERPL-MCNC: 42 NG/L (ref 6–19)
WBC # BLD AUTO: 5.9 K/UL (ref 4.8–10.8)

## 2025-06-03 PROCEDURE — 83880 ASSAY OF NATRIURETIC PEPTIDE: CPT

## 2025-06-03 PROCEDURE — 85730 THROMBOPLASTIN TIME PARTIAL: CPT

## 2025-06-03 PROCEDURE — 83690 ASSAY OF LIPASE: CPT

## 2025-06-03 PROCEDURE — 85025 COMPLETE CBC W/AUTO DIFF WBC: CPT

## 2025-06-03 PROCEDURE — 700111 HCHG RX REV CODE 636 W/ 250 OVERRIDE (IP): Mod: JZ,UD | Performed by: EMERGENCY MEDICINE

## 2025-06-03 PROCEDURE — 84484 ASSAY OF TROPONIN QUANT: CPT

## 2025-06-03 PROCEDURE — 80053 COMPREHEN METABOLIC PANEL: CPT

## 2025-06-03 PROCEDURE — 96374 THER/PROPH/DIAG INJ IV PUSH: CPT

## 2025-06-03 PROCEDURE — 99285 EMERGENCY DEPT VISIT HI MDM: CPT

## 2025-06-03 PROCEDURE — 93005 ELECTROCARDIOGRAM TRACING: CPT | Mod: TC | Performed by: EMERGENCY MEDICINE

## 2025-06-03 PROCEDURE — 71045 X-RAY EXAM CHEST 1 VIEW: CPT

## 2025-06-03 PROCEDURE — 36415 COLL VENOUS BLD VENIPUNCTURE: CPT

## 2025-06-03 PROCEDURE — 96372 THER/PROPH/DIAG INJ SC/IM: CPT

## 2025-06-03 PROCEDURE — 85610 PROTHROMBIN TIME: CPT

## 2025-06-03 RX ORDER — FUROSEMIDE 10 MG/ML
40 INJECTION INTRAMUSCULAR; INTRAVENOUS ONCE
Status: COMPLETED | OUTPATIENT
Start: 2025-06-03 | End: 2025-06-03

## 2025-06-03 RX ADMIN — FUROSEMIDE 40 MG: 10 INJECTION, SOLUTION INTRAVENOUS at 23:43

## 2025-06-03 ASSESSMENT — FIBROSIS 4 INDEX: FIB4 SCORE: 1.99

## 2025-06-04 ENCOUNTER — APPOINTMENT (OUTPATIENT)
Dept: RADIOLOGY | Facility: MEDICAL CENTER | Age: 60
DRG: 292 | End: 2025-06-04
Attending: EMERGENCY MEDICINE
Payer: MEDICAID

## 2025-06-04 PROBLEM — R19.7 DIARRHEA: Status: ACTIVE | Noted: 2025-06-04

## 2025-06-04 PROBLEM — M10.9 GOUT: Status: ACTIVE | Noted: 2025-06-04

## 2025-06-04 PROBLEM — N40.0 BPH (BENIGN PROSTATIC HYPERPLASIA): Status: ACTIVE | Noted: 2025-06-04

## 2025-06-04 LAB
AMPHET UR QL SCN: NEGATIVE
APPEARANCE UR: CLEAR
APTT PPP: 27.6 SEC (ref 24.7–36)
B-OH-BUTYR SERPL-MCNC: 0.26 MMOL/L (ref 0.02–0.27)
BARBITURATES UR QL SCN: NEGATIVE
BENZODIAZ UR QL SCN: NEGATIVE
BILIRUB UR QL STRIP.AUTO: NEGATIVE
BZE UR QL SCN: NEGATIVE
CANNABINOIDS UR QL SCN: NEGATIVE
COLOR UR: YELLOW
EKG IMPRESSION: NORMAL
FENTANYL UR QL: NEGATIVE
GLUCOSE UR STRIP.AUTO-MCNC: NEGATIVE MG/DL
INR PPP: 1.12 (ref 0.87–1.13)
KETONES UR STRIP.AUTO-MCNC: NEGATIVE MG/DL
LACTATE SERPL-SCNC: 2.4 MMOL/L (ref 0.5–2)
LACTATE SERPL-SCNC: 2.5 MMOL/L (ref 0.5–2)
LEUKOCYTE ESTERASE UR QL STRIP.AUTO: NEGATIVE
METHADONE UR QL SCN: NEGATIVE
MICRO URNS: NORMAL
NITRITE UR QL STRIP.AUTO: NEGATIVE
OPIATES UR QL SCN: NEGATIVE
OXYCODONE UR QL SCN: NEGATIVE
PCP UR QL SCN: NEGATIVE
PH UR STRIP.AUTO: 5 [PH] (ref 5–8)
PROPOXYPH UR QL SCN: NEGATIVE
PROT UR QL STRIP: NEGATIVE MG/DL
PROTHROMBIN TIME: 14.4 SEC (ref 12–14.6)
RBC UR QL AUTO: NEGATIVE
SP GR UR STRIP.AUTO: 1.01
TROPONIN T SERPL-MCNC: 45 NG/L (ref 6–19)
UROBILINOGEN UR STRIP.AUTO-MCNC: 1 EU/DL

## 2025-06-04 PROCEDURE — 87077 CULTURE AEROBIC IDENTIFY: CPT | Mod: 91

## 2025-06-04 PROCEDURE — A9270 NON-COVERED ITEM OR SERVICE: HCPCS | Performed by: STUDENT IN AN ORGANIZED HEALTH CARE EDUCATION/TRAINING PROGRAM

## 2025-06-04 PROCEDURE — 700111 HCHG RX REV CODE 636 W/ 250 OVERRIDE (IP): Mod: JZ | Performed by: STUDENT IN AN ORGANIZED HEALTH CARE EDUCATION/TRAINING PROGRAM

## 2025-06-04 PROCEDURE — 87899 AGENT NOS ASSAY W/OPTIC: CPT

## 2025-06-04 PROCEDURE — 87046 STOOL CULTR AEROBIC BACT EA: CPT

## 2025-06-04 PROCEDURE — 82010 KETONE BODYS QUAN: CPT

## 2025-06-04 PROCEDURE — 93970 EXTREMITY STUDY: CPT

## 2025-06-04 PROCEDURE — 80307 DRUG TEST PRSMV CHEM ANLYZR: CPT

## 2025-06-04 PROCEDURE — 36415 COLL VENOUS BLD VENIPUNCTURE: CPT

## 2025-06-04 PROCEDURE — 83605 ASSAY OF LACTIC ACID: CPT

## 2025-06-04 PROCEDURE — 96372 THER/PROPH/DIAG INJ SC/IM: CPT

## 2025-06-04 PROCEDURE — 700102 HCHG RX REV CODE 250 W/ 637 OVERRIDE(OP): Performed by: EMERGENCY MEDICINE

## 2025-06-04 PROCEDURE — 93005 ELECTROCARDIOGRAM TRACING: CPT | Mod: TC | Performed by: STUDENT IN AN ORGANIZED HEALTH CARE EDUCATION/TRAINING PROGRAM

## 2025-06-04 PROCEDURE — 700102 HCHG RX REV CODE 250 W/ 637 OVERRIDE(OP): Performed by: STUDENT IN AN ORGANIZED HEALTH CARE EDUCATION/TRAINING PROGRAM

## 2025-06-04 PROCEDURE — A9270 NON-COVERED ITEM OR SERVICE: HCPCS | Performed by: EMERGENCY MEDICINE

## 2025-06-04 PROCEDURE — 93970 EXTREMITY STUDY: CPT | Mod: 26 | Performed by: INTERNAL MEDICINE

## 2025-06-04 PROCEDURE — 87045 FECES CULTURE AEROBIC BACT: CPT

## 2025-06-04 PROCEDURE — 99223 1ST HOSP IP/OBS HIGH 75: CPT | Performed by: STUDENT IN AN ORGANIZED HEALTH CARE EDUCATION/TRAINING PROGRAM

## 2025-06-04 PROCEDURE — 81003 URINALYSIS AUTO W/O SCOPE: CPT

## 2025-06-04 PROCEDURE — 84484 ASSAY OF TROPONIN QUANT: CPT

## 2025-06-04 PROCEDURE — 770020 HCHG ROOM/CARE - TELE (206)

## 2025-06-04 PROCEDURE — 93010 ELECTROCARDIOGRAM REPORT: CPT | Performed by: INTERNAL MEDICINE

## 2025-06-04 RX ORDER — TAMSULOSIN HYDROCHLORIDE 0.4 MG/1
0.4 CAPSULE ORAL
Status: DISCONTINUED | OUTPATIENT
Start: 2025-06-04 | End: 2025-06-08 | Stop reason: HOSPADM

## 2025-06-04 RX ORDER — ASPIRIN 81 MG/1
324 TABLET, CHEWABLE ORAL ONCE
Status: COMPLETED | OUTPATIENT
Start: 2025-06-04 | End: 2025-06-04

## 2025-06-04 RX ORDER — SIMETHICONE 125 MG
125 TABLET,CHEWABLE ORAL 3 TIMES DAILY PRN
Status: DISCONTINUED | OUTPATIENT
Start: 2025-06-04 | End: 2025-06-08 | Stop reason: HOSPADM

## 2025-06-04 RX ORDER — CEPHALEXIN 500 MG/1
500 CAPSULE ORAL EVERY 6 HOURS
Status: DISCONTINUED | OUTPATIENT
Start: 2025-06-04 | End: 2025-06-08 | Stop reason: HOSPADM

## 2025-06-04 RX ORDER — ACETAMINOPHEN 325 MG/1
650 TABLET ORAL EVERY 6 HOURS PRN
Status: DISCONTINUED | OUTPATIENT
Start: 2025-06-04 | End: 2025-06-08 | Stop reason: HOSPADM

## 2025-06-04 RX ORDER — PROMETHAZINE HYDROCHLORIDE 25 MG/1
12.5-25 SUPPOSITORY RECTAL EVERY 4 HOURS PRN
Status: DISCONTINUED | OUTPATIENT
Start: 2025-06-04 | End: 2025-06-08 | Stop reason: HOSPADM

## 2025-06-04 RX ORDER — LOPERAMIDE HYDROCHLORIDE 2 MG/1
4 CAPSULE ORAL ONCE
Status: COMPLETED | OUTPATIENT
Start: 2025-06-04 | End: 2025-06-04

## 2025-06-04 RX ORDER — LOPERAMIDE HYDROCHLORIDE 2 MG/1
2 CAPSULE ORAL EVERY 4 HOURS PRN
Status: DISCONTINUED | OUTPATIENT
Start: 2025-06-04 | End: 2025-06-04

## 2025-06-04 RX ORDER — PROCHLORPERAZINE EDISYLATE 5 MG/ML
5-10 INJECTION INTRAMUSCULAR; INTRAVENOUS EVERY 4 HOURS PRN
Status: DISCONTINUED | OUTPATIENT
Start: 2025-06-04 | End: 2025-06-08 | Stop reason: HOSPADM

## 2025-06-04 RX ORDER — PROMETHAZINE HYDROCHLORIDE 25 MG/1
12.5-25 TABLET ORAL EVERY 4 HOURS PRN
Status: DISCONTINUED | OUTPATIENT
Start: 2025-06-04 | End: 2025-06-08 | Stop reason: HOSPADM

## 2025-06-04 RX ORDER — OXYCODONE HYDROCHLORIDE 5 MG/1
5 TABLET ORAL EVERY 6 HOURS PRN
Refills: 0 | Status: DISCONTINUED | OUTPATIENT
Start: 2025-06-04 | End: 2025-06-08 | Stop reason: HOSPADM

## 2025-06-04 RX ORDER — ALLOPURINOL 300 MG/1
300 TABLET ORAL DAILY
Status: DISCONTINUED | OUTPATIENT
Start: 2025-06-04 | End: 2025-06-08 | Stop reason: HOSPADM

## 2025-06-04 RX ORDER — HYDRALAZINE HYDROCHLORIDE 20 MG/ML
10 INJECTION INTRAMUSCULAR; INTRAVENOUS EVERY 4 HOURS PRN
Status: DISCONTINUED | OUTPATIENT
Start: 2025-06-04 | End: 2025-06-08 | Stop reason: HOSPADM

## 2025-06-04 RX ORDER — SODIUM CHLORIDE 1 G/1
2 TABLET ORAL
Status: DISCONTINUED | OUTPATIENT
Start: 2025-06-04 | End: 2025-06-04

## 2025-06-04 RX ORDER — DOCUSATE SODIUM 100 MG/1
100 CAPSULE, LIQUID FILLED ORAL 2 TIMES DAILY PRN
COMMUNITY

## 2025-06-04 RX ORDER — SPIRONOLACTONE 25 MG/1
12.5 TABLET ORAL DAILY
COMMUNITY

## 2025-06-04 RX ORDER — LOPERAMIDE HYDROCHLORIDE 2 MG/1
2 CAPSULE ORAL 4 TIMES DAILY PRN
Status: DISCONTINUED | OUTPATIENT
Start: 2025-06-04 | End: 2025-06-04

## 2025-06-04 RX ORDER — METHOCARBAMOL 500 MG/1
500 TABLET, FILM COATED ORAL 4 TIMES DAILY PRN
Status: DISCONTINUED | OUTPATIENT
Start: 2025-06-04 | End: 2025-06-08 | Stop reason: HOSPADM

## 2025-06-04 RX ORDER — ENOXAPARIN SODIUM 100 MG/ML
40 INJECTION SUBCUTANEOUS DAILY
Status: DISCONTINUED | OUTPATIENT
Start: 2025-06-04 | End: 2025-06-08 | Stop reason: HOSPADM

## 2025-06-04 RX ORDER — ONDANSETRON 2 MG/ML
4 INJECTION INTRAMUSCULAR; INTRAVENOUS EVERY 4 HOURS PRN
Status: DISCONTINUED | OUTPATIENT
Start: 2025-06-04 | End: 2025-06-08 | Stop reason: HOSPADM

## 2025-06-04 RX ORDER — ONDANSETRON 4 MG/1
4 TABLET, ORALLY DISINTEGRATING ORAL EVERY 4 HOURS PRN
Status: DISCONTINUED | OUTPATIENT
Start: 2025-06-04 | End: 2025-06-08 | Stop reason: HOSPADM

## 2025-06-04 RX ORDER — SPIRONOLACTONE 25 MG/1
12.5 TABLET ORAL DAILY
Status: DISCONTINUED | OUTPATIENT
Start: 2025-06-04 | End: 2025-06-08 | Stop reason: HOSPADM

## 2025-06-04 RX ORDER — METOPROLOL SUCCINATE 50 MG/1
50 TABLET, EXTENDED RELEASE ORAL DAILY
Status: DISCONTINUED | OUTPATIENT
Start: 2025-06-04 | End: 2025-06-08 | Stop reason: HOSPADM

## 2025-06-04 RX ORDER — FUROSEMIDE 10 MG/ML
40 INJECTION INTRAMUSCULAR; INTRAVENOUS 2 TIMES DAILY
Status: DISCONTINUED | OUTPATIENT
Start: 2025-06-04 | End: 2025-06-08 | Stop reason: HOSPADM

## 2025-06-04 RX ORDER — OMEPRAZOLE 20 MG/1
20 CAPSULE, DELAYED RELEASE ORAL 2 TIMES DAILY
Status: DISCONTINUED | OUTPATIENT
Start: 2025-06-04 | End: 2025-06-08 | Stop reason: HOSPADM

## 2025-06-04 RX ADMIN — CEPHALEXIN 500 MG: 500 CAPSULE ORAL at 13:14

## 2025-06-04 RX ADMIN — FUROSEMIDE 40 MG: 10 INJECTION, SOLUTION INTRAVENOUS at 18:18

## 2025-06-04 RX ADMIN — FUROSEMIDE 40 MG: 10 INJECTION, SOLUTION INTRAVENOUS at 12:32

## 2025-06-04 RX ADMIN — CEPHALEXIN 500 MG: 500 CAPSULE ORAL at 18:26

## 2025-06-04 RX ADMIN — METHOCARBAMOL 500 MG: 500 TABLET ORAL at 18:26

## 2025-06-04 RX ADMIN — Medication 2 G: at 07:55

## 2025-06-04 RX ADMIN — METHOCARBAMOL 500 MG: 500 TABLET ORAL at 14:13

## 2025-06-04 RX ADMIN — METOPROLOL SUCCINATE 50 MG: 50 TABLET, EXTENDED RELEASE ORAL at 06:03

## 2025-06-04 RX ADMIN — ASPIRIN 324 MG: 81 TABLET, CHEWABLE ORAL at 03:50

## 2025-06-04 RX ADMIN — OMEPRAZOLE 20 MG: 20 CAPSULE, DELAYED RELEASE ORAL at 06:03

## 2025-06-04 RX ADMIN — ENOXAPARIN SODIUM 40 MG: 100 INJECTION SUBCUTANEOUS at 18:18

## 2025-06-04 RX ADMIN — TAMSULOSIN HYDROCHLORIDE 0.4 MG: 0.4 CAPSULE ORAL at 07:57

## 2025-06-04 RX ADMIN — SPIRONOLACTONE 12.5 MG: 25 TABLET ORAL at 07:45

## 2025-06-04 RX ADMIN — OMEPRAZOLE 20 MG: 20 CAPSULE, DELAYED RELEASE ORAL at 18:18

## 2025-06-04 RX ADMIN — SIMETHICONE 125 MG: 125 TABLET, CHEWABLE ORAL at 13:14

## 2025-06-04 RX ADMIN — ENOXAPARIN SODIUM 40 MG: 100 INJECTION SUBCUTANEOUS at 03:50

## 2025-06-04 RX ADMIN — LOPERAMIDE HYDROCHLORIDE 4 MG: 2 CAPSULE ORAL at 06:03

## 2025-06-04 RX ADMIN — ALLOPURINOL 300 MG: 300 TABLET ORAL at 06:03

## 2025-06-04 ASSESSMENT — COGNITIVE AND FUNCTIONAL STATUS - GENERAL
DAILY ACTIVITIY SCORE: 24
MOBILITY SCORE: 24
SUGGESTED CMS G CODE MODIFIER DAILY ACTIVITY: CH
SUGGESTED CMS G CODE MODIFIER MOBILITY: CH

## 2025-06-04 ASSESSMENT — ENCOUNTER SYMPTOMS
SHORTNESS OF BREATH: 0
HEADACHES: 0
WHEEZING: 0
FEVER: 0
ABDOMINAL PAIN: 0
COUGH: 0
CHILLS: 0
EYE PAIN: 0
BACK PAIN: 0
DIARRHEA: 1
DIZZINESS: 0
DOUBLE VISION: 0
NECK PAIN: 0
BLURRED VISION: 0
PALPITATIONS: 0
CONSTIPATION: 0
NAUSEA: 0
BLOOD IN STOOL: 0
VOMITING: 0

## 2025-06-04 ASSESSMENT — LIFESTYLE VARIABLES
ALCOHOL_USE: NO
HOW MANY TIMES IN THE PAST YEAR HAVE YOU HAD 5 OR MORE DRINKS IN A DAY: 0
TOTAL SCORE: 0
EVER HAD A DRINK FIRST THING IN THE MORNING TO STEADY YOUR NERVES TO GET RID OF A HANGOVER: NO
AVERAGE NUMBER OF DAYS PER WEEK YOU HAVE A DRINK CONTAINING ALCOHOL: 0
ON A TYPICAL DAY WHEN YOU DRINK ALCOHOL HOW MANY DRINKS DO YOU HAVE: 0
CONSUMPTION TOTAL: NEGATIVE
HAVE PEOPLE ANNOYED YOU BY CRITICIZING YOUR DRINKING: NO
DOES PATIENT WANT TO STOP DRINKING: NO
TOTAL SCORE: 0
TOTAL SCORE: 0
HAVE YOU EVER FELT YOU SHOULD CUT DOWN ON YOUR DRINKING: NO
EVER FELT BAD OR GUILTY ABOUT YOUR DRINKING: NO

## 2025-06-04 ASSESSMENT — SOCIAL DETERMINANTS OF HEALTH (SDOH)
IN THE PAST 12 MONTHS, HAS THE ELECTRIC, GAS, OIL, OR WATER COMPANY THREATENED TO SHUT OFF SERVICE IN YOUR HOME?: YES
WITHIN THE LAST YEAR, HAVE YOU BEEN KICKED, HIT, SLAPPED, OR OTHERWISE PHYSICALLY HURT BY YOUR PARTNER OR EX-PARTNER?: NO
WITHIN THE LAST YEAR, HAVE TO BEEN RAPED OR FORCED TO HAVE ANY KIND OF SEXUAL ACTIVITY BY YOUR PARTNER OR EX-PARTNER?: NO
WITHIN THE PAST 12 MONTHS, YOU WORRIED THAT YOUR FOOD WOULD RUN OUT BEFORE YOU GOT THE MONEY TO BUY MORE: NEVER TRUE
WITHIN THE PAST 12 MONTHS, THE FOOD YOU BOUGHT JUST DIDN'T LAST AND YOU DIDN'T HAVE MONEY TO GET MORE: OFTEN TRUE
WITHIN THE LAST YEAR, HAVE YOU BEEN AFRAID OF YOUR PARTNER OR EX-PARTNER?: NO
WITHIN THE LAST YEAR, HAVE YOU BEEN HUMILIATED OR EMOTIONALLY ABUSED IN OTHER WAYS BY YOUR PARTNER OR EX-PARTNER?: NO

## 2025-06-04 ASSESSMENT — PAIN DESCRIPTION - PAIN TYPE
TYPE: ACUTE PAIN;CHRONIC PAIN
TYPE: ACUTE PAIN
TYPE: ACUTE PAIN;CHRONIC PAIN
TYPE: ACUTE PAIN;CHRONIC PAIN

## 2025-06-04 ASSESSMENT — FIBROSIS 4 INDEX
FIB4 SCORE: 1.9

## 2025-06-04 ASSESSMENT — PATIENT HEALTH QUESTIONNAIRE - PHQ9
2. FEELING DOWN, DEPRESSED, IRRITABLE, OR HOPELESS: NOT AT ALL
SUM OF ALL RESPONSES TO PHQ9 QUESTIONS 1 AND 2: 0
1. LITTLE INTEREST OR PLEASURE IN DOING THINGS: NOT AT ALL

## 2025-06-04 NOTE — ASSESSMENT & PLAN NOTE
Over past 2 months.  Likely in the setting of docusate use at home.  Risk factors for C. difficile including recent hospitalizations  Stool to form for C. difficile testing  - Continue monitor closely

## 2025-06-04 NOTE — DISCHARGE PLANNING
Care Transition Team Assessment  Patient is a 59 year-old male admitted for abdominal cramping. Please see pt's H&P for prior medical history. RNCM met with pt at bedside to complete assessment. Pt A&Ox4 and able to verify the information on the face sheet. Pt lives with his mother in a single-story. Emergency contact is brother Tyree Zabala [phone]. Prior to admission patient is independent with ADL's and IADL’s. Pt has a cane, denies home O2 use, reports that he drives. Pt reported that his brother is good support. 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 Pt receives monthly SSI deposits of $1500. The patient's PCP is [facility]. Patient's preferred pharmacy is [facility]. Patient denies a history of SNF/IPR nor HHC use in the past. Patient reports clean of alcohol and meth x1 month, declines resources when offered. Patients confirmed medical coverage with [facility] Medicaid.  Patient has means to transportation and brother Tyree will provide transport once medically stable for discharge. RNCM discussed discharge planning. Patient reported pt's goal is to return home once medically stable.      Information Source  Orientation Level: Oriented X4  Information Given By: Patient  Who is responsible for making decisions for patient? : Patient    Readmission Evaluation  Is this a readmission?: No    Elopement Risk  Legal Hold: No  Ambulatory or Self Mobile in Wheelchair: Yes  Disoriented: No  Psychiatric Symptoms: None  History of Wandering: No  Elopement this Admit: No  Vocalizing Wanting to Leave: No  Displays Behaviors, Body Language Wanting to Leave: No-Not at Risk for Elopement    Interdisciplinary Discharge Planning  Lives with - Patient's Self Care Capacity: Parents  Patient or legal guardian wants to designate a caregiver: No  Support Systems: Family Member(s), Friends / Neighbors  Housing / Facility: 1 Story House    Discharge Preparedness  What is your plan after discharge?: Home  with help  What are your discharge supports?: Sibling  Prior Functional Level: Ambulatory, Drives Self, Independent with Activities of Daily Living, Independent with Medication Management, Uses Cane  Difficulity with ADLs: Walking  Difficulty with ADLs Comment: cane  Difficulity with IADLs: None    Functional Assesment  Prior Functional Level: Ambulatory, Drives Self, Independent with Activities of Daily Living, Independent with Medication Management, Uses Cane    Finances  Financial Barriers to Discharge: No    Vision / Hearing Impairment  Vision Impairment : No  Hearing Impairment : No    Advance Directive  Advance Directive?: Living Will    Domestic Abuse  Have you ever been the victim of abuse or violence?: No  Possible Abuse/Neglect Reported to:: Not Applicable    Psychological Assessment  History of Substance Abuse: Methamphetamine, Alcohol  Date Last Used - Alcohol: 5/1/25  Date Last Used - Methamphetamine: 5/1/25  Substance Abuse Comments: patient has been clean x1 months, declines resources  History of Psychiatric Problems: No  Non-compliant with Treatment: No  Newly Diagnosed Illness: No    Discharge Risks or Barriers  Discharge risks or barriers?: No    Anticipated Discharge Information  Discharge Disposition: Discharged to home/self care (01)  Discharge Address: [address] [place] [address] [address] [id number]  Discharge Contact Phone Number: [phone]  Case Management Discharge Planning    Admission Date: 6/3/2025  GMLOS:    ALOS: 0    6-Clicks ADL Score: 24  6-Clicks Mobility Score: 24    Anticipated Discharge Dispo: Discharge Disposition: Discharged to home/self care (01)  Discharge Address: [address] [place] [address] [address] [id number]  Discharge Contact Phone Number: [phone]    DME Needed: No    Action(s) Taken: DC Assessment Complete (See below)  Food pantry resources provided to patient    Escalations Completed: None    Medically Clear: No    Next Steps: RNCM to continue to follow patient for DCP needs.     Barriers  to Discharge: Medical clearance

## 2025-06-04 NOTE — HOSPITAL COURSE
Santino Zabala is a 59-year-old with PMHx of HfrEF, methamphetamine abuse, alcoholic liver cirrhosis, BPH, CKD 3, gout and GERD.  Admitted 6/3 for lower extremity edema.    Per history: Patient recently admitted from 5/22/2025 to 6/2/2025 for acute on chronic heart failure.  Was also admitted from 5/9/2025 to 5/11/2025 for SUZY and hyperkalemia.     Patient states that they have been having worsening lower extremity edema over the past few days despite taking their medication as prescribed.  States that they have been having intermittent loose stools over the past 2 months, for which they were prescribed loperamide.    In the ED: Vitals unremarkable and stable.  Potassium 4.8.  Creatinine 1.07.  BNP 17,000.  CXR with mild bilateral infiltrates consistent with pulmonary edema.  Patient was started on IV Lasix.  LE US negative for DVT.

## 2025-06-04 NOTE — PROGRESS NOTES
Snatino Zabala is a 59-year-old with PMHx of HfrEF, methamphetamine abuse, alcoholic liver cirrhosis, BPH, CKD 3, gout and GERD.  Admitted 6/3 for lower extremity edema.    Per history: Patient recently admitted from 5/22/2025 to 6/2/2025 for acute on chronic heart failure.  Was also admitted from 5/9/2025 to 5/11/2025 for SUZY and hyperkalemia.     Patient states that they have been having worsening lower extremity edema over the past few days despite taking their medication as prescribed.  States that they have been having intermittent loose stools over the past 2 months, for which they were prescribed loperamide.    In the ED: Vitals unremarkable and stable.  Potassium 4.8.  Creatinine 1.07.  BNP 17,000.  CXR with mild bilateral infiltrates consistent with pulmonary edema.  Patient was started on IV Lasix.  LE US negative for DVT.    Assessment: 59-year-old male admitted for heart failure exacerbation.    Plan:  - Start IV Lasix 40 mg BID  - Start Keflex for overlying cellulitis  - Discontinue salt tabs  - Repeat CMP and CBC in a.m.  - UDS pending  - Strict I's and O's  - C. difficile pending    Ayana Owen MD

## 2025-06-04 NOTE — ED TRIAGE NOTES
"Chief Complaint   Patient presents with    Abdominal Cramping     Pt states that he's been having abdominal cramping all day with one emesis, endorses 3 loose bowel movements today. Pt also states that he's having persistant lower extremity swelling, hx of heart failure, has been taking his meds. Recently admitted and discharged.     Diarrhea    Leg Swelling     Pt is alert and oriented, speaking in full sentences, follows commands and responds appropriately to questions. Resperations are even and unlabored.      Pt placed in jaramillo for lab draw. Pt educated on triage process. Pt encouraged to alert staff for any changes.    /79   Pulse 95   Temp 36.2 °C (97.2 °F) (Temporal)   Resp 20   Ht 1.651 m (5' 5\")   Wt 57.6 kg (127 lb)   SpO2 94%      "

## 2025-06-04 NOTE — ASSESSMENT & PLAN NOTE
Troponin 42.  Denies chest pain.  Likely nonischemic myocardial injury in the setting of severely reduced ejection fraction and acute on chronic heart failure. Patient with baseline troponin elevation 40s to 50s. EKG NSR HR 93, QTc 446, without ST changes similar to previous.    - Trend troponin

## 2025-06-04 NOTE — PROGRESS NOTES
Bedside report received from off going RN/tech: Ayana, assumed care of patient.     Fall Risk Score: LOW RISK  Fall risk interventions in place: Provide patient/family education based on risk assessment, Educate patient/family to call staff for assistance when getting out of bed, Place fall precaution signage outside patient door, Place patient in room close to nursing station, and Utilize bed/chair fall alarm  Bed type: Regular (Mich Score less than 17 interventions in place)  Patient on cardiac monitor: Yes  IVF/IV medications: Not Applicable   Oxygen: Room Air  Bedside sitter: Not Applicable   Isolation: Not applicable

## 2025-06-04 NOTE — CARE PLAN
Problem: Care Map:  Admission Optimal Outcome for the Heart Failure Patient  Goal: Admission:  Optimal Care o  Problem: Bowel Elimination  Goal: Establish and maintain regular bowel function  Outcome: Progressing   f the heart failure patient  Outcome: Progressing   The patient is Stable - Low risk of patient condition declining or worsening    Shift Goals  Clinical Goals: diuresis; tele monitor; safety  Patient Goals: rest  Family Goals: none present    Progress made toward(s) clinical / shift goals:  pt on lasix and spirnolactone. HF booklet provided and discussed with patient.     Patient is not progressing towards the following goals:

## 2025-06-04 NOTE — ED PROVIDER NOTES
ER Provider Note    Scribed for ESEQUIEL Epperson.O. by Vicente Faye. 6/3/2025  11:11 PM    Primary Care Provider: YISEL Snyder, P.A.-C.    CHIEF COMPLAINT  Chief Complaint   Patient presents with    Abdominal Cramping     Pt states that he's been having abdominal cramping all day with one emesis, endorses 3 loose bowel movements today. Pt also states that he's having persistant lower extremity swelling, hx of heart failure, has been taking his meds. Recently admitted and discharged.     Diarrhea    Leg Swelling       EXTERNAL RECORDS REVIEWED  Inpatient Notes Reviewed patient's discharge summary from 6/2/2025. Patient was admitted 5/22/2025 for acute on chronic systolic heart failure. He underwent IV diuresis during this stay.      HPI/ROS  LIMITATION TO HISTORY   Select: : None    OUTSIDE HISTORIAN(S):  None    Santino Zabala is a 59 y.o. male with a history of CHF and methamphetamine use who presents to the ED for evaluation of shortness of breath and diarrhea, onset today. The patient reports he was discharged from [facility] 1 day ago following an 11 day inpatient stay for chronic systolic heart failure. He reports three episodes of diarrhea today with some cramping abdominal pain as well. He reports bilateral lower extremity edema, shortness of breath with any exertion and notes he has been compliant with his Lasix since he was discharged.  He denies blood in his stool or fever.      PAST MEDICAL HISTORY  Past Medical History[1]    SURGICAL HISTORY  Past Surgical History[2]    FAMILY HISTORY  No family history noted.     SOCIAL HISTORY   reports that he has never smoked. He has never used smokeless tobacco. He reports that he does not currently use alcohol. He reports that he does not use drugs.    CURRENT MEDICATIONS  Previous Medications    ALLOPURINOL (ZYLOPRIM) 300 MG TAB    Take 1 Tablet by mouth every day.    FUROSEMIDE (LASIX) 40 MG TAB    Take 40 mg by mouth every day.    LOPERAMIDE (IMODIUM) 2 MG  "CAP    Take 2 mg by mouth 4 times a day as needed for Diarrhea.    METOPROLOL SR (TOPROL XL) 50 MG TABLET SR 24 HR    Take 50 mg by mouth every day.    OMEPRAZOLE (PRILOSEC) 20 MG DELAYED-RELEASE CAPSULE    Take 1 Capsule by mouth every day.    SODIUM CHLORIDE (SALT) 1 GM TAB    Take 2 Tablets by mouth 3 times a day with meals.    TAMSULOSIN (FLOMAX) 0.4 MG CAPSULE    Take 1 Capsule by mouth 1/2 hour after breakfast.       ALLERGIES  Patient has no known allergies.    PHYSICAL EXAM  /79   Pulse 95   Temp 36.2 °C (97.2 °F) (Temporal)   Resp 20   Ht 1.651 m (5' 5\")   Wt 57.6 kg (127 lb)   SpO2 94%   BMI 21.13 kg/m²   Constitutional: Patient is homeless appearing male, non-toxic appearing. Moderate respiratory distress with minimal exertion.   HENT: Normocephalic, atraumatic.    Cardiovascular: Tachycardic rate and Regular rhythm. No murmur.  Thorax & Lungs: Slightly diminished breath sounds in the bases. Moderate respiratory distress, no rhonchi, wheezing or rales.   Abdomen: Bowel sounds normal in all four quadrants. Soft, nontender, no rebound, guarding, palpable masses.   Skin: Warm, Dry    Extremities: 3+ pitting edema bilaterally with increased erythema and warmth. Neurovascular intact.    Neurologic: Alert & oriented x 3, Normal motor function, Normal sensory function.   Psychiatric: Affect odd, Judgment normal, Mood normal.       DIAGNOSTIC STUDIES & PROCEDURES    Labs:   Results for orders placed or performed during the hospital encounter of 06/03/25   CBC WITH DIFFERENTIAL    Collection Time: 06/03/25  9:07 PM   Result Value Ref Range    WBC 5.9 4.8 - 10.8 K/uL    RBC 4.46 (L) 4.70 - 6.10 M/uL    Hemoglobin 14.6 14.0 - 18.0 g/dL    Hematocrit 44.0 42.0 - 52.0 %    MCV 98.7 (H) 81.4 - 97.8 fL    MCH 32.7 27.0 - 33.0 pg    MCHC 33.2 32.3 - 36.5 g/dL    RDW 54.6 (H) 35.9 - 50.0 fL    Platelet Count 248 164 - 446 K/uL    MPV 10.3 9.0 - 12.9 fL    Neutrophils-Polys 80.20 (H) 44.00 - 72.00 %    " Lymphocytes 9.90 (L) 22.00 - 41.00 %    Monocytes 7.80 0.00 - 13.40 %    Eosinophils 1.50 0.00 - 6.90 %    Basophils 0.30 0.00 - 1.80 %    Immature Granulocytes 0.30 0.00 - 0.90 %    Nucleated RBC 0.00 0.00 - 0.20 /100 WBC    Neutrophils (Absolute) 4.70 1.82 - 7.42 K/uL    Lymphs (Absolute) 0.58 (L) 1.00 - 4.80 K/uL    Monos (Absolute) 0.46 0.00 - 0.85 K/uL    Eos (Absolute) 0.09 0.00 - 0.51 K/uL    Baso (Absolute) 0.02 0.00 - 0.12 K/uL    Immature Granulocytes (abs) 0.02 0.00 - 0.11 K/uL    NRBC (Absolute) 0.00 K/uL   COMP METABOLIC PANEL    Collection Time: 06/03/25  9:07 PM   Result Value Ref Range    Sodium 136 135 - 145 mmol/L    Potassium 4.8 3.6 - 5.5 mmol/L    Chloride 107 96 - 112 mmol/L    Co2 16 (L) 20 - 33 mmol/L    Anion Gap 13.0 7.0 - 16.0    Glucose 107 (H) 65 - 99 mg/dL    Bun 29 (H) 8 - 22 mg/dL    Creatinine 1.07 0.50 - 1.40 mg/dL    Calcium 8.5 8.5 - 10.5 mg/dL    Correct Calcium 8.7 8.5 - 10.5 mg/dL    AST(SGOT) 40 12 - 45 U/L    ALT(SGPT) 26 2 - 50 U/L    Alkaline Phosphatase 102 (H) 30 - 99 U/L    Total Bilirubin 1.2 0.1 - 1.5 mg/dL    Albumin 3.7 3.2 - 4.9 g/dL    Total Protein 7.1 6.0 - 8.2 g/dL    Globulin 3.4 1.9 - 3.5 g/dL    A-G Ratio 1.1 g/dL   LIPASE    Collection Time: 06/03/25  9:07 PM   Result Value Ref Range    Lipase 50 11 - 82 U/L   ESTIMATED GFR    Collection Time: 06/03/25  9:07 PM   Result Value Ref Range    GFR (CKD-EPI) 79 >60 mL/min/1.73 m 2   APTT    Collection Time: 06/03/25  9:07 PM   Result Value Ref Range    APTT 27.6 24.7 - 36.0 sec   PROTHROMBIN TIME (INR)    Collection Time: 06/03/25  9:07 PM   Result Value Ref Range    PT 14.4 12.0 - 14.6 sec    INR 1.12 0.87 - 1.13   proBrain Natriuretic Peptide, NT    Collection Time: 06/03/25  9:07 PM   Result Value Ref Range    NT-proBNP 29664 (H) 0 - 125 pg/mL   TROPONIN    Collection Time: 06/03/25  9:07 PM   Result Value Ref Range    Troponin T 42 (H) 6 - 19 ng/L       All labs reviewed by me.    EKG:   I have independently  interpreted this EKG as shown above.      Radiology:   The attending Emergency Physician has independently interpreted the diagnostic imaging associated with this visit and is awaiting the final reading from the radiologist, which will be displayed below.    Preliminary interpretation is a follows: No DVTs but very sluggish venous flow, chest x-ray shows no acute pulmonary edema  Radiologist interpretation:    US-EXTREMITY VENOUS LOWER BILAT         DX-CHEST-PORTABLE (1 VIEW)   Final Result      Stable cardiomegaly.           COURSE & MEDICAL DECISION MAKING    ED OBS: No; Patient does not meet criteria for ED Observation.      INITIAL ASSESSMENT AND PLAN  Care Narrative:       11:11 PM - Patient seen and evaluated at bedside. Discussed plan of care, including lab work and diagnostic imaging. Patient agrees to plan of care. Patient will be treated with Lasix 40 mg injection for his symptoms. Ordered EKG, UA, Lipase, CMP, CBC w/ diff, Troponin, pBNP, Prothrombin Time, APTT, stool culture, DX-Chest, and US-Extremity Venous Lower Bilat to evaluate. Differential diagnoses include but are not limited to: CHF, DVT, PE    Laboratories performed show a normal white blood cell count with a stable H&H, electrolytes are unremarkable his CO2 was low at 16, BUN 29 creatinine 1.07.  His BNP is 17,605 and his troponin is elevated at 42.  He was treated with aspirin and Lasix.    2:35 AM - Paged hospitalist.     3:17 AM - I discussed the patient's case and the above findings with Dr. Mixon (Hospitalist) who agreed to hospitalize the patient. Patient will be medicated with aspirin 324 mg PO.                      DISPOSITION AND DISCUSSIONS  I have discussed management of the patient with the following physicians and FRITZ's: Dr. Mixon (Hospitalist)     Discussion of management with other [facility] or appropriate source(s): None     Barriers to care at this time, including but not limited to: None.     Decision tools and  prescription drugs considered including, but not limited to: Hospital admission for diuresis and further cardiac workup and care.    DISPOSITION:  Patient will be hospitalized by Dr. Mixon in guarded condition.      FINAL IMPRESSION   1. Elevated troponin    2. Elevated brain natriuretic peptide (BNP) level    3. Bilateral lower extremity edema    4. Diarrhea, unspecified type    5. Shortness of breath        IVicente (Scribe), am scribing for, and in the presence of, Sirisha Valadez D.O..    Electronically signed by: Vicente Faye (Scribmartha), 6/3/2025    ISirisha D.O. personally performed the services described in this documentation, as scribed by Vicente Faye in my presence, and it is both accurate and complete.    The note accurately reflects work and decisions made by me.  Sirisha Valadez D.O.  6/4/2025  9:26 PM         [1]   Past Medical History:  Diagnosis Date    Congestive heart failure (HCC)    [2]   Past Surgical History:  Procedure Laterality Date    NO PERTINENT PAST SURGICAL HISTORY

## 2025-06-04 NOTE — PROGRESS NOTES
4 Eyes Skin Assessment Completed by JULIANA Piña and JULIANA Narayan.    Skin assessment is primarily focused on high risk bony prominences. Pay special attention to skin beneath and around medical devices, high risk bony prominences, skin to skin areas and areas where the patient lacks sensation to feel pain and areas where the patient previously had breakdown.     Head (Occipital):  WDL   Ears (Under Medical Devices): WDL   Nose (Under Medical Devices): WDL   Mouth:  WDL   Neck: WDL   Breast/Chest:  WDL   Shoulder Blades:  WDL   Spine:   WDL   (R) Arm/Elbow/Hand: WDL   (L) Arm/Elbow/Hand: Bruising   Abdomen: Bruising   Pannus/Groin:  WDL   Sacrum/Coccyx:   WDL   (R) Ischial Tuberosity (Sit Bones):  WDL   (L) Ischial Tuberosity (Sit Bones):  WDL   (R) Leg:  Red, Edema, and Shiny   (L) Leg:  Red, Edema, and Shiny   (R) Heel:  Red and Blanching   (R) Foot/Toe: Red, Blanching, and Edema   (L) Heel: Red, Blanching, and Edema   (L) Foot/Toe:  Red and Blanching       DEVICES IN USE:   Respiratory Devices:  NA, patient on room air  Feeding Devices:  N/A   Lines & BP Monitoring Devices:  Peripheral IV, BP cuff, and Pulse ox    Orthopedic Devices:  N/A  Miscellaneous Devices:  SCDs    PROTOCOL INTERVENTIONS:   Standard/Trauma Bed:  Already in place  Offloading Dressing - Heel:  Reinforced/reapplied    WOUND PHOTOS:   Completed and in EPIC     WOUND CONSULT:   N/A, no advanced wound care needs identified

## 2025-06-04 NOTE — PROGRESS NOTES
Monitor Summary:   Rhythm: SR-ST  Rate:   Measurement: .21/.09/.35  Ectopy: o pvc, r coup

## 2025-06-04 NOTE — ASSESSMENT & PLAN NOTE
+3 pitting edema bilateral lower extremity to hips; BNP elevated at 17,000 on admission; improving  Echo 5/2/2025: EF less than 20%. grade 2 diastolic dysfunction, moderate MR, mild AI, mild TR, RVSP 25 mmHg. Limited TTE 5/25/2025 with EF 15 to 20%, trivial pericardial effusion.  - Continuous telemetry monitoring; monitoring for arrhythmia in the setting of forced IV diuresis  - Continue IV Lasix 40 mg twice daily  - Additional one-time dose IV Lasix 40 mg this a.m.  - Strict I's and O's  - Close monitoring of electrolytes and replacement as needed  - Repeat BMP in a.m.    6/7 Continue GDMT including Toprol, spironolactone, Farxiga   I added  losartan 12.5 mg daily, monitor BP  Continue telemetry monitoring

## 2025-06-04 NOTE — ED NOTES
Med Rec completed per patient      Allergies reviewed: yes     Oral antibiotics in the past 30 days: no    Anticoagulant in past 14 days: no    Dispense history available in EPIC: yes     Pharmacy [facility] utilizes: [facility]  [phone]

## 2025-06-04 NOTE — H&P
Hospital Medicine History & Physical Note    Date of Service  6/4/2025    Primary Care Physician  YISEL Snyder, P.A.-C.    Consultants  none    Specialist Names: N/A    Code Status  Full Code    Chief Complaint  Chief Complaint   Patient presents with    Abdominal Cramping     Pt states that he's been having abdominal cramping all day with one emesis, endorses 3 loose bowel movements today. Pt also states that he's having persistant lower extremity swelling, hx of heart failure, has been taking his meds. Recently admitted and discharged.     Diarrhea    Leg Swelling       History of Presenting Illness  Patient is a 59-year-old male with past medical history of HFrEF 15-20% 2025 secondary to methamphetamine use, alcoholic cirrhosis, meth use disorder, BPH, CKD3a, gout, GERD that presents with worsening lower extremity edema     Patient recently admitted from 5/22/2025 to 6/2/2025 for acute on chronic heart failure.  Was also admitted from 5/9/2025 to 5/11/2025 for SUZY and hyperkalemia.    Patient states that they have been having worsening lower extremity edema over the past few days despite taking their medication as prescribed.  States that they have been having intermittent loose stools over the past 2 months, for which they were prescribed loperamide.     In the ED, BP 100s to 140s/70s to 100s, HR 70s to 100s, RR 20, afebrile, saturating well on room air.  Received Lasix 40 mg IV x 1.  CBC unremarkable.  Sodium 136, potassium 4.8, bicarb 16, anion gap 13, BUN 29, creatinine 1.07, alk phos 102, lipase 50, troponin 42, NT proBNP 17,605, INR 1.12.  Chest x-ray without acute abnormality.  EKG NSR HR 93, QTc 446, without ST changes similar to previous. Bilateral lower extremity DVT ultrasound without DVT.    I discussed the plan of care with patient.    Review of Systems  Review of Systems   Constitutional:  Negative for chills and fever.   HENT:  Negative for ear pain.    Eyes:  Negative for blurred vision, double  vision and pain.   Respiratory:  Negative for cough, shortness of breath and wheezing.    Cardiovascular:  Positive for leg swelling. Negative for chest pain and palpitations.   Gastrointestinal:  Positive for diarrhea. Negative for abdominal pain, blood in stool, constipation, melena, nausea and vomiting.   Genitourinary:  Negative for dysuria, frequency and hematuria.   Musculoskeletal:  Negative for back pain, joint pain and neck pain.   Neurological:  Negative for dizziness and headaches.       Past Medical History   has a past medical history of Congestive heart failure (HCC).    Surgical History   has a past surgical history that includes no pertinent past surgical history.     Family History  family history is not on file.   Family history reviewed with patient. There is no family history that is pertinent to the chief complaint.     Social History   reports that he has never smoked. He has never used smokeless tobacco. He reports that he does not currently use alcohol. He reports that he does not use drugs.    Allergies  Allergies[1]    Medications  Prior to Admission Medications   Prescriptions Last Dose Informant Patient Reported? Taking?   allopurinol (ZYLOPRIM) 300 MG Tab   No No   Sig: Take 1 Tablet by mouth every day.   furosemide (LASIX) 40 MG Tab  Patient Yes No   Sig: Take 40 mg by mouth every day.   loperamide (IMODIUM) 2 MG Cap  Patient Yes No   Sig: Take 2 mg by mouth 4 times a day as needed for Diarrhea.   metoprolol SR (TOPROL XL) 50 MG TABLET SR 24 HR  Patient Yes No   Sig: Take 50 mg by mouth every day.   omeprazole (PRILOSEC) 20 MG delayed-release capsule  Patient No No   Sig: Take 1 Capsule by mouth every day.   Patient taking differently: Take 20 mg by mouth 1 time a day as needed.   sodium chloride (SALT) 1 GM Tab   No No   Sig: Take 2 Tablets by mouth 3 times a day with meals.   tamsulosin (FLOMAX) 0.4 MG capsule   No No   Sig: Take 1 Capsule by mouth 1/2 hour after breakfast.       Facility-Administered Medications: None       Physical Exam  Temp:  [36.2 °C (97.2 °F)-36.5 °C (97.7 °F)] 36.5 °C (97.7 °F)  Pulse:  [] 97  Resp:  [12-20] 18  BP: ()/() 120/84  SpO2:  [90 %-100 %] 91 %  Blood Pressure: 110/77   Temperature: 36.2 °C (97.2 °F)   Pulse: 97   Respiration: 20   Pulse Oximetry: 92 %       Physical Exam  Vitals reviewed.   Constitutional:       General: He is not in acute distress.     Appearance: Normal appearance. He is not ill-appearing, toxic-appearing or diaphoretic.   HENT:      Head: Normocephalic and atraumatic.      Mouth/Throat:      Mouth: Mucous membranes are moist.      Pharynx: No oropharyngeal exudate or posterior oropharyngeal erythema.      Comments: Poor dentition  Eyes:      General: No scleral icterus.     Extraocular Movements: Extraocular movements intact.      Conjunctiva/sclera: Conjunctivae normal.   Cardiovascular:      Rate and Rhythm: Normal rate and regular rhythm.      Heart sounds: Normal heart sounds. No murmur heard.     No friction rub. No gallop.   Pulmonary:      Effort: Pulmonary effort is normal. No respiratory distress.      Breath sounds: Normal breath sounds. No stridor. No wheezing, rhonchi or rales.   Abdominal:      General: Abdomen is flat. There is no distension.      Palpations: Abdomen is soft. There is no mass.      Tenderness: There is no abdominal tenderness. There is no guarding or rebound.      Hernia: No hernia is present.   Musculoskeletal:         General: No swelling or tenderness. Normal range of motion.      Cervical back: Neck supple. No rigidity.      Right lower leg: Edema present.      Left lower leg: Edema present.      Comments: Bilateral lower extremity 3+ pitting edema to the hips  Bilateral lower extremity venous stasis dermatitis   Lymphadenopathy:      Cervical: No cervical adenopathy.   Skin:     General: Skin is warm and dry.      Coloration: Skin is not jaundiced.   Neurological:      Mental  Status: He is alert and oriented to person, place, and time. Mental status is at baseline.      Cranial Nerves: No cranial nerve deficit.         Laboratory:  Recent Labs     06/02/25 0122 06/03/25 2107   WBC 5.4 5.9   RBC 4.19* 4.46*   HEMOGLOBIN 13.8* 14.6   HEMATOCRIT 40.9* 44.0   MCV 97.6 98.7*   MCH 32.9 32.7   MCHC 33.7 33.2   RDW 51.1* 54.6*   PLATELETCT 255 248   MPV 11.1 10.3     Recent Labs     06/02/25  0122 06/02/25 0733 06/03/25 2107   SODIUM 127* 129* 136   POTASSIUM 3.8 4.2 4.8   CHLORIDE 98 99 107   CO2 17* 20 16*   GLUCOSE 179* 101* 107*   BUN 44* 38* 29*   CREATININE 1.15 1.13 1.07   CALCIUM 8.4* 8.4* 8.5     Recent Labs     06/02/25 0122 06/02/25 0733 06/03/25 2107   ALTSGPT  --   --  26   ASTSGOT  --   --  40   ALKPHOSPHAT  --   --  102*   TBILIRUBIN  --   --  1.2   LIPASE  --   --  50   GLUCOSE 179* 101* 107*     Recent Labs     06/03/25 2107   APTT 27.6   INR 1.12     Recent Labs     06/03/25 2107   NTPROBNP 10459*         Recent Labs     06/03/25 2107 06/04/25  0530   TROPONINT 42* 45*       Imaging:  US-EXTREMITY VENOUS LOWER BILAT   Final Result      DX-CHEST-PORTABLE (1 VIEW)   Final Result      Stable cardiomegaly.          X-Ray:  I have personally reviewed the images and compared with prior images.    Assessment/Plan:  Justification for Admission Status  I anticipate this patient will require at least two midnights for appropriate medical management, necessitating inpatient admission because acute on chronic heart failure requiring IV diuresis    Patient will need a Telemetry bed on MEDICAL service .  The need is secondary to acute on chronic heart failure requiring IV diuresis.    * Acute on chronic combined systolic (congestive) and diastolic (congestive) heart failure (HCC)- (present on admission)  Assessment & Plan  Patient with 3+ pitting edema to the hips. NT proBNP 17,605. Chest x-ray without acute abnormality.  TTE 5/2/2025 with EF less than 20%, grade 2 diastolic  dysfunction, moderate MR, mild AI, mild TR, RVSP 25 mmHg. Limited TTE 5/25/2025 with EF 15 to 20%, trivial pericardial effusion.    -Telemetry  -Daily weights and I&Os  -Low sodium diet with 2L fluid restriction  -Lasix 40mg IV bid  -Continue home metoprolol, spironolactone  -K>4, Mg>2    Gout- (present on admission)  Assessment & Plan  - Continue home allopurinol    BPH (benign prostatic hyperplasia)- (present on admission)  Assessment & Plan  - Continue home tamsulosin    Diarrhea- (present on admission)  Assessment & Plan  Over past 2 months.  Likely in the setting of docusate use at home.    - Continue home loperamide  - Consider discontinuing docusate on discharge    Elevated troponin- (present on admission)  Assessment & Plan  Troponin 42.  Denies chest pain.  Likely nonischemic myocardial injury in the setting of severely reduced ejection fraction and acute on chronic heart failure. Patient with baseline troponin elevation 40s to 50s. EKG NSR HR 93, QTc 446, without ST changes similar to previous.    - Trend troponin    High anion gap metabolic acidosis- (present on admission)  Assessment & Plan  Bicarb 16 anion gap 13    - Order LA and BHB    Methamphetamine abuse (HCC)- (present on admission)  Assessment & Plan  History of meth use    Nonischemic cardiomyopathy (HCC)- (present on admission)  Assessment & Plan  Secondary to methamphetamine use    - Refer to heart failure        VTE prophylaxis: SCDs/TEDs and enoxaparin ppx         [1] No Known Allergies

## 2025-06-05 ENCOUNTER — APPOINTMENT (OUTPATIENT)
Dept: RADIOLOGY | Facility: MEDICAL CENTER | Age: 60
DRG: 292 | End: 2025-06-05
Attending: STUDENT IN AN ORGANIZED HEALTH CARE EDUCATION/TRAINING PROGRAM
Payer: MEDICAID

## 2025-06-05 PROBLEM — L03.119 CELLULITIS OF LOWER EXTREMITY: Status: ACTIVE | Noted: 2025-06-05

## 2025-06-05 LAB
ALBUMIN SERPL BCP-MCNC: 3.7 G/DL (ref 3.2–4.9)
ALBUMIN/GLOB SERPL: 1.1 G/DL
ALP SERPL-CCNC: 117 U/L (ref 30–99)
ALT SERPL-CCNC: 23 U/L (ref 2–50)
ANION GAP SERPL CALC-SCNC: 15 MMOL/L (ref 7–16)
AST SERPL-CCNC: 39 U/L (ref 12–45)
BILIRUB SERPL-MCNC: 1.2 MG/DL (ref 0.1–1.5)
BUN SERPL-MCNC: 30 MG/DL (ref 8–22)
CALCIUM ALBUM COR SERPL-MCNC: 8.6 MG/DL (ref 8.5–10.5)
CALCIUM SERPL-MCNC: 8.4 MG/DL (ref 8.5–10.5)
CHLORIDE SERPL-SCNC: 100 MMOL/L (ref 96–112)
CO2 SERPL-SCNC: 16 MMOL/L (ref 20–33)
CREAT SERPL-MCNC: 1.34 MG/DL (ref 0.5–1.4)
E COLI SXT1+2 STL IA: NORMAL
ERYTHROCYTE [DISTWIDTH] IN BLOOD BY AUTOMATED COUNT: 54.4 FL (ref 35.9–50)
GFR SERPLBLD CREATININE-BSD FMLA CKD-EPI: 60 ML/MIN/1.73 M 2
GLOBULIN SER CALC-MCNC: 3.3 G/DL (ref 1.9–3.5)
GLUCOSE SERPL-MCNC: 133 MG/DL (ref 65–99)
HCT VFR BLD AUTO: 42.3 % (ref 42–52)
HGB BLD-MCNC: 14.3 G/DL (ref 14–18)
MAGNESIUM SERPL-MCNC: 2.1 MG/DL (ref 1.5–2.5)
MCH RBC QN AUTO: 33.2 PG (ref 27–33)
MCHC RBC AUTO-ENTMCNC: 33.8 G/DL (ref 32.3–36.5)
MCV RBC AUTO: 98.1 FL (ref 81.4–97.8)
PLATELET # BLD AUTO: 254 K/UL (ref 164–446)
PMV BLD AUTO: 10.7 FL (ref 9–12.9)
POTASSIUM SERPL-SCNC: 4 MMOL/L (ref 3.6–5.5)
PROT SERPL-MCNC: 7 G/DL (ref 6–8.2)
RBC # BLD AUTO: 4.31 M/UL (ref 4.7–6.1)
SIGNIFICANT IND 70042: NORMAL
SITE SITE: NORMAL
SODIUM SERPL-SCNC: 131 MMOL/L (ref 135–145)
SOURCE SOURCE: NORMAL
WBC # BLD AUTO: 6.7 K/UL (ref 4.8–10.8)

## 2025-06-05 PROCEDURE — A9270 NON-COVERED ITEM OR SERVICE: HCPCS | Performed by: STUDENT IN AN ORGANIZED HEALTH CARE EDUCATION/TRAINING PROGRAM

## 2025-06-05 PROCEDURE — 76700 US EXAM ABDOM COMPLETE: CPT

## 2025-06-05 PROCEDURE — 80053 COMPREHEN METABOLIC PANEL: CPT

## 2025-06-05 PROCEDURE — 770020 HCHG ROOM/CARE - TELE (206)

## 2025-06-05 PROCEDURE — 700111 HCHG RX REV CODE 636 W/ 250 OVERRIDE (IP): Mod: JZ | Performed by: STUDENT IN AN ORGANIZED HEALTH CARE EDUCATION/TRAINING PROGRAM

## 2025-06-05 PROCEDURE — 85027 COMPLETE CBC AUTOMATED: CPT

## 2025-06-05 PROCEDURE — 700102 HCHG RX REV CODE 250 W/ 637 OVERRIDE(OP): Performed by: STUDENT IN AN ORGANIZED HEALTH CARE EDUCATION/TRAINING PROGRAM

## 2025-06-05 PROCEDURE — 36415 COLL VENOUS BLD VENIPUNCTURE: CPT

## 2025-06-05 PROCEDURE — 83735 ASSAY OF MAGNESIUM: CPT

## 2025-06-05 PROCEDURE — 99233 SBSQ HOSP IP/OBS HIGH 50: CPT | Performed by: STUDENT IN AN ORGANIZED HEALTH CARE EDUCATION/TRAINING PROGRAM

## 2025-06-05 RX ORDER — DAPAGLIFLOZIN 10 MG/1
10 TABLET, FILM COATED ORAL DAILY
Status: DISCONTINUED | OUTPATIENT
Start: 2025-06-05 | End: 2025-06-08 | Stop reason: HOSPADM

## 2025-06-05 RX ORDER — FUROSEMIDE 10 MG/ML
80 INJECTION INTRAMUSCULAR; INTRAVENOUS ONCE
Status: COMPLETED | OUTPATIENT
Start: 2025-06-05 | End: 2025-06-05

## 2025-06-05 RX ADMIN — METHOCARBAMOL 500 MG: 500 TABLET ORAL at 09:05

## 2025-06-05 RX ADMIN — CEPHALEXIN 500 MG: 500 CAPSULE ORAL at 23:24

## 2025-06-05 RX ADMIN — FUROSEMIDE 40 MG: 10 INJECTION, SOLUTION INTRAVENOUS at 04:50

## 2025-06-05 RX ADMIN — METHOCARBAMOL 500 MG: 500 TABLET ORAL at 23:24

## 2025-06-05 RX ADMIN — METHOCARBAMOL 500 MG: 500 TABLET ORAL at 00:22

## 2025-06-05 RX ADMIN — DAPAGLIFLOZIN 10 MG: 10 TABLET, FILM COATED ORAL at 11:23

## 2025-06-05 RX ADMIN — FUROSEMIDE 40 MG: 10 INJECTION, SOLUTION INTRAVENOUS at 16:56

## 2025-06-05 RX ADMIN — OMEPRAZOLE 20 MG: 20 CAPSULE, DELAYED RELEASE ORAL at 16:56

## 2025-06-05 RX ADMIN — CEPHALEXIN 500 MG: 500 CAPSULE ORAL at 11:23

## 2025-06-05 RX ADMIN — TAMSULOSIN HYDROCHLORIDE 0.4 MG: 0.4 CAPSULE ORAL at 09:05

## 2025-06-05 RX ADMIN — ALLOPURINOL 300 MG: 300 TABLET ORAL at 09:05

## 2025-06-05 RX ADMIN — CEPHALEXIN 500 MG: 500 CAPSULE ORAL at 00:21

## 2025-06-05 RX ADMIN — ENOXAPARIN SODIUM 40 MG: 100 INJECTION SUBCUTANEOUS at 16:56

## 2025-06-05 RX ADMIN — FUROSEMIDE 80 MG: 10 INJECTION, SOLUTION INTRAVENOUS at 11:23

## 2025-06-05 RX ADMIN — CEPHALEXIN 500 MG: 500 CAPSULE ORAL at 16:56

## 2025-06-05 RX ADMIN — METHOCARBAMOL 500 MG: 500 TABLET ORAL at 15:00

## 2025-06-05 ASSESSMENT — PAIN DESCRIPTION - PAIN TYPE: TYPE: ACUTE PAIN

## 2025-06-05 ASSESSMENT — FIBROSIS 4 INDEX: FIB4 SCORE: 1.92

## 2025-06-05 ASSESSMENT — ENCOUNTER SYMPTOMS
VOMITING: 0
ABDOMINAL PAIN: 0
NAUSEA: 0
SHORTNESS OF BREATH: 1
FEVER: 0

## 2025-06-05 NOTE — PROGRESS NOTES
Bedside report received from off going RN Charley, assumed care of patient.     Fall Risk Score: LOW RISK  Fall risk interventions in place: Provide patient/family education based on risk assessment, Educate patient/family to call staff for assistance when getting out of bed, Place fall precaution signage outside patient door, Place patient in room close to nursing station, and Utilize bed/chair fall alarm  Bed type: Regular (Mich Score less than 17 interventions in place)  Patient on cardiac monitor: Yes  IVF/IV medications: Not Applicable   Oxygen: Room Air  Bedside sitter: Not Applicable   Isolation: Isolation precautions in place

## 2025-06-05 NOTE — CARE PLAN
The patient is Watcher - Medium risk of patient condition declining or worsening    Shift Goals  Clinical Goals: diuresis, abx, safety  Patient Goals: pain control  Family Goals: YOEL    Progress made toward(s) clinical / shift goals:      Patient is not progressing towards the following goals:      Problem: Pain - Standard  Goal: Alleviation of pain or a reduction in pain to the patient’s comfort goal  Outcome: Progressing  Flowsheets (Taken 6/5/2025 0054)  OB Pain Intervention: Medication - See MAR  Note: Medicated per MAR      Problem: Care Map:  Day 2 Optimal Outcome for the Heart Failure Patient  Goal: Day 2:  Optimal Care of the heart failure patient  Outcome: Progressing  Note: Educated pt on signs and symptoms of heart failure exacerbation. Educated pt on the importance of weighing themselves daily at the same time everyday and documenting weights in booklet. Educated pt on stoplight tool. Assessed/documented daily weights, intake and output. Assessed for peripheral edema. Educated pt on heart medications: and side effects and purpose. Educated pt on the importance of ambulating.

## 2025-06-05 NOTE — PROGRESS NOTES
Hospital Medicine Daily Progress Note    Date of Service  6/5/2025    Chief Complaint  Santino Zabala is a 59 y.o. male admitted 6/3/2025 with lower extremity swelling    Hospital Course  Santino Zabala is a 59-year-old with PMHx of HfrEF, methamphetamine abuse, alcoholic liver cirrhosis, BPH, CKD 3, gout and GERD.  Admitted 6/3 for lower extremity edema.    Per history: Patient recently admitted from 5/22/2025 to 6/2/2025 for acute on chronic heart failure.  Was also admitted from 5/9/2025 to 5/11/2025 for SUZY and hyperkalemia.     Patient states that they have been having worsening lower extremity edema over the past few days despite taking their medication as prescribed.  States that they have been having intermittent loose stools over the past 2 months, for which they were prescribed loperamide.    In the ED: Vitals unremarkable and stable.  Potassium 4.8.  Creatinine 1.07.  BNP 17,000.  CXR with mild bilateral infiltrates consistent with pulmonary edema.  Patient was started on IV Lasix.  LE US negative for DVT.    Interval Problem Update  6/5: Vital stable overnight.  SBP ranging .  WBC stable at 6.7.  Hb stable at 14.  Sodium 131 from 136.  Creatinine 1.34 from 1.07.  Significant lower extremity swelling and weeping present today.  Additional Lasix dosing administered.  Continue with aggressive diuresis.    I have discussed this patient's plan of care and discharge plan at IDT rounds today with Case Management, Nursing, Nursing leadership, and other members of the IDT team.    Consultants/Specialty  None at this time    Code Status  Full Code    Disposition  The patient is not medically cleared for discharge to home or a post-acute facility.  Anticipate discharge to: home with close outpatient follow-up    I have placed the appropriate orders for post-discharge needs.    Review of Systems  Review of Systems   Constitutional:  Negative for fever and malaise/fatigue.   Respiratory:  Positive for  shortness of breath.    Cardiovascular:  Positive for leg swelling. Negative for chest pain.   Gastrointestinal:  Negative for abdominal pain, nausea and vomiting.        Physical Exam  Temp:  [36.5 °C (97.7 °F)-36.8 °C (98.2 °F)] 36.5 °C (97.7 °F)  Pulse:  [] 93  Resp:  [18] 18  BP: ()/(65-81) 117/78  SpO2:  [90 %-99 %] 98 %    Physical Exam  Vitals and nursing note reviewed.   Constitutional:       General: He is not in acute distress.     Appearance: Normal appearance. He is not ill-appearing.   Cardiovascular:      Rate and Rhythm: Normal rate and regular rhythm.   Pulmonary:      Effort: Pulmonary effort is normal. No respiratory distress.      Breath sounds: Normal breath sounds. No wheezing.   Abdominal:      General: Bowel sounds are normal. There is no distension.      Tenderness: There is no abdominal tenderness.   Musculoskeletal:         General: Swelling present.      Comments: +3 pitting edema bilateral lower extremities to knees  Weeping is present  Superimposed cellulitis   Skin:     General: Skin is warm and dry.   Neurological:      Mental Status: He is alert and oriented to person, place, and time. Mental status is at baseline.   Psychiatric:         Mood and Affect: Mood normal.         Behavior: Behavior normal.         Fluids    Intake/Output Summary (Last 24 hours) at 6/5/2025 1027  Last data filed at 6/5/2025 0719  Gross per 24 hour   Intake 1420 ml   Output 2275 ml   Net -855 ml        Laboratory  Recent Labs     06/03/25 2107 06/05/25  0102   WBC 5.9 6.7   RBC 4.46* 4.31*   HEMOGLOBIN 14.6 14.3   HEMATOCRIT 44.0 42.3   MCV 98.7* 98.1*   MCH 32.7 33.2*   MCHC 33.2 33.8   RDW 54.6* 54.4*   PLATELETCT 248 254   MPV 10.3 10.7     Recent Labs     06/03/25 2107 06/05/25  0102   SODIUM 136 131*   POTASSIUM 4.8 4.0   CHLORIDE 107 100   CO2 16* 16*   GLUCOSE 107* 133*   BUN 29* 30*   CREATININE 1.07 1.34   CALCIUM 8.5 8.4*     Recent Labs     06/03/25 2107   APTT 27.6   INR 1.12                Imaging  US-ABDOMEN COMPLETE SURVEY   Final Result      1.  No acute sonographic abnormality. No gallstones.   2.  Slightly increased hepatic echogenicity, suggestive of mild steatosis and/or hepatocellular disease. Trace ascites.   3.  Nonspecific gallbladder wall thickening, likely related to hepatic dysfunction.         US-EXTREMITY VENOUS LOWER BILAT   Final Result      DX-CHEST-PORTABLE (1 VIEW)   Final Result      Stable cardiomegaly.           Assessment/Plan  * Acute on chronic combined systolic (congestive) and diastolic (congestive) heart failure (HCC)- (present on admission)  Assessment & Plan  +3 pitting edema bilateral lower extremity to hips; BNP elevated at 17,000 on admission  Echo 5/2/2025: EF less than 20%. grade 2 diastolic dysfunction, moderate MR, mild AI, mild TR, RVSP 25 mmHg. Limited TTE 5/25/2025 with EF 15 to 20%, trivial pericardial effusion.  - Continuous telemetry monitoring; monitoring for arrhythmia in the setting of forced IV diuresis  - Continue IV Lasix 40 mg twice daily  - Additional one-time dose IV Lasix 80 mg this morning  - Strict I's and O's  - Close monitoring of electrolytes and replacement as needed  - Repeat BMP in a.m.  - Continue GDMT with metoprolol, spironolactone  - Start Farxiga  - Holding ACE and ARB due to hypotension    Cellulitis of lower extremity  Assessment & Plan  Mild bilateral superimposed cellulitis secondary to volume overload; left worse than right  - Continue Keflex; end date 6/11    Gout- (present on admission)  Assessment & Plan  - Continue home allopurinol    BPH (benign prostatic hyperplasia)- (present on admission)  Assessment & Plan  - Continue home tamsulosin    Diarrhea- (present on admission)  Assessment & Plan  Over past 2 months.  Likely in the setting of docusate use at home.  Risk factors for C. difficile including recent hospitalizations  Stool to form for C. difficile testing  - Continue monitor closely    Elevated troponin-  (present on admission)  Assessment & Plan  Troponin 42.  Denies chest pain.  Likely nonischemic myocardial injury in the setting of severely reduced ejection fraction and acute on chronic heart failure. Patient with baseline troponin elevation 40s to 50s. EKG NSR HR 93, QTc 446, without ST changes similar to previous.    - Trend troponin    Generalized abdominal pain- (present on admission)  Assessment & Plan  Intermittently complaining of gas pains and cramping  RUQ US: Negative for acute abnormalities.  Mild steatosis/hepatocellular disease present.  Trace ascites.  - Start simethicone    High anion gap metabolic acidosis- (present on admission)  Assessment & Plan  Bicarb 16; secondary to volume overload  - Continue to monitor    Methamphetamine abuse (HCC)- (present on admission)  Assessment & Plan  History of meth use  UDS negative; last use greater than 1 month ago per patient    Nonischemic cardiomyopathy (HCC)- (present on admission)  Assessment & Plan  Secondary to methamphetamine use    - Refer to heart failure         VTE prophylaxis:   SCDs/TEDs   enoxaparin ppx      I have performed a physical exam and reviewed and updated ROS and Plan today (6/5/2025). In review of yesterday's note (6/4/2025), there are no changes except as documented above.

## 2025-06-05 NOTE — ASSESSMENT & PLAN NOTE
Mild bilateral superimposed cellulitis secondary to volume overload; left worse than right  - Continue Keflex; end date 6/11

## 2025-06-05 NOTE — PROGRESS NOTES
Bedside report received from off going RN/tech: Helene, assumed care of patient.   Overnight Events: no significant overnight events, pt has been NPO for RUQ US this morning for suspected cholecystitis and abdominal pain. Pt did refuse all meds this morning due to NPO status and wanted them with food   A&O x 4  Oxygen: Room Air  SBP Ranged: 90's-1 teen's  Patient on cardiac monitor: Yes SR-ST 80's-low 100's  IVF/IV medications: Not Applicable   Fall Risk Score: low fall risk   Fall risk interventions in place: Place yellow fall risk ID band on patient, Provide patient/family education based on risk assessment, Educate patient/family to call staff for assistance when getting out of bed, Place fall precaution signage outside patient door, Place patient in room close to nursing station, Utilize bed/chair fall alarm, Notify charge of high risk for huddle, and Bed alarm connected correctly  Bed type: Regular (Mich Score less than 17 interventions in place)  Bedside sitter: Not Applicable   Isolation: Not applicable

## 2025-06-05 NOTE — PROGRESS NOTES
Monitor Summary  Rhythm: SR  Rate:   Ectopy: (O) PVC  .21 / .11 / .31  6 bts of VTACH

## 2025-06-05 NOTE — ASSESSMENT & PLAN NOTE
Intermittently complaining of gas pains and cramping  RUQ US: Negative for acute abnormalities.  Mild steatosis/hepatocellular disease present.  Trace ascites.  - Start simethicone

## 2025-06-06 LAB
ANION GAP SERPL CALC-SCNC: 13 MMOL/L (ref 7–16)
BUN SERPL-MCNC: 29 MG/DL (ref 8–22)
CALCIUM SERPL-MCNC: 8.5 MG/DL (ref 8.5–10.5)
CHLORIDE SERPL-SCNC: 99 MMOL/L (ref 96–112)
CO2 SERPL-SCNC: 21 MMOL/L (ref 20–33)
CREAT SERPL-MCNC: 1.31 MG/DL (ref 0.5–1.4)
ERYTHROCYTE [DISTWIDTH] IN BLOOD BY AUTOMATED COUNT: 54.4 FL (ref 35.9–50)
GFR SERPLBLD CREATININE-BSD FMLA CKD-EPI: 62 ML/MIN/1.73 M 2
GLUCOSE SERPL-MCNC: 92 MG/DL (ref 65–99)
HCT VFR BLD AUTO: 40.4 % (ref 42–52)
HGB BLD-MCNC: 13.5 G/DL (ref 14–18)
MAGNESIUM SERPL-MCNC: 2.1 MG/DL (ref 1.5–2.5)
MCH RBC QN AUTO: 32.8 PG (ref 27–33)
MCHC RBC AUTO-ENTMCNC: 33.4 G/DL (ref 32.3–36.5)
MCV RBC AUTO: 98.3 FL (ref 81.4–97.8)
PLATELET # BLD AUTO: 248 K/UL (ref 164–446)
PMV BLD AUTO: 10.8 FL (ref 9–12.9)
POTASSIUM SERPL-SCNC: 3.6 MMOL/L (ref 3.6–5.5)
RBC # BLD AUTO: 4.11 M/UL (ref 4.7–6.1)
SODIUM SERPL-SCNC: 133 MMOL/L (ref 135–145)
WBC # BLD AUTO: 6.9 K/UL (ref 4.8–10.8)

## 2025-06-06 PROCEDURE — A9270 NON-COVERED ITEM OR SERVICE: HCPCS | Performed by: STUDENT IN AN ORGANIZED HEALTH CARE EDUCATION/TRAINING PROGRAM

## 2025-06-06 PROCEDURE — 700102 HCHG RX REV CODE 250 W/ 637 OVERRIDE(OP): Performed by: STUDENT IN AN ORGANIZED HEALTH CARE EDUCATION/TRAINING PROGRAM

## 2025-06-06 PROCEDURE — 99233 SBSQ HOSP IP/OBS HIGH 50: CPT | Performed by: STUDENT IN AN ORGANIZED HEALTH CARE EDUCATION/TRAINING PROGRAM

## 2025-06-06 PROCEDURE — 80048 BASIC METABOLIC PNL TOTAL CA: CPT

## 2025-06-06 PROCEDURE — 83735 ASSAY OF MAGNESIUM: CPT

## 2025-06-06 PROCEDURE — 85027 COMPLETE CBC AUTOMATED: CPT

## 2025-06-06 PROCEDURE — 36415 COLL VENOUS BLD VENIPUNCTURE: CPT

## 2025-06-06 PROCEDURE — 770020 HCHG ROOM/CARE - TELE (206)

## 2025-06-06 PROCEDURE — 700111 HCHG RX REV CODE 636 W/ 250 OVERRIDE (IP): Mod: JZ | Performed by: STUDENT IN AN ORGANIZED HEALTH CARE EDUCATION/TRAINING PROGRAM

## 2025-06-06 PROCEDURE — RXMED WILLOW AMBULATORY MEDICATION CHARGE: Performed by: STUDENT IN AN ORGANIZED HEALTH CARE EDUCATION/TRAINING PROGRAM

## 2025-06-06 RX ORDER — POTASSIUM CHLORIDE 1500 MG/1
40 TABLET, EXTENDED RELEASE ORAL ONCE
Status: COMPLETED | OUTPATIENT
Start: 2025-06-06 | End: 2025-06-06

## 2025-06-06 RX ORDER — FUROSEMIDE 10 MG/ML
40 INJECTION INTRAMUSCULAR; INTRAVENOUS ONCE
Status: COMPLETED | OUTPATIENT
Start: 2025-06-06 | End: 2025-06-06

## 2025-06-06 RX ORDER — METHOCARBAMOL 500 MG/1
500 TABLET, FILM COATED ORAL 4 TIMES DAILY PRN
Qty: 60 TABLET | Refills: 0 | Status: SHIPPED | OUTPATIENT
Start: 2025-06-06

## 2025-06-06 RX ORDER — DAPAGLIFLOZIN 10 MG/1
10 TABLET, FILM COATED ORAL DAILY
Qty: 100 TABLET | Refills: 3 | Status: SHIPPED | OUTPATIENT
Start: 2025-06-07 | End: 2026-07-12

## 2025-06-06 RX ORDER — CEPHALEXIN 500 MG/1
500 CAPSULE ORAL EVERY 6 HOURS
Qty: 20 CAPSULE | Refills: 0 | Status: ACTIVE | OUTPATIENT
Start: 2025-06-06 | End: 2025-06-13

## 2025-06-06 RX ADMIN — FUROSEMIDE 40 MG: 10 INJECTION, SOLUTION INTRAVENOUS at 08:59

## 2025-06-06 RX ADMIN — METOPROLOL SUCCINATE 50 MG: 50 TABLET, EXTENDED RELEASE ORAL at 04:11

## 2025-06-06 RX ADMIN — OMEPRAZOLE 20 MG: 20 CAPSULE, DELAYED RELEASE ORAL at 16:46

## 2025-06-06 RX ADMIN — CEPHALEXIN 500 MG: 500 CAPSULE ORAL at 04:06

## 2025-06-06 RX ADMIN — OMEPRAZOLE 20 MG: 20 CAPSULE, DELAYED RELEASE ORAL at 04:06

## 2025-06-06 RX ADMIN — METHOCARBAMOL 500 MG: 500 TABLET ORAL at 23:46

## 2025-06-06 RX ADMIN — ENOXAPARIN SODIUM 40 MG: 100 INJECTION SUBCUTANEOUS at 16:45

## 2025-06-06 RX ADMIN — METHOCARBAMOL 500 MG: 500 TABLET ORAL at 10:31

## 2025-06-06 RX ADMIN — METHOCARBAMOL 500 MG: 500 TABLET ORAL at 16:48

## 2025-06-06 RX ADMIN — TAMSULOSIN HYDROCHLORIDE 0.4 MG: 0.4 CAPSULE ORAL at 07:41

## 2025-06-06 RX ADMIN — CEPHALEXIN 500 MG: 500 CAPSULE ORAL at 16:46

## 2025-06-06 RX ADMIN — POTASSIUM CHLORIDE 40 MEQ: 1500 TABLET, EXTENDED RELEASE ORAL at 07:41

## 2025-06-06 RX ADMIN — DAPAGLIFLOZIN 10 MG: 10 TABLET, FILM COATED ORAL at 04:05

## 2025-06-06 RX ADMIN — FUROSEMIDE 40 MG: 10 INJECTION, SOLUTION INTRAVENOUS at 16:47

## 2025-06-06 RX ADMIN — FUROSEMIDE 40 MG: 10 INJECTION, SOLUTION INTRAVENOUS at 04:11

## 2025-06-06 RX ADMIN — SPIRONOLACTONE 12.5 MG: 25 TABLET ORAL at 04:11

## 2025-06-06 RX ADMIN — CEPHALEXIN 500 MG: 500 CAPSULE ORAL at 13:03

## 2025-06-06 RX ADMIN — METHOCARBAMOL 500 MG: 500 TABLET ORAL at 04:11

## 2025-06-06 RX ADMIN — ALLOPURINOL 300 MG: 300 TABLET ORAL at 04:05

## 2025-06-06 RX ADMIN — CEPHALEXIN 500 MG: 500 CAPSULE ORAL at 23:55

## 2025-06-06 ASSESSMENT — PAIN DESCRIPTION - PAIN TYPE
TYPE: ACUTE PAIN
TYPE: ACUTE PAIN

## 2025-06-06 ASSESSMENT — ENCOUNTER SYMPTOMS
SHORTNESS OF BREATH: 1
VOMITING: 0
ABDOMINAL PAIN: 0
NAUSEA: 0
FEVER: 0

## 2025-06-06 ASSESSMENT — FIBROSIS 4 INDEX: FIB4 SCORE: 1.97

## 2025-06-06 NOTE — CARE PLAN
The patient is Watcher - Medium risk of patient condition declining or worsening    Shift Goals  Clinical Goals: I/o, increase mobility  Patient Goals: go home  Family Goals: Not present    Progress made toward(s) clinical / shift goals:          Problem: Knowledge Deficit - Standard  Goal: Patient and family/care givers will demonstrate understanding of plan of care, disease process/condition, diagnostic tests and medications  Outcome: Progressing     Problem: Care Map:  Day 3 Optimal Outcome for the Heart Failure Patient  Goal: Day 3:  Optimal Care of the heart failure patient  Outcome: Progressing  Intervention: Start Heart Failure education booklet, provide writing utensils and notepad for questions  Note: Heart failure booklet given to patient.  Intervention: For patient's with heart failure exacerbation, identify precipitant (diet, med compliance, etc.) and direct education towards lifestyle changes to prevent exacerbations.  Note: Medication compliance was achieved and answers about heart medications discussed.   Intervention: Instruct patient to write down weights on symptom tracker daily  Note: Daily weights documented   Intervention: Provide and explain the Stoplight Tool  Note: Stoplight tool ex[plained   Intervention: Ensure daily BMP is ordered by provider  Note: BMP ordered and in flowsheets  Intervention: Daily weight documented.  Use stand up scale if patient able. Compare to previous weight  Note: Daily weights documented and educated about obtaining daily weights  Intervention: Assess and document 2 hour post diuretic output  Note: Post diuretic documentation in flowsheets.  Intervention: Document intake and output per shift.  Communicate with care team if volume status is improving, stalled or worsening.  Note: I/o documented in flowsheets  Intervention: Document ambulation tolerance (functional assessment) in ADL flowsheet daily  Note: Pt able to tolerate ambulation to chair and  bathroom  Intervention: Assess edema every shift (peripheral, sacral, periorbital, perineal/scrotal, and abdominal)  Note: Edema assessed and charged

## 2025-06-06 NOTE — PROGRESS NOTES
Health Maintenance       HPV Vaccine (1 - 3-dose series)  Never done    Chlamydia and Gonorrhea Screening (if sexually active) (Yearly)  Never done    Cervical Cancer Screening (View Topic Details)  Never done    COVID-19 Vaccine (1 - 2024-25 season)  Never done           Following review of the above:  Patient is not proceeding with: COVID-19    Note: Refer to final orders and clinician documentation.         Monitor Summary  Rhythm: SR/ST  Rate:   Ectopy: R PVC  .24 / .07 / .36

## 2025-06-06 NOTE — PROGRESS NOTES
Bedside report received from off going RN/tech: Tomasa, assumed care of patient.     Fall Risk Score: LOW RISK  Fall risk interventions in place: Provide patient/family education based on risk assessment, Educate patient/family to call staff for assistance when getting out of bed, and Utilize bed/chair fall alarm  Bed type: Regular (Mich Score less than 17 interventions in place)  Patient on cardiac monitor: Yes  IVF/IV medications: Not Applicable   Oxygen: Room Air  Bedside sitter: Not Applicable   Isolation: Not applicable

## 2025-06-06 NOTE — CARE PLAN
The patient is Stable - Low risk of patient condition declining or worsening    Shift Goals  Clinical Goals: monitor i/os, skin integrity. pain management  Patient Goals: pain mangement  Family Goals: Not present    Progress made toward(s) clinical / shift goals:        Problem: Knowledge Deficit - Standard  Goal: Patient and family/care givers will demonstrate understanding of plan of care, disease process/condition, diagnostic tests and medications  Outcome: Progressing     Problem: Care Map:  Day 2 Optimal Outcome for the Heart Failure Patient  Goal: Day 2:  Optimal Care of the heart failure patient  Outcome: Progressing  Note: Discussed heart failure education topics. HF education book provided, discussed importance of measuring and recording daily weight, and blood pressure. Discussed importance of tracking symptoms. Dietary education reinforced.    Discussed use of Stoplight tool and when to seek medical attention or emergency medical services.    Daily BMP order verified. Daily weights documented. Diuretics administered and output recorded. I/O tracked, optimized fluid balance and monitored for retention. Edema assessed and documented. Ambulation tolerance evaluated and documented, mobility encouraged. Reviewed chart for guideline medications.      Problem: Pain - Standard  Goal: Alleviation of pain or a reduction in pain to the patient’s comfort goal  Outcome: Progressing     Problem: Nutrition  Goal: Patient's nutritional and fluid intake will be adequate or improve  Outcome: Progressing     Problem: Mobility  Goal: Patient's capacity to carry out activities will improve  Outcome: Progressing

## 2025-06-06 NOTE — PROGRESS NOTES
[facility] Medicine Daily Progress Note    Date of Service  6/6/2025    Chief Complaint  Santino Zabala is a 59 y.o. male admitted 6/3/2025 with lower extremity swelling    Hospital Course  Santino Zabala is a 59-year-old with PMHx of HfrEF, methamphetamine abuse, alcoholic liver cirrhosis, BPH, CKD 3, gout and GERD.  Admitted 6/3 for lower extremity edema.    Per history: Patient recently admitted from 5/22/2025 to 6/2/2025 for acute on chronic heart failure.  Was also admitted from 5/9/2025 to 5/11/2025 for SUZY and hyperkalemia.     Patient states that they have been having worsening lower extremity edema over the past few days despite taking their medication as prescribed.  States that they have been having intermittent loose stools over the past 2 months, for which they were prescribed loperamide.    In the ED: Vitals unremarkable and stable.  Potassium 4.8.  Creatinine 1.07.  BNP 17,000.  CXR with mild bilateral infiltrates consistent with pulmonary edema.  Patient was started on IV Lasix.  LE US negative for DVT.    Interval Problem Update  6/5: Vital stable overnight.  SBP ranging .  WBC stable at 6.7.  Hb stable at 14.  Sodium 131 from 136.  Creatinine 1.34 from 1.07.  Significant lower extremity swelling and weeping present today.  Additional Lasix dosing administered.  Continue with aggressive diuresis.    6/6: Vitals stable overnight. SBP ranging 100-123.  Tolerating IV diuresis well.  Additional 40 IV Lasix this a.m.  Continue diuresis today.  Anticipate discharge tomorrow morning.    I have discussed this patient's plan of care and discharge plan at IDT rounds today with Case Management, Nursing, Nursing leadership, and other members of the IDT team.    Consultants/Specialty  None at this time    Code Status  Full Code    Disposition  Medically Cleared  I have placed the appropriate orders for post-discharge needs.    Review of Systems  Review of Systems   Constitutional:  Negative for fever and  malaise/fatigue.   Respiratory:  Positive for shortness of breath.    Cardiovascular:  Positive for leg swelling. Negative for chest pain.   Gastrointestinal:  Negative for abdominal pain, nausea and vomiting.        Physical Exam  Temp:  [36.6 °C (97.9 °F)-36.8 °C (98.2 °F)] 36.6 °C (97.9 °F)  Pulse:  [85-97] 93  Resp:  [18] 18  BP: (100-123)/(68-83) 110/83  SpO2:  [96 %-100 %] 96 %    Physical Exam  Vitals and nursing note reviewed.   Constitutional:       General: He is not in acute distress.     Appearance: Normal appearance. He is not ill-appearing.   Cardiovascular:      Rate and Rhythm: Normal rate and regular rhythm.   Pulmonary:      Effort: Pulmonary effort is normal. No respiratory distress.      Breath sounds: Normal breath sounds. No wheezing.   Abdominal:      General: Bowel sounds are normal. There is no distension.      Tenderness: There is no abdominal tenderness.   Musculoskeletal:         General: Swelling present.      Comments: +2 pitting edema bilateral lower extremity to mid shin  Weeping resolved  Superimposed cellulitis-improving   Skin:     General: Skin is warm and dry.   Neurological:      Mental Status: He is alert and oriented to person, place, and time. Mental status is at baseline.   Psychiatric:         Mood and Affect: Mood normal.         Behavior: Behavior normal.         Fluids    Intake/Output Summary (Last 24 hours) at 6/6/2025 0814  Last data filed at 6/6/2025 0748  Gross per 24 hour   Intake 850 ml   Output 5690 ml   Net -4840 ml        Laboratory  Recent Labs     06/03/25 2107 06/05/25 0102 06/06/25  0120   WBC 5.9 6.7 6.9   RBC 4.46* 4.31* 4.11*   HEMOGLOBIN 14.6 14.3 13.5*   HEMATOCRIT 44.0 42.3 40.4*   MCV 98.7* 98.1* 98.3*   MCH 32.7 33.2* 32.8   MCHC 33.2 33.8 33.4   RDW 54.6* 54.4* 54.4*   PLATELETCT 248 254 248   MPV 10.3 10.7 10.8     Recent Labs     06/03/25 2107 06/05/25  0102 06/06/25  0120   SODIUM 136 131* 133*   POTASSIUM 4.8 4.0 3.6   CHLORIDE 107 100 99    CO2 16* 16* 21   GLUCOSE 107* 133* 92   BUN 29* 30* 29*   CREATININE 1.07 1.34 1.31   CALCIUM 8.5 8.4* 8.5     Recent Labs     06/03/25  2107   APTT 27.6   INR 1.12               Imaging  US-ABDOMEN COMPLETE SURVEY   Final Result      1.  No acute sonographic abnormality. No gallstones.   2.  Slightly increased hepatic echogenicity, suggestive of mild steatosis and/or hepatocellular disease. Trace ascites.   3.  Nonspecific gallbladder wall thickening, likely related to hepatic dysfunction.         US-EXTREMITY VENOUS LOWER BILAT   Final Result      DX-CHEST-PORTABLE (1 VIEW)   Final Result      Stable cardiomegaly.           Assessment/Plan  * Acute on chronic combined systolic (congestive) and diastolic (congestive) heart failure (HCC)- (present on admission)  Assessment & Plan  +3 pitting edema bilateral lower extremity to hips; BNP elevated at 17,000 on admission; improving  Echo 5/2/2025: EF less than 20%. grade 2 diastolic dysfunction, moderate MR, mild AI, mild TR, RVSP 25 mmHg. Limited TTE 5/25/2025 with EF 15 to 20%, trivial pericardial effusion.  - Continuous telemetry monitoring; monitoring for arrhythmia in the setting of forced IV diuresis  - Continue IV Lasix 40 mg twice daily  - Additional one-time dose IV Lasix 40 mg this a.m.  - Strict I's and O's  - Close monitoring of electrolytes and replacement as needed  - Repeat BMP in a.m.  - Continue GDMT with metoprolol, spironolactone  - Continue Farxiga  - Holding ACE and ARB due to hypotension    Cellulitis of lower extremity  Assessment & Plan  Mild bilateral superimposed cellulitis secondary to volume overload; left worse than right  - Continue Keflex; end date 6/11    Gout- (present on admission)  Assessment & Plan  - Continue home allopurinol    BPH (benign prostatic hyperplasia)- (present on admission)  Assessment & Plan  - Continue home tamsulosin    Diarrhea- (present on admission)  Assessment & Plan  Over past 2 months.  Likely in the setting  of docusate use at home.  Risk factors for C. difficile including recent hospitalizations  Stool to form for C. difficile testing  - Continue monitor closely    Elevated troponin- (present on admission)  Assessment & Plan  Troponin 42.  Denies chest pain.  Likely nonischemic myocardial injury in the setting of severely reduced ejection fraction and acute on chronic heart failure. Patient with baseline troponin elevation 40s to 50s. EKG NSR HR 93, QTc 446, without ST changes similar to previous.    - Trend troponin    Generalized abdominal pain- (present on admission)  Assessment & Plan  Intermittently complaining of gas pains and cramping  RUQ US: Negative for acute abnormalities.  Mild steatosis/hepatocellular disease present.  Trace ascites.  - Start simethicone    High anion gap metabolic acidosis- (present on admission)  Assessment & Plan  Bicarb 16; secondary to volume overload  - Continue to monitor    Methamphetamine abuse (HCC)- (present on admission)  Assessment & Plan  History of meth use  UDS negative; last use greater than 1 month ago per patient    Nonischemic cardiomyopathy (HCC)- (present on admission)  Assessment & Plan  Secondary to methamphetamine use    - Refer to heart failure         VTE prophylaxis:   SCDs/TEDs   enoxaparin ppx      I have performed a physical exam and reviewed and updated ROS and Plan today (6/6/2025). In review of yesterday's note (6/5/2025), there are no changes except as documented above.

## 2025-06-06 NOTE — PROGRESS NOTES
Bedside report received from off going RN/tech: brennon Fontaine care of patient.     Fall Risk Score: LOW RISK  Fall risk interventions in place: Provide patient/family education based on risk assessment, Place patient in room close to nursing station, and Utilize bed/chair fall alarm  Bed type: Regular (Mich Score less than 17 interventions in place)  Patient on cardiac monitor: Yes  IVF/IV medications: Not Applicable   Oxygen: Room Air  Bedside sitter: Not Applicable   Isolation: Not applicable

## 2025-06-07 PROBLEM — Z71.89 ACP (ADVANCE CARE PLANNING): Status: ACTIVE | Noted: 2025-06-07

## 2025-06-07 LAB
ANION GAP SERPL CALC-SCNC: 13 MMOL/L (ref 7–16)
BACTERIA STL CULT: NORMAL
BUN SERPL-MCNC: 32 MG/DL (ref 8–22)
CALCIUM SERPL-MCNC: 8.7 MG/DL (ref 8.5–10.5)
CHLORIDE SERPL-SCNC: 99 MMOL/L (ref 96–112)
CO2 SERPL-SCNC: 22 MMOL/L (ref 20–33)
CREAT SERPL-MCNC: 1.3 MG/DL (ref 0.5–1.4)
E COLI SXT1+2 STL IA: NORMAL
ERYTHROCYTE [DISTWIDTH] IN BLOOD BY AUTOMATED COUNT: 53.5 FL (ref 35.9–50)
GFR SERPLBLD CREATININE-BSD FMLA CKD-EPI: 63 ML/MIN/1.73 M 2
GLUCOSE SERPL-MCNC: 157 MG/DL (ref 65–99)
HCT VFR BLD AUTO: 39.8 % (ref 42–52)
HGB BLD-MCNC: 13.7 G/DL (ref 14–18)
MAGNESIUM SERPL-MCNC: 2.2 MG/DL (ref 1.5–2.5)
MCH RBC QN AUTO: 33.5 PG (ref 27–33)
MCHC RBC AUTO-ENTMCNC: 34.4 G/DL (ref 32.3–36.5)
MCV RBC AUTO: 97.3 FL (ref 81.4–97.8)
PLATELET # BLD AUTO: 237 K/UL (ref 164–446)
PMV BLD AUTO: 10.6 FL (ref 9–12.9)
POTASSIUM SERPL-SCNC: 3.7 MMOL/L (ref 3.6–5.5)
RBC # BLD AUTO: 4.09 M/UL (ref 4.7–6.1)
SIGNIFICANT IND 70042: NORMAL
SITE SITE: NORMAL
SODIUM SERPL-SCNC: 134 MMOL/L (ref 135–145)
SOURCE SOURCE: NORMAL
WBC # BLD AUTO: 6.5 K/UL (ref 4.8–10.8)

## 2025-06-07 PROCEDURE — A9270 NON-COVERED ITEM OR SERVICE: HCPCS | Performed by: STUDENT IN AN ORGANIZED HEALTH CARE EDUCATION/TRAINING PROGRAM

## 2025-06-07 PROCEDURE — 36415 COLL VENOUS BLD VENIPUNCTURE: CPT

## 2025-06-07 PROCEDURE — 83735 ASSAY OF MAGNESIUM: CPT

## 2025-06-07 PROCEDURE — 99233 SBSQ HOSP IP/OBS HIGH 50: CPT | Mod: 25 | Performed by: STUDENT IN AN ORGANIZED HEALTH CARE EDUCATION/TRAINING PROGRAM

## 2025-06-07 PROCEDURE — 700102 HCHG RX REV CODE 250 W/ 637 OVERRIDE(OP): Performed by: STUDENT IN AN ORGANIZED HEALTH CARE EDUCATION/TRAINING PROGRAM

## 2025-06-07 PROCEDURE — 80048 BASIC METABOLIC PNL TOTAL CA: CPT

## 2025-06-07 PROCEDURE — 700111 HCHG RX REV CODE 636 W/ 250 OVERRIDE (IP): Mod: JZ | Performed by: STUDENT IN AN ORGANIZED HEALTH CARE EDUCATION/TRAINING PROGRAM

## 2025-06-07 PROCEDURE — 770020 HCHG ROOM/CARE - TELE (206)

## 2025-06-07 PROCEDURE — 85027 COMPLETE CBC AUTOMATED: CPT

## 2025-06-07 PROCEDURE — 700111 HCHG RX REV CODE 636 W/ 250 OVERRIDE (IP): Performed by: STUDENT IN AN ORGANIZED HEALTH CARE EDUCATION/TRAINING PROGRAM

## 2025-06-07 RX ORDER — LOSARTAN POTASSIUM 25 MG/1
12.5 TABLET ORAL
Status: DISCONTINUED | OUTPATIENT
Start: 2025-06-07 | End: 2025-06-08 | Stop reason: HOSPADM

## 2025-06-07 RX ADMIN — CEPHALEXIN 500 MG: 500 CAPSULE ORAL at 23:21

## 2025-06-07 RX ADMIN — DAPAGLIFLOZIN 10 MG: 10 TABLET, FILM COATED ORAL at 06:03

## 2025-06-07 RX ADMIN — OXYCODONE 5 MG: 5 TABLET ORAL at 02:41

## 2025-06-07 RX ADMIN — METHOCARBAMOL 500 MG: 500 TABLET ORAL at 13:03

## 2025-06-07 RX ADMIN — LOSARTAN POTASSIUM 12.5 MG: 25 TABLET, FILM COATED ORAL at 17:29

## 2025-06-07 RX ADMIN — SPIRONOLACTONE 12.5 MG: 25 TABLET ORAL at 06:04

## 2025-06-07 RX ADMIN — OXYCODONE 5 MG: 5 TABLET ORAL at 16:05

## 2025-06-07 RX ADMIN — CEPHALEXIN 500 MG: 500 CAPSULE ORAL at 06:03

## 2025-06-07 RX ADMIN — CEPHALEXIN 500 MG: 500 CAPSULE ORAL at 16:06

## 2025-06-07 RX ADMIN — TAMSULOSIN HYDROCHLORIDE 0.4 MG: 0.4 CAPSULE ORAL at 07:52

## 2025-06-07 RX ADMIN — METHOCARBAMOL 500 MG: 500 TABLET ORAL at 21:04

## 2025-06-07 RX ADMIN — FUROSEMIDE 40 MG: 10 INJECTION, SOLUTION INTRAVENOUS at 06:04

## 2025-06-07 RX ADMIN — OMEPRAZOLE 20 MG: 20 CAPSULE, DELAYED RELEASE ORAL at 16:06

## 2025-06-07 RX ADMIN — ENOXAPARIN SODIUM 40 MG: 100 INJECTION SUBCUTANEOUS at 16:06

## 2025-06-07 RX ADMIN — METOPROLOL SUCCINATE 50 MG: 50 TABLET, EXTENDED RELEASE ORAL at 06:04

## 2025-06-07 RX ADMIN — SIMETHICONE 125 MG: 125 TABLET, CHEWABLE ORAL at 02:42

## 2025-06-07 RX ADMIN — OMEPRAZOLE 20 MG: 20 CAPSULE, DELAYED RELEASE ORAL at 06:04

## 2025-06-07 RX ADMIN — CEPHALEXIN 500 MG: 500 CAPSULE ORAL at 13:03

## 2025-06-07 RX ADMIN — FUROSEMIDE 40 MG: 10 INJECTION, SOLUTION INTRAVENOUS at 16:06

## 2025-06-07 RX ADMIN — ALLOPURINOL 300 MG: 300 TABLET ORAL at 06:04

## 2025-06-07 ASSESSMENT — PAIN DESCRIPTION - PAIN TYPE
TYPE: ACUTE PAIN

## 2025-06-07 ASSESSMENT — ENCOUNTER SYMPTOMS
ABDOMINAL PAIN: 0
VOMITING: 0
FEVER: 0
NAUSEA: 0
SHORTNESS OF BREATH: 1

## 2025-06-07 ASSESSMENT — FIBROSIS 4 INDEX: FIB4 SCORE: 1.97

## 2025-06-07 NOTE — PROGRESS NOTES
Hospital Medicine Daily Progress Note    Date of Service  6/7/2025    Chief Complaint  Santino Zabala is a 59 y.o. male admitted 6/3/2025 with lower extremity swelling    Hospital Course  Santino Zabala is a 59-year-old with PMHx of HfrEF, methamphetamine abuse, alcoholic liver cirrhosis, BPH, CKD 3, gout and GERD.  Admitted 6/3 for lower extremity edema.    Per history: Patient recently admitted from 5/22/2025 to 6/2/2025 for acute on chronic heart failure.  Was also admitted from 5/9/2025 to 5/11/2025 for SUZY and hyperkalemia.     Patient states that they have been having worsening lower extremity edema over the past few days despite taking their medication as prescribed.  States that they have been having intermittent loose stools over the past 2 months, for which they were prescribed loperamide.    In the ED: Vitals unremarkable and stable.  Potassium 4.8.  Creatinine 1.07.  BNP 17,000.  CXR with mild bilateral infiltrates consistent with pulmonary edema.  Patient was started on IV Lasix.  LE US negative for DVT.    Interval Problem Update  I have seen and examined the patient at bedside    Still significant fluid overload, LE edema 2-3+  Continue IV Lasix twice daily    Continue GDMT including Toprol, spironolactone, Farxiga   I added  losartan 12.5 mg daily, monitor BP  Continue telemetry monitoring    I have discussed this patient's plan of care and discharge plan at IDT rounds today with Case Management, Nursing, Nursing leadership, and other members of the IDT team.    Consultants/Specialty  None at this time    Code Status  Full Code    Disposition  The patient is not medically cleared for discharge to home or a post-acute facility.      I have placed the appropriate orders for post-discharge needs.    Review of Systems  Review of Systems   Constitutional:  Negative for fever and malaise/fatigue.   Respiratory:  Positive for shortness of breath.    Cardiovascular:  Positive for leg swelling. Negative  for chest pain.   Gastrointestinal:  Negative for abdominal pain, nausea and vomiting.        Physical Exam  Temp:  [36.3 °C (97.3 °F)-36.5 °C (97.7 °F)] 36.3 °C (97.3 °F)  Pulse:  [] 96  Resp:  [18] 18  BP: ()/(66-78) 111/74  SpO2:  [92 %-100 %] 98 %    Physical Exam  Vitals and nursing note reviewed.   Constitutional:       General: He is not in acute distress.     Appearance: Normal appearance. He is not ill-appearing.   Cardiovascular:      Rate and Rhythm: Normal rate and regular rhythm.   Pulmonary:      Effort: Pulmonary effort is normal. No respiratory distress.      Breath sounds: Normal breath sounds. No wheezing.   Abdominal:      General: Bowel sounds are normal. There is no distension.      Tenderness: There is no abdominal tenderness.   Musculoskeletal:         General: Swelling present.      Comments: +2 pitting edema bilateral lower extremity to mid shin  Weeping resolved  Superimposed cellulitis-improving   Skin:     General: Skin is warm and dry.   Neurological:      Mental Status: He is alert and oriented to person, place, and time. Mental status is at baseline.   Psychiatric:         Mood and Affect: Mood normal.         Behavior: Behavior normal.         Fluids    Intake/Output Summary (Last 24 hours) at 6/7/2025 1617  Last data filed at 6/7/2025 1026  Gross per 24 hour   Intake 720 ml   Output 2020 ml   Net -1300 ml        Laboratory  Recent Labs     06/05/25  0102 06/06/25  0120 06/07/25  0410   WBC 6.7 6.9 6.5   RBC 4.31* 4.11* 4.09*   HEMOGLOBIN 14.3 13.5* 13.7*   HEMATOCRIT 42.3 40.4* 39.8*   MCV 98.1* 98.3* 97.3   MCH 33.2* 32.8 33.5*   MCHC 33.8 33.4 34.4   RDW 54.4* 54.4* 53.5*   PLATELETCT 254 248 237   MPV 10.7 10.8 10.6     Recent Labs     06/05/25  0102 06/06/25  0120 06/07/25  0410   SODIUM 131* 133* 134*   POTASSIUM 4.0 3.6 3.7   CHLORIDE 100 99 99   CO2 16* 21 22   GLUCOSE 133* 92 157*   BUN 30* 29* 32*   CREATININE 1.34 1.31 1.30   CALCIUM 8.4* 8.5 8.7                      Imaging  US-ABDOMEN COMPLETE SURVEY   Final Result      1.  No acute sonographic abnormality. No gallstones.   2.  Slightly increased hepatic echogenicity, suggestive of mild steatosis and/or hepatocellular disease. Trace ascites.   3.  Nonspecific gallbladder wall thickening, likely related to hepatic dysfunction.         US-EXTREMITY VENOUS LOWER BILAT   Final Result      DX-CHEST-PORTABLE (1 VIEW)   Final Result      Stable cardiomegaly.           Assessment/Plan  * Acute on chronic combined systolic (congestive) and diastolic (congestive) heart failure (HCC)- (present on admission)  Assessment & Plan  +3 pitting edema bilateral lower extremity to hips; BNP elevated at 17,000 on admission; improving  Echo 5/2/2025: EF less than 20%. grade 2 diastolic dysfunction, moderate MR, mild AI, mild TR, RVSP 25 mmHg. Limited TTE 5/25/2025 with EF 15 to 20%, trivial pericardial effusion.  - Continuous telemetry monitoring; monitoring for arrhythmia in the setting of forced IV diuresis  - Continue IV Lasix 40 mg twice daily  - Additional one-time dose IV Lasix 40 mg this a.m.  - Strict I's and O's  - Close monitoring of electrolytes and replacement as needed  - Repeat BMP in a.m.    6/7 Continue GDMT including Toprol, spironolactone, Farxiga   I added  losartan 12.5 mg daily, monitor BP  Continue telemetry monitoring    Cellulitis of lower extremity  Assessment & Plan  Mild bilateral superimposed cellulitis secondary to volume overload; left worse than right  - Continue Keflex; end date 6/11    Gout- (present on admission)  Assessment & Plan  - Continue home allopurinol    BPH (benign prostatic hyperplasia)- (present on admission)  Assessment & Plan  - Continue home tamsulosin    Diarrhea- (present on admission)  Assessment & Plan  Over past 2 months.  Likely in the setting of docusate use at home.  Risk factors for C. difficile including recent hospitalizations  Stool to form for C. difficile testing  - Continue monitor  closely    Elevated troponin- (present on admission)  Assessment & Plan  Troponin 42.  Denies chest pain.  Likely nonischemic myocardial injury in the setting of severely reduced ejection fraction and acute on chronic heart failure. Patient with baseline troponin elevation 40s to 50s. EKG NSR HR 93, QTc 446, without ST changes similar to previous.    - Trend troponin    Generalized abdominal pain- (present on admission)  Assessment & Plan  Intermittently complaining of gas pains and cramping  RUQ US: Negative for acute abnormalities.  Mild steatosis/hepatocellular disease present.  Trace ascites.  - Start simethicone    High anion gap metabolic acidosis- (present on admission)  Assessment & Plan  Bicarb 16; secondary to volume overload  - Continue to monitor    Methamphetamine abuse (HCC)- (present on admission)  Assessment & Plan  History of meth use  UDS negative; last use greater than 1 month ago per patient    Nonischemic cardiomyopathy (HCC)- (present on admission)  Assessment & Plan  Secondary to methamphetamine use    - Refer to heart failure    ACP (advance care planning)  Assessment & Plan  59-year-old male admitted with CHF exacerbation, fluid overload.  EF of 15%.  History of meth use, alcohol liver cirrhosis, CKD 3, gout.  I discussed the diagnosis and treatment plan with him.  We discussed the CODE STATUS including CPR/intubation/chest compression/electric shock.  He wants to stay full code.  ACP 16 minutes.         VTE prophylaxis: Lovenox    I have performed a physical exam and reviewed and updated ROS and Plan today (6/7/2025). In review of yesterday's note (6/6/2025), there are no changes except as documented above.    I spent greater than 52 minutes for chart review, obtaining history independently, performing medically appropriate examination,  documenting , ordering medications, tests, or procedures, referring and communicating with other health care professionals, Independently interpreting  results and communicating results with patient/family/caregiver. More than 50% of time was spent in face-to-face clinical encounter.

## 2025-06-07 NOTE — PROGRESS NOTES
Monitor Summary  Rhythm: SR  Rate: 82-98  Ectopy: R PVC, coup  .20 / .10 / .38

## 2025-06-07 NOTE — ASSESSMENT & PLAN NOTE
59-year-old male admitted with CHF exacerbation, fluid overload.  EF of 15%.  History of meth use, alcohol liver cirrhosis, CKD 3, gout.  I discussed the diagnosis and treatment plan with him.  We discussed the CODE STATUS including CPR/intubation/chest compression/electric shock.  He wants to stay full code.  ACP 16 minutes.

## 2025-06-07 NOTE — CARE PLAN
The patient is Stable - Low risk of patient condition declining or worsening    Shift Goals  Clinical Goals: monitor i/os, ambulate, d/c in AM  Patient Goals: wants to sleep  Family Goals: not present    Progress made toward(s) clinical / shift goals:        Problem: Care Map:  Day 3 Optimal Outcome for the Heart Failure Patient  Goal: Day 3:  Optimal Care of the heart failure patient  Outcome: Progressing  Note: Discussed heart failure education topics. HF education book provided, discussed importance of measuring and recording daily weight, and blood pressure. Discussed importance of tracking symptoms. Dietary education reinforced.    Discussed use of Stoplight tool and when to seek medical attention or emergency medical services.    Daily BMP order verified. Daily weights documented. Diuretics administered and output recorded. I/O tracked, optimized fluid balance and monitored for retention. Edema assessed and documented. Ambulation tolerance evaluated and documented, mobility encouraged. Reviewed chart for guideline medications.      Problem: Pain - Standard  Goal: Alleviation of pain or a reduction in pain to the patient’s comfort goal  Outcome: Progressing     Problem: Knowledge Deficit - Standard  Goal: Patient and family/care givers will demonstrate understanding of plan of care, disease process/condition, diagnostic tests and medications  Outcome: Progressing     Problem: Psychosocial  Goal: Patient's level of anxiety will decrease  Outcome: Progressing     Problem: Nutrition  Goal: Patient's nutritional and fluid intake will be adequate or improve  Outcome: Progressing     Problem: Bowel Elimination  Goal: Establish and maintain regular bowel function  Outcome: Progressing     Problem: Fall Risk  Goal: Patient will remain free from falls  Outcome: Progressing

## 2025-06-08 ENCOUNTER — PHARMACY VISIT (OUTPATIENT)
Dept: PHARMACY | Facility: MEDICAL CENTER | Age: 60
End: 2025-06-08
Payer: COMMERCIAL

## 2025-06-08 VITALS
WEIGHT: 126.76 LBS | HEART RATE: 78 BPM | TEMPERATURE: 97.5 F | DIASTOLIC BLOOD PRESSURE: 74 MMHG | OXYGEN SATURATION: 99 % | HEIGHT: 65 IN | RESPIRATION RATE: 17 BRPM | SYSTOLIC BLOOD PRESSURE: 118 MMHG | BODY MASS INDEX: 21.12 KG/M2

## 2025-06-08 LAB
ANION GAP SERPL CALC-SCNC: 12 MMOL/L (ref 7–16)
BUN SERPL-MCNC: 30 MG/DL (ref 8–22)
CALCIUM SERPL-MCNC: 8.6 MG/DL (ref 8.5–10.5)
CHLORIDE SERPL-SCNC: 101 MMOL/L (ref 96–112)
CO2 SERPL-SCNC: 22 MMOL/L (ref 20–33)
CREAT SERPL-MCNC: 1.06 MG/DL (ref 0.5–1.4)
ERYTHROCYTE [DISTWIDTH] IN BLOOD BY AUTOMATED COUNT: 56.3 FL (ref 35.9–50)
GFR SERPLBLD CREATININE-BSD FMLA CKD-EPI: 80 ML/MIN/1.73 M 2
GLUCOSE SERPL-MCNC: 102 MG/DL (ref 65–99)
HCT VFR BLD AUTO: 41.5 % (ref 42–52)
HGB BLD-MCNC: 13.5 G/DL (ref 14–18)
MAGNESIUM SERPL-MCNC: 2.2 MG/DL (ref 1.5–2.5)
MCH RBC QN AUTO: 32.8 PG (ref 27–33)
MCHC RBC AUTO-ENTMCNC: 32.5 G/DL (ref 32.3–36.5)
MCV RBC AUTO: 100.7 FL (ref 81.4–97.8)
PHOSPHATE SERPL-MCNC: 2.7 MG/DL (ref 2.5–4.5)
PLATELET # BLD AUTO: 226 K/UL (ref 164–446)
PMV BLD AUTO: 10.5 FL (ref 9–12.9)
POTASSIUM SERPL-SCNC: 3.8 MMOL/L (ref 3.6–5.5)
RBC # BLD AUTO: 4.12 M/UL (ref 4.7–6.1)
SODIUM SERPL-SCNC: 135 MMOL/L (ref 135–145)
WBC # BLD AUTO: 6.2 K/UL (ref 4.8–10.8)

## 2025-06-08 PROCEDURE — 85027 COMPLETE CBC AUTOMATED: CPT

## 2025-06-08 PROCEDURE — A9270 NON-COVERED ITEM OR SERVICE: HCPCS

## 2025-06-08 PROCEDURE — RXMED WILLOW AMBULATORY MEDICATION CHARGE: Performed by: STUDENT IN AN ORGANIZED HEALTH CARE EDUCATION/TRAINING PROGRAM

## 2025-06-08 PROCEDURE — 99239 HOSP IP/OBS DSCHRG MGMT >30: CPT | Performed by: STUDENT IN AN ORGANIZED HEALTH CARE EDUCATION/TRAINING PROGRAM

## 2025-06-08 PROCEDURE — 700102 HCHG RX REV CODE 250 W/ 637 OVERRIDE(OP): Performed by: STUDENT IN AN ORGANIZED HEALTH CARE EDUCATION/TRAINING PROGRAM

## 2025-06-08 PROCEDURE — 36415 COLL VENOUS BLD VENIPUNCTURE: CPT

## 2025-06-08 PROCEDURE — 700102 HCHG RX REV CODE 250 W/ 637 OVERRIDE(OP)

## 2025-06-08 PROCEDURE — 84100 ASSAY OF PHOSPHORUS: CPT

## 2025-06-08 PROCEDURE — A9270 NON-COVERED ITEM OR SERVICE: HCPCS | Performed by: STUDENT IN AN ORGANIZED HEALTH CARE EDUCATION/TRAINING PROGRAM

## 2025-06-08 PROCEDURE — 700111 HCHG RX REV CODE 636 W/ 250 OVERRIDE (IP): Performed by: STUDENT IN AN ORGANIZED HEALTH CARE EDUCATION/TRAINING PROGRAM

## 2025-06-08 PROCEDURE — 80048 BASIC METABOLIC PNL TOTAL CA: CPT

## 2025-06-08 PROCEDURE — 83735 ASSAY OF MAGNESIUM: CPT

## 2025-06-08 RX ORDER — FUROSEMIDE 40 MG/1
40 TABLET ORAL 2 TIMES DAILY
Qty: 60 TABLET | Refills: 0 | Status: SHIPPED | OUTPATIENT
Start: 2025-06-08

## 2025-06-08 RX ORDER — LOSARTAN POTASSIUM 25 MG/1
12.5 TABLET ORAL DAILY
Qty: 30 TABLET | Refills: 2 | Status: SHIPPED | OUTPATIENT
Start: 2025-06-09

## 2025-06-08 RX ORDER — POTASSIUM CHLORIDE 1500 MG/1
20 TABLET, EXTENDED RELEASE ORAL DAILY
Qty: 30 TABLET | Refills: 0 | Status: SHIPPED | OUTPATIENT
Start: 2025-06-08

## 2025-06-08 RX ADMIN — SPIRONOLACTONE 12.5 MG: 25 TABLET ORAL at 06:38

## 2025-06-08 RX ADMIN — LIDOCAINE HYDROCHLORIDE 30 ML: 20 SOLUTION ORAL; TOPICAL at 00:47

## 2025-06-08 RX ADMIN — METOPROLOL SUCCINATE 50 MG: 50 TABLET, EXTENDED RELEASE ORAL at 06:37

## 2025-06-08 RX ADMIN — FUROSEMIDE 40 MG: 10 INJECTION, SOLUTION INTRAVENOUS at 06:37

## 2025-06-08 RX ADMIN — OMEPRAZOLE 20 MG: 20 CAPSULE, DELAYED RELEASE ORAL at 06:37

## 2025-06-08 RX ADMIN — TAMSULOSIN HYDROCHLORIDE 0.4 MG: 0.4 CAPSULE ORAL at 08:31

## 2025-06-08 RX ADMIN — METHOCARBAMOL 500 MG: 500 TABLET ORAL at 06:39

## 2025-06-08 RX ADMIN — DAPAGLIFLOZIN 10 MG: 10 TABLET, FILM COATED ORAL at 06:37

## 2025-06-08 RX ADMIN — ALLOPURINOL 300 MG: 300 TABLET ORAL at 06:38

## 2025-06-08 RX ADMIN — METHOCARBAMOL 500 MG: 500 TABLET ORAL at 11:35

## 2025-06-08 RX ADMIN — CEPHALEXIN 500 MG: 500 CAPSULE ORAL at 06:37

## 2025-06-08 RX ADMIN — LOSARTAN POTASSIUM 12.5 MG: 25 TABLET, FILM COATED ORAL at 06:37

## 2025-06-08 RX ADMIN — SIMETHICONE 125 MG: 125 TABLET, CHEWABLE ORAL at 00:53

## 2025-06-08 ASSESSMENT — FIBROSIS 4 INDEX: FIB4 SCORE: 2.06

## 2025-06-08 ASSESSMENT — PAIN DESCRIPTION - PAIN TYPE: TYPE: ACUTE PAIN

## 2025-06-08 NOTE — DISCHARGE SUMMARY
Discharge Summary    CHIEF COMPLAINT ON ADMISSION  Chief Complaint   Patient presents with    Abdominal Cramping     Pt states that he's been having abdominal cramping all day with one emesis, endorses 3 loose bowel movements today. Pt also states that he's having persistant lower extremity swelling, hx of heart failure, has been taking his meds. Recently admitted and discharged.     Diarrhea    Leg Swelling       Reason for Admission  Abdominal pain, Leg Swelling     Admission Date  6/3/2025    CODE STATUS  Full Code    HPI & HOSPITAL COURSE  Santino Zabala is a 59-year-old with PMHx of HfrEF, methamphetamine abuse, alcoholic liver cirrhosis, BPH, CKD 3, gout and GERD.  Admitted 6/3 for lower extremity edema. Per history: Patient recently admitted from 5/22/2025 to 6/2/2025 for acute on chronic heart failure.  Was also admitted from 5/9/2025 to 5/11/2025 for SUZY and hyperkalemia. Patient states that they have been having worsening lower extremity edema over the past few days despite taking their medication as prescribed. BNP 17,000.  CXR with mild bilateral infiltrates consistent with pulmonary edema.  Patient was started on IV Lasix.  LE US negative for DVT.  He was also treated with Keflex for lower extremity cellulitis. His leg swelling has been improved.  He will continue p.o. Lasix 40 mg at home for leg swelling. Continue GDMT including Toprol, spironolactone, Farxiga, and losartan.     He will continue to follow-up with his PCP and cardiology.  I placed the referral for heart failure program.     Therefore, he is discharged in good and stable condition to home with close outpatient follow-up.    The patient met 2-midnight criteria for an inpatient stay at the time of discharge.    Discharge Date  6/8/2025    FOLLOW UP ITEMS POST DISCHARGE  - Follow up with primary care physician in 1 week.   - Follow up with cardiology as instructed  - Please take the medications as instructed    Please check weight daily, if  you gain two or three pounds in a 24-hour period or more than five pounds in a week, please increase the dose of lasix by 20 mg and call the PCP or cardiologist for further evaluation.      - Go to the local Emergency Department if you have any worsening condition.     DISCHARGE DIAGNOSES  Principal Problem:    Acute on chronic combined systolic (congestive) and diastolic (congestive) heart failure (HCC) (POA: Yes)  Active Problems:    Nonischemic cardiomyopathy (HCC) (POA: Yes)    Methamphetamine abuse (HCC) (POA: Yes)    High anion gap metabolic acidosis (POA: Yes)    Generalized abdominal pain (POA: Yes)    Elevated troponin (POA: Yes)    Diarrhea (POA: Yes)    BPH (benign prostatic hyperplasia) (POA: Yes)    Gout (POA: Yes)    Cellulitis of lower extremity (POA: Unknown)    ACP (advance care planning) (POA: Unknown)  Resolved Problems:    * No resolved hospital problems. *      FOLLOW UP  Future Appointments   Date Time Provider Department Center   6/10/2025  1:30 PM STEVE Kimble None     [facility]  [address] [facility] [address]  [phone]    Follow up in 1 week(s)  Please call your primary care provider to schedule, recent hospitalization follow up      MEDICATIONS ON DISCHARGE     Medication List        START taking these medications        Instructions   cephALEXin 500 MG Caps  Commonly known as: Keflex   Take 1 Capsule by mouth every 6 hours for 5 days.  Dose: 500 mg     dapagliflozin propanediol 10 MG Tabs  Commonly known as: Farxiga   Take 1 Tablet by mouth every day.  Dose: 10 mg     losartan 25 MG Tabs  Start taking on: June 9, 2025  Commonly known as: Cozaar   Take 0.5 Tablets by mouth every day.  Dose: 12.5 mg     methocarbamol 500 MG Tabs  Commonly known as: Robaxin   Take 1 Tablet by mouth 4 times a day as needed (mild pain not covered by Tylenol).  Dose: 500 mg     potassium chloride SA 20 MEQ Tbcr  Commonly known as: Kdur   Take 1 Tablet by mouth every  day.  Dose: 20 mEq            CHANGE how you take these medications        Instructions   furosemide 40 MG Tabs  What changed: when to take this  Commonly known as: Lasix   Take 1 Tablet by mouth 2 times a day.  Dose: 40 mg            CONTINUE taking these medications        Instructions   allopurinol 300 MG Tabs  Commonly known as: Zyloprim   Take 1 Tablet by mouth every day.  Dose: 300 mg     docusate sodium 100 MG Caps  Commonly known as: Colace   Take 100 mg by mouth 2 times a day as needed for Constipation.  Dose: 100 mg     metoprolol SR 50 MG Tb24  Commonly known as: Toprol XL   Take 50 mg by mouth every day.  Dose: 50 mg     omeprazole 20 MG delayed-release capsule  Commonly known as: PriLOSEC   Take 1 Capsule by mouth every day.  Dose: 20 mg     spironolactone 25 MG Tabs  Commonly known as: Aldactone   Take 12.5 mg by mouth every day. 12.5 mg = 0.5 tablet  Dose: 12.5 mg     tamsulosin 0.4 MG capsule  Commonly known as: Flomax   Take 1 Capsule by mouth 1/2 hour after breakfast.  Dose: 0.4 mg     VITAMIN B-12 PO   Take 1 Capsule by mouth every day.  Dose: 1 Capsule            STOP taking these medications      sodium chloride 1 GM Tabs  Commonly known as: Salt              Allergies  Allergies[1]    DIET  Orders Placed This Encounter   Procedures    Diet Order Diet: Cardiac; Second Modifier: (optional): 2 Gram Sodium; Fluid modifications: (optional): 2000 ml Fluid Restriction     Standing Status:   Standing     Number of Occurrences:   1     Diet::   Cardiac [6]     Second Modifier: (optional):   2 Gram Sodium [7]     Fluid modifications: (optional):   2000 ml Fluid Restriction [11]    Discontinue Diet Tray     Standing Status:   Standing     Number of Occurrences:   1       ACTIVITY  As tolerated.  Weight bearing as tolerated    CONSULTATIONS      PROCEDURES      LABORATORY  Lab Results   Component Value Date    SODIUM 135 06/08/2025    POTASSIUM 3.8 06/08/2025    CHLORIDE 101 06/08/2025    CO2 22 06/08/2025     GLUCOSE 102 (H) 06/08/2025    BUN 30 (H) 06/08/2025    CREATININE 1.06 06/08/2025        Lab Results   Component Value Date    WBC 6.2 06/08/2025    HEMOGLOBIN 13.5 (L) 06/08/2025    HEMATOCRIT 41.5 (L) 06/08/2025    PLATELETCT 226 06/08/2025        Total time of the discharge process exceeds 36 minutes.         [1] No Known Allergies

## 2025-06-08 NOTE — PROGRESS NOTES
Bedside report received from off going RN: Sylvia, assumed care of patient.     Fall Risk Score: LOW RISK  Fall risk interventions in place: Provide patient/family education based on risk assessment, Educate patient/family to call staff for assistance when getting out of bed, and Place fall precaution signage outside patient door  Bed type: Regular (Mich Score less than 17 interventions in place)  Patient on cardiac monitor: Yes  IVF/IV medications: Not Applicable   Oxygen: Room Air  Bedside sitter: Not Applicable   Isolation: Not applicable

## 2025-06-08 NOTE — PROGRESS NOTES
Bedside report received from off going RN/tech: Roderick RN, assumed care of patient.     Fall Risk Score: LOW RISK  Fall risk interventions in place: Provide patient/family education based on risk assessment, Educate patient/family to call staff for assistance when getting out of bed, Place fall precaution signage outside patient door, Place patient in room close to nursing station, Utilize bed/chair fall alarm, and Bed alarm connected correctly  Bed type: Regular (Mich Score less than 17 interventions in place)  Patient on cardiac monitor: Yes  IVF/IV medications: Not Applicable   Oxygen: Room Air  Bedside sitter: Not Applicable   Isolation: Not applicable

## 2025-06-08 NOTE — DISCHARGE INSTRUCTIONS
- Follow up with primary care physician in 1 week.   - Follow up with cardiology as instructed  - Please take the medications as instructed    Please check weight daily, if you gain two or three pounds in a 24-hour period or more than five pounds in a week, please increase the dose of lasix by 20 mg and call the PCP or cardiologist for further evaluation.      - Go to the local Emergency Department if you have any worsening condition.   HF Patient Discharge Instructions  Monitor your weight daily, and maintain a weight chart, to track your weight changes.   Activity as tolerated, unless your Doctor has ordered otherwise. Other activity order: .  Follow a low fat, low cholesterol, low salt diet unless instructed otherwise by your Doctor. Read the labels on the back of food products and track your intake of fat, cholesterol and salt.   Fluid Restriction Yes. If a Fluid Restriction has been ordered by your Doctor, measure fluids with a measuring cup to ensure that you are not exceeding the restriction.   No smoking.  Oxygen Yes. If your Doctor has ordered that you wear Oxygen at home, it is important to wear it as ordered.  Did you receive an explanation from staff on the importance of taking each of your medications and why it is necessary to keep taking them unless your doctor says to stop? Yes  Were all of your questions answered about how to manage your heart failure and what to do if you have increased signs and symptoms after you go home? Yes  Do you feel like your heart failure care team involved you in the care treatment plan and allowed you to make decisions regarding your care while in the hospital and addressed any discharge needs you might have? Yes    See the educational handout provided at discharge for more information on monitoring your daily weight, activity and diet. This also explains more about Heart Failure, symptoms of a flare-up and some of the tests that you have undergone.     Warning Signs of  a Flare-Up include:  Swelling in the ankles or lower legs.  Shortness of breath, while at rest, or while doing normal activities.   Shortness of breath at night when in bed, or coughing in bed.   Requiring more pillows to sleep at night, or needing to sit up at night to sleep.  Feeling weak, dizzy or fatigued.     When to call your Doctor:  Call your Primary Care Physician or Cardiologist if:   You experience any pain radiating to your jaw or neck.  You have any difficulty breathing.  You experience weight gain of 3 lbs in a day or 5 lbs in a week.   You feel any palpitations or irregular heartbeats.  You become dizzy or lose consciousness.   If you have had an angiogram or had a pacemaker or AICD placed, and experience:  Bleeding, drainage or swelling at the surgical / puncture site.  Fever greater than 100.0 F  Shock from internal defibrillator.  Cool and / or numb extremities.     Please access the [facility] My HF Guide/Heart Failure Interactive Workbook:   [email or web address]

## 2025-06-08 NOTE — PROGRESS NOTES
Patient discharged. AO x4, VSS, not in distress. PIV removed, discharge medications and instructions provided. Follow-up appointment already set-up. Wheeled down to lobby to be picked up by his brother.

## 2025-06-08 NOTE — PROGRESS NOTES
Monitor Summary  Rhythm: SR/ ST  Rate:   Ectopy: O-PVC/ R-COUP/ F-PVC/ R-TRIP  Measurement: 0.21/0.10/0.39

## 2025-06-09 NOTE — DOCUMENTATION QUERY
"                                                                         [facility]                                                                       Query Response Note      PATIENT:               TREVON HAUSER  ACCT #:                  [id number]  MRN:                     [id number]  :                      1965  ADMIT DATE:       6/3/2025 10:44 PM  DISCH DATE:        2025 12:21 PM  RESPONDING  PROVIDER #:        [id number]           QUERY TEXT:    Elevated troponin is documented in the discharge summary.  \"Likely nonischemic myocardial injury in setting of severely reduced ejection fraction and acute on chronic heart failure\"  is noted in the progress notes, but not in the discharge summary.  Please clarity the cause of the elevated troponin:    The patient's clinical indicators include:  Clinical Findings:    - Progress note- Dr. Porter\"  \"Elevated troponin- (present on admission)  Troponin 42.  Denies chest pain.  Likely nonischemic myocardial injury in the setting of severely reduced ejection fraction and acute on chronic heart failure. Patient with baseline troponin  elevation 40s to 50s. EKG NSR HR 93, QTc 446, without ST changes similar to previous. \"     - Discharge summary:    Active Problems:    \" Elevated troponin (POA: Yes)\"    Labs:  Troponin: 42 (6/3) - 45 ()  - EKG: Sinus rhythm; Multiform ventricular premature complexes; Prolonged IA interval; Anterior infarct, old    Risk factors:  elevated troponin; acute on chronic combined systolic and diastolic heart failure; nonischemic cardiomyopathy, methamphetamine abuse, metabolic acidosis, cellulitis of lower extremity    Treatment:  Troponin labs, EKG, Lasix IV, losartan; metoprolol; spironolactone; telemetry    Please contact me with any questions:    Donna DYE RN CCDS  [facility]  [email or web address]  Donna Adam via Voal[employer]    Note: If you agree with a diagnosis listed above, please remember to include it in your " concurrent daily documentation and onto the Discharge Summary.  Options provided:   -- Elevated troponin is likely due to nonischemic myocardial injury in the setting of severely reduced ejection fraction and acute on chronic heart failure   -- Elevated troponin are not due to nonischemic myocardial injury   -- Elevated troponin levels due to other explanation, Please specify other explanation of elevated troponin   -- Other explanation, Please specify other explanation   -- Unable to determine      Query created by: Donna Adam on 6/9/2025 8:39 AM    RESPONSE TEXT:    Elevated troponin is likely due to nonischemic myocardial injury in the setting of severely reduced ejection fraction and acute on chronic heart failure          Electronically signed by:  CARRIE GEORGE MD 6/9/2025 8:43 AM

## 2025-06-17 ENCOUNTER — TELEPHONE (OUTPATIENT)
Dept: HEALTH INFORMATION MANAGEMENT | Facility: OTHER | Age: 60
End: 2025-06-17
Payer: MEDICAID

## 2025-07-15 ENCOUNTER — APPOINTMENT (OUTPATIENT)
Dept: RADIOLOGY | Facility: MEDICAL CENTER | Age: 60
DRG: 291 | End: 2025-07-15
Payer: MEDICAID

## 2025-07-15 ENCOUNTER — APPOINTMENT (OUTPATIENT)
Dept: RADIOLOGY | Facility: MEDICAL CENTER | Age: 60
DRG: 291 | End: 2025-07-15
Attending: STUDENT IN AN ORGANIZED HEALTH CARE EDUCATION/TRAINING PROGRAM
Payer: MEDICAID

## 2025-07-15 ENCOUNTER — APPOINTMENT (OUTPATIENT)
Dept: RADIOLOGY | Facility: MEDICAL CENTER | Age: 60
DRG: 291 | End: 2025-07-15
Attending: EMERGENCY MEDICINE
Payer: MEDICAID

## 2025-07-15 ENCOUNTER — HOSPITAL ENCOUNTER (INPATIENT)
Facility: MEDICAL CENTER | Age: 60
LOS: 5 days | DRG: 291 | End: 2025-07-20
Attending: STUDENT IN AN ORGANIZED HEALTH CARE EDUCATION/TRAINING PROGRAM | Admitting: INTERNAL MEDICINE
Payer: MEDICAID

## 2025-07-15 ENCOUNTER — APPOINTMENT (OUTPATIENT)
Dept: CARDIOLOGY | Facility: MEDICAL CENTER | Age: 60
DRG: 291 | End: 2025-07-15
Attending: INTERNAL MEDICINE
Payer: MEDICAID

## 2025-07-15 DIAGNOSIS — R57.0 CARDIOGENIC SHOCK (HCC): ICD-10-CM

## 2025-07-15 DIAGNOSIS — E87.20 LACTIC ACIDOSIS: ICD-10-CM

## 2025-07-15 DIAGNOSIS — I50.1 ACUTE LEFT-SIDED CHF (CONGESTIVE HEART FAILURE) (HCC): ICD-10-CM

## 2025-07-15 DIAGNOSIS — N17.9 AKI (ACUTE KIDNEY INJURY) (HCC): ICD-10-CM

## 2025-07-15 DIAGNOSIS — R79.89 ABNORMAL LFTS: ICD-10-CM

## 2025-07-15 DIAGNOSIS — I50.9 ACUTE ON CHRONIC CONGESTIVE HEART FAILURE, UNSPECIFIED HEART FAILURE TYPE (HCC): Primary | ICD-10-CM

## 2025-07-15 DIAGNOSIS — E87.5 HYPERKALEMIA: ICD-10-CM

## 2025-07-15 LAB
ALBUMIN SERPL BCP-MCNC: 3.6 G/DL (ref 3.2–4.9)
ALBUMIN SERPL BCP-MCNC: 4.2 G/DL (ref 3.2–4.9)
ALBUMIN SERPL BCP-MCNC: 4.3 G/DL (ref 3.2–4.9)
ALBUMIN/GLOB SERPL: 1 G/DL
ALBUMIN/GLOB SERPL: 1.1 G/DL
ALP SERPL-CCNC: 128 U/L (ref 30–99)
ALP SERPL-CCNC: 162 U/L (ref 30–99)
ALP SERPL-CCNC: 165 U/L (ref 30–99)
ALT SERPL-CCNC: 1225 U/L (ref 2–50)
ALT SERPL-CCNC: 1552 U/L (ref 2–50)
ALT SERPL-CCNC: 970 U/L (ref 2–50)
AMORPHOUS CRYSTALS 1764: PRESENT /HPF
AMPHET UR QL SCN: POSITIVE
ANION GAP SERPL CALC-SCNC: 16 MMOL/L (ref 7–16)
ANION GAP SERPL CALC-SCNC: 17 MMOL/L (ref 7–16)
ANION GAP SERPL CALC-SCNC: 17 MMOL/L (ref 7–16)
ANION GAP SERPL CALC-SCNC: 22 MMOL/L (ref 7–16)
ANION GAP SERPL CALC-SCNC: 28 MMOL/L (ref 7–16)
ANION GAP SERPL CALC-SCNC: 28 MMOL/L (ref 7–16)
APAP SERPL-MCNC: <5 UG/ML (ref 10–30)
APPEARANCE UR: ABNORMAL
AST SERPL-CCNC: 1866 U/L (ref 12–45)
AST SERPL-CCNC: 2479 U/L (ref 12–45)
AST SERPL-CCNC: 2878 U/L (ref 12–45)
BACTERIA #/AREA URNS HPF: ABNORMAL /HPF
BARBITURATES UR QL SCN: NEGATIVE
BASOPHILS # BLD AUTO: 0.2 % (ref 0–1.8)
BASOPHILS # BLD AUTO: 0.2 % (ref 0–1.8)
BASOPHILS # BLD: 0.02 K/UL (ref 0–0.12)
BASOPHILS # BLD: 0.03 K/UL (ref 0–0.12)
BENZODIAZ UR QL SCN: NEGATIVE
BILIRUB CONJ SERPL-MCNC: 1.1 MG/DL (ref 0.1–0.5)
BILIRUB INDIRECT SERPL-MCNC: 0.6 MG/DL (ref 0–1)
BILIRUB SERPL-MCNC: 1.7 MG/DL (ref 0.1–1.5)
BILIRUB SERPL-MCNC: 3.9 MG/DL (ref 0.1–1.5)
BILIRUB SERPL-MCNC: 4.1 MG/DL (ref 0.1–1.5)
BILIRUB UR QL STRIP.AUTO: ABNORMAL
BUN SERPL-MCNC: 37 MG/DL (ref 8–22)
BUN SERPL-MCNC: 40 MG/DL (ref 8–22)
BUN SERPL-MCNC: 45 MG/DL (ref 8–22)
BUN SERPL-MCNC: 47 MG/DL (ref 8–22)
BUN SERPL-MCNC: 50 MG/DL (ref 8–22)
BUN SERPL-MCNC: 50 MG/DL (ref 8–22)
BZE UR QL SCN: NEGATIVE
CA-I SERPL-SCNC: 1.1 MMOL/L (ref 1.1–1.3)
CA-I SERPL-SCNC: 1.1 MMOL/L (ref 1.1–1.3)
CALCIUM ALBUM COR SERPL-MCNC: 9.4 MG/DL (ref 8.5–10.5)
CALCIUM ALBUM COR SERPL-MCNC: 9.7 MG/DL (ref 8.5–10.5)
CALCIUM SERPL-MCNC: 8.2 MG/DL (ref 8.5–10.5)
CALCIUM SERPL-MCNC: 8.6 MG/DL (ref 8.5–10.5)
CALCIUM SERPL-MCNC: 8.6 MG/DL (ref 8.5–10.5)
CALCIUM SERPL-MCNC: 9.1 MG/DL (ref 8.5–10.5)
CALCIUM SERPL-MCNC: 9.6 MG/DL (ref 8.5–10.5)
CALCIUM SERPL-MCNC: 9.9 MG/DL (ref 8.5–10.5)
CANNABINOIDS UR QL SCN: NEGATIVE
CASTS URNS QL MICRO: ABNORMAL /LPF (ref 0–2)
CHLORIDE SERPL-SCNC: 96 MMOL/L (ref 96–112)
CHLORIDE SERPL-SCNC: 98 MMOL/L (ref 96–112)
CHLORIDE SERPL-SCNC: 99 MMOL/L (ref 96–112)
CO2 SERPL-SCNC: 12 MMOL/L (ref 20–33)
CO2 SERPL-SCNC: 16 MMOL/L (ref 20–33)
CO2 SERPL-SCNC: 17 MMOL/L (ref 20–33)
CO2 SERPL-SCNC: 17 MMOL/L (ref 20–33)
CO2 SERPL-SCNC: 7 MMOL/L (ref 20–33)
CO2 SERPL-SCNC: 9 MMOL/L (ref 20–33)
COLOR UR: ABNORMAL
CREAT SERPL-MCNC: 2.06 MG/DL (ref 0.5–1.4)
CREAT SERPL-MCNC: 2.09 MG/DL (ref 0.5–1.4)
CREAT SERPL-MCNC: 2.17 MG/DL (ref 0.5–1.4)
CREAT SERPL-MCNC: 2.33 MG/DL (ref 0.5–1.4)
CREAT SERPL-MCNC: 2.35 MG/DL (ref 0.5–1.4)
CREAT SERPL-MCNC: 2.49 MG/DL (ref 0.5–1.4)
EKG IMPRESSION: NORMAL
EKG IMPRESSION: NORMAL
EOSINOPHIL # BLD AUTO: 0 K/UL (ref 0–0.51)
EOSINOPHIL # BLD AUTO: 0.04 K/UL (ref 0–0.51)
EOSINOPHIL NFR BLD: 0 % (ref 0–6.9)
EOSINOPHIL NFR BLD: 0.4 % (ref 0–6.9)
EPITHELIAL CELLS 1715: ABNORMAL /HPF (ref 0–5)
ERYTHROCYTE [DISTWIDTH] IN BLOOD BY AUTOMATED COUNT: 56 FL (ref 35.9–50)
ERYTHROCYTE [DISTWIDTH] IN BLOOD BY AUTOMATED COUNT: 64.5 FL (ref 35.9–50)
ETHANOL BLD-MCNC: <10.1 MG/DL
FENTANYL UR QL: NEGATIVE
GFR SERPLBLD CREATININE-BSD FMLA CKD-EPI: 29 ML/MIN/1.73 M 2
GFR SERPLBLD CREATININE-BSD FMLA CKD-EPI: 31 ML/MIN/1.73 M 2
GFR SERPLBLD CREATININE-BSD FMLA CKD-EPI: 31 ML/MIN/1.73 M 2
GFR SERPLBLD CREATININE-BSD FMLA CKD-EPI: 34 ML/MIN/1.73 M 2
GFR SERPLBLD CREATININE-BSD FMLA CKD-EPI: 35 ML/MIN/1.73 M 2
GFR SERPLBLD CREATININE-BSD FMLA CKD-EPI: 36 ML/MIN/1.73 M 2
GLOBULIN SER CALC-MCNC: 3.8 G/DL (ref 1.9–3.5)
GLOBULIN SER CALC-MCNC: 4.2 G/DL (ref 1.9–3.5)
GLUCOSE BLD STRIP.AUTO-MCNC: 124 MG/DL (ref 65–99)
GLUCOSE BLD STRIP.AUTO-MCNC: 165 MG/DL (ref 65–99)
GLUCOSE BLD STRIP.AUTO-MCNC: 171 MG/DL (ref 65–99)
GLUCOSE BLD STRIP.AUTO-MCNC: 191 MG/DL (ref 65–99)
GLUCOSE SERPL-MCNC: 101 MG/DL (ref 65–99)
GLUCOSE SERPL-MCNC: 128 MG/DL (ref 65–99)
GLUCOSE SERPL-MCNC: 146 MG/DL (ref 65–99)
GLUCOSE SERPL-MCNC: 171 MG/DL (ref 65–99)
GLUCOSE SERPL-MCNC: 54 MG/DL (ref 65–99)
GLUCOSE SERPL-MCNC: 55 MG/DL (ref 65–99)
GLUCOSE UR STRIP.AUTO-MCNC: NEGATIVE MG/DL
HCT VFR BLD AUTO: 40 % (ref 42–52)
HCT VFR BLD AUTO: 53.2 % (ref 42–52)
HGB BLD-MCNC: 13.3 G/DL (ref 14–18)
HGB BLD-MCNC: 16.1 G/DL (ref 14–18)
HYALINE CAST   1831: PRESENT /LPF
IMM GRANULOCYTES # BLD AUTO: 0.02 K/UL (ref 0–0.11)
IMM GRANULOCYTES # BLD AUTO: 0.09 K/UL (ref 0–0.11)
IMM GRANULOCYTES NFR BLD AUTO: 0.2 % (ref 0–0.9)
IMM GRANULOCYTES NFR BLD AUTO: 0.7 % (ref 0–0.9)
INR PPP: 2.53 (ref 0.87–1.13)
KETONES UR STRIP.AUTO-MCNC: 15 MG/DL
LACTATE SERPL-SCNC: 11.6 MMOL/L (ref 0.5–2)
LACTATE SERPL-SCNC: 3.2 MMOL/L (ref 0.5–2)
LACTATE SERPL-SCNC: 3.8 MMOL/L (ref 0.5–2)
LACTATE SERPL-SCNC: 4.6 MMOL/L (ref 0.5–2)
LACTATE SERPL-SCNC: 8 MMOL/L (ref 0.5–2)
LACTATE SERPL-SCNC: 9.2 MMOL/L (ref 0.5–2)
LEUKOCYTE ESTERASE UR QL STRIP.AUTO: NEGATIVE
LIPASE SERPL-CCNC: 18 U/L (ref 11–82)
LV EJECT FRACT MOD 2C 99903: 19.37
LV EJECT FRACT MOD 4C 99902: 15.84
LV EJECT FRACT MOD BP 99901: 16.89
LYMPHOCYTES # BLD AUTO: 0.96 K/UL (ref 1–4.8)
LYMPHOCYTES # BLD AUTO: 1.03 K/UL (ref 1–4.8)
LYMPHOCYTES NFR BLD: 10.1 % (ref 22–41)
LYMPHOCYTES NFR BLD: 8 % (ref 22–41)
MAGNESIUM SERPL-MCNC: 2.6 MG/DL (ref 1.5–2.5)
MCH RBC QN AUTO: 31.9 PG (ref 27–33)
MCH RBC QN AUTO: 32.1 PG (ref 27–33)
MCHC RBC AUTO-ENTMCNC: 30.3 G/DL (ref 32.3–36.5)
MCHC RBC AUTO-ENTMCNC: 33.3 G/DL (ref 32.3–36.5)
MCV RBC AUTO: 105.3 FL (ref 81.4–97.8)
MCV RBC AUTO: 96.6 FL (ref 81.4–97.8)
METHADONE UR QL SCN: NEGATIVE
MICRO URNS: ABNORMAL
MONOCYTES # BLD AUTO: 1.05 K/UL (ref 0–0.85)
MONOCYTES # BLD AUTO: 1.29 K/UL (ref 0–0.85)
MONOCYTES NFR BLD AUTO: 10 % (ref 0–13.4)
MONOCYTES NFR BLD AUTO: 11.1 % (ref 0–13.4)
NEUTROPHILS # BLD AUTO: 10.41 K/UL (ref 1.82–7.42)
NEUTROPHILS # BLD AUTO: 7.38 K/UL (ref 1.82–7.42)
NEUTROPHILS NFR BLD: 78 % (ref 44–72)
NEUTROPHILS NFR BLD: 81.1 % (ref 44–72)
NITRITE UR QL STRIP.AUTO: NEGATIVE
NRBC # BLD AUTO: 0 K/UL
NRBC # BLD AUTO: 0.06 K/UL
NRBC BLD-RTO: 0 /100 WBC (ref 0–0.2)
NRBC BLD-RTO: 0.6 /100 WBC (ref 0–0.2)
NT-PROBNP SERPL IA-MCNC: ABNORMAL PG/ML (ref 0–125)
OPIATES UR QL SCN: NEGATIVE
OXYCODONE UR QL SCN: NEGATIVE
PCP UR QL SCN: NEGATIVE
PH UR STRIP.AUTO: 5 [PH] (ref 5–8)
PHOSPHATE SERPL-MCNC: 8.5 MG/DL (ref 2.5–4.5)
PLATELET # BLD AUTO: 219 K/UL (ref 164–446)
PLATELET # BLD AUTO: 276 K/UL (ref 164–446)
PMV BLD AUTO: 10.3 FL (ref 9–12.9)
PMV BLD AUTO: 10.7 FL (ref 9–12.9)
POTASSIUM SERPL-SCNC: 4.6 MMOL/L (ref 3.6–5.5)
POTASSIUM SERPL-SCNC: 4.7 MMOL/L (ref 3.6–5.5)
POTASSIUM SERPL-SCNC: 5.2 MMOL/L (ref 3.6–5.5)
POTASSIUM SERPL-SCNC: 5.5 MMOL/L (ref 3.6–5.5)
POTASSIUM SERPL-SCNC: 6.9 MMOL/L (ref 3.6–5.5)
POTASSIUM SERPL-SCNC: 7.3 MMOL/L (ref 3.6–5.5)
PROPOXYPH UR QL SCN: NEGATIVE
PROT SERPL-MCNC: 6.3 G/DL (ref 6–8.2)
PROT SERPL-MCNC: 8.1 G/DL (ref 6–8.2)
PROT SERPL-MCNC: 8.4 G/DL (ref 6–8.2)
PROT UR QL STRIP: >=300 MG/DL
PROTHROMBIN TIME: 27.4 SEC (ref 12–14.6)
RBC # BLD AUTO: 4.14 M/UL (ref 4.7–6.1)
RBC # BLD AUTO: 5.05 M/UL (ref 4.7–6.1)
RBC # URNS HPF: >100 /HPF (ref 0–2)
RBC UR QL AUTO: ABNORMAL
SODIUM SERPL-SCNC: 131 MMOL/L (ref 135–145)
SODIUM SERPL-SCNC: 131 MMOL/L (ref 135–145)
SODIUM SERPL-SCNC: 132 MMOL/L (ref 135–145)
SODIUM SERPL-SCNC: 133 MMOL/L (ref 135–145)
SP GR UR STRIP.AUTO: >=1.03
T4 FREE SERPL-MCNC: 1.23 NG/DL (ref 0.93–1.7)
TROPONIN T SERPL-MCNC: 60 NG/L (ref 6–19)
TROPONIN T SERPL-MCNC: 61 NG/L (ref 6–19)
TROPONIN T SERPL-MCNC: 69 NG/L (ref 6–19)
TROPONIN T SERPL-MCNC: 73 NG/L (ref 6–19)
TROPONIN T SERPL-MCNC: 74 NG/L (ref 6–19)
TSH SERPL DL<=0.005 MIU/L-ACNC: 6.62 UIU/ML (ref 0.38–5.33)
UROBILINOGEN UR STRIP.AUTO-MCNC: 4 EU/DL
WBC # BLD AUTO: 12.9 K/UL (ref 4.8–10.8)
WBC # BLD AUTO: 9.5 K/UL (ref 4.8–10.8)
WBC #/AREA URNS HPF: ABNORMAL /HPF

## 2025-07-15 PROCEDURE — 74018 RADEX ABDOMEN 1 VIEW: CPT

## 2025-07-15 PROCEDURE — 700102 HCHG RX REV CODE 250 W/ 637 OVERRIDE(OP)

## 2025-07-15 PROCEDURE — 82077 ASSAY SPEC XCP UR&BREATH IA: CPT

## 2025-07-15 PROCEDURE — 700101 HCHG RX REV CODE 250: Performed by: INTERNAL MEDICINE

## 2025-07-15 PROCEDURE — 84100 ASSAY OF PHOSPHORUS: CPT

## 2025-07-15 PROCEDURE — 80053 COMPREHEN METABOLIC PANEL: CPT | Mod: 91

## 2025-07-15 PROCEDURE — 81001 URINALYSIS AUTO W/SCOPE: CPT

## 2025-07-15 PROCEDURE — 93306 TTE W/DOPPLER COMPLETE: CPT

## 2025-07-15 PROCEDURE — 84443 ASSAY THYROID STIM HORMONE: CPT

## 2025-07-15 PROCEDURE — 84484 ASSAY OF TROPONIN QUANT: CPT | Mod: 91

## 2025-07-15 PROCEDURE — 770022 HCHG ROOM/CARE - ICU (200)

## 2025-07-15 PROCEDURE — 700111 HCHG RX REV CODE 636 W/ 250 OVERRIDE (IP): Mod: JZ,UD | Performed by: STUDENT IN AN ORGANIZED HEALTH CARE EDUCATION/TRAINING PROGRAM

## 2025-07-15 PROCEDURE — 74176 CT ABD & PELVIS W/O CONTRAST: CPT

## 2025-07-15 PROCEDURE — 700101 HCHG RX REV CODE 250: Mod: UD | Performed by: STUDENT IN AN ORGANIZED HEALTH CARE EDUCATION/TRAINING PROGRAM

## 2025-07-15 PROCEDURE — 99291 CRITICAL CARE FIRST HOUR: CPT | Mod: 25 | Performed by: INTERNAL MEDICINE

## 2025-07-15 PROCEDURE — 82962 GLUCOSE BLOOD TEST: CPT | Performed by: STUDENT IN AN ORGANIZED HEALTH CARE EDUCATION/TRAINING PROGRAM

## 2025-07-15 PROCEDURE — 700105 HCHG RX REV CODE 258: Performed by: INTERNAL MEDICINE

## 2025-07-15 PROCEDURE — 84439 ASSAY OF FREE THYROXINE: CPT

## 2025-07-15 PROCEDURE — A9270 NON-COVERED ITEM OR SERVICE: HCPCS | Performed by: INTERNAL MEDICINE

## 2025-07-15 PROCEDURE — 93306 TTE W/DOPPLER COMPLETE: CPT | Mod: 26 | Performed by: STUDENT IN AN ORGANIZED HEALTH CARE EDUCATION/TRAINING PROGRAM

## 2025-07-15 PROCEDURE — 03HY32Z INSERTION OF MONITORING DEVICE INTO UPPER ARTERY, PERCUTANEOUS APPROACH: ICD-10-PCS | Performed by: EMERGENCY MEDICINE

## 2025-07-15 PROCEDURE — 36620 INSERTION CATHETER ARTERY: CPT

## 2025-07-15 PROCEDURE — 85610 PROTHROMBIN TIME: CPT

## 2025-07-15 PROCEDURE — P9047 ALBUMIN (HUMAN), 25%, 50ML: HCPCS | Mod: JZ | Performed by: INTERNAL MEDICINE

## 2025-07-15 PROCEDURE — 93005 ELECTROCARDIOGRAM TRACING: CPT | Mod: TC

## 2025-07-15 PROCEDURE — 96365 THER/PROPH/DIAG IV INF INIT: CPT

## 2025-07-15 PROCEDURE — 83880 ASSAY OF NATRIURETIC PEPTIDE: CPT

## 2025-07-15 PROCEDURE — 93005 ELECTROCARDIOGRAM TRACING: CPT | Mod: TC | Performed by: INTERNAL MEDICINE

## 2025-07-15 PROCEDURE — 700111 HCHG RX REV CODE 636 W/ 250 OVERRIDE (IP): Mod: JZ

## 2025-07-15 PROCEDURE — 83735 ASSAY OF MAGNESIUM: CPT

## 2025-07-15 PROCEDURE — 96375 TX/PRO/DX INJ NEW DRUG ADDON: CPT

## 2025-07-15 PROCEDURE — 83690 ASSAY OF LIPASE: CPT

## 2025-07-15 PROCEDURE — 80307 DRUG TEST PRSMV CHEM ANLYZR: CPT

## 2025-07-15 PROCEDURE — 87086 URINE CULTURE/COLONY COUNT: CPT

## 2025-07-15 PROCEDURE — 80076 HEPATIC FUNCTION PANEL: CPT

## 2025-07-15 PROCEDURE — 02HP32Z INSERTION OF MONITORING DEVICE INTO PULMONARY TRUNK, PERCUTANEOUS APPROACH: ICD-10-PCS | Performed by: EMERGENCY MEDICINE

## 2025-07-15 PROCEDURE — 700111 HCHG RX REV CODE 636 W/ 250 OVERRIDE (IP): Performed by: INTERNAL MEDICINE

## 2025-07-15 PROCEDURE — 71045 X-RAY EXAM CHEST 1 VIEW: CPT

## 2025-07-15 PROCEDURE — 93010 ELECTROCARDIOGRAM REPORT: CPT | Performed by: STUDENT IN AN ORGANIZED HEALTH CARE EDUCATION/TRAINING PROGRAM

## 2025-07-15 PROCEDURE — 99285 EMERGENCY DEPT VISIT HI MDM: CPT

## 2025-07-15 PROCEDURE — 80074 ACUTE HEPATITIS PANEL: CPT

## 2025-07-15 PROCEDURE — 80048 BASIC METABOLIC PNL TOTAL CA: CPT

## 2025-07-15 PROCEDURE — 87040 BLOOD CULTURE FOR BACTERIA: CPT

## 2025-07-15 PROCEDURE — 700117 HCHG RX CONTRAST REV CODE 255: Performed by: INTERNAL MEDICINE

## 2025-07-15 PROCEDURE — 99292 CRITICAL CARE ADDL 30 MIN: CPT | Mod: 25 | Performed by: INTERNAL MEDICINE

## 2025-07-15 PROCEDURE — 93005 ELECTROCARDIOGRAM TRACING: CPT | Mod: TC | Performed by: STUDENT IN AN ORGANIZED HEALTH CARE EDUCATION/TRAINING PROGRAM

## 2025-07-15 PROCEDURE — 82962 GLUCOSE BLOOD TEST: CPT | Performed by: INTERNAL MEDICINE

## 2025-07-15 PROCEDURE — 36620 INSERTION CATHETER ARTERY: CPT | Performed by: EMERGENCY MEDICINE

## 2025-07-15 PROCEDURE — 80143 DRUG ASSAY ACETAMINOPHEN: CPT

## 2025-07-15 PROCEDURE — 82330 ASSAY OF CALCIUM: CPT | Mod: 91

## 2025-07-15 PROCEDURE — 36556 INSERT NON-TUNNEL CV CATH: CPT | Performed by: EMERGENCY MEDICINE

## 2025-07-15 PROCEDURE — 36415 COLL VENOUS BLD VENIPUNCTURE: CPT

## 2025-07-15 PROCEDURE — 700102 HCHG RX REV CODE 250 W/ 637 OVERRIDE(OP): Performed by: INTERNAL MEDICINE

## 2025-07-15 PROCEDURE — 85025 COMPLETE CBC W/AUTO DIFF WBC: CPT | Mod: 91

## 2025-07-15 PROCEDURE — 83605 ASSAY OF LACTIC ACID: CPT | Mod: 91

## 2025-07-15 PROCEDURE — A9270 NON-COVERED ITEM OR SERVICE: HCPCS

## 2025-07-15 RX ORDER — HEPARIN SODIUM 5000 [USP'U]/ML
5000 INJECTION, SOLUTION INTRAVENOUS; SUBCUTANEOUS EVERY 8 HOURS
Status: DISCONTINUED | OUTPATIENT
Start: 2025-07-15 | End: 2025-07-18

## 2025-07-15 RX ORDER — ALBUMIN (HUMAN) 12.5 G/50ML
12.5 SOLUTION INTRAVENOUS EVERY 4 HOURS
Status: COMPLETED | OUTPATIENT
Start: 2025-07-15 | End: 2025-07-15

## 2025-07-15 RX ORDER — TAMSULOSIN HYDROCHLORIDE 0.4 MG/1
0.4 CAPSULE ORAL
Status: DISCONTINUED | OUTPATIENT
Start: 2025-07-16 | End: 2025-07-20 | Stop reason: HOSPADM

## 2025-07-15 RX ORDER — MORPHINE SULFATE 4 MG/ML
3 INJECTION INTRAVENOUS ONCE
Status: COMPLETED | OUTPATIENT
Start: 2025-07-15 | End: 2025-07-15

## 2025-07-15 RX ORDER — FUROSEMIDE 20 MG/1
20 TABLET ORAL DAILY
Status: ON HOLD | COMMUNITY
End: 2025-07-20

## 2025-07-15 RX ORDER — SIMETHICONE 125 MG
125 TABLET,CHEWABLE ORAL 3 TIMES DAILY
Status: DISCONTINUED | OUTPATIENT
Start: 2025-07-15 | End: 2025-07-20 | Stop reason: HOSPADM

## 2025-07-15 RX ORDER — FUROSEMIDE 10 MG/ML
60 INJECTION INTRAMUSCULAR; INTRAVENOUS EVERY 8 HOURS
Status: DISCONTINUED | OUTPATIENT
Start: 2025-07-15 | End: 2025-07-17

## 2025-07-15 RX ORDER — NOREPINEPHRINE BITARTRATE 0.03 MG/ML
0-1 INJECTION, SOLUTION INTRAVENOUS CONTINUOUS
Status: DISCONTINUED | OUTPATIENT
Start: 2025-07-15 | End: 2025-07-18

## 2025-07-15 RX ORDER — DEXTROSE, SODIUM CHLORIDE, SODIUM LACTATE, POTASSIUM CHLORIDE, AND CALCIUM CHLORIDE 5; .6; .31; .03; .02 G/100ML; G/100ML; G/100ML; G/100ML; G/100ML
INJECTION, SOLUTION INTRAVENOUS CONTINUOUS
Status: ACTIVE | OUTPATIENT
Start: 2025-07-15 | End: 2025-07-15

## 2025-07-15 RX ORDER — MORPHINE SULFATE 4 MG/ML
2 INJECTION INTRAVENOUS ONCE
Status: DISCONTINUED | OUTPATIENT
Start: 2025-07-15 | End: 2025-07-15

## 2025-07-15 RX ORDER — ACETAMINOPHEN 325 MG/1
650 TABLET ORAL EVERY 6 HOURS PRN
Status: DISCONTINUED | OUTPATIENT
Start: 2025-07-15 | End: 2025-07-16

## 2025-07-15 RX ORDER — DEXTROSE MONOHYDRATE 25 G/50ML
25 INJECTION, SOLUTION INTRAVENOUS
Status: DISCONTINUED | OUTPATIENT
Start: 2025-07-15 | End: 2025-07-20 | Stop reason: HOSPADM

## 2025-07-15 RX ORDER — INDOMETHACIN 25 MG/1
50 CAPSULE ORAL ONCE
Status: COMPLETED | OUTPATIENT
Start: 2025-07-15 | End: 2025-07-15

## 2025-07-15 RX ORDER — ONDANSETRON 4 MG/1
4 TABLET, ORALLY DISINTEGRATING ORAL EVERY 4 HOURS PRN
Status: DISCONTINUED | OUTPATIENT
Start: 2025-07-15 | End: 2025-07-20 | Stop reason: HOSPADM

## 2025-07-15 RX ORDER — DOBUTAMINE HYDROCHLORIDE 100 MG/100ML
2.5 INJECTION INTRAVENOUS CONTINUOUS
Status: DISCONTINUED | OUTPATIENT
Start: 2025-07-15 | End: 2025-07-18

## 2025-07-15 RX ORDER — THIAMINE HYDROCHLORIDE 100 MG/ML
200 INJECTION, SOLUTION INTRAMUSCULAR; INTRAVENOUS EVERY 12 HOURS
Status: COMPLETED | OUTPATIENT
Start: 2025-07-15 | End: 2025-07-18

## 2025-07-15 RX ORDER — ONDANSETRON 2 MG/ML
4 INJECTION INTRAMUSCULAR; INTRAVENOUS EVERY 4 HOURS PRN
Status: DISCONTINUED | OUTPATIENT
Start: 2025-07-15 | End: 2025-07-20 | Stop reason: HOSPADM

## 2025-07-15 RX ORDER — ASPIRIN 81 MG/1
81 TABLET ORAL DAILY
Status: DISCONTINUED | OUTPATIENT
Start: 2025-07-16 | End: 2025-07-20 | Stop reason: HOSPADM

## 2025-07-15 RX ORDER — POLYETHYLENE GLYCOL 3350 17 G/17G
1 POWDER, FOR SOLUTION ORAL
Status: DISCONTINUED | OUTPATIENT
Start: 2025-07-15 | End: 2025-07-20 | Stop reason: HOSPADM

## 2025-07-15 RX ORDER — OMEPRAZOLE 20 MG/1
20 CAPSULE, DELAYED RELEASE ORAL DAILY
Status: DISCONTINUED | OUTPATIENT
Start: 2025-07-16 | End: 2025-07-20 | Stop reason: HOSPADM

## 2025-07-15 RX ORDER — DEXTROSE MONOHYDRATE 25 G/50ML
25 INJECTION, SOLUTION INTRAVENOUS ONCE
Status: COMPLETED | OUTPATIENT
Start: 2025-07-15 | End: 2025-07-15

## 2025-07-15 RX ORDER — BISACODYL 10 MG
10 SUPPOSITORY, RECTAL RECTAL
Status: DISCONTINUED | OUTPATIENT
Start: 2025-07-15 | End: 2025-07-20 | Stop reason: HOSPADM

## 2025-07-15 RX ORDER — ASPIRIN 81 MG/1
81 TABLET ORAL DAILY
Status: ON HOLD | COMMUNITY
End: 2025-07-30

## 2025-07-15 RX ORDER — AMOXICILLIN 250 MG
2 CAPSULE ORAL EVERY EVENING
Status: DISCONTINUED | OUTPATIENT
Start: 2025-07-15 | End: 2025-07-20 | Stop reason: HOSPADM

## 2025-07-15 RX ADMIN — MORPHINE SULFATE 3 MG: 4 INJECTION, SOLUTION INTRAMUSCULAR; INTRAVENOUS at 18:29

## 2025-07-15 RX ADMIN — HEPARIN SODIUM 5000 UNITS: 5000 INJECTION, SOLUTION INTRAVENOUS; SUBCUTANEOUS at 08:50

## 2025-07-15 RX ADMIN — DEXTROSE MONOHYDRATE 25 G: 25 INJECTION, SOLUTION INTRAVENOUS at 05:48

## 2025-07-15 RX ADMIN — HEPARIN SODIUM 5000 UNITS: 5000 INJECTION, SOLUTION INTRAVENOUS; SUBCUTANEOUS at 22:48

## 2025-07-15 RX ADMIN — SIMETHICONE 125 MG: 125 TABLET, CHEWABLE ORAL at 19:22

## 2025-07-15 RX ADMIN — FUROSEMIDE 60 MG: 10 INJECTION, SOLUTION INTRAVENOUS at 22:45

## 2025-07-15 RX ADMIN — SENNOSIDES, DOCUSATE SODIUM 2 TABLET: 50; 8.6 TABLET, FILM COATED ORAL at 17:15

## 2025-07-15 RX ADMIN — FUROSEMIDE 60 MG: 10 INJECTION, SOLUTION INTRAVENOUS at 08:50

## 2025-07-15 RX ADMIN — DEXTROSE MONOHYDRATE 25 ML: 25 INJECTION, SOLUTION INTRAVENOUS at 05:49

## 2025-07-15 RX ADMIN — HUMAN ALBUMIN MICROSPHERES AND PERFLUTREN 3 ML: 10; .22 INJECTION, SOLUTION INTRAVENOUS at 13:30

## 2025-07-15 RX ADMIN — ALBUMIN (HUMAN) 12.5 G: 0.25 INJECTION, SOLUTION INTRAVENOUS at 22:54

## 2025-07-15 RX ADMIN — ALBUMIN (HUMAN) 12.5 G: 0.25 INJECTION, SOLUTION INTRAVENOUS at 17:45

## 2025-07-15 RX ADMIN — SODIUM BICARBONATE 50 MEQ: 84 INJECTION INTRAVENOUS at 07:03

## 2025-07-15 RX ADMIN — THIAMINE HYDROCHLORIDE 200 MG: 100 INJECTION, SOLUTION INTRAMUSCULAR; INTRAVENOUS at 17:15

## 2025-07-15 RX ADMIN — NOREPINEPHRINE BITARTRATE 0.05 MCG/KG/MIN: 1 INJECTION, SOLUTION, CONCENTRATE INTRAVENOUS at 15:11

## 2025-07-15 RX ADMIN — HEPARIN SODIUM 5000 UNITS: 5000 INJECTION, SOLUTION INTRAVENOUS; SUBCUTANEOUS at 14:23

## 2025-07-15 RX ADMIN — INSULIN HUMAN 5 UNITS: 100 INJECTION, SOLUTION PARENTERAL at 05:55

## 2025-07-15 RX ADMIN — FENTANYL CITRATE 25 MCG: 50 INJECTION, SOLUTION INTRAMUSCULAR; INTRAVENOUS at 05:23

## 2025-07-15 RX ADMIN — ALBUMIN (HUMAN) 12.5 G: 0.25 INJECTION, SOLUTION INTRAVENOUS at 08:56

## 2025-07-15 RX ADMIN — FUROSEMIDE 60 MG: 10 INJECTION, SOLUTION INTRAVENOUS at 14:23

## 2025-07-15 RX ADMIN — ALBUMIN (HUMAN) 12.5 G: 0.25 INJECTION, SOLUTION INTRAVENOUS at 14:33

## 2025-07-15 RX ADMIN — DOBUTAMINE HYDROCHLORIDE 5 MCG/KG/MIN: 100 INJECTION INTRAVENOUS at 19:31

## 2025-07-15 RX ADMIN — SODIUM CHLORIDE, SODIUM LACTATE, POTASSIUM CHLORIDE, CALCIUM CHLORIDE AND DEXTROSE MONOHYDRATE: 5; 600; 310; 30; 20 INJECTION, SOLUTION INTRAVENOUS at 09:13

## 2025-07-15 RX ADMIN — THIAMINE HYDROCHLORIDE 200 MG: 100 INJECTION, SOLUTION INTRAMUSCULAR; INTRAVENOUS at 08:50

## 2025-07-15 RX ADMIN — DOBUTAMINE HYDROCHLORIDE 5 MCG/KG/MIN: 100 INJECTION INTRAVENOUS at 07:44

## 2025-07-15 ASSESSMENT — PAIN DESCRIPTION - PAIN TYPE
TYPE: ACUTE PAIN

## 2025-07-15 ASSESSMENT — LIFESTYLE VARIABLES
HAVE PEOPLE ANNOYED YOU BY CRITICIZING YOUR DRINKING: NO
TOTAL SCORE: 0
TOTAL SCORE: 0
DOES PATIENT WANT TO STOP DRINKING: NO
TOTAL SCORE: 0
ALCOHOL_USE: NO
AVERAGE NUMBER OF DAYS PER WEEK YOU HAVE A DRINK CONTAINING ALCOHOL: 0
EVER FELT BAD OR GUILTY ABOUT YOUR DRINKING: NO
ON A TYPICAL DAY WHEN YOU DRINK ALCOHOL HOW MANY DRINKS DO YOU HAVE: 0
CONSUMPTION TOTAL: NEGATIVE
EVER HAD A DRINK FIRST THING IN THE MORNING TO STEADY YOUR NERVES TO GET RID OF A HANGOVER: NO
HAVE YOU EVER FELT YOU SHOULD CUT DOWN ON YOUR DRINKING: NO
HOW MANY TIMES IN THE PAST YEAR HAVE YOU HAD 5 OR MORE DRINKS IN A DAY: 0

## 2025-07-15 ASSESSMENT — SOCIAL DETERMINANTS OF HEALTH (SDOH)
WITHIN THE PAST 12 MONTHS, YOU WORRIED THAT YOUR FOOD WOULD RUN OUT BEFORE YOU GOT THE MONEY TO BUY MORE: NEVER TRUE
WITHIN THE LAST YEAR, HAVE TO BEEN RAPED OR FORCED TO HAVE ANY KIND OF SEXUAL ACTIVITY BY YOUR PARTNER OR EX-PARTNER?: NO
WITHIN THE PAST 12 MONTHS, THE FOOD YOU BOUGHT JUST DIDN'T LAST AND YOU DIDN'T HAVE MONEY TO GET MORE: OFTEN TRUE
WITHIN THE PAST 12 MONTHS, YOU WORRIED THAT YOUR FOOD WOULD RUN OUT BEFORE YOU GOT THE MONEY TO BUY MORE: NEVER TRUE
WITHIN THE LAST YEAR, HAVE YOU BEEN AFRAID OF YOUR PARTNER OR EX-PARTNER?: NO
WITHIN THE LAST YEAR, HAVE YOU BEEN KICKED, HIT, SLAPPED, OR OTHERWISE PHYSICALLY HURT BY YOUR PARTNER OR EX-PARTNER?: NO
WITHIN THE LAST YEAR, HAVE YOU BEEN HUMILIATED OR EMOTIONALLY ABUSED IN OTHER WAYS BY YOUR PARTNER OR EX-PARTNER?: NO
IN THE PAST 12 MONTHS, HAS THE ELECTRIC, GAS, OIL, OR WATER COMPANY THREATENED TO SHUT OFF SERVICE IN YOUR HOME?: YES
IN THE PAST 12 MONTHS, HAS THE ELECTRIC, GAS, OIL, OR WATER COMPANY THREATENED TO SHUT OFF SERVICE IN YOUR HOME?: YES
WITHIN THE PAST 12 MONTHS, THE FOOD YOU BOUGHT JUST DIDN'T LAST AND YOU DIDN'T HAVE MONEY TO GET MORE: OFTEN TRUE

## 2025-07-15 ASSESSMENT — FIBROSIS 4 INDEX
FIB4 SCORE: 15.4
FIB4 SCORE: 2.16

## 2025-07-15 NOTE — ASSESSMENT & PLAN NOTE
RESOLVED, back to baseline   Suspect acutely exacerbated due to cardiorenal syndrome  - Targeting heart failure exacerbation treatment, creatinine improving  - Lasix as above  - Continue Flomax  - Daily I&Os, catheter placed

## 2025-07-15 NOTE — PROGRESS NOTES
4 Eyes Skin Assessment Completed by JULIANA Dai and JULIANA Wagner.    Skin assessment is primarily focused on high risk bony prominences. Pay special attention to skin beneath and around medical devices, high risk bony prominences, skin to skin areas and areas where the patient lacks sensation to feel pain and areas where the patient previously had breakdown.     Head (Occipital):  WDL   Ears (Under Medical Devices): WDL   Nose (Under Medical Devices): WDL   Mouth:  WDL   Neck: WDL   Breast/Chest:  WDL   Shoulder Blades:  WDL   Spine:   WDL   (R) Arm/Elbow/Hand: WDL   (L) Arm/Elbow/Hand: WDL   Abdomen: WDL   Pannus/Groin:  WDL   Sacrum/Coccyx:   WDL   (R) Ischial Tuberosity (Sit Bones):  WDL   (L) Ischial Tuberosity (Sit Bones):  WDL   (R) Leg:  Edema   (L) Leg:  Edema   (R) Heel:  WDL, Skin peeling, no injury   (R) Foot/Toe: WDL, Skin Peeling, no injury   (L) Heel: WDL, Skin peeling, no injury   (L) Foot/Toe:  WDL, Skin peeling, no injury       DEVICES IN USE:   Respiratory Devices:  NA, patient on room air  Feeding Devices:  N/A   Lines & BP Monitoring Devices:  Peripheral IV, BP cuff, and Pulse ox    Orthopedic Devices:  N/A  Miscellaneous Devices:  Aguilar    PROTOCOL INTERVENTIONS:   ICU Low Airloss Bed:  Already in place  Offloading Dressing - Sacrum:  Ordered via RN Wound Protocol  Glide Sheet:  Already in place  Aguilar:  Already in place    WOUND PHOTOS:   N/A no wounds identified    WOUND CONSULT:   N/A, no advanced wound care needs identified

## 2025-07-15 NOTE — ASSESSMENT & PLAN NOTE
By history, prior cessation of alcohol and remains abstinent  Current transaminitis likely related to CHF exacerbation  Follow hepatic function, LFTs closely and monitor response to inotropic therapy  Repeat hepatitis panel non-reactove  CT A/P noted with fatty liver change and mild ascites, generalized bowel dilation possible ileus

## 2025-07-15 NOTE — ASSESSMENT & PLAN NOTE
Acute exacerbation/decompensation of chronic left-sided systolic heart failure  History of prior methamphetamine use with cessation, lower suspicion for acute ischemic etiology  Associated with multiorgan dysfunction, SUZY, cardiorenal syndrome, acute ischemic hepatitis  Urgent initiation of inotropic support with dobutamine  Currently maintaining adequate MAP, add additional vasopressor if needed  Low threshold for further invasive monitoring, PA catheter to guide ongoing therapy if needed  Forced diuresis with IV Lasix  Reinitiation of GDMT heart failure medications as appropriate, hold acutely  Cardiology consultation for assistance with management of acute on chronic heart failure, close follow-up  He reports target dry weight of 130 pounds

## 2025-07-15 NOTE — CONSULTS
"Critical Care Consultation    Date of consult: 7/15/2025    Referring Physician  Keri Milian M.D.    Reason for Consultation  Acute exacerbation of CHF, SUZY on CKD, hyperkalemia, multiorgan failure    History of Presenting Illness  59 y.o. male, PMH HFrEF, LVEF 15-20%, stage III CKD, methamphetamine use, cirrhosis, multiple hospital admissions who presented 7/15/2025 with vague chest discomfort and dyspnea.  He reports dyspnea on exertion and feeling short of breath over the past 24 hours more notably than previously.  When he was walking to the dinner table last night he started feeling dizzy and may have passed out.  Denies seizure activity, loss of bowel or bladder function and did not note any injury/trauma.  He denies fever, chills, nausea vomiting cough sputum production diarrhea.  He reports increased generalized edema but does report he has been compliant with medications for heart failure but takes Lasix \"as needed\" which he has been taking on most days.  He denies alcohol use    Code Status  Full Code    Review of Systems  Review of Systems   Unable to perform ROS: Acuity of condition       Past Medical History   has a past medical history of Congestive heart failure (HCC).      Surgical History   has a past surgical history that includes no pertinent past surgical history.    Family History  History reviewed. No pertinent family history.    Social History   reports that he has never smoked. He has never used smokeless tobacco. He reports that he does not currently use alcohol. He reports that he does not use drugs.    Medications  Home Medications       Reviewed by Tino Osman (Pharmacy Tech) on 07/15/25 at 0733  Med List Status: Complete     Medication Last Dose Status   allopurinol (ZYLOPRIM) 300 MG Tab 7/15/2025 Active   aspirin 81 MG EC tablet 7/15/2025 Active   Cyanocobalamin (VITAMIN B-12 PO) 7/15/2025 Active   dapagliflozin propanediol (FARXIGA) 10 MG Tab 7/15/2025 Active   docusate " sodium (COLACE) 100 MG Cap 7/15/2025 Active   furosemide (LASIX) 20 MG Tab 7/15/2025 Active   losartan (COZAAR) 25 MG Tab 7/15/2025 Active   methocarbamol (ROBAXIN) 500 MG Tab Unknown Active   metoprolol SR (TOPROL XL) 25 MG TABLET SR 24 HR 7/15/2025 Active   omeprazole (PRILOSEC) 20 MG delayed-release capsule  Active   potassium chloride SA (KDUR) 20 MEQ Tab CR 7/15/2025 Active   spironolactone (ALDACTONE) 25 MG Tab 7/15/2025 Active   tamsulosin (FLOMAX) 0.4 MG capsule 7/15/2025 Active                  Audit from Redirected Encounters    **Home medications have not yet been reviewed for this encounter**       Current Medications[1]    Allergies  Allergies[2]    Vital Signs last 24 hours  Temp:  [36.1 °C (97 °F)] 36.1 °C (97 °F)  Pulse:  [77-82] 80  Resp:  [15-33] 33  BP: (101-120)/(59-78) 111/72  SpO2:  [91 %-100 %] 98 %    Physical Exam  Physical Exam  Vitals and nursing note reviewed.   Constitutional:       General: He is not in acute distress.     Appearance: He is ill-appearing.   HENT:      Head: Normocephalic and atraumatic.      Nose: Nose normal.      Mouth/Throat:      Mouth: Mucous membranes are moist.   Eyes:      Pupils: Pupils are equal, round, and reactive to light.   Cardiovascular:      Rate and Rhythm: Normal rate and regular rhythm.      Pulses: Normal pulses.   Pulmonary:      Effort: No respiratory distress.      Breath sounds: No wheezing.   Abdominal:      General: There is distension.      Tenderness: There is no abdominal tenderness. There is no guarding.   Musculoskeletal:         General: Swelling present. No deformity.      Cervical back: Neck supple.   Skin:     General: Skin is dry.      Capillary Refill: Capillary refill takes 2 to 3 seconds.      Comments: Slightly cool extremities, no mottling   Neurological:      General: No focal deficit present.      Mental Status: He is alert and oriented to person, place, and time.      Cranial Nerves: No cranial nerve deficit.      Motor: No  weakness.   Psychiatric:         Mood and Affect: Mood normal.         Fluids  No intake or output data in the 24 hours ending 07/15/25 0828    Laboratory  Recent Results (from the past 48 hours)   EKG    Collection Time: 07/15/25  3:38 AM   Result Value Ref Range    Report       [facility] Emergency Dept[facility]    Test Date:  2025-07-15  Pt Name:    TREVON HAUSER                Department: ER  MRN:        [id number]                      Room:  Gender:     Male                         Technician: [id number]  :        1965                   Requested By:ER TRIAGE PROTOCOL  Order #:    [id number]                    Reading MD: Keri Milian    Measurements  Intervals                                Axis  Rate:       82                           P:          103  GA:         246                          QRS:        130  QRSD:       153                          T:          -21  QT:         460  QTc:        538    Interpretive Statements  Sinus rhythm  Prolonged GA interval  Nonspecific intraventricular conduction delay  No ST elevation  Compared to ECG 2025 05:50:03  No significant changes  Electronically Signed On 07- 03:38:02 PDT by Keri Milian     CBC with Differential    Collection Time: 07/15/25  3:48 AM   Result Value Ref Range    WBC 12.9 (H) 4.8 - 10.8 K/uL    RBC 5.05 4.70 - 6.10 M/uL    Hemoglobin 16.1 14.0 - 18.0 g/dL    Hematocrit 53.2 (H) 42.0 - 52.0 %    .3 (H) 81.4 - 97.8 fL    MCH 31.9 27.0 - 33.0 pg    MCHC 30.3 (L) 32.3 - 36.5 g/dL    RDW 64.5 (H) 35.9 - 50.0 fL    Platelet Count 276 164 - 446 K/uL    MPV 10.3 9.0 - 12.9 fL    Neutrophils-Polys 81.10 (H) 44.00 - 72.00 %    Lymphocytes 8.00 (L) 22.00 - 41.00 %    Monocytes 10.00 0.00 - 13.40 %    Eosinophils 0.00 0.00 - 6.90 %    Basophils 0.20 0.00 - 1.80 %    Immature Granulocytes 0.70 0.00 - 0.90 %    Nucleated RBC 0.00 0.00 - 0.20 /100 WBC    Neutrophils (Absolute) 10.41 (H) 1.82 - 7.42 K/uL    Lymphs  (Absolute) 1.03 1.00 - 4.80 K/uL    Monos (Absolute) 1.29 (H) 0.00 - 0.85 K/uL    Eos (Absolute) 0.00 0.00 - 0.51 K/uL    Baso (Absolute) 0.03 0.00 - 0.12 K/uL    Immature Granulocytes (abs) 0.09 0.00 - 0.11 K/uL    NRBC (Absolute) 0.00 K/uL   Complete Metabolic Panel (CMP)    Collection Time: 07/15/25  3:48 AM   Result Value Ref Range    Sodium 133 (L) 135 - 145 mmol/L    Potassium 6.9 (HH) 3.6 - 5.5 mmol/L    Chloride 98 96 - 112 mmol/L    Co2 7 (LL) 20 - 33 mmol/L    Anion Gap 28.0 (H) 7.0 - 16.0    Glucose 55 (L) 65 - 99 mg/dL    Bun 37 (H) 8 - 22 mg/dL    Creatinine 2.33 (H) 0.50 - 1.40 mg/dL    Calcium 9.6 8.5 - 10.5 mg/dL    Correct Calcium 9.4 8.5 - 10.5 mg/dL    AST(SGOT) 1866 (HH) 12 - 45 U/L    ALT(SGPT) 970 (H) 2 - 50 U/L    Alkaline Phosphatase 165 (H) 30 - 99 U/L    Total Bilirubin 4.1 (H) 0.1 - 1.5 mg/dL    Albumin 4.2 3.2 - 4.9 g/dL    Total Protein 8.4 (H) 6.0 - 8.2 g/dL    Globulin 4.2 (H) 1.9 - 3.5 g/dL    A-G Ratio 1.0 g/dL   proBrain Natriuretic Peptide, NT (BNP)    Collection Time: 07/15/25  3:48 AM   Result Value Ref Range    NT-proBNP 64987 (H) 0 - 125 pg/mL   Troponins NOW    Collection Time: 07/15/25  3:48 AM   Result Value Ref Range    Troponin T 69 (H) 6 - 19 ng/L   ESTIMATED GFR    Collection Time: 07/15/25  3:48 AM   Result Value Ref Range    GFR (CKD-EPI) 31 (A) >60 mL/min/1.73 m 2   MAGNESIUM    Collection Time: 07/15/25  3:48 AM   Result Value Ref Range    Magnesium 2.6 (H) 1.5 - 2.5 mg/dL   PHOSPHORUS    Collection Time: 07/15/25  3:48 AM   Result Value Ref Range    Phosphorus 8.5 (H) 2.5 - 4.5 mg/dL   LIPASE    Collection Time: 07/15/25  3:48 AM   Result Value Ref Range    Lipase 18 11 - 82 U/L   DIAGNOSTIC ALCOHOL    Collection Time: 07/15/25  5:35 AM   Result Value Ref Range    Diagnostic Alcohol <10.1 <10.1 mg/dL   ACETAMINOPHEN    Collection Time: 07/15/25  5:35 AM   Result Value Ref Range    Acetaminophen -Tylenol <5.0 (L) 10.0 - 30.0 ug/mL   Lactic Acid    Collection Time:  07/15/25  5:35 AM   Result Value Ref Range    Lactic Acid 11.6 (HH) 0.5 - 2.0 mmol/L   Blood Culture - Draw one from central line and one from peripheral site    Collection Time: 07/15/25  5:35 AM    Specimen: Line; Blood   Result Value Ref Range    Significant Indicator NEG     Source BLD     Site Peripheral     Culture Result       No Growth  Note: Blood cultures are incubated for 5 days and  are monitored continuously.Positive blood cultures  are called to the RN and reported as soon as  they are identified.     Comp Metabolic Panel    Collection Time: 07/15/25  5:35 AM   Result Value Ref Range    Sodium 133 (L) 135 - 145 mmol/L    Potassium 7.3 (HH) 3.6 - 5.5 mmol/L    Chloride 96 96 - 112 mmol/L    Co2 9 (LL) 20 - 33 mmol/L    Anion Gap 28.0 (H) 7.0 - 16.0    Glucose 54 (LL) 65 - 99 mg/dL    Bun 40 (H) 8 - 22 mg/dL    Creatinine 2.49 (H) 0.50 - 1.40 mg/dL    Calcium 9.9 8.5 - 10.5 mg/dL    Correct Calcium 9.7 8.5 - 10.5 mg/dL    AST(SGOT) 2479 (HH) 12 - 45 U/L    ALT(SGPT) 1225 (HH) 2 - 50 U/L    Alkaline Phosphatase 162 (H) 30 - 99 U/L    Total Bilirubin 3.9 (H) 0.1 - 1.5 mg/dL    Albumin 4.3 3.2 - 4.9 g/dL    Total Protein 8.1 6.0 - 8.2 g/dL    Globulin 3.8 (H) 1.9 - 3.5 g/dL    A-G Ratio 1.1 g/dL   TROPONIN    Collection Time: 07/15/25  5:35 AM   Result Value Ref Range    Troponin T 74 (H) 6 - 19 ng/L   ESTIMATED GFR    Collection Time: 07/15/25  5:35 AM   Result Value Ref Range    GFR (CKD-EPI) 29 (A) >60 mL/min/1.73 m 2   Blood Culture - Draw one from central line and one from peripheral site    Collection Time: 07/15/25  5:36 AM    Specimen: Peripheral; Blood   Result Value Ref Range    Significant Indicator NEG     Source BLD     Site PERIPHERAL     Culture Result       No Growth  Note: Blood cultures are incubated for 5 days and  are monitored continuously.Positive blood cultures  are called to the RN and reported as soon as  they are identified.     POCT glucose device results    Collection Time:  07/15/25  6:48 AM   Result Value Ref Range    POC Glucose, Blood 171 (H) 65 - 99 mg/dL   URINE DRUG SCREEN    Collection Time: 07/15/25  6:54 AM   Result Value Ref Range    Amphetamines Urine Positive (A) Negative    Barbiturates Negative Negative    Benzodiazepines Negative Negative    Cocaine Metabolite Negative Negative    Fentanyl, Urine Negative Negative    Methadone Negative Negative    Opiates Negative Negative    Oxycodone Negative Negative    Phencyclidine -Pcp Negative Negative    Propoxyphene Negative Negative    Cannabinoid Metab Negative Negative   Urinalysis    Collection Time: 07/15/25  6:54 AM    Specimen: Urine   Result Value Ref Range    Color Dark yellow     Character Cloudy (A)     Specific Gravity >=1.030 <1.035    Ph 5.0 5.0 - 8.0    Glucose Negative Negative mg/dL    Ketones 15 (A) Negative mg/dL    Protein >=300 (A) Negative mg/dL    Bilirubin Moderate (A) Negative    Urobilinogen, Urine 4.0 (A) <=1.0 EU/dL    Nitrite Negative Negative    Leukocyte Esterase Negative Negative    Occult Blood Large (A) Negative    Micro Urine Req Microscopic    URINE MICROSCOPIC (W/UA)    Collection Time: 07/15/25  6:54 AM   Result Value Ref Range    WBC 0-2 /hpf    RBC >100 (A) 0 - 2 /hpf    Bacteria Moderate (A) None /hpf    Epithelial Cells 0-2 0 - 5 /hpf    Amorphous Crystal Present (A) Absent /hpf    Urine Casts 6-10 (A) 0 - 2 /lpf    Hyaline Cast Present /lpf   Lactic Acid    Collection Time: 07/15/25  7:12 AM   Result Value Ref Range    Lactic Acid 9.2 (HH) 0.5 - 2.0 mmol/L   POCT glucose device results    Collection Time: 07/15/25  7:47 AM   Result Value Ref Range    POC Glucose, Blood 191 (H) 65 - 99 mg/dL       Imaging  CT-ABDOMEN-PELVIS W/O   Final Result      1.  Mild ascites.   2.  Body wall anasarca.   3.  Cardiomegaly.   4.  Mild generalized bowel dilation, likely ileus.   5.  Fatty liver.         DX-CHEST-PORTABLE (1 VIEW)   Final Result      Cardiomegaly. No acute process.       EC-ECHOCARDIOGRAM COMPLETE W/O CONT    (Results Pending)       Assessment/Plan  * Acute on chronic combined systolic (congestive) and diastolic (congestive) heart failure (HCC)- (present on admission)  Assessment & Plan  Acute exacerbation/decompensation of chronic left-sided systolic heart failure  History of prior methamphetamine use with cessation, lower suspicion for acute ischemic etiology  Associated with multiorgan dysfunction, SUZY, cardiorenal syndrome, acute ischemic hepatitis  Urgent initiation of inotropic support with dobutamine  Currently maintaining adequate MAP, add additional vasopressor if needed  Low threshold for further invasive monitoring, PA catheter to guide ongoing therapy if needed  Forced diuresis with IV Lasix  Reinitiation of GDMT heart failure medications as appropriate, hold acutely  Cardiology consultation for assistance with management of acute on chronic heart failure, close follow-up  He reports target dry weight of 130 pounds    Hypoglycemia- (present on admission)  Assessment & Plan  Multifactorial, poor p.o. intake, acute on chronic liver failure due to CHF exacerbation, medications  Trend frequent FSBS  Hypoglycemia protocol, avoid/treat hypoglycemia    Hyperkalemia- (present on admission)  Assessment & Plan  Critical hyperkalemia due to SUZY on CKD, cardiorenal syndrome  Shifting therapies initiated in ED with insulin/glucose, bicarbonate  Every 4 BMP, acute treatment as needed  Close monitoring for renal recovery with inotropic support, may require RRT acutely      High anion gap metabolic acidosis- (present on admission)  Assessment & Plan  Lactic acidosis secondary to acute heart failure exacerbation and hypoperfusion  Due to initiation of inotropic support, trend lactic acid every 4 hours    Acute renal failure superimposed on stage 3 chronic kidney disease (HCC)- (present on admission)  Assessment & Plan  Suspect acutely exacerbated due to cardiorenal syndrome  Aggressive  treatment for systolic heart failure with IV inotropic therapy  Place Aguilar catheter  Monitor serial electrolytes every 4 hours, treat hyperkalemia as needed, initiating IV Lasix  Nephrology consultation if not improving with acute management and need for RRT    Methamphetamine abuse (HCC)- (present on admission)  Assessment & Plan  Patient reports remote cessation  UDS pending    Alcoholic cirrhosis of liver with ascites (HCC)- (present on admission)  Assessment & Plan  By history, prior cessation of alcohol and remains abstinent  Current transaminitis likely related to CHF exacerbation  Follow hepatic function, LFTs closely and monitor response to inotropic therapy  Repeat hepatitis panel sent  CT A/P noted with fatty liver change and mild ascites, generalized bowel dilation possible ileus    Transaminitis- (present on admission)  Assessment & Plan  Suspect secondary to CHF exacerbation and ischemic hepatitis acutely  Treatment as above        Discussed patient condition and risk of morbidity and/or mortality with RN, RT, Pharmacy, and ERP.    The patient remains critically ill.  Critical care time = 115 minutes in directly providing and coordinating critical care and extensive data review.  No time overlap and excludes procedures.             [1]   Current Facility-Administered Medications   Medication Dose Route Frequency Provider Last Rate Last Admin    DOBUTamine (Dobutrex) 1 mg/1 mL premix infusion  5 mcg/kg/min (Ideal) Intravenous Continuous Rip Carrion M.D.   Continue to Floor at 07/15/25 0814    [START ON 7/16/2025] aspirin EC tablet 81 mg  81 mg Oral DAILY Rip Carrion M.D.        [START ON 7/16/2025] omeprazole (PriLOSEC) capsule 20 mg  20 mg Oral DAILY Rip Carrion M.D.        [START ON 7/16/2025] tamsulosin (Flomax) capsule 0.4 mg  0.4 mg Oral AFTER BREAKFAST Rip Carrion M.D.        heparin injection 5,000 Units  5,000 Units Subcutaneous Q8HRS Rip Carrion M.D.        acetaminophen  (Tylenol) tablet 650 mg  650 mg Oral Q6HRS PRN Rip Carrion M.D.        senna-docusate (Pericolace Or Senokot S) 8.6-50 MG per tablet 2 Tablet  2 Tablet Oral Q EVENING Rip Carrion M.D.        And    polyethylene glycol/lytes (Miralax) Packet 1 Packet  1 Packet Oral QDAY PRN Rip Carrion M.D.        ondansetron (Zofran) syringe/vial injection 4 mg  4 mg Intravenous Q4HRS PRN Rip Carrion M.D.        ondansetron (Zofran ODT) dispertab 4 mg  4 mg Oral Q4HRS PRN Rip Carrion M.D.        dextrose 50 % (D50W) injection 25 g  25 g Intravenous Q15 MIN PRN Rip Carrion M.D.        albumin human 25% solution 12.5 g  12.5 g Intravenous Q4HRS Rip Carrion M.D.        furosemide (Lasix) injection 60 mg  60 mg Intravenous Q8HRS Rip Carrion M.D.        thiamine (B-1) injection 200 mg  200 mg Intravenous Q12HRS Rip Carrion M.D.        D5LR infusion   Intravenous Continuous Rip Carrion M.D.       [2] No Known Allergies

## 2025-07-15 NOTE — ASSESSMENT & PLAN NOTE
RESOLVED  Presented with K 6.9, peak of 7.3.  Responded well to insulin/glucose and bicarbonate therapy  Critical hyperkalemia due to SUZY on CKD, cardiorenal syndrome  - Continue to monitor

## 2025-07-15 NOTE — ASSESSMENT & PLAN NOTE
RESOLVED  Lactic acidosis secondary to acute heart failure exacerbation and hypoperfusion  Due to initiation of inotropic support  LA trended down <2

## 2025-07-15 NOTE — ED TRIAGE NOTES
Chief Complaint   Patient presents with    Chest Pain     Reports left-sided chest pain x 2 hours.     Shortness of Breath        Patient to triage in w/c for above complaint. Patient endorses 10/10 chest pain that is non-radiating. Hx: CHF, endorses compliance with diuretics. AAxO 4, VSS. Chest pain protocol ordered.

## 2025-07-15 NOTE — ASSESSMENT & PLAN NOTE
Suspect secondary to CHF exacerbation and ischemic hepatitis acutely  Treatment as above  - Continue to trend, currently improving

## 2025-07-15 NOTE — ED NOTES
Med Rec completed per patient   Allergies reviewed    Patient is not taking anticoagulants     Patient states that he took his medications before coming into the hospital early this morning

## 2025-07-15 NOTE — ED NOTES
NEERAJ Milian notified of the following critical labs: K: 6.9, co2: 7, AST: 1866. Order for recollects placed.

## 2025-07-15 NOTE — CARE PLAN
The patient is Watcher - Medium risk of patient condition declining or worsening    Shift Goals  Clinical Goals: Dobutamine gtt, Diurese  Patient Goals: Rest      Problem: Pain - Standard  Goal: Alleviation of pain or a reduction in pain to the patient’s comfort goal  Outcome: Progressing     Problem: Knowledge Deficit - Standard  Goal: Patient and family/care givers will demonstrate understanding of plan of care, disease process/condition, diagnostic tests and medications  Outcome: Progressing

## 2025-07-15 NOTE — ASSESSMENT & PLAN NOTE
Multifactorial, poor p.o. intake, acute on chronic liver failure due to CHF exacerbation, medications  Trend frequent FSBS  Hypoglycemia protocol, avoid/treat hypoglycemia

## 2025-07-15 NOTE — ASSESSMENT & PLAN NOTE
Acute exacerbation/decompensation of chronic left-sided systolic heart failure, cause for exacerbation unclear.  History of prior methamphetamine use with cessation, lower suspicion for acute ischemic etiology  Associated with multiorgan dysfunction, SUZY, cardiorenal syndrome, acute ischemic hepatitis  Was able to wean off of levo  - CI 3.1, SVR 800s, off dobutamine since 1600 on 7/17  - Cardiology following  - IV furosemide 40 mg Q12hrs  - Daily weights, I&O, bingham placed for accurate monitoring  - Reinitiation of GDMT heart failure medications as appropriate, hold acutely  - He reports target dry weight of 130 pounds

## 2025-07-15 NOTE — ED NOTES
NEERAJ Milian verbally notified of critical lab values of: K: 7.3, glucose: 54, co2: 9, ALT: 1225, AST: 2479, lactic acid: 11.6

## 2025-07-15 NOTE — ED PROVIDER NOTES
ED Provider Note    CHIEF COMPLAINT  Chief Complaint   Patient presents with    Chest Pain     Reports left-sided chest pain x 2 hours.     Shortness of Breath       EXTERNAL RECORDS REVIEWED  Inpatient Notes patient was admitted 12/08 to the ICU for acute systolic heart failure exacerbation with multiorgan failure and SUZY.  He also had SUZY and hyperkalemia in May 2025 for which he was admitted and was admitted for heart failure May 9, 2025.  Most recently was admitted Deysi 3, 2025 for heart failure exacerbation    HPI/ROS  LIMITATION TO HISTORY   Select: : None  OUTSIDE HISTORIAN(S):  None    Santino Zabala is a 59 y.o. male with history of chronic systolic heart failure with EF of 20%, previous methamphetamine use, stage III CKD who presents for evaluation of shortness of breath.  Patient states he has been feeling short of breath all day today.  He feels like it is his heart failure.  When he was walking to the dinner table tonight he started feeling dizzy and he passed out.  He has not passed out before.  He denies any fever, vomiting, cough, hemoptysis.  He says that his legs have been swollen.  He does report that has been taking all of his medications.  He does have Lasix 20 mg ordered as needed which he states that he takes most days.  He states that he has not used methamphetamines in the past few months.  He does not drink alcohol nor does he take Tylenol.    PAST MEDICAL HISTORY   has a past medical history of Congestive heart failure (HCC).    SURGICAL HISTORY   has a past surgical history that includes no pertinent past surgical history.    FAMILY HISTORY  History reviewed. No pertinent family history.    SOCIAL HISTORY  Social History     Tobacco Use    Smoking status: Never    Smokeless tobacco: Never   Vaping Use    Vaping status: Never Used   Substance and Sexual Activity    Alcohol use: Not Currently    Drug use: Never    Sexual activity: Not on file       CURRENT MEDICATIONS  Home Medications   "     Reviewed by Jennifer Ruiz R.N. (Registered Nurse) on 07/15/25 at 0315  Med List Status: Not Addressed     Medication Last Dose Status   allopurinol (ZYLOPRIM) 300 MG Tab  Active   Cyanocobalamin (VITAMIN B-12 PO)  Active   dapagliflozin propanediol (FARXIGA) 10 MG Tab  Active   docusate sodium (COLACE) 100 MG Cap  Active   furosemide (LASIX) 40 MG Tab  Active   losartan (COZAAR) 25 MG Tab  Active   methocarbamol (ROBAXIN) 500 MG Tab  Active   metoprolol SR (TOPROL XL) 50 MG TABLET SR 24 HR  Active   omeprazole (PRILOSEC) 20 MG delayed-release capsule  Active   potassium chloride SA (KDUR) 20 MEQ Tab CR  Active   spironolactone (ALDACTONE) 25 MG Tab  Active   tamsulosin (FLOMAX) 0.4 MG capsule  Active                  Audit from Redirected Encounters    **Home medications have not yet been reviewed for this encounter**         ALLERGIES  Allergies[1]    PHYSICAL EXAM  VITAL SIGNS: /71   Pulse 81   Temp 36.1 °C (97 °F) (Temporal)   Resp 20   Ht 1.651 m (5' 5\")   Wt 56.7 kg (125 lb)   SpO2 97%   BMI 20.80 kg/m²      Constitutional: Alert, appears older than stated age  HEENT: Normocephalic, Atraumatic,  external ears normal, pharynx pink,  Mucous  Membranes moist, No rhinorrhea or mucosal edema  Eyes: PERRL, EOMI, Conjunctiva normal, No discharge.   Neck: Normal range of motion, No tenderness, Supple, No stridor.   Cardiovascular: Regular Rate and Rhythm, No murmurs,  rubs, or gallops.   Thorax & Lungs: Lungs clear to auscultation bilaterally, No respiratory distress, No wheezes, rhales or rhonchi, No chest wall tenderness.   Abdomen: Soft, non tender, non distended,  No pulsatile masses., no rebound guarding or peritoneal signs.   Skin: Cool extremities, delayed capillary refill  Back:  No CVA tenderness,  No spinal tenderness, bony crepitance step offs or instability.   Extremities: Equal, intact distal pulses, 2+ Pitting edema to bilateral lower extremities  Musculoskeletal: Good range of motion " in all major joints. No tenderness to palpation or major deformities noted.   Neurologic: Alert & oriented No focal deficits noted.  Psychiatric: Affect normal, Judgment normal, Mood normal.      EKG/LABS  Results for orders placed or performed during the hospital encounter of 07/15/25   EKG    Collection Time: 07/15/25  3:38 AM   Result Value Ref Range    Report       [facility] Emergency Dept.    Test Date:  2025-07-15  Pt Name:    TREVON HAUSER                Department: ER  MRN:        [id number]                      Room:  Gender:     Male                         Technician: [id number]  :        1965                   Requested By:ER TRIAGE PROTOCOL  Order #:    [id number]                    Reading MD: Keri Milian    Measurements  Intervals                                Axis  Rate:       82                           P:          103  NV:         246                          QRS:        130  QRSD:       153                          T:          -21  QT:         460  QTc:        538    Interpretive Statements  Sinus rhythm  Prolonged NV interval  Nonspecific intraventricular conduction delay  No ST elevation  Compared to ECG 2025 05:50:03  No significant changes  Electronically Signed On 07- 03:38:02 PDT by Keri Milian     CBC with Differential    Collection Time: 07/15/25  3:48 AM   Result Value Ref Range    WBC 12.9 (H) 4.8 - 10.8 K/uL    RBC 5.05 4.70 - 6.10 M/uL    Hemoglobin 16.1 14.0 - 18.0 g/dL    Hematocrit 53.2 (H) 42.0 - 52.0 %    .3 (H) 81.4 - 97.8 fL    MCH 31.9 27.0 - 33.0 pg    MCHC 30.3 (L) 32.3 - 36.5 g/dL    RDW 64.5 (H) 35.9 - 50.0 fL    Platelet Count 276 164 - 446 K/uL    MPV 10.3 9.0 - 12.9 fL    Neutrophils-Polys 81.10 (H) 44.00 - 72.00 %    Lymphocytes 8.00 (L) 22.00 - 41.00 %    Monocytes 10.00 0.00 - 13.40 %    Eosinophils 0.00 0.00 - 6.90 %    Basophils 0.20 0.00 - 1.80 %    Immature Granulocytes 0.70 0.00 - 0.90 %    Nucleated RBC 0.00  0.00 - 0.20 /100 WBC    Neutrophils (Absolute) 10.41 (H) 1.82 - 7.42 K/uL    Lymphs (Absolute) 1.03 1.00 - 4.80 K/uL    Monos (Absolute) 1.29 (H) 0.00 - 0.85 K/uL    Eos (Absolute) 0.00 0.00 - 0.51 K/uL    Baso (Absolute) 0.03 0.00 - 0.12 K/uL    Immature Granulocytes (abs) 0.09 0.00 - 0.11 K/uL    NRBC (Absolute) 0.00 K/uL   Complete Metabolic Panel (CMP)    Collection Time: 07/15/25  3:48 AM   Result Value Ref Range    Sodium 133 (L) 135 - 145 mmol/L    Potassium 6.9 (HH) 3.6 - 5.5 mmol/L    Chloride 98 96 - 112 mmol/L    Co2 7 (LL) 20 - 33 mmol/L    Anion Gap 28.0 (H) 7.0 - 16.0    Glucose 55 (L) 65 - 99 mg/dL    Bun 37 (H) 8 - 22 mg/dL    Creatinine 2.33 (H) 0.50 - 1.40 mg/dL    Calcium 9.6 8.5 - 10.5 mg/dL    Correct Calcium 9.4 8.5 - 10.5 mg/dL    AST(SGOT) 1866 (HH) 12 - 45 U/L    ALT(SGPT) 970 (H) 2 - 50 U/L    Alkaline Phosphatase 165 (H) 30 - 99 U/L    Total Bilirubin 4.1 (H) 0.1 - 1.5 mg/dL    Albumin 4.2 3.2 - 4.9 g/dL    Total Protein 8.4 (H) 6.0 - 8.2 g/dL    Globulin 4.2 (H) 1.9 - 3.5 g/dL    A-G Ratio 1.0 g/dL   proBrain Natriuretic Peptide, NT (BNP)    Collection Time: 07/15/25  3:48 AM   Result Value Ref Range    NT-proBNP 08394 (H) 0 - 125 pg/mL   Troponins NOW    Collection Time: 07/15/25  3:48 AM   Result Value Ref Range    Troponin T 69 (H) 6 - 19 ng/L   ESTIMATED GFR    Collection Time: 07/15/25  3:48 AM   Result Value Ref Range    GFR (CKD-EPI) 31 (A) >60 mL/min/1.73 m 2   MAGNESIUM    Collection Time: 07/15/25  3:48 AM   Result Value Ref Range    Magnesium 2.6 (H) 1.5 - 2.5 mg/dL   PHOSPHORUS    Collection Time: 07/15/25  3:48 AM   Result Value Ref Range    Phosphorus 8.5 (H) 2.5 - 4.5 mg/dL   LIPASE    Collection Time: 07/15/25  3:48 AM   Result Value Ref Range    Lipase 18 11 - 82 U/L   DIAGNOSTIC ALCOHOL    Collection Time: 07/15/25  5:35 AM   Result Value Ref Range    Diagnostic Alcohol <10.1 <10.1 mg/dL   ACETAMINOPHEN    Collection Time: 07/15/25  5:35 AM   Result Value Ref Range     Acetaminophen -Tylenol <5.0 (L) 10.0 - 30.0 ug/mL   Lactic Acid    Collection Time: 07/15/25  5:35 AM   Result Value Ref Range    Lactic Acid 11.6 (HH) 0.5 - 2.0 mmol/L   Comp Metabolic Panel    Collection Time: 07/15/25  5:35 AM   Result Value Ref Range    Sodium 133 (L) 135 - 145 mmol/L    Potassium 7.3 (HH) 3.6 - 5.5 mmol/L    Chloride 96 96 - 112 mmol/L    Co2 9 (LL) 20 - 33 mmol/L    Anion Gap 28.0 (H) 7.0 - 16.0    Glucose 54 (LL) 65 - 99 mg/dL    Bun 40 (H) 8 - 22 mg/dL    Creatinine 2.49 (H) 0.50 - 1.40 mg/dL    Calcium 9.9 8.5 - 10.5 mg/dL    Correct Calcium 9.7 8.5 - 10.5 mg/dL    AST(SGOT) 2479 (HH) 12 - 45 U/L    ALT(SGPT) 1225 (HH) 2 - 50 U/L    Alkaline Phosphatase 162 (H) 30 - 99 U/L    Total Bilirubin 3.9 (H) 0.1 - 1.5 mg/dL    Albumin 4.3 3.2 - 4.9 g/dL    Total Protein 8.1 6.0 - 8.2 g/dL    Globulin 3.8 (H) 1.9 - 3.5 g/dL    A-G Ratio 1.1 g/dL   TROPONIN    Collection Time: 07/15/25  5:35 AM   Result Value Ref Range    Troponin T 74 (H) 6 - 19 ng/L   ESTIMATED GFR    Collection Time: 07/15/25  5:35 AM   Result Value Ref Range    GFR (CKD-EPI) 29 (A) >60 mL/min/1.73 m 2   POCT glucose device results    Collection Time: 07/15/25  6:48 AM   Result Value Ref Range    POC Glucose, Blood 171 (H) 65 - 99 mg/dL       I have independently interpreted this EKG    RADIOLOGY/PROCEDURES   I have independently interpreted the diagnostic imaging associated with this visit and am waiting the final reading from the radiologist.   My preliminary interpretation is as follows: no free air in abdomen    Radiologist interpretation:  CT-ABDOMEN-PELVIS W/O   Final Result      1.  Mild ascites.   2.  Body wall anasarca.   3.  Cardiomegaly.   4.  Mild generalized bowel dilation, likely ileus.   5.  Fatty liver.         DX-CHEST-PORTABLE (1 VIEW)   Final Result      Cardiomegaly. No acute process.          COURSE & MEDICAL DECISION MAKING    ASSESSMENT, COURSE AND PLAN  Care Narrative:   This is a 59-year-old male with  history of nonischemic cardiomyopathy CHF with last echo demonstrating EF approximately 15% who is here for evaluation of shortness of breath and syncope.    On arrival, his vital signs are stable.  Patient is ill-appearing.  He notes that he is orthopneic.  He has lower extremity edema.  He notes compliance with all of his medications although he is only scheduled to take furosemide as needed.  He states that he takes this most days.    Chest x-ray shows no evidence of pneumonia.  He does have cardiomegaly.  His labs are concerning that he does have mild leukocytosis 12,900 however I am less concern for acute infectious process.  He does have an SUZY on CKD with creatinine 2.33, he does have hyperkalemia with potassium 6.9.  Labs are hemolyzed however I did repeat them and he is hyperkalemic with potassium of 7.3.  I did give him insulin and glucose however this was given after labs were repeated.  He does have elevation in his AST and ALT to 2479 and 1225 respectively.  His total bilirubin is elevated to 3.9.  He has no right upper quadrant tenderness to palpation.  Patient has had very similar presentations in the past and it has been due to decompensated heart failure.  He has had multiple right upper quadrant ultrasounds which showed no evidence of gallstones.  I did do a CT scan abdomen and pelvis and there is no evidence of biliary obstruction that is obvious.    Patient's lactic acid is elevated, I did do a point-of-care ultrasound of his IVC and it is severely dilated.  I do think that he is volume overloaded I do not think that he would benefit from IV fluids.  I am much more concerned for cardiorenal syndrome that I am for a condition such as sepsis.  Will defer IV fluids at this time.  His NT proBNP is significantly elevated at 28,000.    5:01 AM patient was reassessed, his vital signs are actually quite stable.  Discussed with him that he will be admitted to the hospital.  He voices understanding.    I  am concerned that patient will need to go to the ICU for dobutamine support and diuresis as he did in December.  We did discuss nephrology consultation however they will diurese patient and then reach out to nephrology. I discussed with the intensivist, Dr. Carrion, who agreed to admit him to the ICU.  Patient understands need for admission and is agreeable to plan.              ADDITIONAL PROBLEMS MANAGED  None    DISPOSITION AND DISCUSSIONS  I have discussed management of the patient with the following physicians and FRITZ's:    Nephrology - Dr. Carrion    Discussion of management with other [facility] or appropriate source(s): None     Escalation of care considered, and ultimately not performed: None    Barriers to care at this time, including but not limited to: None.     Decision tools and prescription drugs considered including, but not limited to: None.    DISPOSITION:  Patient will be hospitalized by Dr. Carrion, intensivist, in critical condition.      FINAL DIAGNOSIS  1. Acute on chronic congestive heart failure, unspecified heart failure type (HCC) Acute   2. SUZY (acute kidney injury) (HCC) Acute   3. Abnormal LFTs Acute   4. Hyperkalemia Acute   5. Lactic acidosis Acute        Electronically signed by: Keri Milian M.D., 7/15/2025 3:38 AM           [1] No Known Allergies

## 2025-07-16 PROBLEM — E87.1 HYPONATREMIA: Status: ACTIVE | Noted: 2025-07-16

## 2025-07-16 PROBLEM — K59.00 CONSTIPATION: Status: ACTIVE | Noted: 2025-07-16

## 2025-07-16 LAB
ALBUMIN SERPL BCP-MCNC: 3.4 G/DL (ref 3.2–4.9)
ALBUMIN/GLOB SERPL: 1.4 G/DL
ALP SERPL-CCNC: 112 U/L (ref 30–99)
ALT SERPL-CCNC: 1243 U/L (ref 2–50)
ANION GAP SERPL CALC-SCNC: 13 MMOL/L (ref 7–16)
ANION GAP SERPL CALC-SCNC: 14 MMOL/L (ref 7–16)
ANION GAP SERPL CALC-SCNC: 15 MMOL/L (ref 7–16)
ANION GAP SERPL CALC-SCNC: 15 MMOL/L (ref 7–16)
ANION GAP SERPL CALC-SCNC: 18 MMOL/L (ref 7–16)
AST SERPL-CCNC: 1660 U/L (ref 12–45)
BILIRUB SERPL-MCNC: 1.8 MG/DL (ref 0.1–1.5)
BUN SERPL-MCNC: 42 MG/DL (ref 8–22)
BUN SERPL-MCNC: 46 MG/DL (ref 8–22)
BUN SERPL-MCNC: 49 MG/DL (ref 8–22)
BUN SERPL-MCNC: 54 MG/DL (ref 8–22)
BUN SERPL-MCNC: 55 MG/DL (ref 8–22)
CA-I SERPL-SCNC: 1.1 MMOL/L (ref 1.1–1.3)
CALCIUM ALBUM COR SERPL-MCNC: 8.6 MG/DL (ref 8.5–10.5)
CALCIUM SERPL-MCNC: 7.9 MG/DL (ref 8.5–10.5)
CALCIUM SERPL-MCNC: 8 MG/DL (ref 8.5–10.5)
CALCIUM SERPL-MCNC: 8.1 MG/DL (ref 8.5–10.5)
CALCIUM SERPL-MCNC: 8.1 MG/DL (ref 8.5–10.5)
CALCIUM SERPL-MCNC: 8.2 MG/DL (ref 8.5–10.5)
CHLORIDE SERPL-SCNC: 98 MMOL/L (ref 96–112)
CHLORIDE SERPL-SCNC: 99 MMOL/L (ref 96–112)
CO2 SERPL-SCNC: 15 MMOL/L (ref 20–33)
CO2 SERPL-SCNC: 18 MMOL/L (ref 20–33)
CO2 SERPL-SCNC: 19 MMOL/L (ref 20–33)
CO2 SERPL-SCNC: 20 MMOL/L (ref 20–33)
CO2 SERPL-SCNC: 20 MMOL/L (ref 20–33)
CREAT SERPL-MCNC: 1.48 MG/DL (ref 0.5–1.4)
CREAT SERPL-MCNC: 1.62 MG/DL (ref 0.5–1.4)
CREAT SERPL-MCNC: 1.74 MG/DL (ref 0.5–1.4)
CREAT SERPL-MCNC: 1.97 MG/DL (ref 0.5–1.4)
CREAT SERPL-MCNC: 2.12 MG/DL (ref 0.5–1.4)
GFR SERPLBLD CREATININE-BSD FMLA CKD-EPI: 35 ML/MIN/1.73 M 2
GFR SERPLBLD CREATININE-BSD FMLA CKD-EPI: 38 ML/MIN/1.73 M 2
GFR SERPLBLD CREATININE-BSD FMLA CKD-EPI: 44 ML/MIN/1.73 M 2
GFR SERPLBLD CREATININE-BSD FMLA CKD-EPI: 48 ML/MIN/1.73 M 2
GFR SERPLBLD CREATININE-BSD FMLA CKD-EPI: 54 ML/MIN/1.73 M 2
GLOBULIN SER CALC-MCNC: 2.5 G/DL (ref 1.9–3.5)
GLUCOSE SERPL-MCNC: 109 MG/DL (ref 65–99)
GLUCOSE SERPL-MCNC: 116 MG/DL (ref 65–99)
GLUCOSE SERPL-MCNC: 122 MG/DL (ref 65–99)
GLUCOSE SERPL-MCNC: 148 MG/DL (ref 65–99)
GLUCOSE SERPL-MCNC: 167 MG/DL (ref 65–99)
HAV IGM SERPL QL IA: NORMAL
HBV CORE IGM SER QL: NORMAL
HBV SURFACE AG SER QL: NORMAL
HCV AB SER QL: NORMAL
LACTATE SERPL-SCNC: 1.6 MMOL/L (ref 0.5–2)
LACTATE SERPL-SCNC: 2.1 MMOL/L (ref 0.5–2)
MAGNESIUM SERPL-MCNC: 2.2 MG/DL (ref 1.5–2.5)
NT-PROBNP SERPL IA-MCNC: 7198 PG/ML (ref 0–125)
PHOSPHATE SERPL-MCNC: 3.7 MG/DL (ref 2.5–4.5)
POTASSIUM SERPL-SCNC: 3.6 MMOL/L (ref 3.6–5.5)
POTASSIUM SERPL-SCNC: 3.7 MMOL/L (ref 3.6–5.5)
POTASSIUM SERPL-SCNC: 3.9 MMOL/L (ref 3.6–5.5)
POTASSIUM SERPL-SCNC: 3.9 MMOL/L (ref 3.6–5.5)
POTASSIUM SERPL-SCNC: 4.8 MMOL/L (ref 3.6–5.5)
PROT SERPL-MCNC: 5.9 G/DL (ref 6–8.2)
SODIUM SERPL-SCNC: 131 MMOL/L (ref 135–145)
SODIUM SERPL-SCNC: 132 MMOL/L (ref 135–145)
SODIUM SERPL-SCNC: 134 MMOL/L (ref 135–145)
TROPONIN T SERPL-MCNC: 81 NG/L (ref 6–19)

## 2025-07-16 PROCEDURE — 83880 ASSAY OF NATRIURETIC PEPTIDE: CPT

## 2025-07-16 PROCEDURE — 99291 CRITICAL CARE FIRST HOUR: CPT | Performed by: INTERNAL MEDICINE

## 2025-07-16 PROCEDURE — 700101 HCHG RX REV CODE 250: Performed by: INTERNAL MEDICINE

## 2025-07-16 PROCEDURE — 82330 ASSAY OF CALCIUM: CPT

## 2025-07-16 PROCEDURE — 84484 ASSAY OF TROPONIN QUANT: CPT

## 2025-07-16 PROCEDURE — C1894 INTRO/SHEATH, NON-LASER: HCPCS

## 2025-07-16 PROCEDURE — A9270 NON-COVERED ITEM OR SERVICE: HCPCS

## 2025-07-16 PROCEDURE — 83735 ASSAY OF MAGNESIUM: CPT

## 2025-07-16 PROCEDURE — A9270 NON-COVERED ITEM OR SERVICE: HCPCS | Performed by: INTERNAL MEDICINE

## 2025-07-16 PROCEDURE — 80048 BASIC METABOLIC PNL TOTAL CA: CPT | Mod: 91

## 2025-07-16 PROCEDURE — 80053 COMPREHEN METABOLIC PANEL: CPT

## 2025-07-16 PROCEDURE — 700111 HCHG RX REV CODE 636 W/ 250 OVERRIDE (IP): Performed by: INTERNAL MEDICINE

## 2025-07-16 PROCEDURE — 700102 HCHG RX REV CODE 250 W/ 637 OVERRIDE(OP): Performed by: INTERNAL MEDICINE

## 2025-07-16 PROCEDURE — C1751 CATH, INF, PER/CENT/MIDLINE: HCPCS

## 2025-07-16 PROCEDURE — 99222 1ST HOSP IP/OBS MODERATE 55: CPT | Performed by: INTERNAL MEDICINE

## 2025-07-16 PROCEDURE — 700102 HCHG RX REV CODE 250 W/ 637 OVERRIDE(OP)

## 2025-07-16 PROCEDURE — 93503 INSERT/PLACE HEART CATHETER: CPT

## 2025-07-16 PROCEDURE — 700111 HCHG RX REV CODE 636 W/ 250 OVERRIDE (IP)

## 2025-07-16 PROCEDURE — 83605 ASSAY OF LACTIC ACID: CPT | Mod: 91

## 2025-07-16 PROCEDURE — 770022 HCHG ROOM/CARE - ICU (200)

## 2025-07-16 PROCEDURE — 700105 HCHG RX REV CODE 258: Performed by: INTERNAL MEDICINE

## 2025-07-16 PROCEDURE — 84100 ASSAY OF PHOSPHORUS: CPT

## 2025-07-16 RX ORDER — POTASSIUM CHLORIDE 14.9 MG/ML
20 INJECTION INTRAVENOUS ONCE
Status: COMPLETED | OUTPATIENT
Start: 2025-07-16 | End: 2025-07-16

## 2025-07-16 RX ORDER — POTASSIUM CHLORIDE 7.45 MG/ML
10 INJECTION INTRAVENOUS ONCE
Status: COMPLETED | OUTPATIENT
Start: 2025-07-16 | End: 2025-07-16

## 2025-07-16 RX ORDER — POTASSIUM CHLORIDE 14.9 MG/ML
20 INJECTION INTRAVENOUS ONCE
Status: COMPLETED | OUTPATIENT
Start: 2025-07-16 | End: 2025-07-17

## 2025-07-16 RX ORDER — ACETAMINOPHEN 325 MG/1
650 TABLET ORAL EVERY 6 HOURS PRN
Status: CANCELLED | OUTPATIENT
Start: 2025-07-16

## 2025-07-16 RX ADMIN — DOBUTAMINE HYDROCHLORIDE 10 MCG/KG/MIN: 100 INJECTION INTRAVENOUS at 04:23

## 2025-07-16 RX ADMIN — HEPARIN SODIUM 5000 UNITS: 5000 INJECTION, SOLUTION INTRAVENOUS; SUBCUTANEOUS at 15:14

## 2025-07-16 RX ADMIN — HEPARIN SODIUM 5000 UNITS: 5000 INJECTION, SOLUTION INTRAVENOUS; SUBCUTANEOUS at 21:03

## 2025-07-16 RX ADMIN — DOBUTAMINE HYDROCHLORIDE 10 MCG/KG/MIN: 100 INJECTION INTRAVENOUS at 10:51

## 2025-07-16 RX ADMIN — TAMSULOSIN HYDROCHLORIDE 0.4 MG: 0.4 CAPSULE ORAL at 09:34

## 2025-07-16 RX ADMIN — SENNOSIDES, DOCUSATE SODIUM 2 TABLET: 50; 8.6 TABLET, FILM COATED ORAL at 17:05

## 2025-07-16 RX ADMIN — THIAMINE HYDROCHLORIDE 200 MG: 100 INJECTION, SOLUTION INTRAMUSCULAR; INTRAVENOUS at 17:06

## 2025-07-16 RX ADMIN — FUROSEMIDE 60 MG: 10 INJECTION, SOLUTION INTRAVENOUS at 21:03

## 2025-07-16 RX ADMIN — FUROSEMIDE 60 MG: 10 INJECTION, SOLUTION INTRAVENOUS at 05:17

## 2025-07-16 RX ADMIN — POTASSIUM CHLORIDE 10 MEQ: 7.46 INJECTION, SOLUTION INTRAVENOUS at 17:08

## 2025-07-16 RX ADMIN — SIMETHICONE 125 MG: 125 TABLET, CHEWABLE ORAL at 15:14

## 2025-07-16 RX ADMIN — SIMETHICONE 125 MG: 125 TABLET, CHEWABLE ORAL at 09:34

## 2025-07-16 RX ADMIN — HEPARIN SODIUM 5000 UNITS: 5000 INJECTION, SOLUTION INTRAVENOUS; SUBCUTANEOUS at 05:17

## 2025-07-16 RX ADMIN — POTASSIUM CHLORIDE 20 MEQ: 14.9 INJECTION, SOLUTION INTRAVENOUS at 10:50

## 2025-07-16 RX ADMIN — ASPIRIN 81 MG: 81 TABLET, COATED ORAL at 05:24

## 2025-07-16 RX ADMIN — POTASSIUM CHLORIDE 20 MEQ: 14.9 INJECTION, SOLUTION INTRAVENOUS at 22:40

## 2025-07-16 RX ADMIN — DOBUTAMINE HYDROCHLORIDE 10 MCG/KG/MIN: 100 INJECTION INTRAVENOUS at 17:16

## 2025-07-16 RX ADMIN — THIAMINE HYDROCHLORIDE 200 MG: 100 INJECTION, SOLUTION INTRAMUSCULAR; INTRAVENOUS at 05:17

## 2025-07-16 RX ADMIN — FUROSEMIDE 60 MG: 10 INJECTION, SOLUTION INTRAVENOUS at 15:13

## 2025-07-16 RX ADMIN — SIMETHICONE 125 MG: 125 TABLET, CHEWABLE ORAL at 21:03

## 2025-07-16 RX ADMIN — OMEPRAZOLE 20 MG: 20 CAPSULE, DELAYED RELEASE ORAL at 05:24

## 2025-07-16 RX ADMIN — NOREPINEPHRINE BITARTRATE 0.2 MCG/KG/MIN: 1 INJECTION, SOLUTION, CONCENTRATE INTRAVENOUS at 00:06

## 2025-07-16 ASSESSMENT — PAIN DESCRIPTION - PAIN TYPE
TYPE: ACUTE PAIN

## 2025-07-16 ASSESSMENT — ENCOUNTER SYMPTOMS
FEVER: 0
WHEEZING: 0
ABDOMINAL PAIN: 0
SHORTNESS OF BREATH: 0
DIARRHEA: 0
COUGH: 0
DIZZINESS: 0
MYALGIAS: 0
CHILLS: 0

## 2025-07-16 ASSESSMENT — FIBROSIS 4 INDEX: FIB4 SCORE: 12.9

## 2025-07-16 NOTE — ASSESSMENT & PLAN NOTE
Acute exacerbation/decompensation of chronic left-sided systolic heart failure, cause for exacerbation unclear.  History of prior methamphetamine use with cessation, lower suspicion for acute ischemic etiology  Associated with multiorgan dysfunction, SUZY, cardiorenal syndrome, acute ischemic hepatitis  Status post Levophed  - CI 3.1, SVR 800s, off dobutamine since 1600 on 7/17  - Daily weights, I&O, bingham placed for accurate monitoring  - Reinitiation of GDMT heart failure medications as appropriate, hold acutely  - He reports target dry weight of 130 pounds.  Cardiology is following him.  Continue IV Lasix 20 mg twice daily.  Now plan is to transfer him out from IMCU status to telemetry.

## 2025-07-16 NOTE — ASSESSMENT & PLAN NOTE
Suspect acutely exacerbated due to cardiorenal syndrome  Continue IV Lasix  Continue Flomax   Avoid nephrotoxins  Medication dose regimen as per renal functions been  Now resolved

## 2025-07-16 NOTE — PROGRESS NOTES
1500: Patient hypotensive 80s, MAP 60s, Dr. Meyer at bedside who ordered Levo gtt.    1830: Patient yelling in pain in his abdomen. Abdomen firm, distended. Dr. Ardon resident updated who ordered 3 mg morphine and simethicone 125 mg.     Tech from Lab called with critical result of AST 2878 and ALT 1552. Critical lab result read back to tech.   Dr. Prado notified of critical lab result at 1830.  Critical lab result read back by Dr. Prado.  Also notified Dr. Prado of increasing levo requirements.

## 2025-07-16 NOTE — DISCHARGE PLANNING
Case Management Discharge Planning    Admission Date: 7/15/2025  GMLOS: 3.9  ALOS: 1    6-Clicks ADL Score:    6-Clicks Mobility Score:        Anticipated Discharge Dispo: Discharge Disposition: Discharged to home/self care (01)  Discharge Address: [address] [place] [address] [address] [id number]  Discharge Contact Phone Number: [phone]    DME Needed: No    Action(s) Taken: DC Assessment Complete (See below)    Escalations Completed: Provider    Medically Clear: No    Next Steps: f/u with pt and medical team to discuss dc needs and barriers.    Barriers to Discharge: Medical clearance    Is the patient up for discharge tomorrow: No      Care Transition Team Assessment    Pt is a 59 y.o male, admitted due to acute on chronic congestive heart failure. RN CM met with pt at bedside to complete assessment. Pt A&Ox4 and able to verify the information on the face sheet. Pt lives with his mom in a one story house in [place]. At baseline, Pt was independent  in all his ADL's and IADL's. Pt has not had previous HH nor does he use DME or home O2. Pt's brother, Tyree ([phone]) is listed as emergency contact PCP is [facility]. Pt receives $1500 a month from [facility]. Pt has history of meth and alcohol abuse. Pt is agreeable for post acute placement if recommended.    Discharge planning: Rehab vs HH vs Home with help; pending tx course and therapy recommendations.     Information Source  Orientation Level: Oriented X4  Information Given By: Patient  Who is responsible for making decisions for patient? : Patient    Readmission Evaluation  Is this a readmission?: No    Elopement Risk  Legal Hold: No  Ambulatory or Self Mobile in Wheelchair: No-Not an Elopement Risk  Elopement Risk: Not at Risk for Elopement    Interdisciplinary Discharge Planning  Lives with - Patient's Self Care Capacity: Parents  Patient or legal guardian wants to designate a caregiver: No  Support Systems: Family Member(s)  Housing / Facility: 1 Story House    Discharge  Preparedness  What is your plan after discharge?: Uncertain - pending medical team collaboration  What are your discharge supports?: Sibling  Prior Functional Level: Ambulatory, Independent with Activities of Daily Living  Difficulity with ADLs: None  Difficulity with IADLs: None    Functional Assesment  Prior Functional Level: Ambulatory, Independent with Activities of Daily Living    Finances  Financial Barriers to Discharge: No  Prescription Coverage: Yes    Vision / Hearing Impairment  Vision Impairment : No  Hearing Impairment : No         Advance Directive  Advance Directive?: None (Pa said he has AD at home and his brother,Tyree is his POA)    Domestic Abuse  Have you ever been the victim of abuse or violence?: No  Possible Abuse/Neglect Reported to:: Not Applicable    Psychological Assessment  History of Substance Abuse: Methamphetamine    Discharge Risks or Barriers  Discharge risks or barriers?: Complex medical needs, Substance abuse  Patient risk factors: Complex medical needs, Substance abuse    Anticipated Discharge Information  Discharge Disposition: Discharged to home/self care (01)  Discharge Address: [address] [place] [address] [address] [id number]  Discharge Contact Phone Number: [phone]

## 2025-07-16 NOTE — PROGRESS NOTES
2130: Timeout for Keller insertion and arterial line insertion.     2238: OK to use central line per Dr. Prado.

## 2025-07-16 NOTE — CARE PLAN
The patient is Unstable - High likelihood or risk of patient condition declining or worsening    Shift Goals  Clinical Goals: Hemodynamic stability  Patient Goals: Eat, Rest  Family Goals: YOEL    Progress made toward(s) clinical / shift goals:    Problem: Pain - Standard  Goal: Alleviation of pain or a reduction in pain to the patient’s comfort goal  Outcome: Progressing     Problem: Knowledge Deficit - Standard  Goal: Patient and family/care givers will demonstrate understanding of plan of care, disease process/condition, diagnostic tests and medications  Outcome: Progressing     Problem: Fall Risk  Goal: Patient will remain free from falls  Outcome: Progressing     Problem: Skin Integrity  Goal: Skin integrity is maintained or improved  Outcome: Progressing       Patient is not progressing towards the following goals:

## 2025-07-16 NOTE — PROGRESS NOTES
Feliciano Numbers    2337    CO 2.4  CI 1.4  SVR 1701  CVP 17  PCWP 15    Levo @ 0.2  Dobutamine @ 5    Dr. Leong notified. Increase dobutamine to 10.     0333    CO 3.8  CI 2.2  SVR 1271  CVP 5  PCWP 19    Dobutamine @ 10    Dr. Leong notified.

## 2025-07-16 NOTE — CONSULTS
Cardiology Initial Consultation    Date of Service  7/16/2025    Referring Physician  Ascencion Meyer D.O.    Reason for Consultation  Acute on chronic systolic congestive heart failure (HFrEF), cardiogenic shock on pressor support.     History of Presenting Illness  Santino Zabala is a 59 y.o. male with a past medical history of nonischemic chronic systolic congestive heart failure (HFrEF), history of methamphetamine use who presented 7/15/2025 with heart failure exacerbation.    Review of Systems  Review of Systems   Unable to perform ROS: Other       Past Medical History   has a past medical history of Congestive heart failure (HCC).    Surgical History   has a past surgical history that includes no pertinent past surgical history.    Family History  History reviewed. No pertinent family history.    Social History   reports that he has never smoked. He has never used smokeless tobacco. He reports that he does not currently use alcohol. He reports that he does not use drugs.    Medications  Prior to Admission Medications   Prescriptions Last Dose Informant Patient Reported? Taking?   Cyanocobalamin (VITAMIN B-12 PO) 7/15/2025 Morning Patient Yes Yes   Sig: Take 1 Capsule by mouth every day.   allopurinol (ZYLOPRIM) 300 MG Tab 7/15/2025 Morning Patient No Yes   Sig: Take 1 Tablet by mouth every day.   aspirin 81 MG EC tablet 7/15/2025 Morning Patient Yes Yes   Sig: Take 81 mg by mouth every day.   dapagliflozin propanediol (FARXIGA) 10 MG Tab 7/15/2025 Morning Patient No Yes   Sig: Take 1 Tablet by mouth every day.   docusate sodium (COLACE) 100 MG Cap 7/15/2025 Morning Patient Yes Yes   Sig: Take 100 mg by mouth 2 times a day as needed for Constipation.   furosemide (LASIX) 20 MG Tab 7/15/2025 Morning Patient Yes Yes   Sig: Take 20 mg by mouth every day.   losartan (COZAAR) 25 MG Tab 7/15/2025 Morning Patient No Yes   Sig: Take 0.5 Tablets by mouth every day.   methocarbamol (ROBAXIN) 500 MG Tab Unknown Patient No  No   Sig: Take 1 Tablet by mouth 4 times a day as needed (mild pain not covered by Tylenol).   metoprolol SR (TOPROL XL) 25 MG TABLET SR 24 HR 7/15/2025 Morning Patient Yes Yes   Sig: Take 25 mg by mouth every day.   omeprazole (PRILOSEC) 20 MG delayed-release capsule  Patient No No   Sig: Take 1 Capsule by mouth every day.   potassium chloride SA (KDUR) 20 MEQ Tab CR 7/15/2025 Morning Patient No Yes   Sig: Take 1 Tablet by mouth every day.   Patient taking differently: Take 10 mEq by mouth every day.   spironolactone (ALDACTONE) 25 MG Tab 7/15/2025 Morning Patient Yes Yes   Sig: Take 12.5 mg by mouth every day. 12.5 mg = 0.5 tablet   tamsulosin (FLOMAX) 0.4 MG capsule 7/15/2025 Morning Patient No Yes   Sig: Take 1 Capsule by mouth 1/2 hour after breakfast.      Facility-Administered Medications: None       Allergies  Allergies[1]    Vital signs in last 24 hours  Temp:  [36.3 °C (97.3 °F)-37.5 °C (99.5 °F)] 36.6 °C (97.9 °F)  Pulse:  [65-84] 66  Resp:  [9-38] 14  BP: ()/(50-72) 95/57  SpO2:  [76 %-100 %] 100 %    Physical Exam  Physical Exam  Constitutional:       Appearance: Normal appearance. He is well-developed and normal weight.   HENT:      Head: Normocephalic and atraumatic.      Mouth/Throat:      Mouth: Mucous membranes are moist.   Eyes:      Extraocular Movements: Extraocular movements intact.      Conjunctiva/sclera: Conjunctivae normal.   Cardiovascular:      Rate and Rhythm: Normal rate and regular rhythm.      Pulses: Normal pulses.      Heart sounds: Normal heart sounds.   Pulmonary:      Effort: Pulmonary effort is normal.      Breath sounds: Normal breath sounds.   Abdominal:      General: Bowel sounds are normal.      Palpations: Abdomen is soft.   Musculoskeletal:         General: Normal range of motion.      Cervical back: Normal range of motion and neck supple.   Skin:     General: Skin is warm and dry.   Neurological:      General: No focal deficit present.      Mental Status: He is  alert and oriented to person, place, and time. Mental status is at baseline.   Psychiatric:         Mood and Affect: Mood normal.         Behavior: Behavior normal.         Thought Content: Thought content normal.         Judgment: Judgment normal.         Lab Review  Lab Results   Component Value Date/Time    WBC 9.5 07/15/2025 07:29 PM    RBC 4.14 (L) 07/15/2025 07:29 PM    HEMOGLOBIN 13.3 (L) 07/15/2025 07:29 PM    HEMATOCRIT 40.0 (L) 07/15/2025 07:29 PM    MCV 96.6 07/15/2025 07:29 PM    MCH 32.1 07/15/2025 07:29 PM    MCHC 33.3 07/15/2025 07:29 PM    MPV 10.7 07/15/2025 07:29 PM      Lab Results   Component Value Date/Time    SODIUM 132 (L) 07/16/2025 03:17 AM    POTASSIUM 3.9 07/16/2025 03:17 AM    CHLORIDE 99 07/16/2025 03:17 AM    CO2 18 (L) 07/16/2025 03:17 AM    GLUCOSE 122 (H) 07/16/2025 03:17 AM    BUN 54 (H) 07/16/2025 03:17 AM    CREATININE 1.97 (H) 07/16/2025 03:17 AM      Lab Results   Component Value Date/Time    ASTSGOT 1660 () 07/16/2025 03:17 AM    ALTSGPT 1243 () 07/16/2025 03:17 AM     Lab Results   Component Value Date/Time    CHOLSTRLTOT 134 02/07/2024 03:56 AM    LDL 73 02/07/2024 03:56 AM    HDL 39 (A) 02/07/2024 03:56 AM    TRIGLYCERIDE 108 02/07/2024 03:56 AM    TROPONINT 81 (H) 07/16/2025 03:17 AM       Recent Labs     07/15/25  0348 07/16/25  0317   NTPROBNP 78863* 7198*       Cardiac Imaging and Procedures Review  CARDIAC STUDIES/PROCEDURES:    CARDIAC CATHETERIZATION CONCLUSIONS by Carl Aiken (02/10/24)  1.  Minimal epicardial CAD  2.  Nonischemic cardiomyopathy, most likely alcohol related  3.  Significantly elevated resting LVEDP 30mmHg  4.  Narrow pulse pressure  (study result reviewed)     ECHOCARDIOGRAM CONCLUSIONS (07/15/25)  Severely reduced left ventricular systolic function. The ejection   fraction is measured to be less 20% by Contreras's biplane. Global   hypokinesis. Grade II diastolic dysfunction.  The right ventricle is dilated. Reduced right ventricular  systolic   function.   Moderate to severe mitral regurgitation.   Aortic valve sclerosis without significant stenosis.   Moderate tricuspid regurgitation.  Compared to the prior study on 05/25/2025, there are no significant   changes.  (study result reviewed)     EKG performed on (07/15/25) was reviewed: EKG personally interpreted shows sinus rhythm with left ventricular hypertrophy.    Assessment/Plan  Acute on chronic systolic congestive heart failure (HFrEF), cardiogenic shock on pressor support: He is a 59 y.o. male with a past medical history of nonischemic chronic systolic congestive heart failure (HFrEF), history of methamphetamine use who presented with heart failure exacerbation: He is clinically improving on dobutamine and norepinephrine therapy. We will continue with current medical therapy.     Thank you for allowing me to participate in the care of this patient.    I will continue to follow this patient    Please contact me with any questions.    Rodrigue Williamson M.D.   Cardiologist, [facility]  [phone]               [1] No Known Allergies

## 2025-07-16 NOTE — PROGRESS NOTES
Attending Staff Note    I have seen and examined the patient.  I have reviewed the medical record, laboratory data, imaging and all relevant studies.  I have discussed the plan of care with the Internal Medicine Resident and multidisciplinary team, and agree with the note and plan as documented.     58yo male with hx of HFrEF, NICM, LVEF 15-20%, stage 3 CKD, hx of meth use (quit 6 months ago), cirrhosis who was admitted for acute decompensated biV failure. Started on dobutamine and norepinephrine, and diuresis.     Overnight, pt had increasing dobutamine/NE and PA cath was subsequently placed   Initial PA numbers with CO/CI 2.4/1/4, dobutamine was increased to 10 with improvement of CI 2.2.  Lactate normalized 1.6, creatinine slowly improving. LFT elevated but improving.     In addition, pt also c/o abd pain. Xray abdomen with ileus and large amount of stool.   Received suppository and had large  BM and relieved the abd pain   This am, CI 2.1, PCWP 19, SVR 1500   UOP 2L with lasix      Assessment  # Acute decompensated BiV  # Transaminitis - improving.   # Acute on chronic kidney disease  - improving creatinine   # Lactic acidosis - resolving  # Constipation s/p suppository     Overall organ perfusion is improving with increasing dose of dobutamine.    Plan:   Serial PA numbers  Continue dobutamine and norepinephrine gtt  Titrate to keep CI >2  Continue lasix 60 Q8H  Monitor renal function   Monitor UOP, strict I/Os  Early mobility     The patient remains critically ill. Critical care time = 65 mins in directly providing and coordinating critical care and extensive data review. No time overlap and excludes procedures.       Ascencion Meyer D.O.  Critical Care

## 2025-07-16 NOTE — PROCEDURES
Arterial Line Insertion    Date/Time: 7/15/2025 10:46 PM    Performed by: Andres Prado M.D.  Authorized by: Andres Prado M.D.  Indications: multiple ABGs, respiratory failure and hemodynamic monitoring  Location: left radial  Anesthesia: local infiltration    Anesthesia:  Local Anesthetic: lidocaine 1% without epinephrine  Seldinger technique: Seldinger technique used  Number of attempts: 1  Post-procedure: line sutured  Patient tolerance: patient tolerated the procedure well with no immediate complications

## 2025-07-16 NOTE — PROGRESS NOTES
"[facility] ICU Resident Progress Note      Admit Date: 7/15/2025    Resident(s): Mindy Sorenson M.D.   Attending:  FAUSTINO MADDOX/ Dr. Meyer    Patient ID:    Name:  Santino Zabala   YOB: 1965  Age:  59 y.o.  male   MRN:  [id number]    Hospital Course:  Santino Zabala is a 59 y.o. male who was admitted on 7/15/2025 for acute on chronic heart failure exacerbation 2/2 methamphetamine use with medical history including  HFrEF 20%, h/o methamphetamine use, cirrhosis, CKD stage 3. He came in with concerns of chest pain and dyspnea. He was found to have SUZY on CKD with creatinine 2.33 (baseline 1.3), lactic acidosis of 11.6, hyperkalemia of 6.9 then 7.3, elevated BNP 28,000, troponin 69, BNP 13306, and transaminitis with AST 1600 and ALT 1200 and peak at 2800 and 1500 respectively. When he was walking to the dinner table last night he started feeling dizzy and may have passed out. Denies seizure activity, loss of bowel or bladder function and did not note any injury/trauma. He denies fever, chills, nausea vomiting cough sputum production diarrhea. He reports increased generalized edema but does report he has been compliant with medications for heart failure but takes Lasix \"as needed\" which he has been taking on most days. He denies alcohol and meth use.    Consultants:  Critical Care  Cardiology     Interval Events:  Patient complained of severe abdominal pain. Abdo XR did not show perforation. Responded well to morphine and had two bowel movements since enema given. No further abdominal pain.  - [place] cath and arterial line placed for more accurate measurements of BP and hemodynamic status  - 50 g albumin overnight  - Levo discontinued around 0300  - Continue dobutamine  - K scale started  - Overall, labs showing improvement and patient appears to be doing well    Review of systems completed, including 10 pertinent systems, with all other systems negative unless noted above.      Vitals Range last 24h:  Temp:  " [36.3 °C (97.3 °F)-37.5 °C (99.5 °F)] 36.5 °C (97.7 °F)  Pulse:  [65-80] 71  Resp:  [9-43] 14  BP: ()/(50-68) 91/56  SpO2:  [76 %-100 %] 96 %      Intake/Output Summary (Last 24 hours) at 7/16/2025 1026  Last data filed at 7/16/2025 1000  Gross per 24 hour   Intake 1288.37 ml   Output 4105 ml   Net -2816.63 ml        Review of Systems   Constitutional:  Negative for chills, fever and malaise/fatigue.   Respiratory:  Negative for cough, shortness of breath and wheezing.    Cardiovascular:  Negative for chest pain.   Gastrointestinal:  Negative for abdominal pain and diarrhea.   Genitourinary:  Negative for dysuria, frequency and urgency.   Musculoskeletal:  Negative for myalgias.   Skin:  Negative for itching and rash.   Neurological:  Negative for dizziness.       PHYSICAL EXAM:  Vitals:    07/16/25 0700 07/16/25 0800 07/16/25 0900 07/16/25 1000   BP: 99/61 93/57  91/56   Pulse: 71 67 77 71   Resp: 16 20 (!) 43 14   Temp:  36.5 °C (97.7 °F)     TempSrc:  Bladder     SpO2:  100% 99% 96%   Weight:   60.7 kg (133 lb 13.1 oz)    Height:        Body mass index is 22.27 kg/m².    O2 therapy: Pulse Oximetry: 96 %, O2 (LPM): 2, O2 Delivery Device: Silicone Nasal Cannula    Date 07/16/25 0700 - 07/17/25 0659   Shift 0307-8406 1385-5733 3143-3414 24 Hour Total   INTAKE   I.V. 73.8   73.8     Norepinephrine Volume 0   0     Dobutamine Volume 73.8   73.8   Shift Total 73.8   73.8   OUTPUT   Urine 1200   1200     Output (mL) (Urethral Catheter) 1200   1200   Shift Total 1200   1200   NET -1126.2   -1126.2        Physical Exam  Vitals reviewed.   Constitutional:       General: He is not in acute distress.     Appearance: Normal appearance. He is normal weight.   HENT:      Head: Normocephalic and atraumatic.      Mouth/Throat:      Mouth: Mucous membranes are moist.      Pharynx: Oropharynx is clear. No posterior oropharyngeal erythema.   Eyes:      Extraocular Movements: Extraocular movements intact.       Conjunctiva/sclera: Conjunctivae normal.      Pupils: Pupils are equal, round, and reactive to light.   Cardiovascular:      Rate and Rhythm: Normal rate and regular rhythm.      Pulses: Normal pulses.      Heart sounds: Normal heart sounds.   Pulmonary:      Effort: Pulmonary effort is normal.      Breath sounds: Normal breath sounds.   Abdominal:      General: Abdomen is flat. Bowel sounds are normal.      Palpations: Abdomen is soft.      Tenderness: There is no abdominal tenderness.   Musculoskeletal:         General: No swelling or tenderness. Normal range of motion.      Cervical back: Normal range of motion and neck supple. No tenderness.   Lymphadenopathy:      Cervical: No cervical adenopathy.   Skin:     General: Skin is warm and dry.      Findings: No bruising or rash.   Neurological:      General: No focal deficit present.      Mental Status: He is alert and oriented to person, place, and time.      Sensory: No sensory deficit.      Motor: No weakness.   Psychiatric:         Mood and Affect: Mood normal.         Behavior: Behavior normal.         Thought Content: Thought content normal.         Judgment: Judgment normal.             Recent Labs     07/15/25  0348 07/15/25  0535 07/16/25  0004 07/16/25  0317 07/16/25  0938   SODIUM 133*   < > 132* 132* 134*   POTASSIUM 6.9*   < > 4.8 3.9 3.7   CHLORIDE 98   < > 99 99 99   CO2 7*   < > 15* 18* 20   BUN 37*   < > 55* 54* 49*   CREATININE 2.33*   < > 2.12* 1.97* 1.74*   MAGNESIUM 2.6*  --   --  2.2  --    PHOSPHORUS 8.5*  --   --  3.7  --    CALCIUM 9.6   < > 8.1* 8.1* 8.2*    < > = values in this interval not displayed.     Recent Labs     07/15/25  0348 07/15/25  0535 07/15/25  0845 07/15/25  1706 07/15/25  1929 07/16/25  0004 07/16/25  0317 07/16/25  0938   ALTSGPT 970* 1225*  --  1552*  --   --  1243*  --    ASTSGOT 1866* 2479*  --  2878*  --   --  1660*  --    ALKPHOSPHAT 165* 162*  --  128*  --   --  112*  --    TBILIRUBIN 4.1* 3.9*  --  1.7*  --   --   1.8*  --    DBILIRUBIN  --   --   --  1.1*  --   --   --   --    LIPASE 18  --   --   --   --   --   --   --    GLUCOSE 55* 54*   < > 128*   < > 109* 122* 116*    < > = values in this interval not displayed.     Recent Labs     07/15/25  0348 07/15/25  0845 07/15/25  1929   RBC 5.05  --  4.14*   HEMOGLOBIN 16.1  --  13.3*   HEMATOCRIT 53.2*  --  40.0*   PLATELETCT 276  --  219   PROTHROMBTM  --  27.4*  --    INR  --  2.53*  --      Recent Labs     07/15/25  0348 07/15/25  0535 07/15/25  1706 07/15/25  1929 07/16/25  0317   WBC 12.9*  --   --  9.5  --    NEUTSPOLYS 81.10*  --   --  78.00*  --    LYMPHOCYTES 8.00*  --   --  10.10*  --    MONOCYTES 10.00  --   --  11.10  --    EOSINOPHILS 0.00  --   --  0.40  --    BASOPHILS 0.20  --   --  0.20  --    ASTSGOT 1866* 2479* 2878*  --  1660*   ALTSGPT 970* 1225* 1552*  --  1243*   ALKPHOSPHAT 165* 162* 128*  --  112*   TBILIRUBIN 4.1* 3.9* 1.7*  --  1.8*       Meds:   K+ Scale: Goal of 4.5  1 Each      potassium chloride (KCL-CENTRAL) IV *Administer in ICU only*  20 mEq      DOBUTamine-Dextrose  5 mcg/kg/min (Ideal) 10 mcg/kg/min (07/16/25 0423)    aspirin  81 mg      omeprazole  20 mg      tamsulosin  0.4 mg      heparin  5,000 Units      senna-docusate  2 Tablet      And    polyethylene glycol/lytes  1 Packet      ondansetron  4 mg      ondansetron  4 mg      dextrose bolus  25 g      furosemide  60 mg      thiamine  200 mg      NORepinephrine  0-1 mcg/kg/min (Ideal) Stopped (07/16/25 0300)    simethicone  125 mg      bisacodyl  10 mg      vasopressin (Vasostrict) infusion  0.03 Units/min Stopped (07/15/25 6425)        Procedures:  7/15 - central line insertion, arterial line insertion    Imaging:  DX-CHEST-LIMITED (1 VIEW)   Final Result      Right Harris-Kaz catheter with tip in the right lower lobe segmental pulmonary artery.      [id number] VIEW   Final Result      No definite evidence for obstruction.      EC-ECHOCARDIOGRAM COMPLETE W/ CONT   Final Result       CT-ABDOMEN-PELVIS W/O   Final Result      1.  Mild ascites.   2.  Body wall anasarca.   3.  Cardiomegaly.   4.  Mild generalized bowel dilation, likely ileus.   5.  Fatty liver.         DX-CHEST-PORTABLE (1 VIEW)   Final Result      Cardiomegaly. No acute process.          ASSESSEMENT and PLAN:    * Acute on chronic combined systolic (congestive) and diastolic (congestive) heart failure (HCC)- (present on admission)  Assessment & Plan  Acute exacerbation/decompensation of chronic left-sided systolic heart failure, cause for exacerbation unclear.  History of prior methamphetamine use with cessation, lower suspicion for acute ischemic etiology  Associated with multiorgan dysfunction, SUZY, cardiorenal syndrome, acute ischemic hepatitis  Was able to wean off of levo  - Dobutamine drip  - Cardiology following  - IV furosemide 60 mg Q8hrs  - Daily weights, I&O, bingham placed for accurate monitoring  - Reinitiation of GDMT heart failure medications as appropriate, hold acutely  - He reports target dry weight of 130 pounds  - CI 2.1, PCWP 19    Hyperkalemia- (present on admission)  Assessment & Plan  RESOLVED  Presented with K 6.9, peak of 7.3.  Responded well to insulin/glucose and bicarbonate therapy  Critical hyperkalemia due to SUZY on CKD, cardiorenal syndrome  - Continue to monitor    Acute renal failure superimposed on stage 3 chronic kidney disease (HCC)- (present on admission)  Assessment & Plan  Suspect acutely exacerbated due to cardiorenal syndrome  - Targeting heart failure exacerbation treatment, creatinine improving  - Lasix as above  - Continue Flomax  - Daily I&Os, catheter placed  - Nephrology consultation if not improving with acute management and need for RRT    Transaminitis- (present on admission)  Assessment & Plan  Suspect secondary to CHF exacerbation and ischemic hepatitis acutely  Treatment as above  - Continue to trend, currently improving    Lactic acidosis- (present on admission)  Assessment &  Plan  RESOLVED    Constipation- (present on admission)  Assessment & Plan  IMPROVED  Severe abdominal pain on night of 7/15. Improved with morphine and two bowel movements following enema.  - Continue bowel regimen    Hypoglycemia- (present on admission)  Assessment & Plan  Resolved    Methamphetamine abuse (HCC)- (present on admission)  Assessment & Plan  In remission    Alcoholic cirrhosis of liver with ascites (HCC)- (present on admission)  Assessment & Plan  By history, prior cessation of alcohol and remains abstinent  Current transaminitis likely related to CHF exacerbation  Follow hepatic function, LFTs closely and monitor response to inotropic therapy  Repeat hepatitis panel sent  CT A/P noted with fatty liver change and mild ascites, generalized bowel dilation possible ileus  - Thiamine        DISPO: continue to monitor on CICU    CODE STATUS: Full    Quality Measures:  Feeding: cardiac diet  Analgesia: PRN, avoid opioids  Sedation: none  Thromboprophylaxis: heparin  Head of bed: >30 degrees  Ulcer prophylaxis: n/a  Glycemic control: n/a  Bowel care: bowel regimen  Indwelling lines: PIV, central line, arterial line  Deescalation of antibiotics: n/a    Mindy Sorenson MD  PGY-3 Internal Medicine Resident

## 2025-07-16 NOTE — PROCEDURES
Central Line Insertion    Date/Time: 7/15/2025 10:44 PM    Performed by: Andres Prado M.D.  Authorized by: Andres Prado M.D.    Pre-procedure details:     Hand hygiene: Hand hygiene performed prior to insertion      Sterile barrier technique: All elements of maximal sterile technique followed      Skin preparation:  2% chlorhexidine    Skin preparation agent: Skin preparation agent completely dried prior to procedure    Anesthesia:     Anesthesia method:  Local infiltration    Local anesthetic:  Lidocaine 1% w/o epi  Procedure details:     Location:  R internal jugular    Procedural supplies: MAC.    Catheter size:  7 Fr    Ultrasound guidance: yes      Sterile ultrasound techniques: Sterile gel and sterile probe covers were used      Number of attempts:  1    Successful placement: yes    Post-procedure details:     Post-procedure:  Line sutured    Assessment:  Blood return through all ports    Patient tolerance of procedure:  Tolerated well, no immediate complications  Comments:      Procedure: [place]-Kaz catheter placement  Indication: shock  Date: 07/15/25     A time out was performed.  A [place]-Kaz pulmonary artery catheter was advanced through a previously placed 9 [place] right internal jugular vein introducer sheath into the pulmonary artery wedge position using continuous waveform analysis without difficulty or apparent complication.  The procedure was well tolerated.

## 2025-07-16 NOTE — CARE PLAN
The patient is Watcher - Medium risk of patient condition declining or worsening    Shift Goals  Clinical Goals: hemodynamic stability, pain management  Patient Goals: pain management  Family Goals: YOEL    Progress made toward(s) clinical / shift goals:    Problem: Pain - Standard  Goal: Alleviation of pain or a reduction in pain to the patient’s comfort goal  Outcome: Progressing     Problem: Knowledge Deficit - Standard  Goal: Patient and family/care givers will demonstrate understanding of plan of care, disease process/condition, diagnostic tests and medications  Outcome: Progressing     Problem: Fall Risk  Goal: Patient will remain free from falls  Outcome: Progressing     Problem: Skin Integrity  Goal: Skin integrity is maintained or improved  Outcome: Progressing       Patient is not progressing towards the following goals:

## 2025-07-16 NOTE — ASSESSMENT & PLAN NOTE
By history, prior cessation of alcohol and remains abstinent  Current transaminitis likely related to CHF exacerbation  Liver function showed elevated liver enzymes.  Repeat hepatitis panel non-reactove  CT A/P noted with fatty liver change and mild ascites, generalized bowel dilation possible ileus.  No signs of acute abdomen on physical exam   Continue to monitor electrolytes   Outpatient follow-up.  Ordered CMP for tomorrow

## 2025-07-16 NOTE — ASSESSMENT & PLAN NOTE
Suspect secondary to CHF exacerbation and ischemic hepatitis acutely  Treatment as above  - Continue to trend, currently improving  Ordered CMP for tomorrow.

## 2025-07-16 NOTE — PROGRESS NOTES
4 Eyes Skin Assessment Completed by JULIANA Wagner and JULIANA Llanes.    Skin assessment is primarily focused on high risk bony prominences. Pay special attention to skin beneath and around medical devices, high risk bony prominences, skin to skin areas and areas where the patient lacks sensation to feel pain and areas where the patient previously had breakdown.     Head (Occipital):  WDL   Ears (Under Medical Devices): WDL   Nose (Under Medical Devices): WDL   Mouth:  WDL   Neck: WDL   Breast/Chest:  WDL   Shoulder Blades:  WDL   Spine:   WDL   (R) Arm/Elbow/Hand: WDL   (L) Arm/Elbow/Hand: WDL   Abdomen: WDL   Pannus/Groin:  WDL   Sacrum/Coccyx:   WDL   (R) Ischial Tuberosity (Sit Bones):  WDL   (L) Ischial Tuberosity (Sit Bones):  WDL   (R) Leg:  Edema   (L) Leg:  Edema   (R) Heel:  Boggy, Skin peeling, no injury   (R) Foot/Toe: Boggy, Skin Peeling, no injury   (L) Heel: Boggy, Skin peeling, no injury   (L) Foot/Toe:  Boggy, Skin peeling, no injury       DEVICES IN USE:   Respiratory Devices:  NA, patient on room air  Feeding Devices:  N/A   Lines & BP Monitoring Devices:  Peripheral IV, Central line, Arterial line, BP cuff, and Pulse ox, swan in place    Orthopedic Devices:  N/A  Miscellaneous Devices:  Aguilar    PROTOCOL INTERVENTIONS:   [facility]:  Already in place  Offloading Dressing - Sacrum:  Ordered via RN Wound Protocol  Glide Sheet:  Already in place  Aguilar:  Already in place    WOUND PHOTOS:   N/A no wounds identified    WOUND CONSULT:   N/A, no advanced wound care needs identified

## 2025-07-16 NOTE — PROGRESS NOTES
Patient with acute worsening abdominal pain, screaming in his room. Tylenol already given, cannot given NSAIDs due to acute HF. At bedside, in significant distress. Abdomen firm but difficult to accurately examine because he is tensing up his abd muscles, states he cannot relax. BS present. There was some gaseous sounds when pressing on his abd.    Chart reviewed. CT A/P from 0536 today mild generalized bowel dilation likely ileus. Lactic acid is decreasing 8 ? 4.6 ? 3.8. UDS is negative for opioids, patient confirmed he did not take opioids recently, was taking methocarbamol at home. Ordered Abdominal XR to look for any free air, and with the significant pain currently, gave Morphine 3 while pending. Since ileus was not opioid-induced, did feel one-time dose was appropriate while workup pending. Also ordered CBC along with simethicone.    Abd XR came back with no perforation, no signs of free air. Leukocytosis actually resolved. On repeat exam abdomen is soft with no peritoneal signs. Subsequently notified Dr. Prado, received recommendations to not give any opioids in future to avoid worsening of ileus - can try ketamine if pain management needed. Also can try enema and suppository.      #Abdominal pain  #Possible ileus on CT  #Constipation  Per chart review, has had similar intense abdominal pain prior, prior workup also unremarkable  With XR, CBC, and LA not concerning for acute complications such as perforation  No recent opioids, ileus could be due to decreased perfusion from his cardiogenic shock  Enema and suppository ordered  S/p Morphine 3. Hold off on any further opioids  Tylenol, stay below limits with current worsening transaminitis   Can consider Ketamine for pain control  Could also consider restarting home methocarbamol      Afterwards patient's abdominal pain significantly improved, did not end up needing further pain meds

## 2025-07-17 LAB
ACTION RANGE TRIGGERED IACRT: NO
ACTION RANGE TRIGGERED IACRT: YES
ALBUMIN SERPL BCP-MCNC: 3.4 G/DL (ref 3.2–4.9)
ALBUMIN/GLOB SERPL: 1.2 G/DL
ALP SERPL-CCNC: 127 U/L (ref 30–99)
ALT SERPL-CCNC: 1017 U/L (ref 2–50)
ANION GAP SERPL CALC-SCNC: 11 MMOL/L (ref 7–16)
ANION GAP SERPL CALC-SCNC: 12 MMOL/L (ref 7–16)
ANION GAP SERPL CALC-SCNC: 14 MMOL/L (ref 7–16)
AST SERPL-CCNC: 934 U/L (ref 12–45)
BACTERIA UR CULT: NORMAL
BASE EXCESS BLDA CALC-SCNC: -1 MMOL/L (ref -4–3)
BASE EXCESS BLDV CALC-SCNC: 0 MMOL/L (ref -2–3)
BILIRUB SERPL-MCNC: 1.8 MG/DL (ref 0.1–1.5)
BUN SERPL-MCNC: 23 MG/DL (ref 8–22)
BUN SERPL-MCNC: 25 MG/DL (ref 8–22)
BUN SERPL-MCNC: 30 MG/DL (ref 8–22)
BUN SERPL-MCNC: 37 MG/DL (ref 8–22)
BUN SERPL-MCNC: 37 MG/DL (ref 8–22)
CALCIUM ALBUM COR SERPL-MCNC: 8.7 MG/DL (ref 8.5–10.5)
CALCIUM SERPL-MCNC: 8.1 MG/DL (ref 8.5–10.5)
CALCIUM SERPL-MCNC: 8.2 MG/DL (ref 8.5–10.5)
CALCIUM SERPL-MCNC: 8.2 MG/DL (ref 8.5–10.5)
CALCIUM SERPL-MCNC: 8.3 MG/DL (ref 8.5–10.5)
CALCIUM SERPL-MCNC: 8.4 MG/DL (ref 8.5–10.5)
CHLORIDE SERPL-SCNC: 100 MMOL/L (ref 96–112)
CHLORIDE SERPL-SCNC: 101 MMOL/L (ref 96–112)
CHLORIDE SERPL-SCNC: 99 MMOL/L (ref 96–112)
CO2 BLDA-SCNC: 24 MMOL/L (ref 20–33)
CO2 BLDV-SCNC: 26 MMOL/L (ref 20–33)
CO2 SERPL-SCNC: 20 MMOL/L (ref 20–33)
CO2 SERPL-SCNC: 21 MMOL/L (ref 20–33)
CO2 SERPL-SCNC: 21 MMOL/L (ref 20–33)
CO2 SERPL-SCNC: 22 MMOL/L (ref 20–33)
CO2 SERPL-SCNC: 23 MMOL/L (ref 20–33)
CREAT SERPL-MCNC: 1.06 MG/DL (ref 0.5–1.4)
CREAT SERPL-MCNC: 1.16 MG/DL (ref 0.5–1.4)
CREAT SERPL-MCNC: 1.26 MG/DL (ref 0.5–1.4)
CREAT SERPL-MCNC: 1.34 MG/DL (ref 0.5–1.4)
CREAT SERPL-MCNC: 1.34 MG/DL (ref 0.5–1.4)
DELSYS IDSYS: ABNORMAL
DELSYS IDSYS: NORMAL
GFR SERPLBLD CREATININE-BSD FMLA CKD-EPI: 60 ML/MIN/1.73 M 2
GFR SERPLBLD CREATININE-BSD FMLA CKD-EPI: 65 ML/MIN/1.73 M 2
GFR SERPLBLD CREATININE-BSD FMLA CKD-EPI: 72 ML/MIN/1.73 M 2
GFR SERPLBLD CREATININE-BSD FMLA CKD-EPI: 80 ML/MIN/1.73 M 2
GLOBULIN SER CALC-MCNC: 2.8 G/DL (ref 1.9–3.5)
GLUCOSE SERPL-MCNC: 100 MG/DL (ref 65–99)
GLUCOSE SERPL-MCNC: 125 MG/DL (ref 65–99)
GLUCOSE SERPL-MCNC: 138 MG/DL (ref 65–99)
GLUCOSE SERPL-MCNC: 138 MG/DL (ref 65–99)
GLUCOSE SERPL-MCNC: 154 MG/DL (ref 65–99)
HCO3 BLDA-SCNC: 23 MMOL/L (ref 21–28)
HCO3 BLDV-SCNC: 24 MMOL/L (ref 22–29)
HCT VFR BLD AUTO: 38 % (ref 42–52)
HGB BLD-MCNC: 12.7 G/DL (ref 14–18)
INST. QUALIFIED PATIENT IIQPT: YES
INST. QUALIFIED PATIENT IIQPT: YES
LACTATE BLD-SCNC: 2.31 MMOL/L (ref 0.5–2)
LACTATE BLD-SCNC: 2.61 MMOL/L (ref 0.5–2)
LPM ILPM: 0
LPM ILPM: 0
Lab: ABNORMAL
Lab: NORMAL
MAGNESIUM SERPL-MCNC: 2.1 MG/DL (ref 1.5–2.5)
O2/TOTAL GAS SETTING VFR VENT: 0 %
PCO2 BLDA: 33 MMHG (ref 32–48)
PCO2 BLDV: 38 MMHG (ref 38–54)
PCO2 TEMP ADJ BLDA: 32 MMHG (ref 32–48)
PCO2 TEMP ADJ BLDV: 38 MMHG (ref 38–54)
PH BLDA: 7.45 [PH] (ref 7.35–7.45)
PH BLDV: 7.41 [PH] (ref 7.32–7.45)
PH TEMP ADJ BLDA: 7.46 [PH] (ref 7.35–7.45)
PH TEMP ADJ BLDV: 7.42 [PH] (ref 7.31–7.45)
PHOSPHATE SERPL-MCNC: 2 MG/DL (ref 2.5–4.5)
PO2 BLDA: 80 MMHG (ref 83–108)
PO2 BLDV: 34 MMHG (ref 23–48)
PO2 TEMP ADJ BLDA: 78 MMHG (ref 83–108)
PO2 TEMP ADJ BLDV: 33 MMHG (ref 23–48)
POTASSIUM SERPL-SCNC: 3.6 MMOL/L (ref 3.6–5.5)
POTASSIUM SERPL-SCNC: 3.6 MMOL/L (ref 3.6–5.5)
POTASSIUM SERPL-SCNC: 3.7 MMOL/L (ref 3.6–5.5)
POTASSIUM SERPL-SCNC: 3.7 MMOL/L (ref 3.6–5.5)
POTASSIUM SERPL-SCNC: 3.9 MMOL/L (ref 3.6–5.5)
PROT SERPL-MCNC: 6.2 G/DL (ref 6–8.2)
SAO2 % BLDA: 96 % (ref 93–99)
SAO2 % BLDV: 66 % (ref 60–85)
SIGNIFICANT IND 70042: NORMAL
SITE SITE: NORMAL
SODIUM SERPL-SCNC: 133 MMOL/L (ref 135–145)
SODIUM SERPL-SCNC: 134 MMOL/L (ref 135–145)
SODIUM SERPL-SCNC: 135 MMOL/L (ref 135–145)
SOURCE SOURCE: NORMAL
SPECIMEN DRAWN FROM PATIENT: ABNORMAL
SPECIMEN DRAWN FROM PATIENT: NORMAL

## 2025-07-17 PROCEDURE — 700102 HCHG RX REV CODE 250 W/ 637 OVERRIDE(OP)

## 2025-07-17 PROCEDURE — 770022 HCHG ROOM/CARE - ICU (200)

## 2025-07-17 PROCEDURE — 83735 ASSAY OF MAGNESIUM: CPT

## 2025-07-17 PROCEDURE — 85018 HEMOGLOBIN: CPT

## 2025-07-17 PROCEDURE — 80048 BASIC METABOLIC PNL TOTAL CA: CPT | Mod: 91

## 2025-07-17 PROCEDURE — 80053 COMPREHEN METABOLIC PANEL: CPT

## 2025-07-17 PROCEDURE — 700111 HCHG RX REV CODE 636 W/ 250 OVERRIDE (IP): Performed by: INTERNAL MEDICINE

## 2025-07-17 PROCEDURE — 700102 HCHG RX REV CODE 250 W/ 637 OVERRIDE(OP): Performed by: INTERNAL MEDICINE

## 2025-07-17 PROCEDURE — A9270 NON-COVERED ITEM OR SERVICE: HCPCS

## 2025-07-17 PROCEDURE — A9270 NON-COVERED ITEM OR SERVICE: HCPCS | Performed by: INTERNAL MEDICINE

## 2025-07-17 PROCEDURE — 82803 BLOOD GASES ANY COMBINATION: CPT | Performed by: INTERNAL MEDICINE

## 2025-07-17 PROCEDURE — 99231 SBSQ HOSP IP/OBS SF/LOW 25: CPT | Performed by: INTERNAL MEDICINE

## 2025-07-17 PROCEDURE — 700111 HCHG RX REV CODE 636 W/ 250 OVERRIDE (IP): Mod: JZ

## 2025-07-17 PROCEDURE — 700111 HCHG RX REV CODE 636 W/ 250 OVERRIDE (IP)

## 2025-07-17 PROCEDURE — 99292 CRITICAL CARE ADDL 30 MIN: CPT | Performed by: INTERNAL MEDICINE

## 2025-07-17 PROCEDURE — 83605 ASSAY OF LACTIC ACID: CPT | Performed by: INTERNAL MEDICINE

## 2025-07-17 PROCEDURE — 85014 HEMATOCRIT: CPT

## 2025-07-17 PROCEDURE — 99291 CRITICAL CARE FIRST HOUR: CPT | Performed by: INTERNAL MEDICINE

## 2025-07-17 PROCEDURE — 84100 ASSAY OF PHOSPHORUS: CPT

## 2025-07-17 RX ORDER — FUROSEMIDE 10 MG/ML
40 INJECTION INTRAMUSCULAR; INTRAVENOUS EVERY 12 HOURS
Status: DISCONTINUED | OUTPATIENT
Start: 2025-07-17 | End: 2025-07-18

## 2025-07-17 RX ORDER — POTASSIUM CHLORIDE 14.9 MG/ML
20 INJECTION INTRAVENOUS ONCE
Status: COMPLETED | OUTPATIENT
Start: 2025-07-17 | End: 2025-07-17

## 2025-07-17 RX ORDER — POTASSIUM CHLORIDE 7.45 MG/ML
10 INJECTION INTRAVENOUS ONCE
Status: COMPLETED | OUTPATIENT
Start: 2025-07-17 | End: 2025-07-17

## 2025-07-17 RX ADMIN — POTASSIUM CHLORIDE 10 MEQ: 7.46 INJECTION, SOLUTION INTRAVENOUS at 22:06

## 2025-07-17 RX ADMIN — SENNOSIDES, DOCUSATE SODIUM 2 TABLET: 50; 8.6 TABLET, FILM COATED ORAL at 17:33

## 2025-07-17 RX ADMIN — POTASSIUM CHLORIDE 20 MEQ: 14.9 INJECTION, SOLUTION INTRAVENOUS at 15:15

## 2025-07-17 RX ADMIN — DOBUTAMINE HYDROCHLORIDE 10 MCG/KG/MIN: 100 INJECTION INTRAVENOUS at 00:14

## 2025-07-17 RX ADMIN — OMEPRAZOLE 20 MG: 20 CAPSULE, DELAYED RELEASE ORAL at 05:19

## 2025-07-17 RX ADMIN — ASPIRIN 81 MG: 81 TABLET, COATED ORAL at 05:19

## 2025-07-17 RX ADMIN — SIMETHICONE 125 MG: 125 TABLET, CHEWABLE ORAL at 09:39

## 2025-07-17 RX ADMIN — POTASSIUM CHLORIDE 20 MEQ: 14.9 INJECTION, SOLUTION INTRAVENOUS at 03:34

## 2025-07-17 RX ADMIN — FUROSEMIDE 40 MG: 10 INJECTION, SOLUTION INTRAVENOUS at 17:32

## 2025-07-17 RX ADMIN — HEPARIN SODIUM 5000 UNITS: 5000 INJECTION, SOLUTION INTRAVENOUS; SUBCUTANEOUS at 13:55

## 2025-07-17 RX ADMIN — TAMSULOSIN HYDROCHLORIDE 0.4 MG: 0.4 CAPSULE ORAL at 09:39

## 2025-07-17 RX ADMIN — SIMETHICONE 125 MG: 125 TABLET, CHEWABLE ORAL at 14:00

## 2025-07-17 RX ADMIN — SIMETHICONE 125 MG: 125 TABLET, CHEWABLE ORAL at 21:15

## 2025-07-17 RX ADMIN — HEPARIN SODIUM 5000 UNITS: 5000 INJECTION, SOLUTION INTRAVENOUS; SUBCUTANEOUS at 05:19

## 2025-07-17 RX ADMIN — DOBUTAMINE HYDROCHLORIDE 10 MCG/KG/MIN: 100 INJECTION INTRAVENOUS at 07:10

## 2025-07-17 RX ADMIN — Medication 500 MG: at 17:33

## 2025-07-17 RX ADMIN — FUROSEMIDE 60 MG: 10 INJECTION, SOLUTION INTRAVENOUS at 05:18

## 2025-07-17 RX ADMIN — POTASSIUM CHLORIDE 20 MEQ: 14.9 INJECTION, SOLUTION INTRAVENOUS at 09:45

## 2025-07-17 RX ADMIN — THIAMINE HYDROCHLORIDE 200 MG: 100 INJECTION, SOLUTION INTRAMUSCULAR; INTRAVENOUS at 05:19

## 2025-07-17 RX ADMIN — THIAMINE HYDROCHLORIDE 200 MG: 100 INJECTION, SOLUTION INTRAMUSCULAR; INTRAVENOUS at 17:32

## 2025-07-17 RX ADMIN — HEPARIN SODIUM 5000 UNITS: 5000 INJECTION, SOLUTION INTRAVENOUS; SUBCUTANEOUS at 21:15

## 2025-07-17 RX ADMIN — Medication 500 MG: at 09:39

## 2025-07-17 ASSESSMENT — ENCOUNTER SYMPTOMS
HEADACHES: 0
CARDIOVASCULAR NEGATIVE: 1
MUSCULOSKELETAL NEGATIVE: 1
RESPIRATORY NEGATIVE: 1
MYALGIAS: 0
DIZZINESS: 0
CHILLS: 0
NERVOUS/ANXIOUS: 0
NEUROLOGICAL NEGATIVE: 1
COUGH: 0
SHORTNESS OF BREATH: 0
WEAKNESS: 0
NAUSEA: 0
VOMITING: 0
EYES NEGATIVE: 1
DIARRHEA: 0
CONSTITUTIONAL NEGATIVE: 1
PALPITATIONS: 0
ABDOMINAL PAIN: 0
WHEEZING: 0
PSYCHIATRIC NEGATIVE: 1
BRUISES/BLEEDS EASILY: 0
GASTROINTESTINAL NEGATIVE: 1
FEVER: 0

## 2025-07-17 ASSESSMENT — PAIN DESCRIPTION - PAIN TYPE
TYPE: ACUTE PAIN

## 2025-07-17 ASSESSMENT — COGNITIVE AND FUNCTIONAL STATUS - GENERAL
DRESSING REGULAR UPPER BODY CLOTHING: A LITTLE
SUGGESTED CMS G CODE MODIFIER MOBILITY: CI
MOBILITY SCORE: 23
TOILETING: A LITTLE
HELP NEEDED FOR BATHING: A LITTLE
DRESSING REGULAR LOWER BODY CLOTHING: A LITTLE
DAILY ACTIVITIY SCORE: 20
SUGGESTED CMS G CODE MODIFIER DAILY ACTIVITY: CJ
CLIMB 3 TO 5 STEPS WITH RAILING: A LITTLE

## 2025-07-17 ASSESSMENT — FIBROSIS 4 INDEX: FIB4 SCORE: 8.02

## 2025-07-17 NOTE — PROGRESS NOTES
MONITOR SUMMARY  Rate: 60-80's  Rhythm: SR  Ectopy: Rare PVC's  Measurements:     0.19/0.09/0.37

## 2025-07-17 NOTE — PROGRESS NOTES
Notified resident Dr. Quijano that the monitor tech reported the patient had a moment of ectopy before returning back into sinus rhythm.

## 2025-07-17 NOTE — PROGRESS NOTES
[place] Numbers    2054    CO: 5.0  CI: 2.9  CVP: 10  PCWP: 13  SVR: 781  PVR: 218  PA: 45/17    GTT: dobutamine 10    0043    CO: 4.8  CI: 2.8  CVP: 13  PCWP: 20  SVR: 933  PVR: 166  PA: 48/22    GTT: dobutamine 10    0410    CO: 6.1  CI: 3.6  CVP: 15  PCWP: 20  SVR: 697  PVR: 144  PA: 54/24    GTT: dobutamine 10

## 2025-07-17 NOTE — PROGRESS NOTES
Mac from Lab called with critical result of ALT of 1017 at 0336. Critical lab result read back to Mac.    Dr. Quijano notified of critical lab result at 0338.  Critical lab result read back by Dr. Quijano.

## 2025-07-17 NOTE — PROGRESS NOTES
"[facility] ICU Resident Progress Note      Admit Date: 7/15/2025    Resident(s): Franca Diaz M.D.   Attending:  FAUSTINO MADDOX/ Dr. Meyer    Patient ID:    Name:  Santino Zabala   YOB: 1965  Age:  59 y.o.  male   MRN:  [id number]    Hospital Course:  Santino Zabala is a 59 y.o. male who was admitted on 7/15/2025 for acute on chronic heart failure exacerbation 2/2 methamphetamine use with medical history including  HFrEF 20%, h/o methamphetamine use, cirrhosis, CKD stage 3. He came in with concerns of chest pain and dyspnea. He was found to have SUZY on CKD with creatinine 2.33 (baseline 1.3), lactic acidosis of 11.6, hyperkalemia of 6.9 then 7.3, elevated BNP 28,000, troponin 69, BNP 25380, and transaminitis with AST 1600 and ALT 1200 and peak at 2800 and 1500 respectively. When he was walking to the dinner table last night he started feeling dizzy and may have passed out. Denies seizure activity, loss of bowel or bladder function and did not note any injury/trauma. He denies fever, chills, nausea vomiting cough sputum production diarrhea. He reports increased generalized edema but does report he has been compliant with medications for heart failure but takes Lasix \"as needed\" which he has been taking on most days. He denies alcohol and meth use.    Consultants:  Critical Care  Cardiology     Interval Events:  Patient complained of severe abdominal pain. Abdo XR did not show perforation. Responded well to morphine and had two bowel movements since enema given. No further abdominal pain.  -  3.6, SVR 700s  -Wean off dobutamine to 5mcg, will further wean based on   - Levo held  - Decrease lasix to 40mg Q12  - Hb dropped to 13.3, no bleeding isgns, will CTM  - Liver enzymes improving  - Renal function close to baseline    Review of systems completed, including 10 pertinent systems, with all other systems negative unless noted above.      Vitals Range last 24h:  Temp:  [36 °C (96.8 °F)-36.5 °C (97.7 " °F)] (P) 36.5 °C (97.7 °F)  Pulse:  [71-89] (P) 74  Resp:  [9-44] (P) 16  BP: ()/(50-71) (P) 99/64  SpO2:  [91 %-98 %] (P) 96 %      Intake/Output Summary (Last 24 hours) at 7/17/2025 0933  Last data filed at 7/17/2025 0900  Gross per 24 hour   Intake 1445.9 ml   Output 8005 ml   Net -6559.1 ml        Review of Systems   Constitutional:  Negative for chills, fever and malaise/fatigue.   Respiratory:  Negative for cough, shortness of breath and wheezing.    Cardiovascular:  Negative for chest pain.   Gastrointestinal:  Negative for abdominal pain and diarrhea.   Genitourinary:  Negative for dysuria, frequency and urgency.   Musculoskeletal:  Negative for myalgias.   Skin:  Negative for itching and rash.   Neurological:  Negative for dizziness.       PHYSICAL EXAM:  Vitals:    07/17/25 0600 07/17/25 0700 07/17/25 0800 07/17/25 0900   BP: 114/70 106/58 (P) 102/58 (P) 99/64   Pulse: 75 79 (P) 75 (P) 74   Resp: (!) 25 20 (P) 13 (P) 16   Temp:   (P) 36.3 °C (97.3 °F) (P) 36.5 °C (97.7 °F)   TempSrc:   (P) Core (P) Core   SpO2: 93% 92% (P) 96% (P) 96%   Weight:       Height:        Body mass index is 22.16 kg/m².    O2 therapy: Pulse Oximetry: (P) 96 %, O2 (LPM): 0, O2 Delivery Device: (P) Room air w/o2 available    Date 07/17/25 0700 - 07/18/25 0659   Shift 6319-9973 0566-3575 7482-8489 24 Hour Total   INTAKE   I.V. 146.6   146.6     Dobutamine Volume 146.6   146.6   IV Piggyback 78.3   78.3     Volume (mL) (potassium chloride in water (Kcl) ivpb **Administer in ICU only** 20 mEq) 78.3   78.3   Shift Total 224.9   224.9   OUTPUT   Urine 1550   1550     Output (mL) (Urethral Catheter) 1550   1550   Shift Total 1550   1550   NET -1325.1   -1325.1          Physical Exam  Vitals reviewed.   Constitutional:       General: He is not in acute distress.     Appearance: Normal appearance. He is normal weight.   HENT:      Head: Normocephalic and atraumatic.      Mouth/Throat:      Mouth: Mucous membranes are moist.       Pharynx: Oropharynx is clear. No posterior oropharyngeal erythema.   Eyes:      Extraocular Movements: Extraocular movements intact.      Conjunctiva/sclera: Conjunctivae normal.      Pupils: Pupils are equal, round, and reactive to light.   Cardiovascular:      Rate and Rhythm: Normal rate and regular rhythm.      Pulses: Normal pulses.      Heart sounds: Normal heart sounds.   Pulmonary:      Effort: Pulmonary effort is normal.      Breath sounds: Normal breath sounds.   Abdominal:      General: Abdomen is flat. Bowel sounds are normal.      Palpations: Abdomen is soft.      Tenderness: There is no abdominal tenderness.   Musculoskeletal:         General: No swelling or tenderness. Normal range of motion.      Cervical back: Normal range of motion and neck supple. No tenderness.   Lymphadenopathy:      Cervical: No cervical adenopathy.   Skin:     General: Skin is warm and dry.      Findings: No bruising or rash.   Neurological:      General: No focal deficit present.      Mental Status: He is alert and oriented to person, place, and time.      Sensory: No sensory deficit.      Motor: No weakness.   Psychiatric:         Mood and Affect: Mood normal.         Behavior: Behavior normal.         Thought Content: Thought content normal.         Judgment: Judgment normal.             Recent Labs     07/15/25  0348 07/15/25  0535 07/16/25  0317 07/16/25  0938 07/16/25  2058 07/17/25  0207 07/17/25  0812   SODIUM 133*   < > 132*   < > 131* 133*  133* 135   POTASSIUM 6.9*   < > 3.9   < > 3.6 3.7  3.7 3.6   CHLORIDE 98   < > 99   < > 98 100  100 100   CO2 7*   < > 18*   < > 19* 21  21 23   BUN 37*   < > 54*   < > 42* 37*  37* 30*   CREATININE 2.33*   < > 1.97*   < > 1.48* 1.34  1.34 1.16   MAGNESIUM 2.6*  --  2.2  --   --  2.1  --    PHOSPHORUS 8.5*  --  3.7  --   --  2.0*  --    CALCIUM 9.6   < > 8.1*   < > 7.9* 8.2*  8.2* 8.4*    < > = values in this interval not displayed.     Recent Labs     07/15/25  0348  07/15/25  0535 07/15/25  1706 07/15/25  1929 07/16/25  0317 07/16/25  0938 07/16/25 2058 07/17/25 0207 07/17/25 0812   ALTSGPT 970*   < > 1552*  --  1243*  --   --  1017*  --    ASTSGOT 1866*   < > 2878*  --  1660*  --   --  934*  --    ALKPHOSPHAT 165*   < > 128*  --  112*  --   --  127*  --    TBILIRUBIN 4.1*   < > 1.7*  --  1.8*  --   --  1.8*  --    DBILIRUBIN  --   --  1.1*  --   --   --   --   --   --    LIPASE 18  --   --   --   --   --   --   --   --    GLUCOSE 55*   < > 128*   < > 122*   < > 167* 138*  138* 100*    < > = values in this interval not displayed.     Recent Labs     07/15/25  0348 07/15/25  0845 07/15/25  1929   RBC 5.05  --  4.14*   HEMOGLOBIN 16.1  --  13.3*   HEMATOCRIT 53.2*  --  40.0*   PLATELETCT 276  --  219   PROTHROMBTM  --  27.4*  --    INR  --  2.53*  --      Recent Labs     07/15/25  0348 07/15/25  0535 07/15/25  1706 07/15/25  1929 07/16/25  0317 07/17/25  0207   WBC 12.9*  --   --  9.5  --   --    NEUTSPOLYS 81.10*  --   --  78.00*  --   --    LYMPHOCYTES 8.00*  --   --  10.10*  --   --    MONOCYTES 10.00  --   --  11.10  --   --    EOSINOPHILS 0.00  --   --  0.40  --   --    BASOPHILS 0.20  --   --  0.20  --   --    ASTSGOT 1866*   < > 2878*  --  1660* 934*   ALTSGPT 970*   < > 1552*  --  1243* 1017*   ALKPHOSPHAT 165*   < > 128*  --  112* 127*   TBILIRUBIN 4.1*   < > 1.7*  --  1.8* 1.8*    < > = values in this interval not displayed.       Meds:   furosemide  40 mg      phosphorus  500 mg      potassium chloride (KCL-CENTRAL) IV *Administer in ICU only*  20 mEq      K+ Scale: Goal of 4.5  1 Each      DOBUTamine-Dextrose  5 mcg/kg/min (Ideal) 5 mcg/kg/min (07/17/25 0801)    aspirin  81 mg      omeprazole  20 mg      tamsulosin  0.4 mg      heparin  5,000 Units      senna-docusate  2 Tablet      And    polyethylene glycol/lytes  1 Packet      ondansetron  4 mg      ondansetron  4 mg      dextrose bolus  25 g      thiamine  200 mg      NORepinephrine  0-1 mcg/kg/min (Ideal)  Stopped (07/16/25 1707)    simethicone  125 mg      bisacodyl  10 mg      vasopressin (Vasostrict) infusion  0.03 Units/min Stopped (07/15/25 4331)        Procedures:  7/15 - central line insertion, arterial line insertion    Imaging:  DX-CHEST-LIMITED (1 VIEW)   Final Result      Right Custer-Kaz catheter with tip in the right lower lobe segmental pulmonary artery.      [id number] VIEW   Final Result      No definite evidence for obstruction.      EC-ECHOCARDIOGRAM COMPLETE W/ CONT   Final Result      CT-ABDOMEN-PELVIS W/O   Final Result      1.  Mild ascites.   2.  Body wall anasarca.   3.  Cardiomegaly.   4.  Mild generalized bowel dilation, likely ileus.   5.  Fatty liver.         DX-CHEST-PORTABLE (1 VIEW)   Final Result      Cardiomegaly. No acute process.          ASSESSEMENT and PLAN:    * Acute on chronic combined systolic (congestive) and diastolic (congestive) heart failure (HCC)- (present on admission)  Assessment & Plan  Acute exacerbation/decompensation of chronic left-sided systolic heart failure, cause for exacerbation unclear.  History of prior methamphetamine use with cessation, lower suspicion for acute ischemic etiology  Associated with multiorgan dysfunction, SUZY, cardiorenal syndrome, acute ischemic hepatitis  Was able to wean off of levo  - Dobutamine drip titrated down to 5 based on C.I 3.6 and SVR 700s  - Cardiology following  - IV furosemide 40 mg Q12hrs  - Daily weights, I&O, bingham placed for accurate monitoring  - Reinitiation of GDMT heart failure medications as appropriate, hold acutely  - He reports target dry weight of 130 pounds      Hypoglycemia- (present on admission)  Assessment & Plan  Multifactorial, poor p.o. intake, acute on chronic liver failure due to CHF exacerbation, medications  Trend frequent FSBS  Hypoglycemia protocol, avoid/treat hypoglycemia    Hyperkalemia- (present on admission)  Assessment & Plan  RESOLVED  Presented with K 6.9, peak of 7.3.  Responded well to  insulin/glucose and bicarbonate therapy  Critical hyperkalemia due to SUZY on CKD, cardiorenal syndrome  - Continue to monitor      High anion gap metabolic acidosis- (present on admission)  Assessment & Plan  RESOLVED  Lactic acidosis secondary to acute heart failure exacerbation and hypoperfusion  Due to initiation of inotropic support  LA trended down <2    Acute renal failure superimposed on stage 3 chronic kidney disease (HCC)- (present on admission)  Assessment & Plan  RESOLVING, close to baseline   Suspect acutely exacerbated due to cardiorenal syndrome  - Targeting heart failure exacerbation treatment, creatinine improving  - Lasix as above  - Continue Flomax  - Daily I&Os, catheter placed    Methamphetamine abuse (HCC)- (present on admission)  Assessment & Plan  Patient reports remote cessation  UDS positive for Methamphetamine    Alcoholic cirrhosis of liver with ascites (HCC)- (present on admission)  Assessment & Plan  By history, prior cessation of alcohol and remains abstinent  Current transaminitis likely related to CHF exacerbation  Follow hepatic function, LFTs closely and monitor response to inotropic therapy  Repeat hepatitis panel non-reactove  CT A/P noted with fatty liver change and mild ascites, generalized bowel dilation possible ileus    Transaminitis- (present on admission)  Assessment & Plan  Suspect secondary to CHF exacerbation and ischemic hepatitis acutely  Treatment as above  - Continue to trend, currently improving        DISPO: continue to monitor on CICU    CODE STATUS: Full    Quality Measures:  Feeding: cardiac diet  Analgesia: PRN, avoid opioids  Sedation: none  Thromboprophylaxis: heparin  Head of bed: >30 degrees  Ulcer prophylaxis: n/a  Glycemic control: n/a  Bowel care: bowel regimen  Indwelling lines: PIV, central line, arterial line  Deescalation of antibiotics: n/a    Franca Diaz MD  PGY-2 Internal Medicine Resident

## 2025-07-17 NOTE — CARE PLAN
The patient is Watcher - Medium risk of patient condition declining or worsening    Shift Goals  Clinical Goals: Hemodynamic stability, Wean Dobutamine per PA numbers, mobilize  Patient Goals: Rest  Family Goals: YOEL    Progress made toward(s) clinical / shift goals:    Problem: Pain - Standard  Goal: Alleviation of pain or a reduction in pain to the patient’s comfort goal  Description: Target End Date:  Prior to discharge or change in level of care    Document on Vitals flowsheet    1.  Document pain using the appropriate pain scale per order or unit policy  2.  Educate and implement non-pharmacologic comfort measures (i.e. relaxation, distraction, massage, cold/heat therapy, etc.)  3.  Pain management medications as ordered  4.  Reassess pain after pain med administration per policy  5.  If opiods administered assess patient's response to pain medication is appropriate per POSS sedation scale  6.  Follow pain management plan developed in collaboration with patient and interdisciplinary team (including palliative care or pain specialists if applicable)  7/17/2025 1408 by Suhail Matos R.N.  Outcome: Progressing  7/17/2025 1408 by Suhail Matos R.N.  Outcome: Progressing, Assessed pain using 0-10 scale q2h and PRN     Problem: Hemodynamics  Goal: Patient's hemodynamics, fluid balance and neurologic status will be stable or improve  Description: Target End Date:  Prior to discharge or change in level of care    Document on Assessment and I/O flowsheet templates    1.  Monitor vital signs, pulse oximetry and cardiac monitor per provider order and/or policy  2.  Maintain blood pressure per provider order  3.  Hemodynamic monitoring per provider order  4.  Manage IV fluids and IV infusions  5.  Monitor intake and output  6.  Daily weights per unit policy or provider order  7.  Assess peripheral pulses and capillary refill  8.  Assess color and body temperature  9.  Position patient for maximum  circulation/cardiac output  10. Monitor for signs/symptoms of excessive bleeding  11. Assess mental status, restlessness and changes in level of consciousness  12. Monitor temperature and report fever or hypothermia to provider immediately. Consideration of targeted temperature management.  Outcome: Progressing, Assessed SBP to maintain >90 or MAP >65, Patient on dobutamine, titrating per MD following [place] number trends, CI>2     Problem: Respiratory  Goal: Patient will achieve/maintain optimum respiratory ventilation and gas exchange  Description: Target End Date:  Prior to discharge or change in level of care    Document on Assessment flowsheet    1.  Assess and monitor rate, rhythm, depth and effort of respiration  2.  Breath sounds assessed qshift and/or as needed  3.  Assess O2 saturation, administer/titrate oxygen as ordered  4.  Position patient for maximum ventilatory efficiency  5.  Turn, cough, and deep breath with splinting to improve effectiveness  6.  Collaborate with RT to administer medication/treatments per order  7.  Encourage use of incentive spirometer and encourage patient to cough after use and utilize splinting techniques if applicable  8.  Airway suctioning  9.  Monitor sputum production for changes in color, consistency and frequency  10. Perform frequent oral hygiene  11. Alternate physical activity with rest periods  Outcome: Progressing, Patient remains on room air, goal SPO2>90     Problem: Nutrition  Goal: Patient's nutritional and fluid intake will be adequate or improve  Description: Target End Date:  Prior to discharge or change in level of care    Document on I/O flowsheet    1.  Monitor nutritional intake  2.  Monitor weight per provider order  3.  Assess patient's ability to take oral nutrition  4.  Collaborate with Speech Therapy, Dietitian and interdisciplinary team for appropriate feeding and fluid intake  5.  Assist with feeding  Outcome: Progressing, Patient has good appitire  and oral intake, no need or additional supplement        Patient is not progressing towards the following goals:

## 2025-07-17 NOTE — CARE PLAN
The patient is Watcher - Medium risk of patient condition declining or worsening    Shift Goals  Clinical Goals: MAP greater than 65  Patient Goals: rest  Family Goals: YOEL    Progress made toward(s) clinical / shift goals:    Problem: Pain - Standard  Goal: Alleviation of pain or a reduction in pain to the patient’s comfort goal  Outcome: Progressing  Note: Medication available per MAR.      Problem: Skin Integrity  Goal: Skin integrity is maintained or improved  Outcome: Progressing  Note: Patient demonstrates ability to turn and requested to stay up in the chair. Pillows utilized for positioning. Education provided on prevention of pressure injuries.

## 2025-07-17 NOTE — PROGRESS NOTES
[place] Numbers    0800:  CO: 6.5  CI: 3.8  CVP: 10  PCWP: 19  SVR: 767  PVR: 154  PA: 36/17    Dobutamine 10 mcg/kg/min    1200:  CO: 5.9  CI: 3.5  CVP: 12  PCWP: 16  SVR: 763  PVR: 189  PA: 47/20    Dobutamine 5 mcg/kg/min      1600:  CO: 4.5  CI: 2.6  CVP: 10  PCWP: 19  SVR: 1043  PVR: 107  PA: 25    Dobutamine 2.5 mcg/kg/min.    Per Dr Meyer, Pause Dobutamine. Watch Urine output and hemodynamics.

## 2025-07-17 NOTE — PROGRESS NOTES
Attending Staff Note    I have seen and examined the patient.  I have reviewed the medical record, laboratory data, imaging and all relevant studies.  I have discussed the plan of care with the Internal Medicine Resident and multidisciplinary team, and agree with the note and plan as documented.     58yo male with hx of HFrEF, NICM, LVEF 15-20%, stage 3 CKD, hx of meth use (quit 6 months ago), cirrhosis who was admitted for acute decompensated biV failure. Started on dobutamine and norepinephrine, and diuresis.     7/15 Pt had increasing dobutamine/NE and PA cath was placed. Dobutamine to 10. Perfusion markers are improving with good UOP. CI 2.1-2.4    24 hour event:   No acute events overnight  PA cath numbers this am with CI 3.8, CVP 10, PCWP 19, SVR 700s  Decreased dobutamine to 5 mcg/kg/min  Pt had excellent UOP from lasix, -5L in the past 24 hours.   HR in 70-80s  -120s  On room air.   Afebrile  Creatinine improving to 1.34  LFT improving with ALT in 1000 (from 1500)  Lasix 60 Q8H  On heparin SQ for DVT prophx    Assessment  # Acute decompensated BiV  # Transaminitis - continues to improve  # Acute on chronic kidney disease  - improving renal function with good UOP  # Lactic acidosis - resolved  # Constipation s/p suppository     Overall organ perfusion is improving with dobutamine. CI 3.8,  10, Ailyn >2. Can start weaning dobutamine. Lactate normalized.     Plan:   Decrease dobutamine at 5mcg/kg/min  Decrease lasix 40 Q12H   Serial PA numbers  Will further wean dobutamine depending on PA numbers.   Titrate to keep CI >2  Monitor renal function   Monitor UOP, strict I/Os  Early mobility     The patient remains critically ill. Critical care time = 80 mins in directly providing and coordinating critical care and extensive data review. No time overlap and excludes procedures.       Ascencion Meyer D.O.  Critical Care

## 2025-07-17 NOTE — PROGRESS NOTES
Cardiology Follow Up Progress Note    Date of Service  7/17/2025    Attending Physician  Ascencion Meyer D.O.    Chief Complaint   Acute on chronic systolic congestive heart failure (HFrEF), cardiogenic shock on pressor support, methamphetamine use.    HPI  Santino Zabala is a 59 y.o. male admitted 7/15/2025 with above.    Interim Events  No significant bradycardia noted within the past 24 hours.    Review of Systems  Review of Systems   Constitutional: Negative.  Negative for chills and fever.   HENT: Negative.  Negative for hearing loss.    Eyes: Negative.    Respiratory: Negative.  Negative for cough and shortness of breath.    Cardiovascular: Negative.  Negative for chest pain, palpitations and leg swelling.   Gastrointestinal: Negative.  Negative for abdominal pain, nausea and vomiting.   Genitourinary: Negative.  Negative for dysuria and urgency.   Musculoskeletal: Negative.  Negative for myalgias.   Skin: Negative.  Negative for rash.   Neurological: Negative.  Negative for dizziness, weakness and headaches.   Hematological:  Does not bruise/bleed easily.   Psychiatric/Behavioral: Negative.  The patient is not nervous/anxious.        Vital signs in last 24 hours  Temp:  [36 °C (96.8 °F)-36.6 °C (97.9 °F)] 36.2 °C (97.2 °F)  Pulse:  [66-89] 82  Resp:  [12-45] 29  BP: ()/(50-71) 97/55  SpO2:  [91 %-100 %] 95 %    Physical Exam  Physical Exam    Lab Review  Lab Results   Component Value Date/Time    WBC 9.5 07/15/2025 07:29 PM    RBC 4.14 (L) 07/15/2025 07:29 PM    HEMOGLOBIN 13.3 (L) 07/15/2025 07:29 PM    HEMATOCRIT 40.0 (L) 07/15/2025 07:29 PM    MCV 96.6 07/15/2025 07:29 PM    MCH 32.1 07/15/2025 07:29 PM    MCHC 33.3 07/15/2025 07:29 PM    MPV 10.7 07/15/2025 07:29 PM      Lab Results   Component Value Date/Time    SODIUM 133 (L) 07/17/2025 02:07 AM    SODIUM 133 (L) 07/17/2025 02:07 AM    POTASSIUM 3.7 07/17/2025 02:07 AM    POTASSIUM 3.7 07/17/2025 02:07 AM    CHLORIDE 100 07/17/2025 02:07 AM     CHLORIDE 100 07/17/2025 02:07 AM    CO2 21 07/17/2025 02:07 AM    CO2 21 07/17/2025 02:07 AM    GLUCOSE 138 (H) 07/17/2025 02:07 AM    GLUCOSE 138 (H) 07/17/2025 02:07 AM    BUN 37 (H) 07/17/2025 02:07 AM    BUN 37 (H) 07/17/2025 02:07 AM    CREATININE 1.34 07/17/2025 02:07 AM    CREATININE 1.34 07/17/2025 02:07 AM      Lab Results   Component Value Date/Time    ASTSGOT 934 (H) 07/17/2025 02:07 AM    ALTSGPT 1017 (HH) 07/17/2025 02:07 AM     Lab Results   Component Value Date/Time    CHOLSTRLTOT 134 02/07/2024 03:56 AM    LDL 73 02/07/2024 03:56 AM    HDL 39 (A) 02/07/2024 03:56 AM    TRIGLYCERIDE 108 02/07/2024 03:56 AM    TROPONINT 81 (H) 07/16/2025 03:17 AM       Recent Labs     07/15/25  0348 07/16/25  0317   NTPROBNP 33897* 7198*       Cardiac Imaging and Procedures Review  CARDIAC STUDIES/PROCEDURES:     CARDIAC CATHETERIZATION CONCLUSIONS by Carl Aiken (02/10/24)  1.  Minimal epicardial CAD  2.  Nonischemic cardiomyopathy, most likely alcohol related  3.  Significantly elevated resting LVEDP 30mmHg  4.  Narrow pulse pressure  (study result reviewed)      ECHOCARDIOGRAM CONCLUSIONS (07/15/25)  Severely reduced left ventricular systolic function. The ejection   fraction is measured to be less 20% by Contreras's biplane. Global   hypokinesis. Grade II diastolic dysfunction.  The right ventricle is dilated. Reduced right ventricular systolic   function.   Moderate to severe mitral regurgitation.   Aortic valve sclerosis without significant stenosis.   Moderate tricuspid regurgitation.  Compared to the prior study on 05/25/2025, there are no significant   changes.  (study result reviewed)      EKG performed on (07/15/25) was reviewed: EKG personally interpreted shows sinus rhythm with left ventricular hypertrophy.    Assessment/Plan  Acute on chronic systolic congestive heart failure (HFrEF), cardiogenic shock on pressor support, methamphetamine use: He is clinically improving. We will continue with medical  management.     Thank you for allowing me to participate in the care of this patient.  I will continue to follow this patient    Please contact me with any questions.    Rodrigue Williamson M.D.   Cardiologist, [facility]  [phone]

## 2025-07-17 NOTE — PROGRESS NOTES
4 Eyes Skin Assessment Completed by JULIANA Jang and JULIANA Leal.    Skin assessment is primarily focused on high risk bony prominences. Pay special attention to skin beneath and around medical devices, high risk bony prominences, skin to skin areas and areas where the patient lacks sensation to feel pain and areas where the patient previously had breakdown.     Head (Occipital):  WDL   Ears (Under Medical Devices): WDL   Nose (Under Medical Devices): WDL   Mouth:  WDL   Neck: WDL   Breast/Chest:  WDL   Shoulder Blades:  WDL   Spine:   WDL   (R) Arm/Elbow/Hand: Blanching   (L) Arm/Elbow/Hand: Blanching   Abdomen: WDL   Pannus/Groin:  WDL   Sacrum/Coccyx:   Blanching, discoloration   (R) Ischial Tuberosity (Sit Bones):  WDL   (L) Ischial Tuberosity (Sit Bones):  WDL   (R) Leg:  Red and Edema, Red spot on knee slow to tanna   (L) Leg:  Edema   (R) Heel:  Pink and Blanching   (R) Foot/Toe: WDL   (L) Heel: Red and Boggy, Skin peeling, slow to tanna   (L) Foot/Toe:  WDL       DEVICES IN USE:   Respiratory Devices:  NA, patient on room air and Pulse ox  Feeding Devices:  N/A   Lines & BP Monitoring Devices:  Peripheral IV, Central line, Arterial line, BP cuff, and Pulse ox    Orthopedic Devices:  N/A  Miscellaneous Devices:  Telemetry monitor, Aguilar, and SCDs    PROTOCOL INTERVENTIONS:   [facility]:  Already in place  Offloading Dressing - Sacrum:  Changed  Offloading Dressing - Heel:  Changed  Float Heels with Pillows:  Already in place  Glide Sheet:  Already in place  Dri-Nato/Micro Climate Pads:  Already in place  Aguilar:  Already in place    WOUND PHOTOS:   N/A no wounds identified    WOUND CONSULT:   N/A, no advanced wound care needs identified

## 2025-07-18 LAB
ALBUMIN SERPL BCP-MCNC: 3.4 G/DL (ref 3.2–4.9)
ALBUMIN/GLOB SERPL: 1.1 G/DL
ALP SERPL-CCNC: 137 U/L (ref 30–99)
ALT SERPL-CCNC: 770 U/L (ref 2–50)
ANION GAP SERPL CALC-SCNC: 10 MMOL/L (ref 7–16)
ANION GAP SERPL CALC-SCNC: 12 MMOL/L (ref 7–16)
ANION GAP SERPL CALC-SCNC: 12 MMOL/L (ref 7–16)
AST SERPL-CCNC: 494 U/L (ref 12–45)
BILIRUB SERPL-MCNC: 1.8 MG/DL (ref 0.1–1.5)
BUN SERPL-MCNC: 19 MG/DL (ref 8–22)
BUN SERPL-MCNC: 20 MG/DL (ref 8–22)
BUN SERPL-MCNC: 21 MG/DL (ref 8–22)
CALCIUM ALBUM COR SERPL-MCNC: 8.8 MG/DL (ref 8.5–10.5)
CALCIUM SERPL-MCNC: 8.3 MG/DL (ref 8.5–10.5)
CALCIUM SERPL-MCNC: 8.4 MG/DL (ref 8.5–10.5)
CALCIUM SERPL-MCNC: 8.5 MG/DL (ref 8.5–10.5)
CHLORIDE SERPL-SCNC: 100 MMOL/L (ref 96–112)
CHLORIDE SERPL-SCNC: 101 MMOL/L (ref 96–112)
CHLORIDE SERPL-SCNC: 101 MMOL/L (ref 96–112)
CO2 SERPL-SCNC: 20 MMOL/L (ref 20–33)
CO2 SERPL-SCNC: 22 MMOL/L (ref 20–33)
CO2 SERPL-SCNC: 22 MMOL/L (ref 20–33)
CREAT SERPL-MCNC: 0.92 MG/DL (ref 0.5–1.4)
CREAT SERPL-MCNC: 1.05 MG/DL (ref 0.5–1.4)
CREAT SERPL-MCNC: 1.12 MG/DL (ref 0.5–1.4)
GFR SERPLBLD CREATININE-BSD FMLA CKD-EPI: 75 ML/MIN/1.73 M 2
GFR SERPLBLD CREATININE-BSD FMLA CKD-EPI: 81 ML/MIN/1.73 M 2
GFR SERPLBLD CREATININE-BSD FMLA CKD-EPI: 95 ML/MIN/1.73 M 2
GLOBULIN SER CALC-MCNC: 3 G/DL (ref 1.9–3.5)
GLUCOSE SERPL-MCNC: 102 MG/DL (ref 65–99)
GLUCOSE SERPL-MCNC: 130 MG/DL (ref 65–99)
GLUCOSE SERPL-MCNC: 151 MG/DL (ref 65–99)
MAGNESIUM SERPL-MCNC: 1.8 MG/DL (ref 1.5–2.5)
PHOSPHATE SERPL-MCNC: 2.2 MG/DL (ref 2.5–4.5)
POTASSIUM SERPL-SCNC: 3.8 MMOL/L (ref 3.6–5.5)
POTASSIUM SERPL-SCNC: 3.8 MMOL/L (ref 3.6–5.5)
POTASSIUM SERPL-SCNC: 4 MMOL/L (ref 3.6–5.5)
PROT SERPL-MCNC: 6.4 G/DL (ref 6–8.2)
SODIUM SERPL-SCNC: 133 MMOL/L (ref 135–145)
SODIUM SERPL-SCNC: 133 MMOL/L (ref 135–145)
SODIUM SERPL-SCNC: 134 MMOL/L (ref 135–145)

## 2025-07-18 PROCEDURE — 700102 HCHG RX REV CODE 250 W/ 637 OVERRIDE(OP): Performed by: INTERNAL MEDICINE

## 2025-07-18 PROCEDURE — 700111 HCHG RX REV CODE 636 W/ 250 OVERRIDE (IP): Mod: JZ

## 2025-07-18 PROCEDURE — 99223 1ST HOSP IP/OBS HIGH 75: CPT | Performed by: INTERNAL MEDICINE

## 2025-07-18 PROCEDURE — 99291 CRITICAL CARE FIRST HOUR: CPT | Mod: GC | Performed by: INTERNAL MEDICINE

## 2025-07-18 PROCEDURE — 700111 HCHG RX REV CODE 636 W/ 250 OVERRIDE (IP): Performed by: INTERNAL MEDICINE

## 2025-07-18 PROCEDURE — A9270 NON-COVERED ITEM OR SERVICE: HCPCS | Performed by: INTERNAL MEDICINE

## 2025-07-18 PROCEDURE — 80053 COMPREHEN METABOLIC PANEL: CPT

## 2025-07-18 PROCEDURE — 770000 HCHG ROOM/CARE - INTERMEDIATE ICU *

## 2025-07-18 PROCEDURE — 80048 BASIC METABOLIC PNL TOTAL CA: CPT

## 2025-07-18 PROCEDURE — A9270 NON-COVERED ITEM OR SERVICE: HCPCS

## 2025-07-18 PROCEDURE — 700102 HCHG RX REV CODE 250 W/ 637 OVERRIDE(OP)

## 2025-07-18 PROCEDURE — 84100 ASSAY OF PHOSPHORUS: CPT

## 2025-07-18 PROCEDURE — 83735 ASSAY OF MAGNESIUM: CPT

## 2025-07-18 RX ORDER — FUROSEMIDE 10 MG/ML
20 INJECTION INTRAMUSCULAR; INTRAVENOUS EVERY 12 HOURS
Status: DISCONTINUED | OUTPATIENT
Start: 2025-07-18 | End: 2025-07-20 | Stop reason: HOSPADM

## 2025-07-18 RX ORDER — POTASSIUM CHLORIDE 1500 MG/1
40 TABLET, EXTENDED RELEASE ORAL ONCE
Status: COMPLETED | OUTPATIENT
Start: 2025-07-18 | End: 2025-07-18

## 2025-07-18 RX ORDER — ENOXAPARIN SODIUM 100 MG/ML
40 INJECTION SUBCUTANEOUS DAILY
Status: DISCONTINUED | OUTPATIENT
Start: 2025-07-18 | End: 2025-07-20 | Stop reason: HOSPADM

## 2025-07-18 RX ORDER — MAGNESIUM SULFATE HEPTAHYDRATE 40 MG/ML
2 INJECTION, SOLUTION INTRAVENOUS ONCE
Status: COMPLETED | OUTPATIENT
Start: 2025-07-18 | End: 2025-07-18

## 2025-07-18 RX ORDER — POTASSIUM CHLORIDE 7.45 MG/ML
10 INJECTION INTRAVENOUS ONCE
Status: COMPLETED | OUTPATIENT
Start: 2025-07-18 | End: 2025-07-18

## 2025-07-18 RX ADMIN — SIMETHICONE 125 MG: 125 TABLET, CHEWABLE ORAL at 20:11

## 2025-07-18 RX ADMIN — DIBASIC SODIUM PHOSPHATE, MONOBASIC POTASSIUM PHOSPHATE AND MONOBASIC SODIUM PHOSPHATE 500 MG: 852; 155; 130 TABLET ORAL at 10:00

## 2025-07-18 RX ADMIN — HEPARIN SODIUM 5000 UNITS: 5000 INJECTION, SOLUTION INTRAVENOUS; SUBCUTANEOUS at 05:20

## 2025-07-18 RX ADMIN — DIBASIC SODIUM PHOSPHATE, MONOBASIC POTASSIUM PHOSPHATE AND MONOBASIC SODIUM PHOSPHATE 500 MG: 852; 155; 130 TABLET ORAL at 17:27

## 2025-07-18 RX ADMIN — FUROSEMIDE 40 MG: 10 INJECTION, SOLUTION INTRAVENOUS at 05:20

## 2025-07-18 RX ADMIN — POTASSIUM CHLORIDE 40 MEQ: 1500 TABLET, EXTENDED RELEASE ORAL at 17:58

## 2025-07-18 RX ADMIN — FUROSEMIDE 20 MG: 10 INJECTION, SOLUTION INTRAVENOUS at 17:19

## 2025-07-18 RX ADMIN — OMEPRAZOLE 20 MG: 20 CAPSULE, DELAYED RELEASE ORAL at 05:20

## 2025-07-18 RX ADMIN — POTASSIUM CHLORIDE 10 MEQ: 7.46 INJECTION, SOLUTION INTRAVENOUS at 05:20

## 2025-07-18 RX ADMIN — THIAMINE HYDROCHLORIDE 200 MG: 100 INJECTION, SOLUTION INTRAMUSCULAR; INTRAVENOUS at 05:20

## 2025-07-18 RX ADMIN — SIMETHICONE 125 MG: 125 TABLET, CHEWABLE ORAL at 14:16

## 2025-07-18 RX ADMIN — SIMETHICONE 125 MG: 125 TABLET, CHEWABLE ORAL at 10:00

## 2025-07-18 RX ADMIN — TAMSULOSIN HYDROCHLORIDE 0.4 MG: 0.4 CAPSULE ORAL at 10:00

## 2025-07-18 RX ADMIN — ENOXAPARIN SODIUM 40 MG: 100 INJECTION SUBCUTANEOUS at 17:17

## 2025-07-18 RX ADMIN — MAGNESIUM SULFATE HEPTAHYDRATE 2 G: 2 INJECTION, SOLUTION INTRAVENOUS at 09:07

## 2025-07-18 RX ADMIN — ASPIRIN 81 MG: 81 TABLET, COATED ORAL at 05:20

## 2025-07-18 RX ADMIN — THIAMINE HYDROCHLORIDE 200 MG: 100 INJECTION, SOLUTION INTRAMUSCULAR; INTRAVENOUS at 17:18

## 2025-07-18 RX ADMIN — SENNOSIDES, DOCUSATE SODIUM 2 TABLET: 50; 8.6 TABLET, FILM COATED ORAL at 17:17

## 2025-07-18 RX ADMIN — POTASSIUM CHLORIDE 10 MEQ: 7.46 INJECTION, SOLUTION INTRAVENOUS at 09:14

## 2025-07-18 RX ADMIN — POTASSIUM CHLORIDE 10 MEQ: 7.46 INJECTION, SOLUTION INTRAVENOUS at 15:58

## 2025-07-18 ASSESSMENT — ENCOUNTER SYMPTOMS
COUGH: 0
BACK PAIN: 0
SPUTUM PRODUCTION: 0
NECK PAIN: 0
WHEEZING: 0
WEIGHT LOSS: 0
DIZZINESS: 0
BLURRED VISION: 0
HEADACHES: 0
NAUSEA: 0
CONSTIPATION: 0
SENSORY CHANGE: 0
PHOTOPHOBIA: 0
FOCAL WEAKNESS: 0
TINGLING: 0
ABDOMINAL PAIN: 0
PALPITATIONS: 0
ORTHOPNEA: 0
DOUBLE VISION: 0
HALLUCINATIONS: 0
SHORTNESS OF BREATH: 0
MYALGIAS: 0
CHILLS: 0
TREMORS: 0
FEVER: 0
VOMITING: 0
EYE PAIN: 0
WEAKNESS: 1
SPEECH CHANGE: 0
DIARRHEA: 0

## 2025-07-18 ASSESSMENT — PAIN DESCRIPTION - PAIN TYPE
TYPE: ACUTE PAIN

## 2025-07-18 ASSESSMENT — LIFESTYLE VARIABLES: SUBSTANCE_ABUSE: 0

## 2025-07-18 ASSESSMENT — FIBROSIS 4 INDEX: FIB4 SCORE: 4.88

## 2025-07-18 NOTE — PROGRESS NOTES
Feliciano Numbers    [id number]    CO: 3.1  CI: 1.8  SVR: 1655  PVR: 296  CVP: 14  PCWP: 20  PA: 49/22    No gtts.    1948 notified Dr. Whitehead of the Feliciano was not wedging with the [place] appearing closer to the 60 filipe than it was last night, and that the CO/CI decreasing since stopping dobutamine. Dr. Whitehead gave orders to obtain a RADHA.     RADHA 2141    CO: 4.1  CI: 2.4    No gtts. No restarting of dobutamine per Dr. Whitehead's verbal orders.    0008    CO: 4.2  CI: 2.4  SVR: 1145  PVR: 304  CVP: 11  PCWP: 16  PA: 47/17    No gtts.     0404    CO: 5.4  CI: 3.1  SVR: 888  PVR: 187  CVP: 9  PCWP: 16  PA: 50/19    No gtts.

## 2025-07-18 NOTE — PROGRESS NOTES
"[facility] ICU Resident Progress Note      Admit Date: 7/15/2025    Resident(s): Franca Diaz M.D.   Attending:  FAUSTINO MADDOX/ Dr. Meyer    Patient ID:    Name:  Santino Zabala   YOB: 1965  Age:  59 y.o.  male   MRN:  [id number]    Hospital Course:  Santino Zabala is a 59 y.o. male who was admitted on 7/15/2025 for acute on chronic heart failure exacerbation 2/2 methamphetamine use with medical history including  HFrEF 20%, h/o methamphetamine use, cirrhosis, CKD stage 3. He came in with concerns of chest pain and dyspnea. He was found to have SUZY on CKD with creatinine 2.33 (baseline 1.3), lactic acidosis of 11.6, hyperkalemia of 6.9 then 7.3, elevated BNP 28,000, troponin 69, BNP 05205, and transaminitis with AST 1600 and ALT 1200 and peak at 2800 and 1500 respectively. When he was walking to the dinner table last night he started feeling dizzy and may have passed out. Denies seizure activity, loss of bowel or bladder function and did not note any injury/trauma. He denies fever, chills, nausea vomiting cough sputum production diarrhea. He reports increased generalized edema but does report he has been compliant with medications for heart failure but takes Lasix \"as needed\" which he has been taking on most days. He denies alcohol and meth use.    Consultants:  Critical Care  Cardiology     Interval Events:  Patient reports symptom resolution  - CO, 5.4, CI 3.1, SVR 800s, off dobutamine since 1600 on 7/17  - Net negative -10L, switch to po lasix 20mg Q12  - Hb trending from 13.3 -> 12.7, no bleeding signs, will CTM  - Liver enzymes improving  - Renal function back to baseline  - Cardiac diet, last BM 7/17  - Okay to transfer to [facility] today, remove PA cath and A-line, leave bingham in  - Can switch heparin to lovenox    Review of systems completed, including 10 pertinent systems, with all other systems negative unless noted above.      Vitals Range last 24h:  Temp:  [36.3 °C (97.3 °F)-36.9 °C (98.4 " °F)] 36.9 °C (98.4 °F)  Pulse:  [] 88  Resp:  [10-38] 19  BP: ()/(55-71) 103/65  SpO2:  [90 %-100 %] 97 %      Intake/Output Summary (Last 24 hours) at 7/18/2025 0610  Last data filed at 7/18/2025 0400  Gross per 24 hour   Intake 1278.46 ml   Output 4740 ml   Net -3461.54 ml        Review of Systems   Constitutional:  Negative for chills, fever and malaise/fatigue.   Respiratory:  Negative for cough, shortness of breath and wheezing.    Cardiovascular:  Negative for chest pain.   Gastrointestinal:  Negative for abdominal pain and diarrhea.   Genitourinary:  Negative for dysuria, frequency and urgency.   Musculoskeletal:  Negative for myalgias.   Skin:  Negative for itching and rash.   Neurological:  Negative for dizziness.       PHYSICAL EXAM:  Vitals:    07/18/25 0100 07/18/25 0200 07/18/25 0300 07/18/25 0400   BP: 102/69 104/71 98/61 103/65   Pulse: 86 100 91 88   Resp: 12 (!) 37 (!) 31 19   Temp:    36.9 °C (98.4 °F)   TempSrc:    Core   SpO2: 97% 95% 90% 97%   Weight:       Height:        Body mass index is 22.16 kg/m².    O2 therapy: Pulse Oximetry: 97 %, O2 (LPM): 0, O2 Delivery Device: Room air w/o2 available             Physical Exam  Vitals reviewed.   Constitutional:       General: He is not in acute distress.     Appearance: Normal appearance. He is normal weight.   HENT:      Head: Normocephalic and atraumatic.      Mouth/Throat:      Mouth: Mucous membranes are moist.      Pharynx: Oropharynx is clear. No posterior oropharyngeal erythema.   Eyes:      Extraocular Movements: Extraocular movements intact.      Conjunctiva/sclera: Conjunctivae normal.      Pupils: Pupils are equal, round, and reactive to light.   Cardiovascular:      Rate and Rhythm: Normal rate and regular rhythm.      Pulses: Normal pulses.      Heart sounds: Normal heart sounds.   Pulmonary:      Effort: Pulmonary effort is normal.      Breath sounds: Normal breath sounds.   Abdominal:      General: Abdomen is flat. Bowel  sounds are normal.      Palpations: Abdomen is soft.      Tenderness: There is no abdominal tenderness.   Musculoskeletal:         General: No swelling or tenderness. Normal range of motion.      Cervical back: Normal range of motion and neck supple. No tenderness.   Lymphadenopathy:      Cervical: No cervical adenopathy.   Skin:     General: Skin is warm and dry.      Findings: No bruising or rash.   Neurological:      General: No focal deficit present.      Mental Status: He is alert and oriented to person, place, and time.      Sensory: No sensory deficit.      Motor: No weakness.   Psychiatric:         Mood and Affect: Mood normal.         Behavior: Behavior normal.         Thought Content: Thought content normal.         Judgment: Judgment normal.         Recent Labs     07/17/25 2115 07/17/25 2124   ISTATAPH  --  7.45   ISTATAPCO2  --  33   ISTATAPO2  --  80*   ISTATATCO2  --  24   [id number]  --  96   ISTATARTHCO3  --  23   ISTATARTBE  --  -1   ISTATFIO2 0  0 0  0   ISTATSPEC BONG ART   ISTATAPHTC  --  7.46   BBVMDABH3RS  --  78*     Recent Labs     07/16/25 0317 07/16/25  0938 07/17/25  0207 07/17/25  0812 07/17/25  1357 07/17/25 2112 07/18/25  0324   SODIUM 132*   < > 133*  133*   < > 134* 133* 133*   POTASSIUM 3.9   < > 3.7  3.7   < > 3.6 3.9 4.0   CHLORIDE 99   < > 100  100   < > 101 99 101   CO2 18*   < > 21  21   < > 22 20 22   BUN 54*   < > 37*  37*   < > 25* 23* 21   CREATININE 1.97*   < > 1.34  1.34   < > 1.06 1.26 1.12   MAGNESIUM 2.2  --  2.1  --   --   --  1.8   PHOSPHORUS 3.7  --  2.0*  --   --   --  2.2*   CALCIUM 8.1*   < > 8.2*  8.2*   < > 8.3* 8.1* 8.3*    < > = values in this interval not displayed.     Recent Labs     07/15/25  1706 07/15/25  1929 07/16/25  0317 07/16/25  0938 07/17/25  0207 07/17/25  0812 07/17/25  1357 07/17/25  2112 07/18/25  0324   ALTSGPT 1552*  --  1243*  --  1017*  --   --   --  770*   ASTSGOT 2878*  --  1660*  --  934*  --   --   --  494*   ALKPHOSPHAT  128*  --  112*  --  127*  --   --   --  137*   TBILIRUBIN 1.7*  --  1.8*  --  1.8*  --   --   --  1.8*   DBILIRUBIN 1.1*  --   --   --   --   --   --   --   --    GLUCOSE 128*   < > 122*   < > 138*  138*   < > 125* 154* 102*    < > = values in this interval not displayed.     Recent Labs     07/15/25  0845 07/15/25  1929 07/17/25  2112   RBC  --  4.14*  --    HEMOGLOBIN  --  13.3* 12.7*   HEMATOCRIT  --  40.0* 38.0*   PLATELETCT  --  219  --    PROTHROMBTM 27.4*  --   --    INR 2.53*  --   --      Recent Labs     07/15/25  1929 07/16/25  0317 07/17/25  0207 07/18/25  0324   WBC 9.5  --   --   --    NEUTSPOLYS 78.00*  --   --   --    LYMPHOCYTES 10.10*  --   --   --    MONOCYTES 11.10  --   --   --    EOSINOPHILS 0.40  --   --   --    BASOPHILS 0.20  --   --   --    ASTSGOT  --  1660* 934* 494*   ALTSGPT  --  1243* 1017* 770*   ALKPHOSPHAT  --  112* 127* 137*   TBILIRUBIN  --  1.8* 1.8* 1.8*       Meds:   potassium chloride  10 mEq 10 mEq (07/18/25 0520)    furosemide  40 mg      K+ Scale: Goal of 4.5  1 Each      DOBUTamine-Dextrose  2.5 mcg/kg/min (Ideal) Stopped (07/17/25 1623)    aspirin  81 mg      omeprazole  20 mg      tamsulosin  0.4 mg      heparin  5,000 Units      senna-docusate  2 Tablet      And    polyethylene glycol/lytes  1 Packet      ondansetron  4 mg      ondansetron  4 mg      dextrose bolus  25 g      thiamine  200 mg      NORepinephrine  0-1 mcg/kg/min (Ideal) Stopped (07/16/25 1707)    simethicone  125 mg      bisacodyl  10 mg      vasopressin (Vasostrict) infusion  0.03 Units/min Stopped (07/15/25 2357)        Procedures:  7/15 - central line insertion, arterial line insertion    Imaging:  DX-CHEST-LIMITED (1 VIEW)   Final Result      Right Jefferson-Kaz catheter with tip in the right lower lobe segmental pulmonary artery.      [id number] VIEW   Final Result      No definite evidence for obstruction.      EC-ECHOCARDIOGRAM COMPLETE W/ CONT   Final Result      CT-ABDOMEN-PELVIS W/O   Final  Result      1.  Mild ascites.   2.  Body wall anasarca.   3.  Cardiomegaly.   4.  Mild generalized bowel dilation, likely ileus.   5.  Fatty liver.         DX-CHEST-PORTABLE (1 VIEW)   Final Result      Cardiomegaly. No acute process.          ASSESSEMENT and PLAN:    * Acute on chronic combined systolic (congestive) and diastolic (congestive) heart failure (HCC)- (present on admission)  Assessment & Plan  Acute exacerbation/decompensation of chronic left-sided systolic heart failure, cause for exacerbation unclear.  History of prior methamphetamine use with cessation, lower suspicion for acute ischemic etiology  Associated with multiorgan dysfunction, SUZY, cardiorenal syndrome, acute ischemic hepatitis  Was able to wean off of levo  - Dobutamine drip titrated down to 5 based on C.I 3.6 and SVR 700s  - Cardiology following  - IV furosemide 40 mg Q12hrs  - Daily weights, I&O, bingham placed for accurate monitoring  - Reinitiation of GDMT heart failure medications as appropriate, hold acutely  - He reports target dry weight of 130 pounds      Hypoglycemia- (present on admission)  Assessment & Plan  Multifactorial, poor p.o. intake, acute on chronic liver failure due to CHF exacerbation, medications  Trend frequent FSBS  Hypoglycemia protocol, avoid/treat hypoglycemia    Hyperkalemia- (present on admission)  Assessment & Plan  RESOLVED  Presented with K 6.9, peak of 7.3.  Responded well to insulin/glucose and bicarbonate therapy  Critical hyperkalemia due to SUZY on CKD, cardiorenal syndrome  - Continue to monitor      High anion gap metabolic acidosis- (present on admission)  Assessment & Plan  RESOLVED  Lactic acidosis secondary to acute heart failure exacerbation and hypoperfusion  Due to initiation of inotropic support  LA trended down <2    Acute renal failure superimposed on stage 3 chronic kidney disease (HCC)- (present on admission)  Assessment & Plan  RESOLVING, close to baseline   Suspect acutely exacerbated due  to cardiorenal syndrome  - Targeting heart failure exacerbation treatment, creatinine improving  - Lasix as above  - Continue Flomax  - Daily I&Os, catheter placed    Methamphetamine abuse (HCC)- (present on admission)  Assessment & Plan  Patient reports remote cessation  UDS positive for Methamphetamine    Alcoholic cirrhosis of liver with ascites (HCC)- (present on admission)  Assessment & Plan  By history, prior cessation of alcohol and remains abstinent  Current transaminitis likely related to CHF exacerbation  Follow hepatic function, LFTs closely and monitor response to inotropic therapy  Repeat hepatitis panel non-reactove  CT A/P noted with fatty liver change and mild ascites, generalized bowel dilation possible ileus    Transaminitis- (present on admission)  Assessment & Plan  Suspect secondary to CHF exacerbation and ischemic hepatitis acutely  Treatment as above  - Continue to trend, currently improving        DISPO: Transfer to [facility] today    CODE STATUS: Full    Quality Measures:  Feeding: cardiac diet  Analgesia: PRN, avoid opioids  Sedation: none  Thromboprophylaxis: heparin  Head of bed: >30 degrees  Ulcer prophylaxis: n/a  Glycemic control: n/a  Bowel care: bowel regimen  Indwelling lines: PIV  Deescalation of antibiotics: n/a    Franca Diaz MD  PGY-2 Internal Medicine Resident

## 2025-07-18 NOTE — CARE PLAN
The patient is Watcher - Medium risk of patient condition declining or worsening    Shift Goals  Clinical Goals: Monitor CO/CI  Patient Goals: Rest/eat  Family Goals: YOEL    Progress made toward(s) clinical / shift goals:    Problem: Pain - Standard  Goal: Alleviation of pain or a reduction in pain to the patient’s comfort goal  Outcome: Progressing     Problem: Fall Risk  Goal: Patient will remain free from falls  Outcome: Progressing     Problem: Skin Integrity  Goal: Skin integrity is maintained or improved  Outcome: Progressing  Note: Patient demonstrates ability to off load pressure points. Heel mepilex and pillows to float heels utilized. Patient declines use of turns. Education provided on risk for skin breakdown if not frequently turning self.

## 2025-07-18 NOTE — CARE PLAN
The patient is Watcher - Medium risk of patient condition declining or worsening    Shift Goals  Clinical Goals: Monitor I/Os, hemodynamic stability  Patient Goals: comfort  Family Goals: YOEL    Progress made toward(s) clinical / shift goals:    Problem: Pain - Standard  Goal: Alleviation of pain or a reduction in pain to the patient’s comfort goal  Description: Target End Date:  Prior to discharge or change in level of care    Document on Vitals flowsheet    1.  Document pain using the appropriate pain scale per order or unit policy  2.  Educate and implement non-pharmacologic comfort measures (i.e. relaxation, distraction, massage, cold/heat therapy, etc.)  3.  Pain management medications as ordered  4.  Reassess pain after pain med administration per policy  5.  If opiods administered assess patient's response to pain medication is appropriate per POSS sedation scale  6.  Follow pain management plan developed in collaboration with patient and interdisciplinary team (including palliative care or pain specialists if applicable)  Outcome: Progressing, Assessed pain using 0-10 scale, addressed need reflecting scale, Reassessed, if any intervention was used     Problem: Fall Risk  Goal: Patient will remain free from falls  Description: Target End Date:  Prior to discharge or change in level of care    Document interventions on the [place] Fall Risk Assessment    1.  Assess for fall risk factors  2.  Implement fall precautions  Outcome: Progressing, belongings and call light within reach, bed alarm on     Problem: Hemodynamics  Goal: Patient's hemodynamics, fluid balance and neurologic status will be stable or improve  Description: Target End Date:  Prior to discharge or change in level of care    Document on Assessment and I/O flowsheet templates    1.  Monitor vital signs, pulse oximetry and cardiac monitor per provider order and/or policy  2.  Maintain blood pressure per provider order  3.  Hemodynamic  monitoring per provider order  4.  Manage IV fluids and IV infusions  5.  Monitor intake and output  6.  Daily weights per unit policy or provider order  7.  Assess peripheral pulses and capillary refill  8.  Assess color and body temperature  9.  Position patient for maximum circulation/cardiac output  10. Monitor for signs/symptoms of excessive bleeding  11. Assess mental status, restlessness and changes in level of consciousness  12. Monitor temperature and report fever or hypothermia to provider immediately. Consideration of targeted temperature management.  Outcome: Progressing, Patient off any pressor support, Goal MAP>65, SBP>90     Problem: Respiratory  Goal: Patient will achieve/maintain optimum respiratory ventilation and gas exchange  Description: Target End Date:  Prior to discharge or change in level of care    Document on Assessment flowsheet    1.  Assess and monitor rate, rhythm, depth and effort of respiration  2.  Breath sounds assessed qshift and/or as needed  3.  Assess O2 saturation, administer/titrate oxygen as ordered  4.  Position patient for maximum ventilatory efficiency  5.  Turn, cough, and deep breath with splinting to improve effectiveness  6.  Collaborate with RT to administer medication/treatments per order  7.  Encourage use of incentive spirometer and encourage patient to cough after use and utilize splinting techniques if applicable  8.  Airway suctioning  9.  Monitor sputum production for changes in color, consistency and frequency  10. Perform frequent oral hygiene  11. Alternate physical activity with rest periods  Outcome: Progressing, Patient remains on room air goal SPO2>90%     Problem: Nutrition  Goal: Patient's nutritional and fluid intake will be adequate or improve  Description: Target End Date:  Prior to discharge or change in level of care    Document on I/O flowsheet    1.  Monitor nutritional intake  2.  Monitor weight per provider order  3.  Assess patient's ability  to take oral nutrition  4.  Collaborate with Speech Therapy, Dietitian and interdisciplinary team for appropriate feeding and fluid intake  5.  Assist with feeding  Outcome: Progressing, oral intake adequate        Patient is not progressing towards the following goals:

## 2025-07-18 NOTE — PROGRESS NOTES
4 Eyes Skin Assessment Completed by JULIANA Jang and JULIANA Dai.    Skin assessment is primarily focused on high risk bony prominences. Pay special attention to skin beneath and around medical devices, high risk bony prominences, skin to skin areas and areas where the patient lacks sensation to feel pain and areas where the patient previously had breakdown.     Head (Occipital):  WDL   Ears (Under Medical Devices): WDL, Dry flaky L ear   Nose (Under Medical Devices): WDL   Mouth:  WDL   Neck: WDL   Breast/Chest:  WDL   Shoulder Blades:  WDL   Spine:   WDL   (R) Arm/Elbow/Hand: Blanching   (L) Arm/Elbow/Hand: Blanching   Abdomen: WDL   Pannus/Groin:  WDL, discoloration inner thighs   Sacrum/Coccyx:   WDL   (R) Ischial Tuberosity (Sit Bones):  WDL   (L) Ischial Tuberosity (Sit Bones):  WDL   (R) Leg:  Edema, scaly    (L) Leg:  Edema, scaly   (R) Heel:  Pink, Blanching, and Edema peeling skin   (R) Foot/Toe: Pink, Blanching, and Edema peeling skin   (L) Heel: Pink, Blanching, and Edema peeling skin   (L) Foot/Toe:  Pink, Blanching, and Edema peeling skin       DEVICES IN USE:   Respiratory Devices:  NA, patient on room air  Feeding Devices:  N/A   Lines & BP Monitoring Devices:  Peripheral IV, BP cuff, and Pulse ox    Orthopedic Devices:  N/A  Miscellaneous Devices:  Telemetry monitor and Aguilar    PROTOCOL INTERVENTIONS:   ICU Low [place] Bed:  Already in place  Offloading Dressing - Sacrum:  Already in place  Offloading Dressing - Heel:  Already in place  Aguilar:  Already in place    WOUND PHOTOS:   N/A no wounds identified    WOUND CONSULT:   N/A, no advanced wound care needs identified

## 2025-07-18 NOTE — PROGRESS NOTES
Dr. Meyer notified of the new [place] numbers, Per Dr Meyer orders remove of swan and arterial line. Jeff to remain per Dr. Meyer

## 2025-07-19 LAB
ALBUMIN SERPL BCP-MCNC: 3.3 G/DL (ref 3.2–4.9)
ALBUMIN/GLOB SERPL: 1 G/DL
ALP SERPL-CCNC: 146 U/L (ref 30–99)
ALT SERPL-CCNC: 565 U/L (ref 2–50)
ANION GAP SERPL CALC-SCNC: 10 MMOL/L (ref 7–16)
AST SERPL-CCNC: 260 U/L (ref 12–45)
BILIRUB SERPL-MCNC: 1.4 MG/DL (ref 0.1–1.5)
BUN SERPL-MCNC: 20 MG/DL (ref 8–22)
CALCIUM ALBUM COR SERPL-MCNC: 9.3 MG/DL (ref 8.5–10.5)
CALCIUM SERPL-MCNC: 8.7 MG/DL (ref 8.5–10.5)
CHLORIDE SERPL-SCNC: 102 MMOL/L (ref 96–112)
CO2 SERPL-SCNC: 22 MMOL/L (ref 20–33)
CREAT SERPL-MCNC: 0.87 MG/DL (ref 0.5–1.4)
ERYTHROCYTE [DISTWIDTH] IN BLOOD BY AUTOMATED COUNT: 57.3 FL (ref 35.9–50)
GFR SERPLBLD CREATININE-BSD FMLA CKD-EPI: 98 ML/MIN/1.73 M 2
GLOBULIN SER CALC-MCNC: 3.2 G/DL (ref 1.9–3.5)
GLUCOSE SERPL-MCNC: 109 MG/DL (ref 65–99)
HCT VFR BLD AUTO: 40.9 % (ref 42–52)
HGB BLD-MCNC: 13.5 G/DL (ref 14–18)
MAGNESIUM SERPL-MCNC: 2.2 MG/DL (ref 1.5–2.5)
MCH RBC QN AUTO: 31.9 PG (ref 27–33)
MCHC RBC AUTO-ENTMCNC: 33 G/DL (ref 32.3–36.5)
MCV RBC AUTO: 96.7 FL (ref 81.4–97.8)
PHOSPHATE SERPL-MCNC: 2.6 MG/DL (ref 2.5–4.5)
PLATELET # BLD AUTO: 177 K/UL (ref 164–446)
PMV BLD AUTO: 10.6 FL (ref 9–12.9)
POTASSIUM SERPL-SCNC: 4.4 MMOL/L (ref 3.6–5.5)
PROT SERPL-MCNC: 6.5 G/DL (ref 6–8.2)
RBC # BLD AUTO: 4.23 M/UL (ref 4.7–6.1)
SODIUM SERPL-SCNC: 134 MMOL/L (ref 135–145)
WBC # BLD AUTO: 5.1 K/UL (ref 4.8–10.8)

## 2025-07-19 PROCEDURE — 99233 SBSQ HOSP IP/OBS HIGH 50: CPT | Performed by: INTERNAL MEDICINE

## 2025-07-19 PROCEDURE — A9270 NON-COVERED ITEM OR SERVICE: HCPCS

## 2025-07-19 PROCEDURE — A9270 NON-COVERED ITEM OR SERVICE: HCPCS | Performed by: INTERNAL MEDICINE

## 2025-07-19 PROCEDURE — 84100 ASSAY OF PHOSPHORUS: CPT

## 2025-07-19 PROCEDURE — 700111 HCHG RX REV CODE 636 W/ 250 OVERRIDE (IP): Mod: JZ

## 2025-07-19 PROCEDURE — 83735 ASSAY OF MAGNESIUM: CPT

## 2025-07-19 PROCEDURE — 700102 HCHG RX REV CODE 250 W/ 637 OVERRIDE(OP)

## 2025-07-19 PROCEDURE — 80053 COMPREHEN METABOLIC PANEL: CPT

## 2025-07-19 PROCEDURE — 85027 COMPLETE CBC AUTOMATED: CPT

## 2025-07-19 PROCEDURE — 770020 HCHG ROOM/CARE - TELE (206)

## 2025-07-19 PROCEDURE — 700102 HCHG RX REV CODE 250 W/ 637 OVERRIDE(OP): Performed by: INTERNAL MEDICINE

## 2025-07-19 RX ADMIN — TAMSULOSIN HYDROCHLORIDE 0.4 MG: 0.4 CAPSULE ORAL at 09:58

## 2025-07-19 RX ADMIN — ENOXAPARIN SODIUM 40 MG: 100 INJECTION SUBCUTANEOUS at 17:22

## 2025-07-19 RX ADMIN — OMEPRAZOLE 20 MG: 20 CAPSULE, DELAYED RELEASE ORAL at 05:05

## 2025-07-19 RX ADMIN — SENNOSIDES, DOCUSATE SODIUM 2 TABLET: 50; 8.6 TABLET, FILM COATED ORAL at 17:21

## 2025-07-19 RX ADMIN — SIMETHICONE 125 MG: 125 TABLET, CHEWABLE ORAL at 09:58

## 2025-07-19 RX ADMIN — DIBASIC SODIUM PHOSPHATE, MONOBASIC POTASSIUM PHOSPHATE AND MONOBASIC SODIUM PHOSPHATE 500 MG: 852; 155; 130 TABLET ORAL at 05:04

## 2025-07-19 RX ADMIN — FUROSEMIDE 20 MG: 10 INJECTION, SOLUTION INTRAVENOUS at 05:05

## 2025-07-19 RX ADMIN — FUROSEMIDE 20 MG: 10 INJECTION, SOLUTION INTRAVENOUS at 17:22

## 2025-07-19 RX ADMIN — SIMETHICONE 125 MG: 125 TABLET, CHEWABLE ORAL at 21:10

## 2025-07-19 RX ADMIN — DIBASIC SODIUM PHOSPHATE, MONOBASIC POTASSIUM PHOSPHATE AND MONOBASIC SODIUM PHOSPHATE 500 MG: 852; 155; 130 TABLET ORAL at 17:22

## 2025-07-19 RX ADMIN — SIMETHICONE 125 MG: 125 TABLET, CHEWABLE ORAL at 14:32

## 2025-07-19 RX ADMIN — ASPIRIN 81 MG: 81 TABLET, COATED ORAL at 05:04

## 2025-07-19 ASSESSMENT — ENCOUNTER SYMPTOMS
NAUSEA: 0
DOUBLE VISION: 0
CHILLS: 0
BLURRED VISION: 0
PALPITATIONS: 0
MYALGIAS: 0
CONSTIPATION: 0
FOCAL WEAKNESS: 0
HALLUCINATIONS: 0
DIZZINESS: 0
SHORTNESS OF BREATH: 0
ORTHOPNEA: 0
BACK PAIN: 0
SENSORY CHANGE: 0
EYE PAIN: 0
NECK PAIN: 0
SPUTUM PRODUCTION: 0
DIARRHEA: 0
TINGLING: 0
SPEECH CHANGE: 0
VOMITING: 0
FEVER: 0
WEIGHT LOSS: 0
HEADACHES: 0
TREMORS: 0
PHOTOPHOBIA: 0
ABDOMINAL PAIN: 0
COUGH: 0

## 2025-07-19 ASSESSMENT — PAIN DESCRIPTION - PAIN TYPE
TYPE: ACUTE PAIN

## 2025-07-19 ASSESSMENT — FIBROSIS 4 INDEX: FIB4 SCORE: 3.71

## 2025-07-19 ASSESSMENT — LIFESTYLE VARIABLES: SUBSTANCE_ABUSE: 0

## 2025-07-19 NOTE — PROGRESS NOTES
4 Eyes Skin Assessment Completed by JULIANA Jang and JULIANA Grullon.    Skin assessment is primarily focused on high risk bony prominences. Pay special attention to skin beneath and around medical devices, high risk bony prominences, skin to skin areas and areas where the patient lacks sensation to feel pain and areas where the patient previously had breakdown.     Head (Occipital):  WDL   Ears (Under Medical Devices): WDL   Nose (Under Medical Devices): WDL   Mouth:  Rash     Neck: WDL   Breast/Chest:  WDL   Shoulder Blades:  WDL   Spine:   WDL   (R) Arm/Elbow/Hand: WDL   (L) Arm/Elbow/Hand: WDL   Abdomen: WDL   Pannus/Groin:  WDL   Sacrum/Coccyx:   WDL   (R) Ischial Tuberosity (Sit Bones):  WDL   (L) Ischial Tuberosity (Sit Bones):  WDL   (R) Leg:  Edema, scaly   (L) Leg:  Edema, scaly   (R) Heel:  Pink, Blanching, and Edema, Peeling skin   (R) Foot/Toe: Pink, Blanching, and Edema   (L) Heel: Pink, Blanching, and Edema, Peeling skin   (L) Foot/Toe:  Pink, Blanching, and Edema       DEVICES IN USE:   Respiratory Devices:  Pulse ox  Feeding Devices:  N/A   Lines & BP Monitoring Devices:  Peripheral IV, BP cuff, and Pulse ox    Orthopedic Devices:  N/A  Miscellaneous Devices:  SCDs    PROTOCOL INTERVENTIONS:   [facility]:  Already in place    WOUND PHOTOS:   N/A no wounds identified    WOUND CONSULT:   N/A, no advanced wound care needs identified

## 2025-07-19 NOTE — CARE PLAN
The patient is Watcher - Medium risk of patient condition declining or worsening    Shift Goals  Clinical Goals: Monitor I/Os, Ambulate, SPO2>90  Patient Goals: Rest  Family Goals: YOEL    Progress made toward(s) clinical / shift goals:    Problem: Pain - Standard  Goal: Alleviation of pain or a reduction in pain to the patient’s comfort goal  Description: Target End Date:  Prior to discharge or change in level of care    Document on Vitals flowsheet    1.  Document pain using the appropriate pain scale per order or unit policy  2.  Educate and implement non-pharmacologic comfort measures (i.e. relaxation, distraction, massage, cold/heat therapy, etc.)  3.  Pain management medications as ordered  4.  Reassess pain after pain med administration per policy  5.  If opiods administered assess patient's response to pain medication is appropriate per POSS sedation scale  6.  Follow pain management plan developed in collaboration with patient and interdisciplinary team (including palliative care or pain specialists if applicable)  Outcome: Progressing     Problem: Hemodynamics  Goal: Patient's hemodynamics, fluid balance and neurologic status will be stable or improve  Description: Target End Date:  Prior to discharge or change in level of care    Document on Assessment and I/O flowsheet templates    1.  Monitor vital signs, pulse oximetry and cardiac monitor per provider order and/or policy  2.  Maintain blood pressure per provider order  3.  Hemodynamic monitoring per provider order  4.  Manage IV fluids and IV infusions  5.  Monitor intake and output  6.  Daily weights per unit policy or provider order  7.  Assess peripheral pulses and capillary refill  8.  Assess color and body temperature  9.  Position patient for maximum circulation/cardiac output  10. Monitor for signs/symptoms of excessive bleeding  11. Assess mental status, restlessness and changes in level of consciousness  12. Monitor temperature and report fever  or hypothermia to provider immediately. Consideration of targeted temperature management.  Outcome: Progressing     Problem: Respiratory  Goal: Patient will achieve/maintain optimum respiratory ventilation and gas exchange  Description: Target End Date:  Prior to discharge or change in level of care    Document on Assessment flowsheet    1.  Assess and monitor rate, rhythm, depth and effort of respiration  2.  Breath sounds assessed qshift and/or as needed  3.  Assess O2 saturation, administer/titrate oxygen as ordered  4.  Position patient for maximum ventilatory efficiency  5.  Turn, cough, and deep breath with splinting to improve effectiveness  6.  Collaborate with RT to administer medication/treatments per order  7.  Encourage use of incentive spirometer and encourage patient to cough after use and utilize splinting techniques if applicable  8.  Airway suctioning  9.  Monitor sputum production for changes in color, consistency and frequency  10. Perform frequent oral hygiene  11. Alternate physical activity with rest periods  Outcome: Progressing     Problem: Nutrition  Goal: Patient's nutritional and fluid intake will be adequate or improve  Description: Target End Date:  Prior to discharge or change in level of care    Document on I/O flowsheet    1.  Monitor nutritional intake  2.  Monitor weight per provider order  3.  Assess patient's ability to take oral nutrition  4.  Collaborate with Speech Therapy, Dietitian and interdisciplinary team for appropriate feeding and fluid intake  5.  Assist with feeding  Outcome: Progressing       Patient is not progressing towards the following goals:

## 2025-07-19 NOTE — CARE PLAN
The patient is Stable - Low risk of patient condition declining or worsening    Shift Goals  Clinical Goals: monitor I/Os  Patient Goals: rest  Family Goals: YOEL    Progress made toward(s) clinical / shift goals:    Problem: Pain - Standard  Goal: Alleviation of pain or a reduction in pain to the patient’s comfort goal  Outcome: Progressing     Problem: Fall Risk  Goal: Patient will remain free from falls  Outcome: Progressing     Problem: Skin Integrity  Goal: Skin integrity is maintained or improved  Outcome: Progressing  Note: Heel mepilex and pillows for floating heels utilized. Patient demonstrates ability to off load at risk locations. Education provided.

## 2025-07-19 NOTE — PROGRESS NOTES
1515 arrived to room via wheelchair. AAO x4, not in any distress. VSS, RA sats >95%. Denies pain. Oriented to room, call system, fall/safety protocols. Verbalized understanding.

## 2025-07-19 NOTE — CONSULTS
Hospital Medicine Consultation    Date of Service  7/18/2025    Referring Physician  Ascencion Meyer D.O.      Consulting Physician  Madelyn Faye M.D.    Reason for Consultation  Transfer of care    History of Presenting Illness  59 y.o. male with past medical history of HFrEF, and ICM, severely reduced ejection fraction of 15 to 20%, chronic kidney disease, history of meth use last use was approximately 6 months ago and cirrhosis who presented to the hospital on 7/15/2025 with chest pain and dyspnea.  Patient found to have acute decompensated biventricular failure, elevated liver enzymes and acute on chronic kidney disease.  He was placed on dobutamine infusion and admitted to ICU.  Now he was weaned off from dobutamine.    On July 18, 2025 intensivist Dr. Meyer requested to transfer care to hospital service.    I evaluated and examined him at the bedside.  He reported he has been having burning sensation as he is receiving IV potassium.  Overall he reported that he is feeling better.  I discussed plan of care with him and answered all his questions.    Review of Systems  Review of Systems   Constitutional:  Positive for malaise/fatigue. Negative for chills, fever and weight loss.   HENT:  Negative for hearing loss and tinnitus.    Eyes:  Negative for blurred vision, double vision, photophobia and pain.   Respiratory:  Negative for cough, sputum production and shortness of breath.    Cardiovascular:  Positive for leg swelling. Negative for chest pain, palpitations and orthopnea.   Gastrointestinal:  Negative for abdominal pain, constipation, diarrhea, nausea and vomiting.   Genitourinary:  Negative for dysuria, frequency and urgency.   Musculoskeletal:  Negative for back pain, joint pain, myalgias and neck pain.   Skin:  Negative for rash.   Neurological:  Positive for weakness. Negative for dizziness, tingling, tremors, sensory change, speech change, focal weakness and headaches.   Psychiatric/Behavioral:   Negative for hallucinations and substance abuse.    All other systems reviewed and are negative.      Past Medical History   has a past medical history of Congestive heart failure (HCC).    Surgical History   has a past surgical history that includes no pertinent past surgical history.    Family History  History reviewed. No pertinent family history.    Social History   reports that he has never smoked. He has never used smokeless tobacco. He reports that he does not currently use alcohol. He reports that he does not use drugs.    Medications  Prior to Admission Medications   Prescriptions Last Dose Informant Patient Reported? Taking?   Cyanocobalamin (VITAMIN B-12 PO) 7/15/2025 Morning Patient Yes Yes   Sig: Take 1 Capsule by mouth every day.   allopurinol (ZYLOPRIM) 300 MG Tab 7/15/2025 Morning Patient No Yes   Sig: Take 1 Tablet by mouth every day.   aspirin 81 MG EC tablet 7/15/2025 Morning Patient Yes Yes   Sig: Take 81 mg by mouth every day.   dapagliflozin propanediol (FARXIGA) 10 MG Tab 7/15/2025 Morning Patient No Yes   Sig: Take 1 Tablet by mouth every day.   docusate sodium (COLACE) 100 MG Cap 7/15/2025 Morning Patient Yes Yes   Sig: Take 100 mg by mouth 2 times a day as needed for Constipation.   furosemide (LASIX) 20 MG Tab 7/15/2025 Morning Patient Yes Yes   Sig: Take 20 mg by mouth every day.   losartan (COZAAR) 25 MG Tab 7/15/2025 Morning Patient No Yes   Sig: Take 0.5 Tablets by mouth every day.   methocarbamol (ROBAXIN) 500 MG Tab Unknown Patient No No   Sig: Take 1 Tablet by mouth 4 times a day as needed (mild pain not covered by Tylenol).   metoprolol SR (TOPROL XL) 25 MG TABLET SR 24 HR 7/15/2025 Morning Patient Yes Yes   Sig: Take 25 mg by mouth every day.   omeprazole (PRILOSEC) 20 MG delayed-release capsule  Patient No No   Sig: Take 1 Capsule by mouth every day.   potassium chloride SA (KDUR) 20 MEQ Tab CR 7/15/2025 Morning Patient No Yes   Sig: Take 1 Tablet by mouth every day.   Patient  taking differently: Take 10 mEq by mouth every day.   spironolactone (ALDACTONE) 25 MG Tab 7/15/2025 Morning Patient Yes Yes   Sig: Take 12.5 mg by mouth every day. 12.5 mg = 0.5 tablet   tamsulosin (FLOMAX) 0.4 MG capsule 7/15/2025 Morning Patient No Yes   Sig: Take 1 Capsule by mouth 1/2 hour after breakfast.      Facility-Administered Medications: None       Allergies  Allergies[1]    Physical Exam  Temp:  [36.2 °C (97.2 °F)-36.9 °C (98.4 °F)] (P) 36.3 °C (97.3 °F)  Pulse:  [] (P) 85  Resp:  [11-50] (P) 16  BP: ()/(56-84) (P) 104/63  SpO2:  [90 %-100 %] (P) 92 %    Physical Exam  Vitals reviewed.   Constitutional:       General: He is not in acute distress.     Appearance: He is ill-appearing.   HENT:      Head: Normocephalic and atraumatic. No contusion.      Right Ear: External ear normal.      Left Ear: External ear normal.      Nose: Nose normal.      Mouth/Throat:      Pharynx: No oropharyngeal exudate.   Eyes:      General:         Right eye: No discharge.         Left eye: No discharge.      Pupils: Pupils are equal, round, and reactive to light.   Cardiovascular:      Rate and Rhythm: Normal rate and regular rhythm.      Heart sounds: No murmur heard.     No friction rub. No gallop.   Pulmonary:      Effort: Pulmonary effort is normal.      Breath sounds: No wheezing or rhonchi.   Abdominal:      General: Bowel sounds are normal. There is no distension.      Palpations: Abdomen is soft.      Tenderness: There is no abdominal tenderness. There is no rebound.   Musculoskeletal:         General: No swelling or tenderness. Normal range of motion.      Cervical back: No rigidity. No muscular tenderness.      Right lower leg: Edema present.      Left lower leg: Edema present.   Skin:     General: Skin is warm and dry.      Coloration: Skin is not jaundiced.   Neurological:      General: No focal deficit present.      Mental Status: He is alert and oriented to person, place, and time.      Cranial  Nerves: No cranial nerve deficit.      Sensory: No sensory deficit.      Comments: Following commands and moving his extremities   Psychiatric:         Mood and Affect: Mood normal.         Fluids  Date 07/18/25 0700 - 07/19/25 0659   Shift 7095-3236 2838-0612 7692-1492 24 Hour Total   INTAKE   P.O. 350   350   I.V. 48.6   48.6   IV Piggyback 123.4 0.1  123.6   Shift Total 522 0.1  522.1   OUTPUT   Urine 2865 250  3115   Shift Total 2865 250  3115   Weight (kg) 61.7 61.7 61.7 61.7       Laboratory  Recent Labs     07/15/25  1929 07/17/25  2112   WBC 9.5  --    RBC 4.14*  --    HEMOGLOBIN 13.3* 12.7*   HEMATOCRIT 40.0* 38.0*   MCV 96.6  --    MCH 32.1  --    MCHC 33.3  --    RDW 56.0*  --    PLATELETCT 219  --    MPV 10.7  --      Recent Labs     07/18/25  0324 07/18/25  0750 07/18/25  1420   SODIUM 133* 134* 133*   POTASSIUM 4.0 3.8 3.8   CHLORIDE 101 100 101   CO2 22 22 20   GLUCOSE 102* 151* 130*   BUN 21 20 19   CREATININE 1.12 1.05 0.92   CALCIUM 8.3* 8.4* 8.5                     Imaging  DX-CHEST-LIMITED (1 VIEW)   Final Result      Right Mission-Kaz catheter with tip in the right lower lobe segmental pulmonary artery.      MX-[id number] VIEW   Final Result      No definite evidence for obstruction.      EC-ECHOCARDIOGRAM COMPLETE W/ CONT   Final Result      CT-ABDOMEN-PELVIS W/O   Final Result      1.  Mild ascites.   2.  Body wall anasarca.   3.  Cardiomegaly.   4.  Mild generalized bowel dilation, likely ileus.   5.  Fatty liver.         DX-CHEST-PORTABLE (1 VIEW)   Final Result      Cardiomegaly. No acute process.          Assessment/Plan  * Acute on chronic combined systolic (congestive) and diastolic (congestive) heart failure (HCC)- (present on admission)  Assessment & Plan  Acute exacerbation/decompensation of chronic left-sided systolic heart failure, cause for exacerbation unclear.  History of prior methamphetamine use with cessation, lower suspicion for acute ischemic etiology  Associated with  multiorgan dysfunction, SUZY, cardiorenal syndrome, acute ischemic hepatitis  Was able to wean off of levo  - CI 3.1, SVR 800s, off dobutamine since 1600 on 7/17  - Cardiology following  - Daily weights, I&O, bingham placed for accurate monitoring  - Reinitiation of GDMT heart failure medications as appropriate, hold acutely  - He reports target dry weight of 130 pounds  Continue IV Lasix 20 mg twice daily.  The plan is to transfer him out from ICU  Discussed plan of care with patient and answered all his questions  Discussed plan of care with intensivist.    Hyponatremia  Assessment & Plan  Mild, continue to monitor  In setting of known cirrhosis    Constipation- (present on admission)  Assessment & Plan  Continue bowel regimen    Lactic acidosis- (present on admission)  Assessment & Plan  RESOLVED    Hypoglycemia- (present on admission)  Assessment & Plan  Resolved    Hyperkalemia- (present on admission)  Assessment & Plan  Now resolved    Cardiogenic shock (HCC)- (present on admission)  Assessment & Plan  Shock now resolved    Acute renal failure superimposed on stage 3 chronic kidney disease (HCC)- (present on admission)  Assessment & Plan  Suspect acutely exacerbated due to cardiorenal syndrome  Continue IV Lasix  Continue Flomax   Avoid nephrotoxins  Medication dose regimen as per renal functions been  Order lab workup for tomorrow.    Methamphetamine abuse (HCC)- (present on admission)  Assessment & Plan  In remission    Alcoholic cirrhosis of liver with ascites (HCC)- (present on admission)  Assessment & Plan  By history, prior cessation of alcohol and remains abstinent  Current transaminitis likely related to CHF exacerbation  Follow hepatic function, LFTs closely and monitor response to inotropic therapy  Repeat hepatitis panel non-reactove  CT A/P noted with fatty liver change and mild ascites, generalized bowel dilation possible ileus.  No signs of acute abdomen on physical exam   Continue to monitor  electrolytes   Outpatient follow-up.    Transaminitis- (present on admission)  Assessment & Plan  Suspect secondary to CHF exacerbation and ischemic hepatitis acutely  Treatment as above  - Continue to trend, currently improving  Ordered CMP for tomorrow      Thank you for allow me to participate in patient care.  Hospitalist service will continue to follow this patient.    I reviewed and summarized patient hospitalization in this document.    I discussed plan of care with bedside RN in ICU.    I discussed plan of care with intensivist Dr. Meyer    High complexity medical care due to multiorgan involvement.             [1] No Known Allergies

## 2025-07-19 NOTE — PROGRESS NOTES
MONITOR SUMMARY  Rate: 's  Rhythm: SR-ST 1st HB  Ectopy: rare PVC's  Measurements:     0.21/0.1/0.4

## 2025-07-20 VITALS
TEMPERATURE: 97.9 F | DIASTOLIC BLOOD PRESSURE: 78 MMHG | RESPIRATION RATE: 18 BRPM | HEART RATE: 96 BPM | OXYGEN SATURATION: 95 % | WEIGHT: 126.76 LBS | SYSTOLIC BLOOD PRESSURE: 102 MMHG | HEIGHT: 65 IN | BODY MASS INDEX: 21.12 KG/M2

## 2025-07-20 LAB
ALBUMIN SERPL BCP-MCNC: 3.4 G/DL (ref 3.2–4.9)
ALBUMIN/GLOB SERPL: 1 G/DL
ALP SERPL-CCNC: 138 U/L (ref 30–99)
ALT SERPL-CCNC: 434 U/L (ref 2–50)
ANION GAP SERPL CALC-SCNC: 11 MMOL/L (ref 7–16)
AST SERPL-CCNC: 146 U/L (ref 12–45)
BACTERIA BLD CULT: NORMAL
BACTERIA BLD CULT: NORMAL
BILIRUB SERPL-MCNC: 1 MG/DL (ref 0.1–1.5)
BUN SERPL-MCNC: 23 MG/DL (ref 8–22)
CALCIUM ALBUM COR SERPL-MCNC: 9.5 MG/DL (ref 8.5–10.5)
CALCIUM SERPL-MCNC: 9 MG/DL (ref 8.5–10.5)
CHLORIDE SERPL-SCNC: 101 MMOL/L (ref 96–112)
CO2 SERPL-SCNC: 20 MMOL/L (ref 20–33)
CREAT SERPL-MCNC: 0.83 MG/DL (ref 0.5–1.4)
ERYTHROCYTE [DISTWIDTH] IN BLOOD BY AUTOMATED COUNT: 58.7 FL (ref 35.9–50)
GFR SERPLBLD CREATININE-BSD FMLA CKD-EPI: 100 ML/MIN/1.73 M 2
GLOBULIN SER CALC-MCNC: 3.4 G/DL (ref 1.9–3.5)
GLUCOSE SERPL-MCNC: 109 MG/DL (ref 65–99)
HCT VFR BLD AUTO: 42.7 % (ref 42–52)
HGB BLD-MCNC: 13.9 G/DL (ref 14–18)
MAGNESIUM SERPL-MCNC: 1.9 MG/DL (ref 1.5–2.5)
MCH RBC QN AUTO: 32 PG (ref 27–33)
MCHC RBC AUTO-ENTMCNC: 32.6 G/DL (ref 32.3–36.5)
MCV RBC AUTO: 98.2 FL (ref 81.4–97.8)
PHOSPHATE SERPL-MCNC: 2.9 MG/DL (ref 2.5–4.5)
PLATELET # BLD AUTO: 208 K/UL (ref 164–446)
PMV BLD AUTO: 10.6 FL (ref 9–12.9)
POTASSIUM SERPL-SCNC: 4.2 MMOL/L (ref 3.6–5.5)
PROT SERPL-MCNC: 6.8 G/DL (ref 6–8.2)
RBC # BLD AUTO: 4.35 M/UL (ref 4.7–6.1)
SIGNIFICANT IND 70042: NORMAL
SIGNIFICANT IND 70042: NORMAL
SITE SITE: NORMAL
SITE SITE: NORMAL
SODIUM SERPL-SCNC: 132 MMOL/L (ref 135–145)
SOURCE SOURCE: NORMAL
SOURCE SOURCE: NORMAL
WBC # BLD AUTO: 6.3 K/UL (ref 4.8–10.8)

## 2025-07-20 PROCEDURE — 700111 HCHG RX REV CODE 636 W/ 250 OVERRIDE (IP): Mod: JZ

## 2025-07-20 PROCEDURE — 700102 HCHG RX REV CODE 250 W/ 637 OVERRIDE(OP): Performed by: INTERNAL MEDICINE

## 2025-07-20 PROCEDURE — A9270 NON-COVERED ITEM OR SERVICE: HCPCS | Performed by: STUDENT IN AN ORGANIZED HEALTH CARE EDUCATION/TRAINING PROGRAM

## 2025-07-20 PROCEDURE — A9270 NON-COVERED ITEM OR SERVICE: HCPCS

## 2025-07-20 PROCEDURE — 85027 COMPLETE CBC AUTOMATED: CPT

## 2025-07-20 PROCEDURE — 700102 HCHG RX REV CODE 250 W/ 637 OVERRIDE(OP): Performed by: STUDENT IN AN ORGANIZED HEALTH CARE EDUCATION/TRAINING PROGRAM

## 2025-07-20 PROCEDURE — 80053 COMPREHEN METABOLIC PANEL: CPT

## 2025-07-20 PROCEDURE — A9270 NON-COVERED ITEM OR SERVICE: HCPCS | Performed by: INTERNAL MEDICINE

## 2025-07-20 PROCEDURE — 700102 HCHG RX REV CODE 250 W/ 637 OVERRIDE(OP)

## 2025-07-20 PROCEDURE — 84100 ASSAY OF PHOSPHORUS: CPT

## 2025-07-20 PROCEDURE — 83735 ASSAY OF MAGNESIUM: CPT

## 2025-07-20 PROCEDURE — 36415 COLL VENOUS BLD VENIPUNCTURE: CPT

## 2025-07-20 RX ORDER — FUROSEMIDE 20 MG/1
20 TABLET ORAL DAILY
Qty: 60 TABLET | Refills: 3 | Status: ON HOLD | OUTPATIENT
Start: 2025-07-20 | End: 2025-07-30

## 2025-07-20 RX ORDER — LOSARTAN POTASSIUM 25 MG/1
12.5 TABLET ORAL DAILY
Status: DISCONTINUED | OUTPATIENT
Start: 2025-07-20 | End: 2025-07-20 | Stop reason: HOSPADM

## 2025-07-20 RX ORDER — METOPROLOL SUCCINATE 25 MG/1
25 TABLET, EXTENDED RELEASE ORAL DAILY
Status: DISCONTINUED | OUTPATIENT
Start: 2025-07-20 | End: 2025-07-20 | Stop reason: HOSPADM

## 2025-07-20 RX ADMIN — SIMETHICONE 125 MG: 125 TABLET, CHEWABLE ORAL at 11:50

## 2025-07-20 RX ADMIN — ASPIRIN 81 MG: 81 TABLET, COATED ORAL at 05:20

## 2025-07-20 RX ADMIN — LOSARTAN POTASSIUM 12.5 MG: 25 TABLET, FILM COATED ORAL at 09:05

## 2025-07-20 RX ADMIN — OMEPRAZOLE 20 MG: 20 CAPSULE, DELAYED RELEASE ORAL at 05:20

## 2025-07-20 RX ADMIN — FUROSEMIDE 20 MG: 10 INJECTION, SOLUTION INTRAVENOUS at 05:20

## 2025-07-20 RX ADMIN — METOPROLOL SUCCINATE 25 MG: 25 TABLET, EXTENDED RELEASE ORAL at 09:06

## 2025-07-20 RX ADMIN — TAMSULOSIN HYDROCHLORIDE 0.4 MG: 0.4 CAPSULE ORAL at 09:05

## 2025-07-20 ASSESSMENT — FIBROSIS 4 INDEX: FIB4 SCORE: 3.71

## 2025-07-20 NOTE — PROGRESS NOTES
Discharge orders received. PIV and tele box removed. HF education provided, medications reviewed, all questions answered. Belongings returned to patient.

## 2025-07-20 NOTE — PROGRESS NOTES
4 Eyes Skin Assessment Completed by JULIANA james and JULIANA penn.    Skin assessment is primarily focused on high risk bony prominences. Pay special attention to skin beneath and around medical devices, high risk bony prominences, skin to skin areas and areas where the patient lacks sensation to feel pain and areas where the patient previously had breakdown.     Head (Occipital):  WDL   Ears (Under Medical Devices): WDL   Nose (Under Medical Devices): WDL   Mouth:  WDL   Neck: WDL   Breast/Chest:  WDL   Shoulder Blades:  WDL   Spine:   WDL   (R) Arm/Elbow/Hand: WDL   (L) Arm/Elbow/Hand: WDL   Abdomen: WDL   Pannus/Groin:  WDL   Sacrum/Coccyx:   WDL   (R) Ischial Tuberosity (Sit Bones):  WDL   (L) Ischial Tuberosity (Sit Bones):  WDL   (R) Leg:  DRY   (L) Leg:  DRY   (R) Heel:  WDL   (R) Foot/Toe: DRY, +1 pitting edema   (L) Heel: WDL   (L) Foot/Toe:  DRY, trace edema       DEVICES IN USE:   Respiratory Devices:  NA, patient on room air  Feeding Devices:  N/A   Lines & BP Monitoring Devices:  Peripheral IV and BP cuff    Orthopedic Devices:  N/A  Miscellaneous Devices:  Telemetry monitor    PROTOCOL INTERVENTIONS:   Standard/Trauma Bed:  Already in place    WOUND PHOTOS:   N/A no wounds identified    WOUND CONSULT:   N/A, no advanced wound care needs identified

## 2025-07-20 NOTE — PROGRESS NOTES
Bedside report received, pt care assumed, tele box on.  Pt denies any additional needs at this time. Bed in lowest position, bed alarm on, pt educated on fall risk and verbalized understanding, call light within reach.

## 2025-07-20 NOTE — CARE PLAN
The patient is Stable - Low risk of patient condition declining or worsening    Shift Goals  Clinical Goals: monitor labs, maintain oxygenation, diuresis  Patient Goals: comfort, rest, nutrition  Family Goals: YOEL    Progress made toward(s) clinical / shift goals:    Problem: Pain - Standard  Goal: Alleviation of pain or a reduction in pain to the patient’s comfort goal  Outcome: Progressing     Problem: Knowledge Deficit - Standard  Goal: Patient and family/care givers will demonstrate understanding of plan of care, disease process/condition, diagnostic tests and medications  Outcome: Progressing     Problem: Fall Risk  Goal: Patient will remain free from falls  Outcome: Progressing     Problem: Skin Integrity  Goal: Skin integrity is maintained or improved  Outcome: Progressing     Problem: Care Map:  Day Before Discharge Optimal Outcome for the Heart Failure Patient  Goal: Day Before Discharge:  Optimal Care of the heart failure patient  Outcome: Progressing     Problem: Hemodynamics  Goal: Patient's hemodynamics, fluid balance and neurologic status will be stable or improve  Outcome: Progressing     Problem: Respiratory  Goal: Patient will achieve/maintain optimum respiratory ventilation and gas exchange  Outcome: Progressing       Patient is not progressing towards the following goals:

## 2025-07-20 NOTE — PROGRESS NOTES
Monitor summary:        Rhythm: SR, ST  Rate:   Ectopy: (f)PVC  Measurements: .18/.10/.33     12hr chart check

## 2025-07-20 NOTE — DISCHARGE INSTRUCTIONS
HF Patient Discharge Instructions  Monitor your weight daily, and maintain a weight chart, to track your weight changes.   Activity as tolerated, unless your Doctor has ordered otherwise. Other activity order: NA.  Follow a low fat, low cholesterol, low salt diet unless instructed otherwise by your Doctor. Read the labels on the back of food products and track your intake of fat, cholesterol and salt.   Fluid Restriction No. If a Fluid Restriction has been ordered by your Doctor, measure fluids with a measuring cup to ensure that you are not exceeding the restriction.   No smoking.  Oxygen No. If your Doctor has ordered that you wear Oxygen at home, it is important to wear it as ordered.  Did you receive an explanation from staff on the importance of taking each of your medications and why it is necessary to keep taking them unless your doctor says to stop? Yes  Were all of your questions answered about how to manage your heart failure and what to do if you have increased signs and symptoms after you go home? Yes  Do you feel like your heart failure care team involved you in the care treatment plan and allowed you to make decisions regarding your care while in the hospital and addressed any discharge needs you might have? Yes    See the educational handout provided at discharge for more information on monitoring your daily weight, activity and diet. This also explains more about Heart Failure, symptoms of a flare-up and some of the tests that you have undergone.     Warning Signs of a Flare-Up include:  Swelling in the ankles or lower legs.  Shortness of breath, while at rest, or while doing normal activities.   Shortness of breath at night when in bed, or coughing in bed.   Requiring more pillows to sleep at night, or needing to sit up at night to sleep.  Feeling weak, dizzy or fatigued.     When to call your Doctor:  Call your Primary Care Physician or Cardiologist if:   You experience any pain radiating to your  jaw or neck.  You have any difficulty breathing.  You experience weight gain of 3 lbs in a day or 5 lbs in a week.   You feel any palpitations or irregular heartbeats.  You become dizzy or lose consciousness.   If you have had an angiogram or had a pacemaker or AICD placed, and experience:  Bleeding, drainage or swelling at the surgical / puncture site.  Fever greater than 100.0 F  Shock from internal defibrillator.  Cool and / or numb extremities.     Please access the [facility] My HF Guide/Heart Failure Interactive Workbook:   [email or web address]

## 2025-07-20 NOTE — DISCHARGE SUMMARY
Discharge Summary    CHIEF COMPLAINT ON ADMISSION  Chief Complaint   Patient presents with    Chest Pain     Reports left-sided chest pain x 2 hours.     Shortness of Breath       Reason for Admission  SOB; Syncope     Admission Date  7/15/2025    CODE STATUS  Full Code    HPI & HOSPITAL COURSE    Santino Zabala is a 59-year-old male with PMHx HFrEF, CKD, history of methamphetamine abuse last use 6 months ago, cirrhosis.  Admitted 7/15 for chest pain and dyspnea.    Prior history: Patient had worsening shortness of breath, chest pain 24 hours prior to arrival.  He developed dizziness and syncope.    In the ED: Patient was found to be in acute on chronic systolic heart failure.  He was admitted to the ICU and initiated on a dobutamine infusion.  Aggressive IV diuresis was also initiated.  Cardiology was consulted.    Blood pressures improved and dobutamine was weaned off.  Patient was transferred to IMCU on 7/18 and then to telemetry 7/19.  Patient had significant improvement in his lower extremity swelling.  He is resting comfortably on room air.  He does not have ongoing supplemental oxygen requirements.  His blood pressures have improved with an SBP ranging 110-120.    At time of discharge, I personally sat with patient and reviewed his medication list.  I explained what each medication is for and how he should be taking it.  We discussed blood pressure monitoring at home.  Patient states he has an appointment to see cardiology this week.  I have resumed GDMT with metoprolol, losartan, Farxiga.  I have held spironolactone due to hypotension.    Patient is discharged with close outpatient follow-up.    Therefore, he is discharged in fair and stable condition to home with close outpatient follow-up.    The patient met 2-midnight criteria for an inpatient stay at the time of discharge.    Discharge Date  7/20/2025    FOLLOW UP ITEMS POST DISCHARGE  Follow-up with cardiology this week  Follow-up with primary care  physician ASAP    DISCHARGE DIAGNOSES  Principal Problem:    Acute on chronic combined systolic (congestive) and diastolic (congestive) heart failure (HCC) (POA: Yes)  Active Problems:    Transaminitis (POA: Yes)    Alcoholic cirrhosis of liver with ascites (HCC) (POA: Yes)    Methamphetamine abuse (HCC) (POA: Yes)    Acute renal failure superimposed on stage 3 chronic kidney disease (HCC) (POA: Yes)    Cardiogenic shock (HCC) (POA: Yes)    High anion gap metabolic acidosis (POA: Yes)    Hyperkalemia (POA: Yes)    Hypoglycemia (POA: Yes)    Lactic acidosis (POA: Yes)    Constipation (POA: Yes)    Hyponatremia (POA: Unknown)  Resolved Problems:    Acute left-sided CHF (congestive heart failure) (HCC) (POA: Yes)      FOLLOW UP  No future appointments.  [facility]  [address] [facility] [address]  [phone]  Go on 7/25/2025  Arrive at 8:00am for ongoing heart failure treatment. This is a walk-in clinic. Patients are seen on a first come, first served basis, wait times may vary. This clinic offers a sliding-fee scale.      MEDICATIONS ON DISCHARGE     Medication List        PAUSE taking these medications        Instructions   spironolactone 25 MG Tabs  Wait to take this until: July 24, 2025  Commonly known as: Aldactone   Take 12.5 mg by mouth every day. 12.5 mg = 0.5 tablet  Dose: 12.5 mg            CONTINUE taking these medications        Instructions   allopurinol 300 MG Tabs  Commonly known as: Zyloprim   Take 1 Tablet by mouth every day.  Dose: 300 mg     aspirin 81 MG EC tablet   Take 81 mg by mouth every day.  Dose: 81 mg     dapagliflozin propanediol 10 MG Tabs  Commonly known as: Farxiga   Take 1 Tablet by mouth every day.  Dose: 10 mg     docusate sodium 100 MG Caps  Commonly known as: Colace   Take 100 mg by mouth 2 times a day as needed for Constipation.  Dose: 100 mg     furosemide 20 MG Tabs  Commonly known as: Lasix   Take 1 Tablet by mouth every day.  Dose: 20 mg     losartan 25 MG  Tabs  Commonly known as: Cozaar   Take 0.5 Tablets by mouth every day.  Dose: 12.5 mg     methocarbamol 500 MG Tabs  Commonly known as: Robaxin   Take 1 Tablet by mouth 4 times a day as needed (mild pain not covered by Tylenol).  Dose: 500 mg     metoprolol SR 25 MG Tb24  Commonly known as: Toprol XL   Take 25 mg by mouth every day.  Dose: 25 mg     omeprazole 20 MG delayed-release capsule  Commonly known as: PriLOSEC   Take 1 Capsule by mouth every day.  Dose: 20 mg     potassium chloride SA 20 MEQ Tbcr  Commonly known as: Kdur   Take 1 Tablet by mouth every day.  Dose: 20 mEq     tamsulosin 0.4 MG capsule  Commonly known as: Flomax   Take 1 Capsule by mouth 1/2 hour after breakfast.  Dose: 0.4 mg     VITAMIN B-12 PO   Take 1 Capsule by mouth every day.  Dose: 1 Capsule              Allergies  Allergies[1]    DIET  Orders Placed This Encounter   Procedures    Diet Order Diet: Cardiac     Standing Status:   Standing     Number of Occurrences:   1     Diet::   Cardiac [6]       ACTIVITY  As tolerated.  Weight bearing as tolerated    CONSULTATIONS  Critical care  Cardiology    PROCEDURES  None    LABORATORY  Lab Results   Component Value Date    SODIUM 132 (L) 07/20/2025    POTASSIUM 4.2 07/20/2025    CHLORIDE 101 07/20/2025    CO2 20 07/20/2025    GLUCOSE 109 (H) 07/20/2025    BUN 23 (H) 07/20/2025    CREATININE 0.83 07/20/2025        Lab Results   Component Value Date    WBC 6.3 07/20/2025    HEMOGLOBIN 13.9 (L) 07/20/2025    HEMATOCRIT 42.7 07/20/2025    PLATELETCT 208 07/20/2025      DX-CHEST-LIMITED (1 VIEW)   Final Result      Right [place]-Kaz catheter with tip in the right lower lobe segmental pulmonary artery.      [id number] VIEW   Final Result      No definite evidence for obstruction.      EC-ECHOCARDIOGRAM COMPLETE W/ CONT   Final Result      CT-ABDOMEN-PELVIS W/O   Final Result      1.  Mild ascites.   2.  Body wall anasarca.   3.  Cardiomegaly.   4.  Mild generalized bowel dilation, likely ileus.   5.   Fatty liver.         DX-CHEST-PORTABLE (1 VIEW)   Final Result      Cardiomegaly. No acute process.            Total time of the discharge process exceeds 41 minutes.         [1] No Known Allergies

## 2025-07-20 NOTE — HOSPITAL COURSE
Santino Zabala is a 59-year-old male with PMHx HFrEF, CKD, history of methamphetamine abuse last use 6 months ago, cirrhosis.  Admitted 7/15 for chest pain and dyspnea.    Prior history: Patient had worsening shortness of breath, chest pain 24 hours prior to arrival.  He developed dizziness and syncope.    In the ED: Patient was found to be in acute on chronic systolic heart failure.  He was admitted to the ICU and initiated on a dobutamine infusion.  Aggressive IV diuresis was also initiated.  Cardiology was consulted.    Blood pressures improved and dobutamine was weaned off.  Patient was transferred to IMCU on 7/18 and then to telemetry 7/19.  Patient had significant improvement in his lower extremity swelling.  He is resting comfortably on room air.  He does not have ongoing supplemental oxygen requirements.  His blood pressures have improved with an SBP ranging 110-120.    At time of discharge, I personally sat with patient and reviewed his medication list.  I explained what each medication is for and how he should be taking it.  We discussed blood pressure monitoring at home.  Patient states he has an appointment to see cardiology this week.  I have resumed GDMT with metoprolol, losartan, Farxiga.  I have held spironolactone due to hypotension.    Patient is discharged with close outpatient follow-up.

## 2025-07-24 ENCOUNTER — APPOINTMENT (OUTPATIENT)
Dept: RADIOLOGY | Facility: MEDICAL CENTER | Age: 60
End: 2025-07-24
Payer: MEDICAID

## 2025-07-24 ENCOUNTER — HOSPITAL ENCOUNTER (INPATIENT)
Facility: MEDICAL CENTER | Age: 60
LOS: 6 days | End: 2025-07-30
Attending: STUDENT IN AN ORGANIZED HEALTH CARE EDUCATION/TRAINING PROGRAM | Admitting: INTERNAL MEDICINE
Payer: MEDICAID

## 2025-07-24 ENCOUNTER — APPOINTMENT (OUTPATIENT)
Dept: RADIOLOGY | Facility: MEDICAL CENTER | Age: 60
End: 2025-07-24
Attending: STUDENT IN AN ORGANIZED HEALTH CARE EDUCATION/TRAINING PROGRAM
Payer: MEDICAID

## 2025-07-24 PROBLEM — N18.30 CKD (CHRONIC KIDNEY DISEASE) STAGE 3, GFR 30-59 ML/MIN: Status: ACTIVE | Noted: 2025-07-24

## 2025-07-24 PROBLEM — R73.9 HYPERGLYCEMIA: Status: ACTIVE | Noted: 2025-07-24

## 2025-07-24 PROBLEM — I50.9 HEART FAILURE (HCC): Status: ACTIVE | Noted: 2025-07-24

## 2025-07-24 PROBLEM — E87.20 NORMAL ANION GAP METABOLIC ACIDOSIS: Status: ACTIVE | Noted: 2025-07-24

## 2025-07-24 ASSESSMENT — FIBROSIS 4 INDEX: FIB4 SCORE: 2.02

## 2025-07-24 ASSESSMENT — ENCOUNTER SYMPTOMS
SPUTUM PRODUCTION: 0
FOCAL WEAKNESS: 0
NAUSEA: 0
PALPITATIONS: 0
FLANK PAIN: 0
ORTHOPNEA: 0
WEIGHT LOSS: 0
CONSTIPATION: 0
BLURRED VISION: 0
NECK PAIN: 0
SPEECH CHANGE: 0
SHORTNESS OF BREATH: 1
BRUISES/BLEEDS EASILY: 0
DIARRHEA: 0
ABDOMINAL PAIN: 1
COUGH: 0
CHILLS: 0
FEVER: 0
POLYDIPSIA: 0
TREMORS: 0
HEADACHES: 0
BACK PAIN: 0
PHOTOPHOBIA: 0
HEMOPTYSIS: 0
NERVOUS/ANXIOUS: 0
HALLUCINATIONS: 0
VOMITING: 0
HEARTBURN: 0
DOUBLE VISION: 0

## 2025-07-24 ASSESSMENT — PAIN DESCRIPTION - PAIN TYPE
TYPE: ACUTE PAIN
TYPE: ACUTE PAIN

## 2025-07-24 ASSESSMENT — LIFESTYLE VARIABLES: SUBSTANCE_ABUSE: 0

## 2025-07-25 ASSESSMENT — ENCOUNTER SYMPTOMS
FEVER: 0
SHORTNESS OF BREATH: 0
ABDOMINAL PAIN: 1
HEADACHES: 0
CHILLS: 0

## 2025-07-25 ASSESSMENT — COGNITIVE AND FUNCTIONAL STATUS - GENERAL
DRESSING REGULAR LOWER BODY CLOTHING: A LITTLE
SUGGESTED CMS G CODE MODIFIER DAILY ACTIVITY: CJ
MOVING TO AND FROM BED TO CHAIR: A LITTLE
DAILY ACTIVITIY SCORE: 21
WALKING IN HOSPITAL ROOM: A LITTLE
MOBILITY SCORE: 21
CLIMB 3 TO 5 STEPS WITH RAILING: A LITTLE
SUGGESTED CMS G CODE MODIFIER MOBILITY: CJ
HELP NEEDED FOR BATHING: A LITTLE
DRESSING REGULAR UPPER BODY CLOTHING: A LITTLE

## 2025-07-25 ASSESSMENT — SOCIAL DETERMINANTS OF HEALTH (SDOH)
WITHIN THE LAST YEAR, HAVE TO BEEN RAPED OR FORCED TO HAVE ANY KIND OF SEXUAL ACTIVITY BY YOUR PARTNER OR EX-PARTNER?: NO
WITHIN THE PAST 12 MONTHS, YOU WORRIED THAT YOUR FOOD WOULD RUN OUT BEFORE YOU GOT THE MONEY TO BUY MORE: NEVER TRUE
WITHIN THE PAST 12 MONTHS, THE FOOD YOU BOUGHT JUST DIDN'T LAST AND YOU DIDN'T HAVE MONEY TO GET MORE: SOMETIMES TRUE
WITHIN THE LAST YEAR, HAVE YOU BEEN AFRAID OF YOUR PARTNER OR EX-PARTNER?: NO
WITHIN THE LAST YEAR, HAVE YOU BEEN HUMILIATED OR EMOTIONALLY ABUSED IN OTHER WAYS BY YOUR PARTNER OR EX-PARTNER?: NO
IN THE PAST 12 MONTHS, HAS THE ELECTRIC, GAS, OIL, OR WATER COMPANY THREATENED TO SHUT OFF SERVICE IN YOUR HOME?: YES
WITHIN THE LAST YEAR, HAVE YOU BEEN KICKED, HIT, SLAPPED, OR OTHERWISE PHYSICALLY HURT BY YOUR PARTNER OR EX-PARTNER?: NO

## 2025-07-25 ASSESSMENT — FIBROSIS 4 INDEX
FIB4 SCORE: 0.88
FIB4 SCORE: 0.92

## 2025-07-25 ASSESSMENT — LIFESTYLE VARIABLES
DOES PATIENT WANT TO STOP DRINKING: NO
EVER FELT BAD OR GUILTY ABOUT YOUR DRINKING: YES
TOTAL SCORE: 4
HAVE YOU EVER FELT YOU SHOULD CUT DOWN ON YOUR DRINKING: YES
EVER HAD A DRINK FIRST THING IN THE MORNING TO STEADY YOUR NERVES TO GET RID OF A HANGOVER: YES
AVERAGE NUMBER OF DAYS PER WEEK YOU HAVE A DRINK CONTAINING ALCOHOL: 0
TOTAL SCORE: 4
ON A TYPICAL DAY WHEN YOU DRINK ALCOHOL HOW MANY DRINKS DO YOU HAVE: 0
HOW MANY TIMES IN THE PAST YEAR HAVE YOU HAD 5 OR MORE DRINKS IN A DAY: 0
HAVE PEOPLE ANNOYED YOU BY CRITICIZING YOUR DRINKING: YES
CONSUMPTION TOTAL: POSITIVE
ALCOHOL_USE: NO
TOTAL SCORE: 4

## 2025-07-25 ASSESSMENT — PATIENT HEALTH QUESTIONNAIRE - PHQ9
1. LITTLE INTEREST OR PLEASURE IN DOING THINGS: NOT AT ALL
2. FEELING DOWN, DEPRESSED, IRRITABLE, OR HOPELESS: NOT AT ALL
SUM OF ALL RESPONSES TO PHQ9 QUESTIONS 1 AND 2: 0

## 2025-07-25 ASSESSMENT — PAIN DESCRIPTION - PAIN TYPE
TYPE: ACUTE PAIN

## 2025-07-25 NOTE — CARE PLAN
The patient is Watcher - Medium risk of patient condition declining or worsening    Shift Goals  Clinical Goals: diurese  Patient Goals: comfort, rest  Family Goals: louis    Progress made toward(s) clinical / shift goals:    Problem: Pain - Standard  Goal: Alleviation of pain or a reduction in pain to the patient’s comfort goal  Outcome: Progressing  Note: Pt assessed for pain regularly and medicated PRN per MAR.      Problem: Knowledge Deficit - Standard  Goal: Patient and family/care givers will demonstrate understanding of plan of care, disease process/condition, diagnostic tests and medications  Outcome: Progressing  Note: White board updated with POC and care team information during bedside report.     Problem: Fall Risk  Goal: Patient will remain free from falls  Note: Fall precautions in place. Bed in lowest position. Non-skid socks in place. Personal possessions within reach. Mobility sign on door. Bed-alarm on. Call light within reach. Pt educated regarding fall prevention and states understanding.        Patient is not progressing towards the following goals:

## 2025-07-25 NOTE — CARE PLAN
Problem: Pain - Standard  Goal: Alleviation of pain or a reduction in pain to the patient’s comfort goal  Outcome: Progressing     Problem: Knowledge Deficit - Standard  Goal: Patient and family/care givers will demonstrate understanding of plan of care, disease process/condition, diagnostic tests and medications  Outcome: Progressing     Problem: Fall Risk  Goal: Patient will remain free from falls  Outcome: Progressing   The patient is Stable - Low risk of patient condition declining or worsening    Shift Goals  Clinical Goals: Remain free of falls, no skin breakdown  Patient Goals: Rest  Family Goals: YOEL    Progress made toward(s) clinical / shift goals:      Pt educated on plan of care, pt verbalizes understanding, reinforcement needed. Pt educated on pain/anxiety scales, alleviating factors, pain/anxiety medications, and side effects, and need for pain/anxiety reassessment, pt pain/anxiety controlled at this time, reinforcement needed. Pt educated on the need to reposition frequently and remain dry to avoid skin breakdown, reinforcement needed, no new skin breakdown during shift. Fall risk education provided to pt, pt educated about the need to call for assistance while attempting to ambulate, reinforcement needed, pt remains free of falls this shift

## 2025-07-25 NOTE — PROGRESS NOTES
Lab reported LA of 9.9, physician aware. Lab also reported K of 7.1, lab instructed to redraw to verify. Physician notified.

## 2025-07-25 NOTE — ASSESSMENT & PLAN NOTE
7/29/2025  CT of the abdomen showed no acute changes, except hepatomegaly, probably due to hepatic congestion  Symptoms since resolved  Cont to monitor

## 2025-07-25 NOTE — DISCHARGE PLANNING
Care Transition Team Assessment  Patient is a 59 year-old male admitted for Heart Failure. Please see pt's H&P for prior medical history. RNCM met with pt at bedside to complete assessment. Pt A&Ox4 and able to verify the information on the face sheet. Pt lives with mother in a single-story, [address] [place] [address] , [place] [address]. 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 Emergency contact is brotherTyree, [phone]. Prior to admission patient is independent with ADL's and IADL’s.  Pt receives monthly SSI deposits of $1500. The patient's PCP is YISEL Snyder. Patient's preferred pharmacy is [facility], [place]. Patient denies a history of SNF/IPR nor HHC use in the past. Pt denies any SA or MH concerns. Patients confirmed medical coverage with [employer].  Patient has means to transportation and brothTyree mondragon [phone] will provide transport once medically stable for discharge. RNCM discussed discharge planning. Patient reported pt's goal is to return home once medically stable.        Information Source  Orientation Level: Oriented X4  Information Given By: Patient  Informant's Name: Santino Zabala  Who is responsible for making decisions for patient? : Patient    Readmission Evaluation  Is this a readmission?: No    Elopement Risk  Legal Hold: No  Ambulatory or Self Mobile in Wheelchair: No-Not an Elopement Risk  Elopement Risk: Not at Risk for Elopement    Interdisciplinary Discharge Planning  Lives with - Patient's Self Care Capacity: Parents  Patient or legal guardian wants to designate a caregiver: No  Support Systems: Family Member(s)  Housing / Facility: [address]    Discharge Preparedness  What is your plan after discharge?: Home with help  What are your discharge supports?: Sibling (BrotherTyree [phone])  Prior Functional Level: Drives Self, Independent with Activities of Daily Living, Independent with Medication Management  Difficulity with ADLs: None  Difficulity  with IADLs: None    Functional Assesment  Prior Functional Level: Drives Self, Independent with Activities of Daily Living, Independent with Medication Management         Vision / Hearing Impairment  Vision Impairment : No  Hearing Impairment : No         Advance Directive  Advance Directive?: None  Advance Directive offered?: AD Booklet given    Domestic Abuse  Have you ever been the victim of abuse or violence?: No  Possible Abuse/Neglect Reported to:: Not Applicable    Psychological Assessment  History of Substance Abuse: None  History of Psychiatric Problems: No  Newly Diagnosed Illness: No    Discharge Risks or Barriers  Discharge risks or barriers?: Complex medical needs  Patient risk factors: Complex medical needs    Anticipated Discharge Information  Discharge Disposition: Discharged to home/self care (01)  Discharge Address: [facility] [place] [address] [address]  Discharge Contact Phone Number: [phone]      Case Management Discharge Planning    Admission Date: 7/24/2025  GMLOS: 3.9  ALOS: 1    6-Clicks ADL Score: 21  6-Clicks Mobility Score: 21      Anticipated Discharge Dispo: Discharge Disposition: Discharged to home/self care (01)  Discharge Address: [facility] [place] [address] [address]  Discharge Contact Phone Number: [phone]    DME Needed: No    Action(s) Taken: DC Assessment Complete (See below)    Escalations Completed: None    Medically Clear: No    Next Steps: RNCM to follow for DC needs    Barriers to Discharge: Medical clearance    Is the patient up for discharge tomorrow: No

## 2025-07-25 NOTE — PROGRESS NOTES
Report received from night RN at 0700. PT seen at bedside and pt care assumed. Pt is A&Ox4, on 2L NC, states pain is 7/10. PIV flushed and intact. PT is ST on telemetry. PT is x1 assist.     Plan of care reviewed with pt, call light, phone, and personal belongings within reach. Bed in low locked position. All pt's needs met at this time.    Bedside report received from off going RN/tech: JULIANA Brooke, assumed care of patient.     Fall Risk Score: HIGH RISK  Fall risk interventions in place: Place yellow fall risk ID band on patient, Provide patient/family education based on risk assessment, Educate patient/family to call staff for assistance when getting out of bed, Place fall precaution signage outside patient door, Utilize bed/chair fall alarm, Notify charge of high risk for huddle, and Bed alarm connected correctly  Bed type: Regular ([place] Score less than 17 interventions in place)  Patient on cardiac monitor: Yes  IVF/IV medications: Not Applicable   Oxygen: How many liters 2L, Traced the line to wall oxygen, and No oxygen tank in room  Bedside sitter: Not Applicable   Isolation: Not applicable

## 2025-07-25 NOTE — ED PROVIDER NOTES
ED Provider Note    CHIEF COMPLAINT  Chief Complaint   Patient presents with    Abdominal Pain     Intermittent mid abd pain x24 hrs    Denies n/v        EXTERNAL RECORDS REVIEWED  Inpatient Notes patient discharged from hospital 7/20/2025 after admission 5 days prior to that in the setting of chest pain, shortness of breath.  Notably with history of heart failure with reduced ejection fraction, chronic kidney disease, methamphetamine abuse last use 6 months prior, cirrhosis.  He was found to be in acute on chronic systolic heart failure initially admitted to the ICU on dobutamine necessitating aggressive IV diuresis.    HPI/ROS  LIMITATION TO HISTORY   Select: : None      Santino Zabala is a 59 y.o. male who presents to the emergency department for evaluation of abdominal pain.  The patient reports the pain started this morning and has been increasing in severity throughout the day.  He reports pain predominantly in the epigastric area and right upper quadrant that feels similar to pain he has had in the past but this is more severe.  States it is worse with movement or deep breathing.  He has not had nausea or vomiting or diarrhea though did have 1 episode of stool incontinence earlier in the day.  He reports feeling somewhat lightheaded but denies syncope today though does report he has had 2 syncopal episodes this week.  He reports adherence to his medications with no missed doses.    PAST MEDICAL HISTORY   has a past medical history of Congestive heart failure (HCC).    SURGICAL HISTORY   has a past surgical history that includes no pertinent past surgical history.    FAMILY HISTORY  History reviewed. No pertinent family history.    SOCIAL HISTORY  Social History     Tobacco Use    Smoking status: Never    Smokeless tobacco: Never   Vaping Use    Vaping status: Never Used   Substance and Sexual Activity    Alcohol use: Not Currently    Drug use: Never    Sexual activity: Not on file       CURRENT  "MEDICATIONS  Home Medications       Reviewed by Na Putnam R.N. (Registered Nurse) on 07/24/25 at 1847  Med List Status: Not Addressed     Medication Last Dose Status   allopurinol (ZYLOPRIM) 300 MG Tab  Active   aspirin 81 MG EC tablet  Active   Cyanocobalamin (VITAMIN B-12 PO)  Active   dapagliflozin propanediol (FARXIGA) 10 MG Tab  Active   docusate sodium (COLACE) 100 MG Cap  Active   furosemide (LASIX) 20 MG Tab  Active   losartan (COZAAR) 25 MG Tab  Active   methocarbamol (ROBAXIN) 500 MG Tab  Active   metoprolol SR (TOPROL XL) 25 MG TABLET SR 24 HR  Active   omeprazole (PRILOSEC) 20 MG delayed-release capsule  Active   potassium chloride SA (KDUR) 20 MEQ Tab CR  Active   spironolactone (ALDACTONE) 25 MG Tab  Active   tamsulosin (FLOMAX) 0.4 MG capsule  Active                    ALLERGIES  Allergies[1]    PHYSICAL EXAM  VITAL SIGNS: /77   Pulse (!) 104   Temp 36.3 °C (97.4 °F) (Temporal)   Resp 20   Ht 1.651 m (5' 5\")   Wt 56.7 kg (125 lb)   SpO2 100%   BMI 20.80 kg/m²    Constitutional: No acute distress, anxious and uncomfortable appearing  HEENT: Atraumatic, normocephalic, pupils are equal round reactive to light, nose normal, mouth shows dry mucous membranes  Cardiovascular: Tachycardic, regular rhythm  Thorax & Lungs: Tachypneic with crackles in the bases bilaterally  GI: Soft, non-distended, tender epigastrium and right upper quadrant  Skin: Warm, dry, no acute rash or lesion  Musculoskeletal: Moving all extremities, no acute deformity, 3+ symmetric lower extremity edema, no tenderness  Neurologic: A&Ox3, at baseline mentation  Psychiatric: Appropriate affect for situation at this time      EKG/LABS  Labs Reviewed   CBC WITH DIFFERENTIAL - Abnormal; Notable for the following components:       Result Value    RBC 4.32 (*)     Hemoglobin 13.8 (*)     Hematocrit 41.9 (*)     RDW 61.0 (*)     Neutrophils-Polys 74.80 (*)     Lymphocytes 11.80 (*)     Monos (Absolute) 0.88 (*)     All " other components within normal limits   COMP METABOLIC PANEL - Abnormal; Notable for the following components:    Co2 13 (*)     Glucose 165 (*)     Bun 30 (*)     AST(SGOT) 62 (*)     ALT(SGPT) 210 (*)     Alkaline Phosphatase 133 (*)     Total Bilirubin 1.6 (*)     Globulin 3.8 (*)     All other components within normal limits   URINALYSIS - Abnormal; Notable for the following components:    Protein 30 (*)     All other components within normal limits   TROPONIN - Abnormal; Notable for the following components:    Troponin T 43 (*)     All other components within normal limits   PROBRAIN NATRIURETIC PEPTIDE, NT - Abnormal; Notable for the following components:    NT-proBNP 42580 (*)     All other components within normal limits   TROPONIN - Abnormal; Notable for the following components:    Troponin T 46 (*)     All other components within normal limits   URINE MICROSCOPIC (W/UA) - Abnormal; Notable for the following components:    Urine Casts 3-5 (*)     All other components within normal limits   CBC WITH DIFFERENTIAL - Abnormal; Notable for the following components:    RBC 4.43 (*)     MCV 98.6 (*)     RDW 63.4 (*)     Neutrophils-Polys 79.20 (*)     Lymphocytes 8.30 (*)     Lymphs (Absolute) 0.70 (*)     Monos (Absolute) 0.93 (*)     All other components within normal limits   COMP METABOLIC PANEL - Abnormal; Notable for the following components:    Co2 16 (*)     Anion Gap 17.0 (*)     Glucose 150 (*)     Bun 32 (*)     AST(SGOT) 55 (*)     ALT(SGPT) 190 (*)     Alkaline Phosphatase 128 (*)     Total Bilirubin 1.8 (*)     Globulin 3.7 (*)     All other components within normal limits   LACTIC ACID - Abnormal; Notable for the following components:    Lactic Acid 3.7 (*)     All other components within normal limits   VENOUS BLOOD GAS - Abnormal; Notable for the following components:    Venous Bg Pco2 32.3 (*)     Venous Bg Pco2 Temp Corrected 31.9 (*)     Venous Bg O2 Saturation 44.0 (*)     Venous Bg Hco3 17  (*)     Venous Bg Base Excess -7 (*)     All other components within normal limits   LIPASE   ESTIMATED GFR   BETA-HYDROXYBUTYRIC ACID   ESTIMATED GFR     Results for orders placed or performed during the hospital encounter of 25   EKG   Result Value Ref Range    Report       [facility]    Test Date:  2025  Pt Name:    TREVON HAUSER                Department: ER  MRN:        [id number]                      Room:  Gender:     Male                         Technician: [id number]  :        1965                   Requested By:ER TRIAGE PROTOCOL  Order #:    [id number]                    Reading MD: Filiberto Ayala    Measurements  Intervals                                Axis  Rate:       110                          P:          66  ME:         181                          QRS:        166  QRSD:       110                          T:          -21  QT:         348  QTc:        471    Interpretive Statements  Sinus tachycardia at a rate of 110, left anterior fascicular block,  first-degree AV block, otherwise normal intervals, J-point elevation in the  anterior precordial leads, LVH with secondary repolarization abnormality.  No  STEMI.  No significant change from prior EKG.  Electronically Signed On 2025 21:50:54 PDT  by Filiberto Ayala         I have independently interpreted this EKG    RADIOLOGY/PROCEDURES   I have independently interpreted the diagnostic imaging associated with this visit and am waiting the final reading from the radiologist.   My preliminary interpretation is as follows: CT scan with no hemoperitoneum or pneumoperitoneum but does demonstrate some small volume ascites.  Chest x-ray with no clear pneumonia    Radiologist interpretation:  DX-CHEST-PORTABLE (1 VIEW)   Final Result         1.  Left basilar atelectasis, no focal infiltrate   2.  Cardiomegaly      CT-ABDOMEN-PELVIS WITH   Final Result         1.  Hepatomegaly and changes of cirrhosis.    2.  Mild scattered abdominal ascites, greatest in the pelvis.   3.  Indeterminate left adrenal nodule, follow-up adrenal protocol CT for further characterization as clinically appropriate.   4.  Cardiomegaly.   5.  Atherosclerosis and atherosclerotic coronary artery disease.                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  He appears grossly fluid overloaded and is tachycardic.  He is not hypoxic on arrival but did necessitate 2 L supplemental oxygen while being worked up.  His abdomen is quite tender on examination.  Differential diagnosis considered includes pancreatitis, cholecystitis, choledocholithiasis, bowel obstruction, hepatic congestion in the setting of his heart failure with exacerbation.  An EKG was obtained demonstrating no STEMI.  Chest x-ray with no significant pneumonia, pulmonary edema or pneumothorax.  Patient ordered for laboratory assessment as well as CT scan of the abdomen pelvis with contrast to further assess.  His labs ultimately demonstrated no significant leukocytosis though he does have a left shift.  He is chronically mildly anemic which is unchanged.  CMP demonstrates unchanged elevation in transaminases and slight elevation in alkaline phosphatase and total bilirubin.  BNP is quite elevated at 14,000 which is far above that at the time of his discharge and his weight is increased as well.  Lipase is normal arguing against pancreatitis.  Serial troponin enzymes are stable at patient's baseline and urinalysis is without blood or infection.  CT scan ultimately did not demonstrate any acute cause of the patient's abdominal pain.  He does have trace abdominal ascites but no pocket amenable to bedside diagnostic paracentesis.  I feel SBP is less likely.  Overall I suspect the  majority of his symptoms are caused by anxiety and his severe underlying heart failure.  IV Lasix therapy was provided and he was admitted to the hospital for ongoing treatment.  Case discussed with Dr. Lee, hospitalist who accepts for admission.    ADDITIONAL PROBLEMS MANAGED  None    DISPOSITION AND DISCUSSIONS  I have discussed management of the patient with the following physicians and FRITZ's: Hospitalist as above      FINAL DIAGNOSIS  1. Acute on chronic combined systolic and diastolic congestive heart failure (HCC)    2. Hypervolemia, unspecified hypervolemia type    3. Right upper quadrant abdominal pain         Electronically signed by: Filiberto Ayala M.D., 7/24/2025 9:49 PM           [1] No Known Allergies

## 2025-07-25 NOTE — ED NOTES
Med Rec completed per patient    Allergies reviewed: yes  Oral antibiotics in the past 30 days: no  Anticoagulant in past 14 days: no  Patient takes Aspirin 81 mg daily. Last dose: 07/24/2025 AM      Pharmacy patient utilizes: [facility]  [phone]

## 2025-07-25 NOTE — PROGRESS NOTES
[facility] Medicine Daily Progress Note    Date of Service  7/25/2025    Chief Complaint  Santino Zabala is a 59 y.o. male admitted 7/24/2025 with abdominal pain and shortness of breath    Hospital Course  No notes on file    Interval Problem Update  7/25  No acute events overnight, vital signs stable other than mild tachycardia  Patient admitted last night by my colleague and started on diuresis for acute on chronic heart failure  Lactic acid was elevated on admission at 3.7, I rechecked it this afternoon and unfortunately it has gone up to 7.8, will continue to monitor closely and repeat to ensure that this is an accurate result  I have held off on further diuresis and started on a gentle bicarb infusion, will avoid bolus at this time given his heart failure and requiring initial diuresis.  Unclear etiology for lactic acidosis, no signs of infection  Overall patient reports feeling much improved and looks fairly well clinically, we will continue to monitor closely    I have discussed this patient's plan of care and discharge plan at IDT rounds today with Case Management, Nursing, Nursing leadership, and other members of the IDT team.    Consultants/Specialty      Code Status  Full Code    Disposition  The patient is not medically cleared for discharge to home or a post-acute facility.      I have placed the appropriate orders for post-discharge needs.    Review of Systems  Review of Systems   Constitutional:  Negative for chills and fever.   Respiratory:  Negative for shortness of breath.    Cardiovascular:  Negative for chest pain.   Gastrointestinal:  Positive for abdominal pain.   Neurological:  Negative for headaches.        Physical Exam  Temp:  [35.7 °C (96.3 °F)-36.7 °C (98.1 °F)] 36.6 °C (97.9 °F)  Pulse:  [] 102  Resp:  [14-27] 20  BP: (104-134)/(73-91) 112/79  SpO2:  [87 %-100 %] 98 %    Physical Exam  Constitutional:       General: He is not in acute distress.     Appearance: He is not  diaphoretic.   HENT:      Head: Normocephalic and atraumatic.      Nose: Nose normal.      Mouth/Throat:      Mouth: Mucous membranes are moist.      Pharynx: Oropharynx is clear.   Eyes:      Extraocular Movements: Extraocular movements intact.   Cardiovascular:      Rate and Rhythm: Regular rhythm. Tachycardia present.   Pulmonary:      Breath sounds: Normal breath sounds.   Musculoskeletal:         General: Normal range of motion.   Skin:     General: Skin is warm.   Neurological:      General: No focal deficit present.      Mental Status: He is oriented to person, place, and time.         Fluids    Intake/Output Summary (Last 24 hours) at 7/25/2025 1434  Last data filed at 7/25/2025 0300  Gross per 24 hour   Intake --   Output 200 ml   Net -200 ml        Laboratory  Recent Labs     07/24/25 1913 07/25/25  0056   WBC 8.7 8.4   RBC 4.32* 4.43*   HEMOGLOBIN 13.8* 14.2   HEMATOCRIT 41.9* 43.7   MCV 97.0 98.6*   MCH 31.9 32.1   MCHC 32.9 32.5   RDW 61.0* 63.4*   PLATELETCT 292 278   MPV 10.3 10.5     Recent Labs     07/24/25 1913 07/25/25  0056   SODIUM 135 135   POTASSIUM 4.9 5.5   CHLORIDE 107 102   CO2 13* 16*   GLUCOSE 165* 150*   BUN 30* 32*   CREATININE 1.14 1.28   CALCIUM 9.1 9.0                   Imaging  DX-CHEST-PORTABLE (1 VIEW)   Final Result         1.  Left basilar atelectasis, no focal infiltrate   2.  Cardiomegaly      CT-ABDOMEN-PELVIS WITH   Final Result         1.  Hepatomegaly and changes of cirrhosis.   2.  Mild scattered abdominal ascites, greatest in the pelvis.   3.  Indeterminate left adrenal nodule, follow-up adrenal protocol CT for further characterization as clinically appropriate.   4.  Cardiomegaly.   5.  Atherosclerosis and atherosclerotic coronary artery disease.                 Assessment/Plan  * Acute on chronic combined systolic (congestive) and diastolic (congestive) heart failure (HCC)- (present on admission)  Assessment & Plan  59-year-old male who recently was admitted with  cardiogenic shock secondary to combined systolic and diastolic heart failure, presented with worsening of lower extremity edema, shortness of breath and abdominal pain. chest x-ray showed cardiomegaly  Plan: IV Lasix 40 mg twice daily  Continue spironolactone, metoprolol, hold losartan to allow room for diuresis  Monitor input and output and daily weight.  2 g salt restriction    Normal anion gap metabolic acidosis  Assessment & Plan  13, anion gap 15  Unclear etiology, possibly due to CKD.  Will check lactic acid, BHB and venous blood gases.  Consider bicarb supplementation    Hyperglycemia  Assessment & Plan  Glucose 165  Monitor, consider insulin sliding scale.    CKD (chronic kidney disease) stage 3, GFR 30-59 ml/min  Assessment & Plan  Avoid nephrotoxins    Lactic acidosis- (present on admission)  Assessment & Plan  3.7 on admission and on recheck afternoon of 7/25 and is up to 7.8, I am repeating this lab to ensure accuracy.  Holding off on further diuresis and will give gentle bicarb infusion, closely monitor BMP and lactic acid  Will check procalcitonin however no overt signs of infection and patient does appear clinically improved and reports feeling improved however given this marked elevation we will monitor closely and have low threshold to treat for any other underlying causes.  I reviewed medications and I do not see any medication he was taking that could contribute to this.    Abdominal pain- (present on admission)  Assessment & Plan  CT of the abdomen showed no acute changes, except hepatomegaly, probably due to hepatic congestion  IV Lasix started for diuresis on admission however holding off on further diuresis given his lactic acidosis  Diet as tolerated    Acute hypoxic respiratory failure (HCC)- (present on admission)  Assessment & Plan  Improving  Initially 87% on room air, placed on 2 L nasal cannula  Chest x-ray showed atelectasis and cardiomegaly  Continuous pulse ox, supplemental  oxygen  Wean off oxygen as able    Transaminitis- (present on admission)  Assessment & Plan  , AST 62.  Appears chronic, improving.  History of liver cirrhosis         VTE prophylaxis: VTE Selection    I have performed a physical exam and reviewed and updated ROS and Plan today (7/25/2025). In review of yesterday's note (7/24/2025), there are no changes except as documented above.

## 2025-07-25 NOTE — ASSESSMENT & PLAN NOTE
7/29/2025  59-year-old male who recently was admitted with cardiogenic shock secondary to combined systolic and diastolic heart failure, presented with worsening of lower extremity edema, shortness of breath and abdominal pain. chest x-ray showed cardiomegaly  Plan: IV Lasix 40 mg twice daily  Continue spironolactone, metoprolol, hold losartan to allow room for diuresis  Monitor input and output and daily weight.  2 g salt restriction

## 2025-07-25 NOTE — ED TRIAGE NOTES
"Chief Complaint   Patient presents with    Abdominal Pain     Intermittent mid abd pain x24 hrs    Denies n/v    Hx of CHF     Pt appears uncomfortable in triage    Pt is alert/oriented and follows commands. Pt speaking in full sentences and responds appropriately to questions. .      Pt placed in lobby and educated on triage process. Pt encouraged to alert staff for any changes in condition.    /77   Pulse (!) 105   Temp 36.3 °C (97.4 °F) (Temporal)   Resp 14   Ht 1.651 m (5' 5\")   Wt 56.7 kg (125 lb)   SpO2 96%   BMI 20.80 kg/m²      "

## 2025-07-25 NOTE — ASSESSMENT & PLAN NOTE
7/29/2025  13, anion gap 15  Unclear etiology, possibly due to CKD.  Will check lactic acid, BHB and venous blood gases.  Consider bicarb supplementation

## 2025-07-25 NOTE — PROGRESS NOTES
4 Eyes Skin Assessment Completed by JULIANA Brooke and JULIANA Masters.    Skin assessment is primarily focused on high risk bony prominences. Pay special attention to skin beneath and around medical devices, high risk bony prominences, skin to skin areas and areas where the patient lacks sensation to feel pain and areas where the patient previously had breakdown.     Head (Occipital):  WDL   Ears (Under Medical Devices): WDL   Nose (Under Medical Devices): WDL   Mouth:  WDL   Neck: Scab   Breast/Chest:  WDL   Shoulder Blades:  WDL   Spine:   WDL   (R) Arm/Elbow/Hand: Scab   (L) Arm/Elbow/Hand: WDL   Abdomen: WDL   Pannus/Groin:  WDL   Sacrum/Coccyx:   WDL   (R) Ischial Tuberosity (Sit Bones):  WDL   (L) Ischial Tuberosity (Sit Bones):  WDL   (R) Leg:  Edema   (L) Leg:  Edema   (R) Heel:  Edema and peeling, flaky   (R) Foot/Toe: Edema   (L) Heel: Edema and peeling, flaky   (L) Foot/Toe:  Edema       DEVICES IN USE:   Respiratory Devices:  Nasal cannula  Feeding Devices:  N/A   Lines & BP Monitoring Devices:  Peripheral IV, BP cuff, and Pulse ox    Orthopedic Devices:  N/A  Miscellaneous Devices:  Telemetry monitor    PROTOCOL INTERVENTIONS:   Standard/Trauma Bed:  Already in place  Nasal Cannula with Gray Foams:  Already in place    WOUND PHOTOS:   Completed and in EPIC       Old central line site    WOUND CONSULT:   N/A, no advanced wound care needs identified

## 2025-07-25 NOTE — H&P
Hospital Medicine History & Physical Note    Date of Service  7/24/2025    Primary Care Physician  YISEL Snyder, P.A.-C.      Code Status  Full Code    Chief Complaint  Chief Complaint   Patient presents with    Abdominal Pain     Intermittent mid abd pain x24 hrs    Denies n/v        History of Presenting Illness  Santino Zabala is a 59 y.o. male with history of dilated cardiomyopathy, heart failure with reduced EF, CKD 3, methamphetamine abuse, liver cirrhosis, gout pulmonary hypertension, who presented 7/24/2025 with complaints of epigastric, right upper quadrant pain since a.m, worse with movement and deep breathing, worsening of bilateral lower extremity edema, shortness of breath.  He denies nausea vomiting or diarrhea.  he reported compliance with medications and denies using methamphetamine recently.  Desaturated to 87% on room air and was placed on 2 L nasal cannula.  Chest x-ray: Left basilar atelectasis, cardiomegaly.  CT of the abdomen and pelvis with contrast: Hepatomegaly with cirrhosis, scattered mild ascites, cardiomegaly, indeterminate left adrenal nodule.  No acute changes  EKG showed sinus tachycardia with heart rate 110, T wave inversion in lateral leads  Per chart review he was admitted from 7/15 to 7/20 with cardiogenic shock requiring dobutamine infusion in ICU, was discharged on IV Lasix 20 mg once a day, spironolactone, losartan, metoprolol.  Treatment included IV Lasix 40 mg once, IV Dilaudid 0.5 mg  I discussed the plan of care with patient, bedside RN, and ERP.    Review of Systems  Review of Systems   Constitutional:  Negative for chills, fever and weight loss.   HENT:  Negative for ear pain, hearing loss and tinnitus.    Eyes:  Negative for blurred vision, double vision and photophobia.   Respiratory:  Positive for shortness of breath. Negative for cough, hemoptysis and sputum production.    Cardiovascular:  Positive for leg swelling. Negative for chest pain, palpitations and  orthopnea.   Gastrointestinal:  Positive for abdominal pain. Negative for constipation, diarrhea, heartburn, nausea and vomiting.   Genitourinary:  Negative for dysuria, flank pain, frequency and hematuria.   Musculoskeletal:  Positive for joint pain. Negative for back pain and neck pain.   Skin:  Negative for itching and rash.   Neurological:  Negative for tremors, speech change, focal weakness and headaches.   Endo/Heme/Allergies:  Negative for environmental allergies and polydipsia. Does not bruise/bleed easily.   Psychiatric/Behavioral:  Negative for hallucinations and substance abuse. The patient is not nervous/anxious.        Past Medical History   has a past medical history of Congestive heart failure (HCC).    Surgical History   has a past surgical history that includes no pertinent past surgical history.     Family History  History reviewed. No pertinent family history.     Family history reviewed with patient. There is no family history that is pertinent to the chief complaint.     Social History   reports that he has never smoked. He has never used smokeless tobacco. He reports that he does not currently use alcohol. He reports that he does not use drugs.    Allergies  Allergies[1]    Medications  Prior to Admission Medications   Prescriptions Last Dose Informant Patient Reported? Taking?   Cyanocobalamin (VITAMIN B-12 PO)  Patient Yes No   Sig: Take 1 Capsule by mouth every day.   allopurinol (ZYLOPRIM) 300 MG Tab  Patient No No   Sig: Take 1 Tablet by mouth every day.   aspirin 81 MG EC tablet  Patient Yes No   Sig: Take 81 mg by mouth every day.   dapagliflozin propanediol (FARXIGA) 10 MG Tab  Patient No No   Sig: Take 1 Tablet by mouth every day.   docusate sodium (COLACE) 100 MG Cap  Patient Yes No   Sig: Take 100 mg by mouth 2 times a day as needed for Constipation.   furosemide (LASIX) 20 MG Tab   No No   Sig: Take 1 Tablet by mouth every day.   losartan (COZAAR) 25 MG Tab  Patient No No   Sig:  Take 0.5 Tablets by mouth every day.   methocarbamol (ROBAXIN) 500 MG Tab  Patient No No   Sig: Take 1 Tablet by mouth 4 times a day as needed (mild pain not covered by Tylenol).   metoprolol SR (TOPROL XL) 25 MG TABLET SR 24 HR  Patient Yes No   Sig: Take 25 mg by mouth every day.   omeprazole (PRILOSEC) 20 MG delayed-release capsule  Patient No No   Sig: Take 1 Capsule by mouth every day.   potassium chloride SA (KDUR) 20 MEQ Tab CR  Patient No No   Sig: Take 1 Tablet by mouth every day.   Patient taking differently: Take 10 mEq by mouth every day.   spironolactone (ALDACTONE) 25 MG Tab  Patient Yes No   Sig: Take 12.5 mg by mouth every day. 12.5 mg = 0.5 tablet   tamsulosin (FLOMAX) 0.4 MG capsule  Patient No No   Sig: Take 1 Capsule by mouth 1/2 hour after breakfast.      Facility-Administered Medications: None       Physical Exam  Temp:  [36.3 °C (97.4 °F)] 36.3 °C (97.4 °F)  Pulse:  [] 94  Resp:  [14-27] 15  BP: (104-134)/(77-91) 104/78  SpO2:  [87 %-100 %] 96 %  Blood Pressure: 104/78   Temperature: 36.3 °C (97.4 °F)   Pulse: 94   Respiration: 15   Pulse Oximetry: 96 %       Physical Exam  Vitals and nursing note reviewed.   Constitutional:       General: He is not in acute distress.     Appearance: Normal appearance.   HENT:      Head: Normocephalic and atraumatic.      Nose: Nose normal.      Mouth/Throat:      Mouth: Mucous membranes are moist.   Eyes:      Extraocular Movements: Extraocular movements intact.      Pupils: Pupils are equal, round, and reactive to light.   Cardiovascular:      Rate and Rhythm: Normal rate and regular rhythm.   Pulmonary:      Effort: Pulmonary effort is normal.      Breath sounds: Examination of the right-lower field reveals decreased breath sounds. Examination of the left-lower field reveals decreased breath sounds. Decreased breath sounds and rales present.   Abdominal:      General: Abdomen is flat. There is no distension.      Tenderness: There is no abdominal  tenderness.   Musculoskeletal:         General: No swelling or deformity. Normal range of motion.      Cervical back: Normal range of motion and neck supple.      Right lower leg: Edema present.      Left lower leg: Edema present.   Skin:     General: Skin is warm and dry.   Neurological:      General: No focal deficit present.      Mental Status: He is alert and oriented to person, place, and time.   Psychiatric:         Mood and Affect: Mood normal.         Behavior: Behavior normal.         Laboratory:  Recent Labs     07/24/25 1913   WBC 8.7   RBC 4.32*   HEMOGLOBIN 13.8*   HEMATOCRIT 41.9*   MCV 97.0   MCH 31.9   MCHC 32.9   RDW 61.0*   PLATELETCT 292   MPV 10.3     Recent Labs     07/24/25 1913   SODIUM 135   POTASSIUM 4.9   CHLORIDE 107   CO2 13*   GLUCOSE 165*   BUN 30*   CREATININE 1.14   CALCIUM 9.1     Recent Labs     07/24/25 1913   ALTSGPT 210*   ASTSGOT 62*   ALKPHOSPHAT 133*   TBILIRUBIN 1.6*   LIPASE 31   GLUCOSE 165*         Recent Labs     07/24/25 1913   NTPROBNP 12132*         Recent Labs     07/24/25 1913 07/24/25  2211   TROPONINT 43* 46*       Imaging:  DX-CHEST-PORTABLE (1 VIEW)   Final Result         1.  Left basilar atelectasis, no focal infiltrate   2.  Cardiomegaly      CT-ABDOMEN-PELVIS WITH   Final Result         1.  Hepatomegaly and changes of cirrhosis.   2.  Mild scattered abdominal ascites, greatest in the pelvis.   3.  Indeterminate left adrenal nodule, follow-up adrenal protocol CT for further characterization as clinically appropriate.   4.  Cardiomegaly.   5.  Atherosclerosis and atherosclerotic coronary artery disease.                X-Ray:  I have personally reviewed the images and compared with prior images.    Assessment/Plan:  Justification for Admission Status  I anticipate this patient will require at least two midnights for appropriate medical management, necessitating inpatient admission because acute on chronic systolic heart failure    Patient will need a  Telemetry bed on MEDICAL service .  The need is secondary to   Acute on chronic systolic heart failure  * Acute on chronic combined systolic (congestive) and diastolic (congestive) heart failure (HCC)- (present on admission)  Assessment & Plan  59-year-old male who recently was admitted with cardiogenic shock secondary to combined systolic and diastolic heart failure, presented with worsening of lower extremity edema, shortness of breath and abdominal pain. chest x-ray showed cardiomegaly  Plan: IV Lasix 40 mg twice daily  Continue spironolactone, metoprolol, hold losartan to allow room for diuresis  Monitor input and output and daily weight.  2 g salt restriction    Normal anion gap metabolic acidosis  Assessment & Plan  13, anion gap 15  Unclear etiology, possibly due to CKD.  Will check lactic acid, BHB and venous blood gases.  Consider bicarb supplementation    Hyperglycemia  Assessment & Plan  Glucose 165  Monitor, consider insulin sliding scale.    CKD (chronic kidney disease) stage 3, GFR 30-59 ml/min  Assessment & Plan  Avoid nephrotoxins    Abdominal pain- (present on admission)  Assessment & Plan  CT of the abdomen showed no acute changes, except hepatomegaly, probably due to hepatic congestion  IV Lasix  Diet as tolerated    Acute hypoxic respiratory failure (HCC)- (present on admission)  Assessment & Plan  87% on room air, placed on 2 L nasal cannula  Chest x-ray showed atelectasis and cardiomegaly  Continuous pulse ox, supplemental oxygen  Wean off oxygen as able    Transaminitis- (present on admission)  Assessment & Plan  , AST 62.  Appears chronic, improving.  History of liver cirrhosis        VTE prophylaxis: enoxaparin ppx         [1] No Known Allergies

## 2025-07-25 NOTE — ED NOTES
Taken patient from triage waiting room, via WC, alert/ oriented x 4.Verified patient identification.  Placed on patient room. Changed clothes to hospital gown. Connected to cardiac monitor.   Given the call light and instructed to call for any assistance needed/ or concerns.   Bed on lowest position, side rails up, breaks locked. Awaiting for ERP.

## 2025-07-26 ENCOUNTER — APPOINTMENT (OUTPATIENT)
Dept: RADIOLOGY | Facility: MEDICAL CENTER | Age: 60
End: 2025-07-26
Attending: STUDENT IN AN ORGANIZED HEALTH CARE EDUCATION/TRAINING PROGRAM
Payer: MEDICAID

## 2025-07-26 ASSESSMENT — ENCOUNTER SYMPTOMS
HEADACHES: 0
CHILLS: 0
SHORTNESS OF BREATH: 0
ABDOMINAL PAIN: 1
FEVER: 0

## 2025-07-26 ASSESSMENT — PAIN DESCRIPTION - PAIN TYPE
TYPE: ACUTE PAIN

## 2025-07-26 NOTE — ASSESSMENT & PLAN NOTE
From cardiogenic pulmonary edema    SpO2 > 90%  On RA   Transition to PO Diuresis  Pulmonary hygiene

## 2025-07-26 NOTE — PROGRESS NOTES
Monitor Summary    ST/SR  113-99    Rare PVC, 1st degree block    0.21/0.10/0.32

## 2025-07-26 NOTE — PROGRESS NOTES
Bedside report received from off going RN/tech: Mendy assumed care of patient.     Fall Risk Score: HIGH RISK  Fall risk interventions in place: Place yellow fall risk ID band on patient, Provide patient/family education based on risk assessment, Educate patient/family to call staff for assistance when getting out of bed, Place fall precaution signage outside patient door, Place patient in room close to nursing station, Utilize bed/chair fall alarm, and Bed alarm connected correctly  Bed type: Regular (Mich Score less than 17 interventions in place)  Patient on cardiac monitor: Yes  IVF/IV medications: Not Applicable   Oxygen: Room Air  Bedside sitter: Not Applicable   Isolation: Not applicable

## 2025-07-26 NOTE — ASSESSMENT & PLAN NOTE
7/29/2025  Was in the ICU requiring dobutamine since titrated off  Pressures now improved to the point that we can initiate GDMT

## 2025-07-26 NOTE — CARE PLAN
The patient is Watcher - Medium risk of patient condition declining or worsening    Shift Goals  Clinical Goals: safety, hemodynamic stability  Patient Goals: rest, comfort  Family Goals: louis    Progress made toward(s) clinical / shift goals:    Problem: Pain - Standard  Goal: Alleviation of pain or a reduction in pain to the patient’s comfort goal  Outcome: Progressing  Note: Medicated prn per mar     Problem: Knowledge Deficit - Standard  Goal: Patient and family/care givers will demonstrate understanding of plan of care, disease process/condition, diagnostic tests and medications  Outcome: Progressing  Note: White board and patient updated regarding plan of care and care team information     Problem: Fall Risk  Goal: Patient will remain free from falls  Outcome: Progressing  Note: Fall precautions in place including non skid socks and bed alarm. Bed in lowest position       Patient is not progressing towards the following goals:       ankle pain/injury Patent

## 2025-07-26 NOTE — ASSESSMENT & PLAN NOTE
Severe biventricular failure  07/26/25 admitted to the ICU    Off dobutamine , lactate normal   Transition to PO diuresis   Optimize electrolytes

## 2025-07-26 NOTE — ASSESSMENT & PLAN NOTE
Currently without signs of intoxication or withdrawal    If withdrawal occurs, will use precedex infusion titrated to RASS 0 and benzodiazepines as indicated   Thiamine  Replace electrolytes  Maintain euvolemia   Resume aldactone and lasix PO

## 2025-07-26 NOTE — PROGRESS NOTES
Hospital Medicine Daily Progress Note    Date of Service  7/26/2025    Chief Complaint  Santino Zabala is a 59 y.o. male admitted 7/24/2025 with abdominal pain and shortness of breath    Hospital Course  No notes on file    Interval Problem Update  7/26  Had worsening lactic acid overnight  Escalated antibiotics  Procalcitonin elevated  Patient feels worse today and worsening ascites  Ordered paracentesis and fluid studies sent  Worsening kidney injury  I have consulted nephrology and I also consulted critical care, patient will be transferred to ICU for possible dobutamine support for cardiogenic shock      I have discussed this patient's plan of care and discharge plan at IDT rounds today with Case Management, Nursing, Nursing leadership, and other members of the IDT team.    Consultants/Specialty      Code Status  Full Code    Disposition  The patient is not medically cleared for discharge to home or a post-acute facility.      I have placed the appropriate orders for post-discharge needs.    Review of Systems  Review of Systems   Constitutional:  Negative for chills and fever.   Respiratory:  Negative for shortness of breath.    Cardiovascular:  Negative for chest pain.   Gastrointestinal:  Positive for abdominal pain.   Neurological:  Negative for headaches.        Physical Exam  Temp:  [36.2 °C (97.2 °F)-36.6 °C (97.9 °F)] 36.2 °C (97.2 °F)  Pulse:  [73-92] 86  Resp:  [16-23] 22  BP: ()/(65-80) 110/68  SpO2:  [72 %-100 %] 100 %    Physical Exam  Constitutional:       General: He is not in acute distress.     Appearance: He is not diaphoretic.   HENT:      Head: Normocephalic and atraumatic.      Nose: Nose normal.      Mouth/Throat:      Mouth: Mucous membranes are moist.      Pharynx: Oropharynx is clear.   Eyes:      Extraocular Movements: Extraocular movements intact.   Cardiovascular:      Rate and Rhythm: Regular rhythm. Tachycardia present.   Pulmonary:      Breath sounds: Normal breath sounds.    Musculoskeletal:         General: Normal range of motion.   Skin:     General: Skin is warm.   Neurological:      General: No focal deficit present.      Mental Status: He is oriented to person, place, and time.         Fluids    Intake/Output Summary (Last 24 hours) at 7/26/2025 1547  Last data filed at 7/26/2025 1300  Gross per 24 hour   Intake 600 ml   Output 645 ml   Net -45 ml        Laboratory  Recent Labs     07/24/25  1913 07/25/25  0056 07/26/25  0251   WBC 8.7 8.4 9.1   RBC 4.32* 4.43* 4.18*   HEMOGLOBIN 13.8* 14.2 13.7*   HEMATOCRIT 41.9* 43.7 40.5*   MCV 97.0 98.6* 96.9   MCH 31.9 32.1 32.8   MCHC 32.9 32.5 33.8   RDW 61.0* 63.4* 60.6*   PLATELETCT 292 278 313   MPV 10.3 10.5 10.3     Recent Labs     07/25/25  2249 07/26/25  0251 07/26/25  1004   SODIUM 132* 132* 129*   POTASSIUM 5.5 6.3* 5.9*   CHLORIDE 98 97 94*   CO2 16* 18* 17*   GLUCOSE 143* 106* 189*   BUN 49* 52* 56*   CREATININE 2.21* 2.16* 2.56*   CALCIUM 9.2 9.0 9.0                   Imaging  US-RUQ   Final Result      1.  Hepatic steatosis.   2.  Moderate ascites.   3.  Gallbladder wall thickening, likely secondary to third spacing or liver disease.      DX-CHEST-PORTABLE (1 VIEW)   Final Result         1.  Left basilar atelectasis, no focal infiltrate   2.  Cardiomegaly      CT-ABDOMEN-PELVIS WITH   Final Result         1.  Hepatomegaly and changes of cirrhosis.   2.  Mild scattered abdominal ascites, greatest in the pelvis.   3.  Indeterminate left adrenal nodule, follow-up adrenal protocol CT for further characterization as clinically appropriate.   4.  Cardiomegaly.   5.  Atherosclerosis and atherosclerotic coronary artery disease.            US-PARACENTESIS, ABD WITH IMAGING    (Results Pending)        Assessment/Plan  * Cardiogenic shock (HCC)- (present on admission)  Assessment & Plan  I have consulted critical care patient has been transferred to ICU, appreciate care    Normal anion gap metabolic acidosis  Assessment & Plan  13,  anion gap 15  Unclear etiology, possibly due to CKD.  Will check lactic acid, BHB and venous blood gases.  Consider bicarb supplementation    Hyperglycemia  Assessment & Plan  Glucose 165  Monitor, consider insulin sliding scale.    CKD (chronic kidney disease) stage 3, GFR 30-59 ml/min  Assessment & Plan  Avoid nephrotoxins    ACP (advance care planning)- (present on admission)  Assessment & Plan  Had extensive discussion with patient regarding CODE STATUS.  Discussed that he has multiple organ dysfunction and that if he were to have a cardiac arrest he would have a very low likelihood of a good outcome and would likely prolong suffering.  He ultimately was not ready to make a decision on changing his CODE STATUS but he would like his brother Tyree to be his decision maker.  I called and left a message with him, was unable to reach him to speak.  Total time on ACP 18 minutes    Lactic acidosis- (present on admission)  Assessment & Plan  3.7 on admission and on recheck afternoon of 7/25 and is up to 7.8, I am repeating this lab to ensure accuracy.  Holding off on further diuresis and will give gentle bicarb infusion, closely monitor BMP and lactic acid  Started on antibiotics due to possible SBP given his abdominal distention, ascites and abdominal pain  Initially improved with antibiotics and bicarb however subsequently continued to worsen  Likely secondary to developing cardiogenic shock  I have consulted nephrology and critical care, patient being transferred to ICU      Hyperkalemia- (present on admission)  Assessment & Plan  Have been closely monitoring and treating with insulin as his sugars tolerate as well as with Lasix and    Abdominal pain- (present on admission)  Assessment & Plan  CT of the abdomen showed no acute changes, except hepatomegaly, probably due to hepatic congestion  IV Lasix started for diuresis on admission however holding off on further diuresis given his lactic acidosis  Diet as  tolerated    Acute renal failure superimposed on stage 3 chronic kidney disease (HCC)- (present on admission)  Assessment & Plan  Has had previous episode of SUZY during similar episode  Has had poor urine output  I have consulted nephrology, appreciate recommendations    Acute hypoxic respiratory failure (HCC)- (present on admission)  Assessment & Plan  Improving  Initially 87% on room air, placed on 2 L nasal cannula  Chest x-ray showed atelectasis and cardiomegaly  Continuous pulse ox, supplemental oxygen  Wean off oxygen as able    Acute on chronic combined systolic (congestive) and diastolic (congestive) heart failure (HCC)- (present on admission)  Assessment & Plan  59-year-old male who recently was admitted with cardiogenic shock secondary to combined systolic and diastolic heart failure, presented with worsening of lower extremity edema, shortness of breath and abdominal pain. chest x-ray showed cardiomegaly  Plan: IV Lasix 40 mg twice daily  Continue spironolactone, metoprolol, hold losartan to allow room for diuresis  Monitor input and output and daily weight.  2 g salt restriction    Alcoholic cirrhosis of liver with ascites (HCC)- (present on admission)  Assessment & Plan  Paracentesis ordered and obtained, fluid studies pending    Transaminitis- (present on admission)  Assessment & Plan  , AST 62.  Appears chronic, improving.  History of liver cirrhosis     Patient is critically ill.   The patient continues to have: Multiple organ failure  The vital organ system that is affected is the: Cardiovascular, renal, hepatic  If untreated there is a high chance of deterioration into: Cardiogenic shock, worsening kidney failure, electrolyte derangements  And eventually death.   The critical care that I am providing today is: Treatment of hyperkalemia and lactic acidosis, IV antibiotics, consultation with critical care and nephrology  The critical that has been undertaken is medically complex.   There has  been no overlap in critical care time.   Critical Care Time not including procedures: 49 minutes      VTE prophylaxis:     I have performed a physical exam and reviewed and updated ROS and Plan today (7/26/2025). In review of yesterday's note (7/25/2025), there are no changes except as documented above.

## 2025-07-26 NOTE — ASSESSMENT & PLAN NOTE
See cardiogenic shock    Will restart metoprolol  Continue PO lasix  Add aldactone   Will resume ARB based on renal function tomorrow

## 2025-07-26 NOTE — ASSESSMENT & PLAN NOTE
7/29/2025  Has had previous episode of SUZY during similar episode  Creat has improved to the normal range  Cont to follow BMP  Renally dose medications as appropriate

## 2025-07-26 NOTE — CONSULTS
Critical Care Consultation    Date of consult: 7/26/2025    Referring Physician  Rah Florence M.D.    Reason for Consultation  Cardiogenic shock    History of Presenting Illness  59 year old male with a PMHx HFrEF with severely reduced ejection fraction of 15 to 20%, chronic kidney disease, history of meth use last use was approximately 6 months ago and cirrhosis who presented to the hospital on 7/24 with shortness of breath and lactic acid > 9.    He was admitted to tele but continued getting worse so on 07/26/25 CCM was consulted and determined him to be in cardiogenic shock so moved him to the ICU for dobutamine.     He says he feels foggy and tired with shortness of breath but denies chest pain or infectious s/s at this time.         Code Status  Full Code    Review of Systems  Review of Systems   Unable to perform ROS: Critical illness       Past Medical History   has a past medical history of Congestive heart failure (HCC).    Surgical History   has a past surgical history that includes no pertinent past surgical history.    Family History  History reviewed. No pertinent family history.    Social History   reports that he has never smoked. He has never used smokeless tobacco. He reports that he does not currently use alcohol. He reports that he does not use drugs.    Medications  Home Medications       Reviewed by Tino Mosqueda (Pharmacy Tech) on 07/24/25 at 2336  Med List Status: Complete     Medication Last Dose Status   allopurinol (ZYLOPRIM) 300 MG Tab 7/24/2025 Active   aspirin 81 MG EC tablet 7/24/2025 Active   Cyanocobalamin (VITAMIN B-12 PO) 7/16/2025 Active   dapagliflozin propanediol (FARXIGA) 10 MG Tab 7/3/2025 Active   docusate sodium (COLACE) 100 MG Cap 7/16/2025 Active   furosemide (LASIX) 20 MG Tab 7/24/2025 Active   losartan (COZAAR) 25 MG Tab  Active   methocarbamol (ROBAXIN) 500 MG Tab 7/24/2025 Active   metoprolol SR (TOPROL XL) 25 MG TABLET SR 24 HR 7/24/2025 Active   omeprazole  (PRILOSEC) 20 MG delayed-release capsule 7/10/2025 Active   potassium chloride SA (KDUR) 20 MEQ Tab CR 7/24/2025 Active   spironolactone (ALDACTONE) 25 MG Tab 7/3/2025 Active   tamsulosin (FLOMAX) 0.4 MG capsule 7/24/2025 Active                  Audit from Redirected Encounters    **Home medications have not yet been reviewed for this encounter**       Current Medications[1]    Allergies  Allergies[2]    Vital Signs last 24 hours  Temp:  [36.2 °C (97.2 °F)-36.6 °C (97.9 °F)] 36.2 °C (97.2 °F)  Pulse:  [] 73  Resp:  [16-18] 18  BP: ()/(65-80) 101/71  SpO2:  [72 %-98 %] 97 %    Physical Exam  Physical Exam  Constitutional:       Appearance: He is ill-appearing.   HENT:      Head: Normocephalic and atraumatic.      Nose: Nose normal.      Mouth/Throat:      Mouth: Mucous membranes are moist.   Eyes:      Pupils: Pupils are equal, round, and reactive to light.   Cardiovascular:      Rate and Rhythm: Normal rate.   Pulmonary:      Effort: Pulmonary effort is normal.   Abdominal:      General: Abdomen is flat. There is distension.      Palpations: Abdomen is soft.      Tenderness: There is no abdominal tenderness. There is no guarding or rebound.   Musculoskeletal:      Right lower leg: Edema present.      Left lower leg: Edema present.   Skin:     General: Skin is dry.      Capillary Refill: Capillary refill takes 2 to 3 seconds.   Neurological:      General: No focal deficit present.      Mental Status: He is oriented to person, place, and time.      Motor: No weakness.   Psychiatric:         Mood and Affect: Mood normal.         Fluids    Intake/Output Summary (Last 24 hours) at 7/26/2025 1304  Last data filed at 7/26/2025 1114  Gross per 24 hour   Intake 600 ml   Output 520 ml   Net 80 ml       Laboratory  Recent Results (from the past 48 hours)   CBC WITH DIFFERENTIAL    Collection Time: 07/24/25  7:13 PM   Result Value Ref Range    WBC 8.7 4.8 - 10.8 K/uL    RBC 4.32 (L) 4.70 - 6.10 M/uL    Hemoglobin  13.8 (L) 14.0 - 18.0 g/dL    Hematocrit 41.9 (L) 42.0 - 52.0 %    MCV 97.0 81.4 - 97.8 fL    MCH 31.9 27.0 - 33.0 pg    MCHC 32.9 32.3 - 36.5 g/dL    RDW 61.0 (H) 35.9 - 50.0 fL    Platelet Count 292 164 - 446 K/uL    MPV 10.3 9.0 - 12.9 fL    Neutrophils-Polys 74.80 (H) 44.00 - 72.00 %    Lymphocytes 11.80 (L) 22.00 - 41.00 %    Monocytes 10.10 0.00 - 13.40 %    Eosinophils 1.90 0.00 - 6.90 %    Basophils 0.80 0.00 - 1.80 %    Immature Granulocytes 0.60 0.00 - 0.90 %    Nucleated RBC 0.00 0.00 - 0.20 /100 WBC    Neutrophils (Absolute) 6.53 1.82 - 7.42 K/uL    Lymphs (Absolute) 1.03 1.00 - 4.80 K/uL    Monos (Absolute) 0.88 (H) 0.00 - 0.85 K/uL    Eos (Absolute) 0.17 0.00 - 0.51 K/uL    Baso (Absolute) 0.07 0.00 - 0.12 K/uL    Immature Granulocytes (abs) 0.05 0.00 - 0.11 K/uL    NRBC (Absolute) 0.00 K/uL   COMP METABOLIC PANEL    Collection Time: 07/24/25  7:13 PM   Result Value Ref Range    Sodium 135 135 - 145 mmol/L    Potassium 4.9 3.6 - 5.5 mmol/L    Chloride 107 96 - 112 mmol/L    Co2 13 (L) 20 - 33 mmol/L    Anion Gap 15.0 7.0 - 16.0    Glucose 165 (H) 65 - 99 mg/dL    Bun 30 (H) 8 - 22 mg/dL    Creatinine 1.14 0.50 - 1.40 mg/dL    Calcium 9.1 8.5 - 10.5 mg/dL    Correct Calcium 9.0 8.5 - 10.5 mg/dL    AST(SGOT) 62 (H) 12 - 45 U/L    ALT(SGPT) 210 (H) 2 - 50 U/L    Alkaline Phosphatase 133 (H) 30 - 99 U/L    Total Bilirubin 1.6 (H) 0.1 - 1.5 mg/dL    Albumin 4.1 3.2 - 4.9 g/dL    Total Protein 7.9 6.0 - 8.2 g/dL    Globulin 3.8 (H) 1.9 - 3.5 g/dL    A-G Ratio 1.1 g/dL   LIPASE    Collection Time: 07/24/25  7:13 PM   Result Value Ref Range    Lipase 31 11 - 82 U/L   Troponin    Collection Time: 07/24/25  7:13 PM   Result Value Ref Range    Troponin T 43 (H) 6 - 19 ng/L   proBrain Natriuretic Peptide, NT    Collection Time: 07/24/25  7:13 PM   Result Value Ref Range    NT-proBNP 23691 (H) 0 - 125 pg/mL   ESTIMATED GFR    Collection Time: 07/24/25  7:13 PM   Result Value Ref Range    GFR (CKD-EPI) 73 >60  mL/min/1.73 m 2   EKG    Collection Time: 25  9:50 PM   Result Value Ref Range    Report       [facility] Emergency Dept.    Test Date:  2025  Pt Name:    TREVON HAUSER                Department: ER  MRN:        [id number]                      Room:  Gender:     Male                         Technician: [id number]  :        1965                   Requested By:ER TRIAGE PROTOCOL  Order #:    [id number]                    Reading MD: Filiberto Ayala    Measurements  Intervals                                Axis  Rate:       110                          P:          66  CT:         181                          QRS:        166  QRSD:       110                          T:          -21  QT:         348  QTc:        471    Interpretive Statements  Sinus tachycardia at a rate of 110, left anterior fascicular block,  first-degree AV block, otherwise normal intervals, J-point elevation in the  anterior precordial leads, LVH with secondary repolarization abnormality.  No  STEMI.  No significant change from prior EKG.  Electronically Signed On 2025 21:50:54 PDT  by Filiberto Ayala     TROPONIN    Collection Time: 25 10:11 PM   Result Value Ref Range    Troponin T 46 (H) 6 - 19 ng/L   URINALYSIS    Collection Time: 25 12:00 AM    Specimen: Urine   Result Value Ref Range    Color Yellow     Character Clear     Specific Gravity 1.025 <1.035    Ph 5.0 5.0 - 8.0    Glucose Negative Negative mg/dL    Ketones Negative Negative mg/dL    Protein 30 (A) Negative mg/dL    Bilirubin Negative Negative    Urobilinogen, Urine 1.0 <=1.0 EU/dL    Nitrite Negative Negative    Leukocyte Esterase Negative Negative    Occult Blood Negative Negative    Micro Urine Req Microscopic    URINE MICROSCOPIC (W/UA)    Collection Time: 25 12:00 AM   Result Value Ref Range    WBC 0-2 /hpf    RBC 0-2 0 - 2 /hpf    Bacteria None Seen None /hpf    Epithelial Cells 0-2 0 - 5 /hpf    Urine Casts 3-5 (A) 0 - 2  /lpf    Hyaline Cast Present /lpf   BETA-HYDROXYBUTYRIC ACID    Collection Time: 07/25/25 12:56 AM   Result Value Ref Range    beta-Hydroxybutyric Acid 0.27 0.02 - 0.27 mmol/L   CBC with Differential    Collection Time: 07/25/25 12:56 AM   Result Value Ref Range    WBC 8.4 4.8 - 10.8 K/uL    RBC 4.43 (L) 4.70 - 6.10 M/uL    Hemoglobin 14.2 14.0 - 18.0 g/dL    Hematocrit 43.7 42.0 - 52.0 %    MCV 98.6 (H) 81.4 - 97.8 fL    MCH 32.1 27.0 - 33.0 pg    MCHC 32.5 32.3 - 36.5 g/dL    RDW 63.4 (H) 35.9 - 50.0 fL    Platelet Count 278 164 - 446 K/uL    MPV 10.5 9.0 - 12.9 fL    Neutrophils-Polys 79.20 (H) 44.00 - 72.00 %    Lymphocytes 8.30 (L) 22.00 - 41.00 %    Monocytes 11.10 0.00 - 13.40 %    Eosinophils 0.20 0.00 - 6.90 %    Basophils 0.50 0.00 - 1.80 %    Immature Granulocytes 0.70 0.00 - 0.90 %    Nucleated RBC 0.00 0.00 - 0.20 /100 WBC    Neutrophils (Absolute) 6.64 1.82 - 7.42 K/uL    Lymphs (Absolute) 0.70 (L) 1.00 - 4.80 K/uL    Monos (Absolute) 0.93 (H) 0.00 - 0.85 K/uL    Eos (Absolute) 0.02 0.00 - 0.51 K/uL    Baso (Absolute) 0.04 0.00 - 0.12 K/uL    Immature Granulocytes (abs) 0.06 0.00 - 0.11 K/uL    NRBC (Absolute) 0.00 K/uL   Comp Metabolic Panel (CMP)    Collection Time: 07/25/25 12:56 AM   Result Value Ref Range    Sodium 135 135 - 145 mmol/L    Potassium 5.5 3.6 - 5.5 mmol/L    Chloride 102 96 - 112 mmol/L    Co2 16 (L) 20 - 33 mmol/L    Anion Gap 17.0 (H) 7.0 - 16.0    Glucose 150 (H) 65 - 99 mg/dL    Bun 32 (H) 8 - 22 mg/dL    Creatinine 1.28 0.50 - 1.40 mg/dL    Calcium 9.0 8.5 - 10.5 mg/dL    Correct Calcium 8.9 8.5 - 10.5 mg/dL    AST(SGOT) 55 (H) 12 - 45 U/L    ALT(SGPT) 190 (H) 2 - 50 U/L    Alkaline Phosphatase 128 (H) 30 - 99 U/L    Total Bilirubin 1.8 (H) 0.1 - 1.5 mg/dL    Albumin 4.1 3.2 - 4.9 g/dL    Total Protein 7.8 6.0 - 8.2 g/dL    Globulin 3.7 (H) 1.9 - 3.5 g/dL    A-G Ratio 1.1 g/dL   LACTIC ACID    Collection Time: 07/25/25 12:56 AM   Result Value Ref Range    Lactic Acid 3.7 (H) 0.5  - 2.0 mmol/L   VENOUS BLOOD GAS    Collection Time: 07/25/25 12:56 AM   Result Value Ref Range    Venous Bg Ph 7.33 7.31 - 7.45    Venous Bg Ph Temp Corrected 7.33 7.31 - 7.45    Venous Bg Pco2 32.3 (L) 38.0 - 54.0 mmHg    Venous Bg Pco2 Temp Corrected 31.9 (L) 38.0 - 54.0 mmHg    Venous Bg Po2 39.5 23.0 - 48.0 mmHg    Venous Bg Po2 Temp Corrected 38.7 23.0 - 48.0 mmHg    Venous Bg O2 Saturation 44.0 (L) 60.0 - 85.0 %    Venous Bg Hco3 17 (L) 22 - 29 mmol/L    Venous Bg Base Excess -7 (L) -2 - 3 mmol/L    Body Temp 36.7 Centigrade   ESTIMATED GFR    Collection Time: 07/25/25 12:56 AM   Result Value Ref Range    GFR (CKD-EPI) 64 >60 mL/min/1.73 m 2   LACTIC ACID    Collection Time: 07/25/25 12:27 PM   Result Value Ref Range    Lactic Acid 7.8 (HH) 0.5 - 2.0 mmol/L   LACTIC ACID    Collection Time: 07/25/25  2:54 PM   Result Value Ref Range    Lactic Acid 9.9 (HH) 0.5 - 2.0 mmol/L   BLOOD CULTURE    Collection Time: 07/25/25  2:54 PM    Specimen: Peripheral; Blood   Result Value Ref Range    Significant Indicator NEG     Source BLD     Site PERIPHERAL     Culture Result       No Growth  Note: Blood cultures are incubated for 5 days and  are monitored continuously.Positive blood cultures  are called to the RN and reported as soon as  they are identified.     BLOOD CULTURE    Collection Time: 07/25/25  2:54 PM    Specimen: Peripheral; Blood   Result Value Ref Range    Significant Indicator NEG     Source BLD     Site PERIPHERAL     Culture Result       No Growth  Note: Blood cultures are incubated for 5 days and  are monitored continuously.Positive blood cultures  are called to the RN and reported as soon as  they are identified.     POCT glucose device results    Collection Time: 07/25/25  5:22 PM   Result Value Ref Range    POC Glucose, Blood 74 65 - 99 mg/dL   EKG    Collection Time: 07/25/25  5:33 PM   Result Value Ref Range    Report       [facility]    Test Date:  2025-07-25  Pt Name:    TREVON HAUSER                 Department: 183  MRN:        [id number]                      Room:       26  Gender:     Male                         Technician: SHI  :        1965                   Requested By:JOY HEARN  Order #:    [id number]                    Reading MD: Carl Ortiz MD    Measurements  Intervals                                Axis  Rate:       96                           P:          59  WA:         206                          QRS:        173  QRSD:       110                          T:          15  QT:         352  QTc:        445    Interpretive Statements  Sinus rhythm  FIRST DEGREE AV BLOCK  Left posterior fascicular block  Probable anterolateral infarct, age indeterm  Artifact  Compared to ECG 2025 18:55:58  Sinus tachycardia no longer present    Electronically Signed On 2025 17:33:12 PDT by Carl Ortiz MD     LACTIC ACID    Collection Time: 25  6:40 PM   Result Value Ref Range    Lactic Acid 9.7 (HH) 0.5 - 2.0 mmol/L   TROPONIN    Collection Time: 25  6:40 PM   Result Value Ref Range    Troponin T 76 (H) 6 - 19 ng/L   PROCALCITONIN    Collection Time: 25  6:40 PM   Result Value Ref Range    Procalcitonin 5.37 (H) <0.25 ng/mL   Basic Metabolic Panel    Collection Time: 25  6:40 PM   Result Value Ref Range    Sodium 135 135 - 145 mmol/L    Potassium 6.6 (HH) 3.6 - 5.5 mmol/L    Chloride 97 96 - 112 mmol/L    Co2 12 (L) 20 - 33 mmol/L    Glucose 115 (H) 65 - 99 mg/dL    Bun 46 (H) 8 - 22 mg/dL    Creatinine 2.33 (H) 0.50 - 1.40 mg/dL    Calcium 9.1 8.5 - 10.5 mg/dL    Anion Gap 26.0 (H) 7.0 - 16.0   ESTIMATED GFR    Collection Time: 25  6:40 PM   Result Value Ref Range    GFR (CKD-EPI) 31 (A) >60 mL/min/1.73 m 2   POCT glucose device results    Collection Time: 25  9:09 PM   Result Value Ref Range    POC Glucose, Blood 197 (H) 65 - 99 mg/dL   POCT glucose device results    Collection Time: 25  9:40 PM   Result Value Ref Range    POC Glucose,  Blood 271 (H) 65 - 99 mg/dL   LACTIC ACID    Collection Time: 07/25/25 10:49 PM   Result Value Ref Range    Lactic Acid 4.8 (HH) 0.5 - 2.0 mmol/L   Comp Metabolic Panel    Collection Time: 07/25/25 10:49 PM   Result Value Ref Range    Sodium 132 (L) 135 - 145 mmol/L    Potassium 5.5 3.6 - 5.5 mmol/L    Chloride 98 96 - 112 mmol/L    Co2 16 (L) 20 - 33 mmol/L    Anion Gap 18.0 (H) 7.0 - 16.0    Glucose 143 (H) 65 - 99 mg/dL    Bun 49 (H) 8 - 22 mg/dL    Creatinine 2.21 (H) 0.50 - 1.40 mg/dL    Calcium 9.2 8.5 - 10.5 mg/dL    Correct Calcium 9.4 8.5 - 10.5 mg/dL    AST(SGOT) 65 (H) 12 - 45 U/L    ALT(SGPT) 170 (H) 2 - 50 U/L    Alkaline Phosphatase 108 (H) 30 - 99 U/L    Total Bilirubin 1.7 (H) 0.1 - 1.5 mg/dL    Albumin 3.8 3.2 - 4.9 g/dL    Total Protein 7.1 6.0 - 8.2 g/dL    Globulin 3.3 1.9 - 3.5 g/dL    A-G Ratio 1.2 g/dL   ESTIMATED GFR    Collection Time: 07/25/25 10:49 PM   Result Value Ref Range    GFR (CKD-EPI) 33 (A) >60 mL/min/1.73 m 2   LACTIC ACID    Collection Time: 07/26/25  2:51 AM   Result Value Ref Range    Lactic Acid 3.9 (H) 0.5 - 2.0 mmol/L   Comp Metabolic Panel    Collection Time: 07/26/25  2:51 AM   Result Value Ref Range    Sodium 132 (L) 135 - 145 mmol/L    Potassium 6.3 (H) 3.6 - 5.5 mmol/L    Chloride 97 96 - 112 mmol/L    Co2 18 (L) 20 - 33 mmol/L    Anion Gap 17.0 (H) 7.0 - 16.0    Glucose 106 (H) 65 - 99 mg/dL    Bun 52 (H) 8 - 22 mg/dL    Creatinine 2.16 (H) 0.50 - 1.40 mg/dL    Calcium 9.0 8.5 - 10.5 mg/dL    Correct Calcium 8.9 8.5 - 10.5 mg/dL    AST(SGOT) 79 (H) 12 - 45 U/L    ALT(SGPT) 177 (H) 2 - 50 U/L    Alkaline Phosphatase 111 (H) 30 - 99 U/L    Total Bilirubin 1.6 (H) 0.1 - 1.5 mg/dL    Albumin 4.1 3.2 - 4.9 g/dL    Total Protein 7.5 6.0 - 8.2 g/dL    Globulin 3.4 1.9 - 3.5 g/dL    A-G Ratio 1.2 g/dL   CBC WITHOUT DIFFERENTIAL    Collection Time: 07/26/25  2:51 AM   Result Value Ref Range    WBC 9.1 4.8 - 10.8 K/uL    RBC 4.18 (L) 4.70 - 6.10 M/uL    Hemoglobin 13.7 (L) 14.0  - 18.0 g/dL    Hematocrit 40.5 (L) 42.0 - 52.0 %    MCV 96.9 81.4 - 97.8 fL    MCH 32.8 27.0 - 33.0 pg    MCHC 33.8 32.3 - 36.5 g/dL    RDW 60.6 (H) 35.9 - 50.0 fL    Platelet Count 313 164 - 446 K/uL    MPV 10.3 9.0 - 12.9 fL   ESTIMATED GFR    Collection Time: 07/26/25  2:51 AM   Result Value Ref Range    GFR (CKD-EPI) 34 (A) >60 mL/min/1.73 m 2   URIC ACID    Collection Time: 07/26/25  2:51 AM   Result Value Ref Range    Uric Acid 7.6 2.5 - 8.3 mg/dL   MAGNESIUM    Collection Time: 07/26/25  2:51 AM   Result Value Ref Range    Magnesium 2.4 1.5 - 2.5 mg/dL   PHOSPHORUS    Collection Time: 07/26/25  2:51 AM   Result Value Ref Range    Phosphorus 6.3 (H) 2.5 - 4.5 mg/dL   POCT glucose device results    Collection Time: 07/26/25  9:08 AM   Result Value Ref Range    POC Glucose, Blood 53 (LL) 65 - 99 mg/dL   POCT glucose device results    Collection Time: 07/26/25  9:55 AM   Result Value Ref Range    POC Glucose, Blood 187 (H) 65 - 99 mg/dL   Basic Metabolic Panel every 12 hours    Collection Time: 07/26/25 10:04 AM   Result Value Ref Range    Sodium 129 (L) 135 - 145 mmol/L    Potassium 5.9 (H) 3.6 - 5.5 mmol/L    Chloride 94 (L) 96 - 112 mmol/L    Co2 17 (L) 20 - 33 mmol/L    Glucose 189 (H) 65 - 99 mg/dL    Bun 56 (H) 8 - 22 mg/dL    Creatinine 2.56 (H) 0.50 - 1.40 mg/dL    Calcium 9.0 8.5 - 10.5 mg/dL    Anion Gap 18.0 (H) 7.0 - 16.0   LACTIC ACID    Collection Time: 07/26/25 10:04 AM   Result Value Ref Range    Lactic Acid 4.5 (HH) 0.5 - 2.0 mmol/L   ESTIMATED GFR    Collection Time: 07/26/25 10:04 AM   Result Value Ref Range    GFR (CKD-EPI) 28 (A) >60 mL/min/1.73 m 2   POCT glucose device results    Collection Time: 07/26/25 11:06 AM   Result Value Ref Range    POC Glucose, Blood 202 (H) 65 - 99 mg/dL   FLUID CULTURE W/GRAM STAIN    Collection Time: 07/26/25 12:05 PM    Specimen: Body Fluid   Result Value Ref Range    Significant Indicator NEG     Source BF     Site Peritoneal Fluid     Culture Result -      Gram Stain Result -    POCT glucose device results    Collection Time: 07/26/25 12:16 PM   Result Value Ref Range    POC Glucose, Blood 146 (H) 65 - 99 mg/dL       Imaging  US-RUQ   Final Result      1.  Hepatic steatosis.   2.  Moderate ascites.   3.  Gallbladder wall thickening, likely secondary to third spacing or liver disease.      DX-CHEST-PORTABLE (1 VIEW)   Final Result         1.  Left basilar atelectasis, no focal infiltrate   2.  Cardiomegaly      CT-ABDOMEN-PELVIS WITH   Final Result         1.  Hepatomegaly and changes of cirrhosis.   2.  Mild scattered abdominal ascites, greatest in the pelvis.   3.  Indeterminate left adrenal nodule, follow-up adrenal protocol CT for further characterization as clinically appropriate.   4.  Cardiomegaly.   5.  Atherosclerosis and atherosclerotic coronary artery disease.            US-PARACENTESIS, ABD WITH IMAGING    (Results Pending)       Assessment/Plan  * Cardiogenic shock (HCC)- (present on admission)  Assessment & Plan  Severe biventricular failure  07/26/25 admitted to the ICU    Dobutamine titrated to clinical perfusion and lactic acid clearance  Vasopressors for MAP > 65  Diuresis until euvolemic  Optimize electrolytes    Hyponatremia- (present on admission)  Assessment & Plan  Volume overload    Diuretics  Trend     Lactic acidosis- (present on admission)  Assessment & Plan  Secondary to cardiogenic shock    Dobutamine MD titrated  Thiamine  Trend     Hyperkalemia- (present on admission)  Assessment & Plan  Secondary to cardiorenal SUZY    Diuretics  K cocktail prn  Trend     Acute renal failure superimposed on stage 3 chronic kidney disease (HCC)- (present on admission)  Assessment & Plan  Cardiorenal     Dobutamine to augment CO  Strict I/Os  Renally dosed medications  Avoid nephrotoxic agents as able  Trend     Acute hypoxic respiratory failure (HCC)- (present on admission)  Assessment & Plan  From cardiogenic pulmonary edema    SpO2 > 90%  Consider HFNC  vs BiPAP if worsens  Diuresis  Pulmonary hygiene     Methamphetamine abuse (HCC)- (present on admission)  Assessment & Plan  History of    Nonischemic cardiomyopathy (HCC)- (present on admission)  Assessment & Plan  See cardiogenic shock    Resume GDMT when appropriate    Alcoholic cirrhosis of liver with ascites (HCC)- (present on admission)  Assessment & Plan  Monitor for withdrawal  If withdrawal occurs, will use precedex infusion titrated to RASS 0 and benzodiazepines as indicated   Thiamine  Replace electrolytes  Maintain euvolemia         Discussed patient condition and risk of morbidity and/or mortality with RN, RT, Code status disscussed, Charge nurse / hot rounds, and Patient.    The patient remains critically ill.  Critical care time = 52 minutes in directly providing and coordinating critical care and extensive data review.  No time overlap and excludes procedures.             [1]   Current Facility-Administered Medications   Medication Dose Route Frequency Provider Last Rate Last Admin    thiamine (B-1) 500 mg in dextrose 5% 100 mL IVPB  500 mg Intravenous TID Rah Florence M.D. 200 mL/hr at 07/26/25 1015 500 mg at 07/26/25 1015    piperacillin-tazobactam (Zosyn) 4.5 g in  mL IVPB  4.5 g Intravenous Q8HRS Rah Florence M.D.        heparin injection 5,000 Units  5,000 Units Subcutaneous Q8HRS Rah Florence M.D.        DOBUTamine (Dobutrex) 1 mg/1 mL premix infusion  10 mcg/kg/min Intravenous Continuous Rio Paniagua M.D.        furosemide (Lasix) injection 40 mg  40 mg Intravenous TID DIURETIC Rio Paniagua M.D.        acetaminophen (Tylenol) tablet 650 mg  650 mg Oral Q6HRS PRN Krystian Lee M.D.        senna-docusate (Pericolace Or Senokot S) 8.6-50 MG per tablet 2 Tablet  2 Tablet Oral Q EVENING Krystian Lee M.D.   2 Tablet at 07/25/25 1727    And    polyethylene glycol/lytes (Miralax) Packet 1 Packet  1 Packet Oral QDAY PRN Krystian Lee M.D.        oxyCODONE  immediate-release (Roxicodone) tablet 5 mg  5 mg Oral Q3HRS PRN Krystian Lee M.D.        Or    oxyCODONE immediate release (Roxicodone) tablet 10 mg  10 mg Oral Q3HRS PRN Krystian Lee M.D.   10 mg at 07/26/25 0305    Or    morphine 4 MG/ML injection 4 mg  4 mg Intravenous Q3HRS PRN Krystian Lee M.D.   4 mg at 07/26/25 0645    hydrALAZINE (Apresoline) injection 10 mg  10 mg Intravenous Q4HRS PRN Krystian Lee M.D.        ondansetron (Zofran) syringe/vial injection 4 mg  4 mg Intravenous Q4HRS PRN Krystian Lee M.D.   4 mg at 07/25/25 0747    ondansetron (Zofran ODT) dispertab 4 mg  4 mg Oral Q4HRS PRN Krystian Lee M.D.        promethazine (Phenergan) tablet 12.5-25 mg  12.5-25 mg Oral Q4HRS PRN Krystian Lee M.D.        promethazine (Phenergan) suppository 12.5-25 mg  12.5-25 mg Rectal Q4HRS PRN Krystian Lee M.D.        prochlorperazine (Compazine) injection 5-10 mg  5-10 mg Intravenous Q4HRS PRN Krystian Lee M.D.        [Held by provider] allopurinol (Zyloprim) tablet 300 mg  300 mg Oral DAILY Krystian Lee M.D.   300 mg at 07/25/25 0535    aspirin EC tablet 81 mg  81 mg Oral DAILY Krystian Lee M.D.   81 mg at 07/26/25 0414    cyanocobalamin (Vitamin B-12) tablet 500 mcg  500 mcg Oral DAILY Krystian Lee M.D.   500 mcg at 07/26/25 0414    methocarbamol (Robaxin) tablet 500 mg  500 mg Oral 4X/DAY PRN Krystian Lee M.D.        omeprazole (PriLOSEC) capsule 20 mg  20 mg Oral DAILY Krystian Lee M.D.   20 mg at 07/26/25 0414    tamsulosin (Flomax) capsule 0.4 mg  0.4 mg Oral AFTER BREAKFAST Krystian Lee M.D.   0.4 mg at 07/26/25 0908    polyethylene glycol/lytes (Miralax) Packet 1 Packet  1 Packet Oral DAILY Krystian Lee M.D.   1 Packet at 07/26/25 0414   [2] No Known Allergies

## 2025-07-26 NOTE — CONSULTS
DATE OF SERVICE:  07/26/2025     REQUESTING PHYSICIAN:  Dr. Florence.     REASON FOR CONSULTATION:  Acute kidney injury.     The patient is seen and examined, medical record reviewed.  Unfortunately, the   patient is encephalopathic and able to provide in history.  Most of the   history from review of the record and discussing the case with Dr. Florence.     HISTORY OF PRESENT ILLNESS:  The patient is an unfortunate 59-year-old   gentleman with past medical history significant for severe cardiomyopathy,   methamphetamine use, history of liver cirrhosis, chronic kidney disease stage   IIIB, presented to the hospital yesterday with abdominal pain.  During his   hospitalization, the patient developed acute kidney injury with creatinine up   to 2.56, also hyperkalemia.  Also, the patient had lactic acidosis.      By the time I saw him, the patient is encephalopathic and unable to provide   any history.     The patient did receive IV contrast on 07/24 with CT scan.     PAST MEDICAL HISTORY, SOCIAL HISTORY, FAMILY HISTORY, MEDICATIONS, REVIEW OF   SYSTEMS AS WELL AS ALLERGIES:  Unobtainable.  Please refer to the chart.      PHYSICAL EXAMINATION:  GENERAL:  The patient appears ill.  He is lethargic, arousable, but not able   to have a reasonable conversation.  HEENT:  Normocephalic and atraumatic.  Sclerae anicteric.  Pupils are   reactive.  Nose normal.  Mucous membranes moist.  NECK:  No lymphadenopathy.  No JVD.  No thyroid mass.  CHEST:  Normal.  LUNGS:  Clear to auscultation.  HEART:  S1, S2.  ABDOMEN:  Soft, nontender.  No hepatosplenomegaly.  There is no inguinal   lymphadenopathy.  EXTREMITIES:  There is trace lower extremity edema.  SKIN:  No skin rash.  NEUROLOGIC:  The patient is very lethargic.     LABORATORY DATA:  His recent lab from today were reviewed.     DIAGNOSTIC DATA:  The patient had abdominal CT scan done on 07/24/2025.  I   reviewed the image myself, showed no hydronephrosis.     ASSESSMENT:  1.   Acute kidney injury.  The etiology is most likely contrast-induced Vs cardiorenal syndrome  nephropathy.  2.  Hyperkalemia secondary to renal failure.  3.  Encephalopathy.  4.  Metabolic acidosis.   5.  Hyponatremia.  6.  Cardiomyopathy.  6.  High lactic acidosis.     PLAN:  1.  There is no acute need for renal replacement therapy.  2.  I recommend to transfer the patient into a higher level of care to optimize cardiac function.   3.  Low-potassium diet.  4.  Can use IV Lasix to manage hyperkalemia.  5.  If there is no improvement in kidney function or hyperkalemia in the next   12-24 hours, we will consider dialysis.  6.  Workup of the high lactic acidosis as per the primary team as well as ICU.  7.  I recommend to address the level of care as the patient has a very guarded   prognosis considering his cardiomyopathy, liver failure, and heart failure on   top of his methamphetamine use.  8.  The plan discussed in detail with Dr. Florence.        ______________________________  FADI NAJJAR, MD FN/ANGELIA    DD:  07/26/2025 12:29  DT:  07/26/2025 15:03    Job#:  [id number]   2 seconds or less

## 2025-07-26 NOTE — ASSESSMENT & PLAN NOTE
Secondary to cardiorenal SUZY  Improved     PO Diuretics  Optimize renal perfusion  K cocktail prn  Trend

## 2025-07-26 NOTE — PROGRESS NOTES
4 Eyes Skin Assessment Completed by JULIANA Phipps and JULIANA Jang.    Skin assessment is primarily focused on high risk bony prominences. Pay special attention to skin beneath and around medical devices, high risk bony prominences, skin to skin areas and areas where the patient lacks sensation to feel pain and areas where the patient previously had breakdown.     Head (Occipital):  WDL   Ears (Under Medical Devices): WDL   Nose (Under Medical Devices): WDL   Mouth:  WDL   Neck: Healing CVC site from previous admission     Breast/Chest:  WDL   Shoulder Blades:  WDL   Spine:   WDL   (R) Arm/Elbow/Hand: WDL   (L) Arm/Elbow/Hand: WDL   Abdomen: WDL   Pannus/Groin:  **Wound/discoloration of bony prominence (Suspected Pressure injury)**   Sacrum/Coccyx:   WDL   (R) Ischial Tuberosity (Sit Bones):  WDL   (L) Ischial Tuberosity (Sit Bones):  WDL   (R) Leg:  WDL   (L) Leg:  WDL   (R) Heel:  Dry, scaling, flaking   (R) Foot/Toe: WDL   (L) Heel: Dry scaling, flaking   (L) Foot/Toe:  WDL       DEVICES IN USE:   Respiratory Devices:  Oxygen mask  Feeding Devices:  N/A   Lines & BP Monitoring Devices:  Peripheral IV, BP cuff, and Pulse ox    Orthopedic Devices:  N/A  Miscellaneous Devices:  Telemetry monitor, Aguilar, and SCDs    PROTOCOL INTERVENTIONS:   ICU Low Airloss Bed:  Already in place  Float Heels with Pillows:  Already in place    WOUND PHOTOS:   Completed and in EPIC     WOUND CONSULT:   N/A, no advanced wound care needs identified

## 2025-07-26 NOTE — ASSESSMENT & PLAN NOTE
History of    Patient interested in rehab at discharge , case management to provide him with resources

## 2025-07-26 NOTE — ASSESSMENT & PLAN NOTE
Cardiorenal   Off dobutamine   Continue po lasix   Strict I/Os  Renally dosed medications  Avoid nephrotoxic agents as able  Trend

## 2025-07-27 ASSESSMENT — ENCOUNTER SYMPTOMS
PSYCHIATRIC NEGATIVE: 1
CHILLS: 0
ABDOMINAL PAIN: 1
NEUROLOGICAL NEGATIVE: 1
EYES NEGATIVE: 1
SHORTNESS OF BREATH: 0
VOMITING: 0
RESPIRATORY NEGATIVE: 1
CARDIOVASCULAR NEGATIVE: 1
MUSCULOSKELETAL NEGATIVE: 1
CONSTITUTIONAL NEGATIVE: 1
COUGH: 0
NAUSEA: 0
FEVER: 0

## 2025-07-27 ASSESSMENT — PAIN DESCRIPTION - PAIN TYPE
TYPE: ACUTE PAIN

## 2025-07-27 ASSESSMENT — FIBROSIS 4 INDEX: FIB4 SCORE: 1.14

## 2025-07-27 NOTE — PROGRESS NOTES
"Critical Care Progress Note    Date of admission  7/24/2025    Chief Complaint  \"59 year old male with a PMHx HFrEF with severely reduced ejection fraction of 15 to 20%, chronic kidney disease, history of meth use last use was approximately 6 months ago and cirrhosis who presented to the hospital on 7/24 with shortness of breath and lactic acid > 9.     He was admitted to tele but continued getting worse so on 07/26/25 CCM was consulted and determined him to be in cardiogenic shock so moved him to the ICU for dobutamine.      He says he feels foggy and tired with shortness of breath but denies chest pain or infectious s/s at this time.\" H+P    Hospital Course  7/26-Admit ICU, HFrEF cardiogenic shock, ,     7/27-Markedly improved, net negative, wean , trend markers of perfusion, DC antibiotics for now, unclear what is being treated, peritoneal fluid bland    Interval Problem Update  Reviewed last 24 hour events:  Para overnight    Feels a lot better this AM, lactate cleared  -3L UOP    Neuro:     CV:  HR 80s-90s  -120   @ 10    Resp:   RA    I/O: -1215  UOP: 3275    Tmax: AF  Heme: WBC 7    Abx:   (7/26- ) Ptazo    Micro:  7/25-BCXs NGTD  7/26-Peritoneal fluid, no WBC, No organisms    Endo: BG WTR, ISS    LDA: PIVs, Aguilar  SUP: PPI  VTE: SQH   Diet: Cardiac      Review of Systems  Review of Systems   All other systems reviewed and are negative.       Vital Signs for last 24 hours   Pulse:  [73-95] 86  Resp:  [12-51] 14  BP: (100-124)/(56-79) 120/73  SpO2:  [72 %-100 %] 100 %    Hemodynamic parameters for last 24 hours       Respiratory Information for the last 24 hours       Physical Exam   Physical Exam  Constitutional:       Appearance: He is ill-appearing.   HENT:      Head: Normocephalic and atraumatic.      Nose: Nose normal.      Mouth/Throat:      Mouth: Mucous membranes are moist.   Eyes:      Pupils: Pupils are equal, round, and reactive to light.   Cardiovascular:      Rate and Rhythm: " Normal rate and regular rhythm.   Pulmonary:      Effort: Pulmonary effort is normal.   Abdominal:      General: Abdomen is flat.      Palpations: Abdomen is soft.      Tenderness: There is no abdominal tenderness. There is no guarding or rebound.   Musculoskeletal:      Comments: Has a little edema in lower extremities, not too bad   Skin:     General: Skin is warm and dry.      Capillary Refill: Capillary refill takes less than 2 seconds.   Neurological:      General: No focal deficit present.      Mental Status: He is oriented to person, place, and time.      Motor: No weakness.   Psychiatric:         Mood and Affect: Mood normal.         Medications  Current Medications[1]    Fluids    Intake/Output Summary (Last 24 hours) at 7/27/2025 1113  Last data filed at 7/27/2025 0814  Gross per 24 hour   Intake 2085.58 ml   Output 3275 ml   Net -1189.42 ml       Laboratory          Recent Labs     07/26/25  0251 07/26/25  1004 07/26/25 2025 07/27/25  0447 07/27/25  1026   SODIUM 132*   < > 130* 130* 133*   POTASSIUM 6.3*   < > 4.1 4.1 3.5*   CHLORIDE 97   < > 95* 95* 98   CO2 18*   < > 20 22 21   BUN 52*   < > 51* 45* 37*   CREATININE 2.16*   < > 2.16* 2.06* 1.77*   MAGNESIUM 2.4  --   --  2.1  --    PHOSPHORUS 6.3*  --   --  3.8  --    CALCIUM 9.0   < > 8.3* 8.2* 7.9*    < > = values in this interval not displayed.     Recent Labs     07/24/25  1913 07/25/25  0056 07/25/25  2249 07/26/25  0251 07/26/25  1004 07/26/25 2025 07/27/25  0447 07/27/25  1026   ALTSGPT 210*   < > 170* 177*  --   --  162*  --    ASTSGOT 62*   < > 65* 79*  --   --  106*  --    ALKPHOSPHAT 133*   < > 108* 111*  --   --  91  --    TBILIRUBIN 1.6*   < > 1.7* 1.6*  --   --  1.3  --    LIPASE 31  --   --   --   --   --   --   --    GLUCOSE 165*   < > 143* 106*   < > 187* 128* 143*    < > = values in this interval not displayed.     Recent Labs     07/24/25  1913 07/25/25  0056 07/25/25  2249 07/26/25  0251 07/27/25  0447   WBC 8.7 8.4  --  9.1 7.0    NEUTSPOLYS 74.80* 79.20*  --   --  75.50*   LYMPHOCYTES 11.80* 8.30*  --   --  9.20*   MONOCYTES 10.10 11.10  --   --  12.60   EOSINOPHILS 1.90 0.20  --   --  2.00   BASOPHILS 0.80 0.50  --   --  0.60   ASTSGOT 62* 55* 65* 79* 106*   ALTSGPT 210* 190* 170* 177* 162*   ALKPHOSPHAT 133* 128* 108* 111* 91   TBILIRUBIN 1.6* 1.8* 1.7* 1.6* 1.3     Recent Labs     07/25/25  0056 07/26/25  0251 07/27/25  0447 07/27/25  0555   RBC 4.43* 4.18* 3.48*  --    HEMOGLOBIN 14.2 13.7* 11.4*  --    HEMATOCRIT 43.7 40.5* 33.4*  --    PLATELETCT 278 313 272  --    PROTHROMBTM  --   --   --  18.4*   INR  --   --   --  1.52*       Imaging  X-Ray:  I have personally reviewed the images and compared with prior images.    Assessment/Plan  * Cardiogenic shock (HCC)- (present on admission)  Assessment & Plan  Severe biventricular failure  07/26/25 admitted to the ICU    Dobutamine titrated to clinical perfusion and lactic acid clearance  Vasopressors for MAP > 65  Diuresis until euvolemic  Optimize electrolytes    Hyponatremia- (present on admission)  Assessment & Plan  Volume overload and poor perfusion    Optimize CO and perfusion with inotropes and decongestion  Diuretics  Trend     Lactic acidosis- (present on admission)  Assessment & Plan  Secondary to cardiogenic shock  Resolved with decongestion and inotropes    Wean , trend markers of perfusion  Thiamine  Trend     Hyperkalemia- (present on admission)  Assessment & Plan  Secondary to cardiorenal SUZY  Improved     Diuretics/kaliuresis  Optimize renal perfusion  K cocktail prn  Trend     Acute renal failure superimposed on stage 3 chronic kidney disease (HCC)- (present on admission)  Assessment & Plan  Cardiorenal     Dobutamine to augment CO  Strict I/Os  Renally dosed medications  Avoid nephrotoxic agents as able  Trend     Acute hypoxic respiratory failure (HCC)- (present on admission)  Assessment & Plan  From cardiogenic pulmonary edema    SpO2 > 90%  Consider HFNC vs BiPAP if  worsens  Diuresis  Pulmonary hygiene     Methamphetamine abuse (HCC)- (present on admission)  Assessment & Plan  History of    Nonischemic cardiomyopathy (HCC)- (present on admission)  Assessment & Plan  See cardiogenic shock    Resume GDMT when appropriate    Alcoholic cirrhosis of liver with ascites (HCC)- (present on admission)  Assessment & Plan  Currently without signs of intoxication or withdrawal    If withdrawal occurs, will use precedex infusion titrated to RASS 0 and benzodiazepines as indicated   Thiamine  Replace electrolytes  Maintain euvolemia          I have performed a physical exam and reviewed and updated ROS and Plan today (7/27/2025). In review of yesterday's note (7/26/2025), there are no changes except as documented above.     Discussed patient condition and risk of morbidity and/or mortality with RN, RT, Therapies, Pharmacy, Dietary, and Patient  The patient remains critically ill.  Critical care time = 85 minutes in directly providing and coordinating critical care and extensive data review.  No time overlap and excludes procedures.         [1]   Current Facility-Administered Medications   Medication Dose Route Frequency Provider Last Rate Last Admin    thiamine (B-1) 500 mg in dextrose 5% 100 mL IVPB  500 mg Intravenous TID Rah Florence M.D. 200 mL/hr at 07/27/25 1047 500 mg at 07/27/25 1047    heparin injection 5,000 Units  5,000 Units Subcutaneous Q8HRS Rah Florence M.D.   5,000 Units at 07/27/25 0531    DOBUTamine (Dobutrex) 1 mg/1 mL premix infusion  5 mcg/kg/min Intravenous Continuous Rigo Malcolm M.D. 17.4 mL/hr at 07/27/25 0807 5 mcg/kg/min at 07/27/25 0807    furosemide (Lasix) injection 40 mg  40 mg Intravenous TID DIURETIC Rio Paniagua M.D.   40 mg at 07/27/25 1045    NS infusion   Intravenous Continuous Rio Paniagua M.D.   Stopped at 07/27/25 0528    clevidipine (Cleviprex) IV emulsion  0-21 mg/hr Intravenous Continuous Rio Paniagua M.D.   Dose not  Required at 07/26/25 1345    acetaminophen (Tylenol) tablet 650 mg  650 mg Oral Q6HRS PRN Krystian Lee M.D.        senna-docusate (Pericolace Or Senokot S) 8.6-50 MG per tablet 2 Tablet  2 Tablet Oral Q EVENING Krystian Lee M.D.   2 Tablet at 07/26/25 1700    And    polyethylene glycol/lytes (Miralax) Packet 1 Packet  1 Packet Oral QDAY PRN Krystian Lee M.D.        oxyCODONE immediate-release (Roxicodone) tablet 5 mg  5 mg Oral Q3HRS PRN Krystian Lee M.D.        Or    oxyCODONE immediate release (Roxicodone) tablet 10 mg  10 mg Oral Q3HRS PRN Krystian Lee M.D.   10 mg at 07/26/25 0305    Or    morphine 4 MG/ML injection 4 mg  4 mg Intravenous Q3HRS PRN Krystian Lee M.D.   4 mg at 07/26/25 0645    hydrALAZINE (Apresoline) injection 10 mg  10 mg Intravenous Q4HRS PRN Krystian Lee M.D.        ondansetron (Zofran) syringe/vial injection 4 mg  4 mg Intravenous Q4HRS PRN Krystian Lee M.D.   4 mg at 07/25/25 0747    ondansetron (Zofran ODT) dispertab 4 mg  4 mg Oral Q4HRS PRN Krystian Lee M.D.        promethazine (Phenergan) tablet 12.5-25 mg  12.5-25 mg Oral Q4HRS PRN Krystian Lee M.D.        promethazine (Phenergan) suppository 12.5-25 mg  12.5-25 mg Rectal Q4HRS PRN Krystian Lee M.D.        prochlorperazine (Compazine) injection 5-10 mg  5-10 mg Intravenous Q4HRS PRN Krystian Lee M.D.        [Held by provider] allopurinol (Zyloprim) tablet 300 mg  300 mg Oral DAILY Krystian Lee M.D.   300 mg at 07/25/25 0535    aspirin EC tablet 81 mg  81 mg Oral DAILY Krystian Lee M.D.   81 mg at 07/27/25 0529    cyanocobalamin (Vitamin B-12) tablet 500 mcg  500 mcg Oral DAILY Krystian Lee M.D.   500 mcg at 07/27/25 0529    methocarbamol (Robaxin) tablet 500 mg  500 mg Oral 4X/DAY PRN Krystian Lee M.D.        omeprazole (PriLOSEC) capsule 20 mg  20 mg Oral DAILY Krystian Lee M.D.   20 mg at 07/27/25 0530    tamsulosin (Flomax) capsule 0.4 mg  0.4 mg  Oral AFTER BREAKFAST Krystian Lee M.D.   0.4 mg at 07/27/25 0921    polyethylene glycol/lytes (Miralax) Packet 1 Packet  1 Packet Oral DAILY Krystian Lee M.D.   1 Packet at 07/27/25 0531

## 2025-07-27 NOTE — PROGRESS NOTES
CRITICAL CARE [facility] RESIDENT PROGRESS NOTE    Attending: Rigo Malcolm M.d.   Date: 07/27/25     Patient: Santino Zabala   Code Status: Full Code     Date of admission  7/24/2025     Chief Complaint  59 year old male with a PMHx HFrEF with severely reduced ejection fraction of 15 to 20%, chronic kidney disease, history of meth use last use was approximately 6 months ago and cirrhosis who presented to the hospital on 7/24/2025 with shortness of breath and lactic acid > 9.     Hospital Course  Initially admitted to telemetry, his condition worsened, leading to a diagnosis of cardiogenic shock and transfer to the ICU for dobutamine treatment. He reports feeling foggy and tired with shortness of breath but denies chest pain or infection. Management includes titrating dobutamine for perfusion and lactic acid clearance, using vasopressors to maintain MAP, diuresis to achieve euvolemia, and optimizing electrolytes. Nephrology recommended a low-potassium diet, IV Lasix for hyperkalemia, and potential dialysis if kidney function does not improve.      24 hr Interval update  Overnight events: none reported.  Vitals within acceptable limits overnight.  Telemetry reviewed shows sinus rhythm 80s-90s with first-degree AV block. Was given dobutamine.     Updates today:  - Hemoglobin lower at 11.4, 14.2 on admission, no overt signs of bleeding noted on physical examination. Platelet count and obesity WNL.  - BMP shows sodium persistently low at 130, chloride 95  - Creatinine slightly improved from peak of 2.56-2.06  - Persistent transaminitis  - Lactic acid downtrending peak of 9.9, now at 2.0  - Peritoneal fluid analysis shows SAAG 1.1, yellow-colored clear fluid  - PT/INR elevated  - UA WNL  - pH 7.23 PO2 38.7, PCO2 31.9  - Peritoneal fluid and blood culture show NGTD  - RUQ U/S from yesterday showed hepatic steatosis and moderate ascites with GB wall thickening, duodenum 250 ml ascitic fluid was drained on  diagnostic/therapeutic paracentesis.    Subjective/Review of Systems  Review of Systems   Constitutional: Negative.    HENT: Negative.     Eyes: Negative.    Respiratory: Negative.     Cardiovascular: Negative.    Gastrointestinal:  Positive for abdominal pain (worst at RLQ).   Genitourinary: Negative.    Musculoskeletal: Negative.    Skin: Negative.    Neurological: Negative.    Endo/Heme/Allergies: Negative.    Psychiatric/Behavioral: Negative.       Vital Signs for the last 24 hours  Temp:  [36.2 °C (97.2 °F)] 36.2 °C (97.2 °F)  Pulse:  [73-95] 86  Resp:  [12-51] 14  BP: (100-124)/(56-79) 120/73  SpO2:  [72 %-100 %] 100 %    Intake/Output Summary (Last 24 hours) at 7/27/2025 0725  Last data filed at 7/27/2025 0600  Gross per 24 hour   Intake 2059.58 ml   Output 3275 ml   Net -1215.42 ml     Physical Exam  Constitutional:       General: He is not in acute distress.     Appearance: Normal appearance.   HENT:      Head: Normocephalic and atraumatic.      Right Ear: External ear normal.      Left Ear: External ear normal.      Nose: Nose normal.      Mouth/Throat:      Mouth: Mucous membranes are dry.   Eyes:      Extraocular Movements: Extraocular movements intact.   Cardiovascular:      Rate and Rhythm: Normal rate and regular rhythm.      Pulses: Normal pulses.   Pulmonary:      Effort: Pulmonary effort is normal. No respiratory distress.      Comments: Very slight crackles at BL bases R>L  Abdominal:      General: Abdomen is flat.      Palpations: Abdomen is soft.      Tenderness: There is no abdominal tenderness. There is no guarding or rebound.   Musculoskeletal:      Right lower leg: Edema present.      Left lower leg: Edema present.      Comments: +1 BLLE   Skin:     General: Skin is warm and dry.      Capillary Refill: Capillary refill takes less than 2 seconds.   Neurological:      General: No focal deficit present.      Mental Status: He is alert and oriented to person, place, and time.      Motor: No  weakness.   Psychiatric:         Mood and Affect: Mood normal.         Thought Content: Thought content normal.       Laboratory  Recent Labs     07/25/25 0056 07/26/25 0251 07/27/25 0447 07/27/25  0555   RBC 4.43* 4.18* 3.48*  --    HEMOGLOBIN 14.2 13.7* 11.4*  --    HEMATOCRIT 43.7 40.5* 33.4*  --    PLATELETCT 278 313 272  --    PROTHROMBTM  --   --   --  18.4*   INR  --   --   --  1.52*         Recent Labs     07/26/25 0251 07/26/25 1004 07/26/25 2025 07/27/25 0447   SODIUM 132* 129* 130* 130*   POTASSIUM 6.3* 5.9* 4.1 4.1   CHLORIDE 97 94* 95* 95*   CO2 18* 17* 20 22   BUN 52* 56* 51* 45*   CREATININE 2.16* 2.56* 2.16* 2.06*   MAGNESIUM 2.4  --   --  2.1   PHOSPHORUS 6.3*  --   --  3.8   CALCIUM 9.0 9.0 8.3* 8.2*          Recent Labs     07/24/25 1913 07/25/25 0056 07/25/25 2249 07/26/25 0251 07/26/25 1004 07/26/25 2025 07/27/25 0447   ALTSGPT 210*   < > 170* 177*  --   --  162*   ASTSGOT 62*   < > 65* 79*  --   --  106*   ALKPHOSPHAT 133*   < > 108* 111*  --   --  91   TBILIRUBIN 1.6*   < > 1.7* 1.6*  --   --  1.3   LIPASE 31  --   --   --   --   --   --    GLUCOSE 165*   < > 143* 106* 189* 187* 128*    < > = values in this interval not displayed.     Recent Labs     07/24/25 1913 07/25/25 0056 07/25/25 2249 07/26/25 0251 07/27/25 0447   WBC 8.7 8.4  --  9.1 7.0   NEUTSPOLYS 74.80* 79.20*  --   --  75.50*   LYMPHOCYTES 11.80* 8.30*  --   --  9.20*   MONOCYTES 10.10 11.10  --   --  12.60   EOSINOPHILS 1.90 0.20  --   --  2.00   BASOPHILS 0.80 0.50  --   --  0.60   ASTSGOT 62* 55* 65* 79* 106*   ALTSGPT 210* 190* 170* 177* 162*   ALKPHOSPHAT 133* 128* 108* 111* 91   TBILIRUBIN 1.6* 1.8* 1.7* 1.6* 1.3     No results found for the last 90 days.     Imaging  US-PARACENTESIS, ABD WITH IMAGING   Final Result      1. Ultrasound-guided diagnostic and therapeutic paracentesis of the right lower quadrant of the abdominal wall.      2. 250 mL of fluid withdrawn.      US-RUQ   Final Result      1.   Hepatic steatosis.   2.  Moderate ascites.   3.  Gallbladder wall thickening, likely secondary to third spacing or liver disease.      DX-CHEST-PORTABLE (1 VIEW)   Final Result         1.  Left basilar atelectasis, no focal infiltrate   2.  Cardiomegaly      CT-ABDOMEN-PELVIS WITH   Final Result         1.  Hepatomegaly and changes of cirrhosis.   2.  Mild scattered abdominal ascites, greatest in the pelvis.   3.  Indeterminate left adrenal nodule, follow-up adrenal protocol CT for further characterization as clinically appropriate.   4.  Cardiomegaly.   5.  Atherosclerosis and atherosclerotic coronary artery disease.              Medications  Current Outpatient Medications   Medication Instructions    allopurinol (ZYLOPRIM) 300 mg, Oral, DAILY    aspirin 81 mg, DAILY    Cyanocobalamin (VITAMIN B-12 PO) 1 Capsule, DAILY    dapagliflozin propanediol (FARXIGA) 10 mg, Oral, DAILY    docusate sodium (COLACE) 100 mg, 2 TIMES DAILY PRN    furosemide (LASIX) 20 mg, Oral, DAILY    losartan (COZAAR) 12.5 mg, Oral, DAILY    methocarbamol (ROBAXIN) 500 mg, Oral, 4 TIMES DAILY PRN    metoprolol SR (TOPROL XL) 25 mg, DAILY    omeprazole (PRILOSEC) 20 mg, Oral, DAILY    potassium chloride SA (KDUR) 20 MEQ Tab CR 20 mEq, Oral, DAILY    spironolactone (ALDACTONE) 12.5 mg, DAILY    tamsulosin (FLOMAX) 0.4 mg, Oral, AFTER BREAKFAST      thiamine, 500 mg, Intravenous, TID  piperacillin-tazobactam, 4.5 g, Intravenous, Q8HRS  heparin, 5,000 Units, Subcutaneous, Q8HRS  furosemide, 40 mg, Intravenous, TID DIURETIC  senna-docusate, 2 Tablet, Oral, Q EVENING  [Held by provider] allopurinol, 300 mg, Oral, DAILY  aspirin, 81 mg, Oral, DAILY  cyanocobalamin, 500 mcg, Oral, DAILY  omeprazole, 20 mg, Oral, DAILY  tamsulosin, 0.4 mg, Oral, AFTER BREAKFAST  polyethylene glycol/lytes, 1 Packet, Oral, DAILY       PRN medications: acetaminophen, senna-docusate **AND** polyethylene glycol/lytes, oxyCODONE immediate-release **OR** oxyCODONE  immediate-release **OR** morphine injection, hydrALAZINE, ondansetron, ondansetron, promethazine, promethazine, prochlorperazine, methocarbamol     Assessment and Plan     Cardiogenic shock (HCC)- (present on admission)  Assessment & Plan  Admitted to ICU on 7/26 due to worsening hypoxia and suspected cardiogenic shock  Related to have severe biventricular failure, was started on dobutamine which improved pressures and symptomatology.     Dobutamine titrated to clinical perfusion and lactic acid clearance  MAP > 65  IV Lasix 40 3 times daily  Optimize electrolytes  Thiamine  B12  Cardiac diet    Hyponatremia- (present on admission)  Assessment & Plan  Hypervolemic, mild.  There may be a component of low ECV given decreased chloride concentration.     Monitor with ongoing diuresis     Lactic acidosis- (present on admission)  Assessment & Plan  Secondary to cardiogenic shock - > reduced tissue perfusion  Stable at 2.1,  don from 9.7     Cont to titrate Dobutamine   Thiamine  Trend      Hyperkalemia- (present on admission)  Assessment & Plan  Likely 2/2 Secondary to cardiorenal SUZY     Diuretics  K cocktail prn  Trend      Acute renal failure superimposed on stage 3 chronic kidney disease (HCC)- (present on admission)  Assessment & Plan  Likely 2/2 hypoperfusion in the setting of cardiorenal syndrome given cardiogenic shock picture     Dobutamine to augment CO  Improving with Diuresis  Strict I/Os  Renally dosed medications  Avoid nephrotoxic agents as able  Trend   Nephrology signed off     Acute hypoxic respiratory failure (HCC)- (present on admission)  Assessment & Plan  From cardiogenic pulmonary edema     SpO2 > 90%  Consider HFNC vs BiPAP if worsens  Diuresis  Pulmonary hygiene      Methamphetamine abuse (HCC)- (present on admission)  Assessment & Plan  History of     Nonischemic cardiomyopathy (HCC)- (present on admission)  Assessment & Plan  See cardiogenic shock     Resume GDMT when appropriate     Alcoholic  cirrhosis of liver with ascites (HCC)- (present on admission)  Assessment & Plan  Notable on imaging. S/p paracentesis. Low suspicion of SBP, likely in the setting of high oncotic pressure in the circulation 2/2 cardiogenic shock. Patient did disclose extensive drinking history when he was younger.  Peritoneal fluid analysis shows SAAG 1.1, yellow-colored clear fluid. Not concerning for SBP.    Follow peritoneal fluid cultures   Monitor for withdrawal  Thiamine  Replace electrolytes  Maintain euvolemia   Monitor AST/ALT     Feeding/Fluids: cardiac diet  Analgesia: none, acetaminophen  Sedation: none  ThromboPPx: SCDs and ambulation  HOB >30: YES  UlcerPPx:on PO omeprazole 20 daily  Glycemics: in goal range  Sedation holiday: N/A  Bowel regimen: Miralax/senna  Indewlling lines: Peripheral IV for intravenous access  (De-escalate) Abx: stopped Zosyn  Disposition: CICU    I have performed a physical exam and reviewed and updated ROS and Plan today (7/27/2025). In review of yesterday's note (7/26/2025), there are no changes except as documented above.    Dayne Griffin PGY-2  [facility]

## 2025-07-27 NOTE — HOSPITAL COURSE
Initially admitted to telemetry, his condition worsened, leading to a diagnosis of cardiogenic shock and transfer to the ICU for dobutamine treatment. He reports feeling foggy and tired with shortness of breath but denies chest pain or infection. Management includes titrating dobutamine for perfusion and lactic acid clearance, using vasopressors to maintain MAP, diuresis to achieve euvolemia, and optimizing electrolytes. Nephrology recommended a low-potassium diet, IV Lasix for hyperkalemia, and potential dialysis if kidney function does not improve.

## 2025-07-27 NOTE — PROGRESS NOTES
Nephrology Daily Progress Note    Date of Service  7/27/2025    Chief Complaint  59 y.o. male admitted 7/24/2025 with abdominal pain, developed cardiogenic shock, SUZY    Interval Problem Update  Events last 24 hours noted  Patient was transferred to the ICU and started on IV dobutamine    Review of Systems  Review of Systems   Constitutional:  Negative for chills, fever and malaise/fatigue.   Respiratory:  Negative for cough and shortness of breath.    Cardiovascular:  Negative for chest pain and leg swelling.   Gastrointestinal:  Negative for nausea and vomiting.   Genitourinary:  Negative for dysuria, frequency and urgency.        Physical Exam  Pulse:  [73-95] 86  Resp:  [12-51] 14  BP: (100-124)/(56-79) 120/73  SpO2:  [72 %-100 %] 100 %    Physical Exam  Vitals and nursing note reviewed.   Constitutional:       General: He is awake.      Appearance: He is ill-appearing.   HENT:      Head: Normocephalic and atraumatic.      Right Ear: External ear normal.      Left Ear: External ear normal.      Nose: Nose normal.      Mouth/Throat:      Pharynx: No oropharyngeal exudate or posterior oropharyngeal erythema.   Eyes:      General:         Right eye: No discharge.         Left eye: No discharge.      Conjunctiva/sclera: Conjunctivae normal.   Cardiovascular:      Rate and Rhythm: Normal rate and regular rhythm.   Pulmonary:      Effort: Pulmonary effort is normal. No respiratory distress.      Breath sounds: Normal breath sounds. No wheezing.   Abdominal:      General: Abdomen is flat. Bowel sounds are normal.   Musculoskeletal:         General: No tenderness.      Cervical back: No rigidity. No muscular tenderness.      Right lower leg: No edema.      Left lower leg: No edema.   Skin:     General: Skin is warm and dry.      Coloration: Skin is not jaundiced.      Findings: No lesion or rash.   Neurological:      General: No focal deficit present.      Mental Status: He is alert and oriented to person, place, and  time. Mental status is at baseline.   Psychiatric:         Mood and Affect: Mood normal.         Behavior: Behavior normal.         Thought Content: Thought content normal.         Fluids    Intake/Output Summary (Last 24 hours) at 7/27/2025 1150  Last data filed at 7/27/2025 0814  Gross per 24 hour   Intake 2085.58 ml   Output 3100 ml   Net -1014.42 ml       Laboratory  Recent Labs     07/25/25  0056 07/26/25  0251 07/27/25  0447   WBC 8.4 9.1 7.0   RBC 4.43* 4.18* 3.48*   HEMOGLOBIN 14.2 13.7* 11.4*   HEMATOCRIT 43.7 40.5* 33.4*   MCV 98.6* 96.9 96.0   MCH 32.1 32.8 32.8   MCHC 32.5 33.8 34.1   RDW 63.4* 60.6* 57.6*   PLATELETCT 278 313 272   MPV 10.5 10.3 10.4     Recent Labs     07/26/25 2025 07/27/25  0447 07/27/25  1026   SODIUM 130* 130* 133*   POTASSIUM 4.1 4.1 3.5*   CHLORIDE 95* 95* 98   CO2 20 22 21   GLUCOSE 187* 128* 143*   BUN 51* 45* 37*   CREATININE 2.16* 2.06* 1.77*   CALCIUM 8.3* 8.2* 7.9*     Recent Labs     07/27/25  0555   INR 1.52*     Recent Labs     07/24/25  1913   NTPROBNP 30181*           Imaging  US-PARACENTESIS, ABD WITH IMAGING   Final Result      1. Ultrasound-guided diagnostic and therapeutic paracentesis of the right lower quadrant of the abdominal wall.      2. 250 mL of fluid withdrawn.      US-RUQ   Final Result      1.  Hepatic steatosis.   2.  Moderate ascites.   3.  Gallbladder wall thickening, likely secondary to third spacing or liver disease.      DX-CHEST-PORTABLE (1 VIEW)   Final Result         1.  Left basilar atelectasis, no focal infiltrate   2.  Cardiomegaly      CT-ABDOMEN-PELVIS WITH   Final Result         1.  Hepatomegaly and changes of cirrhosis.   2.  Mild scattered abdominal ascites, greatest in the pelvis.   3.  Indeterminate left adrenal nodule, follow-up adrenal protocol CT for further characterization as clinically appropriate.   4.  Cardiomegaly.   5.  Atherosclerosis and atherosclerotic coronary artery disease.                Assessment/Plan  1 SUZY:  Secondary to cardiorenal syndrome  2 cardiomyopathy  3 liver failure  4 lactic acidosis  5 hyponatremia  6 hypokalemia  no acute need for HD  Optimize cardiac function  Renal diet  Replace potassium  Daily BMP, CBC.  Renal dose all meds  Avoid nephrotoxins like NSAIDs.  Prognosis guarded.  We will sign off the case please call if needed  Plan discussed in detail with Dr. Malcolm

## 2025-07-27 NOTE — HOSPITAL COURSE
7/26-Admit ICU, HFrEF cardiogenic shock, ,     7/27-Markedly improved, net negative, wean , trend markers of perfusion, DC antibiotics for now, unclear what is being treated, peritoneal fluid bland - Dr. Malcolm     7/28 : culture negative , improving renal function, new abdominal pain - had CT abdomen showed trace ascites, cirrhosis, simple pleural effusions.

## 2025-07-27 NOTE — PROGRESS NOTES
MONITOR SUMMARY  Rate: 80-90s  Rhythm: SR 1st degree  Ectopy:   Measurements:  0.22; 0.1; 0.502

## 2025-07-27 NOTE — PROGRESS NOTES
4 Eyes Skin Assessment Completed by Ephraim HEALY RN and Bernard HEALY RN.    Skin assessment is primarily focused on high risk bony prominences. Pay special attention to skin beneath and around medical devices, high risk bony prominences, skin to skin areas and areas where the patient lacks sensation to feel pain and areas where the patient previously had breakdown.     Head (Occipital):  WDL   Ears (Under Medical Devices): WDL   Nose (Under Medical Devices): WDL    WDL   Neck: Old R IJ CVC site   Breast/Chest:  WDL   Shoulder Blades:  WDL   Spine:   WDL   (R) Arm/Elbow/Hand: WDL   (L) Arm/Elbow/Hand: WDL   Abdomen: Bruising   Pannus/Groin:  WDL   Sacrum/Coccyx:   WDL   (R) Ischial Tuberosity (Sit Bones):  WDL   (L) Ischial Tuberosity (Sit Bones):  WDL   (R) Leg:  WDL   (L) Leg:  WDL   (R) Heel:  Dry, Cracked, Flaky   (R) Foot/Toe: WDL   (L) Heel: Dry, Cracked, Flaky   (L) Foot/Toe:  WDL       DEVICES IN USE:   Respiratory Devices:  NA, patient on room air and Pulse ox  Feeding Devices:  N/A   Lines & BP Monitoring Devices:  Peripheral IV, BP cuff, and Pulse ox    Orthopedic Devices:  N/A  Miscellaneous Devices:  N/A    PROTOCOL INTERVENTIONS:   ICU Low Airloss Bed:  Already in place  Float Heels with Pillows:  Reinforced/reapplied    WOUND PHOTOS:   N/A no wounds identified    WOUND CONSULT:   N/A, no advanced wound care needs identified

## 2025-07-27 NOTE — CARE PLAN
The patient is Watcher - Medium risk of patient condition declining or worsening    Shift Goals  Clinical Goals: hemodynamic stability; adequate oxygenation  Patient Goals: Rest  Family Goals: YOEL    Progress made toward(s) clinical / shift goals:      Problem: Pain - Standard  Goal: Alleviation of pain or a reduction in pain to the patient’s comfort goal  Outcome: Progressing  Pt pain is controlled without use of PRN    Problem: Hemodynamics  Goal: Patient's hemodynamics, fluid balance and neurologic status will be stable or improve  Outcome: Progressing  Pt hemodynamics remain stable with dobutamine gtt    Problem: Respiratory  Goal: Patient will achieve/maintain optimum respiratory ventilation and gas exchange  Outcome: Progressing  Pt respiratory status remains stable with o2 use    Problem: Urinary Elimination  Goal: Establish and maintain regular urinary output  Outcome: Progressing  Pt is adequately producing urine and diuresing                Patient is not progressing towards the following goals:

## 2025-07-27 NOTE — CARE PLAN
Problem: Pain - Standard  Goal: Alleviation of pain or a reduction in pain to the patient’s comfort goal  Outcome: Progressing     Problem: Knowledge Deficit - Standard  Goal: Patient and family/care givers will demonstrate understanding of plan of care, disease process/condition, diagnostic tests and medications  Outcome: Progressing     Problem: Fall Risk  Goal: Patient will remain free from falls  Outcome: Progressing

## 2025-07-28 ENCOUNTER — APPOINTMENT (OUTPATIENT)
Dept: RADIOLOGY | Facility: MEDICAL CENTER | Age: 60
End: 2025-07-28
Payer: MEDICAID

## 2025-07-28 ASSESSMENT — COGNITIVE AND FUNCTIONAL STATUS - GENERAL
MOBILITY SCORE: 24
SUGGESTED CMS G CODE MODIFIER DAILY ACTIVITY: CH
SUGGESTED CMS G CODE MODIFIER MOBILITY: CH
DAILY ACTIVITIY SCORE: 24

## 2025-07-28 ASSESSMENT — ENCOUNTER SYMPTOMS
SENSORY CHANGE: 0
CONSTIPATION: 0
NAUSEA: 0
BACK PAIN: 0
PALPITATIONS: 0
HEARTBURN: 0
COUGH: 0
SHORTNESS OF BREATH: 0
HEADACHES: 0
FEVER: 0
ABDOMINAL PAIN: 1
DIARRHEA: 0
ABDOMINAL PAIN: 0
CHILLS: 0
DIZZINESS: 0
LOSS OF CONSCIOUSNESS: 0
VOMITING: 0

## 2025-07-28 ASSESSMENT — PAIN DESCRIPTION - PAIN TYPE
TYPE: ACUTE PAIN

## 2025-07-28 ASSESSMENT — FIBROSIS 4 INDEX: FIB4 SCORE: 1.73

## 2025-07-28 ASSESSMENT — LIFESTYLE VARIABLES: SUBSTANCE_ABUSE: 1

## 2025-07-28 NOTE — PROGRESS NOTES
Patient reporting epigastric pain similar to the start of admission and before he was transferred to the ICU. Patient wasn't sure if it was just gas, but was very uncomfortable. Notified Dr. Arguello of patient reporting, received orders as shown on MAR.

## 2025-07-28 NOTE — PROGRESS NOTES
4 Eyes Skin Assessment Completed by JULIANA Zimmerman and JULIANA Hernandez.    Skin assessment is primarily focused on high risk bony prominences. Pay special attention to skin beneath and around medical devices, high risk bony prominences, skin to skin areas and areas where the patient lacks sensation to feel pain and areas where the patient previously had breakdown.     Head (Occipital):  WDL   Ears (Under Medical Devices): WDL   Nose (Under Medical Devices): WDL   Mouth:  WDL   Neck: WDL, old Right IJ site, CDI.   Breast/Chest:  WDL   Shoulder Blades:  WDL   Spine:   WDL   (R) Arm/Elbow/Hand: WDL   (L) Arm/Elbow/Hand: WDL   Abdomen: Bruising   Pannus/Groin:  WDL   Sacrum/Coccyx:   WDL   (R) Ischial Tuberosity (Sit Bones):  WDL   (L) Ischial Tuberosity (Sit Bones):  WDL   (R) Leg:  Dry, flaky.   (L) Leg:  Dry, flaky.   (R) Heel:  Dry, Flaky, cracking   (R) Foot/Toe: WDL   (L) Heel: Dry, Flaky, cracking   (L) Foot/Toe:  WDL       DEVICES IN USE:   Respiratory Devices:  Pulse ox  Feeding Devices:  N/A   Lines & BP Monitoring Devices:  Peripheral IV, BP cuff, and Pulse ox    Orthopedic Devices:  N/A  Miscellaneous Devices:  Telemetry monitor    PROTOCOL INTERVENTIONS:   ICU Low Airloss Bed:  Already in place  Float Heels with Pillows:  Already in place    WOUND PHOTOS:   N/A no wounds identified    WOUND CONSULT:   N/A, no advanced wound care needs identified

## 2025-07-28 NOTE — ASSESSMENT & PLAN NOTE
7/29/2025  ETOH, stimulant abuse  LVEF 20% with mod/severe MR  Encourage sobriety  Cards following  Start metoprolol 25mg SR  Start dapagliflozin 10mg  Spironolactone 12.5  Add ARB as pressures/renal function allow  Cont Tele  Cont po lasix  Daily BMP to monitor for renal toxicity and potassium level

## 2025-07-28 NOTE — CARE PLAN
The patient is Stable - Low risk of patient condition declining or worsening    Shift Goals  Clinical Goals: Maintain hemodynamic stability, comfort, ambulate, pain mgmt prn  Patient Goals: Eat, rest  Family Goals: YOEL    Progress made toward(s) clinical / shift goals:    Problem: Pain - Standard  Goal: Alleviation of pain or a reduction in pain to the patient’s comfort goal  Outcome: Progressing     Problem: Knowledge Deficit - Standard  Goal: Patient and family/care givers will demonstrate understanding of plan of care, disease process/condition, diagnostic tests and medications  Outcome: Progressing     Problem: Fall Risk  Goal: Patient will remain free from falls  Outcome: Progressing     Problem: Care Map:  Admission Optimal Outcome for the Heart Failure Patient  Goal: Admission:  Optimal Care of the heart failure patient  Outcome: Progressing      Problem: Hemodynamics  Goal: Patient's hemodynamics, fluid balance and neurologic status will be stable or improve  Outcome: Progressing     Problem: Respiratory  Goal: Patient will achieve/maintain optimum respiratory ventilation and gas exchange  Outcome: Progressing     Problem: Urinary Elimination  Goal: Establish and maintain regular urinary output  Outcome: Progressing       Patient is not progressing towards the following goals:

## 2025-07-28 NOTE — PROGRESS NOTES
"Bedside report received, Assume care.     A&O x 4, Able to make needs known.  Pain Assessment: 5/10 to Abd, \"toleratable\" . Medication provided per MAR.  Continuous cardiac and [place] monitoring in place.     Bed in low position and locked, pt resting comfortably now, call light with in reach, all needs met at this time. Interventions will be executed per plan of care.   0827- Labs draw per standing orders. Lactic and BMP.  1228- Per order, Aguilar removed, urinal given to pt.  Palliative at bedside talking with pt.   "

## 2025-07-28 NOTE — CARE PLAN
Problem: Pain - Standard  Goal: Alleviation of pain or a reduction in pain to the patient’s comfort goal  Description: Target End Date:  Prior to discharge or change in level of care    Document on Vitals flowsheet    1.  Document pain using the appropriate pain scale per order or unit policy  2.  Educate and implement non-pharmacologic comfort measures (i.e. relaxation, distraction, massage, cold/heat therapy, etc.)  3.  Pain management medications as ordered  4.  Reassess pain after pain med administration per policy  5.  If opiods administered assess patient's response to pain medication is appropriate per POSS sedation scale  6.  Follow pain management plan developed in collaboration with patient and interdisciplinary team (including palliative care or pain specialists if applicable)  Outcome: Progressing     Problem: Knowledge Deficit - Standard  Goal: Patient and family/care givers will demonstrate understanding of plan of care, disease process/condition, diagnostic tests and medications  Description: Target End Date:  1-3 days or as soon as patient condition allows    Document in Patient Education    1.  Patient and family/caregiver oriented to unit, equipment, visitation policy and means for communicating concern  2.  Complete/review Learning Assessment  3.  Assess knowledge level of disease process/condition, treatment plan, diagnostic tests and medications  4.  Explain disease process/condition, treatment plan, diagnostic tests and medications  Outcome: Progressing     Problem: Fall Risk  Goal: Patient will remain free from falls  Description: Target End Date:  Prior to discharge or change in level of care    Document interventions on the Meera Cruz Fall Risk Assessment    1.  Assess for fall risk factors  2.  Implement fall precautions  Outcome: Progressing   The patient is Stable - Low risk of patient condition declining or worsening    Shift Goals  Clinical Goals: hemodynamic stability, trend  BMP  Patient Goals: Rest  Family Goals: louis    Progress made toward(s) clinical / shift goals:  Monitor Labs,Vs, I&O's. Encourage ambulation.     Patient is not progressing towards the following goals:

## 2025-07-28 NOTE — DISCHARGE PLANNING
Case Management Discharge Planning    Admission Date: 7/24/2025  GMLOS: 3.9  ALOS: 4    6-Clicks ADL Score: 21  6-Clicks Mobility Score: 21      Anticipated Discharge Dispo: Discharge Disposition: Discharged to home/self care (01)  Discharge Address: [address], [place] [address]  Discharge Contact Phone Number: [phone]    DME Needed: No    Action(s) Taken: Updated Provider/Nurse on Discharge Plan    RN CM met with Pt and provided Pt with Substance Abuse resources per request.    Escalations Completed: Provider    Medically Clear: No    Next Steps: RN CM to continue to assist in Pt's discharge needs.     Barriers to Discharge: Medical clearance    Is the patient up for discharge tomorrow: No

## 2025-07-28 NOTE — PROGRESS NOTES
Critical Care Medicine Progress Note - Resident    Date of admission  7/24/2025    Chief Complaint  59 y.o. male admitted 7/24/2025 with shortness of breath    Hospital Course  59-year-old male with history of HFrEF (EF 15-20%), cirrhosis, CKD, history of meth use approximately 6 months ago.  He was initially admitted to telemetry, but continued getting worse so on 7/26/2025 CCM was consulted and he was moved to the ICU for cardiogenic shock and started on dobutamine.     7/26 Admit to ICU, HFrEF cardiogenic shock, DBT    7/27 Markedly improved, wean DBT, abx Dc'd, peritoneal fluid bland    7/28 Off DBT, transfer to IM, on room air    Interval Problem Update  Reviewed last 24 hour events:    N: A&O x4  CV: SR 60-90s, BP 92/53 - 112/70 off vasoactives  R: RA  FEN/GI: cardiac diet, overnight c/o epigastric pain, CTAP w/o unremarkable and pain improved with Maalox  /Renal: Cr 1.48 > 1.26, baseline 1.  I/O net -4.5L over last 24 hours  Heme: hemoglobin 12.5, platelets 288K.  ID: Tm 36.9C, WBC 6.6K  Skin/MSK: heparin inj sites noted on abdomen  Endo: at goal    Plan for today: resume spironolactone, decrease diuretics  Family:   Home medications reconciled     Review of Systems  Review of Systems   Constitutional:  Negative for chills and fever.   Respiratory:  Negative for cough and shortness of breath.    Cardiovascular:  Positive for leg swelling. Negative for chest pain.   Gastrointestinal:  Positive for abdominal pain. Negative for constipation, diarrhea, nausea and vomiting.   Genitourinary:  Negative for dysuria.        Vital Signs for last 24 hours   Temp:  [36.4 °C (97.6 °F)-36.9 °C (98.5 °F)] 36.4 °C (97.6 °F)  Pulse:  [65-91] 85  Resp:  [10-53] 49  BP: ()/(53-80) 109/65  SpO2:  [91 %-99 %] 95 %    Hemodynamic parameters for last 24 hours       Respiratory Information for the last 24 hours       Physical Exam   Physical Exam  Vitals and nursing note reviewed.   HENT:      Head: Normocephalic.       Nose: Nose normal.      Mouth/Throat:      Mouth: Mucous membranes are moist.   Eyes:      Pupils: Pupils are equal, round, and reactive to light.   Cardiovascular:      Rate and Rhythm: Normal rate.      Pulses: Normal pulses.   Pulmonary:      Effort: Pulmonary effort is normal. No respiratory distress.   Abdominal:      General: Abdomen is flat. There is distension.      Palpations: Abdomen is soft.   Genitourinary:     Comments: Aguilar in place  Musculoskeletal:      Right lower leg: Edema present.      Left lower leg: Edema present.   Skin:     General: Skin is warm.      Capillary Refill: Capillary refill takes less than 2 seconds.      Comments: Heparin inj sites noted on abd, where he is indicating pain   Neurological:      General: No focal deficit present.      Mental Status: He is alert and oriented to person, place, and time.         Medications  Current Medications[1]    Fluids    Intake/Output Summary (Last 24 hours) at 7/28/2025 1248  Last data filed at 7/28/2025 1229  Gross per 24 hour   Intake 621.99 ml   Output 6400 ml   Net -5778.01 ml       Laboratory          Recent Labs     07/26/25  0251 07/26/25  1004 07/27/25  0447 07/27/25  1026 07/28/25  0011 07/28/25  0325 07/28/25  0801   SODIUM 132*   < > 130*   < > 131*  131* 130* 133*   POTASSIUM 6.3*   < > 4.1   < > 4.2  4.2 4.5 3.8   CHLORIDE 97   < > 95*   < > 95*  95* 96 98   CO2 18*   < > 22   < > 23  23 22 23   BUN 52*   < > 45*   < > 27*  27* 27* 24*   CREATININE 2.16*   < > 2.06*   < > 1.62*  1.62* 1.48* 1.26   MAGNESIUM 2.4  --  2.1  --   --  2.0  --    PHOSPHORUS 6.3*  --  3.8  --   --  2.0*  --    CALCIUM 9.0   < > 8.2*   < > 8.1*  8.1* 8.5 8.6    < > = values in this interval not displayed.     Recent Labs     07/27/25 2011 07/28/25  0011 07/28/25  0325 07/28/25  0801   ALTSGPT 142* 144* 145*  --    ASTSGOT 86* 94* 85*  --    ALKPHOSPHAT 97 103* 109*  --    TBILIRUBIN 1.2 1.3 1.3  --    GLUCOSE 126* 117*  117* 102* 126*     Recent  Labs     07/26/25 0251 07/27/25 0447 07/27/25 2011 07/28/25  0011 07/28/25  0325   WBC 9.1 7.0  --   --  6.6   NEUTSPOLYS  --  75.50*  --   --  78.00*   LYMPHOCYTES  --  9.20*  --   --  8.20*   MONOCYTES  --  12.60  --   --  10.00   EOSINOPHILS  --  2.00  --   --  2.90   BASOPHILS  --  0.60  --   --  0.60   ASTSGOT 79* 106* 86* 94* 85*   ALTSGPT 177* 162* 142* 144* 145*   ALKPHOSPHAT 111* 91 97 103* 109*   TBILIRUBIN 1.6* 1.3 1.2 1.3 1.3     Recent Labs     07/26/25 0251 07/27/25 0447 07/27/25  0555 07/28/25  0325   RBC 4.18* 3.48*  --  3.95*   HEMOGLOBIN 13.7* 11.4*  --  12.5*   HEMATOCRIT 40.5* 33.4*  --  38.5*   PLATELETCT 313 272  --  288   PROTHROMBTM  --   --  18.4*  --    INR  --   --  1.52*  --        Imaging  CT:    Reviewed    Assessment/Plan  * Cardiogenic shock (HCC)- (present on admission)  Assessment & Plan  Was in the ICU requiring dobutamine since titrated off  Pressures now improved to the point that we can initiate GDMT    Normal anion gap metabolic acidosis  Assessment & Plan  13, anion gap 15  Unclear etiology, possibly due to CKD.  Will check lactic acid, BHB and venous blood gases.  Consider bicarb supplementation    Hyperglycemia  Assessment & Plan  Glucose 165  Monitor, consider insulin sliding scale.    CKD (chronic kidney disease) stage 3, GFR 30-59 ml/min  Assessment & Plan  Avoid nephrotoxins    ACP (advance care planning)- (present on admission)  Assessment & Plan  FULL CODE based on discussion with my partner    Lactic acidosis- (present on admission)  Assessment & Plan  Secondary to cardiogenic shock      Hyperkalemia- (present on admission)  Assessment & Plan  Resolved  Renal functioin now improved    Abdominal pain- (present on admission)  Assessment & Plan  CT of the abdomen showed no acute changes, except hepatomegaly, probably due to hepatic congestion  Symptoms since resolved  Cont to monitor    Acute renal failure superimposed on stage 3 chronic kidney disease (HCC)-  (present on admission)  Assessment & Plan  Has had previous episode of SUZY during similar episode  Creat has improved to the normal range  Cont to follow BMP  Renally dose medications as appropriate    Acute hypoxic respiratory failure (HCC)- (present on admission)  Assessment & Plan  Now on RA after diuresis  mobilize    Acute on chronic combined systolic (congestive) and diastolic (congestive) heart failure (HCC)- (present on admission)  Assessment & Plan  59-year-old male who recently was admitted with cardiogenic shock secondary to combined systolic and diastolic heart failure, presented with worsening of lower extremity edema, shortness of breath and abdominal pain. chest x-ray showed cardiomegaly  Plan: IV Lasix 40 mg twice daily  Continue spironolactone, metoprolol, hold losartan to allow room for diuresis  Monitor input and output and daily weight.  2 g salt restriction    Nonischemic cardiomyopathy (HCC)- (present on admission)  Assessment & Plan  ETOH, stimulant abuse  LVEF 20% with mod/severe MR  Encourage sobriety  Cards following  Start metoprolol 25mg SR  Start dapagliflozin 10mg  Spironolactone 12.5  Add ARB as pressures/renal function allow  Cont Tele  Cont po lasix  Daily BMP to monitor for renal toxicity and potassium level    Alcoholic cirrhosis of liver with ascites (HCC)- (present on admission)  Assessment & Plan  Encourage sobriety    Transaminitis- (present on admission)  Assessment & Plan  Improving  Atleast in part due to congestivehepatopathy  Some degree of ETOH injury  Hepatitis panel neg  Cont to follow and work up further if he doesn't normalize         VTE:  Heparin  Ulcer: PPI  Lines: None    I have performed a physical exam and reviewed and updated ROS and Plan today (7/28/2025). In review of yesterday's note (7/27/2025), there are no changes except as documented above.     Corina Kilpatrick D.O.  Internal Medicine Resident         [1]   Current Facility-Administered Medications    Medication Dose Route Frequency Provider Last Rate Last Admin    phosphorus (K-Phos-Neutral) per tablet 500 mg  500 mg Oral 4X/DAY Ayesha Lozoya M.D.   500 mg at 07/28/25 1220    furosemide (Lasix) tablet 60 mg  60 mg Oral BID DIURETIC Ayesha Lozoya M.D.        spironolactone (Aldactone) tablet 12.5 mg  12.5 mg Oral Q DAY Ayesha Lozoya M.D.   12.5 mg at 07/28/25 1029    metoprolol SR (Toprol XL) tablet 25 mg  25 mg Oral Q DAY Jagjit Grubbs D.ODonald   25 mg at 07/28/25 1221    rivaroxaban (Xarelto) tablet 10 mg  10 mg Oral DAILY AT 1800 Jagjit Grubbs D.ODonald        dapagliflozin propanediol (Farxiga) tablet 10 mg  10 mg Oral DAILY Jagjit Grubbs D.ODonald        acetaminophen (Tylenol) tablet 650 mg  650 mg Oral Q6HRS PRN Krystian Lee M.D.   650 mg at 07/28/25 0002    senna-docusate (Pericolace Or Senokot S) 8.6-50 MG per tablet 2 Tablet  2 Tablet Oral Q EVENING Krystian Lee M.D.   2 Tablet at 07/26/25 1700    And    polyethylene glycol/lytes (Miralax) Packet 1 Packet  1 Packet Oral QDAY PRN Krystian Lee M.D.        hydrALAZINE (Apresoline) injection 10 mg  10 mg Intravenous Q4HRS PRN Krystian Lee M.D.        ondansetron (Zofran) syringe/vial injection 4 mg  4 mg Intravenous Q4HRS PRN Krystian Lee M.D.   4 mg at 07/25/25 0747    ondansetron (Zofran ODT) dispertab 4 mg  4 mg Oral Q4HRS PRN Krystian Lee M.D.        promethazine (Phenergan) tablet 12.5-25 mg  12.5-25 mg Oral Q4HRS PRN Krystian Lee M.D.        promethazine (Phenergan) suppository 12.5-25 mg  12.5-25 mg Rectal Q4HRS PRN Krystian Lee M.D.        prochlorperazine (Compazine) injection 5-10 mg  5-10 mg Intravenous Q4HRS PRN Krystian Lee M.D.        allopurinol (Zyloprim) tablet 300 mg  300 mg Oral DAILY Ayesha Lozoya M.D.   300 mg at 07/25/25 0535    aspirin EC tablet 81 mg  81 mg Oral DAILY Krystian Lee M.D.   81 mg at 07/28/25 0608    cyanocobalamin (Vitamin B-12)  tablet 500 mcg  500 mcg Oral DAILY Krystian Lee M.D.   500 mcg at 07/28/25 0607    methocarbamol (Robaxin) tablet 500 mg  500 mg Oral 4X/DAY PRN Krystian Lee M.D.        omeprazole (PriLOSEC) capsule 20 mg  20 mg Oral DAILY Krystian Lee M.D.   20 mg at 07/28/25 0607    tamsulosin (Flomax) capsule 0.4 mg  0.4 mg Oral AFTER BREAKFAST Krystian Lee M.D.   0.4 mg at 07/28/25 0805    polyethylene glycol/lytes (Miralax) Packet 1 Packet  1 Packet Oral DAILY Krystian Lee M.D.   1 Packet at 07/27/25 0531

## 2025-07-28 NOTE — CARE PLAN
The patient is Watcher - Medium risk of patient condition declining or worsening    Shift Goals  Clinical Goals: hemodynamic stability, trend BMP  Patient Goals: Rest  Family Goals: louis    Progress made toward(s) clinical / shift goals:    Problem: Pain - Standard  Goal: Alleviation of pain or a reduction in pain to the patient’s comfort goal  Outcome: Progressing     Problem: Knowledge Deficit - Standard  Goal: Patient and family/care givers will demonstrate understanding of plan of care, disease process/condition, diagnostic tests and medications  Outcome: Progressing     Problem: Fall Risk  Goal: Patient will remain free from falls  Outcome: Progressing     Problem: Hemodynamics  Goal: Patient's hemodynamics, fluid balance and neurologic status will be stable or improve  Outcome: Progressing

## 2025-07-28 NOTE — PROGRESS NOTES
Hospital Medicine Daily Progress Note    Date of Service  7/28/2025    Chief Complaint  Santino Zabala is a 59 y.o. male admitted 7/24/2025 with SOB    Hospital Course  58yo PMHx dilated cardiomyopathy, CKD 3, methamphetamine abuse, BPH, ETOH abuse, cirrhosis, gout, pulmonary HTN presenting with abd pain.  Admitted with biventricular failure/cardiogenic shock on dobutamine drip    Interval Problem Update  Pt feeling much better, no longer SOB    I have discussed this patient's plan of care and discharge plan at IDT rounds today with Case Management, Nursing, Nursing leadership, and other members of the IDT team.    Consultants/Specialty  cardiology    Code Status  Full Code    Disposition  The patient is not medically cleared for discharge to home or a post-acute facility.    I have placed the appropriate orders for post-discharge needs.    Review of Systems  Review of Systems   Constitutional:  Negative for chills and fever.   Respiratory:  Negative for cough and shortness of breath.    Cardiovascular:  Positive for leg swelling. Negative for chest pain and palpitations.   Gastrointestinal:  Negative for abdominal pain, diarrhea, nausea and vomiting.   Genitourinary:  Negative for dysuria and urgency.   Musculoskeletal:  Negative for back pain.   Skin:  Negative for rash.   Neurological:  Negative for dizziness, loss of consciousness and headaches.        Physical Exam  Temp:  [36.4 °C (97.6 °F)-36.9 °C (98.5 °F)] 36.4 °C (97.6 °F)  Pulse:  [65-91] 84  Resp:  [10-53] 20  BP: ()/(53-80) 92/53  SpO2:  [91 %-99 %] 94 %    Physical Exam  Constitutional:       General: He is not in acute distress.     Appearance: He is well-developed. He is not diaphoretic.   HENT:      Head: Normocephalic and atraumatic.   Eyes:      Conjunctiva/sclera: Conjunctivae normal.   Neck:      Vascular: No JVD.   Cardiovascular:      Rate and Rhythm: Normal rate.      Heart sounds: No murmur heard.     No gallop.   Pulmonary:       Effort: Pulmonary effort is normal. No respiratory distress.      Breath sounds: No stridor. No wheezing or rales.   Abdominal:      Palpations: Abdomen is soft.      Tenderness: There is no abdominal tenderness. There is no guarding or rebound.   Musculoskeletal:      Right lower leg: Edema present.      Left lower leg: Edema present.      Comments: Trace/1+ B to proximal shin   Skin:     General: Skin is warm and dry.      Capillary Refill: Capillary refill takes less than 2 seconds.      Findings: No rash.   Neurological:      Mental Status: He is oriented to person, place, and time.   Psychiatric:         Thought Content: Thought content normal.       Fluids    Intake/Output Summary (Last 24 hours) at 7/28/2025 1024  Last data filed at 7/28/2025 0818  Gross per 24 hour   Intake 1101.99 ml   Output 5900 ml   Net -4798.01 ml        Laboratory  Recent Labs     07/26/25  0251 07/27/25  0447 07/28/25  0325   WBC 9.1 7.0 6.6   RBC 4.18* 3.48* 3.95*   HEMOGLOBIN 13.7* 11.4* 12.5*   HEMATOCRIT 40.5* 33.4* 38.5*   MCV 96.9 96.0 97.5   MCH 32.8 32.8 31.6   MCHC 33.8 34.1 32.5   RDW 60.6* 57.6* 58.8*   PLATELETCT 313 272 288   MPV 10.3 10.4 10.1     Recent Labs     07/28/25  0011 07/28/25  0325 07/28/25  0801   SODIUM 131*  131* 130* 133*   POTASSIUM 4.2  4.2 4.5 3.8   CHLORIDE 95*  95* 96 98   CO2 23  23 22 23   GLUCOSE 117*  117* 102* 126*   BUN 27*  27* 27* 24*   CREATININE 1.62*  1.62* 1.48* 1.26   CALCIUM 8.1*  8.1* 8.5 8.6     Recent Labs     07/27/25  0555   INR 1.52*               Imaging  CT-ABDOMEN-PELVIS W/O   Final Result      1.  No acute process seen.   2.  Cardiomegaly.   3.  Small simple bilateral pleural effusions and mild bibasilar atelectasis.   4.  Trace ascites.   5.  Liver contour is slightly nodular, suggesting cirrhosis.         US-PARACENTESIS, ABD WITH IMAGING   Final Result      1. Ultrasound-guided diagnostic and therapeutic paracentesis of the right lower quadrant of the abdominal wall.       2. 250 mL of fluid withdrawn.      US-RUQ   Final Result      1.  Hepatic steatosis.   2.  Moderate ascites.   3.  Gallbladder wall thickening, likely secondary to third spacing or liver disease.      DX-CHEST-PORTABLE (1 VIEW)   Final Result         1.  Left basilar atelectasis, no focal infiltrate   2.  Cardiomegaly      CT-ABDOMEN-PELVIS WITH   Final Result         1.  Hepatomegaly and changes of cirrhosis.   2.  Mild scattered abdominal ascites, greatest in the pelvis.   3.  Indeterminate left adrenal nodule, follow-up adrenal protocol CT for further characterization as clinically appropriate.   4.  Cardiomegaly.   5.  Atherosclerosis and atherosclerotic coronary artery disease.                 Assessment/Plan  * Cardiogenic shock (HCC)- (present on admission)  Assessment & Plan  Was in the ICU requiring dobutamine since titrated off  Pressures now improved to the point that we can initiate GDMT    Normal anion gap metabolic acidosis  Assessment & Plan  13, anion gap 15  Unclear etiology, possibly due to CKD.  Will check lactic acid, BHB and venous blood gases.  Consider bicarb supplementation    Hyperglycemia  Assessment & Plan  Glucose 165  Monitor, consider insulin sliding scale.    CKD (chronic kidney disease) stage 3, GFR 30-59 ml/min  Assessment & Plan  Avoid nephrotoxins    ACP (advance care planning)- (present on admission)  Assessment & Plan  FULL CODE based on discussion with my partner    Lactic acidosis- (present on admission)  Assessment & Plan  Secondary to cardiogenic shock      Hyperkalemia- (present on admission)  Assessment & Plan  Resolved  Renal functioin now improved    Abdominal pain- (present on admission)  Assessment & Plan  CT of the abdomen showed no acute changes, except hepatomegaly, probably due to hepatic congestion  Symptoms since resolved  Cont to monitor    Acute renal failure superimposed on stage 3 chronic kidney disease (HCC)- (present on admission)  Assessment &  Plan  Has had previous episode of SUZY during similar episode  Creat has improved to the normal range  Cont to follow BMP  Renally dose medications as appropriate    Acute hypoxic respiratory failure (HCC)- (present on admission)  Assessment & Plan  Now on RA after diuresis  mobilize    Acute on chronic combined systolic (congestive) and diastolic (congestive) heart failure (HCC)- (present on admission)  Assessment & Plan  59-year-old male who recently was admitted with cardiogenic shock secondary to combined systolic and diastolic heart failure, presented with worsening of lower extremity edema, shortness of breath and abdominal pain. chest x-ray showed cardiomegaly  Plan: IV Lasix 40 mg twice daily  Continue spironolactone, metoprolol, hold losartan to allow room for diuresis  Monitor input and output and daily weight.  2 g salt restriction    Nonischemic cardiomyopathy (HCC)- (present on admission)  Assessment & Plan  ETOH, stimulant abuse  LVEF 20% with mod/severe MR  Encourage sobriety  Cards following  Start metoprolol 25mg SR  Start dapagliflozin 10mg  Spironolactone 12.5  Add ARB as pressures/renal function allow  Cont Tele  Cont po lasix  Daily BMP to monitor for renal toxicity and potassium level    Alcoholic cirrhosis of liver with ascites (HCC)- (present on admission)  Assessment & Plan  Encourage sobriety    Transaminitis- (present on admission)  Assessment & Plan  Improving  Atleast in part due to congestivehepatopathy  Some degree of ETOH injury  Hepatitis panel neg  Cont to follow and work up further if he doesn't normalize         VTE prophylaxis: VTE Selection    I have performed a physical exam and reviewed and updated ROS and Plan today (7/28/2025). In review of yesterday's note (7/27/2025), there are no changes except as documented above.

## 2025-07-28 NOTE — PROGRESS NOTES
"Critical Care Progress Note    Date of admission  7/24/2025    Chief Complaint  \"59 year old male with a PMHx HFrEF with severely reduced ejection fraction of 15 to 20%, chronic kidney disease, history of meth use last use was approximately 6 months ago and cirrhosis who presented to the hospital on 7/24 with shortness of breath and lactic acid > 9.     He was admitted to [facility] but continued getting worse so on 07/26/25 CCM was consulted and determined him to be in cardiogenic shock so moved him to the ICU for dobutamine.      He says he feels foggy and tired with shortness of breath but denies chest pain or infectious s/s at this time.\" H+P    Hospital Course  7/26-Admit ICU, HFrEF cardiogenic shock, ,     7/27-Markedly improved, net negative, wean , trend markers of perfusion, DC antibiotics for now, unclear what is being treated, peritoneal fluid bland - Dr. Malcolm     7/28 : culture negative , improving renal function, new abdominal pain - had CT abdomen showed trace ascites, cirrhosis, simple pleural effusions.    Interval Problem Update  24 Hr Updates               - Neuro: intact , no focal deficits               - CV: off pressors , NSR              - Resp: at RA , saturating >92               - GI: tolerating PO               - Renal /UOP: urine output 6 L in 24 hrs              - Endo: normal glucose               - ID: no fever , culture negative , normal WBC               - Heme/onco: stable Hb , normal platelets               - Lines: PIV /Aguilar - will be removed               - PPx: GI PPI  DVT heparin               - SAT/ SBT: NA              - new  imaging: CT abdomen                     Review of Systems  Review of Systems   Constitutional:  Negative for chills and fever.   Respiratory:  Negative for cough and shortness of breath.    Cardiovascular:  Negative for chest pain and palpitations.   Gastrointestinal:  Positive for abdominal pain. Negative for heartburn, nausea and vomiting. "   Genitourinary:  Negative for dysuria and urgency.   Neurological:  Negative for dizziness, sensory change and loss of consciousness.   All other systems reviewed and are negative.       Vital Signs for last 24 hours   Temp:  [36.4 °C (97.6 °F)-36.9 °C (98.5 °F)] 36.4 °C (97.6 °F)  Pulse:  [65-91] 83  Resp:  [10-53] 29  BP: ()/(53-80) 112/70  SpO2:  [91 %-99 %] 96 %      Physical Exam   Physical Exam  Constitutional:       General: He is not in acute distress.     Appearance: He is not ill-appearing or toxic-appearing.   HENT:      Head: Normocephalic and atraumatic.   Eyes:      General: No scleral icterus.     Conjunctiva/sclera: Conjunctivae normal.   Cardiovascular:      Rate and Rhythm: Normal rate and regular rhythm.      Heart sounds: No murmur heard.  Pulmonary:      Effort: Pulmonary effort is normal. No respiratory distress.      Breath sounds: Normal breath sounds.   Abdominal:      Palpations: Abdomen is soft. There is no mass.      Tenderness: There is abdominal tenderness.      Comments: Bruising    Musculoskeletal:         General: Normal range of motion.      Right lower leg: No edema.      Left lower leg: No edema.   Neurological:      General: No focal deficit present.      Mental Status: He is alert and oriented to person, place, and time.         Medications  Current Medications[1]    Fluids    Intake/Output Summary (Last 24 hours) at 7/28/2025 1059  Last data filed at 7/28/2025 0818  Gross per 24 hour   Intake 1101.99 ml   Output 5900 ml   Net -4798.01 ml       Laboratory          Recent Labs     07/26/25  0251 07/26/25  1004 07/27/25  0447 07/27/25  1026 07/28/25  0011 07/28/25  0325 07/28/25  0801   SODIUM 132*   < > 130*   < > 131*  131* 130* 133*   POTASSIUM 6.3*   < > 4.1   < > 4.2  4.2 4.5 3.8   CHLORIDE 97   < > 95*   < > 95*  95* 96 98   CO2 18*   < > 22   < > 23  23 22 23   BUN 52*   < > 45*   < > 27*  27* 27* 24*   CREATININE 2.16*   < > 2.06*   < > 1.62*  1.62* 1.48* 1.26    MAGNESIUM 2.4  --  2.1  --   --  2.0  --    PHOSPHORUS 6.3*  --  3.8  --   --  2.0*  --    CALCIUM 9.0   < > 8.2*   < > 8.1*  8.1* 8.5 8.6    < > = values in this interval not displayed.     Recent Labs     07/27/25 2011 07/28/25 0011 07/28/25 0325 07/28/25  0801   ALTSGPT 142* 144* 145*  --    ASTSGOT 86* 94* 85*  --    ALKPHOSPHAT 97 103* 109*  --    TBILIRUBIN 1.2 1.3 1.3  --    GLUCOSE 126* 117*  117* 102* 126*     Recent Labs     07/26/25 0251 07/27/25 0447 07/27/25 2011 07/28/25 0011 07/28/25 0325   WBC 9.1 7.0  --   --  6.6   NEUTSPOLYS  --  75.50*  --   --  78.00*   LYMPHOCYTES  --  9.20*  --   --  8.20*   MONOCYTES  --  12.60  --   --  10.00   EOSINOPHILS  --  2.00  --   --  2.90   BASOPHILS  --  0.60  --   --  0.60   ASTSGOT 79* 106* 86* 94* 85*   ALTSGPT 177* 162* 142* 144* 145*   ALKPHOSPHAT 111* 91 97 103* 109*   TBILIRUBIN 1.6* 1.3 1.2 1.3 1.3     Recent Labs     07/26/25 0251 07/27/25 0447 07/27/25  0555 07/28/25 0325   RBC 4.18* 3.48*  --  3.95*   HEMOGLOBIN 13.7* 11.4*  --  12.5*   HEMATOCRIT 40.5* 33.4*  --  38.5*   PLATELETCT 313 272  --  288   PROTHROMBTM  --   --  18.4*  --    INR  --   --  1.52*  --        Imaging  CT:    Reviewed  Ultrasound:  Reviewed    Assessment/Plan  * Cardiogenic shock (HCC)- (present on admission)  Assessment & Plan  Severe biventricular failure  07/26/25 admitted to the ICU    Off dobutamine , lactate normal   Transition to PO diuresis   Optimize electrolytes    Hyponatremia- (present on admission)  Assessment & Plan  Volume overload and poor perfusion    Optimize CO and perfusion with Diuretics  Trend     ACP (advance care planning)- (present on admission)  Assessment & Plan  DNR/Iok    See ACP note dated 7/26 for more detailed discussion    Lactic acidosis- (present on admission)  Assessment & Plan  Secondary to cardiogenic shock  Resolved with decongestion and inotropes    Thiamine  Trend     Hyperkalemia- (present on admission)  Assessment &  Plan  Secondary to cardiorenal SUZY  Improved     PO Diuretics  Optimize renal perfusion  K cocktail prn  Trend     Acute renal failure superimposed on stage 3 chronic kidney disease (HCC)- (present on admission)  Assessment & Plan  Cardiorenal   Off dobutamine   Continue po lasix   Strict I/Os  Renally dosed medications  Avoid nephrotoxic agents as able  Trend     Acute hypoxic respiratory failure (HCC)- (present on admission)  Assessment & Plan  From cardiogenic pulmonary edema    SpO2 > 90%  On RA   Transition to PO Diuresis  Pulmonary hygiene     Methamphetamine abuse (HCC)- (present on admission)  Assessment & Plan  History of    Patient interested in rehab at discharge , case management to provide him with resources     Nonischemic cardiomyopathy (HCC)- (present on admission)  Assessment & Plan  See cardiogenic shock    Will restart metoprolol  Continue PO lasix  Add aldactone   Will resume ARB based on renal function tomorrow     Alcoholic cirrhosis of liver with ascites (HCC)- (present on admission)  Assessment & Plan  Currently without signs of intoxication or withdrawal    If withdrawal occurs, will use precedex infusion titrated to RASS 0 and benzodiazepines as indicated   Thiamine  Replace electrolytes  Maintain euvolemia   Resume aldactone and lasix PO         VTE:  Heparin  Ulcer: PPI  Lines: Aguilar Catheter  Ongoing indication addressed    I have performed a physical exam and reviewed and updated ROS and Plan today (7/28/2025). In review of yesterday's note (7/27/2025), there are no changes except as documented above.     Discussed patient condition and risk of morbidity and/or mortality with RN, RT, Pharmacy, , Charge nurse / hot rounds, and Patient  The patient remains critically ill.  Critical care time = 60 minutes in directly providing and coordinating critical care and extensive data review.  No time overlap and excludes procedures.         [1]   Current Facility-Administered  Medications   Medication Dose Route Frequency Provider Last Rate Last Admin    phosphorus (K-Phos-Neutral) per tablet 500 mg  500 mg Oral 4X/DAY Ayesha Lozoya M.D.   500 mg at 07/28/25 1028    furosemide (Lasix) tablet 60 mg  60 mg Oral BID DIURETIC Ayesha Lozoya M.D.        spironolactone (Aldactone) tablet 12.5 mg  12.5 mg Oral Q DAY Ayesha Lozoya M.D.   12.5 mg at 07/28/25 1029    metoprolol SR (Toprol XL) tablet 25 mg  25 mg Oral Q DAY Jagjit Grubbs D.ODonald        rivaroxaban (Xarelto) tablet 10 mg  10 mg Oral DAILY AT 1800 ESEQUIEL Rushing.ODonald        dapagliflozin propanediol (Farxiga) tablet 10 mg  10 mg Oral DAILY ARISTIDES RushingODonald        acetaminophen (Tylenol) tablet 650 mg  650 mg Oral Q6HRS PRN Krystian Lee M.D.   650 mg at 07/28/25 0002    senna-docusate (Pericolace Or Senokot S) 8.6-50 MG per tablet 2 Tablet  2 Tablet Oral Q EVENING Krystian Lee M.D.   2 Tablet at 07/26/25 1700    And    polyethylene glycol/lytes (Miralax) Packet 1 Packet  1 Packet Oral QDAY PRN Krystian Lee M.D.        hydrALAZINE (Apresoline) injection 10 mg  10 mg Intravenous Q4HRS PRN Krystian Lee M.D.        ondansetron (Zofran) syringe/vial injection 4 mg  4 mg Intravenous Q4HRS PRN Krystian Lee M.D.   4 mg at 07/25/25 0747    ondansetron (Zofran ODT) dispertab 4 mg  4 mg Oral Q4HRS PRN Krystian Lee M.D.        promethazine (Phenergan) tablet 12.5-25 mg  12.5-25 mg Oral Q4HRS PRN Krystian Lee M.D.        promethazine (Phenergan) suppository 12.5-25 mg  12.5-25 mg Rectal Q4HRS PRN Krystian Lee M.D.        prochlorperazine (Compazine) injection 5-10 mg  5-10 mg Intravenous Q4HRS PRN Krystian Lee M.D.        allopurinol (Zyloprim) tablet 300 mg  300 mg Oral DAILY Ayesha Lozoya M.D.   300 mg at 07/25/25 0535    aspirin EC tablet 81 mg  81 mg Oral DAILY Krystian Lee M.D.   81 mg at 07/28/25 0608    cyanocobalamin (Vitamin B-12) tablet  500 mcg  500 mcg Oral DAILY Krystian Lee M.D.   500 mcg at 07/28/25 0607    methocarbamol (Robaxin) tablet 500 mg  500 mg Oral 4X/DAY PRN Krystian Lee M.D.        omeprazole (PriLOSEC) capsule 20 mg  20 mg Oral DAILY Krystian Lee M.D.   20 mg at 07/28/25 0607    tamsulosin (Flomax) capsule 0.4 mg  0.4 mg Oral AFTER BREAKFAST Krystian Lee M.D.   0.4 mg at 07/28/25 0805    polyethylene glycol/lytes (Miralax) Packet 1 Packet  1 Packet Oral DAILY Krystian Lee M.D.   1 Packet at 07/27/25 0531

## 2025-07-28 NOTE — CONSULTS
MRN: [id number]  Date of palliative consult: 25  Reason for consult: GOC/ACP  Referring provider: Savannah WHITE  Location of consult: T619-00  Additional consulting services: Critical care , nephrology     HPI:   Santino Zabala is a 59 y.o. male with medical history significant for severe cardiomyopathy, methamphetamine abuse, alcoholic liver cirrhosis, CKD stage IIIb admitted 2025 with abdominal pain and shortness of breath initially admitted to telemetry 2025 however transferred to ICU 2025.  Hospital course complicated by worsening SUZY, hyperkalemia, encephalopathy.      ROS:    Review of Systems   Respiratory:  Negative for shortness of breath.    Cardiovascular:  Positive for leg swelling. Negative for chest pain.   Psychiatric/Behavioral:  Positive for substance abuse (meth 3 months ago; alcohol years ago).        PE:   Recent vital signs  BMI: Body mass index is 21.87 kg/m².    Temp (24hrs), Av.7 °C (98 °F), Min:36.4 °C (97.6 °F), Max:36.9 °C (98.5 °F)  Temperature: 36.4 °C (97.6 °F)  Pulse  Av.4  Min: 65  Max: 109   Blood Pressure: 112/70       Physical Exam  Constitutional:       Appearance: He is ill-appearing.      Comments: Sitting up in recliner chair   Pulmonary:      Effort: No respiratory distress.   Musculoskeletal:      Right lower leg: No edema.      Left lower leg: No edema.   Neurological:      Mental Status: He is oriented to person, place, and time.      Comments: Oriented to event       ASSESSMENT/PLAN WITH SHARED DECISION MAKING:   PHYSICAL ASPECTS OF CARE  Palliative Performance Scale: 50%    #HFrEF 15-20%  #Cardiogenic shock with lactic acidosis  # SUZY superimposed on CKD 3B with hyperkalemia  # Probable cardiorenal syndrome nephropathy  #Alcoholic cirrhosis of the liver with ascites with associated abdominal pain  # Encephalopathy  #Acute hypoxic respiratory failure on 2 L oxygen  # History of methamphetamine abuse    SOCIAL ASPECTS OF  "CARE  Patient resides with his mother and provides care to her.  Emergency contact is his brother Tyree.  Prior to admission independent with ADLs and IADLs.  Patient reports he has several ill family members and his brother Carin is helping support them.  Patient's brother Tyree is a  graduated from the [employer] in [place].    SPIRITUAL ASPECTS OF CARE   Not Zoroastrianism or spiritual. Patient reports his mom is Holiness and \"prays for me.\"     GOALS OF CARE/SERIOUS ILLNESS CONVERSATION  Introduced myself to patient. Discussed role of palliative care and reason for consult.  He is agreeable to discuss goals of care.  Patient confirmed he has been talking with his brother and the care team regarding his poor health.  He stated \"if I do not take better care of myself I could die.\"  We discussed his heart failure, liver failure, and kidney failure.  Discussed our hope is patient condition can improve if he takes better care of himself.  I did discuss that patient likely has poor prognosis/is at risk to die even if he does not take better care of himself.  Discussed that without abstaining from alcohol and/or drugs his prognosis would likely be much shorter and he would be at high risk for an event that would cause death more quickly.    Patient confirms he would want his brother Carin to act as his primary healthcare agent/surrogate decision maker.  Recommended completion of an advance directive/healthcare power of .  Reviewed advance directive as well as POLST form.  Offered to assist patient with completion or allow him time to look at them and set up another meeting.  He would like to further discuss with his brother.  Patient confirms his brother is off work tomorrow.  Set up a time to meet with them tomorrow at 12:30 PM.  Requested patient call me if time needs to change.  Patient denied having questions or needs at this time. Provided palliative care contact information and encouraged patient " to reach out with any questions/needs.     Code Status: Full code.      ACP Documents: None on file. Per ACP discussion by intensive care physician and patient 7/26 patient would want his brother Tyree to be his healthcare surrogate decision maker.  Advance directive and POLST form reviewed and left bedside with patient for review.    Updated: Dr. Lozoya and bedside RN Hanna    16 minutes spent discussing advance care planning, this time excludes any other billed services.    Interval diagnostic studies and medical documentation entries pertinent to this case were reviewed independently by me. This patient has at least one acute or chronic illness or injury that poses a threat to life or bodily function. This patient suffers from a high risk of morbidity from additional invasive diagnostic testing or intensive treatment. Discussion of recommendations and coordination of care undertaken with primary provider/treatment team.      Charley Torrez A.P.R.N.  Palliative Care Nurse Practitioner  [phone]

## 2025-07-29 ASSESSMENT — COGNITIVE AND FUNCTIONAL STATUS - GENERAL
SUGGESTED CMS G CODE MODIFIER DAILY ACTIVITY: CH
DAILY ACTIVITIY SCORE: 24
SUGGESTED CMS G CODE MODIFIER MOBILITY: CH
MOBILITY SCORE: 24

## 2025-07-29 ASSESSMENT — ENCOUNTER SYMPTOMS
FEVER: 0
NAUSEA: 0
HEADACHES: 0
LOSS OF CONSCIOUSNESS: 0
COUGH: 0
PALPITATIONS: 0
ABDOMINAL PAIN: 0
BACK PAIN: 0
CHILLS: 0
DIARRHEA: 0
DIZZINESS: 0
VOMITING: 0
SHORTNESS OF BREATH: 0

## 2025-07-29 ASSESSMENT — PAIN DESCRIPTION - PAIN TYPE
TYPE: ACUTE PAIN

## 2025-07-29 ASSESSMENT — FIBROSIS 4 INDEX: FIB4 SCORE: 1.47

## 2025-07-29 NOTE — HOSPITAL COURSE
60yo PMHx dilated cardiomyopathy, CKD 3, methamphetamine abuse, BPH, ETOH abuse, cirrhosis, gout, pulmonary HTN presenting with abd pain. Admitted with biventricular failure/cardiogenic shock requiring admission to the ICU for dobutamine drip    Patient did have acute kidney injury on chronic kidney disease stage III.  Kidney function has improved    Patient also has history of heart failure reduced ejection fraction of 20%, global hypokinesis, grade 2 diastolic dysfunction, moderate-severe mitral regurgitation.    Patient did have a paracentesis on 7/26 with 250 cc removed.  Fluid cultures were negative and not consistent with peritonitis    He had a very similar presentation on admission from 7/15-20, and is being admitted almost twice per month since May.  Because of this palliative care was consulted for goals of care conversation.  Patient tends to recover well with dobutamine infusion and diuresis.  He was weaned to room air.  He wishes to remain full code for now.    Patient reports compliance with his medication regimen after previous discharges.  However he does not remember any of the names or what his regimen was.  Despite reported compliance he still states he became short of breath, developed edema in his lower extremities and abdomen and was unable to pee.  At discharge his furosemide was increased from 20 mg to 60 mg.  Outpatient losartan was not restarted because of acute kidney injury.  Even though kidney function did improve his blood pressure has been soft  He was started on metformin for diabetes.  A1c last month was 6.9.  He can continue to take his Farxiga.    Social work was consulted and patient was given resources for substance abuse.  Counseled extensively about drug abuse and alcohol cessation as well as lifestyle modifications which patient states he is agreeable.  Counseled extensively about importance of follow-up and compliance with his medication regimen, monitoring for weight gain  and fluid overload    Medically stable to discharge home.  Follow-up with primary care as outpatient.  He has a cardiology follow-up tomorrow.

## 2025-07-29 NOTE — DISCHARGE PLANNING
Case Management Discharge Planning    Admission Date: 7/24/2025  GMLOS: 3.9  ALOS: 5    6-Clicks ADL Score: 24  6-Clicks Mobility Score: 24      Anticipated Discharge Dispo: Discharge Disposition: Discharged to home/self care (01)  Discharge Address: [address], [place] [address]  Discharge Contact Phone Number: [phone]    DME Needed: Pending hospital course.     Action(s) Taken: Chart reviewed and pt discussed in IDT rounds. Palliative is having GOC meeting at 1230.     Escalations Completed: None    Medically Clear: No    Next Steps: F/U with Palliative.      Barriers to Discharge: Medical clearance    Is the patient up for discharge tomorrow: No

## 2025-07-29 NOTE — PROGRESS NOTES
PALLIATIVE CARE SOCIAL WORK NOTE    Patient: Santino Zabala  Age: 59  Gender: Male  MRN: [id number]  Insurance: [facility]  Date Admitted: 7/24/25  Date of Service: 7/29/25    LMSW and palliative care provider, Hermelinda Torrez, met with patient, brother(Tyere) and VISH(Franki). Patient confirmed full code status. LMSW explained purpose of advance directive and patient agreed to complete. He chose his brother as his medical decision maker and his VISH as his alternate. We went over medical wishes and patient is agreeable to temporary interventions/treatment if there is hope for recovery. Two witnesses signed document. LMSW obtained copy for medical record and provided original to patient.    Brother was asking about drug treatment resources. LMSW reached out to case management to follow up.     Follow up: Palliative care to follow patient as needed.     Stephanie Avelar LMSW  Palliative Care

## 2025-07-29 NOTE — CARE PLAN
The patient is Stable - Low risk of patient condition declining or worsening    Shift Goals  Clinical Goals: hemodynamic stability, monitor labs/vs, I/O's  Patient Goals: sleep, comfort  Family Goals: na    Progress made toward(s) clinical / shift goals:    Problem: Pain - Standard  Goal: Alleviation of pain or a reduction in pain to the patient’s comfort goal  Outcome: Progressing     Problem: Fall Risk  Goal: Patient will remain free from falls  Outcome: Progressing     Problem: Care Map:  Day Before Discharge Optimal Outcome for the Heart Failure Patient  Goal: Day Before Discharge:  Optimal Care of the heart failure patient  Outcome: Progressing     Problem: Hemodynamics  Goal: Patient's hemodynamics, fluid balance and neurologic status will be stable or improve  Outcome: Progressing       Patient is not progressing towards the following goals:

## 2025-07-29 NOTE — PROGRESS NOTES
4 Eyes Skin Assessment Completed by JULIANA Vaughn and JULIANA Marie.    Skin assessment is primarily focused on high risk bony prominences. Pay special attention to skin beneath and around medical devices, high risk bony prominences, skin to skin areas and areas where the patient lacks sensation to feel pain and areas where the patient previously had breakdown.     Head (Occipital):  WDL   Ears (Under Medical Devices): WDL   Nose (Under Medical Devices): WDL   Mouth:  WDL   Neck: WDL   Breast/Chest:  WDL   Shoulder Blades:  WDL   Spine:   WDL   (R) Arm/Elbow/Hand: Scab   (L) Arm/Elbow/Hand: Scab   Abdomen: Bruising   Pannus/Groin:  WDL   Sacrum/Coccyx:   WDL   (R) Ischial Tuberosity (Sit Bones):  WDL   (L) Ischial Tuberosity (Sit Bones):  WDL   (R) Leg:  Edema, Shiny, and Scar   (L) Leg:  Edema, Shiny, and Scar   (R) Heel:  WDL   (R) Foot/Toe: WDL   (L) Heel: WDL   (L) Foot/Toe:  WDL       DEVICES IN USE:   Respiratory Devices:  NA, patient on room air  Feeding Devices:  N/A   Lines & BP Monitoring Devices:  Peripheral IV, BP cuff, and Pulse ox    Orthopedic Devices:  N/A  Miscellaneous Devices:  N/A    PROTOCOL INTERVENTIONS:   Standard/Trauma Bed:  Already in place    WOUND PHOTOS:   N/A no wounds identified    WOUND CONSULT:   N/A, no advanced wound care needs identified

## 2025-07-29 NOTE — PROGRESS NOTES
Pt arrived to unit via wheelchair. Pt oriented to room, unit, and plan of care. Tele-monitor placed and monitor room notified. All questions answered at this time. Call light within reach; fall precautions in place.

## 2025-07-29 NOTE — PROGRESS NOTES
Bedside report received from off going RN: Bernadette, assumed care of patient.     Fall Risk Score: LOW RISK  Fall risk interventions in place: Provide patient/family education based on risk assessment, Educate patient/family to call staff for assistance when getting out of bed, Place fall precaution signage outside patient door, Place patient in room close to nursing station, Utilize bed/chair fall alarm, and Bed alarm connected correctly  Bed type: Regular (Mich Score less than 17 interventions in place)  Patient on cardiac monitor: Yes  IVF/IV medications: Not Applicable   Oxygen: Room Air  Bedside sitter: Not Applicable   Isolation: Not applicable

## 2025-07-29 NOTE — PROGRESS NOTES
Hospital Medicine Daily Progress Note    Date of Service  7/28/2025    Chief Complaint  Santino Zabala is a 59 y.o. male admitted 7/24/2025 with SOB    Hospital Course  58yo PMHx dilated cardiomyopathy, CKD 3, methamphetamine abuse, BPH, ETOH abuse, cirrhosis, gout, pulmonary HTN presenting with abd pain. Admitted with biventricular failure/cardiogenic shock requiring admission to the ICU for dobutamine drip    Patient did have acute kidney injury on chronic kidney disease stage III.  Kidney function has improved    Patient also has history of heart failure reduced ejection fraction of 20%, global hypokinesis, grade 2 diastolic dysfunction, moderate-severe mitral regurgitation.    Patient did have a paracentesis on 7/26 with 250 cc removed.  Fluid cultures were negative and not consistent with peritonitis    He had a very similar presentation on admission from 7/15-20, and is being admitted almost twice per month since May.  Because of this primary care was consulted for goals of care conversation.  Patient tends to recover well with dobutamine infusion and diuresis.  He wishes to remain full code for now.    Interval Problem Update  Patient was seen and examined at bedside.  I have personally reviewed and interpreted vitals, labs, and imaging.    7/29.  Afebrile.  Hypertensive this morning.  On room air.  Denies fever, chills, chest pain.  Shortness of breath has resolved.  Complains of some abdominal pain from previous site of paracentesis.  He has been ambulating and states he feels good.  Hgb 12.5  Na 134  Procal 5.37 > 3.21    I have discussed this patient's plan of care and discharge plan at IDT rounds today with Case Management, Nursing, Nursing leadership, and other members of the IDT team.    Consultants/Specialty  cardiology    Code Status  Full Code    Disposition  The patient is not medically cleared for discharge to home or a post-acute facility.  Anticipate discharge to: home with close outpatient  follow-up    I have placed the appropriate orders for post-discharge needs.    Review of Systems  Review of Systems   Constitutional:  Negative for chills and fever.   Respiratory:  Negative for cough and shortness of breath.    Cardiovascular:  Positive for leg swelling. Negative for chest pain and palpitations.   Gastrointestinal:  Negative for abdominal pain, diarrhea, nausea and vomiting.   Genitourinary:  Negative for dysuria and urgency.   Musculoskeletal:  Negative for back pain.   Skin:  Negative for rash.   Neurological:  Negative for dizziness, loss of consciousness and headaches.        Physical Exam  Temp:  [36.4 °C (97.6 °F)-36.9 °C (98.5 °F)] 36.4 °C (97.6 °F)  Pulse:  [65-91] 84  Resp:  [10-53] 20  BP: ()/(53-80) 92/53  SpO2:  [91 %-99 %] 94 %    Physical Exam  Constitutional:       General: He is not in acute distress.     Appearance: He is well-developed. He is not diaphoretic.   HENT:      Head: Normocephalic and atraumatic.   Eyes:      Conjunctiva/sclera: Conjunctivae normal.   Neck:      Vascular: No JVD.   Cardiovascular:      Rate and Rhythm: Normal rate.      Heart sounds: No murmur heard.     No gallop.   Pulmonary:      Effort: Pulmonary effort is normal. No respiratory distress.      Breath sounds: No stridor. No wheezing or rales.   Abdominal:      Palpations: Abdomen is soft.      Tenderness: There is no abdominal tenderness. There is no guarding or rebound.   Musculoskeletal:      Right lower leg: Edema present.      Left lower leg: Edema present.      Comments: Trace/1+ B to proximal shin   Skin:     General: Skin is warm and dry.      Capillary Refill: Capillary refill takes less than 2 seconds.      Findings: No rash.   Neurological:      Mental Status: He is oriented to person, place, and time.   Psychiatric:         Thought Content: Thought content normal.         Fluids    Intake/Output Summary (Last 24 hours) at 7/28/2025 1024  Last data filed at 7/28/2025 0818  Gross per  24 hour   Intake 1101.99 ml   Output 5900 ml   Net -4798.01 ml        Laboratory  Recent Labs     07/26/25  0251 07/27/25  0447 07/28/25  0325   WBC 9.1 7.0 6.6   RBC 4.18* 3.48* 3.95*   HEMOGLOBIN 13.7* 11.4* 12.5*   HEMATOCRIT 40.5* 33.4* 38.5*   MCV 96.9 96.0 97.5   MCH 32.8 32.8 31.6   MCHC 33.8 34.1 32.5   RDW 60.6* 57.6* 58.8*   PLATELETCT 313 272 288   MPV 10.3 10.4 10.1     Recent Labs     07/28/25  0011 07/28/25  0325 07/28/25  0801   SODIUM 131*  131* 130* 133*   POTASSIUM 4.2  4.2 4.5 3.8   CHLORIDE 95*  95* 96 98   CO2 23  23 22 23   GLUCOSE 117*  117* 102* 126*   BUN 27*  27* 27* 24*   CREATININE 1.62*  1.62* 1.48* 1.26   CALCIUM 8.1*  8.1* 8.5 8.6     Recent Labs     07/27/25  0555   INR 1.52*               Imaging  CT-ABDOMEN-PELVIS W/O   Final Result      1.  No acute process seen.   2.  Cardiomegaly.   3.  Small simple bilateral pleural effusions and mild bibasilar atelectasis.   4.  Trace ascites.   5.  Liver contour is slightly nodular, suggesting cirrhosis.         US-PARACENTESIS, ABD WITH IMAGING   Final Result      1. Ultrasound-guided diagnostic and therapeutic paracentesis of the right lower quadrant of the abdominal wall.      2. 250 mL of fluid withdrawn.      US-RUQ   Final Result      1.  Hepatic steatosis.   2.  Moderate ascites.   3.  Gallbladder wall thickening, likely secondary to third spacing or liver disease.      DX-CHEST-PORTABLE (1 VIEW)   Final Result         1.  Left basilar atelectasis, no focal infiltrate   2.  Cardiomegaly      CT-ABDOMEN-PELVIS WITH   Final Result         1.  Hepatomegaly and changes of cirrhosis.   2.  Mild scattered abdominal ascites, greatest in the pelvis.   3.  Indeterminate left adrenal nodule, follow-up adrenal protocol CT for further characterization as clinically appropriate.   4.  Cardiomegaly.   5.  Atherosclerosis and atherosclerotic coronary artery disease.                 Assessment/Plan  * Cardiogenic shock (HCC)- (present on  admission)  Assessment & Plan  7/29/2025  Was in the ICU requiring dobutamine since titrated off  Pressures now improved to the point that we can initiate GDMT    Normal anion gap metabolic acidosis- (present on admission)  Assessment & Plan  7/29/2025  13, anion gap 15  Unclear etiology, possibly due to CKD.  Will check lactic acid, BHB and venous blood gases.  Consider bicarb supplementation    Hyperglycemia- (present on admission)  Assessment & Plan  7/29/2025  Glucose 165  Monitor, consider insulin sliding scale.    CKD (chronic kidney disease) stage 3, GFR 30-59 ml/min- (present on admission)  Assessment & Plan  7/29/2025  Avoid nephrotoxins    ACP (advance care planning)- (present on admission)  Assessment & Plan  FULL CODE based on discussion with my partner    Lactic acidosis- (present on admission)  Assessment & Plan  7/29/2025  Secondary to cardiogenic shock    Hyperkalemia- (present on admission)  Assessment & Plan  7/29/2025  Resolved  Renal functioin now improved    Abdominal pain- (present on admission)  Assessment & Plan  7/29/2025  CT of the abdomen showed no acute changes, except hepatomegaly, probably due to hepatic congestion  Symptoms since resolved  Cont to monitor    Acute renal failure superimposed on stage 3 chronic kidney disease (HCC)- (present on admission)  Assessment & Plan  7/29/2025  Has had previous episode of SUZY during similar episode  Creat has improved to the normal range  Cont to follow BMP  Renally dose medications as appropriate    Acute hypoxic respiratory failure (HCC)- (present on admission)  Assessment & Plan  7/29/2025  Now on RA after diuresis  mobilize    Acute on chronic combined systolic (congestive) and diastolic (congestive) heart failure (HCC)- (present on admission)  Assessment & Plan  7/29/2025  59-year-old male who recently was admitted with cardiogenic shock secondary to combined systolic and diastolic heart failure, presented with worsening of lower extremity  edema, shortness of breath and abdominal pain. chest x-ray showed cardiomegaly  Plan: IV Lasix 40 mg twice daily  Continue spironolactone, metoprolol, hold losartan to allow room for diuresis  Monitor input and output and daily weight.  2 g salt restriction    Nonischemic cardiomyopathy (HCC)- (present on admission)  Assessment & Plan  7/29/2025  ETOH, stimulant abuse  LVEF 20% with mod/severe MR  Encourage sobriety  Cards following  Start metoprolol 25mg SR  Start dapagliflozin 10mg  Spironolactone 12.5  Add ARB as pressures/renal function allow  Cont Tele  Cont po lasix  Daily BMP to monitor for renal toxicity and potassium level    Alcoholic cirrhosis of liver with ascites (HCC)- (present on admission)  Assessment & Plan  7/29/2025  Encourage sobriety    Transaminitis- (present on admission)  Assessment & Plan  7/29/2025  Improving  Atleast in part due to congestive hepatopathy  Some degree of ETOH injury  Hepatitis panel neg  Cont to follow and work up further if he doesn't normalize         VTE prophylaxis: Rivaroxaban    I have performed a physical exam and reviewed and updated ROS and Plan today (7/28/2025). In review of yesterday's note (7/27/2025), there are no changes except as documented above.    Greater than 50 minutes spent prepping to see patient (e.g. review of tests) obtaining and/or reviewing separately obtained history. Performing a medically appropriate examination and/ evaluation.  Counseling and educating the patient/family/caregiver.  Ordering medications, tests, or procedures.  Referring and communicating with other health care professionals.  Documenting clinical information in EPIC.  Independently interpreting results and communicating results to patient/family/caregiver.  Care coordination.

## 2025-07-29 NOTE — PROGRESS NOTES
Monitor Summary  Rhythm: SR w/ 1st degree 80-98    Ectopy: Freq. PVC    Intervals: .21/.07/.38

## 2025-07-29 NOTE — PROGRESS NOTES
Bedside report received from off going RN: Bernadette, assumed care of patient.     Fall Risk Score: MODERATE RISK  Fall risk interventions in place: Provide patient/family education based on risk assessment, Educate patient/family to call staff for assistance when getting out of bed, Place fall precaution signage outside patient door, Place patient in room close to nursing station, Utilize bed/chair fall alarm, and Bed alarm connected correctly  Bed type: Regular (Mich Score less than 17 interventions in place)  Patient on cardiac monitor: Yes  IVF/IV medications: Not Applicable   Oxygen: Room Air  Bedside sitter: Not Applicable   Isolation: Not applicable

## 2025-07-29 NOTE — PROGRESS NOTES
Palliative Care    Room: T814-02    HPI:   Santino Zabala is a 59 y.o. male with medical history significant for severe cardiomyopathy, methamphetamine abuse, alcoholic liver cirrhosis, CKD stage IIIb admitted 7/26/2025 with abdominal pain and shortness of breath.  Hospital course complicated by SUZY which is now improved, hyperkalemia, and encephalopathy which is also improved.  Originally seen by palliative care 7/28/2025 for advance care planning.    REVIEW OF SYSTEMS:  Negative for dyspnea. Negative for pain. Negative for fatigue.       PHYSICAL EXAM:  Physical Exam  Constitutional:       General: He is not in acute distress.     Comments: Sitting in recliner   Neurological:      Mental Status: He is alert and oriented to person, place, and time.      Comments: Oriented to event       Discussion/Plan   Met with patient, patient's brother Tyree, patient's sister-in-law at bedside along with palliative care  Stephanie.  Reviewed advance directive and POLST form.  We discussed CODE STATUS.  Patient would like to remain a full code.  He feels his chances of survival are high given he is young.  Reiterated his severe cardiomyopathy.  Discussed low likelihood he would survive CPR.  Patient confirmed he would still want to try and then would defer to his brother for decision making.  Recommended completion of advance directive.  Confirmed if he ever wishes to place any limits on the type of care he would want that I would recommend completion of POLST form.  Patient would be okay with trial of artificial nutrition but would not want to be sustained on artificial means long-term.  He feels his brother will make the right decisions for him if he is unable.  Confirmed he can complete POLST form at any time should he ever wish to place limits on his care.  All questions and concerns addressed.    16 minutes spent discussing advance care planning, this time excludes any other billed services.    Interval  "diagnostic studies and medical documentation entries pertinent to this case were reviewed independently by me. This patient has at least one acute or chronic illness or injury that poses a threat to life or bodily function. This patient suffers from a high risk of morbidity from additional invasive diagnostic testing or intensive treatment. Discussion of recommendations and coordination of care undertaken with primary provider/treatment team.      Charley \"Hermelinda\" CINDY Sanchez, [facility]  Inpatient Palliative Care (service hours Mon-Fri 8AM - 5PM)  [phone]      "

## 2025-07-30 ENCOUNTER — TELEPHONE (OUTPATIENT)
Dept: CARDIOLOGY | Facility: MEDICAL CENTER | Age: 60
End: 2025-07-30
Payer: MEDICAID

## 2025-07-30 ENCOUNTER — PHARMACY VISIT (OUTPATIENT)
Dept: PHARMACY | Facility: MEDICAL CENTER | Age: 60
End: 2025-07-30
Payer: COMMERCIAL

## 2025-07-30 PROBLEM — E87.1 HYPONATREMIA: Status: RESOLVED | Noted: 2025-07-16 | Resolved: 2025-07-30

## 2025-07-30 PROBLEM — N18.30 ACUTE RENAL FAILURE SUPERIMPOSED ON STAGE 3 CHRONIC KIDNEY DISEASE (HCC): Status: RESOLVED | Noted: 2024-11-30 | Resolved: 2025-07-30

## 2025-07-30 PROBLEM — R57.0 CARDIOGENIC SHOCK (HCC): Status: RESOLVED | Noted: 2024-12-08 | Resolved: 2025-07-30

## 2025-07-30 PROBLEM — E87.20 LACTIC ACIDOSIS: Status: RESOLVED | Noted: 2025-05-02 | Resolved: 2025-07-30

## 2025-07-30 PROBLEM — N17.9 ACUTE RENAL FAILURE SUPERIMPOSED ON STAGE 3 CHRONIC KIDNEY DISEASE (HCC): Status: RESOLVED | Noted: 2024-11-30 | Resolved: 2025-07-30

## 2025-07-30 PROBLEM — E87.20 NORMAL ANION GAP METABOLIC ACIDOSIS: Status: RESOLVED | Noted: 2025-07-24 | Resolved: 2025-07-30

## 2025-07-30 PROBLEM — I50.43 ACUTE ON CHRONIC COMBINED SYSTOLIC (CONGESTIVE) AND DIASTOLIC (CONGESTIVE) HEART FAILURE (HCC): Status: RESOLVED | Noted: 2024-11-30 | Resolved: 2025-07-30

## 2025-07-30 PROBLEM — R10.9 ABDOMINAL PAIN: Status: RESOLVED | Noted: 2024-12-08 | Resolved: 2025-07-30

## 2025-07-30 PROBLEM — E87.5 HYPERKALEMIA: Status: RESOLVED | Noted: 2024-12-08 | Resolved: 2025-07-30

## 2025-07-30 PROBLEM — J96.01 ACUTE HYPOXIC RESPIRATORY FAILURE (HCC): Status: RESOLVED | Noted: 2024-11-30 | Resolved: 2025-07-30

## 2025-07-30 PROCEDURE — RXMED WILLOW AMBULATORY MEDICATION CHARGE: Performed by: INTERNAL MEDICINE

## 2025-07-30 ASSESSMENT — FIBROSIS 4 INDEX: FIB4 SCORE: 1.18

## 2025-07-30 ASSESSMENT — PAIN DESCRIPTION - PAIN TYPE: TYPE: ACUTE PAIN

## 2025-07-30 NOTE — PROGRESS NOTES
Discharge orders received.  Patient arrived to the discharge lounge.  PIV removed by bedside RN prior to arrival. AVS instructions given, medications reviewed and general discharge education provided to patient.  Follow up appointments discussed.  Patient verbalized understanding of dc instructions and prescriptions.  Patient signed discharge instructions.  Patient verbalized understanding and had all belongings with him.  Patient pending meds.  Wished patient a speedy recovery.

## 2025-07-30 NOTE — DISCHARGE INSTRUCTIONS
HF Patient Discharge Instructions  Monitor your weight daily, and maintain a weight chart, to track your weight changes.   Activity as tolerated, unless your Doctor has ordered otherwise. Other activity order: Walking.  Follow a low fat, low cholesterol, low salt diet unless instructed otherwise by your Doctor. Read the labels on the back of food products and track your intake of fat, cholesterol and salt.   Fluid Restriction No. If a Fluid Restriction has been ordered by your Doctor, measure fluids with a measuring cup to ensure that you are not exceeding the restriction.   No smoking.  Oxygen No. If your Doctor has ordered that you wear Oxygen at home, it is important to wear it as ordered.  Did you receive an explanation from staff on the importance of taking each of your medications and why it is necessary to keep taking them unless your doctor says to stop? Yes  Were all of your questions answered about how to manage your heart failure and what to do if you have increased signs and symptoms after you go home? Yes  Do you feel like your heart failure care team involved you in the care treatment plan and allowed you to make decisions regarding your care while in the hospital and addressed any discharge needs you might have? Yes    See the educational handout provided at discharge for more information on monitoring your daily weight, activity and diet. This also explains more about Heart Failure, symptoms of a flare-up and some of the tests that you have undergone.     Warning Signs of a Flare-Up include:  Swelling in the ankles or lower legs.  Shortness of breath, while at rest, or while doing normal activities.   Shortness of breath at night when in bed, or coughing in bed.   Requiring more pillows to sleep at night, or needing to sit up at night to sleep.  Feeling weak, dizzy or fatigued.     When to call your Doctor:  Call your Primary Care Physician or Cardiologist if:   You experience any pain radiating  to your jaw or neck.  You have any difficulty breathing.  You experience weight gain of 3 lbs in a day or 5 lbs in a week.   You feel any palpitations or irregular heartbeats.  You become dizzy or lose consciousness.   If you have had an angiogram or had a pacemaker or AICD placed, and experience:  Bleeding, drainage or swelling at the surgical / puncture site.  Fever greater than 100.0 F  Shock from internal defibrillator.  Cool and / or numb extremities.     Please access the [facility] My HF Guide/Heart Failure Interactive Workbook:   [email or web address]        Discharge Instructions per Dr. Eddi Stahl D.O.    DIET: Diet Order Diet: Cardiac    ACTIVITY: As tolerated    A proper diet that is low in grease, fat, and salt, along with 30 minutes of exercise per day will lead to weight loss, and better controlled blood sugar and blood pressure.    DIAGNOSIS: Cardiogenic shock (HCC)    Follow up with your Primary Care Provider YISEL Snyder, P.A.-C. as scheduled or sooner if your symptoms persist or worsen.  Return to Emergency Room for sever chest pain, shortness of breath, signs of a stroke, or any other emergencies.

## 2025-07-30 NOTE — TELEPHONE ENCOUNTER
Called patient in regards to records for HF New appointment with JJORDAN. To review most recent lab results, ekg, any cardiac testing or ,if patient has been treated by a cardiologist. Voicemail box not set up, unable to LVM.

## 2025-07-30 NOTE — DISCHARGE SUMMARY
Discharge Summary    CHIEF COMPLAINT ON ADMISSION  Chief Complaint   Patient presents with    Abdominal Pain     Intermittent mid abd pain x24 hrs    Denies n/v        Reason for Admission  Abd pain     Admission Date  7/24/2025    CODE STATUS  Full Code    HPI & HOSPITAL COURSE  58yo PMHx dilated cardiomyopathy, CKD 3, methamphetamine abuse, BPH, ETOH abuse, cirrhosis, gout, pulmonary HTN presenting with abd pain. Admitted with biventricular failure/cardiogenic shock requiring admission to the ICU for dobutamine drip    Patient did have acute kidney injury on chronic kidney disease stage III.  Kidney function has improved    Patient also has history of heart failure reduced ejection fraction of 20%, global hypokinesis, grade 2 diastolic dysfunction, moderate-severe mitral regurgitation.    Patient did have a paracentesis on 7/26 with 250 cc removed.  Fluid cultures were negative and not consistent with peritonitis    He had a very similar presentation on admission from 7/15-20, and is being admitted almost twice per month since May.  Because of this palliative care was consulted for goals of care conversation.  Patient tends to recover well with dobutamine infusion and diuresis.  He was weaned to room air.  He wishes to remain full code for now.    Patient reports compliance with his medication regimen after previous discharges.  However he does not remember any of the names or what his regimen was.  Despite reported compliance he still states he became short of breath, developed edema in his lower extremities and abdomen and was unable to pee.  At discharge his furosemide was increased from 20 mg to 60 mg.  Outpatient losartan was not restarted because of acute kidney injury.  Even though kidney function did improve his blood pressure has been soft  He was started on metformin for diabetes.  A1c last month was 6.9.  He can continue to take his Farxiga.    Social work was consulted and patient was given resources  for substance abuse.  Counseled extensively about drug abuse and alcohol cessation as well as lifestyle modifications which patient states he is agreeable.  Counseled extensively about importance of follow-up and compliance with his medication regimen, monitoring for weight gain and fluid overload    Medically stable to discharge home.  Follow-up with primary care as outpatient.  He has a cardiology follow-up tomorrow.    Therefore, he is discharged in fair and stable condition to home with close outpatient follow-up.    The patient met 2-midnight criteria for an inpatient stay at the time of discharge.    Discharge Date  7/30/2025      FOLLOW UP ITEMS POST DISCHARGE  None    DISCHARGE DIAGNOSES  Principal Problem (Resolved):    Cardiogenic shock (HCC) (POA: Yes)  Active Problems:    Transaminitis (POA: Yes)    Alcoholic cirrhosis of liver with ascites (HCC) (POA: Yes)    Nonischemic cardiomyopathy (HCC) (POA: Yes)    Methamphetamine abuse (HCC) (POA: Yes)    ACP (advance care planning) (POA: Yes)    Heart failure (HCC) (POA: Yes)    CKD (chronic kidney disease) stage 3, GFR 30-59 ml/min (POA: Yes)    Hyperglycemia (POA: Yes)  Resolved Problems:    Acute on chronic combined systolic (congestive) and diastolic (congestive) heart failure (HCC) (POA: Yes)    Acute hypoxic respiratory failure (HCC) (POA: Yes)    Acute renal failure superimposed on stage 3 chronic kidney disease (HCC) (POA: Yes)    Abdominal pain (POA: Yes)    Hyperkalemia (POA: Yes)    Lactic acidosis (POA: Yes)    Hyponatremia (POA: Yes)    Normal anion gap metabolic acidosis (POA: Yes)      FOLLOW UP  Future Appointments   Date Time Provider Department Center   7/31/2025  1:45 PM STEVE Morris [facility] None     [facility] P[facility]A.-C[facility]  [address] [place] [id number] [phone]    Follow up        MEDICATIONS ON DISCHARGE     Medication List        START taking these medications        Instructions   metFORMIN 500 MG Tabs  Commonly  known as: Glucophage   Take 1 Tablet by mouth 2 times a day for 30 days.  Dose: 500 mg            CHANGE how you take these medications        Instructions   furosemide 40 MG Tabs  What changed:   medication strength  how much to take  when to take this  Commonly known as: Lasix   Take 1.5 Tablets by mouth 2 times a day for 30 days.  Dose: 60 mg     potassium chloride SA 10 MEQ Tbcr  What changed:   medication strength  how much to take  Commonly known as: K-Dur   Take 1 Tablet by mouth every day.  Dose: 10 mEq            CONTINUE taking these medications        Instructions   allopurinol 300 MG Tabs  Commonly known as: Zyloprim   Take 1 Tablet by mouth every day.  Dose: 300 mg     Aspirin Low Dose 81 MG EC tablet  Generic drug: aspirin   Take 1 Tablet by mouth every day.  Dose: 81 mg     dapagliflozin propanediol 10 MG Tabs  Commonly known as: Farxiga   Take 1 Tablet by mouth every day for 30 days.  Dose: 10 mg     docusate sodium 100 MG Caps  Commonly known as: Colace   Take 100 mg by mouth 2 times a day as needed for Constipation.  Dose: 100 mg     methocarbamol 500 MG Tabs  Commonly known as: Robaxin   Take 1 Tablet by mouth 4 times a day as needed (mild pain not covered by Tylenol).  Dose: 500 mg     metoprolol SR 25 MG Tb24  Commonly known as: Toprol XL   Take 1 Tablet by mouth every day.  Dose: 25 mg     omeprazole 20 MG delayed-release capsule  Commonly known as: PriLOSEC   Take 1 Capsule by mouth every day.  Dose: 20 mg     spironolactone 25 MG Tabs  Commonly known as: Aldactone   Take 0.5 Tablets by mouth every day. 12.5 mg = 0.5 tablet  Dose: 12.5 mg     tamsulosin 0.4 MG capsule  Commonly known as: Flomax   Take 1 Capsule by mouth 1/2 hour after breakfast.  Dose: 0.4 mg     VITAMIN B-12 PO   Take 1 Capsule by mouth every day.  Dose: 1 Capsule            STOP taking these medications      losartan 25 MG Tabs  Commonly known as: Cozaar              Allergies  Allergies[1]    DIET  Orders Placed This  Encounter   Procedures    Diet Order Diet: Cardiac     Standing Status:   Standing     Number of Occurrences:   1     Diet::   Cardiac [6]       ACTIVITY  As tolerated.  Weight bearing as tolerated    CONSULTATIONS  Neph, Critical care    PROCEDURES  None    LABORATORY  Lab Results   Component Value Date    SODIUM 137 07/30/2025    POTASSIUM 3.7 07/30/2025    CHLORIDE 100 07/30/2025    CO2 22 07/30/2025    GLUCOSE 95 07/30/2025    BUN 23 (H) 07/30/2025    CREATININE 1.13 07/30/2025        Lab Results   Component Value Date    WBC 6.8 07/30/2025    HEMOGLOBIN 13.0 (L) 07/30/2025    HEMATOCRIT 38.9 (L) 07/30/2025    PLATELETCT 360 07/30/2025        I discussed medications and side effects with the patient.  I discussed prognosis and importance of medical compliance with the patient.  I counseled the patient about diet, exercise, weight loss, smoking cessation, and life style modifications.  All questions and concerns have been addressed.  Total time of the discharge process was 37 minutes.         [1] No Known Allergies

## 2025-07-30 NOTE — PROGRESS NOTES
Monitor Summary  Rhythm: Paced 70-82    Ectopy: Freq. PVC, PVC coup, trigem    Intervals: .16/.14/.49

## 2025-07-30 NOTE — PROGRESS NOTES
Monitor Summary:   Rhythm: SR 1st degree  Rate: 78-86  Measurement: .24/.10/.37  Ectopy: f pvc, pac, big, coup, trig

## 2025-07-30 NOTE — CARE PLAN
The patient is Stable - Low risk of patient condition declining or worsening    Shift Goals  Clinical Goals: hemodynamic stability, monitor labs/vs, I/O's  Patient Goals: sleep, comfort  Family Goals: na    Progress made toward(s) clinical / shift goals:    Problem: Pain - Standard  Goal: Alleviation of pain or a reduction in pain to the patient’s comfort goal  Outcome: Progressing     Problem: Knowledge Deficit - Standard  Goal: Patient and family/care givers will demonstrate understanding of plan of care, disease process/condition, diagnostic tests and medications  Outcome: Progressing     Problem: Care Map:  Day of Discharge Optimal Outcome for the Heart Failure Patient  Goal: Day of Discharge:  Optimal Care of the heart failure patient  Outcome: Progressing   Heart failure packet given to patient, discussed med compliance, diet, nutrition s/s HF exacerbation. Re educated as needed.  Keeping track of I's/Os      Patient is not progressing towards the following goals:

## 2025-07-30 NOTE — PROGRESS NOTES
Bedside report received from off going RN: Sylvia, assumed care of patient.     Fall Risk Score: MODERATE RISK  Fall risk interventions in place: Provide patient/family education based on risk assessment, Educate patient/family to call staff for assistance when getting out of bed, Place fall precaution signage outside patient door, Place patient in room close to nursing station, Utilize bed/chair fall alarm, and Bed alarm connected correctly  Bed type: Regular ([place] Score less than 17 interventions in place)  Patient on cardiac monitor: Yes  IVF/IV medications: Not Applicable   Oxygen: Room Air  Bedside sitter: Not Applicable   Isolation: Not applicable

## 2025-07-30 NOTE — CARE PLAN
The patient is Stable - Low risk of patient condition declining or worsening    Shift Goals  Clinical Goals: Hemdynamic stability, D\C in AM if able, I/O's  Patient Goals: sleep, comfort, go home  Family Goals: na    Progress made toward(s) clinical / shift goals:    Problem: Care Map:  Day Before Discharge Optimal Outcome for the Heart Failure Patient  Goal: Day Before Discharge:  Optimal Care of the heart failure patient  Outcome: Progressing     Problem: Hemodynamics  Goal: Patient's hemodynamics, fluid balance and neurologic status will be stable or improve  Outcome: Progressing     Problem: Respiratory  Goal: Patient will achieve/maintain optimum respiratory ventilation and gas exchange  Outcome: Progressing       Patient is not progressing towards the following goals:

## 2025-07-31 ENCOUNTER — APPOINTMENT (OUTPATIENT)
Dept: CARDIOLOGY | Facility: MEDICAL CENTER | Age: 60
End: 2025-07-31
Attending: NURSE PRACTITIONER
Payer: MEDICAID

## 2025-08-04 ENCOUNTER — TELEPHONE (OUTPATIENT)
Dept: CARDIOLOGY | Facility: MEDICAL CENTER | Age: 60
End: 2025-08-04
Payer: MEDICAID

## 2025-08-05 ENCOUNTER — HOSPITAL ENCOUNTER (EMERGENCY)
Facility: MEDICAL CENTER | Age: 60
End: 2025-08-05
Attending: STUDENT IN AN ORGANIZED HEALTH CARE EDUCATION/TRAINING PROGRAM
Payer: MEDICAID

## 2025-08-05 VITALS
TEMPERATURE: 97.7 F | SYSTOLIC BLOOD PRESSURE: 101 MMHG | RESPIRATION RATE: 19 BRPM | BODY MASS INDEX: 21.3 KG/M2 | WEIGHT: 128 LBS | OXYGEN SATURATION: 99 % | HEART RATE: 99 BPM | DIASTOLIC BLOOD PRESSURE: 76 MMHG

## 2025-08-05 DIAGNOSIS — R10.11 RIGHT UPPER QUADRANT ABDOMINAL PAIN: Primary | ICD-10-CM

## 2025-08-05 LAB
ALBUMIN SERPL BCP-MCNC: 4.4 G/DL (ref 3.2–4.9)
ALBUMIN/GLOB SERPL: 1.2 G/DL
ALP SERPL-CCNC: 109 U/L (ref 30–99)
ALT SERPL-CCNC: 48 U/L (ref 2–50)
ANION GAP SERPL CALC-SCNC: 15 MMOL/L (ref 7–16)
AST SERPL-CCNC: 39 U/L (ref 12–45)
BASOPHILS # BLD AUTO: 1.2 % (ref 0–1.8)
BASOPHILS # BLD: 0.09 K/UL (ref 0–0.12)
BILIRUB SERPL-MCNC: 1.5 MG/DL (ref 0.1–1.5)
BUN SERPL-MCNC: 33 MG/DL (ref 8–22)
CALCIUM ALBUM COR SERPL-MCNC: 9.2 MG/DL (ref 8.5–10.5)
CALCIUM SERPL-MCNC: 9.5 MG/DL (ref 8.5–10.5)
CHLORIDE SERPL-SCNC: 104 MMOL/L (ref 96–112)
CO2 SERPL-SCNC: 14 MMOL/L (ref 20–33)
CREAT SERPL-MCNC: 1.15 MG/DL (ref 0.5–1.4)
EOSINOPHIL # BLD AUTO: 0.1 K/UL (ref 0–0.51)
EOSINOPHIL NFR BLD: 1.4 % (ref 0–6.9)
ERYTHROCYTE [DISTWIDTH] IN BLOOD BY AUTOMATED COUNT: 60.5 FL (ref 35.9–50)
GFR SERPLBLD CREATININE-BSD FMLA CKD-EPI: 73 ML/MIN/1.73 M 2
GLOBULIN SER CALC-MCNC: 3.7 G/DL (ref 1.9–3.5)
GLUCOSE SERPL-MCNC: 114 MG/DL (ref 65–99)
HCT VFR BLD AUTO: 42.4 % (ref 42–52)
HGB BLD-MCNC: 14.3 G/DL (ref 14–18)
IMM GRANULOCYTES # BLD AUTO: 0.03 K/UL (ref 0–0.11)
IMM GRANULOCYTES NFR BLD AUTO: 0.4 % (ref 0–0.9)
LIPASE SERPL-CCNC: 36 U/L (ref 11–82)
LYMPHOCYTES # BLD AUTO: 1.07 K/UL (ref 1–4.8)
LYMPHOCYTES NFR BLD: 14.6 % (ref 22–41)
MCH RBC QN AUTO: 32.3 PG (ref 27–33)
MCHC RBC AUTO-ENTMCNC: 33.7 G/DL (ref 32.3–36.5)
MCV RBC AUTO: 95.7 FL (ref 81.4–97.8)
MONOCYTES # BLD AUTO: 0.8 K/UL (ref 0–0.85)
MONOCYTES NFR BLD AUTO: 10.9 % (ref 0–13.4)
NEUTROPHILS # BLD AUTO: 5.25 K/UL (ref 1.82–7.42)
NEUTROPHILS NFR BLD: 71.5 % (ref 44–72)
NRBC # BLD AUTO: 0 K/UL
NRBC BLD-RTO: 0 /100 WBC (ref 0–0.2)
PLATELET # BLD AUTO: 363 K/UL (ref 164–446)
PMV BLD AUTO: 10 FL (ref 9–12.9)
POTASSIUM SERPL-SCNC: 5 MMOL/L (ref 3.6–5.5)
PROT SERPL-MCNC: 8.1 G/DL (ref 6–8.2)
RBC # BLD AUTO: 4.43 M/UL (ref 4.7–6.1)
SODIUM SERPL-SCNC: 133 MMOL/L (ref 135–145)
WBC # BLD AUTO: 7.3 K/UL (ref 4.8–10.8)

## 2025-08-05 PROCEDURE — 36415 COLL VENOUS BLD VENIPUNCTURE: CPT

## 2025-08-05 PROCEDURE — 99284 EMERGENCY DEPT VISIT MOD MDM: CPT

## 2025-08-05 PROCEDURE — 700102 HCHG RX REV CODE 250 W/ 637 OVERRIDE(OP): Mod: UD | Performed by: STUDENT IN AN ORGANIZED HEALTH CARE EDUCATION/TRAINING PROGRAM

## 2025-08-05 PROCEDURE — 83690 ASSAY OF LIPASE: CPT

## 2025-08-05 PROCEDURE — A9270 NON-COVERED ITEM OR SERVICE: HCPCS | Mod: UD | Performed by: STUDENT IN AN ORGANIZED HEALTH CARE EDUCATION/TRAINING PROGRAM

## 2025-08-05 PROCEDURE — 80053 COMPREHEN METABOLIC PANEL: CPT

## 2025-08-05 PROCEDURE — 700111 HCHG RX REV CODE 636 W/ 250 OVERRIDE (IP): Mod: UD | Performed by: STUDENT IN AN ORGANIZED HEALTH CARE EDUCATION/TRAINING PROGRAM

## 2025-08-05 PROCEDURE — 85025 COMPLETE CBC W/AUTO DIFF WBC: CPT

## 2025-08-05 RX ORDER — HYDROCODONE BITARTRATE AND ACETAMINOPHEN 5; 325 MG/1; MG/1
1 TABLET ORAL ONCE
Refills: 0 | Status: COMPLETED | OUTPATIENT
Start: 2025-08-05 | End: 2025-08-05

## 2025-08-05 RX ORDER — ONDANSETRON 4 MG/1
4 TABLET, ORALLY DISINTEGRATING ORAL ONCE
Status: COMPLETED | OUTPATIENT
Start: 2025-08-05 | End: 2025-08-05

## 2025-08-05 RX ADMIN — HYDROCODONE BITARTRATE AND ACETAMINOPHEN 1 TABLET: 5; 325 TABLET ORAL at 18:19

## 2025-08-05 RX ADMIN — ONDANSETRON 4 MG: 4 TABLET, ORALLY DISINTEGRATING ORAL at 18:39

## 2025-08-05 ASSESSMENT — FIBROSIS 4 INDEX: FIB4 SCORE: 1.18

## 2025-08-06 ENCOUNTER — APPOINTMENT (OUTPATIENT)
Dept: RADIOLOGY | Facility: MEDICAL CENTER | Age: 60
DRG: 291 | End: 2025-08-06
Payer: MEDICAID

## 2025-08-06 ENCOUNTER — HOSPITAL ENCOUNTER (INPATIENT)
Facility: MEDICAL CENTER | Age: 60
LOS: 2 days | DRG: 291 | End: 2025-08-08
Attending: STUDENT IN AN ORGANIZED HEALTH CARE EDUCATION/TRAINING PROGRAM | Admitting: HOSPITALIST
Payer: MEDICAID

## 2025-08-06 ENCOUNTER — TELEPHONE (OUTPATIENT)
Dept: CARDIOLOGY | Facility: MEDICAL CENTER | Age: 60
End: 2025-08-06
Payer: MEDICAID

## 2025-08-06 DIAGNOSIS — Z53.09 CONTRAINDICATION TO DEEP VEIN THROMBOSIS (DVT) PROPHYLAXIS: ICD-10-CM

## 2025-08-06 DIAGNOSIS — N17.9 AKI (ACUTE KIDNEY INJURY) (HCC): ICD-10-CM

## 2025-08-06 DIAGNOSIS — I50.43 ACUTE ON CHRONIC COMBINED SYSTOLIC AND DIASTOLIC CONGESTIVE HEART FAILURE (HCC): ICD-10-CM

## 2025-08-06 DIAGNOSIS — K70.31 ALCOHOLIC CIRRHOSIS OF LIVER WITH ASCITES (HCC): ICD-10-CM

## 2025-08-06 DIAGNOSIS — F10.90 ALCOHOL USE: ICD-10-CM

## 2025-08-06 DIAGNOSIS — J96.01 ACUTE HYPOXEMIC RESPIRATORY FAILURE (HCC): ICD-10-CM

## 2025-08-06 DIAGNOSIS — E16.2 HYPOGLYCEMIA: ICD-10-CM

## 2025-08-06 DIAGNOSIS — E86.0 DEHYDRATION: Primary | ICD-10-CM

## 2025-08-06 DIAGNOSIS — R10.9 ABDOMINAL PAIN, UNSPECIFIED ABDOMINAL LOCATION: ICD-10-CM

## 2025-08-06 PROBLEM — N17.0 ACUTE KIDNEY INJURY (AKI) WITH ACUTE TUBULAR NECROSIS (ATN) (HCC): Status: ACTIVE | Noted: 2025-08-06

## 2025-08-06 LAB
ALBUMIN SERPL BCP-MCNC: 4.3 G/DL (ref 3.2–4.9)
ALBUMIN/GLOB SERPL: 1.1 G/DL
ALP SERPL-CCNC: 104 U/L (ref 30–99)
ALT SERPL-CCNC: 45 U/L (ref 2–50)
ANION GAP SERPL CALC-SCNC: 16 MMOL/L (ref 7–16)
APPEARANCE UR: CLEAR
AST SERPL-CCNC: 40 U/L (ref 12–45)
BACTERIA #/AREA URNS HPF: ABNORMAL /HPF
BASOPHILS # BLD AUTO: 1 % (ref 0–1.8)
BASOPHILS # BLD: 0.07 K/UL (ref 0–0.12)
BILIRUB SERPL-MCNC: 1.7 MG/DL (ref 0.1–1.5)
BILIRUB UR QL STRIP.AUTO: NEGATIVE
BUN SERPL-MCNC: 41 MG/DL (ref 8–22)
CALCIUM ALBUM COR SERPL-MCNC: 9.1 MG/DL (ref 8.5–10.5)
CALCIUM SERPL-MCNC: 9.3 MG/DL (ref 8.5–10.5)
CASTS URNS QL MICRO: ABNORMAL /LPF (ref 0–2)
CHLORIDE SERPL-SCNC: 100 MMOL/L (ref 96–112)
CO2 SERPL-SCNC: 14 MMOL/L (ref 20–33)
COLOR UR: ABNORMAL
CREAT SERPL-MCNC: 1.39 MG/DL (ref 0.5–1.4)
EKG IMPRESSION: NORMAL
EOSINOPHIL # BLD AUTO: 0.07 K/UL (ref 0–0.51)
EOSINOPHIL NFR BLD: 1 % (ref 0–6.9)
EPITHELIAL CELLS 1715: ABNORMAL /HPF (ref 0–5)
ERYTHROCYTE [DISTWIDTH] IN BLOOD BY AUTOMATED COUNT: 61.5 FL (ref 35.9–50)
FLUAV RNA SPEC QL NAA+PROBE: NEGATIVE
FLUBV RNA SPEC QL NAA+PROBE: NEGATIVE
GFR SERPLBLD CREATININE-BSD FMLA CKD-EPI: 58 ML/MIN/1.73 M 2
GLOBULIN SER CALC-MCNC: 3.8 G/DL (ref 1.9–3.5)
GLUCOSE BLD STRIP.AUTO-MCNC: 117 MG/DL (ref 65–99)
GLUCOSE BLD STRIP.AUTO-MCNC: 151 MG/DL (ref 65–99)
GLUCOSE SERPL-MCNC: 128 MG/DL (ref 65–99)
GLUCOSE UR STRIP.AUTO-MCNC: NEGATIVE MG/DL
HCT VFR BLD AUTO: 42.5 % (ref 42–52)
HGB BLD-MCNC: 13.8 G/DL (ref 14–18)
IMM GRANULOCYTES # BLD AUTO: 0.03 K/UL (ref 0–0.11)
IMM GRANULOCYTES NFR BLD AUTO: 0.4 % (ref 0–0.9)
KETONES UR STRIP.AUTO-MCNC: NEGATIVE MG/DL
LEUKOCYTE ESTERASE UR QL STRIP.AUTO: NEGATIVE
LIPASE SERPL-CCNC: 32 U/L (ref 11–82)
LYMPHOCYTES # BLD AUTO: 1.23 K/UL (ref 1–4.8)
LYMPHOCYTES NFR BLD: 18.2 % (ref 22–41)
MCH RBC QN AUTO: 31.3 PG (ref 27–33)
MCHC RBC AUTO-ENTMCNC: 32.5 G/DL (ref 32.3–36.5)
MCV RBC AUTO: 96.4 FL (ref 81.4–97.8)
MICRO URNS: ABNORMAL
MONOCYTES # BLD AUTO: 0.87 K/UL (ref 0–0.85)
MONOCYTES NFR BLD AUTO: 12.9 % (ref 0–13.4)
NEUTROPHILS # BLD AUTO: 4.47 K/UL (ref 1.82–7.42)
NEUTROPHILS NFR BLD: 66.5 % (ref 44–72)
NITRITE UR QL STRIP.AUTO: NEGATIVE
NRBC # BLD AUTO: 0 K/UL
NRBC BLD-RTO: 0 /100 WBC (ref 0–0.2)
PH UR STRIP.AUTO: 5.5 [PH] (ref 5–8)
PLATELET # BLD AUTO: 353 K/UL (ref 164–446)
PMV BLD AUTO: 10.5 FL (ref 9–12.9)
POTASSIUM SERPL-SCNC: 5 MMOL/L (ref 3.6–5.5)
PROT SERPL-MCNC: 8.1 G/DL (ref 6–8.2)
PROT UR QL STRIP: 100 MG/DL
RBC # BLD AUTO: 4.41 M/UL (ref 4.7–6.1)
RBC # URNS HPF: ABNORMAL /HPF (ref 0–2)
RBC UR QL AUTO: NEGATIVE
RSV RNA SPEC QL NAA+PROBE: NEGATIVE
SARS-COV-2 RNA RESP QL NAA+PROBE: NEGATIVE
SODIUM SERPL-SCNC: 130 MMOL/L (ref 135–145)
SP GR UR STRIP.AUTO: 1.02
TROPONIN T SERPL-MCNC: 44 NG/L (ref 6–19)
UROBILINOGEN UR STRIP.AUTO-MCNC: 1 EU/DL
WBC # BLD AUTO: 6.7 K/UL (ref 4.8–10.8)
WBC #/AREA URNS HPF: ABNORMAL /HPF

## 2025-08-06 PROCEDURE — 700102 HCHG RX REV CODE 250 W/ 637 OVERRIDE(OP): Performed by: HOSPITALIST

## 2025-08-06 PROCEDURE — 83690 ASSAY OF LIPASE: CPT

## 2025-08-06 PROCEDURE — 80053 COMPREHEN METABOLIC PANEL: CPT

## 2025-08-06 PROCEDURE — 84484 ASSAY OF TROPONIN QUANT: CPT

## 2025-08-06 PROCEDURE — 93005 ELECTROCARDIOGRAM TRACING: CPT | Mod: TC | Performed by: STUDENT IN AN ORGANIZED HEALTH CARE EDUCATION/TRAINING PROGRAM

## 2025-08-06 PROCEDURE — 700105 HCHG RX REV CODE 258: Mod: UD | Performed by: STUDENT IN AN ORGANIZED HEALTH CARE EDUCATION/TRAINING PROGRAM

## 2025-08-06 PROCEDURE — 700111 HCHG RX REV CODE 636 W/ 250 OVERRIDE (IP): Mod: JZ,UD | Performed by: STUDENT IN AN ORGANIZED HEALTH CARE EDUCATION/TRAINING PROGRAM

## 2025-08-06 PROCEDURE — 99223 1ST HOSP IP/OBS HIGH 75: CPT | Performed by: HOSPITALIST

## 2025-08-06 PROCEDURE — 96374 THER/PROPH/DIAG INJ IV PUSH: CPT

## 2025-08-06 PROCEDURE — 36415 COLL VENOUS BLD VENIPUNCTURE: CPT

## 2025-08-06 PROCEDURE — 82962 GLUCOSE BLOOD TEST: CPT | Performed by: HOSPITALIST

## 2025-08-06 PROCEDURE — A9270 NON-COVERED ITEM OR SERVICE: HCPCS | Performed by: HOSPITALIST

## 2025-08-06 PROCEDURE — 700111 HCHG RX REV CODE 636 W/ 250 OVERRIDE (IP): Mod: JZ | Performed by: HOSPITALIST

## 2025-08-06 PROCEDURE — 0241U POC COV-2, FLU A/B, RSV BY PCR: CPT | Performed by: STUDENT IN AN ORGANIZED HEALTH CARE EDUCATION/TRAINING PROGRAM

## 2025-08-06 PROCEDURE — 71045 X-RAY EXAM CHEST 1 VIEW: CPT

## 2025-08-06 PROCEDURE — 99285 EMERGENCY DEPT VISIT HI MDM: CPT

## 2025-08-06 PROCEDURE — 81001 URINALYSIS AUTO W/SCOPE: CPT

## 2025-08-06 PROCEDURE — 51798 US URINE CAPACITY MEASURE: CPT

## 2025-08-06 PROCEDURE — 85025 COMPLETE CBC W/AUTO DIFF WBC: CPT

## 2025-08-06 PROCEDURE — 770020 HCHG ROOM/CARE - TELE (206)

## 2025-08-06 RX ORDER — DAPAGLIFLOZIN 10 MG/1
10 TABLET, FILM COATED ORAL DAILY
Status: DISCONTINUED | OUTPATIENT
Start: 2025-08-07 | End: 2025-08-08 | Stop reason: HOSPADM

## 2025-08-06 RX ORDER — AMOXICILLIN 250 MG
2 CAPSULE ORAL EVERY EVENING
Status: DISCONTINUED | OUTPATIENT
Start: 2025-08-06 | End: 2025-08-08 | Stop reason: HOSPADM

## 2025-08-06 RX ORDER — OXYCODONE HYDROCHLORIDE 5 MG/1
5 TABLET ORAL
Refills: 0 | Status: DISCONTINUED | OUTPATIENT
Start: 2025-08-06 | End: 2025-08-08 | Stop reason: HOSPADM

## 2025-08-06 RX ORDER — ASPIRIN 81 MG/1
81 TABLET ORAL DAILY
Status: DISCONTINUED | OUTPATIENT
Start: 2025-08-07 | End: 2025-08-08 | Stop reason: HOSPADM

## 2025-08-06 RX ORDER — FUROSEMIDE 10 MG/ML
20 INJECTION INTRAMUSCULAR; INTRAVENOUS DAILY
Status: DISCONTINUED | OUTPATIENT
Start: 2025-08-06 | End: 2025-08-07

## 2025-08-06 RX ORDER — DEXTROSE MONOHYDRATE 25 G/50ML
25 INJECTION, SOLUTION INTRAVENOUS
Status: DISCONTINUED | OUTPATIENT
Start: 2025-08-06 | End: 2025-08-08

## 2025-08-06 RX ORDER — TAMSULOSIN HYDROCHLORIDE 0.4 MG/1
0.4 CAPSULE ORAL
Status: DISCONTINUED | OUTPATIENT
Start: 2025-08-07 | End: 2025-08-08 | Stop reason: HOSPADM

## 2025-08-06 RX ORDER — OMEPRAZOLE 20 MG/1
20 CAPSULE, DELAYED RELEASE ORAL DAILY
Status: DISCONTINUED | OUTPATIENT
Start: 2025-08-07 | End: 2025-08-08 | Stop reason: HOSPADM

## 2025-08-06 RX ORDER — INSULIN LISPRO 100 [IU]/ML
1-6 INJECTION, SOLUTION INTRAVENOUS; SUBCUTANEOUS
Status: DISCONTINUED | OUTPATIENT
Start: 2025-08-06 | End: 2025-08-08

## 2025-08-06 RX ORDER — SPIRONOLACTONE 25 MG/1
12.5 TABLET ORAL DAILY
Status: DISCONTINUED | OUTPATIENT
Start: 2025-08-07 | End: 2025-08-08 | Stop reason: HOSPADM

## 2025-08-06 RX ORDER — METOPROLOL SUCCINATE 25 MG/1
25 TABLET, EXTENDED RELEASE ORAL DAILY
Status: DISCONTINUED | OUTPATIENT
Start: 2025-08-07 | End: 2025-08-08 | Stop reason: HOSPADM

## 2025-08-06 RX ORDER — SODIUM CHLORIDE 9 MG/ML
INJECTION, SOLUTION INTRAVENOUS ONCE
Status: COMPLETED | OUTPATIENT
Start: 2025-08-06 | End: 2025-08-06

## 2025-08-06 RX ORDER — HYDROMORPHONE HYDROCHLORIDE 1 MG/ML
0.5 INJECTION, SOLUTION INTRAMUSCULAR; INTRAVENOUS; SUBCUTANEOUS
Status: DISCONTINUED | OUTPATIENT
Start: 2025-08-06 | End: 2025-08-08 | Stop reason: HOSPADM

## 2025-08-06 RX ORDER — OXYCODONE HYDROCHLORIDE 5 MG/1
5 TABLET ORAL EVERY 4 HOURS PRN
Refills: 0 | Status: DISCONTINUED | OUTPATIENT
Start: 2025-08-06 | End: 2025-08-06

## 2025-08-06 RX ORDER — OXYCODONE HYDROCHLORIDE 10 MG/1
10 TABLET ORAL EVERY 4 HOURS PRN
Refills: 0 | Status: DISCONTINUED | OUTPATIENT
Start: 2025-08-06 | End: 2025-08-06

## 2025-08-06 RX ORDER — OXYCODONE HYDROCHLORIDE 10 MG/1
10 TABLET ORAL
Refills: 0 | Status: DISCONTINUED | OUTPATIENT
Start: 2025-08-06 | End: 2025-08-08 | Stop reason: HOSPADM

## 2025-08-06 RX ORDER — ACETAMINOPHEN 325 MG/1
650 TABLET ORAL EVERY 6 HOURS PRN
Status: DISCONTINUED | OUTPATIENT
Start: 2025-08-06 | End: 2025-08-08 | Stop reason: HOSPADM

## 2025-08-06 RX ORDER — MORPHINE SULFATE 4 MG/ML
4 INJECTION INTRAVENOUS ONCE
Status: COMPLETED | OUTPATIENT
Start: 2025-08-06 | End: 2025-08-06

## 2025-08-06 RX ORDER — POLYETHYLENE GLYCOL 3350 17 G/17G
1 POWDER, FOR SOLUTION ORAL
Status: DISCONTINUED | OUTPATIENT
Start: 2025-08-06 | End: 2025-08-08 | Stop reason: HOSPADM

## 2025-08-06 RX ADMIN — ACETAMINOPHEN 650 MG: 325 TABLET ORAL at 21:06

## 2025-08-06 RX ADMIN — FUROSEMIDE 20 MG: 10 INJECTION, SOLUTION INTRAVENOUS at 17:32

## 2025-08-06 RX ADMIN — OXYCODONE HYDROCHLORIDE 10 MG: 10 TABLET ORAL at 17:56

## 2025-08-06 RX ADMIN — INSULIN LISPRO 1 UNITS: 100 INJECTION, SOLUTION INTRAVENOUS; SUBCUTANEOUS at 21:13

## 2025-08-06 RX ADMIN — DOCUSATE SODIUM 50 MG AND SENNOSIDES 8.6 MG 2 TABLET: 8.6; 5 TABLET, FILM COATED ORAL at 17:32

## 2025-08-06 RX ADMIN — MORPHINE SULFATE 4 MG: 4 INJECTION, SOLUTION INTRAMUSCULAR; INTRAVENOUS at 14:38

## 2025-08-06 RX ADMIN — SODIUM CHLORIDE: 9 INJECTION, SOLUTION INTRAVENOUS at 14:38

## 2025-08-06 ASSESSMENT — ENCOUNTER SYMPTOMS
VOMITING: 0
STRIDOR: 0
CHILLS: 0
FOCAL WEAKNESS: 0
COUGH: 0
BRUISES/BLEEDS EASILY: 0
ABDOMINAL PAIN: 0
MYALGIAS: 0
EYE DISCHARGE: 0
SHORTNESS OF BREATH: 0
EYE REDNESS: 0
FEVER: 0
NERVOUS/ANXIOUS: 0
FLANK PAIN: 0

## 2025-08-06 ASSESSMENT — COGNITIVE AND FUNCTIONAL STATUS - GENERAL
SUGGESTED CMS G CODE MODIFIER DAILY ACTIVITY: CJ
HELP NEEDED FOR BATHING: A LITTLE
DRESSING REGULAR LOWER BODY CLOTHING: A LITTLE
DRESSING REGULAR UPPER BODY CLOTHING: A LITTLE
CLIMB 3 TO 5 STEPS WITH RAILING: A LITTLE
SUGGESTED CMS G CODE MODIFIER MOBILITY: CI
DAILY ACTIVITIY SCORE: 21
MOBILITY SCORE: 23

## 2025-08-06 ASSESSMENT — LIFESTYLE VARIABLES
AVERAGE NUMBER OF DAYS PER WEEK YOU HAVE A DRINK CONTAINING ALCOHOL: 0
DOES PATIENT WANT TO STOP DRINKING: NO
EVER FELT BAD OR GUILTY ABOUT YOUR DRINKING: NO
CONSUMPTION TOTAL: NEGATIVE
ON A TYPICAL DAY WHEN YOU DRINK ALCOHOL HOW MANY DRINKS DO YOU HAVE: 0
TOTAL SCORE: 0
HAVE YOU EVER FELT YOU SHOULD CUT DOWN ON YOUR DRINKING: NO
HAVE PEOPLE ANNOYED YOU BY CRITICIZING YOUR DRINKING: NO
TOTAL SCORE: 0
ALCOHOL_USE: NO
TOTAL SCORE: 0
EVER HAD A DRINK FIRST THING IN THE MORNING TO STEADY YOUR NERVES TO GET RID OF A HANGOVER: NO
HOW MANY TIMES IN THE PAST YEAR HAVE YOU HAD 5 OR MORE DRINKS IN A DAY: 0

## 2025-08-06 ASSESSMENT — PAIN DESCRIPTION - PAIN TYPE
TYPE: ACUTE PAIN

## 2025-08-06 ASSESSMENT — FIBROSIS 4 INDEX: FIB4 SCORE: 0.93

## 2025-08-07 ENCOUNTER — APPOINTMENT (OUTPATIENT)
Dept: RADIOLOGY | Facility: MEDICAL CENTER | Age: 60
DRG: 291 | End: 2025-08-07
Attending: INTERNAL MEDICINE
Payer: MEDICAID

## 2025-08-07 PROBLEM — I50.23 ACUTE ON CHRONIC SYSTOLIC HEART FAILURE (HCC): Status: RESOLVED | Noted: 2024-12-08 | Resolved: 2025-08-07

## 2025-08-07 LAB
ALBUMIN SERPL BCP-MCNC: 3.6 G/DL (ref 3.2–4.9)
ALBUMIN SERPL BCP-MCNC: 4.2 G/DL (ref 3.2–4.9)
ALBUMIN/GLOB SERPL: 1.1 G/DL
ALP SERPL-CCNC: 105 U/L (ref 30–99)
ALT SERPL-CCNC: 43 U/L (ref 2–50)
AMPHET UR QL SCN: NEGATIVE
ANION GAP SERPL CALC-SCNC: 15 MMOL/L (ref 7–16)
ANION GAP SERPL CALC-SCNC: 16 MMOL/L (ref 7–16)
ANION GAP SERPL CALC-SCNC: 16 MMOL/L (ref 7–16)
AST SERPL-CCNC: 43 U/L (ref 12–45)
BARBITURATES UR QL SCN: NEGATIVE
BENZODIAZ UR QL SCN: NEGATIVE
BILIRUB SERPL-MCNC: 2.3 MG/DL (ref 0.1–1.5)
BUN SERPL-MCNC: 48 MG/DL (ref 8–22)
BUN SERPL-MCNC: 48 MG/DL (ref 8–22)
BUN SERPL-MCNC: 49 MG/DL (ref 8–22)
BZE UR QL SCN: NEGATIVE
CALCIUM ALBUM COR SERPL-MCNC: 9 MG/DL (ref 8.5–10.5)
CALCIUM SERPL-MCNC: 8.6 MG/DL (ref 8.5–10.5)
CALCIUM SERPL-MCNC: 9.1 MG/DL (ref 8.5–10.5)
CALCIUM SERPL-MCNC: 9.2 MG/DL (ref 8.5–10.5)
CANNABINOIDS UR QL SCN: NEGATIVE
CHLORIDE SERPL-SCNC: 102 MMOL/L (ref 96–112)
CHLORIDE SERPL-SCNC: 102 MMOL/L (ref 96–112)
CHLORIDE SERPL-SCNC: 99 MMOL/L (ref 96–112)
CO2 SERPL-SCNC: 13 MMOL/L (ref 20–33)
CO2 SERPL-SCNC: 15 MMOL/L (ref 20–33)
CO2 SERPL-SCNC: 19 MMOL/L (ref 20–33)
CREAT SERPL-MCNC: 1.7 MG/DL (ref 0.5–1.4)
CREAT SERPL-MCNC: 1.77 MG/DL (ref 0.5–1.4)
CREAT SERPL-MCNC: 1.84 MG/DL (ref 0.5–1.4)
CREAT UR-MCNC: 20.2 MG/DL
ERYTHROCYTE [DISTWIDTH] IN BLOOD BY AUTOMATED COUNT: 64.9 FL (ref 35.9–50)
FENTANYL UR QL: NEGATIVE
GFR SERPLBLD CREATININE-BSD FMLA CKD-EPI: 41 ML/MIN/1.73 M 2
GFR SERPLBLD CREATININE-BSD FMLA CKD-EPI: 43 ML/MIN/1.73 M 2
GFR SERPLBLD CREATININE-BSD FMLA CKD-EPI: 45 ML/MIN/1.73 M 2
GLOBULIN SER CALC-MCNC: 3.7 G/DL (ref 1.9–3.5)
GLUCOSE BLD STRIP.AUTO-MCNC: 106 MG/DL (ref 65–99)
GLUCOSE BLD STRIP.AUTO-MCNC: 123 MG/DL (ref 65–99)
GLUCOSE BLD STRIP.AUTO-MCNC: 141 MG/DL (ref 65–99)
GLUCOSE BLD STRIP.AUTO-MCNC: 158 MG/DL (ref 65–99)
GLUCOSE BLD STRIP.AUTO-MCNC: 177 MG/DL (ref 65–99)
GLUCOSE BLD STRIP.AUTO-MCNC: 50 MG/DL (ref 65–99)
GLUCOSE SERPL-MCNC: 114 MG/DL (ref 65–99)
GLUCOSE SERPL-MCNC: 115 MG/DL (ref 65–99)
GLUCOSE SERPL-MCNC: 83 MG/DL (ref 65–99)
HCT VFR BLD AUTO: 47.4 % (ref 42–52)
HGB BLD-MCNC: 14.7 G/DL (ref 14–18)
LACTATE SERPL-SCNC: 2.1 MMOL/L (ref 0.5–2)
LACTATE SERPL-SCNC: 2.4 MMOL/L (ref 0.5–2)
LACTATE SERPL-SCNC: 2.7 MMOL/L (ref 0.5–2)
LACTATE SERPL-SCNC: 2.8 MMOL/L (ref 0.5–2)
MAGNESIUM SERPL-MCNC: 2.7 MG/DL (ref 1.5–2.5)
MCH RBC QN AUTO: 31.5 PG (ref 27–33)
MCHC RBC AUTO-ENTMCNC: 31 G/DL (ref 32.3–36.5)
MCV RBC AUTO: 101.5 FL (ref 81.4–97.8)
METHADONE UR QL SCN: NEGATIVE
OPIATES UR QL SCN: NEGATIVE
OXYCODONE UR QL SCN: POSITIVE
PCP UR QL SCN: NEGATIVE
PLATELET # BLD AUTO: 346 K/UL (ref 164–446)
PMV BLD AUTO: 10.1 FL (ref 9–12.9)
POTASSIUM SERPL-SCNC: 4.3 MMOL/L (ref 3.6–5.5)
POTASSIUM SERPL-SCNC: 4.7 MMOL/L (ref 3.6–5.5)
POTASSIUM SERPL-SCNC: 5.7 MMOL/L (ref 3.6–5.5)
POTASSIUM SERPL-SCNC: 5.8 MMOL/L (ref 3.6–5.5)
PROPOXYPH UR QL SCN: NEGATIVE
PROT SERPL-MCNC: 7.9 G/DL (ref 6–8.2)
PROT UR-MCNC: 6.9 MG/DL (ref 0–15)
PROT/CREAT UR: 342 MG/G (ref 15–68)
RBC # BLD AUTO: 4.67 M/UL (ref 4.7–6.1)
SODIUM SERPL-SCNC: 131 MMOL/L (ref 135–145)
SODIUM SERPL-SCNC: 133 MMOL/L (ref 135–145)
SODIUM SERPL-SCNC: 133 MMOL/L (ref 135–145)
WBC # BLD AUTO: 8.3 K/UL (ref 4.8–10.8)

## 2025-08-07 PROCEDURE — 99233 SBSQ HOSP IP/OBS HIGH 50: CPT | Mod: 25 | Performed by: INTERNAL MEDICINE

## 2025-08-07 PROCEDURE — 80307 DRUG TEST PRSMV CHEM ANLYZR: CPT

## 2025-08-07 PROCEDURE — 87040 BLOOD CULTURE FOR BACTERIA: CPT

## 2025-08-07 PROCEDURE — 770020 HCHG ROOM/CARE - TELE (206)

## 2025-08-07 PROCEDURE — 700102 HCHG RX REV CODE 250 W/ 637 OVERRIDE(OP): Performed by: INTERNAL MEDICINE

## 2025-08-07 PROCEDURE — 700102 HCHG RX REV CODE 250 W/ 637 OVERRIDE(OP): Performed by: HOSPITALIST

## 2025-08-07 PROCEDURE — 700105 HCHG RX REV CODE 258: Performed by: INTERNAL MEDICINE

## 2025-08-07 PROCEDURE — A9270 NON-COVERED ITEM OR SERVICE: HCPCS | Performed by: INTERNAL MEDICINE

## 2025-08-07 PROCEDURE — 700101 HCHG RX REV CODE 250: Performed by: NURSE PRACTITIONER

## 2025-08-07 PROCEDURE — 99497 ADVNCD CARE PLAN 30 MIN: CPT | Performed by: INTERNAL MEDICINE

## 2025-08-07 PROCEDURE — 700105 HCHG RX REV CODE 258: Performed by: NURSE PRACTITIONER

## 2025-08-07 PROCEDURE — 80048 BASIC METABOLIC PNL TOTAL CA: CPT | Mod: 91

## 2025-08-07 PROCEDURE — 700111 HCHG RX REV CODE 636 W/ 250 OVERRIDE (IP): Performed by: NURSE PRACTITIONER

## 2025-08-07 PROCEDURE — 82962 GLUCOSE BLOOD TEST: CPT | Performed by: INTERNAL MEDICINE

## 2025-08-07 PROCEDURE — 76775 US EXAM ABDO BACK WALL LIM: CPT

## 2025-08-07 PROCEDURE — 700101 HCHG RX REV CODE 250: Performed by: HOSPITALIST

## 2025-08-07 PROCEDURE — 51798 US URINE CAPACITY MEASURE: CPT

## 2025-08-07 PROCEDURE — 700111 HCHG RX REV CODE 636 W/ 250 OVERRIDE (IP): Mod: JZ | Performed by: INTERNAL MEDICINE

## 2025-08-07 PROCEDURE — 80053 COMPREHEN METABOLIC PANEL: CPT

## 2025-08-07 PROCEDURE — 84132 ASSAY OF SERUM POTASSIUM: CPT

## 2025-08-07 PROCEDURE — 82570 ASSAY OF URINE CREATININE: CPT

## 2025-08-07 PROCEDURE — A9270 NON-COVERED ITEM OR SERVICE: HCPCS

## 2025-08-07 PROCEDURE — 84156 ASSAY OF PROTEIN URINE: CPT

## 2025-08-07 PROCEDURE — 82040 ASSAY OF SERUM ALBUMIN: CPT

## 2025-08-07 PROCEDURE — 83605 ASSAY OF LACTIC ACID: CPT | Mod: 91

## 2025-08-07 PROCEDURE — A9270 NON-COVERED ITEM OR SERVICE: HCPCS | Performed by: HOSPITALIST

## 2025-08-07 PROCEDURE — 85027 COMPLETE CBC AUTOMATED: CPT

## 2025-08-07 PROCEDURE — 83735 ASSAY OF MAGNESIUM: CPT

## 2025-08-07 PROCEDURE — 74018 RADEX ABDOMEN 1 VIEW: CPT

## 2025-08-07 PROCEDURE — 700102 HCHG RX REV CODE 250 W/ 637 OVERRIDE(OP)

## 2025-08-07 RX ORDER — DEXTROSE MONOHYDRATE 25 G/50ML
25 INJECTION, SOLUTION INTRAVENOUS ONCE
Status: COMPLETED | OUTPATIENT
Start: 2025-08-07 | End: 2025-08-07

## 2025-08-07 RX ORDER — SODIUM CHLORIDE 9 MG/ML
300 INJECTION, SOLUTION INTRAVENOUS ONCE
Status: COMPLETED | OUTPATIENT
Start: 2025-08-07 | End: 2025-08-07

## 2025-08-07 RX ORDER — ALLOPURINOL 300 MG/1
300 TABLET ORAL DAILY
Status: DISCONTINUED | OUTPATIENT
Start: 2025-08-07 | End: 2025-08-08 | Stop reason: HOSPADM

## 2025-08-07 RX ORDER — SODIUM CHLORIDE 9 MG/ML
INJECTION, SOLUTION INTRAVENOUS CONTINUOUS
Status: DISCONTINUED | OUTPATIENT
Start: 2025-08-07 | End: 2025-08-07

## 2025-08-07 RX ORDER — MULTIVITAMIN WITH IRON
500 TABLET ORAL DAILY
Status: DISCONTINUED | OUTPATIENT
Start: 2025-08-07 | End: 2025-08-08 | Stop reason: HOSPADM

## 2025-08-07 RX ORDER — MIDODRINE HYDROCHLORIDE 5 MG/1
5 TABLET ORAL
Status: DISCONTINUED | OUTPATIENT
Start: 2025-08-07 | End: 2025-08-08

## 2025-08-07 RX ORDER — FUROSEMIDE 10 MG/ML
40 INJECTION INTRAMUSCULAR; INTRAVENOUS DAILY
Status: DISCONTINUED | OUTPATIENT
Start: 2025-08-07 | End: 2025-08-08 | Stop reason: HOSPADM

## 2025-08-07 RX ADMIN — SODIUM CHLORIDE: 9 INJECTION, SOLUTION INTRAVENOUS at 15:25

## 2025-08-07 RX ADMIN — SODIUM ZIRCONIUM CYCLOSILICATE 10 G: 10 POWDER, FOR SUSPENSION ORAL at 10:44

## 2025-08-07 RX ADMIN — INSULIN LISPRO 1 UNITS: 100 INJECTION, SOLUTION INTRAVENOUS; SUBCUTANEOUS at 08:22

## 2025-08-07 RX ADMIN — INSULIN HUMAN 5 UNITS: 100 INJECTION, SOLUTION PARENTERAL at 06:57

## 2025-08-07 RX ADMIN — RIVAROXABAN 10 MG: 10 TABLET, FILM COATED ORAL at 18:13

## 2025-08-07 RX ADMIN — CYANOCOBALAMIN TAB 500 MCG 500 MCG: 500 TAB at 15:28

## 2025-08-07 RX ADMIN — ASPIRIN 81 MG: 81 TABLET, COATED ORAL at 06:08

## 2025-08-07 RX ADMIN — OXYCODONE HYDROCHLORIDE 10 MG: 10 TABLET ORAL at 08:39

## 2025-08-07 RX ADMIN — SODIUM CHLORIDE 300 ML: 9 INJECTION, SOLUTION INTRAVENOUS at 16:14

## 2025-08-07 RX ADMIN — TAMSULOSIN HYDROCHLORIDE 0.4 MG: 0.4 CAPSULE ORAL at 08:07

## 2025-08-07 RX ADMIN — DEXTROSE MONOHYDRATE 25 G: 25 INJECTION, SOLUTION INTRAVENOUS at 06:57

## 2025-08-07 RX ADMIN — FUROSEMIDE 40 MG: 10 INJECTION, SOLUTION INTRAVENOUS at 08:28

## 2025-08-07 RX ADMIN — ALLOPURINOL 300 MG: 300 TABLET ORAL at 15:28

## 2025-08-07 RX ADMIN — DAPAGLIFLOZIN 10 MG: 10 TABLET, FILM COATED ORAL at 06:09

## 2025-08-07 RX ADMIN — SODIUM BICARBONATE INJECTION, 75 MEQ: 84 SOLUTION INTRAVENOUS at 07:07

## 2025-08-07 RX ADMIN — DOCUSATE SODIUM 50 MG AND SENNOSIDES 8.6 MG 2 TABLET: 8.6; 5 TABLET, FILM COATED ORAL at 18:13

## 2025-08-07 RX ADMIN — DEXTROSE MONOHYDRATE 25 G: 25 INJECTION, SOLUTION INTRAVENOUS at 06:21

## 2025-08-07 RX ADMIN — OXYCODONE HYDROCHLORIDE 10 MG: 10 TABLET ORAL at 02:40

## 2025-08-07 RX ADMIN — OMEPRAZOLE 20 MG: 20 CAPSULE, DELAYED RELEASE ORAL at 06:08

## 2025-08-07 RX ADMIN — MIDODRINE HYDROCHLORIDE 5 MG: 5 TABLET ORAL at 18:14

## 2025-08-07 RX ADMIN — SODIUM CHLORIDE 300 ML: 9 INJECTION, SOLUTION INTRAVENOUS at 10:58

## 2025-08-07 ASSESSMENT — ENCOUNTER SYMPTOMS
CHILLS: 0
ABDOMINAL PAIN: 1
NAUSEA: 0
PALPITATIONS: 0
CONSTIPATION: 0
COUGH: 0
SPEECH CHANGE: 0
VOMITING: 0
MYALGIAS: 0
WEIGHT LOSS: 0
HEMOPTYSIS: 0
FEVER: 0
DIZZINESS: 0
NECK PAIN: 0
CLAUDICATION: 0
DOUBLE VISION: 0
DIARRHEA: 0
PHOTOPHOBIA: 0
ORTHOPNEA: 0
WEAKNESS: 0
BLURRED VISION: 0

## 2025-08-07 ASSESSMENT — PAIN DESCRIPTION - PAIN TYPE
TYPE: ACUTE PAIN

## 2025-08-07 ASSESSMENT — FIBROSIS 4 INDEX: FIB4 SCORE: 1.14

## 2025-08-08 ENCOUNTER — PHARMACY VISIT (OUTPATIENT)
Dept: PHARMACY | Facility: MEDICAL CENTER | Age: 60
End: 2025-08-08
Payer: COMMERCIAL

## 2025-08-08 ENCOUNTER — PATIENT OUTREACH (OUTPATIENT)
Dept: SCHEDULING | Facility: IMAGING CENTER | Age: 60
End: 2025-08-08

## 2025-08-08 ENCOUNTER — APPOINTMENT (OUTPATIENT)
Dept: RADIOLOGY | Facility: MEDICAL CENTER | Age: 60
DRG: 291 | End: 2025-08-08
Attending: INTERNAL MEDICINE
Payer: MEDICAID

## 2025-08-08 VITALS
DIASTOLIC BLOOD PRESSURE: 74 MMHG | BODY MASS INDEX: 21.34 KG/M2 | TEMPERATURE: 97.5 F | HEART RATE: 93 BPM | SYSTOLIC BLOOD PRESSURE: 106 MMHG | RESPIRATION RATE: 18 BRPM | HEIGHT: 65 IN | OXYGEN SATURATION: 96 % | WEIGHT: 128.09 LBS

## 2025-08-08 LAB
ALBUMIN SERPL BCP-MCNC: 3.8 G/DL (ref 3.2–4.9)
ALBUMIN/GLOB SERPL: 1.3 G/DL
ALP SERPL-CCNC: 93 U/L (ref 30–99)
ALT SERPL-CCNC: 38 U/L (ref 2–50)
ANION GAP SERPL CALC-SCNC: 15 MMOL/L (ref 7–16)
AST SERPL-CCNC: 44 U/L (ref 12–45)
BILIRUB SERPL-MCNC: 1.6 MG/DL (ref 0.1–1.5)
BUN SERPL-MCNC: 42 MG/DL (ref 8–22)
CALCIUM ALBUM COR SERPL-MCNC: 8.8 MG/DL (ref 8.5–10.5)
CALCIUM SERPL-MCNC: 8.6 MG/DL (ref 8.5–10.5)
CHLORIDE SERPL-SCNC: 101 MMOL/L (ref 96–112)
CO2 SERPL-SCNC: 16 MMOL/L (ref 20–33)
CREAT SERPL-MCNC: 1.28 MG/DL (ref 0.5–1.4)
ERYTHROCYTE [DISTWIDTH] IN BLOOD BY AUTOMATED COUNT: 59 FL (ref 35.9–50)
GFR SERPLBLD CREATININE-BSD FMLA CKD-EPI: 64 ML/MIN/1.73 M 2
GLOBULIN SER CALC-MCNC: 3 G/DL (ref 1.9–3.5)
GLUCOSE BLD STRIP.AUTO-MCNC: 149 MG/DL (ref 65–99)
GLUCOSE SERPL-MCNC: 127 MG/DL (ref 65–99)
HCT VFR BLD AUTO: 37.8 % (ref 42–52)
HGB BLD-MCNC: 12.5 G/DL (ref 14–18)
LACTATE SERPL-SCNC: 1.9 MMOL/L (ref 0.5–2)
LACTATE SERPL-SCNC: 2.2 MMOL/L (ref 0.5–2)
MAGNESIUM SERPL-MCNC: 2.3 MG/DL (ref 1.5–2.5)
MCH RBC QN AUTO: 31.6 PG (ref 27–33)
MCHC RBC AUTO-ENTMCNC: 33.1 G/DL (ref 32.3–36.5)
MCV RBC AUTO: 95.7 FL (ref 81.4–97.8)
PHOSPHATE SERPL-MCNC: 3 MG/DL (ref 2.5–4.5)
PLATELET # BLD AUTO: 323 K/UL (ref 164–446)
PMV BLD AUTO: 10.2 FL (ref 9–12.9)
POTASSIUM SERPL-SCNC: 3.9 MMOL/L (ref 3.6–5.5)
POTASSIUM SERPL-SCNC: 4 MMOL/L (ref 3.6–5.5)
POTASSIUM SERPL-SCNC: 4 MMOL/L (ref 3.6–5.5)
PROCALCITONIN SERPL-MCNC: 1.53 NG/ML
PROT SERPL-MCNC: 6.8 G/DL (ref 6–8.2)
RBC # BLD AUTO: 3.95 M/UL (ref 4.7–6.1)
SODIUM SERPL-SCNC: 132 MMOL/L (ref 135–145)
T4 FREE SERPL-MCNC: 1.11 NG/DL (ref 0.93–1.7)
TSH SERPL DL<=0.005 MIU/L-ACNC: 9.51 UIU/ML (ref 0.38–5.33)
WBC # BLD AUTO: 7.4 K/UL (ref 4.8–10.8)

## 2025-08-08 PROCEDURE — A9270 NON-COVERED ITEM OR SERVICE: HCPCS | Performed by: INTERNAL MEDICINE

## 2025-08-08 PROCEDURE — 84132 ASSAY OF SERUM POTASSIUM: CPT | Mod: 91

## 2025-08-08 PROCEDURE — 71045 X-RAY EXAM CHEST 1 VIEW: CPT

## 2025-08-08 PROCEDURE — RXMED WILLOW AMBULATORY MEDICATION CHARGE: Performed by: INTERNAL MEDICINE

## 2025-08-08 PROCEDURE — A9270 NON-COVERED ITEM OR SERVICE: HCPCS | Performed by: HOSPITALIST

## 2025-08-08 PROCEDURE — 84100 ASSAY OF PHOSPHORUS: CPT

## 2025-08-08 PROCEDURE — 83735 ASSAY OF MAGNESIUM: CPT

## 2025-08-08 PROCEDURE — 99239 HOSP IP/OBS DSCHRG MGMT >30: CPT | Performed by: INTERNAL MEDICINE

## 2025-08-08 PROCEDURE — 84443 ASSAY THYROID STIM HORMONE: CPT

## 2025-08-08 PROCEDURE — 84145 PROCALCITONIN (PCT): CPT

## 2025-08-08 PROCEDURE — 84439 ASSAY OF FREE THYROXINE: CPT

## 2025-08-08 PROCEDURE — 83605 ASSAY OF LACTIC ACID: CPT

## 2025-08-08 PROCEDURE — 76705 ECHO EXAM OF ABDOMEN: CPT

## 2025-08-08 PROCEDURE — 85027 COMPLETE CBC AUTOMATED: CPT

## 2025-08-08 PROCEDURE — 80053 COMPREHEN METABOLIC PANEL: CPT

## 2025-08-08 PROCEDURE — 700105 HCHG RX REV CODE 258: Performed by: INTERNAL MEDICINE

## 2025-08-08 PROCEDURE — 700102 HCHG RX REV CODE 250 W/ 637 OVERRIDE(OP): Performed by: HOSPITALIST

## 2025-08-08 PROCEDURE — 700102 HCHG RX REV CODE 250 W/ 637 OVERRIDE(OP): Performed by: INTERNAL MEDICINE

## 2025-08-08 PROCEDURE — 82962 GLUCOSE BLOOD TEST: CPT | Performed by: INTERNAL MEDICINE

## 2025-08-08 RX ORDER — SODIUM CHLORIDE 9 MG/ML
300 INJECTION, SOLUTION INTRAVENOUS ONCE
Status: COMPLETED | OUTPATIENT
Start: 2025-08-08 | End: 2025-08-08

## 2025-08-08 RX ORDER — FUROSEMIDE 40 MG/1
40 TABLET ORAL 2 TIMES DAILY
Qty: 90 TABLET | Refills: 0 | Status: SHIPPED | OUTPATIENT
Start: 2025-08-08 | End: 2025-08-08

## 2025-08-08 RX ORDER — SODIUM BICARBONATE 650 MG/1
650 TABLET ORAL 3 TIMES DAILY
Status: DISCONTINUED | OUTPATIENT
Start: 2025-08-08 | End: 2025-08-08 | Stop reason: HOSPADM

## 2025-08-08 RX ORDER — LOSARTAN POTASSIUM 25 MG/1
25 TABLET ORAL
Status: DISCONTINUED | OUTPATIENT
Start: 2025-08-08 | End: 2025-08-08 | Stop reason: HOSPADM

## 2025-08-08 RX ORDER — MIDODRINE HYDROCHLORIDE 5 MG/1
5 TABLET ORAL 3 TIMES DAILY PRN
Status: DISCONTINUED | OUTPATIENT
Start: 2025-08-08 | End: 2025-08-08 | Stop reason: HOSPADM

## 2025-08-08 RX ORDER — LOSARTAN POTASSIUM 25 MG/1
12.5 TABLET ORAL DAILY
Qty: 50 TABLET | Refills: 3 | Status: SHIPPED | OUTPATIENT
Start: 2025-08-09 | End: 2026-09-13

## 2025-08-08 RX ORDER — FUROSEMIDE 40 MG/1
40 TABLET ORAL DAILY
Qty: 30 TABLET | Refills: 1 | Status: SHIPPED | OUTPATIENT
Start: 2025-08-11

## 2025-08-08 RX ADMIN — MIDODRINE HYDROCHLORIDE 5 MG: 5 TABLET ORAL at 08:22

## 2025-08-08 RX ADMIN — OMEPRAZOLE 20 MG: 20 CAPSULE, DELAYED RELEASE ORAL at 05:04

## 2025-08-08 RX ADMIN — MIDODRINE HYDROCHLORIDE 5 MG: 5 TABLET ORAL at 11:21

## 2025-08-08 RX ADMIN — ASPIRIN 81 MG: 81 TABLET, COATED ORAL at 05:04

## 2025-08-08 RX ADMIN — CYANOCOBALAMIN TAB 500 MCG 500 MCG: 500 TAB at 05:04

## 2025-08-08 RX ADMIN — SODIUM BICARBONATE 650 MG: 650 TABLET ORAL at 14:08

## 2025-08-08 RX ADMIN — SODIUM CHLORIDE 300 ML: 9 INJECTION, SOLUTION INTRAVENOUS at 11:22

## 2025-08-08 RX ADMIN — ALLOPURINOL 300 MG: 300 TABLET ORAL at 05:04

## 2025-08-08 RX ADMIN — DAPAGLIFLOZIN 10 MG: 10 TABLET, FILM COATED ORAL at 05:04

## 2025-08-08 RX ADMIN — POLYETHYLENE GLYCOL 3350 1 PACKET: 17 POWDER, FOR SOLUTION ORAL at 05:04

## 2025-08-08 RX ADMIN — SODIUM BICARBONATE 650 MG: 650 TABLET ORAL at 08:22

## 2025-08-08 RX ADMIN — LOSARTAN POTASSIUM 25 MG: 25 TABLET, FILM COATED ORAL at 11:20

## 2025-08-08 RX ADMIN — TAMSULOSIN HYDROCHLORIDE 0.4 MG: 0.4 CAPSULE ORAL at 08:22

## 2025-08-08 ASSESSMENT — FIBROSIS 4 INDEX: FIB4 SCORE: 1.33

## 2025-08-08 ASSESSMENT — PAIN DESCRIPTION - PAIN TYPE: TYPE: ACUTE PAIN

## 2025-08-09 ENCOUNTER — APPOINTMENT (OUTPATIENT)
Dept: RADIOLOGY | Facility: MEDICAL CENTER | Age: 60
End: 2025-08-09
Attending: EMERGENCY MEDICINE
Payer: MEDICAID

## 2025-08-09 ENCOUNTER — HOSPITAL ENCOUNTER (EMERGENCY)
Facility: MEDICAL CENTER | Age: 60
End: 2025-08-09
Attending: EMERGENCY MEDICINE
Payer: MEDICAID

## 2025-08-09 VITALS
DIASTOLIC BLOOD PRESSURE: 85 MMHG | HEART RATE: 98 BPM | HEIGHT: 65 IN | BODY MASS INDEX: 21.33 KG/M2 | SYSTOLIC BLOOD PRESSURE: 119 MMHG | OXYGEN SATURATION: 99 % | RESPIRATION RATE: 20 BRPM | TEMPERATURE: 97.9 F | WEIGHT: 128 LBS

## 2025-08-09 DIAGNOSIS — F15.11 HISTORY OF METHAMPHETAMINE ABUSE (HCC): ICD-10-CM

## 2025-08-09 DIAGNOSIS — Z86.79 HISTORY OF CHF (CONGESTIVE HEART FAILURE): ICD-10-CM

## 2025-08-09 DIAGNOSIS — R53.1 GENERALIZED WEAKNESS: Primary | ICD-10-CM

## 2025-08-09 LAB
ALBUMIN SERPL BCP-MCNC: 3.9 G/DL (ref 3.2–4.9)
ALBUMIN/GLOB SERPL: 1.2 G/DL
ALP SERPL-CCNC: 109 U/L (ref 30–99)
ALT SERPL-CCNC: 35 U/L (ref 2–50)
AMPHET UR QL SCN: NEGATIVE
ANION GAP SERPL CALC-SCNC: 14 MMOL/L (ref 7–16)
AST SERPL-CCNC: 37 U/L (ref 12–45)
BARBITURATES UR QL SCN: NEGATIVE
BASOPHILS # BLD AUTO: 0.9 % (ref 0–1.8)
BASOPHILS # BLD: 0.06 K/UL (ref 0–0.12)
BENZODIAZ UR QL SCN: NEGATIVE
BILIRUB SERPL-MCNC: 1 MG/DL (ref 0.1–1.5)
BUN SERPL-MCNC: 21 MG/DL (ref 8–22)
BZE UR QL SCN: NEGATIVE
CALCIUM ALBUM COR SERPL-MCNC: 8.9 MG/DL (ref 8.5–10.5)
CALCIUM SERPL-MCNC: 8.8 MG/DL (ref 8.5–10.5)
CANNABINOIDS UR QL SCN: NEGATIVE
CHLORIDE SERPL-SCNC: 105 MMOL/L (ref 96–112)
CO2 SERPL-SCNC: 17 MMOL/L (ref 20–33)
CREAT SERPL-MCNC: 0.96 MG/DL (ref 0.5–1.4)
EKG IMPRESSION: NORMAL
EOSINOPHIL # BLD AUTO: 0.12 K/UL (ref 0–0.51)
EOSINOPHIL NFR BLD: 1.8 % (ref 0–6.9)
ERYTHROCYTE [DISTWIDTH] IN BLOOD BY AUTOMATED COUNT: 62 FL (ref 35.9–50)
FENTANYL UR QL: NEGATIVE
GFR SERPLBLD CREATININE-BSD FMLA CKD-EPI: 90 ML/MIN/1.73 M 2
GLOBULIN SER CALC-MCNC: 3.2 G/DL (ref 1.9–3.5)
GLUCOSE SERPL-MCNC: 93 MG/DL (ref 65–99)
HCT VFR BLD AUTO: 40.3 % (ref 42–52)
HGB BLD-MCNC: 13.3 G/DL (ref 14–18)
IMM GRANULOCYTES # BLD AUTO: 0.02 K/UL (ref 0–0.11)
IMM GRANULOCYTES NFR BLD AUTO: 0.3 % (ref 0–0.9)
LIPASE SERPL-CCNC: 45 U/L (ref 11–82)
LYMPHOCYTES # BLD AUTO: 0.77 K/UL (ref 1–4.8)
LYMPHOCYTES NFR BLD: 11.4 % (ref 22–41)
MCH RBC QN AUTO: 32 PG (ref 27–33)
MCHC RBC AUTO-ENTMCNC: 33 G/DL (ref 32.3–36.5)
MCV RBC AUTO: 96.9 FL (ref 81.4–97.8)
METHADONE UR QL SCN: NEGATIVE
MONOCYTES # BLD AUTO: 0.55 K/UL (ref 0–0.85)
MONOCYTES NFR BLD AUTO: 8.2 % (ref 0–13.4)
NEUTROPHILS # BLD AUTO: 5.21 K/UL (ref 1.82–7.42)
NEUTROPHILS NFR BLD: 77.4 % (ref 44–72)
NRBC # BLD AUTO: 0 K/UL
NRBC BLD-RTO: 0 /100 WBC (ref 0–0.2)
NT-PROBNP SERPL IA-MCNC: 7545 PG/ML (ref 0–125)
OPIATES UR QL SCN: NEGATIVE
OXYCODONE UR QL SCN: POSITIVE
PCP UR QL SCN: NEGATIVE
PLATELET # BLD AUTO: 311 K/UL (ref 164–446)
PMV BLD AUTO: 10.3 FL (ref 9–12.9)
POTASSIUM SERPL-SCNC: 4.3 MMOL/L (ref 3.6–5.5)
PROPOXYPH UR QL SCN: NEGATIVE
PROT SERPL-MCNC: 7.1 G/DL (ref 6–8.2)
RBC # BLD AUTO: 4.16 M/UL (ref 4.7–6.1)
SODIUM SERPL-SCNC: 136 MMOL/L (ref 135–145)
TROPONIN T SERPL-MCNC: 34 NG/L (ref 6–19)
WBC # BLD AUTO: 6.7 K/UL (ref 4.8–10.8)

## 2025-08-09 PROCEDURE — 71045 X-RAY EXAM CHEST 1 VIEW: CPT

## 2025-08-09 PROCEDURE — 36415 COLL VENOUS BLD VENIPUNCTURE: CPT

## 2025-08-09 PROCEDURE — 99284 EMERGENCY DEPT VISIT MOD MDM: CPT

## 2025-08-09 PROCEDURE — 93005 ELECTROCARDIOGRAM TRACING: CPT | Mod: TC

## 2025-08-09 PROCEDURE — 83880 ASSAY OF NATRIURETIC PEPTIDE: CPT

## 2025-08-09 PROCEDURE — 80053 COMPREHEN METABOLIC PANEL: CPT

## 2025-08-09 PROCEDURE — 84484 ASSAY OF TROPONIN QUANT: CPT

## 2025-08-09 PROCEDURE — 83690 ASSAY OF LIPASE: CPT

## 2025-08-09 PROCEDURE — 80307 DRUG TEST PRSMV CHEM ANLYZR: CPT

## 2025-08-09 PROCEDURE — 93005 ELECTROCARDIOGRAM TRACING: CPT | Mod: TC | Performed by: EMERGENCY MEDICINE

## 2025-08-09 PROCEDURE — 85025 COMPLETE CBC W/AUTO DIFF WBC: CPT

## 2025-08-09 ASSESSMENT — PAIN DESCRIPTION - PAIN TYPE: TYPE: ACUTE PAIN

## 2025-08-09 ASSESSMENT — FIBROSIS 4 INDEX: FIB4 SCORE: 1.33

## 2025-08-12 ENCOUNTER — HOSPITAL ENCOUNTER (OUTPATIENT)
Dept: LAB | Facility: MEDICAL CENTER | Age: 60
End: 2025-08-12
Attending: INTERNAL MEDICINE
Payer: MEDICAID

## 2025-08-12 DIAGNOSIS — F10.90 ALCOHOL USE: ICD-10-CM

## 2025-08-12 DIAGNOSIS — Z53.09 CONTRAINDICATION TO DEEP VEIN THROMBOSIS (DVT) PROPHYLAXIS: ICD-10-CM

## 2025-08-12 DIAGNOSIS — E16.2 HYPOGLYCEMIA: ICD-10-CM

## 2025-08-12 LAB
ALBUMIN SERPL BCP-MCNC: 3.9 G/DL (ref 3.2–4.9)
ALBUMIN/GLOB SERPL: 1.1 G/DL
ALP SERPL-CCNC: 109 U/L (ref 30–99)
ALT SERPL-CCNC: 30 U/L (ref 2–50)
ANION GAP SERPL CALC-SCNC: 13 MMOL/L (ref 7–16)
AST SERPL-CCNC: 35 U/L (ref 12–45)
BACTERIA BLD CULT: NORMAL
BILIRUB SERPL-MCNC: 1 MG/DL (ref 0.1–1.5)
BUN SERPL-MCNC: 25 MG/DL (ref 8–22)
CALCIUM ALBUM COR SERPL-MCNC: 9.3 MG/DL (ref 8.5–10.5)
CALCIUM SERPL-MCNC: 9.2 MG/DL (ref 8.5–10.5)
CHLORIDE SERPL-SCNC: 103 MMOL/L (ref 96–112)
CO2 SERPL-SCNC: 22 MMOL/L (ref 20–33)
CREAT SERPL-MCNC: 1.23 MG/DL (ref 0.5–1.4)
GFR SERPLBLD CREATININE-BSD FMLA CKD-EPI: 67 ML/MIN/1.73 M 2
GLOBULIN SER CALC-MCNC: 3.4 G/DL (ref 1.9–3.5)
GLUCOSE SERPL-MCNC: 80 MG/DL (ref 65–99)
POTASSIUM SERPL-SCNC: 4 MMOL/L (ref 3.6–5.5)
PROT SERPL-MCNC: 7.3 G/DL (ref 6–8.2)
SIGNIFICANT IND 70042: NORMAL
SITE SITE: NORMAL
SODIUM SERPL-SCNC: 138 MMOL/L (ref 135–145)
SOURCE SOURCE: NORMAL
T4 FREE SERPL-MCNC: 1.28 NG/DL (ref 0.93–1.7)
TSH SERPL DL<=0.005 MIU/L-ACNC: 15.5 UIU/ML (ref 0.38–5.33)

## 2025-08-12 PROCEDURE — 84443 ASSAY THYROID STIM HORMONE: CPT

## 2025-08-12 PROCEDURE — 80053 COMPREHEN METABOLIC PANEL: CPT

## 2025-08-12 PROCEDURE — 84439 ASSAY OF FREE THYROXINE: CPT

## 2025-08-12 PROCEDURE — 36415 COLL VENOUS BLD VENIPUNCTURE: CPT

## 2025-08-13 LAB
BACTERIA BLD CULT: NORMAL
SIGNIFICANT IND 70042: NORMAL
SITE SITE: NORMAL
SOURCE SOURCE: NORMAL

## 2025-08-17 ENCOUNTER — APPOINTMENT (OUTPATIENT)
Dept: RADIOLOGY | Facility: MEDICAL CENTER | Age: 60
End: 2025-08-17
Attending: EMERGENCY MEDICINE
Payer: MEDICAID

## 2025-08-17 ENCOUNTER — HOSPITAL ENCOUNTER (EMERGENCY)
Facility: MEDICAL CENTER | Age: 60
End: 2025-08-17
Attending: EMERGENCY MEDICINE
Payer: MEDICAID

## 2025-08-17 VITALS
DIASTOLIC BLOOD PRESSURE: 76 MMHG | SYSTOLIC BLOOD PRESSURE: 103 MMHG | OXYGEN SATURATION: 97 % | BODY MASS INDEX: 21.93 KG/M2 | WEIGHT: 131.61 LBS | TEMPERATURE: 97.9 F | HEIGHT: 65 IN | RESPIRATION RATE: 17 BRPM | HEART RATE: 83 BPM

## 2025-08-17 DIAGNOSIS — I50.22 CHRONIC SYSTOLIC CONGESTIVE HEART FAILURE (HCC): ICD-10-CM

## 2025-08-17 DIAGNOSIS — M79.89 LEG SWELLING: Primary | ICD-10-CM

## 2025-08-17 LAB
ALBUMIN SERPL BCP-MCNC: 4 G/DL (ref 3.2–4.9)
ALBUMIN/GLOB SERPL: 1.1 G/DL
ALP SERPL-CCNC: 104 U/L (ref 30–99)
ALT SERPL-CCNC: 22 U/L (ref 2–50)
ANION GAP SERPL CALC-SCNC: 14 MMOL/L (ref 7–16)
APPEARANCE UR: CLEAR
AST SERPL-CCNC: 31 U/L (ref 12–45)
BACTERIA #/AREA URNS HPF: ABNORMAL /HPF
BASOPHILS # BLD AUTO: 0.9 % (ref 0–1.8)
BASOPHILS # BLD: 0.05 K/UL (ref 0–0.12)
BILIRUB SERPL-MCNC: 1.1 MG/DL (ref 0.1–1.5)
BILIRUB UR QL STRIP.AUTO: NEGATIVE
BUN SERPL-MCNC: 29 MG/DL (ref 8–22)
CALCIUM ALBUM COR SERPL-MCNC: 9.2 MG/DL (ref 8.5–10.5)
CALCIUM SERPL-MCNC: 9.2 MG/DL (ref 8.5–10.5)
CASTS URNS QL MICRO: ABNORMAL /LPF (ref 0–2)
CHLORIDE SERPL-SCNC: 102 MMOL/L (ref 96–112)
CO2 SERPL-SCNC: 21 MMOL/L (ref 20–33)
COLOR UR: YELLOW
CREAT SERPL-MCNC: 1.19 MG/DL (ref 0.5–1.4)
EKG IMPRESSION: NORMAL
EOSINOPHIL # BLD AUTO: 0.21 K/UL (ref 0–0.51)
EOSINOPHIL NFR BLD: 3.7 % (ref 0–6.9)
EPITHELIAL CELLS 1715: ABNORMAL /HPF (ref 0–5)
ERYTHROCYTE [DISTWIDTH] IN BLOOD BY AUTOMATED COUNT: 60.6 FL (ref 35.9–50)
GFR SERPLBLD CREATININE-BSD FMLA CKD-EPI: 70 ML/MIN/1.73 M 2
GLOBULIN SER CALC-MCNC: 3.7 G/DL (ref 1.9–3.5)
GLUCOSE SERPL-MCNC: 110 MG/DL (ref 65–99)
GLUCOSE UR STRIP.AUTO-MCNC: 100 MG/DL
HCT VFR BLD AUTO: 43.6 % (ref 42–52)
HGB BLD-MCNC: 14.2 G/DL (ref 14–18)
IMM GRANULOCYTES # BLD AUTO: 0.03 K/UL (ref 0–0.11)
IMM GRANULOCYTES NFR BLD AUTO: 0.5 % (ref 0–0.9)
KETONES UR STRIP.AUTO-MCNC: NEGATIVE MG/DL
LEUKOCYTE ESTERASE UR QL STRIP.AUTO: NEGATIVE
LIPASE SERPL-CCNC: 35 U/L (ref 11–82)
LYMPHOCYTES # BLD AUTO: 1.13 K/UL (ref 1–4.8)
LYMPHOCYTES NFR BLD: 19.8 % (ref 22–41)
MCH RBC QN AUTO: 31.2 PG (ref 27–33)
MCHC RBC AUTO-ENTMCNC: 32.6 G/DL (ref 32.3–36.5)
MCV RBC AUTO: 95.8 FL (ref 81.4–97.8)
MICRO URNS: ABNORMAL
MONOCYTES # BLD AUTO: 0.48 K/UL (ref 0–0.85)
MONOCYTES NFR BLD AUTO: 8.4 % (ref 0–13.4)
NEUTROPHILS # BLD AUTO: 3.82 K/UL (ref 1.82–7.42)
NEUTROPHILS NFR BLD: 66.7 % (ref 44–72)
NITRITE UR QL STRIP.AUTO: NEGATIVE
NRBC # BLD AUTO: 0 K/UL
NRBC BLD-RTO: 0 /100 WBC (ref 0–0.2)
NT-PROBNP SERPL IA-MCNC: 7107 PG/ML (ref 0–125)
PH UR STRIP.AUTO: 5.5 [PH] (ref 5–8)
PLATELET # BLD AUTO: 236 K/UL (ref 164–446)
PMV BLD AUTO: 10.6 FL (ref 9–12.9)
POTASSIUM SERPL-SCNC: 3.6 MMOL/L (ref 3.6–5.5)
PROT SERPL-MCNC: 7.7 G/DL (ref 6–8.2)
PROT UR QL STRIP: 30 MG/DL
RBC # BLD AUTO: 4.55 M/UL (ref 4.7–6.1)
RBC # URNS HPF: ABNORMAL /HPF (ref 0–2)
RBC UR QL AUTO: NEGATIVE
SODIUM SERPL-SCNC: 137 MMOL/L (ref 135–145)
SP GR UR STRIP.AUTO: 1.01
TROPONIN T SERPL-MCNC: 42 NG/L (ref 6–19)
TROPONIN T SERPL-MCNC: 49 NG/L (ref 6–19)
UROBILINOGEN UR STRIP.AUTO-MCNC: 0.2 EU/DL
WBC # BLD AUTO: 5.7 K/UL (ref 4.8–10.8)
WBC #/AREA URNS HPF: ABNORMAL /HPF

## 2025-08-17 PROCEDURE — 81001 URINALYSIS AUTO W/SCOPE: CPT

## 2025-08-17 PROCEDURE — 84484 ASSAY OF TROPONIN QUANT: CPT

## 2025-08-17 PROCEDURE — 85025 COMPLETE CBC W/AUTO DIFF WBC: CPT

## 2025-08-17 PROCEDURE — 83690 ASSAY OF LIPASE: CPT

## 2025-08-17 PROCEDURE — 93005 ELECTROCARDIOGRAM TRACING: CPT | Mod: TC

## 2025-08-17 PROCEDURE — 80053 COMPREHEN METABOLIC PANEL: CPT

## 2025-08-17 PROCEDURE — 99285 EMERGENCY DEPT VISIT HI MDM: CPT

## 2025-08-17 PROCEDURE — 96374 THER/PROPH/DIAG INJ IV PUSH: CPT

## 2025-08-17 PROCEDURE — 36415 COLL VENOUS BLD VENIPUNCTURE: CPT

## 2025-08-17 PROCEDURE — 700111 HCHG RX REV CODE 636 W/ 250 OVERRIDE (IP): Mod: JZ,UD | Performed by: EMERGENCY MEDICINE

## 2025-08-17 PROCEDURE — 71045 X-RAY EXAM CHEST 1 VIEW: CPT

## 2025-08-17 PROCEDURE — 83880 ASSAY OF NATRIURETIC PEPTIDE: CPT

## 2025-08-17 PROCEDURE — 93005 ELECTROCARDIOGRAM TRACING: CPT | Mod: TC | Performed by: EMERGENCY MEDICINE

## 2025-08-17 RX ORDER — FUROSEMIDE 10 MG/ML
60 INJECTION INTRAMUSCULAR; INTRAVENOUS ONCE
Status: COMPLETED | OUTPATIENT
Start: 2025-08-17 | End: 2025-08-17

## 2025-08-17 RX ADMIN — FUROSEMIDE 60 MG: 10 INJECTION, SOLUTION INTRAVENOUS at 22:04

## 2025-08-17 ASSESSMENT — LIFESTYLE VARIABLES
HOW MANY STANDARD DRINKS CONTAINING ALCOHOL DO YOU HAVE ON A TYPICAL DAY: PATIENT DOES NOT DRINK
SKIP TO QUESTIONS 9-10: 1
AUDIT-C TOTAL SCORE: 0
HOW OFTEN DO YOU HAVE A DRINK CONTAINING ALCOHOL: NEVER
HOW OFTEN DO YOU HAVE SIX OR MORE DRINKS ON ONE OCCASION: NEVER

## 2025-08-17 ASSESSMENT — PAIN DESCRIPTION - PAIN TYPE: TYPE: ACUTE PAIN

## 2025-08-17 ASSESSMENT — FIBROSIS 4 INDEX: FIB4 SCORE: 1.23

## 2025-08-29 ENCOUNTER — HOSPITAL ENCOUNTER (OUTPATIENT)
Dept: LAB | Facility: MEDICAL CENTER | Age: 60
End: 2025-08-29
Attending: PHYSICIAN ASSISTANT
Payer: MEDICAID

## 2025-08-29 LAB
ALBUMIN SERPL BCP-MCNC: 4.1 G/DL (ref 3.2–4.9)
ALBUMIN/GLOB SERPL: 1.2 G/DL
ALP SERPL-CCNC: 111 U/L (ref 30–99)
ALT SERPL-CCNC: 19 U/L (ref 2–50)
ANION GAP SERPL CALC-SCNC: 12 MMOL/L (ref 7–16)
AST SERPL-CCNC: 30 U/L (ref 12–45)
BASOPHILS # BLD AUTO: 0.8 % (ref 0–1.8)
BASOPHILS # BLD: 0.04 K/UL (ref 0–0.12)
BILIRUB SERPL-MCNC: 1 MG/DL (ref 0.1–1.5)
BUN SERPL-MCNC: 22 MG/DL (ref 8–22)
CALCIUM ALBUM COR SERPL-MCNC: 9 MG/DL (ref 8.5–10.5)
CALCIUM SERPL-MCNC: 9.1 MG/DL (ref 8.5–10.5)
CHLORIDE SERPL-SCNC: 105 MMOL/L (ref 96–112)
CO2 SERPL-SCNC: 22 MMOL/L (ref 20–33)
CREAT SERPL-MCNC: 1.18 MG/DL (ref 0.5–1.4)
EOSINOPHIL # BLD AUTO: 0.1 K/UL (ref 0–0.51)
EOSINOPHIL NFR BLD: 2 % (ref 0–6.9)
ERYTHROCYTE [DISTWIDTH] IN BLOOD BY AUTOMATED COUNT: 60.5 FL (ref 35.9–50)
EST. AVERAGE GLUCOSE BLD GHB EST-MCNC: 146 MG/DL
FOLATE SERPL-MCNC: 15.7 NG/ML
GFR SERPLBLD CREATININE-BSD FMLA CKD-EPI: 70 ML/MIN/1.73 M 2
GLOBULIN SER CALC-MCNC: 3.3 G/DL (ref 1.9–3.5)
GLUCOSE SERPL-MCNC: 95 MG/DL (ref 65–99)
HBA1C MFR BLD: 6.7 % (ref 4–5.6)
HCT VFR BLD AUTO: 43.4 % (ref 42–52)
HCV AB SER QL: NORMAL
HGB BLD-MCNC: 14.1 G/DL (ref 14–18)
HIV 1+2 AB+HIV1 P24 AG SERPL QL IA: NORMAL
IMM GRANULOCYTES # BLD AUTO: 0.01 K/UL (ref 0–0.11)
IMM GRANULOCYTES NFR BLD AUTO: 0.2 % (ref 0–0.9)
LYMPHOCYTES # BLD AUTO: 0.93 K/UL (ref 1–4.8)
LYMPHOCYTES NFR BLD: 18.6 % (ref 22–41)
MAGNESIUM SERPL-MCNC: 2.4 MG/DL (ref 1.5–2.5)
MCH RBC QN AUTO: 31.8 PG (ref 27–33)
MCHC RBC AUTO-ENTMCNC: 32.5 G/DL (ref 32.3–36.5)
MCV RBC AUTO: 98 FL (ref 81.4–97.8)
MONOCYTES # BLD AUTO: 0.54 K/UL (ref 0–0.85)
MONOCYTES NFR BLD AUTO: 10.8 % (ref 0–13.4)
NEUTROPHILS # BLD AUTO: 3.38 K/UL (ref 1.82–7.42)
NEUTROPHILS NFR BLD: 67.6 % (ref 44–72)
NRBC # BLD AUTO: 0 K/UL
NRBC BLD-RTO: 0 /100 WBC (ref 0–0.2)
PLATELET # BLD AUTO: 264 K/UL (ref 164–446)
PMV BLD AUTO: 11.2 FL (ref 9–12.9)
POTASSIUM SERPL-SCNC: 4 MMOL/L (ref 3.6–5.5)
PROT SERPL-MCNC: 7.4 G/DL (ref 6–8.2)
RBC # BLD AUTO: 4.43 M/UL (ref 4.7–6.1)
SODIUM SERPL-SCNC: 139 MMOL/L (ref 135–145)
VIT B12 SERPL-MCNC: 620 PG/ML (ref 211–911)
WBC # BLD AUTO: 5 K/UL (ref 4.8–10.8)

## 2025-08-29 PROCEDURE — 86803 HEPATITIS C AB TEST: CPT

## 2025-08-29 PROCEDURE — 80053 COMPREHEN METABOLIC PANEL: CPT

## 2025-08-29 PROCEDURE — 82746 ASSAY OF FOLIC ACID SERUM: CPT

## 2025-08-29 PROCEDURE — 83735 ASSAY OF MAGNESIUM: CPT

## 2025-08-29 PROCEDURE — 87389 HIV-1 AG W/HIV-1&-2 AB AG IA: CPT

## 2025-08-29 PROCEDURE — 83036 HEMOGLOBIN GLYCOSYLATED A1C: CPT

## 2025-08-29 PROCEDURE — 36415 COLL VENOUS BLD VENIPUNCTURE: CPT

## 2025-08-29 PROCEDURE — 85025 COMPLETE CBC W/AUTO DIFF WBC: CPT

## 2025-08-29 PROCEDURE — 82607 VITAMIN B-12: CPT
